# Patient Record
Sex: MALE | Race: WHITE | NOT HISPANIC OR LATINO | Employment: OTHER | URBAN - METROPOLITAN AREA
[De-identification: names, ages, dates, MRNs, and addresses within clinical notes are randomized per-mention and may not be internally consistent; named-entity substitution may affect disease eponyms.]

---

## 2017-01-08 ENCOUNTER — THERAPY VISIT (OUTPATIENT)
Dept: SLEEP MEDICINE | Facility: CLINIC | Age: 67
End: 2017-01-08
Attending: INTERNAL MEDICINE
Payer: COMMERCIAL

## 2017-01-08 DIAGNOSIS — R06.83 SNORING: ICD-10-CM

## 2017-01-08 DIAGNOSIS — I48.0 PAROXYSMAL ATRIAL FIBRILLATION (H): ICD-10-CM

## 2017-01-08 DIAGNOSIS — G47.30 OBSERVED SLEEP APNEA: ICD-10-CM

## 2017-01-08 PROCEDURE — 95811 POLYSOM 6/>YRS CPAP 4/> PARM: CPT | Mod: ZF

## 2017-01-09 NOTE — PATIENT INSTRUCTIONS
[place] [facility]    1. Your sleep study will be reviewed by a sleep physician within the next few days.     2. Please follow up in the sleep clinic as scheduled, or, make an appointment with your sleep provider to be seen within two weeks to discuss the results of the sleep study.    3. If you have any questions or problems with your treatment plan, please contact your sleep clinic provider at [phone] to further manage your condition.    4. Please review your attached medication list, and, at your follow-up appointment advise your sleep clinic provider about any changes.    5. Go to [email or web address] for more information about your sleep problems.    Yara Avelar, PSGT  January 9, 2017

## 2017-01-09 NOTE — PROGRESS NOTES
Completed a split night PSG per provider order.    Preliminary AHI 29.  A final therapeutic PAP pressure was achieved.    Supine REM was seen on therapeutic pressure.    Patient reports feeling not refreshed in AM.

## 2017-01-10 ENCOUNTER — TELEPHONE (OUTPATIENT)
Dept: GASTROENTEROLOGY | Facility: OUTPATIENT CENTER | Age: 67
End: 2017-01-10

## 2017-01-10 DIAGNOSIS — R35.0 URINARY FREQUENCY: Primary | ICD-10-CM

## 2017-01-10 DIAGNOSIS — Z12.11 ENCOUNTER FOR SCREENING COLONOSCOPY: Primary | ICD-10-CM

## 2017-01-10 RX ORDER — ALFUZOSIN HYDROCHLORIDE 10 MG/1
10 TABLET, EXTENDED RELEASE ORAL DAILY
Qty: 90 TABLET | Refills: 1 | Status: SHIPPED | OUTPATIENT
Start: 2017-01-10 | End: 2017-07-06

## 2017-01-10 ASSESSMENT — ACTIVITIES OF DAILY LIVING (ADL)
ARE_THERE_FIREARMS_IN_YOUR_HOME?: N
DO_MEMBERS_OF_YOUR_HOUSEHOLD_WEAR_SEAT_BELTS?: Y
ARE_THERE_CARBON_MONOXIDE_DETECTORS_IN_YOUR_HOME?: Y
DO_MEMBERS_OF_YOUR_HOUSEHOLD_USE_SUNSCREEN?: Y
DO_MEMBERS_OF_YOUR_HOUSEHOLD_USE_SAFETY_HELMETS?: Y
ARE_THERE_SMOKE_DETECTORS_IN_YOUR_HOME?: Y

## 2017-01-10 NOTE — TELEPHONE ENCOUNTER
Patient taking any blood thinners ? 81 mg aspirin    Heart disease ? Paroxysmal atrial fibrillation pt. Reports no current problems    Lung disease ? denies      Sleep apnea ? possible    Diabetic ? denies    Kidney disease ? denies    Dialysis ? n/a    Electronic implanted medical devices ? denies    Are you taking any narcotic pain medication ?  no What is your daily dosage ?    PTSD ? n/a    Prep instructions reviewed with patient ? Instructions,  policy, MAC sedation plan reviewed. Advised pt. To have someone stay with him post exam    Pharmacy : [facility]    Indication for procedure : screening colonoscopy    Referring provider : Dr. Lucy Colon

## 2017-01-16 ENCOUNTER — OFFICE VISIT (OUTPATIENT)
Dept: INTERNAL MEDICINE | Facility: CLINIC | Age: 67
End: 2017-01-16

## 2017-01-16 VITALS
OXYGEN SATURATION: 96 % | HEART RATE: 51 BPM | HEIGHT: 69 IN | SYSTOLIC BLOOD PRESSURE: 115 MMHG | DIASTOLIC BLOOD PRESSURE: 70 MMHG | RESPIRATION RATE: 20 BRPM | BODY MASS INDEX: 26.51 KG/M2 | WEIGHT: 179 LBS

## 2017-01-16 DIAGNOSIS — G47.30 SLEEP APNEA, UNSPECIFIED TYPE: ICD-10-CM

## 2017-01-16 DIAGNOSIS — I48.0 PAROXYSMAL ATRIAL FIBRILLATION (H): ICD-10-CM

## 2017-01-16 DIAGNOSIS — M72.0 DUPUYTREN'S CONTRACTURE OF BOTH HANDS: ICD-10-CM

## 2017-01-16 DIAGNOSIS — B18.2 CHRONIC HEPATITIS C WITHOUT HEPATIC COMA (H): ICD-10-CM

## 2017-01-16 DIAGNOSIS — Z01.810 PRE-OPERATIVE CARDIOVASCULAR EXAMINATION: Primary | ICD-10-CM

## 2017-01-16 LAB
ANION GAP SERPL CALCULATED.3IONS-SCNC: 6 MMOL/L (ref 3–14)
BUN SERPL-MCNC: 13 MG/DL (ref 7–30)
CALCIUM SERPL-MCNC: 8.5 MG/DL (ref 8.5–10.1)
CHLORIDE SERPL-SCNC: 107 MMOL/L (ref 94–109)
CO2 SERPL-SCNC: 28 MMOL/L (ref 20–32)
CREAT SERPL-MCNC: 0.88 MG/DL (ref 0.66–1.25)
GFR SERPL CREATININE-BSD FRML MDRD: 86 ML/MIN/1.7M2
GLUCOSE SERPL-MCNC: 93 MG/DL (ref 70–99)
POTASSIUM SERPL-SCNC: 3.7 MMOL/L (ref 3.4–5.3)
SODIUM SERPL-SCNC: 140 MMOL/L (ref 133–144)

## 2017-01-16 ASSESSMENT — PAIN SCALES - GENERAL: PAINLEVEL: NO PAIN (0)

## 2017-01-16 NOTE — PATIENT INSTRUCTIONS
[facility] Medication Refill Request Information:  * Please contact your pharmacy regarding ANY request for medication refills.  ** [facility] Prescription Fax = [phone]  * Please allow 3 business days for routine medication refills.  * Please allow 5 business days for controlled substance medication refills.     [facility] Test Result notification information:  *You will be notified with in 7-10 days of your appointment day regarding the results of your test.  If you are on MyChart you will be notified as soon as the provider has reviewed the results and signed off on them.    Take your medication as usual on the day prior to your surgery  Resume them the evening of your surgery, including the aspirin  Go to the lab before you leave today  If your feet become painful, or swollen, etc., let me know and I will refer you to Podiatry  You may cancel your appointment with me next month  Follow up in one year and as needed  Dr. Edwards

## 2017-01-16 NOTE — NURSING NOTE
Chief Complaint   Patient presents with     Pre-Op Exam     pre op Release Dupuytrens contracture 1/20/17 (right hand)   Alessandra Hurt LPN 8:39 AM on 1/16/2017

## 2017-01-16 NOTE — PROGRESS NOTES
CC:  Preop    HPI:  Frank Orellana presents today at the request of Dr. Lars Oleary for perioperative cardiopulmonary risk assessment.  The proposed procedure is:  RELEASE DUPUYTRENS CONTRACTURE Right Other   Subtotal Palmar Facetectomy Right Ring Finger              Symptoms of bilateral hand contracture deformities have been progressive over the last few years, affecting daily living activities.  No pain, no history of trauma.  He has had no previous treatment, splinting, nor injections.  No weakness, numbness, tingling.  I saw him in November for preop and we ended up postponing surgery in order to complete his cardiac work up for aortic dilation and a fib.  He was also having some atypical chest pain which has since resolved.  Since I saw him in November 2016, he has had consultations as summarized below (copy and pasted key information):    12/12/16 Cardiology:  Assessment and Plan:   Frank Orellana is a 66 year old Mandaen male referred for symptomatic paroxysmal atrial fibrillation.    1. Paroxysmal atrial fibrillation [id number] (estimated 1.5% annual risk of stroke/thromboembolism) (age+):  Regarding thromboembolic prophylaxis, we discussed that there was some potential benefit for anticoagulation with a TPE1RE2-OXVd of 1 in preventing stroke, but that the risk-benefit profile of preventing thromboembolism vs. Potential for bleeding was not as clear as for a higher GQEC1SX-DSGq score. Particularly given his Episcopalian proscriptions against blood products, he feels that bleeding is the greater concern for him and he understands the 1.5%/year risk of thromboembolism. We have reduced his ASA to 81 mg daily.  He is on diltiazem CD for rate control, which he should continue.  We discussed the merits of a rhythm control strategy, as his A.fib has been significantly symptomatic. He would consider this in the future if episodes became more frequent, but prefers to remain on rate control only at the  present and we agreed with this approach.  Regarding risk factors, his wife reports witnessed apneic episodes and a sleep study is strongly indicated to assess for WINNIE, a modifiable risk factor for A.fib.    2. Thoracic aortic aneuyrsm: 4.5cm mid-ascending aorta on TTE 10/14/16. We have ordered an MRA chest to better assess aortic dimensions.    3. Preoperative cardiovascular examination:  Planned hand surgery is a non-emergent procedure that is low risk. He has no active unstable cardiac conditions (controlled paroxysmal A.fib and moderate aortic root dilatation are not contraindications), and has a functional capacity >4 METS without symptoms. His RCRI is 0, predicting a 0.4% risk of perioperative cardiovascular events.    No further cardiovascular workup is indicated prior to surgery.     Denis Martinez MD  Cardiology Fellow    EP STAFF NOTE  Patient seen and examined and management plan personally reviewed with the patient. I agree with the note above.  Jovanni Felipe MD [facility]  Cardiology - Electrophysiology      Regarding hepatitis C:  12/29/16 hepatology:  Frank Orellana  has chronic hepatitis C, genotype 1b.  He has stage 0 fibrosis as seen on the Fibrosis scan done 12/29/16.  He also had an US today that showed the liver has normal echotexture and no masses.  He  would like to participate in the Prioritize Study.  After discussion with our Research Coordinator, Mr. Orellana would like to have hand surgery first and then start the treatment.  He has agreed to be part of the study.    Sleep apnea f/u pending, had sleep study:  12/2016:  Central Sleep apnea, WINNIE, a fib  AHI 20 on 12/29/16 12/27/16 MR angio chest:  Impression:  1.  Normal caliber of thoracic aorta. The ascending aorta measures 3.9  cm, which is normal when indexed for age/gender/size.    Ziopatch 12/7/16:  Symptom events do not correlate with objective ectopy/arrythmia  Longest SVT run with variable HR, possibly A fib  SVT rage  , longest 45 sec with rate 120    11/11/16 US Aorta:  2.4 cm (WNL)    Hand Xray 10/28/16:  Reviewed    Cardiac risks:  None  Pulmonary risks:  Age, sleep apnea  Exercise tolerance:  Mets approx. 3-4, no concerns  Personal and/or family history of bleeding conditions and/or adverse anesthesia reactions:  None  Prior surgical complications:  None    ROS:  Submitted on 1/10/2017   Annual Exam:         General Symptoms: No    Skin Symptoms: No    HENT Symptoms: No    EYE SYMPTOMS: No    HEART SYMPTOMS: No    LUNG SYMPTOMS: No    INTESTINAL SYMPTOMS: No    URINARY SYMPTOMS: No    REPRODUCTIVE SYMPTOMS: No    SKELETAL SYMPTOMS: No    BLOOD SYMPTOMS: No    NERVOUS SYSTEM SYMPTOMS: No    MENTAL HEALTH SYMPTOMS: No    Frequency of exercise:: 2-3 days/week    Duration of exercise:: 45-60 minutes     Patient Active Problem List   Diagnosis     History of actinic keratoses     Telangiectasia     SK (seborrheic keratosis)     Patient is Mandaen     Refusal of blood transfusions as patient is Mandaen     Paroxysmal atrial fibrillation (H)     Pain in both hands     Chronic left shoulder pain     Chronic hepatitis C without hepatic coma (H)     Sleep apnea     Atrial fibrillation (H)       Past Medical History   Diagnosis Date     Atrial fibrillation (H) 1996     paroxysmal on 3-4 occasions     Actinic keratosis 2009     Hepatitis B 1969     acute infection at age 19; IV drug use     Patient is Mandaen      Refusal of blood transfusions as patient is Mandaen      Pain in both hands      Right shoulder pain      Tinnitus 1 or 2 years ago     both ears     Sleep apnea      central and obstructive     Atrial fibrillation (H) 6/15/96     Afib - 2 or 3 extended occurrences since       Past Surgical History   Procedure Laterality Date     Arthroscopy knee       left knee     Family History   Problem Relation Age of Onset     Arthritis Paternal Grandmother      GASTROINTESTINAL DISEASE Father  "      age 77 after colon surgery     Alcohol/Drug Mother       age 64     HEART DISEASE Brother      Social History     Social History     Marital Status:      Spouse Name: N/A     Number of Children: N/A     Years of Education: N/A     Occupational History     Not on file.     Social History Main Topics     Smoking status: Former Smoker -- 0.50 packs/day for 10 years     Types: Cigarettes, Other     Start date: 06/15/1964     Quit date: 1974     Smokeless tobacco: Never Used     Alcohol Use: 1.8 - 2.4 oz/week      Comment: 4 or 5 drinks/week, limit of 1     Drug Use: Yes     Special: Marijuana, Methamphetamines, IV      Comment:      Sexual Activity:     Partners: Female     Birth Control/ Protection: None     Other Topics Concern     Special Diet No     eats healthy     Exercise No     Social History Narrative    Chemical manufacturing solvents    Cleaning     [employer]    Retired    , no kids         Current Outpatient Prescriptions   Medication Sig Dispense Refill     alfuzosin (UROXATRAL) 10 MG 24 hr tablet Take 1 tablet (10 mg) by mouth daily 90 tablet 1     aspirin 81 MG EC tablet Take 1 tablet (81 mg) by mouth daily 90 tablet 3     diltiazem 180 MG 24 hr capsule Take 1 capsule (180 mg) by mouth daily (Patient taking differently: Take 180 mg by mouth daily 120 mg finishing up old script) 90 capsule 3     Allergies   Allergen Reactions     Blood Transfusion Related (Informational Only)      Orthodoxy.  Declines blood transfusions.     Contrast Dye Rash     /70 mmHg  Pulse 51  Resp 20  Ht 1.753 m (5' 9\")  Wt 81.194 kg (179 lb)  BMI 26.42 kg/m2  SpO2 96%  Physical Examination:  General:  Pleasant, alert, no acute distress  Eyes:  Pupils 2-3 mm, sclera white, EOM's full.    Ears:  TM's normal, EAC's patent, clear of cerumen  Nose:  Nasal passages clear, turbinates not swollen  Throat/Mouth:  No pharyngeal erythema or exudate, oral mucosa and tongue moist, no " suspicious oral lesions  Neck:  Full AROM, supple, thyroid smooth, symmetric, not enlarged, no nodules  Lungs:  Clear to auscultation throughout, no wheezes, rhonchi or rales.  C/V:  Regular rate and rhythm today, no murmurs, rubs or gallops.  No JVD, no carotid bruits.  No peripheral edema.   Abdomen:  Not distended.  Bowel sounds active.  No tenderness, no hepatosplenomegaly or masses.  No CVA tenderness or masses.  Lymph:  No cervical lymph nodes.  Neuro:  Alert and oriented, face symmetric. No tremor.  Gait steady.    Skin:   No rashes or jaundice.  Normal moisture, good skin turgor.  Psych:  Broad range affect.  Not psychomotor slowed.  No signs of anxiety or agitation.    Component      Latest Ref Rng 12/2/2016 1/16/2017   WBC      4.0 - 11.0 10e9/L 5.5    RBC Count      4.4 - 5.9 10e12/L 4.67    Hemoglobin      13.3 - 17.7 g/dL 14.5    Hematocrit      40.0 - 53.0 % 42.4    MCV      78 - 100 fl 91    MCH      26.5 - 33.0 pg 31.0    MCHC      31.5 - 36.5 g/dL 34.2    RDW      10.0 - 15.0 % 13.0    Platelet Count      150 - 450 10e9/L 184    Diff Method       Automated Method    % Neutrophils       58.8    % Lymphocytes       27.7    % Monocytes       10.6    % Eosinophils       2.2    % Basophils       0.5    % Immature Granulocytes       0.2    Nucleated RBCs      0 /100 0    Absolute Neutrophil      1.6 - 8.3 10e9/L 3.2    Absolute Lymphocytes      0.8 - 5.3 10e9/L 1.5    Absolute Monocytes      0.0 - 1.3 10e9/L 0.6    Absolute Eosinophils      0.0 - 0.7 10e9/L 0.1    Absolute Basophils      0.0 - 0.2 10e9/L 0.0    Abs Immature Granulocytes      0 - 0.4 10e9/L 0.0    Absolute Nucleated RBC       0.0    Sodium      133 - 144 mmol/L  140   Potassium      3.4 - 5.3 mmol/L  3.7   Chloride      94 - 109 mmol/L  107   Carbon Dioxide      20 - 32 mmol/L  28   Anion Gap      3 - 14 mmol/L  6   Glucose      70 - 99 mg/dL  93   Urea Nitrogen      7 - 30 mg/dL  13   Creatinine      0.66 - 1.25 mg/dL  0.88   GFR  Estimate      >60 mL/min/1.7m2  86   GFR Estimate If Black      >60 mL/min/1.7m2  >90 . . .   Calcium      8.5 - 10.1 mg/dL  8.5     Frank was seen today for pre-op exam.    Diagnoses and all orders for this visit:    Pre-operative cardiovascular examination  . . .completed today for patient undergoing release of dupuytren's contractures.  Frank has a low cardiopulmonary risk profile for this low risk surgery.  He will take his diltiazem as usual the night prior to surgery  ASA will be on hold preoperative, may resume same day post op  See cardiac evaluation in HPI section  No additional testing indicated at this time.    Dupuytren's contracture of both hands--plan is right hand followed by left    Patient is Judaism    Refusal of blood transfusions as patient is Judaism    Paroxysmal atrial fibrillation (H)   Rate controlled, paroxysmal, currently in SR, asymptomatic  -     Basic metabolic panel; Future    Chronic hepatitis C without hepatic coma (H)    Sleep apnea, unspecified type    Follow up as needed.    Vanessa Edwards M.D.  Internal Medicine  Primary Care Center   pager [phone]

## 2017-01-16 NOTE — MR AVS SNAPSHOT
After Visit Summary   1/16/2017    Frank Orellana    MRN: [id number]           Patient Information     Date Of Birth          1950        Visit Information        Provider Department      1/16/2017 8:40 AM Vanessa Edwards MD [facility]        Today's Diagnoses     Pre-operative cardiovascular examination    -  1     Pain of right hand         Patient is Gnosticism         Refusal of blood transfusions as patient is Gnosticism         Paroxysmal atrial fibrillation (H)         Chronic hepatitis C without hepatic coma (H)         Sleep apnea, unspecified type         Sleep apnea, unspecified type           Care Instructions    [facility] Care Center Medication Refill Request Information:  * Please contact your pharmacy regarding ANY request for medication refills.  ** [facility] Prescription Fax = [phone]  * Please allow 3 business days for routine medication refills.  * Please allow 5 business days for controlled substance medication refills.     [facility] Test Result notification information:  *You will be notified with in 7-10 days of your appointment day regarding the results of your test.  If you are on MyChart you will be notified as soon as the provider has reviewed the results and signed off on them.    Take your medication as usual on the day prior to your surgery  Resume them the evening of your surgery, including the aspirin  Go to the lab before you leave today  If your feet become painful, or swollen, etc., let me know and I will refer you to Podiatry  You may cancel your appointment with me next month  Follow up in one year and as needed  Dr. Edwards          Follow-ups after your visit        Your next 10 appointments already scheduled     Jan 16, 2017  9:45 AM   LAB with  LAB   [facility] ([facility])    [address] [place] [address] [phone]           Patient must bring  picture ID.  Patient should be prepared to give a urine specimen  Please do not eat 10-12 hours before your appointment if you are coming in fasting for labs on lipids, cholesterol, or glucose (sugar).  Pregnant women should follow their Care Team instructions. Water with medications is okay. Do not drink coffee or other fluids.   If you have concerns about taking  your medications, please ask at office or if scheduling via [employer], send a message by clicking on Secure Messaging, Message Your Care Team.            Jan 17, 2017  7:30 AM   Colonoscopy with Darrion Tompkins MD   [facility] ([facility])    [address]  [facility] [address]   [phone]            Jan 18, 2017  1:00 PM   Return Sleep Patient with Melissa Stanford MD   [facility] [place], Sleep Study ([facility])    [address] [place] [address] [phone]            Jan 20, 2017   Procedure with Lars Oleary MD   [facility] [place], Same Day Surgery (--)    [facility] [address] [phone]            Jan 27, 2017 11:40 AM   (Arrive by 11:25 AM)   RETURN HAND with Lars Oleary MD   [facility] ([facility])    [address] [place] [address] [phone]            Feb 09, 2017  7:00 AM   (Arrive by 6:45 AM)   PHYSICAL with Vanessa Edwards MD   [facility] ([facility])    [address] [place] [address] [phone]            Apr 27, 2017  8:00 AM   Ech Complete with UCECHCR2   [facility] Echo ([facility] [place])    [address] [place] [address] [phone]           1.  Please bring or wear a comfortable two-piece outfit. 2.  You may eat, drink and take your normal medicines. 3.  For any questions that cannot be  answered, please contact the ordering physician            Jun 07, 2017  8:00 AM   (Arrive by 7:45 AM)   RETURN ARRHYTHMIA with Jovanni Felipe MD   [facility] ([facility])    [address] [facility] [address] [facility]  [address] [facility] [address] [phone]            Haseeb 15, 2017  7:30 AM   Walk In From ENT with NIKHIL Russo [facility] ([facility])    [address] [place] [address]  [address] [facility] [address] [phone]            Haseeb 15, 2017  8:30 AM   (Arrive by 8:15 AM)   Return Visit with Andrae Laughlin MD   [facility] ([facility])    [address] [place] [address] [phone]              Future tests that were ordered for you today     Open Future Orders        Priority Expected Expires Ordered    Basic metabolic panel Routine  1/16/2018 1/16/2017            Who to contact     Please call your clinic at [phone] to:    Ask questions about your health    Make or cancel appointments    Discuss your medicines    Learn about your test results    Speak to your doctor   If you have compliments or concerns about an experience at your clinic, or if you wish to file a complaint, please contact [facility] Patient Relations at [phone] or email us at [email or web address]         Additional Information About Your Visit        [employer] Information     [employer] gives you secure access to your electronic health record. If you see a primary care provider, you can also send messages to your care team and make appointments. If you have questions, please call your primary care clinic.  If you do not have a primary care provider, please call [phone] and they will assist you.      [employer] is an electronic gateway that provides easy, online access to your medical records. With [employer], you can request a clinic appointment, read  "your test results, renew a prescription or communicate with your care team.     To access your existing account, please contact your [facility] or call [phone] for assistance.        Care EveryWhere ID     This is your Care EveryWhere ID. This could be used by other organizations to access your [facility] medical records  [id number]        Your Vitals Were     Pulse Respirations Height BMI (Body Mass Index) Pulse Oximetry       51 20 1.753 m (5' 9\") 26.42 kg/m2 96%        Blood Pressure from Last 3 Encounters:   01/16/17 115/70   12/29/16 137/77   12/22/16 102/56    Weight from Last 3 Encounters:   01/16/17 81.194 kg (179 lb)   12/29/16 81.285 kg (179 lb 3.2 oz)   12/22/16 81.647 kg (180 lb)                 Today's Medication Changes          These changes are accurate as of: 1/16/17  9:43 AM.  If you have any questions, ask your nurse or doctor.               These medicines have changed or have updated prescriptions.        Dose/Directions    diltiazem 180 MG 24 hr capsule   This may have changed:  additional instructions   Used for:  Paroxysmal atrial fibrillation (H)        Dose:  180 mg   Take 1 capsule (180 mg) by mouth daily   Quantity:  90 capsule   Refills:  3                Primary Care Provider Office Phone # Fax #    Vanessa Edwards [phone]        [facility] [address] [place] [address] [address]  [facility] [address]        Thank you!     Thank you for choosing [facility]  for your care. Our goal is always to provide you with excellent care. Hearing back from our patients is one way we can continue to improve our services. Please take a few minutes to complete the written survey that you may receive in the mail after your visit with us. Thank you!             Your Updated Medication List - Protect others around you: Learn how to safely use, store and throw away your medicines at www.disposemymeds.org.          This list is accurate as of: " 1/16/17  9:43 AM.  Always use your most recent med list.                   Brand Name Dispense Instructions for use    alfuzosin 10 MG 24 hr tablet    UROXATRAL    90 tablet    Take 1 tablet (10 mg) by mouth daily       aspirin 81 MG EC tablet     90 tablet    Take 1 tablet (81 mg) by mouth daily       diltiazem 180 MG 24 hr capsule     90 capsule    Take 1 capsule (180 mg) by mouth daily

## 2017-01-17 ENCOUNTER — TRANSFERRED RECORDS (OUTPATIENT)
Dept: HEALTH INFORMATION MANAGEMENT | Facility: CLINIC | Age: 67
End: 2017-01-17

## 2017-01-18 ENCOUNTER — OFFICE VISIT (OUTPATIENT)
Dept: SLEEP MEDICINE | Facility: CLINIC | Age: 67
End: 2017-01-18
Attending: INTERNAL MEDICINE
Payer: COMMERCIAL

## 2017-01-18 VITALS
HEART RATE: 58 BPM | SYSTOLIC BLOOD PRESSURE: 109 MMHG | DIASTOLIC BLOOD PRESSURE: 60 MMHG | RESPIRATION RATE: 18 BRPM | BODY MASS INDEX: 26.36 KG/M2 | WEIGHT: 178 LBS | OXYGEN SATURATION: 95 % | HEIGHT: 69 IN

## 2017-01-18 DIAGNOSIS — G47.33 OSA (OBSTRUCTIVE SLEEP APNEA): Primary | ICD-10-CM

## 2017-01-18 PROBLEM — D12.6 TUBULAR ADENOMA OF COLON: Status: ACTIVE | Noted: 2017-01-17

## 2017-01-18 PROCEDURE — 99211 OFF/OP EST MAY X REQ PHY/QHP: CPT | Mod: ZF

## 2017-01-18 NOTE — PROCEDURES
"SLEEP STUDY INTERPRETATION  SPLIT-NIGHT STUDY      Patient: Frank Orellana  YOB: 1950  Study Date: 1/8/2017  MRN: [id number]  Referring Provider: Jovanni Felipe MD  Ordering Provider: Melissa Stanford MD    Indications for Polysomnography: The patient is a 66 y old Male who is 5' 9\" and weighs 180.0 lbs.  His BMI is 26.7, [place] sleepiness scale is 2.0 and neck size is 39 cm.  Relevant medical history includes paroxysmal atrial fibrillation.  A diagnostic polysomnogram was performed to evaluate for Sleep Apnea.  After 126.5 minutes of sleep time the patient exhibited sufficient respiratory events qualifying him for a CPAP trial which was then initiated.      Polysomnogram Data:  A full night polysomnogram recorded the standard physiologic parameters including EEG, EOG, EMG, ECG, nasal and oral airflow.  Respiratory parameters of chest and abdominal movements were recorded with respiratory inductance plethysmography.  Oxygen saturation was recorded by pulse oximetry.      Diagnostic PSG  Sleep Architecture: Increased arousal index with normal sleep efficiency.  The total recording time of the diagnostic portion of the study was 141.5 minutes.  The total sleep time was 126.5 minutes.  During the diagnostic portion of the study the sleep latency was decreased at 2.0 minutes with the use of a sleep aid (zolpidem 5 mg.)  REM latency was 52.0 minutes.  Arousal index was increased at 32.7 arousals per hour.  Sleep efficiency was normal at 89.4%.  Wake after sleep onset was 3.0 minutes.   The patient spent 5.9% of total sleep time in Stage N1, 33.6% in Stage N2, 52.6% in Stage N3 and 7.9% in REM.       Respiration: Severe obstructive sleep apnea with mild associated hypoxemia notable in REM sleep.    Events - During the diagnostic portion of the study, the polysomnogram revealed a presence of 10 obstructive, 5 central, and 9 mixed apneas resulting in an apnea index of 11.4 events per hour.  There were 44 " hypopneas resulting in a hypopnea index of 20.9 events per hour.  The combined apnea/hypopnea index was 32.3 events per hour.  The REM AHI was 48.0 events per hour.  The supine AHI was 32.3 events per hour.  The RERA index was 1.9 events per hour.  The RDI was 34.2 events per hour.     Snoring - was reported as moderate and  intermittent.    Respiratory rate and pattern - was notable for normal respiratory rate and pattern.    Sustained Sleep Associated Hypoventilation - Transcutaneous carbon dioxide monitoring was not used, however significant hypoventilation was not suggested by oximetry.    Sleep Associated Hypoxemia - (Greater than 5 minutes O2 sat below 89%) was not present.  Baseline oxygen saturation was 92.9%. Lowest oxygen saturation was 75.2%.  Time spent less than or equal to 88% was 5.2 minutes.  Time spent less than or equal to 89% was 7.0 minutes.  34.2 1.9 32.3     Treatment PSG  Sleep Architecture: Normal arousal index with decreased sleep efficiency on PAP therapy.  At 01:37:58 AM the patient was placed on CPAP treatment and was titrated at pressures ranging from 5 cmH2O up to 7 cmH2O.  The total recording time of the treatment portion of the study was 272.4 minutes.  The total sleep time was 224.0 minutes.  During the treatment portion of the study the sleep latency was 2.5 minutes.  REM latency was 42.0 minutes.  Arousal index was normal at 19.6 arousals per hour.  Sleep efficiency was decreased at 82.2%.  Wake after sleep onset was 38.5 minutes.  The patient spent 8.7% of total sleep time in Stage N1, 66.1% in Stage N2, 10.3% in Stage N3 and 15.0% in REM.       Respiration: Final CPAP pressure of 7 resolved obstructive respiratory events including REM supine.  The optimal pressure was 7 cmH2O with an AHI of 3.2 events per hour.  Time in REM supine on final pressure was 12.0 minutes.   This titration was considered optimal (residual AHI < 5 events per hour and including REM-supine sleep at final  pressure).     Movement Activity: Periodic limb movement noted on diagnostic, none when on PAP.    Periodic Limb Movements  o During the diagnostic portion of the study, there were 45 PLMs recorded. The PLM index was 21.3 movements per hour.  The PLM Arousal Index was 1.9 per hour.    During the treatment portion of the study, there were 0 PLMs recorded.     REM EMG Activity - Excessive transient / sustained muscle activity was not present.    Nocturnal Behavior - Abnormal sleep related behaviors were not noted.    Bruxism - None apparent.    Cardiac Summary: Short runs of supraventricular tachycardia as well as occasional early wide QRS complex beats (PVCs.)  During the diagnostic portion of the study, the average pulse rate was 44.9 bpm.  The minimum pulse rate was 36.9 bpm while the maximum pulse rate was 80.7 bpm.    During the treatment portion of the study, the average pulse rate was 43.4 bpm.  The minimum pulse rate was 34.9 bpm while the maximum pulse rate was 72.1 bpm.       Assessment:     Severe obstructive sleep apnea with AHI 32.2 events per hour and mild associated hypoxemia notable in REM sleep.    Increased arousal index with normal sleep efficiency.    Final CPAP pressure of 7 resolved obstructive respiratory events including REM supine.    Normal arousal index with decreased sleep efficiency on PAP therapy.    Periodic limb movement noted on diagnostic, none when on PAP.    Short runs of supraventricular tachycardia as well as occasional early wide QRS complex beats (PVCs.)    Recommendations:    Treatment of WINNIE with CPAP at 7 cmH2O.      Recommend clinical follow up with sleep management team, for coaching and review of effectiveness and measures.    Patient may be a candidate for dental appliance through referral to Sleep Dentistry for the treatment of obstructive sleep apnea and/or socially disruptive snoring.    If devices are not acceptable or effective, patient may benefit from evaluation of  possible surgical options for the treatment of obstructive sleep apnea and/or socially disruptive snoring.  If he is interested, would recommend referral to specialized ENT-Sleep provider.    Advise regarding the risks of drowsy driving.    Suggest optimizing sleep schedule and avoiding sleep deprivation.    Pharmacologic therapy should be used for management of restless legs syndrome only if present and clinically indicated and not based on the presence of periodic limb movements alone.    Diagnostic Codes:  Obstructive Sleep Apnea G47.33  Sleep Hypoxemia/Hypoventilation G47.36                                                                                             _____________________________________   Melissa Stanford MD 1/18/2017             Table of Oximetry Distribution    Range(%) Time in range (min) Time in range (%) Time in or below range (min) Time in or below range (%)   0.0 - 88.0 5.2 1.3% 5.2 1.3%   0.0 - 89.0 7.0 1.7% 7.0 1.7%

## 2017-01-18 NOTE — NURSING NOTE
"Chief Complaint   Patient presents with     RECHECK     Follow up to discuss PSG results       Initial Ht 1.753 m (5' 9\")  Wt 80.74 kg (178 lb)  BMI 26.27 kg/m2 Estimated body mass index is 26.27 kg/(m^2) as calculated from the following:    Height as of this encounter: 1.753 m (5' 9\").    Weight as of this encounter: 80.74 kg (178 lb).  BP completed using cuff size: regular right arm    ROX Jin          "

## 2017-01-18 NOTE — PROGRESS NOTES
DATE OF SERVICE:  01/18/2017      CHIEF COMPLAINT:  Followup of sleep study.      HISTORY OF PRESENT ILLNESS:  Frank Orellana is a 67-year-old gentleman with history of paroxysmal atrial fibrillation.  He was having observed apneas by his wife for many years.  Finally, had overnight polysomnography performed.  He is here today for those results.  They are summarized as follows:   Study was done on 01/08/2017.  Total sleep time 126 minutes  during the diagnostic portion. Sleep latency was 2 minutes with the use of a sleep aid.  Overall, there were 68 apneas and hypopneas, for an apnea-hypopnea index of 32.3.  All sleep was supine.  REM and non-REM were seen.   35% of the events were central in nature.  CPAP was initiated and final CPAP pressure of 7 led to resolution of sleep disordered breathing.  There was mild associated hypoxemia during the study primarily in REM sleep.  This was also resolved on PAP.  No significant periodic limb movements.  There was a short period of what appears to be supraventricular tachycardia as well as frequent PVCs.       ASSESSMENT:  Severe obstructive sleep apnea with comorbidities of cardiac arrhythmias.  He has a good CPAP titration.        PLAN: We discussed treatment options in detail today.  We recommended CPAP due to the severity of the sleep apnea and his good result during the sleep study, and arrhythmias.  Also discussed dental appliances and position restriction.  Unfortunately, the sleep study did not show any lateral sleep so I cannot say for sure that his sleep apnea would be milder or resolved with lateral sleep.  The patient is noted to sleep on his back at home.  Also reviewed the process of getting a machine, sleep therapy management process and the importance of finding a good fitting mask.  We reviewed the importance of treating severe sleep apnea.  I do not think weight loss would be helpful in this situation. We will generate orders for him to initiate CPAP therapy.   He will schedule a setup, be enrolled in the sleep therapy management program which was reviewed with him in detail today.  He will follow up with me face-to-face approximately 7 weeks later.  He was given a copy of the final report. He is agreeable with this plan.  Questions were answered.  Please see above for further details of counseling provided.      Twenty five  minutes spent with patient, greater than 50% spent in counseling and coordinating care.         CARLA GANNON MD             D: 2017 13:43   T: 2017 16:44   MT: HAZEL      Name:     CATHERINE OH   MRN:      [id number]        Account:      [id number]   :      1950           Visit Date:   2017      Document: [id number]

## 2017-01-18 NOTE — MR AVS SNAPSHOT
After Visit Summary   1/18/2017    Frank Orellana    MRN: [id number]           Patient Information     Date Of Birth          1950        Visit Information        Provider Department      1/18/2017 1:00 PM Melissa Stanford MD [facility], [place], Sleep Study        Care Instructions    1.  Continue CPAP every night for all hours that you are sleeping.  If you nap use CPAP.  As always, try to get at least 8 hours of sleep or more each day, and avoid sleep deprivation. Avoid alcohol.    2.  Reasons that you might need a change to your pressure therapy would be weight gain or loss, waking having inadvertently removed your CPAP overnight, having previously felt refreshed by sleep with CPAP use and now waking un-refreshed, and return of daytime sleepiness. Also, the development of new medical problems can sometimes affect breathing at night-heart failure, stroke, medications such as narcotics.    3.  Please bring CPAP with you if you are hospitalized.  If anticipating surgery be sure to discuss with your surgeon that you have sleep apnea and use PAP therapy.      4.  Maintain your equipment as recommended which includes routine cleaning and replacement of supplies.  Call DME for any questions regarding supplies or maintenance.      5.  Do not drive on engage in potentially dangerous activities if feeling sleepy.    6.  Please see me again in 2 months and bring your machine and card to your follow-up visit.      [facility] DME and cpap specialist Tash [phone]  [facility] [phone]    [facility] DME [phone], CPAP specialist [phone]  [facility] [phone]    [place] Medical Equipment Department, [facility] [phone]    [facility] [place] DME  Daphnie Miles [phone]  [facility] [place] DME Brenda [phone]  [facility]  Medical DME phone [phone]         Tips for your CPAP and BIPAP use-    Mask fitting tips  Mask fitting exercise:    To improve your mask seal and your mobility at night, put mask on and secure in place.  Lie down in bed with full pressure and roll to one side, adjust headgear while in that position to eliminate any leaks. Repeat process rolling to other side.     The mask seal does not have to be perfect:   CPAP machines are designed to make up for small leaks. However, you will not tolerate leaks blowing in your eyes so you will need to adjust.   Any leak should only be near or at the bottom of the mask.  We expect your mask to leak slightly at night.    Do not over-tighten the headgear straps, tighter IS NOT better, we expect minimal leak.    First try re-positioning the mask or headgear before tightening the headgear straps.  Mask leaks are expected due to changing sleeping positions. Try pulling the mask away from your skin allowing the cushion to re-inflate will minimize the leak.  If you struggle for a good fit, try turning the CPAP off and then readjust the mask by pulling it away from your face and then turning back on the CPAP.        Humidifier tips  Humidifiers can be adjusted to increase or decrease the amount of moisture according to your comfort level. You may need to adjust this frequently at first, but then might only change it with seasonal weather changes.     Try INCREASING the humidity if:  You experience a dry, irritated nasal passage or throat.  You have a runny, drippy nose or sneezing fits after using CPAP.  You experience nasal congestion during or after CPAP use.    Try DECREASING the humidity if:  You have excessive condensation or  rain out  in the tubing or mask.  Otherwise keep the tubing warm during the night by running it underneath the blankets or pillow.      Clinic visit after initial CPAP and BIPAP set-up   Bring your equipment with you to your 4 week follow up clinic visit.   We will be extracting your data from the machine.        Travel  Always take your equipment with you.  If you fly with your equipment bring it on with you as a carry on.  Medical equipment does not count as a carry on.    If you travel international the machines take 110-240.  The only adapter needed is the adapter that will fit into the receptacle (outlet).    You may also want to bring an extension cord as many hot rooms have limited outlets at the bedside.  Do not travel with water in your humidifier chamber.     Cleaning and Maintenance Guidelines    Equipment Frequency Cleaning Method   Mask First Day    Daily      Weekly Soak mask in hot soapy water for 30 minutes, rinse and air dry.  Wipe nasal cushion with a hot soapy (Ivory, baby shampoo) cloth and rinse.  Baby wipes may also be used.  Do not use anti-bacterial soaps,Isamar  liquid soap, rubbing alcohol, bleach or ammonia.  Wash frame in hot soapy water (Ivory, baby shampoo) rinse and let air dry   Headgear Biweekly Wash in hot soapy water, rinse and air dry   Reusable Gray Filter Weekly Wash in hot soapy water, rinse, put in towel squeeze moisture out, let air dry   Disposable White Filter Check Weekly Replace when brown or gray in color; at least every 2 to 3 months   Humidifier Chamber Daily    Weekly Empty distilled water from humidifier and let air dry    Hand wash in hot soapy water, rinse and air dry   Tubing Weekly Wash in hot soapy water, rinse and let air dry   Mask, Tubing and Humidifier Chamber As needed Disinfect: Soak in 1 part vinegar to 3 parts hot water for 30 minutes, rinse well and air dry  Not the material headgear        MASK AND SUPPLY REORDERING  Reminder: Most insurance companies will allow for a new mask, headgear, tubing, and reusable gray filter every six months.  Disposable white ultra-fine filters are covered monthly.    EQUIPMENT NEEDS  Please call our CPAP specialist with any equipment problems, questions or needs.    HOME AND  SAFETY INSTRUCTIONS    Do not use frayed or cracked electrical cords, multi plug adaptors, or switched receptacles    Do not immerse electrical equipment into water    Assure that electrical cords do not become a tripping hazard      Your BMI is Body mass index is 26.27 kg/(m^2).  Weight management is a personal decision.  If you are interested in exploring weight loss strategies, the following discussion covers the approaches that may be successful. Body mass index (BMI) is one way to tell whether you are at a healthy weight, overweight, or obese. It measures your weight in relation to your height.  A BMI of 18.5 to 24.9 is in the healthy range. A person with a BMI of 25 to 29.9 is considered overweight, and someone with a BMI of 30 or greater is considered obese. More than two-thirds of American adults are considered overweight or obese.  Being overweight or obese increases the risk for further weight gain. Excess weight may lead to heart disease and diabetes.  Creating and following plans for healthy eating and physical activity may help you improve your health.  Weight control is part of healthy lifestyle and includes exercise, emotional health, and healthy eating habits. Careful eating habits lifelong are the mainstay of weight control. Though there are significant health benefits from weight loss, long-term weight loss with diet alone may be very difficult to achieve- studies show long-term success with dietary management in less than 10% of people. Attaining a healthy weight may be especially difficult to achieve in those with severe obesity. In some cases, medications, devices and surgical management might be considered.  What can you do?  If you are overweight or obese and are interested in methods for weight loss, you should discuss this with your provider.     Consider reducing daily calorie intake by 500 calories.     Keep a food journal.     Avoiding skipping meals, consider cutting portions  instead.    Diet combined with exercise helps maintain muscle while optimizing fat loss. Strength training is particularly important for building and maintaining muscle mass. Exercise helps reduce stress, increase energy, and improves fitness. Increasing exercise without diet control, however, may not burn enough calories to loose weight.       Start walking three days a week 10-20 minutes at a time    Work towards walking thirty minutes five days a week     Eventually, increase the speed of your walking for 1-2 minutes at time    In addition, we recommend that you review healthy lifestyles and methods for weight loss available through the National Institutes of Health patient information sites:  http://win.niddk.nih.gov/publications/index.htm    And look into health and wellness programs that may be available through your health insurance provider, employer, local community center, or matthew club.    Weight management plan: Patient was referred to their PCP to discuss a diet and exercise plan.            Follow-ups after your visit        Your next 10 appointments already scheduled     Jan 20, 2017   Procedure with Lars Oleary MD   [facility], Same Day Surgery (--)    [facility] [id number] [phone]            Jan 24, 2017  8:00 AM   PAP SETUP REPLACEMENT with DME SCHEDULE   [facility], Sleep Study ([facility])    [address] [place] [id number] [phone]            Jan 27, 2017 11:40 AM   (Arrive by 11:25 AM)   RETURN HAND with Lars Oleary MD   [facility] ([facility])    [address] [place] [id number] [phone]            Mar 23, 2017  1:00 PM   Return Sleep Patient with Melissa Stanford MD   [facility], Sleep Study ([facility])    [address] [place] [phone]    [phone]            Apr 27, 2017  8:00 AM   Ech Complete with UCECHCR2    [facility] ([facility])    [address] [phone] [phone]           1.  Please bring or wear a comfortable two-piece outfit. 2.  You may eat, drink and take your normal medicines. 3.  For any questions that cannot be answered, please contact the ordering physician            Jun 07, 2017  8:00 AM   (Arrive by 7:45 AM)   RETURN ARRHYTHMIA with Jovanni Felipe MD   [facility] Heart Care ([facility])    [address] [place] [id number] [phone]            Haseeb 15, 2017  7:30 AM   Walk In From ENT with NIKHIL Russo   [facility] Audiology ([facility])    [address] [place] [phone]            Haseeb 15, 2017  8:30 AM   (Arrive by 8:15 AM)   Return Visit with Andrae Laughlin MD   [facility] Ear Nose and Throat ([facility])    [address] [place] [phone]            Oct 09, 2017  1:15 PM   RETURN RETINA with Ruth Boone MD   [facility] Clinic ([facility])    [address]  [address] [facility]  [facility]  [facility] [address] [phone]              Who to contact     If you have questions or need follow up information about today's clinic visit or your schedule please [facility] directly at [phone].  Normal or non-critical lab and imaging results will be communicated to you by MyChart, letter or phone within 4 business days after the clinic has received the results. If you do not hear from us within 7 days, please contact the clinic through MyChart or phone. If you have a critical or abnormal lab result, we will notify you by phone as soon as possible.  Submit refill requests through [employer] or call your  "pharmacy and they will forward the refill request to us. Please allow 3 business days for your refill to be completed.          Additional Information About Your Visit        [employer] Information     [employer] gives you secure access to your electronic health record. If you see a primary care provider, you can also send messages to your care team and make appointments. If you have questions, please call your primary care clinic.  If you do not have a primary care provider, please call [phone] and they will assist you.        Care EveryWhere ID     This is your Care EveryWhere ID. This could be used by other organizations to access your [facility] medical records  [id number]        Your Vitals Were     Pulse Respirations Height BMI (Body Mass Index) Pulse Oximetry       58 18 1.753 m (5' 9\") 26.27 kg/m2 95%        Blood Pressure from Last 3 Encounters:   01/18/17 109/60   01/16/17 115/70   12/29/16 137/77    Weight from Last 3 Encounters:   01/18/17 80.74 kg (178 lb)   01/16/17 81.194 kg (179 lb)   12/29/16 81.285 kg (179 lb 3.2 oz)              Today, you had the following     No orders found for display         Today's Medication Changes          These changes are accurate as of: 1/18/17  1:35 PM.  If you have any questions, ask your nurse or doctor.               These medicines have changed or have updated prescriptions.        Dose/Directions    diltiazem 180 MG 24 hr capsule   This may have changed:  additional instructions   Used for:  Paroxysmal atrial fibrillation (H)        Dose:  180 mg   Take 1 capsule (180 mg) by mouth daily   Quantity:  90 capsule   Refills:  3                Primary Care Provider Office Phone # Fax #    Vanessa Edwards [phone]        PHYSICIANS [address] [facility] [address]  [facility] [address]        Thank you!     Thank you for choosing [facility], SLEEP STUDY  for your care. Our goal is always to provide you with excellent care. Hearing back from our " patients is one way we can continue to improve our services. Please take a few minutes to complete the written survey that you may receive in the mail after your visit with us. Thank you!             Your Updated Medication List - Protect others around you: Learn how to safely use, store and throw away your medicines at www.disposemymeds.org.          This list is accurate as of: 1/18/17  1:35 PM.  Always use your most recent med list.                   Brand Name Dispense Instructions for use    alfuzosin 10 MG 24 hr tablet    UROXATRAL    90 tablet    Take 1 tablet (10 mg) by mouth daily       aspirin 81 MG EC tablet     90 tablet    Take 1 tablet (81 mg) by mouth daily       diltiazem 180 MG 24 hr capsule     90 capsule    Take 1 capsule (180 mg) by mouth daily

## 2017-01-18 NOTE — PATIENT INSTRUCTIONS
1.  Continue CPAP every night for all hours that you are sleeping.  If you nap use CPAP.  As always, try to get at least 8 hours of sleep or more each day, and avoid sleep deprivation. Avoid alcohol.    2.  Reasons that you might need a change to your pressure therapy would be weight gain or loss, waking having inadvertently removed your CPAP overnight, having previously felt refreshed by sleep with CPAP use and now waking un-refreshed, and return of daytime sleepiness. Also, the development of new medical problems can sometimes affect breathing at night-heart failure, stroke, medications such as narcotics.    3.  Please bring CPAP with you if you are hospitalized.  If anticipating surgery be sure to discuss with your surgeon that you have sleep apnea and use PAP therapy.      4.  Maintain your equipment as recommended which includes routine cleaning and replacement of supplies.  Call DME for any questions regarding supplies or maintenance.      5.  Do not drive on engage in potentially dangerous activities if feeling sleepy.    6.  Please see me again in 2 months and bring your machine and card to your follow-up visit.      [facility] DME and cpap specialist Tash [phone]  [facility] [phone]    [facility] DME [phone], CPAP specialist [phone]  [facility] [phone]    [place] Medical Equipment Department, [facility] [phone]    [facility] DME  Daphnie Miles [phone]  [facility] [place] DME Brenda [phone]  [facility] Medical DME phone [phone]         Tips for your CPAP and BIPAP use-    Mask fitting tips  Mask fitting exercise:    To improve your mask seal and your mobility at night, put mask on and secure in place.  Lie down in bed with full pressure and roll to one side, adjust headgear while in that position to  eliminate any leaks. Repeat process rolling to other side.     The mask seal does not have to be perfect:   CPAP machines are designed to make up for small leaks. However, you will not tolerate leaks blowing in your eyes so you will need to adjust.   Any leak should only be near or at the bottom of the mask.  We expect your mask to leak slightly at night.    Do not over-tighten the headgear straps, tighter IS NOT better, we expect minimal leak.    First try re-positioning the mask or headgear before tightening the headgear straps.  Mask leaks are expected due to changing sleeping positions. Try pulling the mask away from your skin allowing the cushion to re-inflate will minimize the leak.  If you struggle for a good fit, try turning the CPAP off and then readjust the mask by pulling it away from your face and then turning back on the CPAP.        Humidifier tips  Humidifiers can be adjusted to increase or decrease the amount of moisture according to your comfort level. You may need to adjust this frequently at first, but then might only change it with seasonal weather changes.     Try INCREASING the humidity if:  You experience a dry, irritated nasal passage or throat.  You have a runny, drippy nose or sneezing fits after using CPAP.  You experience nasal congestion during or after CPAP use.    Try DECREASING the humidity if:  You have excessive condensation or  rain out  in the tubing or mask.  Otherwise keep the tubing warm during the night by running it underneath the blankets or pillow.      Clinic visit after initial CPAP and BIPAP set-up   Bring your equipment with you to your 4 week follow up clinic visit.  We will be extracting your data from the machine.        Travel  Always take your equipment with you.  If you fly with your equipment bring it on with you as a carry on.  Medical equipment does not count as a carry on.    If you travel international the machines take 110-240.  The only adapter needed is  the adapter that will fit into the receptacle (outlet).    You may also want to bring an extension cord as many hotel rooms have limited outlets at the bedside.  Do not travel with water in your humidifier chamber.     Cleaning and Maintenance Guidelines    Equipment Frequency Cleaning Method   Mask First Day    Daily      Weekly Soak mask in hot soapy water for 30 minutes, rinse and air dry.  Wipe nasal cushion with a hot soapy (Ivory, baby shampoo) cloth and rinse.  Baby wipes may also be used.  Do not use anti-bacterial soaps,Isamar  liquid soap, rubbing alcohol, bleach or ammonia.  Wash frame in hot soapy water (Ivory, baby shampoo) rinse and let air dry   Headgear Biweekly Wash in hot soapy water, rinse and air dry   Reusable Gray Filter Weekly Wash in hot soapy water, rinse, put in towel squeeze moisture out, let air dry   Disposable White Filter Check Weekly Replace when brown or gray in color; at least every 2 to 3 months   Humidifier Chamber Daily    Weekly Empty distilled water from humidifier and let air dry    Hand wash in hot soapy water, rinse and air dry   Tubing Weekly Wash in hot soapy water, rinse and let air dry   Mask, Tubing and Humidifier Chamber As needed Disinfect: Soak in 1 part vinegar to 3 parts hot water for 30 minutes, rinse well and air dry  Not the material headgear        MASK AND SUPPLY REORDERING  Reminder: Most insurance companies will allow for a new mask, headgear, tubing, and reusable gray filter every six months.  Disposable white ultra-fine filters are covered monthly.    EQUIPMENT NEEDS  Please call our CPAP specialist with any equipment problems, questions or needs.    HOME AND SAFETY INSTRUCTIONS    Do not use frayed or cracked electrical cords, multi plug adaptors, or switched receptacles    Do not immerse electrical equipment into water    Assure that electrical cords do not become a tripping hazard      Your BMI is Body mass index is 26.27 kg/(m^2).  Weight management is a  personal decision.  If you are interested in exploring weight loss strategies, the following discussion covers the approaches that may be successful. Body mass index (BMI) is one way to tell whether you are at a healthy weight, overweight, or obese. It measures your weight in relation to your height.  A BMI of 18.5 to 24.9 is in the healthy range. A person with a BMI of 25 to 29.9 is considered overweight, and someone with a BMI of 30 or greater is considered obese. More than two-thirds of American adults are considered overweight or obese.  Being overweight or obese increases the risk for further weight gain. Excess weight may lead to heart disease and diabetes.  Creating and following plans for healthy eating and physical activity may help you improve your health.  Weight control is part of healthy lifestyle and includes exercise, emotional health, and healthy eating habits. Careful eating habits lifelong are the mainstay of weight control. Though there are significant health benefits from weight loss, long-term weight loss with diet alone may be very difficult to achieve- studies show long-term success with dietary management in less than 10% of people. Attaining a healthy weight may be especially difficult to achieve in those with severe obesity. In some cases, medications, devices and surgical management might be considered.  What can you do?  If you are overweight or obese and are interested in methods for weight loss, you should discuss this with your provider.     Consider reducing daily calorie intake by 500 calories.     Keep a food journal.     Avoiding skipping meals, consider cutting portions instead.    Diet combined with exercise helps maintain muscle while optimizing fat loss. Strength training is particularly important for building and maintaining muscle mass. Exercise helps reduce stress, increase energy, and improves fitness. Increasing exercise without diet control, however, may not burn enough  calories to loose weight.       Start walking three days a week 10-20 minutes at a time    Work towards walking thirty minutes five days a week     Eventually, increase the speed of your walking for 1-2 minutes at time    In addition, we recommend that you review healthy lifestyles and methods for weight loss available through the National Institutes of Health patient information sites:  http://win.niddk.nih.gov/publications/index.htm    And look into health and wellness programs that may be available through your health insurance provider, employer, local community center, or matthew club.    Weight management plan: Patient was referred to their PCP to discuss a diet and exercise plan.

## 2017-01-20 ENCOUNTER — ANESTHESIA (OUTPATIENT)
Dept: SURGERY | Facility: CLINIC | Age: 67
End: 2017-01-20
Payer: COMMERCIAL

## 2017-01-20 ENCOUNTER — SURGERY (OUTPATIENT)
Age: 67
End: 2017-01-20

## 2017-01-20 PROCEDURE — 25000125 ZZHC RX 250: Performed by: NURSE ANESTHETIST, CERTIFIED REGISTERED

## 2017-01-20 PROCEDURE — 25800025 ZZH RX 258: Performed by: NURSE ANESTHETIST, CERTIFIED REGISTERED

## 2017-01-20 RX ORDER — SODIUM CHLORIDE, SODIUM LACTATE, POTASSIUM CHLORIDE, CALCIUM CHLORIDE 600; 310; 30; 20 MG/100ML; MG/100ML; MG/100ML; MG/100ML
INJECTION, SOLUTION INTRAVENOUS CONTINUOUS PRN
Status: DISCONTINUED | OUTPATIENT
Start: 2017-01-20 | End: 2017-01-20

## 2017-01-20 RX ORDER — DEXAMETHASONE SODIUM PHOSPHATE 4 MG/ML
INJECTION, SOLUTION INTRA-ARTICULAR; INTRALESIONAL; INTRAMUSCULAR; INTRAVENOUS; SOFT TISSUE PRN
Status: DISCONTINUED | OUTPATIENT
Start: 2017-01-20 | End: 2017-01-20

## 2017-01-20 RX ORDER — LIDOCAINE HYDROCHLORIDE 20 MG/ML
INJECTION, SOLUTION INFILTRATION; PERINEURAL PRN
Status: DISCONTINUED | OUTPATIENT
Start: 2017-01-20 | End: 2017-01-20

## 2017-01-20 RX ORDER — PROPOFOL 10 MG/ML
INJECTION, EMULSION INTRAVENOUS PRN
Status: DISCONTINUED | OUTPATIENT
Start: 2017-01-20 | End: 2017-01-20

## 2017-01-20 RX ORDER — ONDANSETRON 2 MG/ML
INJECTION INTRAMUSCULAR; INTRAVENOUS PRN
Status: DISCONTINUED | OUTPATIENT
Start: 2017-01-20 | End: 2017-01-20

## 2017-01-20 RX ORDER — FENTANYL CITRATE 50 UG/ML
INJECTION, SOLUTION INTRAMUSCULAR; INTRAVENOUS PRN
Status: DISCONTINUED | OUTPATIENT
Start: 2017-01-20 | End: 2017-01-20

## 2017-01-20 RX ADMIN — FENTANYL CITRATE 25 MCG: 50 INJECTION, SOLUTION INTRAMUSCULAR; INTRAVENOUS at 16:59

## 2017-01-20 RX ADMIN — BUPIVACAINE HYDROCHLORIDE 10 ML: 5 INJECTION, SOLUTION PERINEURAL at 17:48

## 2017-01-20 RX ADMIN — MIDAZOLAM HYDROCHLORIDE 2 MG: 1 INJECTION, SOLUTION INTRAMUSCULAR; INTRAVENOUS at 16:12

## 2017-01-20 RX ADMIN — FENTANYL CITRATE 25 MCG: 50 INJECTION, SOLUTION INTRAMUSCULAR; INTRAVENOUS at 17:17

## 2017-01-20 RX ADMIN — SODIUM CHLORIDE 50 ML: 900 IRRIGANT IRRIGATION at 17:29

## 2017-01-20 RX ADMIN — SODIUM CHLORIDE, POTASSIUM CHLORIDE, SODIUM LACTATE AND CALCIUM CHLORIDE: 600; 310; 30; 20 INJECTION, SOLUTION INTRAVENOUS at 16:10

## 2017-01-20 RX ADMIN — ONDANSETRON 4 MG: 2 INJECTION INTRAMUSCULAR; INTRAVENOUS at 17:18

## 2017-01-20 RX ADMIN — LIDOCAINE HYDROCHLORIDE 100 MG: 20 INJECTION, SOLUTION INFILTRATION; PERINEURAL at 16:26

## 2017-01-20 RX ADMIN — FENTANYL CITRATE 50 MCG: 50 INJECTION, SOLUTION INTRAMUSCULAR; INTRAVENOUS at 16:38

## 2017-01-20 RX ADMIN — FENTANYL CITRATE 25 MCG: 50 INJECTION, SOLUTION INTRAMUSCULAR; INTRAVENOUS at 17:57

## 2017-01-20 RX ADMIN — PROPOFOL 150 MG: 10 INJECTION, EMULSION INTRAVENOUS at 16:26

## 2017-01-20 RX ADMIN — DEXAMETHASONE SODIUM PHOSPHATE 8 MG: 4 INJECTION, SOLUTION INTRAMUSCULAR; INTRAVENOUS at 16:26

## 2017-01-20 RX ADMIN — SODIUM CHLORIDE, POTASSIUM CHLORIDE, SODIUM LACTATE AND CALCIUM CHLORIDE: 600; 310; 30; 20 INJECTION, SOLUTION INTRAVENOUS at 17:16

## 2017-01-21 ENCOUNTER — TELEPHONE (OUTPATIENT)
Dept: NURSING | Facility: CLINIC | Age: 67
End: 2017-01-21

## 2017-01-21 NOTE — TELEPHONE ENCOUNTER
Call Type: Triage Call    Presenting Problem: mrn [phone]    pt wife..Larissa.. calling. pt  had surgery on finger and he is now having numbness.  [phone]  aw  Frank had Dupuytrens contracture of right hand release  yesterday.  Carlos Dias' wife called to report Frank's fingers are  still numb.  I asked to have the on call for Dr Lars Oleary,  paged to call Larissa, [phone]. Larissa will call back for a second  page if she has not gotten a return call back by 1:35pm.  Triage Note:  Guideline Title: Postoperative Problems ; Postoperative Problems  Recommended Disposition: See Provider within 8 Hours  Original Inclination: Wanted to speak with a nurse  Override Disposition: Call Provider Immediately  Intended Action: Follow advice given  Physician Contacted: No  Recent onset of pain, tenderness or aching in an extremity that may worsen with  standing or walking OR a cord-like swelling in an extremity that may feel warm,  look red or discolored. ?  YES  Signs of dehydration ? NO  Unconscious now ? NO  Abdominal bloating ? NO  New onset OR worsening bleeding from incision requiring pressure to control ? NO  Depression and no other symptoms ? NO  Severe breathing problems ? NO  Wound separation AND internal organs protrude through wound or surgical incision  (evisceration) ? NO  Hives /Urticaria /Rash ? NO  New or worsening signs and symptoms that may indicate shock ? NO  Neck lump/swelling ? NO  Coughing up large amount of obvious blood (not blood-streaked sputum) ? NO  Orthopedic hardware (metal plate, nica or screw) newly bulging under or through  skin ? NO  New seizure now or within last 6 hours ? NO  Continuous or heavy bleeding from operative site and NOT controlled with 10  minutes of steady pressure ? NO  Pain not relieved by pain medication when taken as directed ? NO  Passing red, black or tarry material from rectum AND onset of new signs and  symptoms of hypovolemia ? NO  Wearing cast or splint AND  new or worsening pain, swelling, numbness, tingling,  coolness or change in color that is NOT improved by elevation for 30 minutes OR  not resolved within 2 hours ? NO  Temperature of 101.5 F (38.6 C) or greater that has not responded to 24 hours of  home care measures ? NO  Vomiting red, bloody or coffee-ground material, more than streaks of blood or  scant amount (not following nosebleed within past day) ? NO  New onset severe pain and pale, discolored or cool below the surgical site  compared to the other extremity ? NO  Current or recent urinary tract instrumentation AND urinary tract symptoms OR no  urine flow ? NO  New or worsening breathing problems ? NO  Heavy vaginal bleeding (soaking 1 pad/tampon every hour for 2 hours or more) ? NO  Urinary tract symptoms AND any flank or low back pain ? NO  Medical device (pump, infusion catheter) damaged or not functioning as expected  (leaking, not infusing, swelling at insertion site) ? NO  More bleeding from site of instrumentation or procedure OR bleeding lasting longer  than defined in provider specified discharge information ? NO  Chest discomfort associated with shortness of breath, sweating, odd heartbeats or  different heart rate, nausea, vomiting, lightheadedness, or fainting lasting 5 or  more minutes now or within the last hour ? NO  Chest pain spreading to the shoulders, neck, jaw, in one or both arms, stomach or  back lasting 5 or more minutes now or within the last hour. Pain is NOT  associated with taking a deep breath or a productive cough, movement, or touch to  a localized area. ? NO  Signs and symptoms of anaphylaxis ? NO  Questions or concerns about postoperative wound ? NO  Headache following spinal anesthesia AND not relieved by pain medication ? NO  Pressure, fullness, squeezing sensation or pain anywhere in the chest lasting 5 or  more minutes now or within the last hour. Pain is NOT associated with taking a  deep breath or a productive cough,  movement, or touch to a localized area on the  chest. ? NO  Sudden onset of focal neurological changes (difficulty speaking, numbness,  weakness, paralysis, loss of coordination, or change in vision such as double  vision or loss of visual field) ? NO  Sudden onset of shortness of breath, chest pain and cough with blood tinged sputum  ? NO  No urination for 12 or more hours ? NO  Abdominal pain, any blood in vomitus or stool, abdominal bloating, new fever or  other cautionary symptoms began after GI surgery or procedure and is lasting  longer than defined in provider specified discharge information. ? NO  Abdominal pain, any blood in vomitus or stool, abdominal bloating, new fever or  other cautionary symptoms began after GI surgery or procedure and is lasting  longer than defined in provider specified discharge information. ? NO  New onset of unbearable pain within last 24 hours ? NO  Any temperature elevation in an immunocompromised individual OR frail elderly with  signs of dehydration ? NO  Unable to retain food or fluids for 24 hours or more due to recurrent vomiting ? NO  Any other unexpected urinary symptoms following urinary tract or abdominal surgery  within timeframe specified by provider ? NO  Physician Instructions:  Care Advice: SYMPTOM / CONDITION MANAGEMENT

## 2017-01-24 ENCOUNTER — DOCUMENTATION ONLY (OUTPATIENT)
Dept: SLEEP MEDICINE | Facility: CLINIC | Age: 67
End: 2017-01-24

## 2017-01-24 ENCOUNTER — DOCUMENTATION ONLY (OUTPATIENT)
Dept: SLEEP MEDICINE | Facility: CLINIC | Age: 67
End: 2017-01-24
Attending: INTERNAL MEDICINE
Payer: COMMERCIAL

## 2017-01-24 NOTE — PROGRESS NOTES
Patient was offered choice of vendor and chose [facility].  Patient Frank Orellana was set up at [facility] on January 24, 2017. Patient received a Resmed AirSense 10 Auto. Pressures were set at 7-9 cm H2O.   Patient s ramp is 5 cm H2O for Off and FLEX/EPR is EPR, 2.  Patient received a Resmed Mask name: AIRFIT N20  Nasal mask Size Medium, heated tubing and heated humidifier.  Patient is enrolled in the [facility] Program and does need to meet compliance. Patient has a follow up on 3/30/17 with Dr. Stanford.    Tash Hurt

## 2017-01-25 ENCOUNTER — DOCUMENTATION ONLY (OUTPATIENT)
Dept: SLEEP MEDICINE | Facility: CLINIC | Age: 67
End: 2017-01-25

## 2017-01-26 ENCOUNTER — DOCUMENTATION ONLY (OUTPATIENT)
Dept: OTHER | Facility: CLINIC | Age: 67
End: 2017-01-26

## 2017-01-26 DIAGNOSIS — Z71.89 ACP (ADVANCE CARE PLANNING): Primary | Chronic | ICD-10-CM

## 2017-01-26 NOTE — PROGRESS NOTES
Patient emailed me asking questions about the ramp pressures and his normal pressures. I briefly replied to explain that the ramp pressure of 5cm H2O is only for while he is falling asleep and the 7-9 cm H2O that is displayed on his screen is his RX pressures. I forwarded his email on to [facility] to help advise patient.    -TAMARA Bianchi Coordinator

## 2017-01-27 ENCOUNTER — OFFICE VISIT (OUTPATIENT)
Dept: ORTHOPEDICS | Facility: CLINIC | Age: 67
End: 2017-01-27

## 2017-01-27 ENCOUNTER — THERAPY VISIT (OUTPATIENT)
Dept: OCCUPATIONAL THERAPY | Facility: CLINIC | Age: 67
End: 2017-01-27
Payer: COMMERCIAL

## 2017-01-27 VITALS — BODY MASS INDEX: 25.98 KG/M2 | HEIGHT: 69 IN | WEIGHT: 175.4 LBS

## 2017-01-27 DIAGNOSIS — M72.0 DUPUYTREN'S CONTRACTURE: Primary | ICD-10-CM

## 2017-01-27 DIAGNOSIS — Z47.89 AFTERCARE FOLLOWING SURGERY OF THE MUSCULOSKELETAL SYSTEM: ICD-10-CM

## 2017-01-27 DIAGNOSIS — M79.644 PAIN OF FINGER OF RIGHT HAND: Primary | ICD-10-CM

## 2017-01-27 DIAGNOSIS — M72.0 DUPUYTREN'S CONTRACTURE: ICD-10-CM

## 2017-01-27 PROBLEM — M79.645 PAIN OF FINGER OF LEFT HAND: Status: ACTIVE | Noted: 2017-01-27

## 2017-01-27 PROBLEM — M79.645 PAIN OF FINGER OF LEFT HAND: Status: RESOLVED | Noted: 2017-01-27 | Resolved: 2017-01-27

## 2017-01-27 PROCEDURE — 97165 OT EVAL LOW COMPLEX 30 MIN: CPT | Mod: GO | Performed by: OCCUPATIONAL THERAPIST

## 2017-01-27 PROCEDURE — 29125 APPL SHORT ARM SPLINT STATIC: CPT | Mod: GO | Performed by: OCCUPATIONAL THERAPIST

## 2017-01-27 PROCEDURE — 97110 THERAPEUTIC EXERCISES: CPT | Mod: GO | Performed by: OCCUPATIONAL THERAPIST

## 2017-01-27 NOTE — Clinical Note
1/27/2017       RE: Frank Orellana  [address]  [facility] [address]     Dear Colleague,    Thank you for referring your patient, Frank Orellana, to the [facility] at [facility]. Please see a copy of my visit note below.    HISTORY OF PRESENT ILLNESS:  Frank is here for followup of his Dupuytren's release last week.  He is doing well, had no pain after surgery.  The dressing is still intact.      Dressing is removed today.  The wound is healing nicely.  He is able to fully extend the finger now; flexion is limited secondary to the recent surgery.  He has full sensation in the digit, brisk capillary refill.      IMPRESSION:  Status post subtotal palmar fasciectomy, right ring finger.      PLAN:  We will have therapy see him for a resting hand splint today.  Sutures were removed.  We will plan on seeing him back in 2-3 weeks for reevaluation.  We will start therapy in the meantime.           Again, thank you for allowing me to participate in the care of your patient.      Sincerely,    Lars Oleary MD

## 2017-01-27 NOTE — PROGRESS NOTES
HISTORY OF PRESENT ILLNESS:  Frank is here for followup of his Dupuytren's release last week.  He is doing well, had no pain after surgery.  The dressing is still intact.      Dressing is removed today.  The wound is healing nicely.  He is able to fully extend the finger now; flexion is limited secondary to the recent surgery.  He has full sensation in the digit, brisk capillary refill.      IMPRESSION:  Status post subtotal palmar fasciectomy, right ring finger.      PLAN:  We will have therapy see him for a resting hand splint today.  Sutures were removed.  We will plan on seeing him back in 2-3 weeks for reevaluation.  We will start therapy in the meantime.

## 2017-01-27 NOTE — NURSING NOTE
"Reason For Visit:   Chief Complaint   Patient presents with     Surgical Followup     S/P Subtotal Palmar Facetectomy Right Ring Finger. DOS: 01/20/2017.       Primary MD: Vanessa Edwards  Ref. MD: Same    Age: 67 year old    ?  No      Ht 1.753 m (5' 9\")  Wt 79.561 kg (175 lb 6.4 oz)  BMI 25.89 kg/m2      Pain Assessment  Patient Currently in Pain: No               QuickDASH Assessment  QuickDASH Main 10/23/2016   1.Open a tight or new jar. Mild difficulty   2. Do heavy household chores (e.g., wash walls, floors) No difficulty   3. Carry a shopping bag or briefcase. No difficulty   4. Wash your back. No difficulty   5. Use a knife to cut food. No difficulty   6. Recreational activities in which you take some force or impact through your arm, shoulder or hand (e.g., golf, hammering, tennis, etc.). No difficulty   7. During the past week, to what extent has your arm, shoulder or hand problem interfered with your normal social activities with family, friends, neighbours or groups? Slightly   8. During the past week, were you limited in your work or other regular daily activities as a result of your arm, shoulder or hand problem? Very limited   9. Arm, shoulder or hand pain. Mild   10.Tingling (pins and needles) in your arm,shoulder or hand. None   11. During the past week, how much difficulty have you had sleeping because of the pain in your arm, shoulder or hand? ([place] number) No difficulty   Quickdash Ability Score 13.63          Current Outpatient Prescriptions   Medication Sig Dispense Refill     order for DME Equipment being ordered: CPAP Equipment ordered: RESMED Auto PAP Mask type: Nasal Settings: 7-9 CM H2O       alfuzosin (UROXATRAL) 10 MG 24 hr tablet Take 1 tablet (10 mg) by mouth daily (Patient taking differently: Take 10 mg by mouth every evening ) 90 tablet 1     aspirin 81 MG EC tablet Take 1 tablet (81 mg) by mouth daily 90 tablet 3     diltiazem 180 MG 24 hr capsule Take 1 capsule (180 " mg) by mouth daily (Patient taking differently: Take 180 mg by mouth every evening ) 90 capsule 3       Allergies   Allergen Reactions     Blood Transfusion Related (Informational Only)      Mandaeism.  Declines blood transfusions.     Contrast Dye Rash

## 2017-01-27 NOTE — PROGRESS NOTES
Hand Therapy Initial Evaluation  Current Date:  1/27/2017    Referring MD: Dr. Oleary  Return to MD: 2/17/2017     Diagnosis: R RF Dupuytren Contracture of MCPJ  Procedure:  Right ring finger subtotal palmar facetectomy  DOS: 1/20/2017  Post:  1w 0d    Subjective:  Frank Orellana is a 67 year old right hand dominant male.    Patient reports symptoms of pain, stiffness/loss of motion, weakness/loss of strength and edema of the right RF which occurred due to above procedure. Since onset symptoms are Gradually getting better..  Special tests:  none.  Previous treatment: none.    General health as reported by patient is good.  Pertinent medical history includes:heart problems, hepatitis, smoking, sleep disorder/apnea  Medical allergies:CT contrast.  Surgical history: orthopedic: hand, knee.  Medication history: none.    Current occupation is Retired - [employer]  Barriers include:none  Prior functional level:  no limitations  Red flags:  none    Additional Occupational Profile Information (patterns of daily living, interests, values and needs): Lives with wife, Larissa.  Upper Extremity Functional Index Score:  SCORE:   Column Totals: /80: 31   (A lower score indicates greater disability.)    Objective:    Pain Report:  VAS(0-10) 1/27/17   At Rest: 0/10   With Use: 2/10   Location:  R volar hand scar  Description:  sharp  Frequency:  intermittent   Pain is worse:  During the day  Pain is exacerbated by:  Finger flexion and extension, at the extremes  Pain is relieved by:  rest  Progression since onset:  improving    ROM: Fingers  Extension/Flexion, AROM(PROM)  Date: 2012 1/27/17   Side: Right   AROM(PROM)     Index:  MP               PIP               DIP 85  95  40    Long:   MP               PIP               DIP 85  90  40    Ring:    MP                PIP                DIP 81  81  10*/47    Small:   MP                PIP                DIP 82  70  44   *history of finger fracture    Strength:  [x]         Contraindicated    Finger Edema (circumference measured in cm)  Edema 1/27/17    Right   Index P1 6.4   Long P1 6.3   Ring P1 7.3   Small P1 5.3     Scar/Wound:  Sutures removed prior to therapy, covered with steri-strips    Sensation: [x]         WNL throughout all nerve distributions; per patient report    []         Decreased  Median  Ulnar  Radial  nerve distribution    Assessment:  Patient presents with symptoms consistent with diagnosis as listed above with surgical  intervention.     Patient's limitations or Problem List includes:  Pain, Decreased ROM/motion, Increased edema, Weakness, Adherent scarring, Decreased stability, Decreased , Decreased pinch, Decreased coordination, Decreased dexterity, Tightness in musculature and Adherence in connective tissue of the right hand which interferes with the patient's ability to perform Self Care Tasks (dressing, eating, bathing, hygiene/toileting), Recreational Activities and Household Chores as compared to previous level of function.    Rehab Potential:  Excellent - Return to full activity, no limitations    Patient will benefit from skilled Occupational Therapy to increase ROM, flexibility,  strength, pinch strength, coordination and dexterity and decrease pain, edema and adherence of scarring to return to previous activity level and resume normal daily tasks and to reach their rehab potential.    Barriers to Learning:  No barrier    Communication Issues:  Patient appears to be able to clearly communicate and understand verbal and written communication and follow directions correctly.    Assessment of Occupational Performance:  3-5 Performance Deficits  Identified Performance Deficits: bathing/showering, toileting, dressing, home establishment and management and meal preparation and cleanup      Clinical Decision Making (Complexity): Low complexity    Treatment Explanation:  The following has been discussed with the patient:  RX ordered/plan of  care  Anticipated outcomes  Possible risks and side effects    Plan:  Frequency:  1 X week, once daily  Duration:  for 4 weeks    Treatment Plan:  Modalities:  US as needed  Therapeutic Exercise:  AROM, AAROM, PROM, Tendon Gliding, Blocking, Isotonics and Isometrics  Manual Techniques:  Scar mobilization, Myofascial release and Manual edema mobilization  Orthotic Fabrication:  Hand based orthosis and Forearm based orthosis  Self Care:  Self Care Tasks    Home Program:  VENTURA extension splint - night wear  AROM of fingers    Next visit:   Check splint  Scar management when healed  Progress ROM to gentle PROM, extensor tracking when healed    Discharge Plan:  Achieve all LTG.  Independent in home treatment program.  Reach maximal therapeutic benefit.    Please see daily flow sheet for treatment and 1:1 time provided today.

## 2017-02-02 ENCOUNTER — THERAPY VISIT (OUTPATIENT)
Dept: OCCUPATIONAL THERAPY | Facility: CLINIC | Age: 67
End: 2017-02-02
Payer: COMMERCIAL

## 2017-02-02 DIAGNOSIS — M72.0 DUPUYTREN'S CONTRACTURE: ICD-10-CM

## 2017-02-02 DIAGNOSIS — R29.898 MECHANICAL PROBLEMS WITH LIMBS: ICD-10-CM

## 2017-02-02 DIAGNOSIS — Z47.89 AFTERCARE FOLLOWING SURGERY OF THE MUSCULOSKELETAL SYSTEM: ICD-10-CM

## 2017-02-02 DIAGNOSIS — M79.644 PAIN OF FINGER OF RIGHT HAND: Primary | ICD-10-CM

## 2017-02-02 PROCEDURE — 97110 THERAPEUTIC EXERCISES: CPT | Mod: GO | Performed by: OCCUPATIONAL THERAPIST

## 2017-02-02 PROCEDURE — 97760 ORTHOTIC MGMT&TRAING 1ST ENC: CPT | Mod: GO | Performed by: OCCUPATIONAL THERAPIST

## 2017-02-02 PROCEDURE — 97140 MANUAL THERAPY 1/> REGIONS: CPT | Mod: GO | Performed by: OCCUPATIONAL THERAPIST

## 2017-02-02 NOTE — PROGRESS NOTES
SOAP note objective information for 2/2/2017.  ROM: Fingers  Extension/Flexion, AROM(PROM)  Date: 2012 1/27/17 2/2/17   Side: Right    AROM(PROM)      Index:  MP               PIP               DIP 85  95  40     Long:   MP               PIP               DIP 85  90  40     Ring:    MP                PIP                DIP 81  81  10*/47 0/87  0/90  -5/49    Small:   MP                PIP                DIP 82  70  44 0/   *history of finger fracture  Please refer to the daily flowsheet for treatment today, total treatment time and time spent performing 1:1 timed codes.       Home Program:  VENTURA extension splint - night wear  AROM of fingers  Scar massage    Next visit:   Check splint  Scar management when healed  Progress ROM to gentle PROM, extensor tracking when healed

## 2017-02-10 ENCOUNTER — OFFICE VISIT (OUTPATIENT)
Dept: ORTHOPEDICS | Facility: CLINIC | Age: 67
End: 2017-02-10

## 2017-02-10 ENCOUNTER — THERAPY VISIT (OUTPATIENT)
Dept: OCCUPATIONAL THERAPY | Facility: CLINIC | Age: 67
End: 2017-02-10
Payer: COMMERCIAL

## 2017-02-10 DIAGNOSIS — M72.0 DUPUYTREN'S CONTRACTURE OF BOTH HANDS: Primary | ICD-10-CM

## 2017-02-10 DIAGNOSIS — M79.644 PAIN OF FINGER OF RIGHT HAND: ICD-10-CM

## 2017-02-10 DIAGNOSIS — M72.0 DUPUYTREN'S CONTRACTURE: ICD-10-CM

## 2017-02-10 DIAGNOSIS — R29.898 MECHANICAL PROBLEMS WITH LIMBS: Primary | ICD-10-CM

## 2017-02-10 DIAGNOSIS — Z47.89 AFTERCARE FOLLOWING SURGERY OF THE MUSCULOSKELETAL SYSTEM: ICD-10-CM

## 2017-02-10 PROCEDURE — 97140 MANUAL THERAPY 1/> REGIONS: CPT | Mod: GO | Performed by: OCCUPATIONAL THERAPIST

## 2017-02-10 PROCEDURE — 97110 THERAPEUTIC EXERCISES: CPT | Mod: GO | Performed by: OCCUPATIONAL THERAPIST

## 2017-02-10 PROCEDURE — 97760 ORTHOTIC MGMT&TRAING 1ST ENC: CPT | Mod: GO | Performed by: OCCUPATIONAL THERAPIST

## 2017-02-10 NOTE — MR AVS SNAPSHOT
After Visit Summary   2/10/2017    Frank Orellana    MRN: [id number]           Patient Information     Date Of Birth          1950        Visit Information        Provider Department      2/10/2017 9:30 AM Lars Oleary MD [facility]        Today's Diagnoses     Dupuytren's contracture of both hands    -  1       Follow-ups after your visit        Your next 10 appointments already scheduled     Feb 17, 2017  8:00 AM CST   LINA Hand with Rosana Yost OT   [facility] Hand Therapy ([facility] [place])    [address] [place] [address] [phone]            Mar 30, 2017  1:00 PM CDT   Return Sleep Patient with Melissa Stanford MD   [facility] [place] Sleep Study ([facility])    [address] [place] [id number] [phone]            Apr 27, 2017  8:00 AM CDT   Ech Complete with [email or web address]   [facility] Echo ([facility])    [address] [place] [id number] [phone]           1.  Please bring or wear a comfortable two-piece outfit. 2.  You may eat, drink and take your normal medicines. 3.  For any questions that cannot be answered, please contact the ordering physician            Jun 07, 2017  8:00 AM CDT   (Arrive by 7:45 AM)   RETURN ARRHYTHMIA with Jovanni Felipe MD   [facility] Heart Care ([facility])    [address] [place] [id number] [phone]            Jul 06, 2017 11:30 AM CDT   Walk In From ENT with Adriane Russo   [facility] Audiology ([facility])    [address] [place] [id number] [phone]            Jul 06, 2017  1:00 PM CDT   (Arrive by 12:45 PM)   Return Visit with Andrae Laughlin MD   [facility] Ear Nose and Throat ([facility] [place])    [address] [place] [address]  Se  4th Floor  [facility] [address] [phone]            Oct 09, 2017  1:15 PM CDT   RETURN RETINA with Ruth Boone MD   Eye Clinic ([facility])    [address]  [facility] [address] [phone]              Who to contact     Please call your clinic at [phone] to:    Ask questions about your health    Make or cancel appointments    Discuss your medicines    Learn about your test results    Speak to your doctor   If you have compliments or concerns about an experience at your clinic, or if you wish to file a complaint, please contact [facility] Patient Relations at [phone] or email us at [email or web address]         Additional Information About Your Visit        MyChart Information     [email or web address] gives you secure access to your electronic health record. If you see a primary care provider, you can also send messages to your care team and make appointments. If you have questions, please call your primary care clinic.  If you do not have a primary care provider, please call [phone] and they will assist you.      Swidjit is an electronic gateway that provides easy, online access to your medical records. With Swidjit, you can request a clinic appointment, read your test results, renew a prescription or communicate with your care team.     To access your existing account, please contact your [facility] or call [phone] for assistance.        Care EveryWhere ID     This is your Care EveryWhere ID. This could be used by other organizations to access your [place] medical records  [id number]         Blood Pressure from Last 3 Encounters:   01/20/17 143/77   01/18/17 109/60   01/16/17 115/70    Weight from Last 3 Encounters:   01/27/17 79.6 kg (175 lb 6.4 oz)   01/20/17 79.7 kg (175 lb 11.3 oz)   01/18/17 80.7 kg (178 lb)              Today, you had the  following     No orders found for display         Today's Medication Changes          These changes are accurate as of: 2/10/17 11:59 PM.  If you have any questions, ask your nurse or doctor.               These medicines have changed or have updated prescriptions.        Dose/Directions    alfuzosin 10 MG 24 hr tablet   Commonly known as:  UROXATRAL   This may have changed:  when to take this   Used for:  Urinary frequency        Dose:  10 mg   Take 1 tablet (10 mg) by mouth daily   Quantity:  90 tablet   Refills:  1       diltiazem 180 MG 24 hr capsule   This may have changed:  when to take this   Used for:  Paroxysmal atrial fibrillation (H)        Dose:  180 mg   Take 1 capsule (180 mg) by mouth daily   Quantity:  90 capsule   Refills:  3                Primary Care Provider Office Phone # Fax #    Vanessa Edwards [phone]        [facility] [address] [place] [phone]  [facility] [address]        Thank you!     Thank you for choosing [facility]  for your care. Our goal is always to provide you with excellent care. Hearing back from our patients is one way we can continue to improve our services. Please take a few minutes to complete the written survey that you may receive in the mail after your visit with us. Thank you!             Your Updated Medication List - Protect others around you: Learn how to safely use, store and throw away your medicines at www.disposemymeds.org.          This list is accurate as of: 2/10/17 11:59 PM.  Always use your most recent med list.                   Brand Name Dispense Instructions for use    alfuzosin 10 MG 24 hr tablet    UROXATRAL    90 tablet    Take 1 tablet (10 mg) by mouth daily       aspirin 81 MG EC tablet     90 tablet    Take 1 tablet (81 mg) by mouth daily       diltiazem 180 MG 24 hr capsule     90 capsule    Take 1 capsule (180 mg) by mouth daily       order for DME      Equipment being ordered: CPAP Equipment ordered: RESMED  Auto PAP Mask type: Nasal Settings: 7-9 CM H2O

## 2017-02-10 NOTE — PROGRESS NOTES
HISTORY OF PRESENT ILLNESS:  Frank is here for followup of his Dupuytren's release in the right hand.  He is doing well.  He is making nice progress in therapy.  No pain.  He is back to all of his activities now.  He does have some Dupuytren's in the contralateral hand.      PHYSICAL EXAMINATION:  Examination of the hand today demonstrates a well-healed wound with some induration in the right palm.  No signs of any infection or recurrence.  He has full flexion now, full extension.  No neurovascular deficits.      On his left hand, he does have Dupuytren's contracture affecting the ring finger.  Approximately a 30-degree contracture at the MP joint, but no involvement of the PIP joint today.      IMPRESSION:  Bilateral Dupuytren's contracture, status post right subtotal palmar fasciectomy.  For the right hand standpoint, he is doing well.  He will continue to use the splint but then he can wean out of it also.  He can continue with all of his activities as tolerated.  In regards to his left hand, we did discuss a subtotal palmar fasciectomy.  We debated the risks and discussed whether or not to proceed with surgery.  My recommendation for now is to monitor it.  He will continue to recover from the right hand.  We will see him back on an as needed basis for the left if it becomes overly bothersome.  It has been present for about 30 years now and has not progressed appreciably.  It does not bother him all that much.  We will continue to monitor.  We will see him back as needed.

## 2017-02-10 NOTE — NURSING NOTE
Reason For Visit:   Chief Complaint   Patient presents with     RECHECK     Status post subtotal palmar fasciectomy, right ring finger, DOS 1/20/17     Primary MD: Vanessa Edwards  Ref. MD: Same    Age: 67 year old    Date of surgery: 1/20/17    Pain Assessment  Patient Currently in Pain: No    Therapy this morning prior to visit

## 2017-02-10 NOTE — PROGRESS NOTES
SOAP Note Objective Information for 2/2/2017    ROM: Fingers  Extension/Flexion, AROM(PROM)  Date: 2012 1/27/17 2/2/17 2/10/17   Side: Right     AROM(PROM)       Index:  MP               PIP               DIP 85  95  40      Long:   MP               PIP               DIP 85  90  40      Ring:    MP                PIP                DIP 81  81  10*/47 0/87  0/90  -5/49 0/95  0/95  5/56    Small:   MP                PIP                DIP 82  70  44 0/    *history of finger fracture  Please refer to the daily flowsheet for treatment today, total treatment time and time spent performing 1:1 timed codes.       Home Program:  VENTURA extension splint - night wear  A/PROM of fingers  ABD/ADD of fingers  Extensor tracking on table  Scar massage    Next visit:   Check splint  Scar massage  A/PROM  Extensor tracking

## 2017-02-10 NOTE — LETTER
2/10/2017       RE: Frank Orellana  [address]  [facility] [address]     Dear Colleague,    Thank you for referring your patient, Frank Orellana, to the [facility] at [facility]. Please see a copy of my visit note below.    HISTORY OF PRESENT ILLNESS:  Frank is here for followup of his Dupuytren's release in the right hand.  He is doing well.  He is making nice progress in therapy.  No pain.  He is back to all of his activities now.  He does have some Dupuytren's in the contralateral hand.      PHYSICAL EXAMINATION:  Examination of the hand today demonstrates a well-healed wound with some induration in the right palm.  No signs of any infection or recurrence.  He has full flexion now, full extension.  No neurovascular deficits.      On his left hand, he does have Dupuytren's contracture affecting the ring finger.  Approximately a 30-degree contracture at the MP joint, but no involvement of the PIP joint today.      IMPRESSION:  Bilateral Dupuytren's contracture, status post right subtotal palmar fasciectomy.  For the right hand standpoint, he is doing well.  He will continue to use the splint but then he can wean out of it also.  He can continue with all of his activities as tolerated.  In regards to his left hand, we did discuss a subtotal palmar fasciectomy.  We debated the risks and discussed whether or not to proceed with surgery.  My recommendation for now is to monitor it.  He will continue to recover from the right hand.  We will see him back on an as needed basis for the left if it becomes overly bothersome.  It has been present for about 30 years now and has not progressed appreciably.  It does not bother him all that much.  We will continue to monitor.  We will see him back as needed.     Sincerely,    Lars Oleary MD

## 2017-02-15 ENCOUNTER — DOCUMENTATION ONLY (OUTPATIENT)
Dept: VASCULAR SURGERY | Facility: CLINIC | Age: 67
End: 2017-02-15

## 2017-02-17 ENCOUNTER — TRANSFERRED RECORDS (OUTPATIENT)
Dept: HEALTH INFORMATION MANAGEMENT | Facility: CLINIC | Age: 67
End: 2017-02-17

## 2017-02-17 ENCOUNTER — THERAPY VISIT (OUTPATIENT)
Dept: OCCUPATIONAL THERAPY | Facility: CLINIC | Age: 67
End: 2017-02-17
Payer: COMMERCIAL

## 2017-02-17 DIAGNOSIS — Z47.89 AFTERCARE FOLLOWING SURGERY OF THE MUSCULOSKELETAL SYSTEM: ICD-10-CM

## 2017-02-17 DIAGNOSIS — M72.0 DUPUYTREN'S CONTRACTURE: ICD-10-CM

## 2017-02-17 DIAGNOSIS — R29.898 MECHANICAL PROBLEMS WITH LIMBS: ICD-10-CM

## 2017-02-17 DIAGNOSIS — M79.644 PAIN OF FINGER OF RIGHT HAND: Primary | ICD-10-CM

## 2017-02-17 PROCEDURE — 97110 THERAPEUTIC EXERCISES: CPT | Mod: GO | Performed by: OCCUPATIONAL THERAPIST

## 2017-02-17 PROCEDURE — 97140 MANUAL THERAPY 1/> REGIONS: CPT | Mod: GO | Performed by: OCCUPATIONAL THERAPIST

## 2017-02-17 PROCEDURE — 97035 APP MDLTY 1+ULTRASOUND EA 15: CPT | Mod: GO | Performed by: OCCUPATIONAL THERAPIST

## 2017-02-17 NOTE — MR AVS SNAPSHOT
After Visit Summary   2/17/2017    Frank Orellana    MRN: [id number]           Patient Information     Date Of Birth          1950        Visit Information        Provider Department      2/17/2017 8:00 AM Rosana Yost OT M [facility] Hand Therapy        Today's Diagnoses     Pain of finger of right hand    -  1    Mechanical problems with limbs        Dupuytren's contracture        Aftercare following surgery of the musculoskeletal system           Follow-ups after your visit        Your next 10 appointments already scheduled     Mar 30, 2017  1:00 PM CDT   Return Sleep Patient with Melissa Stanford MD   [facility], [place], Sleep Study ([facility])    [address] [place] [id number] [phone]            Apr 27, 2017  8:00 AM CDT   Ech Complete with [email or web address]   [facility] Echo ([facility])    [address] [place] [id number] [phone]           1.  Please bring or wear a comfortable two-piece outfit. 2.  You may eat, drink and take your normal medicines. 3.  For any questions that cannot be answered, please contact the ordering physician            Jun 07, 2017  8:00 AM CDT   (Arrive by 7:45 AM)   RETURN ARRHYTHMIA with Jovanni Felipe MD   [facility] Heart Care ([facility])    [address] [place] [id number] [phone]            Jul 06, 2017 11:30 AM CDT   Walk In From ENT with Adriane Russo   [facility] Audiology ([facility])    [address] [place] [id number] [phone]            Jul 06, 2017  1:00 PM CDT   (Arrive by 12:45 PM)   Return Visit with Andrae Laughlin MD   [facility] Ear Nose and Throat ([facility])    [address] [place] [id number] [phone]            Oct 09, 2017  1:15 PM CDT    RETURN RETINA with Ruth [facility] MD   Eye Clinic ([facility])    [address]  [address] [facility]  9[address]  [facility] [id number] [phone]              Who to contact     If you have questions or need follow up information about today's clinic visit or your schedule please contact [facility] directly at [phone].  Normal or non-critical lab and imaging results will be communicated to you by agreement24 [email or web address], letter or phone within 4 business days after the clinic has received the results. If you do not hear from us within 7 days, please contact the clinic through Secrettet or phone. If you have a critical or abnormal lab result, we will notify you by phone as soon as possible.  Submit refill requests through [employer] or call your pharmacy and they will forward the refill request to us. Please allow 3 business days for your refill to be completed.          Additional Information About Your Visit        [employer] Information     [employer] gives you secure access to your electronic health record. If you see a primary care provider, you can also send messages to your care team and make appointments. If you have questions, please call your primary care clinic.  If you do not have a primary care provider, please call [phone] and they will assist you.        Care EveryWhere ID     This is your Care EveryWhere ID. This could be used by other organizations to access your [facility] medical records  [id number]         Blood Pressure from Last 3 Encounters:   01/20/17 143/77   01/18/17 109/60   01/16/17 115/70    Weight from Last 3 Encounters:   01/27/17 79.6 kg (175 lb 6.4 oz)   01/20/17 79.7 kg (175 lb 11.3 oz)   01/18/17 80.7 kg (178 lb)              We Performed the Following     MANUAL THER TECH     THERAPEUTIC EXERCISES     ULTRASOUND THERAPY          Today's Medication Changes          These changes are accurate as of: 2/17/17  8:29 AM.  If you have any questions,  ask your nurse or doctor.               These medicines have changed or have updated prescriptions.        Dose/Directions    alfuzosin 10 MG 24 hr tablet   Commonly known as:  UROXATRAL   This may have changed:  when to take this   Used for:  Urinary frequency        Dose:  10 mg   Take 1 tablet (10 mg) by mouth daily   Quantity:  90 tablet   Refills:  1       diltiazem 180 MG 24 hr capsule   This may have changed:  when to take this   Used for:  Paroxysmal atrial fibrillation (H)        Dose:  180 mg   Take 1 capsule (180 mg) by mouth daily   Quantity:  90 capsule   Refills:  3                Primary Care Provider Office Phone # Fax #    Vanessa Edwards [phone]        [facility] [address] [facility] [address]  [facility] [address]        Thank you!     Thank you for choosing [facility]  for your care. Our goal is always to provide you with excellent care. Hearing back from our patients is one way we can continue to improve our services. Please take a few minutes to complete the written survey that you may receive in the mail after your visit with us. Thank you!             Your Updated Medication List - Protect others around you: Learn how to safely use, store and throw away your medicines at www.disposemymeds.org.          This list is accurate as of: 2/17/17  8:29 AM.  Always use your most recent med list.                   Brand Name Dispense Instructions for use    alfuzosin 10 MG 24 hr tablet    UROXATRAL    90 tablet    Take 1 tablet (10 mg) by mouth daily       aspirin 81 MG EC tablet     90 tablet    Take 1 tablet (81 mg) by mouth daily       diltiazem 180 MG 24 hr capsule     90 capsule    Take 1 capsule (180 mg) by mouth daily       order for DME      Equipment being ordered: CPAP Equipment ordered: RESMED Auto PAP Mask type: Nasal Settings: 7-9 CM H2O

## 2017-02-17 NOTE — PROGRESS NOTES
Hand Therapy Discharge Note  Reporting Period:  1/27/2017 through 2/17/2017    Referring MD: Dr. Oleary  Return to MD: No follow up scheduled at this time     Diagnosis: R RF Dupuytren Contracture of MCPJ  Procedure:  Right ring finger subtotal palmar facetectomy  DOS: 1/20/2017  Post:  4w 0d    Initial History:  Patient reports symptoms of pain, stiffness/loss of motion, weakness/loss of strength and edema of the right RF which occurred due to above procedure. Since onset symptoms are Gradually getting better..  Special tests:  none.  Previous treatment: none.    General health as reported by patient is good.  Pertinent medical history includes:heart problems, hepatitis, smoking, sleep disorder/apnea  Medical allergies:CT contrast.  Surgical history: orthopedic: hand, knee.  Medication history: none.    Current occupation is Retired - [employer]  Barriers include:none  Prior functional level:  no limitations  Red flags:  none    Additional Occupational Profile Information (patterns of daily living, interests, values and needs): Lives with wife, Larissa.  Upper Extremity Functional Index Score:  SCORE:   Column Totals: /80: 80   (A lower score indicates greater disability.)    S:  Subjective changes as noted by patient: Things are going well, its a little stiff in the morning but not painful   Functional changes noted by patient: Improvement in Self Care Tasks (dressing, eating, bathing, hygiene/toileting)  Response to previous treatment: good  Patient has noted adverse reaction to: None    Objective:    Pain Report:  VAS(0-10) 1/27/17 2/17/17   At Rest: 0/10 0/10   With Use: 2/10 0/10   Location:  R volar hand scar  Description:  Stiff  Frequency:  intermittent   Pain is worse:  During the day  Pain is exacerbated by:  Finger flexion and extension, at the extremes  Pain is relieved by:  rest  Progression since onset:  Resolving    ROM: Fingers  Extension/Flexion, AROM(PROM)  Date: 2012 1/27/17 2/2/17 2/10/17 2/17/17    Side: Right   Right   AROM(PROM)        Index:  MP               PIP               DIP 85  95  40       Long:   MP               PIP               DIP 85  90  40       Ring:    MP                PIP                DIP 81  81  10*/47 0/87  0/90  -5/49 0/95  0/95  5/56 0/95  0/96  5/56    Small:   MP                PIP                DIP 82  70  44 0/     *history of finger fracture    Strength:  [x]        Contraindicated    Finger Edema (circumference measured in cm)  Edema 1/27/17 2/17/17    Right Right   Index P1 6.4 6.2   Long P1 6.3 6.1   Ring P1 7.3 6.6   Small P1 5.3 5.1     Scar/Wound:  Raised, mild to moderately hypertrophic but mobile. Non-tender to palpation    Sensation: [x]         WNL throughout all nerve distributions; per patient report    []         Decreased  Median  Ulnar  Radial  nerve distribution    Assessment:  Response to therapy has been improvement to:  ROM of Fingers: MP joint - Flex, PIP joint - Ext, PIP joint - Flex, DIP joint - Flex  Edema:  Circumferential edema has resolved  Pain:  frequency is less, intensity of pain is decreased, duration of pain is decreased and less tender over affected area  Overall Assessment:  Patient is progressing well and is ready to discharge to home exercise program.  STG/LTG:  See goal sheet for details and updates of remaining functional limitations.     Plan:  Discharge to home exercise program    Home Program:  VENTURA extension splint - night wear  A/PROM of fingers  ABD/ADD of fingers  Extensor tracking on table  Scar massage    Please see daily flow sheet for treatment and 1:1 time provided today.

## 2017-02-20 ENCOUNTER — CARE COORDINATION (OUTPATIENT)
Dept: GASTROENTEROLOGY | Facility: CLINIC | Age: 67
End: 2017-02-20

## 2017-02-20 DIAGNOSIS — B18.2 CHRONIC HEPATITIS C WITHOUT HEPATIC COMA (H): Primary | ICD-10-CM

## 2017-02-20 NOTE — PROGRESS NOTES
2/20/2017  2:12 PM      Hep C Care Coordination Call   Attempted to connect with patient for f/u on Hep C treatment start. Patient is on the Prioritized study. No answer. I left patient a message with my direct contact information [phone]. I will continue to attempt to connect with patient.         Lucinda Trujillo RN, BSN, PHN  [facility]   RN Care Coordinator for Hepatology Specialty Clinic/Program

## 2017-02-20 NOTE — PROGRESS NOTES
2/20/2017  3:54 PM      Hep C Care Coordination Call   Patient returned my call regarding Hep C treatment medication. Patient will be on the Prioritized Study. Patient will be on Harvoni for x 12 weeks. Patient has not yet started. Patient will start today in the HS. Patient reports no recent medication changes, no changes in health or recent hospitalizations. Patient will speak with PharmD Dorian CRONIN Tomorrow 2/21/17 for review of medication interactions. Reviewed the following Hep C treatment POC;       Below is a summary of your treatment schedule. Please follow the schedule as closely as possible. Labs should be drawn as close to the date indicated at the [facility] or [facility].    Hepatitis C Treatment  Treatment: Harvoni x 12 weeks  Genotype: 1b  Stage Fibrosis: F0  Treatment Naive       Start Date: 02/20/17    Week 4  Hepatic Panel, BMP Lab Due  Office Visit Date: With Caty Shetty PA-C on 3/21/27 at 11:00am at the [facility] located at [address] [place] [address] on the 3rd floor. Please arrive at 10:00 to have your labs drawn on the 1st floor prior to your provider appointment. You do not need to fast for this lab.     Week 12 - End of Treatment  HCV RNA Quant Lab Due: 5/15/2017. Patient will have the is lab drawn at the [facility] at 8:00am. You do not need to fast for this lab.       3 Months Post Treatment  HCV RNA Quant Lab Due: 8/14/2017. Patient will have the is lab drawn at the [facility] at 8:00am. You do not need to fast for this lab.         Educational information to patient on Hep C treatment;     -Contact the [facility] Hepatology clinic and speak with RN Care Coordinator prior to starting any new prescribed or OTC medications.   -Take medications exactly as prescribed, do not change dose or stop taking without consulting your provider.   -Take Medication one time each day with or without food  -If you miss a dose of medication, then take it as soon as you  remember on the same day. If not remembered on the same day, then skip the dose and take the next dose at the usual time. Do not take more than the recommended dose. Contact the clinic if you miss a dose.    Please contact the pharmacy 1-2 weeks prior to needing a medication refill.      Side Effects  The most common side effects of Hep C medication treatment can include:  -tiredness  -headache  Notify the clinic of any side effects that bother you or that do not go away.   Possible side effects have been discussed.   Patient has been instructed to clinic for rash, itching or unmanageable nausea.    How to store Hep C Treatment Medications  -Store Medication at room temperature below 86 degrees F  -Keep Medication in it's original container  -Do not use Medication if the seal is broken or missing    General information  It is not known if treatment will prevent you from infecting another person or reinfecting yourself with the hepatitis C virus during treatment. It is best that as soon as you start treatment to buy a new toothbrush, disposable razors (if you use a rotating shaver you do not need to buy a new one) and nail clippers. If you check your blood sugar at home, please dispose of the fingerstick needle after each use and DO NOT REUSE the insulin needles. These items should not be shared with anyone.        If you have any questions, please contact the main clinic at [phone] or your RN Care Coordinator at [phone]. We appreciate you choosing the [facility] clinic for your treatment. Patient agrees to Hep C treatment POC and verbalizes understanding. Patient will receive a copy of treatment plan in the mail, address verified with patient. Patient has no further questions or concerns. Hep C care team updated on patient status.          Lucinda Trujillo RN, BSN, PHN  [facility]  Care Coordinator for Hepatology Clinic/Specialty Program

## 2017-02-21 ENCOUNTER — TELEPHONE (OUTPATIENT)
Dept: PHARMACY | Facility: CLINIC | Age: 67
End: 2017-02-21

## 2017-02-21 ENCOUNTER — ALLIED HEALTH/NURSE VISIT (OUTPATIENT)
Dept: PHARMACY | Facility: CLINIC | Age: 67
End: 2017-02-21
Payer: COMMERCIAL

## 2017-02-21 DIAGNOSIS — B18.2 CHRONIC HEPATITIS C WITHOUT HEPATIC COMA (H): Primary | ICD-10-CM

## 2017-02-21 PROCEDURE — 99207 ZZC NO CHARGE LOS: CPT | Performed by: PHARMACIST

## 2017-02-21 NOTE — PROGRESS NOTES
-[facility] Use Only - Started Harvoni yesterday, takes 10-10:30 PM. No issues thus far.  Genotype: 1b  Viral Load and date drawn: 2.8 million on 10/6/16  Previous treatment: Treatment naive  Liver staging: F0 on 1/2017  Child Stephens score: N/A  HIV positive: No  Renal impairment CrCl <30mL/min: No  Decompensated Cirrhosis: No  Recurrent HCV post-liver transplant: No  Hepatocellular Carcinoma: No   Initial Regimen: Harvoni (ledipasvir/sofosbuvir)  Length of Therapy Ordered: 12 weeks  DDIx with HCV therapy: Diltiazem  MTM Evaluation: HCV therapy appropriate and Length of therapy appropriate. 8 or 12 weeks is appropriate.     Discussed with patient:   -Missed dose protocol   -DDIx with Dilt, pt to monitor HR and BP. Harvoni can increase Dilt levels. Dilt has no effect on Harvoni.    -Common SE   -Adherence    Pt should be good to go!    Dorian Schultz, PharmD  MTM Pharmacist    Phone: [phone]

## 2017-02-21 NOTE — PATIENT INSTRUCTIONS
Recommendations from today's MTM visit:                                                      Concerning Harvoni Therapy:   -You will be taking Harvoni, 1 tablet daily for 12 weeks with or without food.   -Do NOT take heartburn/reflux medications while on Harvoni without discussing with us first   -If you start any new medications, please call to discuss drug interactions with me before starting   -If you miss a dose, and it has been less than 12 hours, you may take the dose. If it has been more, skip the dose and take your next dose as normal. Do not take 2 doses at the same time.    -Most common side effects are Headache and Fatigue.   -There is a drug interaction with Harvoni and Diltiazem. Harvoni can increase the blood levels of Diltiazem, monitor this by checking your blood pressure (if your blood pressure drops below 100/50, may need a dose decrease of Diltiazem), and checking your heart rate (if your heart rate drops below 50, you may need a dose decrease of Diltiazem).     Next MTM visit:     To schedule another MTM appointment, please call the clinic directly or you may call the MTM scheduling line at [phone] or toll-free at [phone].     My Clinical Pharmacist's contact information:                                                      It was a pleasure seeing you today!  Please feel free to contact me with any questions or concerns you have.      Dorian Schultz, PharmD  MTM Pharmacist    Phone: [phone]     You may receive a survey about the MTM services you received.  I would appreciate your feedback to help me serve you better in the future. Please fill it out and return it when you can. Your comments will be anonymous.

## 2017-02-21 NOTE — MR AVS SNAPSHOT
After Visit Summary   2/21/2017    Frank Orellana    MRN: [id number]           Patient Information     Date Of Birth          1950        Visit Information        Provider Department      2/21/2017 9:30 AM [facility]        Today's Diagnoses     Chronic hepatitis C without hepatic coma (H)    -  1      Care Instructions    Recommendations from today's MTM visit:                                                      Concerning Harvoni Therapy:   -You will be taking Harvoni, 1 tablet daily for 12 weeks with or without food.   -Do NOT take heartburn/reflux medications while on Harvoni without discussing with us first   -If you start any new medications, please call to discuss drug interactions with me before starting   -If you miss a dose, and it has been less than 12 hours, you may take the dose. If it has been more, skip the dose and take your next dose as normal. Do not take 2 doses at the same time.    -Most common side effects are Headache and Fatigue.   -There is a drug interaction with Harvoni and Diltiazem. Harvoni can increase the blood levels of Diltiazem, monitor this by checking your blood pressure (if your blood pressure drops below 100/50, may need a dose decrease of Diltiazem), and checking your heart rate (if your heart rate drops below 50, you may need a dose decrease of Diltiazem).     Next MTM visit:     To schedule another MTM appointment, please call the clinic directly or you may call the MTM scheduling line at [phone] or toll-free at [phone].     My Clinical Pharmacist's contact information:                                                      It was a pleasure seeing you today!  Please feel free to contact me with any questions or concerns you have.      Dorian Schultz, PharmD  MTM Pharmacist    Phone: [phone]     You may receive a survey about the MTM services you received.  I would appreciate your  feedback to help me serve you better in the future. Please fill it out and return it when you can. Your comments will be anonymous.            Follow-ups after your visit        Your next 10 appointments already scheduled     Mar 21, 2017 10:00 AM CDT   LAB with  LAB    Health Lab ([facility])    [address] [place] [id number] [phone]           Patient must bring picture ID.  Patient should be prepared to give a urine specimen  Please do not eat 10-12 hours before your appointment if you are coming in fasting for labs on lipids, cholesterol, or glucose (sugar).  Pregnant women should follow their Care Team instructions. Water with medications is okay. Do not drink coffee or other fluids.   If you have concerns about taking  your medications, please ask at office or if scheduling via [employer], send a message by clicking on Secure Messaging, Message Your Care Team.            Mar 21, 2017 11:00 AM CDT   (Arrive by 10:45 AM)   Return General Liver with Caty Shetty PA-C   [facility] Hepatology ([facility])    [address] [place] [address] [phone]            Mar 30, 2017  1:00 PM CDT   Return Sleep Patient with Melissa Stanford MD   [facility] [place], Sleep Study ([facility])    [address] [place] [address] [phone]            Apr 27, 2017  8:00 AM CDT   Ech Complete with UCECHCR2    Health Echo ([facility])    [address] [place] [address] [phone]           1.  Please bring or wear a comfortable two-piece outfit. 2.  You may eat, drink and take your normal medicines. 3.  For any questions that cannot be answered, please contact the ordering physician            May 15, 2017  8:00 AM CDT   LAB with  LAB    [facility] ([facility])     [address] [place] [phone]   [phone]           Patient must bring picture ID.  Patient should be prepared to give a urine specimen  Please do not eat 10-12 hours before your appointment if you are coming in fasting for labs on lipids, cholesterol, or glucose (sugar).  Pregnant women should follow their Care Team instructions. Water with medications is okay. Do not drink coffee or other fluids.   If you have concerns about taking  your medications, please ask at office or if scheduling via [email or web address], send a message by clicking on Secure Messaging, Message Your Care Team.            Jun 07, 2017  8:00 AM CDT   (Arrive by 7:45 AM)   RETURN ARRHYTHMIA with Jovanni Felipe MD   [facility] Heart Care ([facility])    [address] [place] [address]   [phone]            Jul 06, 2017 11:30 AM CDT   Walk In From ENT with Adriane Russo   [facility] Audiology ([facility])    [address] [place] [phone]   [phone]            Jul 06, 2017  1:00 PM CDT   (Arrive by 12:45 PM)   Return Visit with Andrae Laughlin MD   [facility] Ear Nose and Throat ([facility])    [address] [place] [phone]   [phone]            Aug 14, 2017  8:00 AM CDT   LAB with  LAB   [facility] Lab ([facility])    [address] [place] [phone]           Patient must bring picture ID.  Patient should be prepared to give a urine specimen  Please do not eat 10-12 hours before your appointment if you are coming in fasting for labs on lipids, cholesterol, or glucose (sugar).  Pregnant women should follow their Care Team instructions. Water with medications is okay. Do not drink coffee or other fluids.   If you have concerns about taking  your medications, please ask at office or  if scheduling via [employer], send a message by clicking on Secure Messaging, Message Your Care Team.            Oct 09, 2017  1:15 PM CDT   RETURN RETINA with Ruth Boone MD   Eye Clinic ([facility])    [address]  [address] [facility]  9th Fl Clin 9a  [facility] [address]   [phone]              Future tests that were ordered for you today     Open Future Orders        Priority Expected Expires Ordered    Hepatic panel Routine 3/21/2017 4/21/2017 2/20/2017    Basic metabolic panel Routine 3/21/2017 4/28/2017 2/20/2017    Hepatitis C RNA quantitative Routine 5/15/2017 8/13/2017 2/20/2017    Hepatitis C RNA quantitative Routine 8/14/2017 11/13/2017 2/20/2017            Who to contact     If you have questions or need follow up information about today's clinic visit or your schedule please contact  [facility] directly at No information on file..  Normal or non-critical lab and imaging results will be communicated to you by [employer], letter or phone within 4 business days after the clinic has received the results. If you do not hear from us within 7 days, please contact the clinic through [employer] or phone. If you have a critical or abnormal lab result, we will notify you by phone as soon as possible.  Submit refill requests through [employer] or call your pharmacy and they will forward the refill request to us. Please allow 3 business days for your refill to be completed.          Additional Information About Your Visit        [employer] Information     [employer] gives you secure access to your electronic health record. If you see a primary care provider, you can also send messages to your care team and make appointments. If you have questions, please call your primary care clinic.  If you do not have a primary care provider, please call [phone] and they will assist you.        Care EveryWhere ID     This is your Care EveryWhere ID. This could be used by  other organizations to access your [facility] medical records  [id number]         Blood Pressure from Last 3 Encounters:   01/20/17 143/77   01/18/17 109/60   01/16/17 115/70    Weight from Last 3 Encounters:   01/27/17 175 lb 6.4 oz (79.6 kg)   01/20/17 175 lb 11.3 oz (79.7 kg)   01/18/17 178 lb (80.7 kg)              Today, you had the following     No orders found for display         Today's Medication Changes          These changes are accurate as of: 2/21/17  9:46 AM.  If you have any questions, ask your nurse or doctor.               These medicines have changed or have updated prescriptions.        Dose/Directions    alfuzosin 10 MG 24 hr tablet   Commonly known as:  UROXATRAL   This may have changed:  when to take this   Used for:  Urinary frequency        Dose:  10 mg   Take 1 tablet (10 mg) by mouth daily   Quantity:  90 tablet   Refills:  1       diltiazem 180 MG 24 hr capsule   This may have changed:  when to take this   Used for:  Paroxysmal atrial fibrillation (H)        Dose:  180 mg   Take 1 capsule (180 mg) by mouth daily   Quantity:  90 capsule   Refills:  3                Primary Care Provider Office Phone # Fax #    Vanessa Edwards [phone]        [facility]ANS [address] [place] [phone]        Thank you!     Thank you for choosing [facility] MEDICATION THERAPY MANAGEMENT  for your care. Our goal is always to provide you with excellent care. Hearing back from our patients is one way we can continue to improve our services. Please take a few minutes to complete the written survey that you may receive in the mail after your visit with us. Thank you!             Your Updated Medication List - Protect others around you: Learn how to safely use, store and throw away your medicines at www.disposemymeds.org.          This list is accurate as of: 2/21/17  9:46 AM.  Always use your most recent med list.                   Brand Name Dispense Instructions for use     alfuzosin 10 MG 24 hr tablet    UROXATRAL    90 tablet    Take 1 tablet (10 mg) by mouth daily       aspirin 81 MG EC tablet     90 tablet    Take 1 tablet (81 mg) by mouth daily       diltiazem 180 MG 24 hr capsule     90 capsule    Take 1 capsule (180 mg) by mouth daily       order for DME      Equipment being ordered: CPAP Equipment ordered: RESMED Auto PAP Mask type: Nasal Settings: 7-9 CM H2O

## 2017-02-21 NOTE — Clinical Note
Pt should be good to go. Discussed Harvoni treatment with patient, small drug interaction with Dilt, but does not effect Harvoni levels. Pt started on 2/20.

## 2017-03-21 ENCOUNTER — OFFICE VISIT (OUTPATIENT)
Dept: GASTROENTEROLOGY | Facility: CLINIC | Age: 67
End: 2017-03-21
Attending: PHYSICIAN ASSISTANT
Payer: COMMERCIAL

## 2017-03-21 VITALS
WEIGHT: 191.3 LBS | TEMPERATURE: 97.7 F | HEIGHT: 69 IN | HEART RATE: 57 BPM | OXYGEN SATURATION: 96 % | DIASTOLIC BLOOD PRESSURE: 73 MMHG | BODY MASS INDEX: 28.33 KG/M2 | SYSTOLIC BLOOD PRESSURE: 116 MMHG

## 2017-03-21 DIAGNOSIS — B18.2 CHRONIC HEPATITIS C WITHOUT HEPATIC COMA (H): ICD-10-CM

## 2017-03-21 DIAGNOSIS — B18.2 CHRONIC HEPATITIS C WITHOUT HEPATIC COMA (H): Primary | ICD-10-CM

## 2017-03-21 LAB
ALBUMIN SERPL-MCNC: 3.7 G/DL (ref 3.4–5)
ALP SERPL-CCNC: 73 U/L (ref 40–150)
ALT SERPL W P-5'-P-CCNC: 16 U/L (ref 0–70)
ANION GAP SERPL CALCULATED.3IONS-SCNC: 7 MMOL/L (ref 3–14)
AST SERPL W P-5'-P-CCNC: 12 U/L (ref 0–45)
BILIRUB DIRECT SERPL-MCNC: 0.2 MG/DL (ref 0–0.2)
BILIRUB SERPL-MCNC: 0.9 MG/DL (ref 0.2–1.3)
BUN SERPL-MCNC: 14 MG/DL (ref 7–30)
CALCIUM SERPL-MCNC: 8.5 MG/DL (ref 8.5–10.1)
CHLORIDE SERPL-SCNC: 111 MMOL/L (ref 94–109)
CO2 SERPL-SCNC: 25 MMOL/L (ref 20–32)
CREAT SERPL-MCNC: 0.78 MG/DL (ref 0.66–1.25)
GFR SERPL CREATININE-BSD FRML MDRD: ABNORMAL ML/MIN/1.7M2
GLUCOSE SERPL-MCNC: 122 MG/DL (ref 70–99)
POTASSIUM SERPL-SCNC: 4.1 MMOL/L (ref 3.4–5.3)
PROT SERPL-MCNC: 6.6 G/DL (ref 6.8–8.8)
SODIUM SERPL-SCNC: 143 MMOL/L (ref 133–144)

## 2017-03-21 PROCEDURE — 99212 OFFICE O/P EST SF 10 MIN: CPT | Mod: ZF

## 2017-03-21 PROCEDURE — 80048 BASIC METABOLIC PNL TOTAL CA: CPT | Performed by: PHYSICIAN ASSISTANT

## 2017-03-21 PROCEDURE — 80076 HEPATIC FUNCTION PANEL: CPT | Performed by: PHYSICIAN ASSISTANT

## 2017-03-21 PROCEDURE — 36415 COLL VENOUS BLD VENIPUNCTURE: CPT | Performed by: PHYSICIAN ASSISTANT

## 2017-03-21 RX ORDER — LEDIPASVIR AND SOFOSBUVIR 90; 400 MG/1; MG/1
1 TABLET, FILM COATED ORAL DAILY
COMMUNITY
End: 2017-06-07

## 2017-03-21 ASSESSMENT — PAIN SCALES - GENERAL: PAINLEVEL: NO PAIN (0)

## 2017-03-21 NOTE — NURSING NOTE
"Chief Complaint   Patient presents with     RECHECK     Hep C. 4 week follow up       Initial /73  Pulse 57  Temp 97.7  F (36.5  C) (Oral)  Ht 1.753 m (5' 9\")  Wt 86.8 kg (191 lb 4.8 oz)  SpO2 96%  BMI 28.25 kg/m2 Estimated body mass index is 28.25 kg/(m^2) as calculated from the following:    Height as of this encounter: 1.753 m (5' 9\").    Weight as of this encounter: 86.8 kg (191 lb 4.8 oz).  Medication Reconciliation: complete  "

## 2017-03-21 NOTE — MR AVS SNAPSHOT
After Visit Summary   3/21/2017    Frank Orellana    MRN: [id number]           Patient Information     Date Of Birth          1950        Visit Information        Provider Department      3/21/2017 11:00 AM Caty Shetty PA-C [facility] Hepatology        Today's Diagnoses     Chronic hepatitis C without hepatic coma (H)    -  1       Follow-ups after your visit        Your next 10 appointments already scheduled     Mar 30, 2017  1:00 PM CDT   Return Sleep Patient with Melissa Stanford MD   [facility] [place], Sleep Study ([facility])    [address] [place] [phone]            Apr 27, 2017  8:00 AM CDT   Ech Complete with UCECHCR2   [facility] Echo ([facility])    [address] [place] [address] [phone]           1.  Please bring or wear a comfortable two-piece outfit. 2.  You may eat, drink and take your normal medicines. 3.  For any questions that cannot be answered, please contact the ordering physician            May 15, 2017  8:00 AM CDT   LAB with  LAB   [facility] Lab ([facility])    [address] [place] [address] [phone]           Patient must bring picture ID.  Patient should be prepared to give a urine specimen  Please do not eat 10-12 hours before your appointment if you are coming in fasting for labs on lipids, cholesterol, or glucose (sugar).  Pregnant women should follow their Care Team instructions. Water with medications is okay. Do not drink coffee or other fluids.   If you have concerns about taking  your medications, please ask at office or if scheduling via [employer], send a message by clicking on Secure Messaging, Message Your Care Team.            Jun 07, 2017  8:00 AM CDT   (Arrive by 7:45 AM)   RETURN ARRHYTHMIA with Jovanni Felipe MD   [facility] Heart Care ([facility]  [facility])    [address] [facility]  3rd [facility] [address]   [phone]            Jul 06, 2017 11:30 AM CDT   Walk In From ENT with Adriane Russo   [facility] Audiology ([facility])    [address] [place] [address]  4th [facility] [address]   [phone]            Jul 06, 2017  1:00 PM CDT   (Arrive by 12:45 PM)   Return Visit with Andrae Laughlin MD   [facility] Ear Nose and Throat ([facility])    [address] [place] [address]  4th [facility] [address]   [phone]            Aug 14, 2017  8:00 AM CDT   LAB with  LAB   [facility] Lab ([facility])    [address] [place] [phone]           Patient must bring picture ID.  Patient should be prepared to give a urine specimen  Please do not eat 10-12 hours before your appointment if you are coming in fasting for labs on lipids, cholesterol, or glucose (sugar).  Pregnant women should follow their Care Team instructions. Water with medications is okay. Do not drink coffee or other fluids.   If you have concerns about taking  your medications, please ask at office or if scheduling via [employer], send a message by clicking on Secure Messaging, Message Your Care Team.            Oct 09, 2017  1:15 PM CDT   RETURN RETINA with Ruth Boone MD   Eye Clinic ([facility])    [address]  [address] [facility]  [address] Clin 9a  [facility] [address] [phone]              Who to contact     If you have questions or need follow up information about today's clinic visit or your schedule please contact [facility] HEPATOLOGY directly at [phone].  Normal or non-critical lab and imaging results will be communicated to you by MyChart, letter or phone within 4 business days after the clinic has received the results. If you do not hear from us within 7 days, please contact the clinic  "through [employer] or phone. If you have a critical or abnormal lab result, we will notify you by phone as soon as possible.  Submit refill requests through [employer] or call your pharmacy and they will forward the refill request to us. Please allow 3 business days for your refill to be completed.          Additional Information About Your Visit        [email or web address] Information     [employer] gives you secure access to your electronic health record. If you see a primary care provider, you can also send messages to your care team and make appointments. If you have questions, please call your primary care clinic.  If you do not have a primary care provider, please call [phone] and they will assist you.        Care EveryWhere ID     This is your Care EveryWhere ID. This could be used by other organizations to access your [facility] medical records  [id number]        Your Vitals Were     Pulse Temperature Height Pulse Oximetry BMI (Body Mass Index)       57 97.7  F (36.5  C) (Oral) 1.753 m (5' 9\") 96% 28.25 kg/m2        Blood Pressure from Last 3 Encounters:   03/21/17 116/73   01/20/17 143/77   01/18/17 109/60    Weight from Last 3 Encounters:   03/21/17 86.8 kg (191 lb 4.8 oz)   01/27/17 79.6 kg (175 lb 6.4 oz)   01/20/17 79.7 kg (175 lb 11.3 oz)              Today, you had the following     No orders found for display         Today's Medication Changes          These changes are accurate as of: 3/21/17 11:27 AM.  If you have any questions, ask your nurse or doctor.               These medicines have changed or have updated prescriptions.        Dose/Directions    alfuzosin 10 MG 24 hr tablet   Commonly known as:  UROXATRAL   This may have changed:  when to take this   Used for:  Urinary frequency        Dose:  10 mg   Take 1 tablet (10 mg) by mouth daily   Quantity:  90 tablet   Refills:  1       diltiazem 180 MG 24 hr capsule   This may have changed:  when to take this   Used for:  Paroxysmal atrial fibrillation (H)        " Dose:  180 mg   Take 1 capsule (180 mg) by mouth daily   Quantity:  90 capsule   Refills:  3                Primary Care Provider Office Phone # Fax #    Vanessa Edwards [phone]        [facility] [address] [facility] [address]  [facility] [address]        Thank you!     Thank you for choosing [facility] HEPATOLOGY  for your care. Our goal is always to provide you with excellent care. Hearing back from our patients is one way we can continue to improve our services. Please take a few minutes to complete the written survey that you may receive in the mail after your visit with us. Thank you!             Your Updated Medication List - Protect others around you: Learn how to safely use, store and throw away your medicines at www.disposemymeds.org.          This list is accurate as of: 3/21/17 11:27 AM.  Always use your most recent med list.                   Brand Name Dispense Instructions for use    alfuzosin 10 MG 24 hr tablet    UROXATRAL    90 tablet    Take 1 tablet (10 mg) by mouth daily       aspirin 81 MG EC tablet     90 tablet    Take 1 tablet (81 mg) by mouth daily       diltiazem 180 MG 24 hr capsule     90 capsule    Take 1 capsule (180 mg) by mouth daily       ledipasvir-sofosbuvir  MG per tablet    HARVONI     Take 1 tablet by mouth daily       order for DME      Equipment being ordered: CPAP Equipment ordered: RESMED Auto PAP Mask type: Nasal Settings: 7-9 CM H2O

## 2017-04-27 ENCOUNTER — RADIANT APPOINTMENT (OUTPATIENT)
Dept: CARDIOLOGY | Facility: CLINIC | Age: 67
End: 2017-04-27
Attending: NURSE PRACTITIONER

## 2017-04-27 DIAGNOSIS — I48.0 PAROXYSMAL ATRIAL FIBRILLATION (H): ICD-10-CM

## 2017-05-04 ENCOUNTER — OFFICE VISIT (OUTPATIENT)
Dept: SLEEP MEDICINE | Facility: CLINIC | Age: 67
End: 2017-05-04
Attending: INTERNAL MEDICINE
Payer: COMMERCIAL

## 2017-05-04 VITALS
WEIGHT: 188.6 LBS | HEIGHT: 69 IN | OXYGEN SATURATION: 95 % | DIASTOLIC BLOOD PRESSURE: 74 MMHG | SYSTOLIC BLOOD PRESSURE: 125 MMHG | HEART RATE: 55 BPM | TEMPERATURE: 96.6 F | BODY MASS INDEX: 27.93 KG/M2

## 2017-05-04 DIAGNOSIS — G47.33 OSA (OBSTRUCTIVE SLEEP APNEA): Primary | ICD-10-CM

## 2017-05-04 ASSESSMENT — PAIN SCALES - GENERAL: PAINLEVEL: MILD PAIN (2)

## 2017-05-04 NOTE — MR AVS SNAPSHOT
After Visit Summary   5/4/2017    Frank Orellana    MRN: [id number]           Patient Information     Date Of Birth          1950        Visit Information        Provider Department      5/4/2017 11:00 AM Melissa Stanford MD [facility], [place], Sleep Study        Today's Diagnoses     WINNIE (obstructive sleep apnea) AHI 32    -  1      Care Instructions    1.  Continue CPAP every night for all hours that you are sleeping.  If you nap use CPAP.  As always, try to get at least 8 hours of sleep or more each day, and avoid sleep deprivation. Avoid alcohol.    2.  Reasons that you might need a change to your pressure therapy would be weight gain or loss, waking having inadvertently removed your CPAP overnight, having previously felt refreshed by sleep with CPAP use and now waking un-refreshed, and return of daytime sleepiness. Also, the development of new medical problems can sometimes affect breathing at night-heart failure, stroke, medications such as narcotics.    3.  Please bring CPAP with you if you are hospitalized.  If anticipating surgery be sure to discuss with your surgeon that you have sleep apnea and use PAP therapy.      4.  Maintain your equipment as recommended which includes routine cleaning and replacement of supplies.  Call DME for any questions regarding supplies or maintenance.      5.  Do not drive on engage in potentially dangerous activities if feeling sleepy.    6.  Please see me again in 12 months and bring your machine and card to your follow-up visit.      [facility] DME and cpap specialist Tash [phone]  [facility] [phone]    [facility] [phone], CPAP specialist [phone]  [facility] [phone]    [place] [facility], [facility] [phone]    [facility] DME  Daphnie Miles [phone]  [facility] DME Brenda [phone]  [facility] DME phone [phone]         Tips for your CPAP and BIPAP use-    Mask fitting tips  Mask fitting exercise:    To improve your mask seal and your mobility at night, put mask on and secure in place.  Lie down in bed with full pressure and roll to one side, adjust headgear while in that position to eliminate any leaks. Repeat process rolling to other side.     The mask seal does not have to be perfect:   CPAP machines are designed to make up for small leaks. However, you will not tolerate leaks blowing in your eyes so you will need to adjust.   Any leak should only be near or at the bottom of the mask.  We expect your mask to leak slightly at night.    Do not over-tighten the headgear straps, tighter IS NOT better, we expect minimal leak.    First try re-positioning the mask or headgear before tightening the headgear straps.  Mask leaks are expected due to changing sleeping positions. Try pulling the mask away from your skin allowing the cushion to re-inflate will minimize the leak.  If you struggle for a good fit, try turning the CPAP off and then readjust the mask by pulling it away from your face and then turning back on the CPAP.        Humidifier tips  Humidifiers can be adjusted to increase or decrease the amount of moisture according to your comfort level. You may need to adjust this frequently at first, but then might only change it with seasonal weather changes.     Try INCREASING the humidity if:  You experience a dry, irritated nasal passage or throat.  You have a runny, drippy nose or sneezing fits after using CPAP.  You experience nasal congestion during or after CPAP use.    Try DECREASING the humidity if:  You have excessive condensation or  rain out  in the tubing or mask.  Otherwise keep the tubing warm during the night by running it underneath the blankets or pillow.      Clinic visit after initial CPAP and BIPAP set-up    Bring your equipment with you to your 4 week follow up clinic visit.  We will be extracting your data from the machine.        Travel  Always take your equipment with you.  If you fly with your equipment bring it on with you as a carry on.  Medical equipment does not count as a carry on.    If you travel international the machines take 110-240.  The only adapter needed is the adapter that will fit into the receptacle (outlet).    You may also want to bring an extension cord as many hot rooms have limited outlets at the bedside.  Do not travel with water in your humidifier chamber.     Cleaning and Maintenance Guidelines    Equipment Frequency Cleaning Method   Mask First Day    Daily      Weekly Soak mask in hot soapy water for 30 minutes, rinse and air dry.  Wipe nasal cushion with a hot soapy (Ivory, baby shampoo) cloth and rinse.  Baby wipes may also be used.  Do not use anti-bacterial soaps,Isamar  liquid soap, rubbing alcohol, bleach or ammonia.  Wash frame in hot soapy water (Ivory, baby shampoo) rinse and let air dry   Headgear Biweekly Wash in hot soapy water, rinse and air dry   Reusable Gray Filter Weekly Wash in hot soapy water, rinse, put in towel squeeze moisture out, let air dry   Disposable White Filter Check Weekly Replace when brown or gray in color; at least every 2 to 3 months   Humidifier Chamber Daily    Weekly Empty distilled water from humidifier and let air dry    Hand wash in hot soapy water, rinse and air dry   Tubing Weekly Wash in hot soapy water, rinse and let air dry   Mask, Tubing and Humidifier Chamber As needed Disinfect: Soak in 1 part vinegar to 3 parts hot water for 30 minutes, rinse well and air dry  Not the material headgear        MASK AND SUPPLY REORDERING  Reminder: Most insurance companies will allow for a new mask, headgear, tubing, and reusable gray filter every six months.  Disposable white ultra-fine filters are covered monthly.    EQUIPMENT NEEDS  Please call our CPAP  specialist with any equipment problems, questions or needs.    HOME AND SAFETY INSTRUCTIONS    Do not use frayed or cracked electrical cords, multi plug adaptors, or switched receptacles    Do not immerse electrical equipment into water    Assure that electrical cords do not become a tripping hazard        Your BMI is Body mass index is 27.85 kg/(m^2).  Weight management is a personal decision.  If you are interested in exploring weight loss strategies, the following discussion covers the approaches that may be successful. Body mass index (BMI) is one way to tell whether you are at a healthy weight, overweight, or obese. It measures your weight in relation to your height.  A BMI of 18.5 to 24.9 is in the healthy range. A person with a BMI of 25 to 29.9 is considered overweight, and someone with a BMI of 30 or greater is considered obese. More than two-thirds of American adults are considered overweight or obese.  Being overweight or obese increases the risk for further weight gain. Excess weight may lead to heart disease and diabetes.  Creating and following plans for healthy eating and physical activity may help you improve your health.  Weight control is part of healthy lifestyle and includes exercise, emotional health, and healthy eating habits. Careful eating habits lifelong are the mainstay of weight control. Though there are significant health benefits from weight loss, long-term weight loss with diet alone may be very difficult to achieve- studies show long-term success with dietary management in less than 10% of people. Attaining a healthy weight may be especially difficult to achieve in those with severe obesity. In some cases, medications, devices and surgical management might be considered.  What can you do?  If you are overweight or obese and are interested in methods for weight loss, you should discuss this with your provider.     Consider reducing daily calorie intake by 500 calories.     Keep a food  journal.     Avoiding skipping meals, consider cutting portions instead.    Diet combined with exercise helps maintain muscle while optimizing fat loss. Strength training is particularly important for building and maintaining muscle mass. Exercise helps reduce stress, increase energy, and improves fitness. Increasing exercise without diet control, however, may not burn enough calories to loose weight.       Start walking three days a week 10-20 minutes at a time    Work towards walking thirty minutes five days a week     Eventually, increase the speed of your walking for 1-2 minutes at time    In addition, we recommend that you review healthy lifestyles and methods for weight loss available through the National Institutes of Health patient information sites:  http://win.niddk.nih.gov/publications/index.htm    And look into health and wellness programs that may be available through your health insurance provider, employer, local community center, or matthew club.    Weight management plan: Patient was referred to their PCP to discuss a diet and exercise plan.            Follow-ups after your visit        Follow-up notes from your care team     Return in about 1 year (around 5/4/2018).      Your next 10 appointments already scheduled     May 05, 2017  8:40 AM CDT   (Arrive by 8:25 AM)   Return Visit with Vanessa Edwards MD   [facility] ([facility])    [address] [place] [id number] [phone]            May 15, 2017  8:00 AM CDT   LAB with  LAB   [facility] ([facility])    [address] [place] [id number] [phone]           Patient must bring picture ID.  Patient should be prepared to give a urine specimen  Please do not eat 10-12 hours before your appointment if you are coming in fasting for labs on lipids, cholesterol, or glucose (sugar).  Pregnant women should follow their Care  Team instructions. Water with medications is okay. Do not drink coffee or other fluids.   If you have concerns about taking  your medications, please ask at office or if scheduling via [employer], send a message by clicking on Secure Messaging, Message Your Care Team.            Jun 07, 2017  8:00 AM CDT   (Arrive by 7:45 AM)   RETURN ARRHYTHMIA with Jovanni Felipe MD   [facility] ([facility])    [address]  3rd Floor  [facility] [address] [phone]            Jul 06, 2017 11:30 AM CDT   Walk In From ENT with Adriane Russo   [facility] Audiology ([facility])    [address] [place] [address]  [address] [facility] [id number] [phone]            Jul 06, 2017  1:00 PM CDT   (Arrive by 12:45 PM)   Return Visit with Andrae Laughlin MD   [facility] Nose [facility]at ([facility])    [address] [place] [address]  [address] [facility] [id number] [phone]            Aug 14, 2017  8:00 AM CDT   LAB with  LAB   [facility] ([facility])    [address]  [address] [facility] [id number] [phone]           Patient must bring picture ID.  Patient should be prepared to give a urine specimen  Please do not eat 10-12 hours before your appointment if you are coming in fasting for labs on lipids, cholesterol, or glucose (sugar).  Pregnant women should follow their Care Team instructions. Water with medications is okay. Do not drink coffee or other fluids.   If you have concerns about taking  your medications, please ask at office or if scheduling via [employer], send a message by clicking on Secure Messaging, Message Your Care Team.            Oct 09, 2017  1:15 PM CDT   RETURN RETINA with Ruth Boone MD   [facility] Clinic ([facility])    [facility]  [address]  [address] [address]  [facility] [address]   [phone]              Who to  "contact     If you have questions or need follow up information about today's clinic visit or your schedule please contact [facility] directly at [phone].  Normal or non-critical lab and imaging results will be communicated to you by [employer], letter or phone within 4 business days after the clinic has received the results. If you do not hear from us within 7 days, please contact the clinic through [employer] or phone. If you have a critical or abnormal lab result, we will notify you by phone as soon as possible.  Submit refill requests through [employer] or call your pharmacy and they will forward the refill request to us. Please allow 3 business days for your refill to be completed.          Additional Information About Your Visit        [employer] Information     [employer] gives you secure access to your electronic health record. If you see a primary care provider, you can also send messages to your care team and make appointments. If you have questions, please call your primary care clinic.  If you do not have a primary care provider, please call [phone] and they will assist you.        Care EveryWhere ID     This is your Care EveryWhere ID. This could be used by other organizations to access your [facility] medical records  [id number]        Your Vitals Were     Pulse Temperature Height Pulse Oximetry BMI (Body Mass Index)       55 96.6  F (35.9  C) (Oral) 1.753 m (5' 9\") 95% 27.85 kg/m2        Blood Pressure from Last 3 Encounters:   05/04/17 125/74   03/21/17 116/73   01/20/17 143/77    Weight from Last 3 Encounters:   05/04/17 85.5 kg (188 lb 9.6 oz)   03/21/17 86.8 kg (191 lb 4.8 oz)   01/27/17 79.6 kg (175 lb 6.4 oz)              Today, you had the following     No orders found for display         Today's Medication Changes          These changes are accurate as of: 5/4/17 11:37 AM.  If you have any questions, ask your nurse or doctor.               These medicines have changed or have " updated prescriptions.        Dose/Directions    alfuzosin 10 MG 24 hr tablet   Commonly known as:  UROXATRAL   This may have changed:  when to take this   Used for:  Urinary frequency        Dose:  10 mg   Take 1 tablet (10 mg) by mouth daily   Quantity:  90 tablet   Refills:  1       diltiazem 180 MG 24 hr capsule   This may have changed:  when to take this   Used for:  Paroxysmal atrial fibrillation (H)        Dose:  180 mg   Take 1 capsule (180 mg) by mouth daily   Quantity:  90 capsule   Refills:  3                Primary Care Provider Office Phone # Fax #    Vanessa Edwards [phone]        [facility] [address]  [facility] [address]        Thank you!     Thank you for choosing [facility]  for your care. Our goal is always to provide you with excellent care. Hearing back from our patients is one way we can continue to improve our services. Please take a few minutes to complete the written survey that you may receive in the mail after your visit with us. Thank you!             Your Updated Medication List - Protect others around you: Learn how to safely use, store and throw away your medicines at www.disposemymeds.org.          This list is accurate as of: 5/4/17 11:37 AM.  Always use your most recent med list.                   Brand Name Dispense Instructions for use    alfuzosin 10 MG 24 hr tablet    UROXATRAL    90 tablet    Take 1 tablet (10 mg) by mouth daily       aspirin 81 MG EC tablet     90 tablet    Take 1 tablet (81 mg) by mouth daily       diltiazem 180 MG 24 hr capsule     90 capsule    Take 1 capsule (180 mg) by mouth daily       ledipasvir-sofosbuvir  MG per tablet    HARVONI     Take 1 tablet by mouth daily       order for DME      Reported on 5/4/2017

## 2017-05-04 NOTE — PROGRESS NOTES
DATE OF SERVICE:  05/04/2017      CHIEF COMPLAINT:  Followup of CPAP and sleep apnea.      HISTORY OF PRESENT ILLNESS:  Farnk Orellana is a 67-year-old gentleman with history of paroxysmal atrial fibrillation, snoring, witnessed apneas.  He was found to have severe obstructive sleep apnea by polysomnography (AHI 32) and has been started on CPAP therapy.  He is here today for followup.  He and his wife both note the resolution of the snoring and witnessed apneas and he feels that his sleep quality is good.  He is fatigued.  However, he attributes this to medication which he is taking for hepatitis C.  Typical bedtime is between 10:30 and midnight, rise time between 5:30 and 7:30.  He is not drinking any alcohol beverages given that he is on this medication at the moment.  Otherwise, he drinks about 2, occasionally 3 caffeinated beverages in the morning.  He occasionally takes some naps when he is fatigued related to the medication.  He is using a nasal mask.  Initially he had some belching but that has since resolved.  He is also using a chin strap.  He does have some potential surgical procedures coming in the next year including possible hand surgery and possible hernia surgery.  [place] is normal.     Download of data from his device in the last 30 days shows excellent compliance with percent of days used greater than 4 hours of 100%, average use, days used is 6 hours and 36 minutes.  Current settings are for auto mode min pressure of 7, max pressure of 9; 95th percentile for pressure is 8.9.  Overall apnea-hypopnea index is optimally treated at 1.  There does not appear to be significant leak issues.  Download was reviewed in detail with the patient and his wife today including detailed individual night data.      PAST MEDICAL HISTORY, ALLERGIES, MEDICATIONS:  Reviewed.      PHYSICAL EXAMINATION:   GENERAL:  Pleasant gentleman, alert, in no distress.   VITAL SIGNS:  Blood pressure 125/74.  Pulse 55.  Temperature  96.6.  O2 saturation 95% on room air.  Weight 188 pounds.  BMI 27.8.      ASSESSMENT:  A 67-year-old gentleman with history of paroxysmal atrial fibrillation, currently without any recent episodes, also with severe obstructive sleep apnea recently diagnosed with resolution of apnea, per download good clinical benefit.      PLAN:  The patient is going to continue to use CPAP nightly.  He is instructed to take it with him when he travels or for any procedures requiring sedation or any hospitalizations.  He was reminded of the effects of alcohol, sedatives and narcotics on sleep apnea.  He was also reminded that weight gain could exacerbate sleep apnea.  If he has questions regarding his pressures, he can certainly contact us and look at a download.  Otherwise, the patient is recommended to follow up once a year.  Please see patient instructions for further details of counseling provided.  We reviewed cleaning schedule as well.  He was agreeable with this plan.  He did note that he did not get sleep therapy management coaching calls as had been counseled to him.  I will be looking into that error.      Twenty-five minutes spent with the patient, greater than 50% spent in counseling and coordinating care.         CARLA GANNON MD             D: 2017 11:40   T: 2017 12:38   MT: SK      Name:     CATHERINE OH   MRN:      [id number]        Account:      [id number]   :      1950           Visit Date:   2017      Document: [id number]

## 2017-05-04 NOTE — NURSING NOTE
"Chief Complaint   Patient presents with     Sleep Problem     f/u       Initial /74 (BP Location: Left arm, Patient Position: Chair, Cuff Size: Adult Regular)  Pulse 55  Temp 96.6  F (35.9  C) (Oral)  Ht 1.753 m (5' 9\")  Wt 85.5 kg (188 lb 9.6 oz)  SpO2 95%  BMI 27.85 kg/m2 Estimated body mass index is 27.85 kg/(m^2) as calculated from the following:    Height as of this encounter: 1.753 m (5' 9\").    Weight as of this encounter: 85.5 kg (188 lb 9.6 oz).  Medication Reconciliation: complete     Neck 37cm   ESS 2    "

## 2017-05-04 NOTE — PATIENT INSTRUCTIONS
1.  Continue CPAP every night for all hours that you are sleeping.  If you nap use CPAP.  As always, try to get at least 8 hours of sleep or more each day, and avoid sleep deprivation. Avoid alcohol.    2.  Reasons that you might need a change to your pressure therapy would be weight gain or loss, waking having inadvertently removed your CPAP overnight, having previously felt refreshed by sleep with CPAP use and now waking un-refreshed, and return of daytime sleepiness. Also, the development of new medical problems can sometimes affect breathing at night-heart failure, stroke, medications such as narcotics.    3.  Please bring CPAP with you if you are hospitalized.  If anticipating surgery be sure to discuss with your surgeon that you have sleep apnea and use PAP therapy.      4.  Maintain your equipment as recommended which includes routine cleaning and replacement of supplies.  Call DME for any questions regarding supplies or maintenance.      5.  Do not drive on engage in potentially dangerous activities if feeling sleepy.    6.  Please see me again in 12 months and bring your machine and card to your follow-up visit.      [facility] DME and cpap specialist Tash [phone]  [facility] [phone]    [facility] DME [phone], CPAP specialist [phone]  [facility] [phone]    [facility] [facility], [facility] [phone]    [facility] D[facility]  Daphnie Miles [phone]  [facility] [place] DME Brenda [phone]  [facility] Medical DME phone [phone]         Tips for your CPAP and BIPAP use-    Mask fitting tips  Mask fitting exercise:    To improve your mask seal and your mobility at night, put mask on and secure in place.  Lie down in bed with full pressure and roll to one side, adjust headgear while in that position to  eliminate any leaks. Repeat process rolling to other side.     The mask seal does not have to be perfect:   CPAP machines are designed to make up for small leaks. However, you will not tolerate leaks blowing in your eyes so you will need to adjust.   Any leak should only be near or at the bottom of the mask.  We expect your mask to leak slightly at night.    Do not over-tighten the headgear straps, tighter IS NOT better, we expect minimal leak.    First try re-positioning the mask or headgear before tightening the headgear straps.  Mask leaks are expected due to changing sleeping positions. Try pulling the mask away from your skin allowing the cushion to re-inflate will minimize the leak.  If you struggle for a good fit, try turning the CPAP off and then readjust the mask by pulling it away from your face and then turning back on the CPAP.        Humidifier tips  Humidifiers can be adjusted to increase or decrease the amount of moisture according to your comfort level. You may need to adjust this frequently at first, but then might only change it with seasonal weather changes.     Try INCREASING the humidity if:  You experience a dry, irritated nasal passage or throat.  You have a runny, drippy nose or sneezing fits after using CPAP.  You experience nasal congestion during or after CPAP use.    Try DECREASING the humidity if:  You have excessive condensation or  rain out  in the tubing or mask.  Otherwise keep the tubing warm during the night by running it underneath the blankets or pillow.      Clinic visit after initial CPAP and BIPAP set-up   Bring your equipment with you to your 4 week follow up clinic visit.  We will be extracting your data from the machine.        Travel  Always take your equipment with you.  If you fly with your equipment bring it on with you as a carry on.  Medical equipment does not count as a carry on.    If you travel international the machines take 110-240.  The only adapter needed is  the adapter that will fit into the receptacle (outlet).    You may also want to bring an extension cord as many hotel rooms have limited outlets at the bedside.  Do not travel with water in your humidifier chamber.     Cleaning and Maintenance Guidelines    Equipment Frequency Cleaning Method   Mask First Day    Daily      Weekly Soak mask in hot soapy water for 30 minutes, rinse and air dry.  Wipe nasal cushion with a hot soapy (Ivory, baby shampoo) cloth and rinse.  Baby wipes may also be used.  Do not use anti-bacterial soaps,Isamar  liquid soap, rubbing alcohol, bleach or ammonia.  Wash frame in hot soapy water (Ivory, baby shampoo) rinse and let air dry   Headgear Biweekly Wash in hot soapy water, rinse and air dry   Reusable Gray Filter Weekly Wash in hot soapy water, rinse, put in towel squeeze moisture out, let air dry   Disposable White Filter Check Weekly Replace when brown or gray in color; at least every 2 to 3 months   Humidifier Chamber Daily    Weekly Empty distilled water from humidifier and let air dry    Hand wash in hot soapy water, rinse and air dry   Tubing Weekly Wash in hot soapy water, rinse and let air dry   Mask, Tubing and Humidifier Chamber As needed Disinfect: Soak in 1 part vinegar to 3 parts hot water for 30 minutes, rinse well and air dry  Not the material headgear        MASK AND SUPPLY REORDERING  Reminder: Most insurance companies will allow for a new mask, headgear, tubing, and reusable gray filter every six months.  Disposable white ultra-fine filters are covered monthly.    EQUIPMENT NEEDS  Please call our CPAP specialist with any equipment problems, questions or needs.    HOME AND SAFETY INSTRUCTIONS    Do not use frayed or cracked electrical cords, multi plug adaptors, or switched receptacles    Do not immerse electrical equipment into water    Assure that electrical cords do not become a tripping hazard        Your BMI is Body mass index is 27.85 kg/(m^2).  Weight management is  a personal decision.  If you are interested in exploring weight loss strategies, the following discussion covers the approaches that may be successful. Body mass index (BMI) is one way to tell whether you are at a healthy weight, overweight, or obese. It measures your weight in relation to your height.  A BMI of 18.5 to 24.9 is in the healthy range. A person with a BMI of 25 to 29.9 is considered overweight, and someone with a BMI of 30 or greater is considered obese. More than two-thirds of American adults are considered overweight or obese.  Being overweight or obese increases the risk for further weight gain. Excess weight may lead to heart disease and diabetes.  Creating and following plans for healthy eating and physical activity may help you improve your health.  Weight control is part of healthy lifestyle and includes exercise, emotional health, and healthy eating habits. Careful eating habits lifelong are the mainstay of weight control. Though there are significant health benefits from weight loss, long-term weight loss with diet alone may be very difficult to achieve- studies show long-term success with dietary management in less than 10% of people. Attaining a healthy weight may be especially difficult to achieve in those with severe obesity. In some cases, medications, devices and surgical management might be considered.  What can you do?  If you are overweight or obese and are interested in methods for weight loss, you should discuss this with your provider.     Consider reducing daily calorie intake by 500 calories.     Keep a food journal.     Avoiding skipping meals, consider cutting portions instead.    Diet combined with exercise helps maintain muscle while optimizing fat loss. Strength training is particularly important for building and maintaining muscle mass. Exercise helps reduce stress, increase energy, and improves fitness. Increasing exercise without diet control, however, may not burn enough  calories to loose weight.       Start walking three days a week 10-20 minutes at a time    Work towards walking thirty minutes five days a week     Eventually, increase the speed of your walking for 1-2 minutes at time    In addition, we recommend that you review healthy lifestyles and methods for weight loss available through the National Institutes of Health patient information sites:  http://win.niddk.nih.gov/publications/index.htm    And look into health and wellness programs that may be available through your health insurance provider, employer, local community center, or matthew club.    Weight management plan: Patient was referred to their PCP to discuss a diet and exercise plan.

## 2017-05-05 ENCOUNTER — OFFICE VISIT (OUTPATIENT)
Dept: INTERNAL MEDICINE | Facility: CLINIC | Age: 67
End: 2017-05-05

## 2017-05-05 VITALS
SYSTOLIC BLOOD PRESSURE: 126 MMHG | RESPIRATION RATE: 16 BRPM | TEMPERATURE: 97.5 F | BODY MASS INDEX: 27.91 KG/M2 | DIASTOLIC BLOOD PRESSURE: 77 MMHG | HEART RATE: 73 BPM | WEIGHT: 189 LBS

## 2017-05-05 DIAGNOSIS — R10.31 ABDOMINAL PAIN, RIGHT LOWER QUADRANT: Primary | ICD-10-CM

## 2017-05-05 DIAGNOSIS — K59.00 CONSTIPATION, UNSPECIFIED CONSTIPATION TYPE: ICD-10-CM

## 2017-05-05 PROBLEM — H90.3 BILATERAL SENSORINEURAL HEARING LOSS: Status: ACTIVE | Noted: 2017-05-05

## 2017-05-05 ASSESSMENT — PAIN SCALES - GENERAL: PAINLEVEL: NO PAIN (1)

## 2017-05-05 NOTE — PATIENT INSTRUCTIONS
[facility] Medication Refill Request Information:  * Please contact your pharmacy regarding ANY request for medication refills.  ** [facility] Prescription Fax = [phone]  * Please allow 3 business days for routine medication refills.  * Please allow 5 business days for controlled substance medication refills.     [facility] Test Result notification information:  *You will be notified with in 7-10 days of your appointment day regarding the results of your test.  If you are on MyChart you will be notified as soon as the provider has reviewed the results and signed off on them.    [facility] [phone]

## 2017-05-05 NOTE — MR AVS SNAPSHOT
After Visit Summary   5/5/2017    Frank Orellana    MRN: [id number]           Patient Information     Date Of Birth          1950        Visit Information        Provider Department      5/5/2017 8:40 AM Vanessa Edwards MD [facility]        Today's Diagnoses     Abdominal pain, right lower quadrant    -  1    Constipation, unspecified constipation type          Care Instructions    Primary Care Center Medication Refill Request Information:  * Please contact your pharmacy regarding ANY request for medication refills.  ** [facility] Prescription Fax = [phone]  * Please allow 3 business days for routine medication refills.  * Please allow 5 business days for controlled substance medication refills.     [facility] Test Result notification information:  *You will be notified with in 7-10 days of your appointment day regarding the results of your test.  If you are on MyChart you will be notified as soon as the provider has reviewed the results and signed off on them.    Primary Care Center [phone]           Follow-ups after your visit        Your next 10 appointments already scheduled     May 15, 2017  8:00 AM CDT   LAB with [facility] ([facility] [place])    [address] [place] [address] [phone]           Patient must bring picture ID.  Patient should be prepared to give a urine specimen  Please do not eat 10-12 hours before your appointment if you are coming in fasting for labs on lipids, cholesterol, or glucose (sugar).  Pregnant women should follow their Care Team instructions. Water with medications is okay. Do not drink coffee or other fluids.   If you have concerns about taking  your medications, please ask at office or if scheduling via [email or web address], send a message by clicking on Secure Messaging, Message Your Care Team.            Jun 07, 2017  8:00 AM CDT   (Arrive by 7:45 AM)   RETURN ARRHYTHMIA  with Jovanni Felipe MD   [facility] Heart Care ([facility])    [address]  3rd Floor  [place] [facility] [address]   [phone]            Jul 06, 2017 11:30 AM CDT   Walk In From ENT with Adriane Russo   [facility] Audiology ([facility])    [address]  [address] [facility] [address] [phone]            Jul 06, 2017  1:00 PM CDT   (Arrive by 12:45 PM)   Return Visit with Andrae Laughlin MD   [facility] Ear Nose and Throat ([facility])    [address] [facility] [address] [phone]            Aug 14, 2017  8:00 AM CDT   LAB with  LAB   [facility] Lab ([facility])    [address]  [address] [facility] [address] [phone]           Patient must bring picture ID.  Patient should be prepared to give a urine specimen  Please do not eat 10-12 hours before your appointment if you are coming in fasting for labs on lipids, cholesterol, or glucose (sugar).  Pregnant women should follow their Care Team instructions. Water with medications is okay. Do not drink coffee or other fluids.   If you have concerns about taking  your medications, please ask at office or if scheduling via Photobuckett, send a message by clicking on Secure Messaging, Message Your Care Team.            Oct 09, 2017  1:15 PM CDT   RETURN RETINA with Ruth Boone MD   Eye Clinic ([facility])    [facility]  [address]  [address] [address]  [facility] [place] [address]   [phone]              Who to contact     Please call your clinic at [phone] to:    Ask questions about your health    Make or cancel appointments    Discuss your medicines    Learn about your test results    Speak to your doctor   If you have compliments or concerns about an experience at your clinic, or if you wish to file a complaint, please contact [facility] [facility] Patient Relations at [phone] or email us at [email or web address]         Additional Information About Your Visit        Preferred [employer] Information     AlliedPatht gives you secure access to your electronic health record. If you see a primary care provider, you can also send messages to your care team and make appointments. If you have questions, please call your primary care clinic.  If you do not have a primary care provider, please call [phone] and they will assist you.      [email or web address] is an electronic gateway that provides easy, online access to your medical records. With [email or web address], you can request a clinic appointment, read your test results, renew a prescription or communicate with your care team.     To access your existing account, please contact your [facility] or call [phone] for assistance.        Care EveryWhere ID     This is your Care EveryWhere ID. This could be used by other organizations to access your [place] medical records  [id number]        Your Vitals Were     Pulse Temperature Respirations BMI (Body Mass Index)          73 97.5  F (36.4  C) (Oral) 16 27.91 kg/m2         Blood Pressure from Last 3 Encounters:   05/05/17 126/77   05/04/17 125/74   03/21/17 116/73    Weight from Last 3 Encounters:   05/05/17 85.7 kg (189 lb)   05/04/17 85.5 kg (188 lb 9.6 oz)   03/21/17 86.8 kg (191 lb 4.8 oz)              Today, you had the following     No orders found for display         Today's Medication Changes          These changes are accurate as of: 5/5/17  9:25 AM.  If you have any questions, ask your nurse or doctor.               These medicines have changed or have updated prescriptions.        Dose/Directions    alfuzosin 10 MG 24 hr tablet   Commonly known as:  UROXATRAL   This may have changed:  when to take this   Used for:  Urinary frequency        Dose:  10 mg   Take 1 tablet (10 mg) by mouth daily   Quantity:  90 tablet    Refills:  1       diltiazem 180 MG 24 hr capsule   This may have changed:  when to take this   Used for:  Paroxysmal atrial fibrillation (H)        Dose:  180 mg   Take 1 capsule (180 mg) by mouth daily   Quantity:  90 capsule   Refills:  3                Primary Care Provider Office Phone # Fax #    Vanessa Edwards [phone]        [facility] [address] [address]  [facility] [address]        Thank you!     Thank you for choosing [facility]  for your care. Our goal is always to provide you with excellent care. Hearing back from our patients is one way we can continue to improve our services. Please take a few minutes to complete the written survey that you may receive in the mail after your visit with us. Thank you!             Your Updated Medication List - Protect others around you: Learn how to safely use, store and throw away your medicines at www.disposemymeds.org.          This list is accurate as of: 5/5/17  9:25 AM.  Always use your most recent med list.                   Brand Name Dispense Instructions for use    alfuzosin 10 MG 24 hr tablet    UROXATRAL    90 tablet    Take 1 tablet (10 mg) by mouth daily       aspirin 81 MG EC tablet     90 tablet    Take 1 tablet (81 mg) by mouth daily       diltiazem 180 MG 24 hr capsule     90 capsule    Take 1 capsule (180 mg) by mouth daily       ledipasvir-sofosbuvir  MG per tablet    HARVONI     Take 1 tablet by mouth daily       order for DME      Reported on 5/4/2017

## 2017-05-05 NOTE — PROGRESS NOTES
"CC:  Possible right hernia  S:  Frank has had some \"light pain\" in his right lower quadrant for about 2 weeks.  He notes that it has been associated with use of Harvoni.  Relieved by drinking water.  Exacerbated by straining on BM's.  No other injury or straining (e.g. Moving, lifting, etc.).  No fevers, chills, other abd pain, GI bleeding, bulge which does not reduce.  No urinary symptoms, no flank pain, no hematuria.  No scrotal mass. Colonoscopy 1/2017 showed 4 polyps, pathology revealed 1/3 polyps tubular adenoma.    ROS:  See HPI. Wife notes he has had right flank \"twinges\" of mild pain x years.  No associated symptoms.  Not escalating in frequency nor severity.  Discussed potential differential and \"red flag\" symptoms for which he should return for additional evaluation.  otherwise 6 point ROS negative.    Patient Active Problem List   Diagnosis     History of actinic keratoses     Telangiectasia     SK (seborrheic keratosis)     Patient is Shinto     Refusal of blood transfusions as patient is Shinto     Paroxysmal atrial fibrillation (H)     Pain in both hands     Chronic left shoulder pain     Chronic hepatitis C without hepatic coma (H)     WINNIE (obstructive sleep apnea) AHI 32     Atrial fibrillation (H)     Tubular adenoma of colon     ACP (advance care planning)     Chronic hepatitis C (H)     Dupuytren's contracture of both hands     Bilateral sensorineural hearing loss     Elevated PSA     Current Outpatient Prescriptions   Medication Sig Dispense Refill     ledipasvir-sofosbuvir (HARVONI)  MG per tablet Take 1 tablet by mouth daily       order for DME Reported on 5/4/2017       alfuzosin (UROXATRAL) 10 MG 24 hr tablet Take 1 tablet (10 mg) by mouth daily (Patient taking differently: Take 10 mg by mouth every evening ) 90 tablet 1     aspirin 81 MG EC tablet Take 1 tablet (81 mg) by mouth daily 90 tablet 3     diltiazem 180 MG 24 hr capsule Take 1 capsule (180 mg) by mouth " "daily (Patient taking differently: Take 180 mg by mouth every evening ) 90 capsule 3     Allergies   Allergen Reactions     Blood Transfusion Related (Informational Only)      Bahai.  Declines blood transfusions.     Contrast Dye Rash     /77 (BP Location: Left arm, Patient Position: Chair, Cuff Size: Adult Regular)  Pulse 73  Temp 97.5  F (36.4  C) (Oral)  Resp 16  Wt 85.7 kg (189 lb)  BMI 27.91 kg/m2  Just lateral to the inguinal ligament is where the patient indicates he has had some mild pain with straining  I could not identify any bulge, including with straining.  No tenderness.  No inguinal hernia/mass palpable, no inguinal nodes    Frank was seen today for possible hernia.    Diagnoses and all orders for this visit:    Mild abdominal pain, low right lower quadrant associated with straining on bowel movements.  Discomfort is in the area where he could have a direct hernia.  Has constipation since starting Harvoni which he will be finishing soon.   Advised on bowel regimen and \"red flag\" signs that might warrant surgical consultation.  Splint area with hand when having BM's or otherwise straining.  Return if symptoms persist after constipation resolved.    Vanessa Edwards M.D.  Internal Medicine  Primary Care Center   pager [phone]    "

## 2017-05-05 NOTE — NURSING NOTE
Chief Complaint   Patient presents with     Hernia     Patient is here to discuss possible hernia     Cynthia Verdugo CMA 8:42 AM on 5/5/2017.

## 2017-05-15 DIAGNOSIS — B18.2 CHRONIC HEPATITIS C WITHOUT HEPATIC COMA (H): ICD-10-CM

## 2017-05-16 LAB
HCV RNA SERPL NAA+PROBE-ACNC: NORMAL [IU]/ML
HCV RNA SERPL NAA+PROBE-LOG IU: NORMAL LOG IU/ML

## 2017-06-06 ENCOUNTER — PRE VISIT (OUTPATIENT)
Dept: CARDIOLOGY | Facility: CLINIC | Age: 67
End: 2017-06-06

## 2017-06-06 DIAGNOSIS — I48.0 PAROXYSMAL ATRIAL FIBRILLATION (H): Primary | ICD-10-CM

## 2017-06-07 ENCOUNTER — OFFICE VISIT (OUTPATIENT)
Dept: CARDIOLOGY | Facility: CLINIC | Age: 67
End: 2017-06-07
Attending: INTERNAL MEDICINE
Payer: COMMERCIAL

## 2017-06-07 VITALS
BODY MASS INDEX: 28.05 KG/M2 | DIASTOLIC BLOOD PRESSURE: 70 MMHG | WEIGHT: 189.4 LBS | OXYGEN SATURATION: 96 % | HEART RATE: 58 BPM | HEIGHT: 69 IN | SYSTOLIC BLOOD PRESSURE: 113 MMHG

## 2017-06-07 DIAGNOSIS — I71.20 THORACIC AORTIC ANEURYSM WITHOUT RUPTURE (H): Primary | ICD-10-CM

## 2017-06-07 DIAGNOSIS — I48.0 PAROXYSMAL ATRIAL FIBRILLATION (H): ICD-10-CM

## 2017-06-07 PROCEDURE — 93010 ELECTROCARDIOGRAM REPORT: CPT | Mod: ZP | Performed by: INTERNAL MEDICINE

## 2017-06-07 PROCEDURE — 99214 OFFICE O/P EST MOD 30 MIN: CPT | Mod: 25 | Performed by: INTERNAL MEDICINE

## 2017-06-07 PROCEDURE — 99212 OFFICE O/P EST SF 10 MIN: CPT | Mod: ZF

## 2017-06-07 PROCEDURE — 93005 ELECTROCARDIOGRAM TRACING: CPT | Mod: ZF

## 2017-06-07 ASSESSMENT — PAIN SCALES - GENERAL: PAINLEVEL: NO PAIN (0)

## 2017-06-07 NOTE — PATIENT INSTRUCTIONS
Cardiology Provider you saw in clinic today: Dr. Felipe       Follow-up Visit:  1 year with Yamilka Skinner NP with cardiac MRI prior. Can cancel Dr. Gagnon appointment in August.        Please contact us via Pixeon for questions or concerns.    Meggan Brar RN   Electrophysiology Nurse Coordinator.  [phone]    If your question concerns the schedule/appointment times, contact:  [facility] Procedure   [phone]    Device Clinic (Pacemakers, ICD, Loop Recorders)   [phone]      You will receive all normal lab and testing results via Pixeon or mail if not reviewed in clinic today.   If you need a medication refill please contact your pharmacy.    As always, thank you for trusting us with your health care needs!

## 2017-06-07 NOTE — NURSING NOTE
Chief Complaint   Patient presents with     Follow Up For     6 mo f/u AF. EKG     Vitals were taken and medications were reconciled. EKG was performed.    Deborah Robert,RMA    8:00am

## 2017-06-07 NOTE — PROGRESS NOTES
HPI:   Frank Nettles a 65 yo formerly smoking Alevism male who returns for follow up of symptomatic paroxysmal atrial fibrillation.  At our initial visit in 12/2016 he had reported ~20 years of paroxysmal atrial fibrillation manifesting as chest pressure and palpitations.  The first episode occurred during a picnic and converted spontaneously to sinus rhythm.  He took metoprolol for 30 days.  Approximately 10 years later he had a second episode in the setting of a meniscal tear and perhaps 2 or 3 more episodes in the intervening years until his palpitations became more frequent in the year leading up to his most recent presentation.  A 48-hour Holter monitor which showed variation between sinus rhythm, junctional rhythm, and A.fib (average HR 63, range ) with 1% PVCs and <1% supraventricular ectopic beats, with 33 runs of PAT vs. A.fib (longest 10 beats at 164 bpm.)    He then presented to the ED on 12/2/16 for palpitations, was in A.fib with RVR~111 bpm, started on diltiazem and ASA 81 mg daily, and discharged.  At our last visit he reported walking daily and did not feel that he had any functional limitation.      Interval history:  He reports not having any symptomatic AF since we last saw him in 12/2016.  He continues to take ASA and has not had any hematochezia or melena.  He remains on diltiazem 120 mg daily and has not had any lightheadedness or syncope.  He gardens, mowing the lawn, carrying bags of dirt, etc and reports no reduction in exercise capacity and no exertional chest pain or dyspnea.  He sometimes has mild swelling in the legs at the end of the day which is resolved by the morning.  Since we last saw him he underwent release of a Dupuytren's contracture of the right hand without any bleeding complications.  He reports using his CPAP and was seen in the sleep clinic a month ago at which time the machine's stored information suggested 100% compliance.  EKG performed in clinic  "and personally reviewed shows sinus rhythm at 49 bpm with normal axis and R wave progression, , QTc 404 msec, and no ischemic ST or Tw changes or pathologic Q waves.    ROS:  A comprehensive 10 point review of systems negative other than as mentioned in HPI.    Examination:  /70 (BP Location: Left arm, Patient Position: Chair, Cuff Size: Adult Regular)  Pulse 58  Ht 1.753 m (5' 9\")  Wt 85.9 kg (189 lb 6.4 oz)  SpO2 96%  BMI 27.97 kg/m2  GENERAL APPEARANCE: comfortable appearing male with unlabored breathing.  HEENT: no icterus, no xanthelasmas  NECK:  Supple.  JVP is not elevated.  RESPIRATORY: lungs clear to auscultation  CARDIOVASCULAR: regular rhythm, normal S1 and S2, 2/6 early diastolic murmur loudest at the LLSB, warm extremities with no peripheral edema.  ABDOMEN: soft, not tender  EXTREMITIES: peripheral pulses normal, no edema, no bruits  NEURO: alert and fully oriented with fluent speech and steady gait.  SKIN: no petechiae/ecchymoses, not jaundiced    Labs:  Reviewed.     Testing/Procedures:  Echocardiogram 4/27/2017 showed LV EF 60-65%, mild RV dilation but normal RV function, mild AI, and mild dilation of the ascending aorta to 4.5 cm, indexed to 2.3 cm/m2.    MR angiography of the chest 12/27/2016 showed \"Normal caliber of thoracic aorta. The ascending aorta measures 3.9 cm, which is normal when indexed for age/gender/size.\"    Echocardiogram 10/14/16 showed LV EF 55-60%, mild RV dilation but normal RV function, mild to moderate AI, and dilation of the proximal ascending aorta to 45 mm, index 2.4 cm/m2.      Assessment and Plan:   Frank Nettles a 67 yo Adventist male who returns for follow up of symptomatic paroxysmal atrial fibrillation.    1. Paroxysmal atrial fibrillation    Stroke prevention:  [id number]=2 (estimated 1.5% annual risk of stroke/thromboembolism) (age+).  Because he is a Adventist and he would not accept blood products he believes that the " risk of bleeding outweighs the stroke/thromboembolic protection from anticoagulation.  He remains on ASA 81 mg daily.    Rate control is adequate on his current dose of diltiazem 120 mg daily.    Rhythm control:  As he is not currently symptomatic and has preserved LV function there is not a compelling reason to employ a rhythm control strategy.    Risk factor modification:  He is being treated with CPAP for WINNIE.    2. Thoracic aortic aneurysm reported on his initial echo was not seen on cardiac MRI but was again reported on his most recent echo 1 month ago.  We will have him get another CMR prior to his next visit and, if it is negative, discontinue surveillance imaging.    Follow up in 1 year with a CMR beforehand.  I discussed the patient with Dr. Jovanni Felipe.    Isreal Beebe MD  Cardiovascular disease fellow    EP STAFF NOTE  Patient seen and examined and management plan personally reviewed with the patient. I agree with the note above by the CV/EP fellow.  Jovanni Felipe MD [facility]  Cardiology - Electrophysiology      MARY ANNE ANDRE

## 2017-06-07 NOTE — MR AVS SNAPSHOT
After Visit Summary   6/7/2017    Frank Orellana    MRN: [id number]           Patient Information     Date Of Birth          1950        Visit Information        Provider Department      6/7/2017 8:00 AM Jovanni Felipe MD [facility] Care        Today's Diagnoses     Thoracic aortic aneurysm without rupture (H)    -  1    Paroxysmal atrial fibrillation (H)          Care Instructions    Cardiology Provider you saw in clinic today: Dr. Felipe       Follow-up Visit:  1 year with Yamilka Skinner NP with cardiac MRI prior. Can cancel Dr. Gagnon appointment in August.        Please contact us via [employer] for questions or concerns.    Meggan Brar, KALEE   Electrophysiology Nurse Coordinator.  [phone]    If your question concerns the schedule/appointment times, contact:  JESSICA Singh Procedure   [phone]    Device Clinic (Pacemakers, ICD, Loop Recorders)   [phone]      You will receive all normal lab and testing results via [employer] or mail if not reviewed in clinic today.   If you need a medication refill please contact your pharmacy.    As always, thank you for trusting us with your health care needs!            Follow-ups after your visit        Follow-up notes from your care team     Return in about 1 year (around 6/7/2018) for Yamilka Skinner NP.      Your next 10 appointments already scheduled     Jul 06, 2017 11:30 AM CDT   Walk In From ENT with Adriane Russo   [facility] Audiology ([facility])    [address] [place] [id number] [phone]            Jul 06, 2017  1:00 PM CDT   (Arrive by 12:45 PM)   Return Visit with Andrae Laughlin MD   [facility] Ear Nose and Throat ([facility])    [address] [id number] [phone]            Aug 08, 2017  8:30 AM CDT   (Arrive by 8:15 AM)   New Patient Visit with MODESTO Sheppard MD   [facility]  Care ([facility])    [address]  3rd Floor  [facility] [id number] [phone]            Aug 14, 2017  8:00 AM CDT   LAB with  LAB   [facility] Lab ([facility])    [address]  1st Floor  [facility] [id number] [phone]           Patient must bring picture ID.  Patient should be prepared to give a urine specimen  Please do not eat 10-12 hours before your appointment if you are coming in fasting for labs on lipids, cholesterol, or glucose (sugar).  Pregnant women should follow their Care Team instructions. Water with medications is okay. Do not drink coffee or other fluids.   If you have concerns about taking  your medications, please ask at office or if scheduling via [employer], send a message by clicking on Secure Messaging, Message Your Care Team.            Oct 09, 2017  1:15 PM CDT   RETURN RETINA with Ruth Boone MD   Eye Clinic ([facility])    [address]  [address] [place] [address] Clin 9a  [facility] [id number] [phone]            May 10, 2018 11:00 AM CDT   Return Sleep Patient with Melissa Stanford MD   [facility] [place] Sleep Study ([facility])    [address] [place] [id number] [phone]              Future tests that were ordered for you today     Open Future Orders        Priority Expected Expires Ordered    MRA Angiogram chest w & w/o contrast Routine 6/4/2018 7/26/2018 6/7/2017            Who to contact     If you have questions or need follow up information about today's clinic visit or your schedule please contact [facility] HEART [facility] directly at [phone].  Normal or non-critical lab and imaging results will be communicated to you by MyChart, letter or phone within 4 business days after the clinic has received the results. If you do not hear from us within 7 days, please contact the clinic through [employer]  "or phone. If you have a critical or abnormal lab result, we will notify you by phone as soon as possible.  Submit refill requests through [email or web address] or call your pharmacy and they will forward the refill request to us. Please allow 3 business days for your refill to be completed.          Additional Information About Your Visit        [employer] Information     [email or web address] gives you secure access to your electronic health record. If you see a primary care provider, you can also send messages to your care team and make appointments. If you have questions, please call your primary care clinic.  If you do not have a primary care provider, please call [phone] and they will assist you.        Care EveryWhere ID     This is your Care EveryWhere ID. This could be used by other organizations to access your [facility] medical records  [id number]        Your Vitals Were     Pulse Height Pulse Oximetry BMI (Body Mass Index)          58 1.753 m (5' 9\") 96% 27.97 kg/m2         Blood Pressure from Last 3 Encounters:   06/07/17 113/70   05/05/17 126/77   05/04/17 125/74    Weight from Last 3 Encounters:   06/07/17 85.9 kg (189 lb 6.4 oz)   05/05/17 85.7 kg (189 lb)   05/04/17 85.5 kg (188 lb 9.6 oz)              We Performed the Following     EKG 12-lead, tracing only (Future)          Today's Medication Changes          These changes are accurate as of: 6/7/17  8:43 AM.  If you have any questions, ask your nurse or doctor.               These medicines have changed or have updated prescriptions.        Dose/Directions    alfuzosin 10 MG 24 hr tablet   Commonly known as:  UROXATRAL   This may have changed:  when to take this   Used for:  Urinary frequency        Dose:  10 mg   Take 1 tablet (10 mg) by mouth daily   Quantity:  90 tablet   Refills:  1       diltiazem 180 MG 24 hr capsule   This may have changed:  when to take this   Used for:  Paroxysmal atrial fibrillation (H)        Dose:  180 mg   Take 1 capsule (180 mg) by mouth " daily   Quantity:  90 capsule   Refills:  3         Stop taking these medicines if you haven't already. Please contact your care team if you have questions.     ledipasvir-sofosbuvir  MG per tablet   Commonly known as:  HARVONI                    Primary Care Provider Office Phone # Fax #    Vanessa Edwards [phone]        [facility] [address] [facility] [address]  [facility] [address]        Thank you!     Thank you for choosing [facility]  for your care. Our goal is always to provide you with excellent care. Hearing back from our patients is one way we can continue to improve our services. Please take a few minutes to complete the written survey that you may receive in the mail after your visit with us. Thank you!             Your Updated Medication List - Protect others around you: Learn how to safely use, store and throw away your medicines at www.disposemymeds.org.          This list is accurate as of: 6/7/17  8:43 AM.  Always use your most recent med list.                   Brand Name Dispense Instructions for use    alfuzosin 10 MG 24 hr tablet    UROXATRAL    90 tablet    Take 1 tablet (10 mg) by mouth daily       aspirin 81 MG EC tablet     90 tablet    Take 1 tablet (81 mg) by mouth daily       diltiazem 180 MG 24 hr capsule     90 capsule    Take 1 capsule (180 mg) by mouth daily       order for DME      Reported on 5/4/2017

## 2017-06-07 NOTE — LETTER
6/7/2017      RE: Frank Orellana  [address]  [facility] [address]       Dear Colleague,    Thank you for the opportunity to participate in the care of your patient, Frank Orellana, at the [facility] HEART [facility]. Please see a copy of my visit note below.    HPI:   Frank Orellanais a 65 yo formerly smoking Mandaeism male who returns for follow up of symptomatic paroxysmal atrial fibrillation.  At our initial visit in 12/2016 he had reported ~20 years of paroxysmal atrial fibrillation manifesting as chest pressure and palpitations.  The first episode occurred during a picnic and converted spontaneously to sinus rhythm.  He took metoprolol for 30 days.  Approximately 10 years later he had a second episode in the setting of a meniscal tear and perhaps 2 or 3 more episodes in the intervening years until his palpitations became more frequent in the year leading up to his most recent presentation.  A 48-hour Holter monitor which showed variation between sinus rhythm, junctional rhythm, and A.fib (average HR 63, range ) with 1% PVCs and <1% supraventricular ectopic beats, with 33 runs of PAT vs. A.fib (longest 10 beats at 164 bpm.)    He then presented to the ED on 12/2/16 for palpitations, was in A.fib with RVR~111 bpm, started on diltiazem and ASA 81 mg daily, and discharged.  At our last visit he reported walking daily and did not feel that he had any functional limitation.      Interval history:  He reports not having any symptomatic AF since we last saw him in 12/2016.  He continues to take ASA and has not had any hematochezia or melena.  He remains on diltiazem 120 mg daily and has not had any lightheadedness or syncope.  He gardens, mowing the lawn, carrying bags of dirt, etc and reports no reduction in exercise capacity and no exertional chest pain or dyspnea.  He sometimes has mild swelling in the legs at the end of the day which is  "resolved by the morning.  Since we last saw him he underwent release of a Dupuytren's contracture of the right hand without any bleeding complications.  He reports using his CPAP and was seen in the sleep clinic a month ago at which time the machine's stored information suggested 100% compliance.  EKG performed in clinic and personally reviewed shows sinus rhythm at 49 bpm with normal axis and R wave progression, , QTc 404 msec, and no ischemic ST or Tw changes or pathologic Q waves.    ROS:  A comprehensive 10 point review of systems negative other than as mentioned in HPI.    Examination:  /70 (BP Location: Left arm, Patient Position: Chair, Cuff Size: Adult Regular)  Pulse 58  Ht 1.753 m (5' 9\")  Wt 85.9 kg (189 lb 6.4 oz)  SpO2 96%  BMI 27.97 kg/m2  GENERAL APPEARANCE: comfortable appearing male with unlabored breathing.  HEENT: no icterus, no xanthelasmas  NECK:  Supple.  JVP is not elevated.  RESPIRATORY: lungs clear to auscultation  CARDIOVASCULAR: regular rhythm, normal S1 and S2, 2/6 early diastolic murmur loudest at the LLSB, warm extremities with no peripheral edema.  ABDOMEN: soft, not tender  EXTREMITIES: peripheral pulses normal, no edema, no bruits  NEURO: alert and fully oriented with fluent speech and steady gait.  SKIN: no petechiae/ecchymoses, not jaundiced    Labs:  Reviewed.     Testing/Procedures:  Echocardiogram 4/27/2017 showed LV EF 60-65%, mild RV dilation but normal RV function, mild AI, and mild dilation of the ascending aorta to 4.5 cm, indexed to 2.3 cm/m2.    MR angiography of the chest 12/27/2016 showed \"Normal caliber of thoracic aorta. The ascending aorta measures 3.9 cm, which is normal when indexed for age/gender/size.\"    Echocardiogram 10/14/16 showed LV EF 55-60%, mild RV dilation but normal RV function, mild to moderate AI, and dilation of the proximal ascending aorta to 45 mm, index 2.4 cm/m2.      Assessment and Plan:   Frank alejo 65 yo Mar's " Witness male who returns for follow up of symptomatic paroxysmal atrial fibrillation.    1. Paroxysmal atrial fibrillation    Stroke prevention:  [id number]-[id number]=2 (estimated 1.5% annual risk of stroke/thromboembolism) (age+).  Because he is a Gnosticism and he would not accept blood products he believes that the risk of bleeding outweighs the stroke/thromboembolic protection from anticoagulation.  He remains on ASA 81 mg daily.    Rate control is adequate on his current dose of diltiazem 120 mg daily.    Rhythm control:  As he is not currently symptomatic and has preserved LV function there is not a compelling reason to employ a rhythm control strategy.    Risk factor modification:  He is being treated with CPAP for WINNIE.    2. Thoracic aortic aneurysm reported on his initial echo was not seen on cardiac MRI but was again reported on his most recent echo 1 month ago.  We will have him get another CMR prior to his next visit and, if it is negative, discontinue surveillance imaging.    Follow up in 1 year with a CMR beforehand.  I discussed the patient with Dr. Jovanni Felipe.    Isreal Beebe MD  Cardiovascular disease fellow    EP STAFF NOTE  Patient seen and examined and management plan personally reviewed with the patient. I agree with the note above by the CV/EP fellow.    Jovanni Felipe MD [facility]  Cardiology - Electrophysiology      CC  MARY ANNE LEO

## 2017-06-09 LAB — INTERPRETATION ECG - MUSE: NORMAL

## 2017-07-05 DIAGNOSIS — H90.5 SNHL (SENSORINEURAL HEARING LOSS): Primary | ICD-10-CM

## 2017-07-06 ENCOUNTER — OFFICE VISIT (OUTPATIENT)
Dept: AUDIOLOGY | Facility: CLINIC | Age: 67
End: 2017-07-06

## 2017-07-06 ENCOUNTER — OFFICE VISIT (OUTPATIENT)
Dept: OTOLARYNGOLOGY | Facility: CLINIC | Age: 67
End: 2017-07-06

## 2017-07-06 DIAGNOSIS — H90.3 SENSORINEURAL HEARING LOSS (SNHL) OF BOTH EARS: Primary | ICD-10-CM

## 2017-07-06 DIAGNOSIS — R35.0 URINARY FREQUENCY: ICD-10-CM

## 2017-07-06 DIAGNOSIS — H90.11 CONDUCTIVE HEARING LOSS OF RIGHT EAR WITH UNRESTRICTED HEARING OF LEFT EAR: Primary | ICD-10-CM

## 2017-07-06 RX ORDER — ALFUZOSIN HYDROCHLORIDE 10 MG/1
TABLET, EXTENDED RELEASE ORAL
Qty: 90 TABLET | Refills: 3 | Status: SHIPPED | OUTPATIENT
Start: 2017-07-06 | End: 2018-04-01

## 2017-07-06 ASSESSMENT — PAIN SCALES - GENERAL: PAINLEVEL: NO PAIN (0)

## 2017-07-06 NOTE — PROGRESS NOTES
The patient presents with a history of hearing loss.  The patient reports a progressive hearing loss in both ears over at least the past few years.  The patient denies ear infections, otalgia, otorrhea, or dizziness but he does report bilateral tinnitus. The patient's Audiogram and Tympanogram are reviewed with him and they demonstrate bilateral sensorineural hearing loss at the higher frequencies and a greater loss in the right ear than in the left ear. He has very good word recognition scores bilaterally and normal tympanograms. The patient's Audiogram and Tympanogram are unchanged since his last test six months ago and these results are discussed with him.     All other systems were reviewed and they are either negative or they are not directly pertinent to this Otolaryngology examination.      Past Medical History:    Past Medical History:   Diagnosis Date     Actinic keratosis 2009     Atrial fibrillation (H) 1996    paroxysmal on 3-4 occasions     Atrial fibrillation (H) 6/15/96    Afib - 2 or 3 extended occurrences since     Chronic hepatitis C (H)     Chronic Hepatitis C,  genotype 1b                Stage 0 Fibrosis     Dupuytren's contracture of both hands      Elevated PSA      Hepatitis B 1969    acute infection at age 19; IV drug use     WINNIE (obstructive sleep apnea)     AHI 34.2     Pain in both hands      Patient is Sabianism      Refusal of blood transfusions as patient is Sabianism      Right shoulder pain      Tinnitus 1 or 2 years ago    both ears     Tubular adenoma of colon 1/17/17    ascending colon, 1/17/17       Past Surgical History:    Past Surgical History:   Procedure Laterality Date     ARTHROSCOPY KNEE      left knee     COLONOSCOPY  1/17/17    tubular adenoma ascending colon     KNEE SURGERY  2006??    torn meniscus     RELEASE DUPUYTRENS CONTRACTURE Right 1/20/2017    Procedure: RELEASE DUPUYTRENS CONTRACTURE;  Surgeon: Lars Oleary MD;  Location: [facility] OR        Medications:      Current Outpatient Prescriptions:      alfuzosin (UROXATRAL) 10 MG 24 hr tablet, TAKE 1 TABLET DAILY, Disp: 90 tablet, Rfl: 3     order for DME, Reported on 5/4/2017, Disp: , Rfl:      aspirin 81 MG EC tablet, Take 1 tablet (81 mg) by mouth daily, Disp: 90 tablet, Rfl: 3     diltiazem 180 MG 24 hr capsule, Take 1 capsule (180 mg) by mouth daily (Patient taking differently: Take 180 mg by mouth every evening ), Disp: 90 capsule, Rfl: 3    Allergies:    Blood transfusion related (informational only) and Contrast dye    Physical Examination:    The patient is a well developed, well nourished male in no apparent distress.  He is normocephalic, atraumatic with pupils equally round and reactive to light.    Oral Cavity Examination:  Normal mucosa with no masses or lesions  Nasal Examination: Normal mucosa with no masses or lesions  Ear Examination: Ear canals clear, tympanic membranes and middle ear spaces normal  Neurological Examination: Facial nerve function intact and symmetric  Integumentary Examination: No lesions on the skin of the head and neck    Assessment and Plan:    The patient presents with a history of hearing loss, slightly worse in the right ear than in the left ear.  The patient's physical examination shows no abnormalities of the ears.  The patient's Audiogram and Tympanogram are unchanged from six months ago. He will be seen again in one year for a repeat Audiogram and Tympanogram.

## 2017-07-06 NOTE — MR AVS SNAPSHOT
After Visit Summary   7/6/2017    Frank Orellana    MRN: [id number]           Patient Information     Date Of Birth          1950        Visit Information        Provider Department      7/6/2017 11:30 AM Jania Umaña [facility] Audiology        Today's Diagnoses     Conductive hearing loss of right ear with unrestricted hearing of left ear    -  1       Follow-ups after your visit        Your next 10 appointments already scheduled     Aug 14, 2017  8:00 AM CDT   LAB with  LAB   [facility] ([facility] [place])    [address] [id number] [phone]           Patient must bring picture ID.  Patient should be prepared to give a urine specimen  Please do not eat 10-12 hours before your appointment if you are coming in fasting for labs on lipids, cholesterol, or glucose (sugar).  Pregnant women should follow their Care Team instructions. Water with medications is okay. Do not drink coffee or other fluids.   If you have concerns about taking  your medications, please ask at office or if scheduling via Shoopit, send a message by clicking on Secure Messaging, Message Your Care Team.            Oct 09, 2017  1:15 PM CDT   RETURN RETINA with Ruth Boone MD   Eye Clinic ([facility])    [address]  [address] [facility]  [address] [address]  [facility] [id number] [phone]            May 10, 2018 11:00 AM CDT   Return Sleep Patient with Melissa Stanford MD   [facility] [place], Sleep Study ([facility], [facility])    [address] [place] [id number] [phone]              Who to contact     Please call your clinic at [phone] to:    Ask questions about your health    Make or cancel appointments    Discuss your medicines    Learn about your test results    Speak to your doctor   If you have compliments or concerns about an experience at your clinic, or  if you wish to file a complaint, please contact [facility] Patient Relations at [phone] or email us at [email or web address]         Additional Information About Your Visit        [employer] Information     Eximia gives you secure access to your electronic health record. If you see a primary care provider, you can also send messages to your care team and make appointments. If you have questions, please call your primary care clinic.  If you do not have a primary care provider, please call [phone] and they will assist you.      Eximia is an electronic gateway that provides easy, online access to your medical records. With Eximia, you can request a clinic appointment, read your test results, renew a prescription or communicate with your care team.     To access your existing account, please contact your [facility] or call [phone] for assistance.        Care EveryWhere ID     This is your Care EveryWhere ID. This could be used by other organizations to access your [place] medical records  [id number]         Blood Pressure from Last 3 Encounters:   06/07/17 113/70   05/05/17 126/77   05/04/17 125/74    Weight from Last 3 Encounters:   06/07/17 85.9 kg (189 lb 6.4 oz)   05/05/17 85.7 kg (189 lb)   05/04/17 85.5 kg (188 lb 9.6 oz)              We Performed the Following     AUDIOGRAM/TYMPANOGRAM - INTERFACE     [facility] Audiometry Thrshld Eval & Speech Recog ([address]668)     Tymps / Reflex   (30288)          Today's Medication Changes          These changes are accurate as of: 7/6/17  1:07 PM.  If you have any questions, ask your nurse or doctor.               These medicines have changed or have updated prescriptions.        Dose/Directions    alfuzosin 10 MG 24 hr tablet   Commonly known as:  UROXATRAL   This may have changed:  See the new instructions.   Used for:  Urinary frequency   Changed by:  Meggan Maravilla MD        TAKE 1  TABLET DAILY   Quantity:  90 tablet   Refills:  3       diltiazem 180 MG 24 hr capsule   This may have changed:  when to take this   Used for:  Paroxysmal atrial fibrillation (H)        Dose:  180 mg   Take 1 capsule (180 mg) by mouth daily   Quantity:  90 capsule   Refills:  3            Where to get your medicines      These medications were sent to [facility] - [place], [place] - [address] [facility]  [address] [facility] [address]     Phone:  [phone]     alfuzosin 10 MG 24 hr tablet                Primary Care Provider Office Phone # Fax #    Vanessa Edwards -[phone]        PHYSICIANS [address] [facility] [address]  [facility] [address]        Equal Access to Services     AYDE PAPPAS : Hadii sylvia corea hadasho Sokourtneyali, waaxda luqadaha, qaybta kaalmada adeegyada, jeyson evans. So [facility] [phone].    ATENCIÓN: Si habla español, tiene a sebastian disposición servicios gratuitos de asistencia lingüística. [facility] [phone].    We comply with applicable federal civil rights laws and [place] laws. We do not discriminate on the basis of race, color, national origin, age, disability sex, sexual orientation or gender identity.            Thank you!     Thank you for choosing [facility] AUDIOLOGY  for your care. Our goal is always to provide you with excellent care. Hearing back from our patients is one way we can continue to improve our services. Please take a few minutes to complete the written survey that you may receive in the mail after your visit with us. Thank you!             Your Updated Medication List - Protect others around you: Learn how to safely use, store and throw away your medicines at www.disposemymeds.org.          This list is accurate as of: 7/6/17  1:07 PM.  Always use your most recent med list.                   Brand Name Dispense Instructions for use Diagnosis    alfuzosin 10 MG 24 hr tablet    UROXATRAL    90 tablet     TAKE 1 TABLET DAILY    Urinary frequency       aspirin 81 MG EC tablet     90 tablet    Take 1 tablet (81 mg) by mouth daily    Paroxysmal atrial fibrillation (H)       diltiazem 180 MG 24 hr capsule     90 capsule    Take 1 capsule (180 mg) by mouth daily    Paroxysmal atrial fibrillation (H)       order for DME      Reported on 5/4/2017

## 2017-07-06 NOTE — MR AVS SNAPSHOT
After Visit Summary   7/6/2017    Frank Orellana    MRN: [id number]           Patient Information     Date Of Birth          1950        Visit Information        Provider Department      7/6/2017 1:00 PM Andrae Laughlin MD [facility] Ear Nose and Throat        Today's Diagnoses     Sensorineural hearing loss (SNHL) of both ears    -  1      Care Instructions    The patient presents with a history of hearing loss, slightly worse in the right ear than in the left ear.  The patient's physical examination shows no abnormalities of the ears.  The patient's Audiogram and Tympanogram are unchanged from six months ago. He will be seen again in one year for a repeat Audiogram and Tympanogram.           Follow-ups after your visit        Your next 10 appointments already scheduled     Aug 14, 2017  8:00 AM CDT   LAB with [facility] ([facility] [place])    [address] [id number] [phone]           Patient must bring picture ID.  Patient should be prepared to give a urine specimen  Please do not eat 10-12 hours before your appointment if you are coming in fasting for labs on lipids, cholesterol, or glucose (sugar).  Pregnant women should follow their Care Team instructions. Water with medications is okay. Do not drink coffee or other fluids.   If you have concerns about taking  your medications, please ask at office or if scheduling via AGNITiOt, send a message by clicking on Secure Messaging, Message Your Care Team.            Oct 09, 2017  1:15 PM CDT   RETURN RETINA with Ruth Boone MD   Eye Clinic ([facility])    [address] [facility]  [address] [facility]  [address] [address]  [facility] [address]   [phone]            May 10, 2018 11:00 AM CDT   Return Sleep Patient with Melissa Stanford MD   [facility] [place], Sleep Study ([facility])    [address]  [facility] [address] [phone]              Who to contact     Please call your clinic at [phone] to:    Ask questions about your health    Make or cancel appointments    Discuss your medicines    Learn about your test results    Speak to your doctor   If you have compliments or concerns about an experience at your clinic, or if you wish to file a complaint, please contact [facility] Patient Relations at [phone] or email us at [email or web address]         Additional Information About Your Visit        [employer] Information     ADCentricity gives you secure access to your electronic health record. If you see a primary care provider, you can also send messages to your care team and make appointments. If you have questions, please call your primary care clinic.  If you do not have a primary care provider, please call [phone] and they will assist you.      ADCentricity is an electronic gateway that provides easy, online access to your medical records. With ADCentricity, you can request a clinic appointment, read your test results, renew a prescription or communicate with your care team.     To access your existing account, please contact your [facility] Physicians Clinic or call [phone] for assistance.        Care EveryWhere ID     This is your Care EveryWhere ID. This could be used by other organizations to access your [place] medical records  [id number]         Blood Pressure from Last 3 Encounters:   06/07/17 113/70   05/05/17 126/77   05/04/17 125/74    Weight from Last 3 Encounters:   06/07/17 85.9 kg (189 lb 6.4 oz)   05/05/17 85.7 kg (189 lb)   05/04/17 85.5 kg (188 lb 9.6 oz)              We Performed the Following     AUDIO REVIEW/CONSULT          Today's Medication Changes          These changes are accurate as of: 7/6/17  1:11 PM.  If you have any questions, ask your nurse or doctor.               These medicines have changed or have  updated prescriptions.        Dose/Directions    alfuzosin 10 MG 24 hr tablet   Commonly known as:  UROXATRAL   This may have changed:  See the new instructions.   Used for:  Urinary frequency   Changed by:  Meggan Maravilla MD        TAKE 1 TABLET DAILY   Quantity:  90 tablet   Refills:  3       diltiazem 180 MG 24 hr capsule   This may have changed:  when to take this   Used for:  Paroxysmal atrial fibrillation (H)        Dose:  180 mg   Take 1 capsule (180 mg) by mouth daily   Quantity:  90 capsule   Refills:  3            Where to get your medicines      These medications were sent to [facility] - [address] [address]     Phone:  [phone]     alfuzosin 10 MG 24 hr tablet                Primary Care Provider Office Phone # Fax #    Vanessa Edwards [phone]        PHYSICIANS [address] [place] [address] [address]  [facility] [address]        Equal Access to Services     [facility] SunsetEDOUARD : Hadii sylvia ku hadasho Soomaali, waaxda luqadaha, qaybta kaalmada adeegyada, waxay onurin hayfitzn fredy hernández . So [facility] [phone].    ATENCIÓN: Si habla español, tiene a sebastian disposición servicios gratuitos de asistencia lingüística. [facility] [phone].    We comply with applicable federal civil rights laws and [place] laws. We do not discriminate on the basis of race, color, national origin, age, disability sex, sexual orientation or gender identity.            Thank you!     Thank you for choosing [facility] EAR NOSE AND THROAT  for your care. Our goal is always to provide you with excellent care. Hearing back from our patients is one way we can continue to improve our services. Please take a few minutes to complete the written survey that you may receive in the mail after your visit with us. Thank you!             Your Updated Medication List - Protect others around you: Learn how to safely use, store and throw away your  medicines at www.disposemymeds.org.          This list is accurate as of: 7/6/17  1:11 PM.  Always use your most recent med list.                   Brand Name Dispense Instructions for use Diagnosis    alfuzosin 10 MG 24 hr tablet    UROXATRAL    90 tablet    TAKE 1 TABLET DAILY    Urinary frequency       aspirin 81 MG EC tablet     90 tablet    Take 1 tablet (81 mg) by mouth daily    Paroxysmal atrial fibrillation (H)       diltiazem 180 MG 24 hr capsule     90 capsule    Take 1 capsule (180 mg) by mouth daily    Paroxysmal atrial fibrillation (H)       order for DME      Reported on 5/4/2017

## 2017-07-06 NOTE — PROGRESS NOTES
AUDIOLOGY REPORT    SUMMARY: Audiology visit completed. See audiogram for results.      RECOMMENDATIONS: Follow-up with ENT.    Hero Damon.  Licensed Audiologist  [place] #[id number]

## 2017-07-06 NOTE — LETTER
7/6/2017       RE: Frank Orellana  [address]  [facility] [address]     Dear Colleague,    Thank you for referring your patient, Frank Orellana, to the [facility]SE [facility] at [facility]. Please see a copy of my visit note below.    The patient presents with a history of hearing loss.  The patient reports a progressive hearing loss in both ears over at least the past few years.  The patient denies ear infections, otalgia, otorrhea, or dizziness but he does report bilateral tinnitus. The patient's Audiogram and Tympanogram are reviewed with him and they demonstrate bilateral sensorineural hearing loss at the higher frequencies and a greater loss in the right ear than in the left ear. He has very good word recognition scores bilaterally and normal tympanograms. The patient's Audiogram and Tympanogram are unchanged since his last test six months ago and these results are discussed with him.     All other systems were reviewed and they are either negative or they are not directly pertinent to this Otolaryngology examination.      Past Medical History:    Past Medical History:   Diagnosis Date     Actinic keratosis 2009     Atrial fibrillation (H) 1996    paroxysmal on 3-4 occasions     Atrial fibrillation (H) 6/15/96    Afib - 2 or 3 extended occurrences since     Chronic hepatitis C (H)     Chronic Hepatitis C,  genotype 1b                Stage 0 Fibrosis     Dupuytren's contracture of both hands      Elevated PSA      Hepatitis B 1969    acute infection at age 19; IV drug use     WINNIE (obstructive sleep apnea)     AHI 34.2     Pain in both hands      Patient is Anabaptism      Refusal of blood transfusions as patient is Anabaptism      Right shoulder pain      Tinnitus 1 or 2 years ago    both ears     Tubular adenoma of colon 1/17/17    ascending colon, 1/17/17       Past Surgical History:    Past Surgical History:   Procedure Laterality Date      ARTHROSCOPY KNEE      left knee     COLONOSCOPY  1/17/17    tubular adenoma ascending colon     KNEE SURGERY  2006??    torn meniscus     RELEASE DUPUYTRENS CONTRACTURE Right 1/20/2017    Procedure: RELEASE DUPUYTRENS CONTRACTURE;  Surgeon: Lars Oleary MD;  Location: [facility] OR       Medications:      Current Outpatient Prescriptions:      alfuzosin (UROXATRAL) 10 MG 24 hr tablet, TAKE 1 TABLET DAILY, Disp: 90 tablet, Rfl: 3     order for DME, Reported on 5/4/2017, Disp: , Rfl:      aspirin 81 MG EC tablet, Take 1 tablet (81 mg) by mouth daily, Disp: 90 tablet, Rfl: 3     diltiazem 180 MG 24 hr capsule, Take 1 capsule (180 mg) by mouth daily (Patient taking differently: Take 180 mg by mouth every evening ), Disp: 90 capsule, Rfl: 3    Allergies:    Blood transfusion related (informational only) and Contrast dye    Physical Examination:    The patient is a well developed, well nourished male in no apparent distress.  He is normocephalic, atraumatic with pupils equally round and reactive to light.    Oral Cavity Examination:  Normal mucosa with no masses or lesions  Nasal Examination: Normal mucosa with no masses or lesions  Ear Examination: Ear canals clear, tympanic membranes and middle ear spaces normal  Neurological Examination: Facial nerve function intact and symmetric  Integumentary Examination: No lesions on the skin of the head and neck    Assessment and Plan:    The patient presents with a history of hearing loss, slightly worse in the right ear than in the left ear.  The patient's physical examination shows no abnormalities of the ears.  The patient's Audiogram and Tympanogram are unchanged from six months ago. He will be seen again in one year for a repeat Audiogram and Tympanogram.         Again, thank you for allowing me to participate in the care of your patient.     Sincerely,    Andrae Laughlin MD

## 2017-07-06 NOTE — PATIENT INSTRUCTIONS
The patient presents with a history of hearing loss, slightly worse in the right ear than in the left ear.  The patient's physical examination shows no abnormalities of the ears.  The patient's Audiogram and Tympanogram are unchanged from six months ago. He will be seen again in one year for a repeat Audiogram and Tympanogram.

## 2017-08-14 DIAGNOSIS — B18.2 CHRONIC HEPATITIS C WITHOUT HEPATIC COMA (H): ICD-10-CM

## 2017-08-17 LAB
HCV RNA SERPL NAA+PROBE-ACNC: <15 [IU]/ML
HCV RNA SERPL NAA+PROBE-LOG IU: <1.2 LOG IU/ML

## 2017-09-01 ENCOUNTER — CARE COORDINATION (OUTPATIENT)
Dept: GASTROENTEROLOGY | Facility: CLINIC | Age: 67
End: 2017-09-01

## 2017-09-01 ASSESSMENT — ACTIVITIES OF DAILY LIVING (ADL)
DO_MEMBERS_OF_YOUR_HOUSEHOLD_USE_SUNSCREEN?: Y
ARE_THERE_FIREARMS_IN_YOUR_HOME?: N
DO_MEMBERS_OF_YOUR_HOUSEHOLD_WEAR_SEAT_BELTS?: Y
DO_MEMBERS_OF_YOUR_HOUSEHOLD_USE_SAFETY_HELMETS?: Y
ARE_THERE_CARBON_MONOXIDE_DETECTORS_IN_YOUR_HOME?: Y
ARE_THERE_SMOKE_DETECTORS_IN_YOUR_HOME?: Y

## 2017-09-01 NOTE — PROGRESS NOTES
9/1/2017  2:13 PM        Hep C Care Coordination Call   Attempted to connect with patient to review Hep C lab results and provider recommendations;     Richard Ruggiero MD Haglund, Michelle, RN                     I agree, he did not achieve SVR and therefore, would need follow up in hepatology clinic and re-treatment consideration, including new trial.   Thanks.       No answer. I did leave my direct contact information for patient. I will continue to attempt to connect with patient.         Lucinda Trujillo RN, BSN, PHN  [facility]   RN Care Coordinator Hepatology Specialty Clinic/Program

## 2017-09-07 ENCOUNTER — OFFICE VISIT (OUTPATIENT)
Dept: INTERNAL MEDICINE | Facility: CLINIC | Age: 67
End: 2017-09-07

## 2017-09-07 ENCOUNTER — CARE COORDINATION (OUTPATIENT)
Dept: GASTROENTEROLOGY | Facility: CLINIC | Age: 67
End: 2017-09-07

## 2017-09-07 VITALS
HEIGHT: 69 IN | DIASTOLIC BLOOD PRESSURE: 70 MMHG | BODY MASS INDEX: 27.7 KG/M2 | RESPIRATION RATE: 20 BRPM | SYSTOLIC BLOOD PRESSURE: 116 MMHG | WEIGHT: 187 LBS | OXYGEN SATURATION: 99 % | HEART RATE: 50 BPM

## 2017-09-07 DIAGNOSIS — K40.90 DIRECT HERNIA: ICD-10-CM

## 2017-09-07 DIAGNOSIS — M79.674 PAIN OF TOE OF RIGHT FOOT: ICD-10-CM

## 2017-09-07 DIAGNOSIS — R73.09 ELEVATED GLUCOSE: ICD-10-CM

## 2017-09-07 DIAGNOSIS — R23.3 EASY BRUISING: ICD-10-CM

## 2017-09-07 DIAGNOSIS — Z00.00 ROUTINE GENERAL MEDICAL EXAMINATION AT A HEALTH CARE FACILITY: Primary | ICD-10-CM

## 2017-09-07 DIAGNOSIS — R10.31 ABDOMINAL PAIN, RIGHT LOWER QUADRANT: ICD-10-CM

## 2017-09-07 DIAGNOSIS — I48.0 PAROXYSMAL ATRIAL FIBRILLATION (H): ICD-10-CM

## 2017-09-07 DIAGNOSIS — M79.642 PAIN OF LEFT HAND: ICD-10-CM

## 2017-09-07 LAB
ERYTHROCYTE [DISTWIDTH] IN BLOOD BY AUTOMATED COUNT: 12.7 % (ref 10–15)
GLUCOSE SERPL-MCNC: 102 MG/DL (ref 70–99)
HBA1C MFR BLD: 4.9 % (ref 4.3–6)
HCT VFR BLD AUTO: 40.1 % (ref 40–53)
HGB BLD-MCNC: 13.4 G/DL (ref 13.3–17.7)
INR PPP: 1.08 (ref 0.86–1.14)
MCH RBC QN AUTO: 30.9 PG (ref 26.5–33)
MCHC RBC AUTO-ENTMCNC: 33.4 G/DL (ref 31.5–36.5)
MCV RBC AUTO: 92 FL (ref 78–100)
PLATELET # BLD AUTO: 214 10E9/L (ref 150–450)
RBC # BLD AUTO: 4.34 10E12/L (ref 4.4–5.9)
WBC # BLD AUTO: 4.7 10E9/L (ref 4–11)

## 2017-09-07 RX ORDER — DILTIAZEM HYDROCHLORIDE 180 MG/1
180 CAPSULE, COATED, EXTENDED RELEASE ORAL DAILY
Qty: 90 CAPSULE | Refills: 3 | Status: SHIPPED | OUTPATIENT
Start: 2017-09-07 | End: 2017-10-06

## 2017-09-07 ASSESSMENT — PAIN SCALES - GENERAL: PAINLEVEL: NO PAIN (0)

## 2017-09-07 NOTE — NURSING NOTE
Chief Complaint   Patient presents with     Physical     established physical   Alessandra Hurt LPN 8:00 AM on 9/7/2017

## 2017-09-07 NOTE — PROGRESS NOTES
CC: Routine physical    S:  Frank is here for the above.  Health care maintenance topics were addressed including cancer screening, any need for labs, medication review, vitals including weights and blood pressures, dental and eye care, depression and anxiety screening, review of labs over the last year, immunizations, coordinating care with GI/hepatology.   His concerns today include:  1.  Right, direct, hernia increasing in size, bulging more often, uncomfortable throughout the day.  No nausea, vomiting, constipation, rectal bleeding.  2.  Right great toe pain throughout the day when walking.  No swelling, redness, injury.  Wears comfortable shoes and shoe inserts with arch support.  3.  Left hand dupuytren's contracture, would like to get this repaired at some point. Has increasing hand pain and limitation in ROM.  4.  Easy bruising if bumps self against something, also with heavy straining (such as recent cutting down trees/branches, lifting).  No hematomas.  On ASA  Otherwise 10 point ROS negative.    Patient Active Problem List   Diagnosis     History of actinic keratoses     Telangiectasia     SK (seborrheic keratosis)     Patient is Buddhism     Refusal of blood transfusions as patient is Buddhism     Paroxysmal atrial fibrillation (H)     Pain in both hands     Chronic left shoulder pain     Chronic hepatitis C without hepatic coma (H)     WINNIE (obstructive sleep apnea) AHI 32     Atrial fibrillation (H)     Tubular adenoma of colon     ACP (advance care planning)     Chronic hepatitis C (H)     Dupuytren's contracture of both hands     Bilateral sensorineural hearing loss     Elevated PSA     Current Outpatient Prescriptions   Medication Sig Dispense Refill     diltiazem 180 MG 24 hr capsule Take 1 capsule (180 mg) by mouth daily 90 capsule 3     alfuzosin (UROXATRAL) 10 MG 24 hr tablet TAKE 1 TABLET DAILY 90 tablet 3     order for DME Reported on 5/4/2017       aspirin 81 MG EC tablet Take  "1 tablet (81 mg) by mouth daily 90 tablet 3     [DISCONTINUED] diltiazem 180 MG 24 hr capsule Take 1 capsule (180 mg) by mouth daily (Patient taking differently: Take 180 mg by mouth every evening ) 90 capsule 3     Allergies   Allergen Reactions     Blood Transfusion Related (Informational Only)      Hoahaoism.  Declines blood transfusions.     Contrast Dye Rash     /70 (BP Location: Right arm, Patient Position: Chair, Cuff Size: Adult Regular)  Pulse 50  Resp 20  Ht 1.753 m (5' 9\")  Wt 84.8 kg (187 lb)  SpO2 99%  BMI 27.62 kg/m2   Physical Examination:  General:  Pleasant, alert, no acute distress  Eyes:  Pupils 2-3 mm, sclera white, EOM's full.    Ears:  TM's normal, EAC's patent, clear of cerumen  Nose:  Nasal passages clear, turbinates not swollen  Throat/Mouth:  No pharyngeal erythema or exudate, oral mucosa and tongue moist, no suspicious oral lesions  Neck:  Full AROM, supple, thyroid smooth, symmetric, not enlarged, no nodules  Lungs:  Clear to auscultation throughout, no wheezes, rhonchi or rales.  C/V:  Regular rate and rhythm, no murmurs, rubs or gallops.  No JVD, no carotid bruits.  No peripheral edema.   Abdomen:  Not distended.  Bowel sounds active.  No tenderness, no hepatosplenomegaly.  Right direct hernia bulge on valsalva, reduces readily.  Lymph:  No cervical lymph nodes.  Neuro:  Alert and oriented, face symmetric. No tremor.  Gait steady.    M/S:   No joint swelling. Left hand/ring finger contracture noted.  Mild bunion deformity right great toe.  Skin:   No rashes or jaundice.  Normal moisture, good skin turgor.  Psych:  Broad range affect.  Not psychomotor slowed.  No signs of anxiety or agitation.    Frank was seen today for physical.    Diagnoses and all orders for this visit:    Routine general medical examination at a health care facility    Direct hernia, abdominal pain RLQ  -     GENERAL SURG ADULT REFERRAL; Future    Pain of toe of right foot, suspect early bunion  - "     PODIATRY/FOOT & ANKLE SURGERY REFERRAL; Future  -     Before scheduling a podiatry appointment, conservative management with a trial of cushioning between great and 2nd toes, cushioning under metatarsal foot pad, wear wide toe box shoes    Elevated glucose  -     Glucose; Future  -     Hemoglobin A1c; Future    Pain of left hand, dupuytren's contracture  -     ORTHOPEDICS (hand) ADULT REFERRAL; Future    Easy bruising  -     CBC with platelets; Future  -     INR; Future    Paroxysmal atrial fibrillation (H)  -     Refill diltiazem 180 MG 24 hr capsule; Take 1 capsule (180 mg) by mouth daily    F/U in one year and as needed.    Vanessa Edwards M.D.  Internal Medicine  Primary Care Center   pager [phone]

## 2017-09-07 NOTE — PATIENT INSTRUCTIONS
Primary Care Center Medication Refill Request Information:  * Please contact your pharmacy regarding ANY request for medication refills.  ** [facility] Prescription Fax = [phone]  * Please allow 3 business days for routine medication refills.  * Please allow 5 business days for controlled substance medication refills.     Primary Care Center Test Result notification information:  *You will be notified with in 7-10 days of your appointment day regarding the results of your test.  If you are on MyChart you will be notified as soon as the provider has reviewed the results and signed off on them.    Primary Care Center [phone]   General Surgery [phone] ([facility], 4th Floor S)   U Ortho [phone] ([facility], 4th Floor N)  Podiatry [phone] ([facility], 4th Floor N)

## 2017-09-07 NOTE — PROGRESS NOTES
9/7/2017  10:07 AM      Hep C Care Coordination   Patient here in clinic with spouse for follow up on HCV RNA Quant results 3 months post hep c treatment;     Component Results   Component Value Flag Ref Range Units Status Collected Lab   HCV RNA Quant IU/ml <15 (A) HCVND^HCV RNA Not Detected         Comment:   HCV RNA is detected, less than 15 IU/mL HCV RNA      Patient has set up an appointment with Dr. Dorman and research coordinator Rivas, for next Tuesday 9/12 to discuss results; to determine if patient will need to be retreated and to see if he may qualify for the Hep C treatment study if treatment is necessary. Patient has no further questions or concerns at this time and is aware he can contact me directly at [phone].         Lucinda Trujillo RN, BSN, PHN  [facility]   RN Care Coordinator Hepatology Specialty Clinic/Program

## 2017-09-08 ASSESSMENT — ANXIETY QUESTIONNAIRES
3. WORRYING TOO MUCH ABOUT DIFFERENT THINGS: NOT AT ALL
7. FEELING AFRAID AS IF SOMETHING AWFUL MIGHT HAPPEN: NOT AT ALL
1. FEELING NERVOUS, ANXIOUS, OR ON EDGE: NOT AT ALL
GAD7 TOTAL SCORE: 0
2. NOT BEING ABLE TO STOP OR CONTROL WORRYING: NOT AT ALL
6. BECOMING EASILY ANNOYED OR IRRITABLE: NOT AT ALL
5. BEING SO RESTLESS THAT IT IS HARD TO SIT STILL: NOT AT ALL

## 2017-09-08 ASSESSMENT — PATIENT HEALTH QUESTIONNAIRE - PHQ9
SUM OF ALL RESPONSES TO PHQ QUESTIONS 1-9: 0
5. POOR APPETITE OR OVEREATING: NOT AT ALL

## 2017-09-09 ASSESSMENT — ANXIETY QUESTIONNAIRES: GAD7 TOTAL SCORE: 0

## 2017-09-12 ENCOUNTER — APPOINTMENT (OUTPATIENT)
Dept: GASTROENTEROLOGY | Facility: CLINIC | Age: 67
End: 2017-09-12
Attending: INTERNAL MEDICINE
Payer: COMMERCIAL

## 2017-09-12 DIAGNOSIS — B18.2 CHRONIC HEPATITIS C WITHOUT HEPATIC COMA (H): Primary | ICD-10-CM

## 2017-09-12 DIAGNOSIS — B18.2 CHRONIC HEPATITIS C WITHOUT HEPATIC COMA (H): ICD-10-CM

## 2017-09-12 PROCEDURE — 87522 HEPATITIS C REVRS TRNSCRPJ: CPT | Performed by: INTERNAL MEDICINE

## 2017-09-12 PROCEDURE — 36415 COLL VENOUS BLD VENIPUNCTURE: CPT | Performed by: INTERNAL MEDICINE

## 2017-09-13 ENCOUNTER — TELEPHONE (OUTPATIENT)
Dept: GASTROENTEROLOGY | Facility: CLINIC | Age: 67
End: 2017-09-13

## 2017-09-13 DIAGNOSIS — N28.9 KIDNEY LESION: ICD-10-CM

## 2017-09-13 DIAGNOSIS — B18.2 CHRONIC HEPATITIS C WITHOUT HEPATIC COMA (H): Primary | ICD-10-CM

## 2017-09-13 NOTE — TELEPHONE ENCOUNTER
Writer spoke with Dr. Dorman regarding the result note of patient's recent US abd. Dr. Dorman recommends patient get an updated creatinine level and then will need a follow up CT scan. If patient has any pain on his sides or flank pain related to the cyst then recommendation is for patient to follow up with PCP asap.     Patient updated and plans on getting his BMP tomorrow at a local [place]. Order placed. Patient stated being allergic to contrast so writer will follow up with Dr. Dorman regarding alternative contrasts patient may be able to have or if a CT without contrast is appropriate. Will await BMP results before updating the patient on Friday.    Frank Orellana - 09/12/17 << Less Detail     Micah Dorman MD     Sent: Tue September 12, 2017  9:39 PM     To: Enmanuel Crowley LPN          Result Note      Your ultrasound shows o suspicious hepatic mass. There is a lesion arising from the lower pole of the left kidney has slightly increased in size since CT dated 7/14/2010 and now shows     low-level internal echoes. Given that this lesion showed simple cystic characteristics and prior CT, favor that this represents development of internal hemorrhagic contents.

## 2017-09-14 DIAGNOSIS — B18.2 CHRONIC HEPATITIS C WITHOUT HEPATIC COMA (H): ICD-10-CM

## 2017-09-14 LAB
ANION GAP SERPL CALCULATED.3IONS-SCNC: 10 MMOL/L (ref 3–14)
BUN SERPL-MCNC: 14 MG/DL (ref 7–30)
CALCIUM SERPL-MCNC: 8.8 MG/DL (ref 8.5–10.1)
CHLORIDE SERPL-SCNC: 108 MMOL/L (ref 94–109)
CO2 SERPL-SCNC: 22 MMOL/L (ref 20–32)
CREAT SERPL-MCNC: 0.85 MG/DL (ref 0.66–1.25)
GFR SERPL CREATININE-BSD FRML MDRD: 90 ML/MIN/1.7M2
GLUCOSE SERPL-MCNC: 90 MG/DL (ref 70–99)
HCV RNA SERPL NAA+PROBE-ACNC: NORMAL [IU]/ML
HCV RNA SERPL NAA+PROBE-LOG IU: NORMAL LOG IU/ML
POTASSIUM SERPL-SCNC: 3.7 MMOL/L (ref 3.4–5.3)
SODIUM SERPL-SCNC: 140 MMOL/L (ref 133–144)

## 2017-09-14 PROCEDURE — 80048 BASIC METABOLIC PNL TOTAL CA: CPT | Performed by: FAMILY MEDICINE

## 2017-09-14 PROCEDURE — 36415 COLL VENOUS BLD VENIPUNCTURE: CPT | Performed by: FAMILY MEDICINE

## 2017-09-20 NOTE — TELEPHONE ENCOUNTER
"Writer spoke with Dr. Dorman regarding the CT scan Dr. Dorman recommended. Pt's contrast allergy may be specific to CT (iodine based) or it may be another type. Pt had stated he had a contrast allergy when CT was advised with contrast.     Dr. Dorman stated that patient really should follow up with urology and they can determine the best choice imaging and follow up for the findings on the recent US abd. See impressions below:    \"Hypoechoic lesion arising from the lower pole of the left kidney  has slightly increased in size since CT dated 7/14/2010 and now shows low-level internal echoes. Given that this lesion showed simple cystic characteristics and prior CT, favor that this represents development of internal hemorrhagic contents.\"    Dr. Dorman reiterated that this is non urgent since there is no pain. If there is pain patient plans to follow up with his PCP in the meantime.    Pt updated and in agreement with plan. He will await assistance with scheduling in urology.  "

## 2017-09-25 ENCOUNTER — PRE VISIT (OUTPATIENT)
Dept: UROLOGY | Facility: CLINIC | Age: 67
End: 2017-09-25

## 2017-09-29 ENCOUNTER — OFFICE VISIT (OUTPATIENT)
Dept: UROLOGY | Facility: CLINIC | Age: 67
End: 2017-09-29

## 2017-09-29 VITALS
BODY MASS INDEX: 26.66 KG/M2 | WEIGHT: 180 LBS | HEART RATE: 65 BPM | DIASTOLIC BLOOD PRESSURE: 65 MMHG | SYSTOLIC BLOOD PRESSURE: 111 MMHG | HEIGHT: 69 IN

## 2017-09-29 DIAGNOSIS — N28.1 ACQUIRED CYST OF KIDNEY: ICD-10-CM

## 2017-09-29 DIAGNOSIS — R97.20 ELEVATED PROSTATE SPECIFIC ANTIGEN (PSA): Primary | ICD-10-CM

## 2017-09-29 DIAGNOSIS — K40.90 INGUINAL HERNIA WITHOUT OBSTRUCTION OR GANGRENE, RECURRENCE NOT SPECIFIED, UNSPECIFIED LATERALITY: ICD-10-CM

## 2017-09-29 ASSESSMENT — PAIN SCALES - GENERAL: PAINLEVEL: NO PAIN (0)

## 2017-09-29 NOTE — PROGRESS NOTES
UROLOGIC DIAGNOSES:       CURRENT INTERVENTIONS:     HISTORY:     Patient with history of elevated PSA with negative prostate MRI currently followed with serial PSAs. Found incidentally on u/s to have a renal lesion, most likely a cyst (hemorrhagic).     I discussed these u/s findings with the patient and his wife.   We reviewed PSA trend and need for screening PSA. Also discussed further imaging to better characterize the lesion.           PAST MEDICAL HISTORY:   Past Medical History:   Diagnosis Date     Actinic keratosis      Atrial fibrillation (H)     paroxysmal on 3-4 occasions     Atrial fibrillation (H) 6/15/96    Afib - 2 or 3 extended occurrences since     Chronic hepatitis C (H)     Chronic Hepatitis C,  genotype 1b                Stage 0 Fibrosis     Dupuytren's contracture of both hands      Elevated PSA      Hepatitis B     acute infection at age 19; IV drug use     WINNIE (obstructive sleep apnea)     AHI 34.2     Pain in both hands      Patient is Druze      Refusal of blood transfusions as patient is Druze      Right shoulder pain      Tinnitus 1 or 2 years ago    both ears     Tubular adenoma of colon 17    ascending colon, 17       PAST SURGICAL HISTORY:   Past Surgical History:   Procedure Laterality Date     ARTHROSCOPY KNEE      left knee     COLONOSCOPY  17    tubular adenoma ascending colon     KNEE SURGERY  ??    torn meniscus     RELEASE DUPUYTRENS CONTRACTURE Right 2017    Procedure: RELEASE DUPUYTRENS CONTRACTURE;  Surgeon: Lars Oleary MD;  Location: [facility]       FAMILY HISTORY:   Family History   Problem Relation Age of Onset     Arthritis Paternal Grandmother      GASTROINTESTINAL DISEASE Father       age 77 after colon surgery     Alcohol/Drug Mother       age 64     HEART DISEASE Brother      Cardiac Sudden Death Brother      Rheumatoid Arthritis Paternal Grandmother        SOCIAL HISTORY:   Social History  "  Substance Use Topics     Smoking status: Former Smoker     Packs/day: 0.50     Years: 10.00     Types: Cigarettes, Other     Start date: 6/15/1964     Quit date: 7/1/1974     Smokeless tobacco: Never Used     Alcohol use 1.8 - 2.4 oz/week      Comment: 4 or 5 drinks/week, limit of 1       Current Outpatient Prescriptions   Medication     diltiazem 180 MG 24 hr capsule     alfuzosin (UROXATRAL) 10 MG 24 hr tablet     order for DME     aspirin 81 MG EC tablet     No current facility-administered medications for this visit.          PHYSICAL EXAM:    /65  Pulse 65  Ht 1.753 m (5' 9\")  Wt 81.6 kg (180 lb)  BMI 26.58 kg/m2    HEENT: Normocephalic and atraumatic   Cardiac: Not done  Back/Flank: Not done  CNS/PNS: Not done  Respiratory: Normal non-labored breathing  Abdomen: Soft nontender and nondistended  Peripheral Vascular: Not done  Mental Status: Not done    Penis: Not done  Scrotal Skin: Not done  Testicles: Not done  Epididymis: Not done  Digital Rectal Exam:     Cystoscopy: Not done    Imaging: None    Urinalysis: UA RESULTS:  Recent Labs   Lab Test  10/13/16   0816   COLOR  Yellow   APPEARANCE  Clear   URINEGLC  Negative   URINEBILI  Negative   URINEKETONE  Negative   SG  1.011   UBLD  Negative   URINEPH  7.0   PROTEIN  Negative   NITRITE  Negative   LEUKEST  Negative   RBCU  0   WBCU  <1       PSA: 5.42 in 11/2016    Post Void Residual:     Other labs: None today      IMPRESSION:  68 y/o M with elevated PSA and renal lesion     PLAN:  Will obtain MRI abd/pelvis w/w/o contrast to better characterize the renal lesion   PSA next available   Follow up when above completed     Total Time: 15 minutes                                      Total in Consultation: greater than 50%     "

## 2017-09-29 NOTE — PATIENT INSTRUCTIONS
Schedule an MRI, PSA, and follow up.    It was a pleasure meeting with you today.  Thank you for allowing me and my team the privilege of caring for you today.  YOU are the reason we are here, and I truly hope we provided you with the excellent service you deserve.  Please let us know if there is anything else we can do for you so that we can be sure you are leaving completely satisfied with your care experience.

## 2017-09-29 NOTE — LETTER
9/29/2017       RE: Frank Orellana  [address]  [facility] [address]     Dear Colleague,    Thank you for referring your patient, Frank Orellana, to the [facility] UROLOGY AND INST FOR PROSTATE AND UROLOGIC CANCERS at [facility]. Please see a copy of my visit note below.    UROLOGIC DIAGNOSES:       CURRENT INTERVENTIONS:     HISTORY:     Patient with history of elevated PSA with negative prostate MRI currently followed with serial PSAs. Found incidentally on u/s to have a renal lesion, most likely a cyst (hemorrhagic).     I discussed these u/s findings with the patient and his wife.   We reviewed PSA trend and need for screening PSA. Also discussed further imaging to better characterize the lesion.           PAST MEDICAL HISTORY:   Past Medical History:   Diagnosis Date     Actinic keratosis 2009     Atrial fibrillation (H) 1996    paroxysmal on 3-4 occasions     Atrial fibrillation (H) 6/15/96    Afib - 2 or 3 extended occurrences since     Chronic hepatitis C (H)     Chronic Hepatitis C,  genotype 1b                Stage 0 Fibrosis     Dupuytren's contracture of both hands      Elevated PSA      Hepatitis B 1969    acute infection at age 19; IV drug use     WINNIE (obstructive sleep apnea)     AHI 34.2     Pain in both hands      Patient is Mosque      Refusal of blood transfusions as patient is Mosque      Right shoulder pain      Tinnitus 1 or 2 years ago    both ears     Tubular adenoma of colon 1/17/17    ascending colon, 1/17/17       PAST SURGICAL HISTORY:   Past Surgical History:   Procedure Laterality Date     ARTHROSCOPY KNEE      left knee     COLONOSCOPY  1/17/17    tubular adenoma ascending colon     KNEE SURGERY  2006??    torn meniscus     RELEASE DUPUYTRENS CONTRACTURE Right 1/20/2017    Procedure: RELEASE DUPUYTRENS CONTRACTURE;  Surgeon: Lars Oleary MD;  [facility]       FAMILY HISTORY:   Family History  "  Problem Relation Age of Onset     Arthritis Paternal Grandmother      GASTROINTESTINAL DISEASE Father       age 77 after colon surgery     Alcohol/Drug Mother       age 64     HEART DISEASE Brother      Cardiac Sudden Death Brother      Rheumatoid Arthritis Paternal Grandmother        SOCIAL HISTORY:   Social History   Substance Use Topics     Smoking status: Former Smoker     Packs/day: 0.50     Years: 10.00     Types: Cigarettes, Other     Start date: 6/15/1964     Quit date: 1974     Smokeless tobacco: Never Used     Alcohol use 1.8 - 2.4 oz/week      Comment: 4 or 5 drinks/week, limit of 1       Current Outpatient Prescriptions   Medication     diltiazem 180 MG 24 hr capsule     alfuzosin (UROXATRAL) 10 MG 24 hr tablet     order for DME     aspirin 81 MG EC tablet     No current facility-administered medications for this visit.          PHYSICAL EXAM:    /65  Pulse 65  Ht 1.753 m (5' 9\")  Wt 81.6 kg (180 lb)  BMI 26.58 kg/m2    HEENT: Normocephalic and atraumatic   Cardiac: Not done  Back/Flank: Not done  CNS/PNS: Not done  Respiratory: Normal non-labored breathing  Abdomen: Soft nontender and nondistended  Peripheral Vascular: Not done  Mental Status: Not done    Penis: Not done  Scrotal Skin: Not done  Testicles: Not done  Epididymis: Not done  Digital Rectal Exam:     Cystoscopy: Not done    Imaging: None    Urinalysis: UA RESULTS:  Recent Labs   Lab Test  10/13/16   0816   COLOR  Yellow   APPEARANCE  Clear   URINEGLC  Negative   URINEBILI  Negative   URINEKETONE  Negative   SG  1.011   UBLD  Negative   URINEPH  7.0   PROTEIN  Negative   NITRITE  Negative   LEUKEST  Negative   RBCU  0   WBCU  <1       PSA: 5.42 in 2016    Post Void Residual:     Other labs: None today      IMPRESSION:  66 y/o M with elevated PSA and renal lesion     PLAN:  Will obtain MRI abd/pelvis w/w/o contrast to better characterize the renal lesion   PSA next available   Follow up when above completed     Total " Time: 15 minutes                                      Total in Consultation: greater than 50%       Again, thank you for allowing me to participate in the care of your patient.      Sincerely,    Meggan Maravilla MD

## 2017-09-29 NOTE — MR AVS SNAPSHOT
After Visit Summary   9/29/2017    Frank Orellana    MRN: [id number]           Patient Information     Date Of Birth          1950        Visit Information        Provider Department      9/29/2017 3:45 PM Meggan Maravilla MD [facility] Urology and [facility]        Today's Diagnoses     Elevated prostate specific antigen (PSA)    -  1    Acquired cyst of kidney        Inguinal hernia without obstruction or gangrene, recurrence not specified, unspecified laterality          Care Instructions    Schedule an MRI, PSA, and follow up.    It was a pleasure meeting with you today.  Thank you for allowing me and my team the privilege of caring for you today.  YOU are the reason we are here, and I truly hope we provided you with the excellent service you deserve.  Please let us know if there is anything else we can do for you so that we can be sure you are leaving completely satisfied with your care experience.                  Follow-ups after your visit        Additional Services     GENERAL SURG ADULT REFERRAL       Your provider has referred you to: [facility]: General Surgery Clinic [facility] [phone]   [email or web address]    Please be aware that coverage of these services is subject to the terms and limitations of your health insurance plan.  Call member services at your health plan with any benefit or coverage questions.      Please bring the following with you to your appointment:    (1) Any X-Rays, CTs or MRIs which have been performed.  Contact the facility where they were done to arrange for  prior to your scheduled appointment.   (2) List of current medications   (3) This referral request   (4) Any documents/labs given to you for this referral                  Your next 10 appointments already scheduled     Oct 06, 2017  7:00 AM CDT   (Arrive by 6:45 AM)   MR ABDOMEN & PELVIS W/O & W CONTRAST with UCMR1   M  [facility] ([facility])    [address]  1st Floor  [facility] [address] [phone]           Take your medicines as usual, unless your doctor tells you not to. Bring a list of your current medicines to your exam (including vitamins, minerals and over-the-counter drugs). Also bring the results of similar scans you may have had.    The day before your exam, drink extra fluids at least six 8-ounce glasses (unless your doctor tells you to restrict your fluids).   Have a blood test (creatinine test) within 30 days of your exam. Go to your clinic or Diagnostic Imaging Department for this test.   Do not eat or drink for 6 hours prior to exam.  The MRI machine uses a strong magnet. Please wear clothes without metal (snaps, zippers). A sweatsuit works well, or we may give you a hospital gown.  Please remove any body piercings and hair extensions before you arrive. You will also remove watches, jewelry, hairpins, wallets, dentures, partial dental plates and hearing aids. You may wear contact lenses, and you may be able to wear your rings. We have a safe place to keep your personal items, but it is safer to leave them at home.   **IMPORTANT** THE INSTRUCTIONS BELOW ARE ONLY FOR THOSE PATIENTS WHO HAVE BEEN TOLD THEY WILL RECEIVE SEDATION OR GENERAL ANESTHESIA DURING THEIR MRI PROCEDURE:  IF YOU WILL RECEIVE SEDATION (take medicine to help you relax during your exam):   You must get the medicine from your doctor before you arrive. Bring the medicine to the exam. Do not take it at home.   Arrive one hour early. Bring someone who can take you home after the test. Your medicine will make you sleepy. After the exam, you may not drive, take a bus or take a taxi by yourself.   No eating 8 hours before your exam. You may have clear liquids up until 4 hours before your exam. (Clear liquids include water, clear tea, black coffee and fruit juice without pulp.)  IF YOU WILL RECEIVE  ANESTHESIA (be asleep for your exam):   Arrive 1 1/2 hours early. Bring someone who can take you home after the test. You may not drive, take a bus or take a taxi by yourself.   No eating 8 hours before your exam. You may have clear liquids up until 4 hours before your exam. (Clear liquids include water, clear tea, black coffee and fruit juice without pulp.)  If you have any questions, please contact your Imaging Department exam site.            Oct 06, 2017  7:45 AM CDT   LAB with  LAB    Health Lab ([facility])    [address] [id number] [phone]           Patient must bring picture ID. Patient should be prepared to give a urine specimen  Please do not eat 10-12 hours before your appointment if you are coming in fasting for labs on lipids, cholesterol, or glucose (sugar). Pregnant women should follow their Care Team instructions. Water with medications is okay. Do not drink coffee or other fluids. If you have concerns about taking  your medications, please ask at office or if scheduling via [employer], send a message by clicking on Secure Messaging, Message Your Care Team.            Oct 09, 2017  1:15 PM CDT   RETURN RETINA with Ruth Boone MD   Eye Clinic ([facility])    [address]  [address] [address] [facility] [address]  [facility] [address]   [phone]            Oct 10, 2017  1:25 PM CDT   Return Visit with Meggan Maravilla MD   [facility] [place] ([facility])    [address]  [address]  [facility] [address] [phone]            May 10, 2018 11:00 AM CDT   Return Sleep Patient with Melissa Stanford MD   [facility] [place] Sleep Study ([facility])    [address] [place] [id number] [phone]              Future tests that were ordered for you today     Open Future Orders         "Priority Expected Expires Ordered    MR Abdomen & Pelvis w/o & w Contrast Routine  9/29/2018 9/29/2017    PSA tumor marker Routine  9/29/2018 9/29/2017            Who to contact     Please call your clinic at [phone] to:    Ask questions about your health    Make or cancel appointments    Discuss your medicines    Learn about your test results    Speak to your doctor   If you have compliments or concerns about an experience at your clinic, or if you wish to file a complaint, please contact [facility] Patient Relations at [phone] or email us at [email or web address]         Additional Information About Your Visit        [employer] Information     [employer] gives you secure access to your electronic health record. If you see a primary care provider, you can also send messages to your care team and make appointments. If you have questions, please call your primary care clinic.  If you do not have a primary care provider, please call [phone] and they will assist you.      [employer] is an electronic gateway that provides easy, online access to your medical records. With [employer], you can request a clinic appointment, read your test results, renew a prescription or communicate with your care team.     To access your existing account, please contact your [facility] or call [phone] for assistance.        Care EveryWhere ID     This is your Care EveryWhere ID. This could be used by other organizations to access your [place] medical records  [id number]        Your Vitals Were     Pulse Height BMI (Body Mass Index)             65 1.753 m (5' 9\") 26.58 kg/m2          Blood Pressure from Last 3 Encounters:   09/29/17 111/65   09/07/17 116/70   06/07/17 113/70    Weight from Last 3 Encounters:   09/29/17 81.6 kg (180 lb)   09/07/17 84.8 kg (187 lb)   06/07/17 85.9 kg (189 lb 6.4 oz)              We Performed the Following     GENERAL SURG ADULT " REFERRAL        Primary Care Provider Office Phone # Fax #    Vanessa Edwards [phone]       [address] [place] [address] [address]  [facility] [address]        Equal Access to Services     AYDE PAPPAS : Hadii sylvia ku hadevetteo Sokourtneyali, waaxda luqadaha, qaybta kaalmada adesammieda, jeyson palomareschloe evans. So [facility] [phone].    ATENCIÓN: Si habla español, tiene a sebastian disposición servicios gratuitos de asistencia lingüística. Llame al [phone].    We comply with applicable federal civil rights laws and [place] laws. We do not discriminate on the basis of race, color, national origin, age, disability, sex, sexual orientation, or gender identity.            Thank you!     Thank you for choosing [facility] UROLOGY AND [facility] FOR PROSTATE AND UROLOGIC CANCERS  for your care. Our goal is always to provide you with excellent care. Hearing back from our patients is one way we can continue to improve our services. Please take a few minutes to complete the written survey that you may receive in the mail after your visit with us. Thank you!             Your Updated Medication List - Protect others around you: Learn how to safely use, store and throw away your medicines at www.disposemymeds.org.          This list is accurate as of: 9/29/17  5:03 PM.  Always use your most recent med list.                   Brand Name Dispense Instructions for use Diagnosis    alfuzosin 10 MG 24 hr tablet    UROXATRAL    90 tablet    TAKE 1 TABLET DAILY    Urinary frequency       aspirin 81 MG EC tablet     90 tablet    Take 1 tablet (81 mg) by mouth daily    Paroxysmal atrial fibrillation (H)       diltiazem 180 MG 24 hr capsule     90 capsule    Take 1 capsule (180 mg) by mouth daily    Paroxysmal atrial fibrillation (H)       order for DME      Reported on 5/4/2017

## 2017-10-05 ENCOUNTER — OFFICE VISIT (OUTPATIENT)
Dept: SURGERY | Facility: CLINIC | Age: 67
End: 2017-10-05

## 2017-10-05 VITALS
HEART RATE: 51 BPM | WEIGHT: 187.1 LBS | TEMPERATURE: 97.8 F | DIASTOLIC BLOOD PRESSURE: 67 MMHG | OXYGEN SATURATION: 97 % | HEIGHT: 69 IN | SYSTOLIC BLOOD PRESSURE: 111 MMHG | BODY MASS INDEX: 27.71 KG/M2

## 2017-10-05 DIAGNOSIS — K40.90 RIGHT INGUINAL HERNIA: Primary | ICD-10-CM

## 2017-10-05 ASSESSMENT — PAIN SCALES - GENERAL: PAINLEVEL: NO PAIN (0)

## 2017-10-05 NOTE — MR AVS SNAPSHOT
After Visit Summary   10/5/2017    Frank Orellana    MRN: [id number]           Patient Information     Date Of Birth          1950        Visit Information        Provider Department      10/5/2017 8:00 AM Suresh Raymond MD [facility] Surgery        Today's Diagnoses     Right inguinal hernia    -  1      Care Instructions    Open Inguinal Hernia Repair Teaching Sheet      1.  Wound care:  Remove gauze dressing 24 hours after surgery.  Leave the medical tape (Steri-strips) in place.  The tape should stay on for 5-7 days.  You may remove the Steri-Strips after one week.   Please wash your hands before touching your incisions or removing dressings.    2.  You may resume all of your home medications after surgery.  Please do not start Aspirin or blood thinners until the day after surgery.    3.  You may shower 24 hours after surgery. Do not submerge yourself in water for 7 days (bath, whirlpool, hot tub, pool, lake, etc).      4.  Restrictions:  No lifting in excess of 20 pounds for 3-4 weeks from the date of surgery.    5.  Pain management:  Apply ice pack to groin for 24 hours protecting the skin with a cloth.  Take prescribed pain medication as directed and only as needed.  Please do not take any additional Acetaminophen or Tylenol products while taking narcotic pain medications.  We encourage the use of Ibuprofen, Advil, or Motrin after surgery except if you have an allergy, ulcer, kidney problems, or it is contraindicated by a provider.  A TAP Block may be recommended the day of surgery (see information sheet below).    6.  Athletic supporter (male):  Wear for the first 3 days.  You may wear longer if you wish.    7.  Our office will call you 2-4 days after your procedure to review post-op teaching, answer questions, and help arrange after surgery care.    8.  Watch for signs of infection:  Redness of the wound, drainage, increasing pain, and/or fever/chills (greater than 101  degrees F).    9.  Constipation:  Please take prescribed stool softener as directed.  You may stop taking it when you are no longer taking narcotic pain medications and your bowels have returned to normal.  If you become constipated it is safe to take an over-the-counter laxative as directed on the bottle.    10. Driving:  You may drive  when you are no longer taking prescription pain medications  and you feel comfortable operating a vehicle.       11. Diet:  You may resume your regular diet after surgery.      If you have questions or concerns please contact our office Monday through Friday during regular office hours at [phone].  If you are calling during nonbusiness hours, the weekend, or holiday please call the hospital  at [phone] and ask for the on-call General Surgeon.                    Follow-ups after your visit        Your next 10 appointments already scheduled     Oct 06, 2017  7:00 AM CDT   (Arrive by 6:45 AM)   MR ABDOMEN & PELVIS W/O & W CONTRAST with [address] MRI ([facility] [place])    [address] [address] [phone]           Take your medicines as usual, unless your doctor tells you not to. Bring a list of your current medicines to your exam (including vitamins, minerals and over-the-counter drugs). Also bring the results of similar scans you may have had.    The day before your exam, drink extra fluids at least six 8-ounce glasses (unless your doctor tells you to restrict your fluids).   Have a blood test (creatinine test) within 30 days of your exam. Go to your clinic or Diagnostic Imaging Department for this test.   Do not eat or drink for 6 hours prior to exam.  The MRI machine uses a strong magnet. Please wear clothes without metal (snaps, zippers). A sweatsuit works well, or we may give you a hospital gown.  Please remove any body piercings and hair extensions before you arrive. You will also  remove watches, jewelry, hairpins, wallets, dentures, partial dental plates and hearing aids. You may wear contact lenses, and you may be able to wear your rings. We have a safe place to keep your personal items, but it is safer to leave them at home.   **IMPORTANT** THE INSTRUCTIONS BELOW ARE ONLY FOR THOSE PATIENTS WHO HAVE BEEN TOLD THEY WILL RECEIVE SEDATION OR GENERAL ANESTHESIA DURING THEIR MRI PROCEDURE:  IF YOU WILL RECEIVE SEDATION (take medicine to help you relax during your exam):   You must get the medicine from your doctor before you arrive. Bring the medicine to the exam. Do not take it at home.   Arrive one hour early. Bring someone who can take you home after the test. Your medicine will make you sleepy. After the exam, you may not drive, take a bus or take a taxi by yourself.   No eating 8 hours before your exam. You may have clear liquids up until 4 hours before your exam. (Clear liquids include water, clear tea, black coffee and fruit juice without pulp.)  IF YOU WILL RECEIVE ANESTHESIA (be asleep for your exam):   Arrive 1 1/2 hours early. Bring someone who can take you home after the test. You may not drive, take a bus or take a taxi by yourself.   No eating 8 hours before your exam. You may have clear liquids up until 4 hours before your exam. (Clear liquids include water, clear tea, black coffee and fruit juice without pulp.)  If you have any questions, please contact your Imaging Department exam site.            Oct 06, 2017  7:45 AM CDT   LAB with [facility] ([facility])    [address] [place] [address] [phone]           Patient must bring picture ID. Patient should be prepared to give a urine specimen  Please do not eat 10-12 hours before your appointment if you are coming in fasting for labs on lipids, cholesterol, or glucose (sugar). Pregnant women should follow their Care Team instructions. Water with medications is  okay. Do not drink coffee or other fluids. If you have concerns about taking  your medications, please ask at office or if scheduling via [employer], send a message by clicking on Secure Messaging, Message Your Care Team.            Oct 09, 2017  1:15 PM CDT   RETURN RETINA with Ruth Boone MD   Eye Clinic ([facility])    [address]  [address] [facility]  [address] [address]  [facility] [address] [phone]            Oct 10, 2017  1:25 PM CDT   Return Visit with Meggan Maravilla MD   [facility] [place] ([facility] [place])    [address]  [address]  [facility] [id number] [phone]            May 10, 2018 11:00 AM CDT   Return Sleep Patient with Melissa Stanford MD   [facility] [place], Sleep Study ([facility])    [address] [place] [id number] [phone]              Who to contact     Please call your clinic at [phone] to:    Ask questions about your health    Make or cancel appointments    Discuss your medicines    Learn about your test results    Speak to your doctor   If you have compliments or concerns about an experience at your clinic, or if you wish to file a complaint, please contact [facility] Patient Relations at [phone] or email us at [email or web address]         Additional Information About Your Visit        [employer] Information     [employer] gives you secure access to your electronic health record. If you see a primary care provider, you can also send messages to your care team and make appointments. If you have questions, please call your primary care clinic.  If you do not have a primary care provider, please call [phone] and they will assist you.      [employer] is an electronic gateway that provides easy, online access to your medical records. With [employer], you can request a clinic appointment,  "read your test results, renew a prescription or communicate with your care team.     To access your existing account, please contact your [facility] or call [phone] for assistance.        Care EveryWhere ID     This is your Care EveryWhere ID. This could be used by other organizations to access your [place] medical records  [id number]        Your Vitals Were     Pulse Temperature Height Pulse Oximetry BMI (Body Mass Index)       51 97.8  F (36.6  C) 1.753 m (5' 9\") 97% 27.63 kg/m2        Blood Pressure from Last 3 Encounters:   10/05/17 111/67   09/29/17 111/65   09/07/17 116/70    Weight from Last 3 Encounters:   10/05/17 84.9 kg (187 lb 1.6 oz)   09/29/17 81.6 kg (180 lb)   09/07/17 84.8 kg (187 lb)              We Performed the Following     Sally-Operative Worksheet (Generic)        Primary Care Provider Office Phone # Fax #    Vanessa Edwards [phone]       [address] [place] [address] [address] [place] [address]        Equal Access to Services     [facility]: Hadii sylvia ku hadasho Soguy, waaxda luqadaha, qaybta kaalmada adegary, jeyson hernández . So [facility] [phone].    ATENCIÓN: Si habla español, tiene a sebastian disposición servicios gratuitos de asistencia lingüística. Llame al [phone].    We comply with applicable federal civil rights laws and [place] laws. We do not discriminate on the basis of race, color, national origin, age, disability, sex, sexual orientation, or gender identity.            Thank you!     Thank you for choosing [facility]  for your care. Our goal is always to provide you with excellent care. Hearing back from our patients is one way we can continue to improve our services. Please take a few minutes to complete the written survey that you may receive in the mail after your visit with us. Thank you!             Your Updated Medication List - Protect others around you: Learn how to safely " use, store and throw away your medicines at www.disposemymeds.org.          This list is accurate as of: 10/5/17  8:49 AM.  Always use your most recent med list.                   Brand Name Dispense Instructions for use Diagnosis    alfuzosin 10 MG 24 hr tablet    UROXATRAL    90 tablet    TAKE 1 TABLET DAILY    Urinary frequency       aspirin 81 MG EC tablet     90 tablet    Take 1 tablet (81 mg) by mouth daily    Paroxysmal atrial fibrillation (H)       diltiazem 180 MG 24 hr capsule     90 capsule    Take 1 capsule (180 mg) by mouth daily    Paroxysmal atrial fibrillation (H)       order for DME      Reported on 5/4/2017

## 2017-10-05 NOTE — PATIENT INSTRUCTIONS
Open Inguinal Hernia Repair Teaching Sheet      1.  Wound care:  Remove gauze dressing 24 hours after surgery.  Leave the medical tape (Steri-strips) in place.  The tape should stay on for 5-7 days.  You may remove the Steri-Strips after one week.   Please wash your hands before touching your incisions or removing dressings.    2.  You may resume all of your home medications after surgery.  Please do not start Aspirin or blood thinners until the day after surgery.    3.  You may shower 24 hours after surgery. Do not submerge yourself in water for 7 days (bath, whirlpool, hot tub, pool, lake, etc).      4.  Restrictions:  No lifting in excess of 20 pounds for 3-4 weeks from the date of surgery.    5.  Pain management:  Apply ice pack to groin for 24 hours protecting the skin with a cloth.  Take prescribed pain medication as directed and only as needed.  Please do not take any additional Acetaminophen or Tylenol products while taking narcotic pain medications.  We encourage the use of Ibuprofen, Advil, or Motrin after surgery except if you have an allergy, ulcer, kidney problems, or it is contraindicated by a provider.  A TAP Block may be recommended the day of surgery (see information sheet below).    6.  Athletic supporter (male):  Wear for the first 3 days.  You may wear longer if you wish.    7.  Our office will call you 2-4 days after your procedure to review post-op teaching, answer questions, and help arrange after surgery care.    8.  Watch for signs of infection:  Redness of the wound, drainage, increasing pain, and/or fever/chills (greater than 101 degrees F).    9.  Constipation:  Please take prescribed stool softener as directed.  You may stop taking it when you are no longer taking narcotic pain medications and your bowels have returned to normal.  If you become constipated it is safe to take an over-the-counter laxative as directed on the bottle.    10. Driving:  You may drive  when you are no longer  taking prescription pain medications  and you feel comfortable operating a vehicle.       11. Diet:  You may resume your regular diet after surgery.      If you have questions or concerns please contact our office Monday through Friday during regular office hours at [phone].  If you are calling during nonbusiness hours, the weekend, or holiday please call the hospital  at [phone] and ask for the on-call General Surgeon.

## 2017-10-05 NOTE — PROGRESS NOTES
Frank Orellana is a 67 year old male with a 6 month history of a right inguinal mass with the following symptoms of lump and pain.    Finding was not work related.  Onset did occur with lifting.  Obstructive symptoms:  no  Urinary difficulties:  no  Chronic cough: no  Constipation:  no  Current level of activity:  Medium, active at home, retired    Past medical and surgical history, medications, allergies, family history, and social history were reviewed with the patient. See list.      ROS: 10 point review of systems negative except noted in HPI    PHYSICAL EXAM  General appearance- healthy, alert, and in no distress.  Skin- Skin color, texture, and turgor normal.  No rashes.  Neck- Neck is supple with no obvious adenopathy.  Lungs- Respiratory effort unlabored.  Gait- Normal.  Abdomen soft non tender with very small umbilical hernia.  Right inguinal hernia retracted, left without weakness or defect.    Impression:  Inguinal hernia, right  Recommendation:  Open  Inguinal Hernioplasty, right mesh repair.    A full discussion regarding the alternatives, risks, goals, and potential complications for this surgery was completed today.  The patient understood that the potential problems included but are not limited to:  Infection, bleeding, hematoma, seroma, recurrence, injury to nerve/muscle or involved testicle, ischemic orchitis, and chronic pain.    The patient verbally expressed understanding, was given the opportunity for questions, and gives full informed consent for the procedure.      Today the patient was instructed on the need for a preoperative H&P, NPO status prior to surgery, and the need to stop anticoagulants prior to surgery.  Additional educational material, soap, and instructions will be mailed out to the patients home.    The total time spent with this patient was 30 minutes.  Of this time, greater than 50% was spent counseling and coordinating care.      Lovenox:  No

## 2017-10-05 NOTE — NURSING NOTE
Pre and Post op Patient Education/Teaching Flowsheet  Relevant Diagnosis:  hernia  Teaching Topic:  Pre and post op teaching  Person(s) Involved in teaching:  Patient and Wife     Motivation Level:  Asks Questions:  Yes  Eager to Learn:  Yes  Cooperative:  Yes  Receptive (willing/able to accept information):  Yes  Any cultural factors/Taoism beliefs that may influence understanding or compliance?  No    Patient/caregiver/family demonstrates understanding of the following:  Reason for the appointment, diagnosis, and treatment plan:  Yes  Patient demonstrates understanding of the following:  Pre-op bowel prep:  No  Post-op pain management recommendations (medications, ice compress, binder/athletic supporter (if applicable), etc.:  Yes  Inguinal hernia patients:  Post-op urinary retention- discussed signs/symptoms and visit to ER for Cunningham catheter placement and to stay in place for at least 48 hours:  Yes  Restrictions:  Yes  Medications to take the day of surgery:  Per PCP  Blood thinner medications discussed and when to stop (if applicable):  Yes  Wound care:  Yes  Diabetes medication management (if applicable):  Per PCP  Which situations necessitate calling provider and whom to contact:  Discussed how to contact the hospital, nurse, and clinic scheduling staff if necessary      Date and time of surgery:  Yes  Location of surgery:  [facility] [place]- 5th Floor  History and Physical and any other testing necessary prior to surgery:  Yes  Required time line for completion of History and Physical and any pre-op testing:  Yes  Discuss need for someone to drive patient home and stay with them for 24 hours:  Yes  Pre-op showering/scrub information with Surgical Scrub:  Yes  NPO Guidelines:  NPO per Anesthesia Guidelines      Infection Prevention: Patient demonstrates understanding of the following:  Patient instructed on hand hygiene:  Yes  Surgical procedure site care will be taught and  will be reviewed at the time of discharge  Signs and symptoms of infection taught:  Yes  Wound care reviewed and will be taught at the time of discharge  Central venous catheter care will be taught at the time of discharge (if applicable)    Post-op follow-up:  Instructional materials used/given/mailed:  [facility] Booklet, post op teaching sheet, Map, Soap, and arrival/location information    Surgical instructions mailed to patient

## 2017-10-05 NOTE — NURSING NOTE
"Chief Complaint   Patient presents with     Consult     consult       Vitals:    10/05/17 0815   BP: 111/67   BP Location: Left arm   Patient Position: Chair   Cuff Size: Adult Regular   Pulse: 51   Temp: 97.8  F (36.6  C)   SpO2: 97%   Weight: 187 lb 1.6 oz   Height: 5' 9\"       Body mass index is 27.63 kg/(m^2).  Kristina Foote MA                          "

## 2017-10-05 NOTE — LETTER
10/5/2017       RE: Frank Orellana  [address]  [facility] [facility] [address]     Dear Colleague,    Thank you for referring your patient, Frank Orellana, to the [facility] at [facility]. Please see a copy of my visit note below.    Frank Orellana is a 67 year old male with a 6 month history of a right inguinal mass with the following symptoms of lump and pain.    Finding was not work related.  Onset did occur with lifting.  Obstructive symptoms:  no  Urinary difficulties:  no  Chronic cough: no  Constipation:  no  Current level of activity:  Medium, active at home, retired    Past medical and surgical history, medications, allergies, family history, and social history were reviewed with the patient. See list.      ROS: 10 point review of systems negative except noted in HPI    PHYSICAL EXAM  General appearance- healthy, alert, and in no distress.  Skin- Skin color, texture, and turgor normal.  No rashes.  Neck- Neck is supple with no obvious adenopathy.  Lungs- Respiratory effort unlabored.  Gait- Normal.  Abdomen soft non tender with very small umbilical hernia.  Right inguinal hernia retracted, left without weakness or defect.    Impression:  Inguinal hernia, right  Recommendation:  Open  Inguinal Hernioplasty, right mesh repair.    A full discussion regarding the alternatives, risks, goals, and potential complications for this surgery was completed today.  The patient understood that the potential problems included but are not limited to:  Infection, bleeding, hematoma, seroma, recurrence, injury to nerve/muscle or involved testicle, ischemic orchitis, and chronic pain.    The patient verbally expressed understanding, was given the opportunity for questions, and gives full informed consent for the procedure.      Today the patient was instructed on the need for a preoperative H&P, NPO status prior to surgery, and the need to stop anticoagulants prior  to surgery.  Additional educational material, soap, and instructions will be mailed out to the patients home.    The total time spent with this patient was 30 minutes.  Of this time, greater than 50% was spent counseling and coordinating care.      Lovenox:  No          Again, thank you for allowing me to participate in the care of your patient.      Sincerely,    Suresh Raymond MD

## 2017-10-05 NOTE — LETTER
Frank Hilario Esteban  [address]  [facility] [address]      SURGERY PACKET            Your surgery is scheduled:    Date: Thursday, October 26  ________________________________    Time: 1:30 pm  ________________________________    Please arrive at:  12:00  ______________________    Surgeon's Name: Dr. Suresh Raymond  _______________________        Pre-Op Physical Fax Numbers:          [facility] Pre-Admissions  Fax: [phone]  Phone: [phone]        Your surgery is located at:  [facility]  [address] [place] [address]  [phone]       Before Your Surgery  For Patients and Visitors at [facility]    Welcome  As you get ready for surgery, you may have a lot of questions.  This brochure will help you know what to expect before and after surgery.  You and your family are the most important members of your health care team.  You will need to take an active role in your care.    Be sure to ask questions and learn all that you can about your surgery.  If you have any safety concerns, we urge you to tell a nurse as soon as possible.   This brochure is for information only.  It does not replace the advice of your doctor.  Always follow your doctor's advice.    Please tell us if you need a .    GETTING READY FOR SURGERY  Always follow your surgeon's instructions.  If you don't, your surgery could be canceled.  Please use the following checklist.    Within 30 Days of Surgery:    Have a pre-surgery physical exam with your family doctor or partner.    If you use a [facility], all of your information from the pre-op physical will be in the [facility] computer system.    Ask the doctor to send all of your results to the hospital before the surgery.  The doctor also may ask you to bring the results with you on the day of surgery or you can fax them to [phone].  Tell the doctor if:    You are allergic to latex or rubber  (Latex and rubber  gloves are often used in medical care).    You are taking any medicines (including aspirin), vitamins (Vitamin E, Fish Oil, Omegas) or herbal products.  You will need to stop taking some medicines before surgery.    You have any medical problems (allergies, diabetes or heart disease, for example).    You have a pacemaker or an AICD (automatic implanted cardiac defibrillator).  If you do, please bring the ID card with you on the day of surgery.    You are a smoker.  People who smoke have a higher risk of infection after surgery.  Ask your doctor how you can quit smoking.  Within 7 days of Surgery:    Pre-register with the hospital.  Please use the hospital's phone number listed on the first page of this brochure.  Or, to register online, go to [email or web address].      Prior to your surgical procedure, a nurse will be contacting you to obtain a health history ([phone]).  Additionally, someone from the Admissions Department will also contact you for preregistration ([phone]).      Call your insurance company.  Ask if you need pre-approval for your surgery.  If you do not have insurance, please let us know.      Arrange for someone to drive you home after surgery.  If you will have same-day surgery, you may not drive or take public transportation home by yourself.    Arrange for someone to stay with you for 24 hours after you go home.  This person must be a responsible adult (ie- Family member or friend).  The Day Before Surgery:     Call your surgeon if there are any changes in your health.  This includes signs of a cold or flu (such as a sore throat, runny nose, cough, rash or fever).    Do not smoke, drink alcohol or take over-the-counter medicine (unless your surgeon tells you to) for 24 hours before and after surgery.    If you take prescribed drugs:  You may need to stop them until after the surgery.  Follow your doctor's orders.  You may resume Aspirin and/or blood thinners after your surgery as  directed by your physician/surgeon.    NO FOOD OR DRINK AFTER MIDNIGHT.  Follow your surgeon's orders for eating and drinking.  You need to have an empty stomach before surgery.  This will make the surgery as safe as possible.  If you don't follow your doctor's orders, your surgery could be changed to another date.  A nurse will call you within a few days of surgery to go over these and other instructions.  If you do not hear from them, please call them at [phone]  The Day of Surgery:    Take a shower or bath the night before and the morning of surgery.  Use antiseptic soap or the soap your surgeon gave you.  Gently clean the skin.  Do not shave or scrub your surgery site.    Please remove deodorant, cologne, scented lotion, makeup, nail polish and jewelry (including rings and body piercings).  If you wear artificial nails, please remove at least one nail before coming to the hospital.    Wear clean, loose clothing to the hospital.    Bring these items to the hospital:  1. Your insurance card.  2. Money for parking and co-pays (for medicines or the surgery), if needed.   3. A list of all the medicines you take.  Include vitamins, minerals, herbs and over-the-counter drugs.  Note any drug allergies.  4. A copy of your advance health care directive, if you have one.  This tells us what treatment you would want -- and who would make health care decisions -- if you could no longer speak for yourself.  You may request this form in advance or download it from [email or web address].  5. A case for any glasses, contact lenses, hearing aids or dentures.  6. Your inhaler or CPAP machine, if you use these at home.  Leave extra cash, jewelry and other valuables at home.    When You Arrive:  When you get to the hospital, you will:    Check in.  If you are under age 18, you must be with a parent or legal guardian.    Sign consent forms, if you haven't already.  These forms state that you know the risks and benefits of  surgery.  When you sign the forms, you give us permission to do the surgery.  Do not sign them unless you understand what will happen during and after your surgery.  If you have any questions about your surgery, ask to speak with your doctor before you sign the forms.  If you don't understand the answers, ask again.    Receive a copy of the Patients Bill of Rights.  If you do not receive a copy, please ask for one.    Change into hospital clothes.  Your belongings will be placed in a bag.  We will return them to you after surgery.    Meet with the anesthesia provider.  He or she will tell you what kind of anesthesia (medicine) will be used to keep you comfortable during surgery.  Remember: It's okay to remind doctors and nurses to wash their hands before touching you.   In most cases, your surgeon will use a marker to write his or her initials on the surgery site.  This ensures that the exact site is operated on.  For safety reasons, we will ask you the same questions many times.  For example, we may ask your name and birth date over and over again.  Friends and family can stay with you until it's time for surgery.  While you're in surgery, they will be in the waiting area.  Please note that cell phones are not allowed in some patient care areas.  If you have questions about what will happen in the operating room, talk to your care team.  After Surgery:    We will move you to a recovery room where we will watch you closely.  If you have any pain or discomfort, tell your nurse.  He or she will try to make you comfortable.      If you are staying overnight we will move you to your hospital room after you are awake.    If you are going home we will move you to another room.  Friends and family may be able to join you.  The length of time you spend in recovery depends on the type of medicine you received, your medical condition, and the type of surgery you had.  Dealing with pain:  A nurse will check your comfort level  "often during your stay.  He or she will work with you to manage your pain.  Remember:    All pain is real.  There are many ways to control pain.  We can help you decide what works best for you.    Ask for pain medicine when you need it.  Don't try to \"tough it out\" -- this can make you feel worse.  Always take your medicine as ordered.    Medicine doesn't work the same for everyone.  If your medicine isn't working tell your nurse.  There may be other medicines or treatments we can try.  Going Home:  We will let you know when you're ready to leave the hospital.  Before you leave, we will tell you how to care for yourself at home and prevent infections.  If you do not understand something, please say so.  We will answer any questions you have.  We will then help you get ready to leave.  You must have an adult with you for the first 24 hours after you leave the hospital. Take it easy when you get home.  You will need some time to recover -- you may be more tired than you realize at first.  Rest and relax for at least the first 24 hours at home.  You'll feel better and heal faster if you take good care of yourself.                      Pre-Operative Surgery Scrub    Purpose:   The skin harbors a large variety of bacteria, both infectious and noninfectious.  Showering with an antiseptic soap prior to an invasive procedure will decrease the number of transient and resident (good and bad) bacteria that is normally found on the skin.    Procedure:      Shower or bathe with 1/2 of the bottle of antiseptic soap (enclosed) the evening before and 1/2 of the bottle the morning of surgery (bathing the day of the procedure is most important).       Apply the soap at full strength (use the entire bottle).  Gently clean the skin, rinse, and dry with a clean towel that is freshly laundered (out of the dryer) and then put on clean clothing that is freshly laundered.        We have given you information regarding pre-op showering.  We " recommend that patients wash with an antiseptic soap prior to surgery.  This cleanser will be given to you at the clinic or mailed to your home.  It is advised that you liberally wash the specific area surgery area the night before, and again in the morning before the surgery.  Do not apply lotion afterward.  We would like to keep the skin as clean as possible.    Thank you for following these important instructions.      You have been scheduled for surgery and we would like to give you some information that will assist in helping get the best possible outcome.      Before Surgery:   If for any reason you decide not to have the surgery, please contact our office.  We can easily cancel or reschedule the procedure. Please call the  at [phone].      Any pain related to the surgery that occurs before the surgery needs to be reported and managed by your primary care or referring doctor.      Please keep in mind that the time of surgery is subject to change.  Make sure you have nothing to eat or drink after midnight.  If your surgery is later in the afternoon, this recommendation might change, but not until the day before surgery after the actual time of the surgery has been established.    After Surgery:  When you are discharged from the recovery room, the nurses will review instructions with you and your caregiver.      Please wash your hands every time you touch the wound or change bandages or dressings.      Do not submerge the wound in water.  You may not use a bathtub or hot tub until the wound is closed.  The wait time frame is generally 2-3 weeks but any open area can be a source of incoming bacteria, so it is better to be on the safe side and avoid the tub until your wound is fully healed.      You may take a shower 24 hours after surgery.  Double check with your surgeon if it is ok for water to run over a wound, whether it has been sutured, stapled, glued or is open.  You may gently wash the wound  using the antiseptic soap provided for your pre-surgery showering (do not use a washcloth).  Any mild soap will work as well.      Many surgical wounds will have small white strips of tape on them called Steri Strips.  Do not remove these.  The edges will curl and fall off within 7-10 days with normal showering.      If you are going home with sutures (stitches) or staples, you must return to the clinic to have them taken out, usually within 1-2 weeks.      Signs and symptoms of infection include:  1. Fever, temperature over 101.5 ' F  2. Redness  3. Swelling  4. Increasing pain  5. Green or yellow drainage which may or may not have a foul odor.    Symptoms of infection need to be reported to your surgery office. Please call the nurse at [phone], Option #3.    If you or your  are deaf or hard of hearing, or prefer a language other than English, please let us know.  We have many free services, including interpreters and other aids to help you communicate. You may ask for help  through any member of your care team or by calling Language Services at [phone], option 2.

## 2017-10-06 DIAGNOSIS — I48.0 PAROXYSMAL ATRIAL FIBRILLATION (H): ICD-10-CM

## 2017-10-06 DIAGNOSIS — R97.20 ELEVATED PROSTATE SPECIFIC ANTIGEN (PSA): ICD-10-CM

## 2017-10-06 LAB — PSA SERPL-MCNC: 4.65 UG/L (ref 0–4)

## 2017-10-06 RX ORDER — DILTIAZEM HYDROCHLORIDE 180 MG/1
CAPSULE, EXTENDED RELEASE ORAL
Qty: 90 CAPSULE | Refills: 3 | Status: ON HOLD | OUTPATIENT
Start: 2017-10-06 | End: 2018-01-15

## 2017-10-06 ASSESSMENT — ACTIVITIES OF DAILY LIVING (ADL)
IN_THE_PAST_7_DAYS,_DID_YOU_NEED_HELP_FROM_OTHERS_TO_TAKE_CARE_OF_THINGS_SUCH_AS_LAUNDRY_AND_HOUSEKEEPING,_BANKING,_SHOPPING,_USING_THE_TELEPHONE,_FOOD_PREPARATION,_TRANSPORTATION,_OR_TAKING_YOUR_OWN_MEDICATIONS?: N
IN_THE_PAST_7_DAYS,_DID_YOU_NEED_HELP_FROM_OTHERS_TO_PERFORM_EVERYDAY_ACTIVITIES_SUCH_AS_EATING,_GETTING_DRESSED,_GROOMING,_BATHING,_WALKING,_OR_USING_THE_TOILET: N

## 2017-10-09 ENCOUNTER — OFFICE VISIT (OUTPATIENT)
Dept: OPHTHALMOLOGY | Facility: CLINIC | Age: 67
End: 2017-10-09
Attending: OPHTHALMOLOGY
Payer: COMMERCIAL

## 2017-10-09 DIAGNOSIS — H43.812 PVD (POSTERIOR VITREOUS DETACHMENT), LEFT EYE: ICD-10-CM

## 2017-10-09 DIAGNOSIS — H40.003 GLAUCOMA SUSPECT OF BOTH EYES: ICD-10-CM

## 2017-10-09 DIAGNOSIS — H43.811 PVD (POSTERIOR VITREOUS DETACHMENT), RIGHT: ICD-10-CM

## 2017-10-09 DIAGNOSIS — H25.13 SENILE NUCLEAR SCLEROSIS, BILATERAL: Primary | ICD-10-CM

## 2017-10-09 PROCEDURE — 99213 OFFICE O/P EST LOW 20 MIN: CPT | Mod: ZF

## 2017-10-09 PROCEDURE — 92015 DETERMINE REFRACTIVE STATE: CPT | Mod: ZF

## 2017-10-09 ASSESSMENT — REFRACTION_WEARINGRX
OD_AXIS: 035
OD_SPHERE: +2.00
OS_ADD: +2.50
OS_SPHERE: +2.25
SPECS_TYPE: BIFOCAL
OD_ADD: +2.50
OD_CYLINDER: +0.25

## 2017-10-09 ASSESSMENT — REFRACTION_MANIFEST
OD_CYLINDER: +0.50
OS_AXIS: 002
OD_AXIS: 018
OD_SPHERE: +2.00
OS_ADD: +1.50
OD_ADD: +2.50
OS_CYLINDER: +0.50
OS_SPHERE: +2.75

## 2017-10-09 ASSESSMENT — EXTERNAL EXAM - RIGHT EYE: OD_EXAM: NORMAL

## 2017-10-09 ASSESSMENT — TONOMETRY
OD_IOP_MMHG: 12
IOP_METHOD: TONOPEN
OS_IOP_MMHG: 12

## 2017-10-09 ASSESSMENT — CONF VISUAL FIELD
OS_NORMAL: 1
METHOD: COUNTING FINGERS
OD_NORMAL: 1

## 2017-10-09 ASSESSMENT — VISUAL ACUITY
OS_CC+: -2+
OS_CC: 20/30
OD_CC: 20/30
CORRECTION_TYPE: GLASSES
METHOD: SNELLEN - LINEAR
OD_CC+: -2

## 2017-10-09 ASSESSMENT — CUP TO DISC RATIO
OS_RATIO: 0.45
OD_RATIO: 0.4

## 2017-10-09 ASSESSMENT — SLIT LAMP EXAM - LIDS
COMMENTS: NORMAL
COMMENTS: NORMAL

## 2017-10-09 ASSESSMENT — EXTERNAL EXAM - LEFT EYE: OS_EXAM: NORMAL

## 2017-10-09 NOTE — PROGRESS NOTES
I have confirmed the patient's and reviewed Past Medical History, Past Surgical History, Social History, Family History, Problem List, Medication List and agree with Tech note.    CC: floater left eye    HPI: for several months     Assessment/plan:   1.  Nuclear sclerotic cataract  Both eyes    - Not visually significant   - refract as needed    - new prescription glasses issued    - monitor yearly     2.  Posterior vitreous detachment (PVD) right eye and new in the left eye    Retinal detachment/retinal tear precautions discussed with patient  Contact clinic immediately for new floaters/flashers or shadows in vision    3.  Primary open angle glaucoma (POAG) suspect   Retinal nerve fiber layer analysis last year shows normal RNFL both eyes    Informed patient       RTC one year for follow up Primary open angle glaucoma (POAG) suspect     Ruth Hodge MD PhD.  Professor & Chair

## 2017-10-09 NOTE — NURSING NOTE
Chief Complaints and History of Present Illnesses   Patient presents with     Follow Up For     1 year f/u for POAG and cats     HPI    Affected eye(s):  Left   Symptoms:     No blurred vision   Floaters      Frequency:  Constant       Do you have eye pain now?:  No      Comments:  New floaters left eye 6-9 months ago. No eyedrops, no eye pain. No vision changes.    Maribel Mon COT 1:53 PM October 9, 2017

## 2017-10-09 NOTE — PATIENT INSTRUCTIONS
Future Appointments  Date Time Provider Department Center   10/10/2017 1:25 PM Meggan Maravilla MD [facility]   10/18/2017 2:40 PM Vanessa Edwards MD [facility]   11/17/2017 11:20 AM Lars Oleary MD [facility]   5/10/2018 11:00 AM Melissa Stanford MD [facility]   10/11/2018 7:30 AM [facility]

## 2017-10-09 NOTE — MR AVS SNAPSHOT
After Visit Summary   10/9/2017    Frank Orellana    MRN: [id number]           Patient Information     Date Of Birth          1950        Visit Information        Provider Department      10/9/2017 1:15 PM [facility]        Today's Diagnoses     Senile nuclear sclerosis, bilateral    -  1    PVD (posterior vitreous detachment), right        PVD (posterior vitreous detachment), left eye        Glaucoma suspect of both eyes          Care Instructions    Future Appointments  Date Time Provider Department Center   10/10/2017 1:25 PM Meggan Maravilla MD [facility]   10/18/2017 2:40 PM Vanessa Edwards MD [facility]   11/17/2017 11:20 AM Lars Oleary MD [facility]   5/10/2018 11:00 AM Melissa Stanford MD [facility]   10/11/2018 7:30 AM Ruth Boone MD [facility] CLIN               Follow-ups after your visit        Follow-up notes from your care team     Return in about 1 year (around 10/9/2018) for POAG suspect with OCT of RNFL .      Your next 10 appointments already scheduled     Oct 10, 2017  1:25 PM CDT   Return Visit with Meggan Maravilla MD   [facility] ([facility])    [address]  [address]  [facility] [address] [phone]            Oct 18, 2017  2:40 PM CDT   (Arrive by 2:25 PM)   PRE-OP with Vanessa Edwards MD   [facility] ([facility])    [address] [place] [address]  [address] [facility] [address] [phone]            Oct 26, 2017   Procedure with Suresh Raymond MD   [facility] ([facility])    [address] [place] [address]  [address] [facility] [address] [phone]           Located in the Clinics and Surgery Center at [address] [place] [address].   parking is very convenient and highly  recommended.  is a $6 flat rate fee.  Both  and self parkers should enter the main arrival plaza from [facility]; parking attendants will direct you based on your parking preference.            Nov 17, 2017 11:20 AM CST   (Arrive by 11:05 AM)   RETURN HAND with Lars Oleary MD   [facility] ([facility] [place] [facility])    [address]  4th Floor  [facility] [address] [phone]            May 10, 2018 11:00 AM CDT   Return Sleep Patient with Melissa Stanford MD   [facility] [place] ([facility])    [address] [place] [address] [phone]              Future tests that were ordered for you today     Open Future Orders        Priority Expected Expires Ordered    OCT Optic Nerve RNFL Spectralis OU (both eyes) Routine  4/12/2019 10/9/2017            Who to contact     Please call your clinic at [phone] to:    Ask questions about your health    Make or cancel appointments    Discuss your medicines    Learn about your test results    Speak to your doctor   If you have compliments or concerns about an experience at your clinic, or if you wish to file a complaint, please contact [facility] Patient Relations at [phone] or email us at [email or web address]         Additional Information About Your Visit        [employer] Information     [employer] gives you secure access to your electronic health record. If you see a primary care provider, you can also send messages to your care team and make appointments. If you have questions, please call your primary care clinic.  If you do not have a primary care provider, please call [phone] and they will assist you.      [employer] is an electronic gateway that provides easy, online access to your medical records. With [employer], you can request a clinic appointment, read your test results, renew a  prescription or communicate with your care team.     To access your existing account, please contact your [facility] or call [phone] for assistance.        Care EveryWhere ID     This is your Care EveryWhere ID. This could be used by other organizations to access your [facility] medical records  [id number]         Blood Pressure from Last 3 Encounters:   10/05/17 111/67   09/29/17 111/65   09/07/17 116/70    Weight from Last 3 Encounters:   10/05/17 84.9 kg (187 lb 1.6 oz)   09/29/17 81.6 kg (180 lb)   09/07/17 84.8 kg (187 lb)               Primary Care Provider Office Phone # Fax #    Vanessa Edwards [phone]       [address] [place] [address]        Equal Access to Services     AYDE PAPPAS : Hadii sylvia corea hadasho Soguy, waaxda luqadaha, qaybta kaalmada adebenedictyada, jeyson hernández . So [facility] [phone].    ATENCIÓN: Si habla español, tiene a sebastian disposición servicios gratuitos de asistencia lingüística. Rowan al [phone].    We comply with applicable federal civil rights laws and [place] laws. We do not discriminate on the basis of race, color, national origin, age, disability, sex, sexual orientation, or gender identity.            Thank you!     Thank you for choosing [facility] CLINIC  for your care. Our goal is always to provide you with excellent care. Hearing back from our patients is one way we can continue to improve our services. Please take a few minutes to complete the written survey that you may receive in the mail after your visit with us. Thank you!             Your Updated Medication List - Protect others around you: Learn how to safely use, store and throw away your medicines at www.disposemymeds.org.          This list is accurate as of: 10/9/17  2:43 PM.  Always use your most recent med list.                   Brand Name Dispense Instructions for use Diagnosis    alfuzosin 10 MG 24 hr tablet     UROXATRAL    90 tablet    TAKE 1 TABLET DAILY    Urinary frequency       aspirin 81 MG EC tablet     90 tablet    Take 1 tablet (81 mg) by mouth daily    Paroxysmal atrial fibrillation (H)       CARTIA  MG 24 hr capsule   Generic drug:  diltiazem     90 capsule    TAKE 1 CAPSULE DAILY    Paroxysmal atrial fibrillation (H)       order for DME      Reported on 5/4/2017

## 2017-10-10 ENCOUNTER — OFFICE VISIT (OUTPATIENT)
Dept: UROLOGY | Facility: CLINIC | Age: 67
End: 2017-10-10
Payer: COMMERCIAL

## 2017-10-10 VITALS
HEART RATE: 58 BPM | OXYGEN SATURATION: 98 % | BODY MASS INDEX: 25.92 KG/M2 | HEIGHT: 69 IN | WEIGHT: 175 LBS | DIASTOLIC BLOOD PRESSURE: 60 MMHG | SYSTOLIC BLOOD PRESSURE: 112 MMHG

## 2017-10-10 DIAGNOSIS — R97.20 ELEVATED PROSTATE SPECIFIC ANTIGEN (PSA): ICD-10-CM

## 2017-10-10 DIAGNOSIS — N28.1 ACQUIRED CYST OF KIDNEY: Primary | ICD-10-CM

## 2017-10-10 PROCEDURE — 99213 OFFICE O/P EST LOW 20 MIN: CPT | Performed by: UROLOGY

## 2017-10-10 ASSESSMENT — PAIN SCALES - GENERAL: PAINLEVEL: NO PAIN (0)

## 2017-10-10 NOTE — LETTER
10/10/2017       RE: Frank Orellana  [address]  [facility] [address]     Dear Colleague,    Thank you for referring your patient, Frank Orellana, to the [facility] [place] at [facility]. Please see a copy of my visit note below.    UROLOGIC DIAGNOSES:       CURRENT INTERVENTIONS:     HISTORY:     Patient with history of elevated PSA with negative prostate MRI currently followed with serial PSAs. Found incidentally on u/s to have a renal lesion, most likely a cyst (hemorrhagic).     Patient presents after MRI to better characterize the cyst. MRI confirmed simple cyst with some hemorrhagic features.     We discussed these findings as well as repeat PSa of 4.65 (decreased from last PSA in 2016). Discussed importance of regular PSA screening as well given PSA elevation in the past.       PAST MEDICAL HISTORY:   Past Medical History:   Diagnosis Date     Actinic keratosis 2009     Atrial fibrillation (H) 1996    paroxysmal on 3-4 occasions     Atrial fibrillation (H) 6/15/96    Afib - 2 or 3 extended occurrences since     Chronic hepatitis C (H)     Chronic Hepatitis C,  genotype 1b                Stage 0 Fibrosis     Dupuytren's contracture of both hands      Elevated PSA      Hepatitis B 1969    acute infection at age 19; IV drug use     WINNIE (obstructive sleep apnea)     AHI 34.2     Pain in both hands      Patient is Oriental orthodox      Refusal of blood transfusions as patient is Oriental orthodox      Right shoulder pain      Tinnitus 1 or 2 years ago    both ears     Tubular adenoma of colon 1/17/17    ascending colon, 1/17/17       PAST SURGICAL HISTORY:   Past Surgical History:   Procedure Laterality Date     ARTHROSCOPY KNEE      left knee     COLONOSCOPY  1/17/17    tubular adenoma ascending colon     KNEE SURGERY  2006??    torn meniscus     RELEASE DUPUYTRENS CONTRACTURE Right 1/20/2017    Procedure: RELEASE DUPUYTRENS  "CONTRACTURE;  Surgeon: Lars Oleary MD;  Location: [facility] OR       FAMILY HISTORY:   Family History   Problem Relation Age of Onset     Arthritis Paternal Grandmother      Rheumatoid Arthritis Paternal Grandmother      GASTROINTESTINAL DISEASE Father       age 77 after colon surgery     Alcohol/Drug Mother       age 64     HEART DISEASE Brother      Cardiac Sudden Death Brother        SOCIAL HISTORY:   Social History   Substance Use Topics     Smoking status: Former Smoker     Packs/day: 0.50     Years: 10.00     Types: Cigarettes, Other     Start date: 6/15/1964     Quit date: 1974     Smokeless tobacco: Former User     Alcohol use 1.8 - 2.4 oz/week      Comment: 4 or 5 drinks/week, limit of 1       Current Outpatient Prescriptions   Medication     CARTIA  MG 24 hr capsule     alfuzosin (UROXATRAL) 10 MG 24 hr tablet     aspirin 81 MG EC tablet     order for DME     No current facility-administered medications for this visit.          PHYSICAL EXAM:    /60 (BP Location: Left arm, Patient Position: Sitting, Cuff Size: Adult Regular)  Pulse 58  Ht 1.753 m (5' 9\")  Wt 79.4 kg (175 lb)  SpO2 98%  BMI 25.84 kg/m2    HEENT: Normocephalic and atraumatic   Cardiac: Not done  Back/Flank: Not done  CNS/PNS: Not done  Respiratory: Normal non-labored breathing  Abdomen: Soft nontender and nondistended  Peripheral Vascular: Not done  Mental Status: Not done    Penis: Not done  Scrotal Skin: Not done  Testicles: Not done  Epididymis: Not done  Digital Rectal Exam:     Cystoscopy: Not done    Imaging: None    Urinalysis: UA RESULTS:  Recent Labs   Lab Test  10/13/16   0816   COLOR  Yellow   APPEARANCE  Clear   URINEGLC  Negative   URINEBILI  Negative   URINEKETONE  Negative   SG  1.011   UBLD  Negative   URINEPH  7.0   PROTEIN  Negative   NITRITE  Negative   LEUKEST  Negative   RBCU  0   WBCU  <1       PSA: 4.65    Post Void Residual:     Other labs: None today      IMPRESSION:  68 y/o M with " elevated PSA and renal cyst     PLAN:  PSA in six months   Follow up in six months   Patient would like to follow up at [facility]     Total Time: 15 minutes                                      Total in Consultation: greater than 50%       Again, thank you for allowing me to participate in the care of your patient.      Sincerely,    Meggan Maravilla MD

## 2017-10-10 NOTE — MR AVS SNAPSHOT
After Visit Summary   10/10/2017    Frank Orellana    MRN: [id number]           Patient Information     Date Of Birth          1950        Visit Information        Provider Department      10/10/2017 1:25 PM Meggan Maravilla MD [facility] [place]        Today's Diagnoses     Acquired cyst of kidney    -  1    Elevated prostate specific antigen (PSA)           Follow-ups after your visit        Follow-up notes from your care team     Return in about 6 months (around 4/10/2018) for PSa prior .      Your next 10 appointments already scheduled     Oct 18, 2017  2:40 PM CDT   (Arrive by 2:25 PM)   PRE-OP with Vanessa Edwards MD   [facility] ([facility])    [address] [place] [address]  4th [facility] [address] [phone]            Oct 26, 2017   Procedure with Suresh Raymond MD   [facility] ([facility])    [address] [place] [address]  5th [facility] [address] [phone]           Located in the Clinics and Surgery Center at [address] [place] [address].   parking is very convenient and highly recommended.  is a $6 flat rate fee.  Both  and self parkers should enter the main arrival plaza from [facility]; parking attendants will direct you based on your parking preference.            Nov 17, 2017 11:20 AM CST   (Arrive by 11:05 AM)   RETURN HAND with Lars Oleary MD   [facility] Orthopaedic [facility] ([facility])    [address] [place] [address] [phone]            May 10, 2018 11:00 AM CDT   Return Sleep Patient with Melissa Stanford MD   [facility] [place] Sleep Study ([facility])    [address] [place] [id number] [phone]            Oct 11, 2018  7:30 AM CDT   RETURN RETINA with  "Ruth Boone MD   Eye Clinic ([facility])    [address]  [address] [facility]  9th Fl Clin 9a  [facility] [id number] [phone]              Who to contact     If you have questions or need follow up information about today's clinic visit or your schedule please contact [facility] [place] [facility] directly at [phone].  Normal or non-critical lab and imaging results will be communicated to you by [employer], letter or phone within 4 business days after the clinic has received the results. If you do not hear from us within 7 days, please contact the clinic through Topadmitt or phone. If you have a critical or abnormal lab result, we will notify you by phone as soon as possible.  Submit refill requests through Moontoast or call your pharmacy and they will forward the refill request to us. Please allow 3 business days for your refill to be completed.          Additional Information About Your Visit        Moontoast Information     Moontoast gives you secure access to your electronic health record. If you see a primary care provider, you can also send messages to your care team and make appointments. If you have questions, please call your primary care clinic.  If you do not have a primary care provider, please call [phone] and they will assist you.        Care EveryWhere ID     This is your Care EveryWhere ID. This could be used by other organizations to access your [place] medical records  [id number]        Your Vitals Were     Pulse Height Pulse Oximetry BMI (Body Mass Index)          58 1.753 m (5' 9\") 98% 25.84 kg/m2         Blood Pressure from Last 3 Encounters:   10/10/17 112/60   10/05/17 111/67   09/29/17 111/65    Weight from Last 3 Encounters:   10/10/17 79.4 kg (175 lb)   10/05/17 84.9 kg (187 lb 1.6 oz)   09/29/17 81.6 kg (180 lb)              Today, you had the following     No orders found for display       Primary Care Provider Office " Phone # Fax #    Vanessa Edwards [phone]       [address] [place] [address]41  [facility] [address]        Equal Access to Services     AYDE PAPPAS : Hadii aad ku hadevetteadwoa Soguy, waaxda luqadaha, qaybta kaalmada keiry, jeyson carvalho betty evans. So [facility] [phone].    ATENCIÓN: Si habla español, tiene a sebastian disposición servicios gratuitos de asistencia lingüística. Llame al [phone].    We comply with applicable federal civil rights laws and [place] laws. We do not discriminate on the basis of race, color, national origin, age, disability, sex, sexual orientation, or gender identity.            Thank you!     Thank you for choosing [facility]OLOGY [facility] [place]  for your care. Our goal is always to provide you with excellent care. Hearing back from our patients is one way we can continue to improve our services. Please take a few minutes to complete the written survey that you may receive in the mail after your visit with us. Thank you!             Your Updated Medication List - Protect others around you: Learn how to safely use, store and throw away your medicines at www.disposemymeds.org.          This list is accurate as of: 10/10/17 11:59 PM.  Always use your most recent med list.                   Brand Name Dispense Instructions for use Diagnosis    alfuzosin 10 MG 24 hr tablet    UROXATRAL    90 tablet    TAKE 1 TABLET DAILY    Urinary frequency       aspirin 81 MG EC tablet     90 tablet    Take 1 tablet (81 mg) by mouth daily    Paroxysmal atrial fibrillation (H)       CARTIA  MG 24 hr capsule   Generic drug:  diltiazem     90 capsule    TAKE 1 CAPSULE DAILY    Paroxysmal atrial fibrillation (H)       order for DME      Reported on 5/4/2017

## 2017-10-12 NOTE — PROGRESS NOTES
UROLOGIC DIAGNOSES:       CURRENT INTERVENTIONS:     HISTORY:     Patient with history of elevated PSA with negative prostate MRI currently followed with serial PSAs. Found incidentally on u/s to have a renal lesion, most likely a cyst (hemorrhagic).     Patient presents after MRI to better characterize the cyst. MRI confirmed simple cyst with some hemorrhagic features.     We discussed these findings as well as repeat PSa of 4.65 (decreased from last PSA in 2016). Discussed importance of regular PSA screening as well given PSA elevation in the past.       PAST MEDICAL HISTORY:   Past Medical History:   Diagnosis Date     Actinic keratosis      Atrial fibrillation (H)     paroxysmal on 3-4 occasions     Atrial fibrillation (H) 6/15/96    Afib - 2 or 3 extended occurrences since     Chronic hepatitis C (H)     Chronic Hepatitis C,  genotype 1b                Stage 0 Fibrosis     Dupuytren's contracture of both hands      Elevated PSA      Hepatitis B 1969    acute infection at age 19; IV drug use     WINNIE (obstructive sleep apnea)     AHI 34.2     Pain in both hands      Patient is Anabaptist      Refusal of blood transfusions as patient is Anabaptist      Right shoulder pain      Tinnitus 1 or 2 years ago    both ears     Tubular adenoma of colon 17    ascending colon, 17       PAST SURGICAL HISTORY:   Past Surgical History:   Procedure Laterality Date     ARTHROSCOPY KNEE      left knee     COLONOSCOPY  17    tubular adenoma ascending colon     KNEE SURGERY  ??    torn meniscus     RELEASE DUPUYTRENS CONTRACTURE Right 2017    Procedure: RELEASE DUPUYTRENS CONTRACTURE;  Surgeon: Lars Oleary MD;  Location: [facility]       FAMILY HISTORY:   Family History   Problem Relation Age of Onset     Arthritis Paternal Grandmother      Rheumatoid Arthritis Paternal Grandmother      GASTROINTESTINAL DISEASE Father       age 77 after colon surgery     Alcohol/Drug Mother      "  age 64     HEART DISEASE Brother      Cardiac Sudden Death Brother        SOCIAL HISTORY:   Social History   Substance Use Topics     Smoking status: Former Smoker     Packs/day: 0.50     Years: 10.00     Types: Cigarettes, Other     Start date: 6/15/1964     Quit date: 1974     Smokeless tobacco: Former User     Alcohol use 1.8 - 2.4 oz/week      Comment: 4 or 5 drinks/week, limit of 1       Current Outpatient Prescriptions   Medication     CARTIA  MG 24 hr capsule     alfuzosin (UROXATRAL) 10 MG 24 hr tablet     aspirin 81 MG EC tablet     order for DME     No current facility-administered medications for this visit.          PHYSICAL EXAM:    /60 (BP Location: Left arm, Patient Position: Sitting, Cuff Size: Adult Regular)  Pulse 58  Ht 1.753 m (5' 9\")  Wt 79.4 kg (175 lb)  SpO2 98%  BMI 25.84 kg/m2    HEENT: Normocephalic and atraumatic   Cardiac: Not done  Back/Flank: Not done  CNS/PNS: Not done  Respiratory: Normal non-labored breathing  Abdomen: Soft nontender and nondistended  Peripheral Vascular: Not done  Mental Status: Not done    Penis: Not done  Scrotal Skin: Not done  Testicles: Not done  Epididymis: Not done  Digital Rectal Exam:     Cystoscopy: Not done    Imaging: None    Urinalysis: UA RESULTS:  Recent Labs   Lab Test  10/13/16   0816   COLOR  Yellow   APPEARANCE  Clear   URINEGLC  Negative   URINEBILI  Negative   URINEKETONE  Negative   SG  1.011   UBLD  Negative   URINEPH  7.0   PROTEIN  Negative   NITRITE  Negative   LEUKEST  Negative   RBCU  0   WBCU  <1       PSA: 4.65    Post Void Residual:     Other labs: None today      IMPRESSION:  68 y/o M with elevated PSA and renal cyst     PLAN:  PSA in six months   Follow up in six months   Patient would like to follow up at [facility]     Total Time: 15 minutes                                      Total in Consultation: greater than 50%     "

## 2017-10-16 ENCOUNTER — OFFICE VISIT (OUTPATIENT)
Dept: INTERNAL MEDICINE | Facility: CLINIC | Age: 67
End: 2017-10-16

## 2017-10-16 ENCOUNTER — DOCUMENTATION ONLY (OUTPATIENT)
Dept: SLEEP MEDICINE | Facility: CLINIC | Age: 67
End: 2017-10-16

## 2017-10-16 VITALS
TEMPERATURE: 97.6 F | BODY MASS INDEX: 27.42 KG/M2 | SYSTOLIC BLOOD PRESSURE: 120 MMHG | RESPIRATION RATE: 16 BRPM | HEART RATE: 59 BPM | WEIGHT: 185.7 LBS | DIASTOLIC BLOOD PRESSURE: 70 MMHG

## 2017-10-16 DIAGNOSIS — I48.0 PAROXYSMAL ATRIAL FIBRILLATION (H): ICD-10-CM

## 2017-10-16 DIAGNOSIS — K40.90 RIGHT INGUINAL HERNIA: ICD-10-CM

## 2017-10-16 DIAGNOSIS — G47.33 OBSTRUCTIVE SLEEP APNEA SYNDROME: Primary | ICD-10-CM

## 2017-10-16 DIAGNOSIS — Z23 NEED FOR PROPHYLACTIC VACCINATION AND INOCULATION AGAINST INFLUENZA: ICD-10-CM

## 2017-10-16 DIAGNOSIS — Z01.810 PRE-OPERATIVE CARDIOVASCULAR EXAMINATION: Primary | ICD-10-CM

## 2017-10-16 DIAGNOSIS — G47.33 OSA (OBSTRUCTIVE SLEEP APNEA): ICD-10-CM

## 2017-10-16 LAB
CREAT SERPL-MCNC: 0.81 MG/DL (ref 0.66–1.25)
GFR SERPL CREATININE-BSD FRML MDRD: >90 ML/MIN/1.7M2
HGB BLD-MCNC: 13.9 G/DL (ref 13.3–17.7)
POTASSIUM SERPL-SCNC: 3.7 MMOL/L (ref 3.4–5.3)

## 2017-10-16 ASSESSMENT — ENCOUNTER SYMPTOMS
TASTE DISTURBANCE: 0
EYE REDNESS: 0
HOARSE VOICE: 0
DIFFICULTY URINATING: 1
DOUBLE VISION: 0
SORE THROAT: 0
HEMATURIA: 0
EYE PAIN: 0
SMELL DISTURBANCE: 0
EYE WATERING: 0
NECK MASS: 0
SINUS PAIN: 0
FLANK PAIN: 0
EYE IRRITATION: 0
SINUS CONGESTION: 0
TROUBLE SWALLOWING: 0
DYSURIA: 0

## 2017-10-16 ASSESSMENT — PAIN SCALES - GENERAL: PAINLEVEL: NO PAIN (0)

## 2017-10-16 NOTE — NURSING NOTE
Chief Complaint   Patient presents with     Pre-Op Exam     Hernia Surgery on 10/26/2017 2i5h Dr. Raymond at [facility]     MARLENY ROBBINS at 1:12 PM on 10/16/2017.

## 2017-10-16 NOTE — MR AVS SNAPSHOT
After Visit Summary   10/16/2017    Frank Orellana    MRN: [id number]           Patient Information     Date Of Birth          1950        Visit Information        Provider Department      10/16/2017 1:20 PM Vanessa Edwards MD [facility]        Today's Diagnoses     Pre-operative cardiovascular examination    -  1    Need for prophylactic vaccination and inoculation against influenza        Right inguinal hernia        Refusal of blood transfusions as patient is Congregation        Paroxysmal atrial fibrillation (H)        WINNIE (obstructive sleep apnea) AHI 32          Care Instructions    Primary Care Center: [phone]     Primary Care Center Medication Refill Request Information:  * Please contact your pharmacy regarding ANY request for medication refills.  ** [facility] Prescription Fax = [phone]  * Please allow 3 business days for routine medication refills.  * Please allow 5 business days for controlled substance medication refills.     [facility] Test Result notification information:  *You will be notified with in 7-10 days of your appointment day regarding the results of your test.  If you are on MyChart you will be notified as soon as the provider has reviewed the results and signed off on them.            Follow-ups after your visit        Follow-up notes from your care team     Return if symptoms worsen or fail to improve.      Your next 10 appointments already scheduled     Oct 26, 2017   Procedure with Suresh Raymond MD   [facility] Surgery [facility] Procedure Center ([facility])    [address] [place], [place] [id number] [phone]           Located in the Clinics and Surgery Center at [address] [place] [address].   parking is very convenient and highly recommended.  is a $6 flat rate fee.  Both  and self parkers should enter the main arrival plaza from [facility];  parking attendants will direct you based on your parking preference.            Nov 17, 2017 11:20 AM CST   (Arrive by 11:05 AM)   RETURN HAND with Lars Oleary MD   [facility] Orthopaedic Clinic ([facility])    [address] [facility]  4th [facility] [id number] [phone]            May 10, 2018 11:00 AM CDT   Return Sleep Patient with Melissa Stanford MD   [facility] [place], Sleep Study ([facility])    [address] [place] [id number] [phone]            Oct 11, 2018  7:30 AM CDT   RETURN RETINA with Ruth Boone MD   Eye Clinic ([facility] Clin[facility])    [address]  [address] [facility]  [address] Clin 9a  [facility] [id number] [phone]              Future tests that were ordered for you today     Open Future Orders        Priority Expected Expires Ordered    Hemoglobin Routine 10/16/2017 10/16/2018 10/16/2017    Potassium Routine 10/16/2017 10/30/2017 10/16/2017    Creatinine Routine 10/16/2017 10/16/2018 10/16/2017            Who to contact     Please call your clinic at [phone] to:    Ask questions about your health    Make or cancel appointments    Discuss your medicines    Learn about your test results    Speak to your doctor   If you have compliments or concerns about an experience at your clinic, or if you wish to file a complaint, please contact [facility] Patient Relations at [phone] or email us at [email or web address]         Additional Information About Your Visit        MyChart Information     Highlightert gives you secure access to your electronic health record. If you see a primary care provider, you can also send messages to your care team and make appointments. If you have questions, please call your primary care clinic.  If you do not have a primary care provider, please call [phone] and they will assist you.       Vurb is an electronic gateway that provides easy, online access to your medical records. With Vurb, you can request a clinic appointment, read your test results, renew a prescription or communicate with your care team.     To access your existing account, please contact your [facility] or call [phone] for assistance.        Care EveryWhere ID     This is your Care EveryWhere ID. This could be used by other organizations to access your [place] medical records  [id number]        Your Vitals Were     Pulse Temperature Respirations BMI (Body Mass Index)          59 97.6  F (36.4  C) 16 27.42 kg/m2         Blood Pressure from Last 3 Encounters:   10/16/17 120/70   10/10/17 112/60   10/05/17 111/67    Weight from Last 3 Encounters:   10/16/17 84.2 kg (185 lb 11.2 oz)   10/10/17 79.4 kg (175 lb)   10/05/17 84.9 kg (187 lb 1.6 oz)              We Performed the Following     FLU VACCINE, INCREASED ANTIGEN, PRESV FREE, AGE 65+ [55968]        Primary Care Provider Office Phone # Fax #    Vanessa Edwards [phone]       [address] [place] [address]41  [facility] [address]        Equal Access to Services     AYDE PAPPAS : Hadii aad ku hadasho Soomaali, waaxda luqadaha, qaybta kaalmada adeegyada, waxay onurin junen fredy evans. So [facility] [phone].    ATENCIÓN: Si habla español, tiene a sebastian disposición servicios gratuitos de asistencia lingüística. Llame al [phone].    We comply with applicable federal civil rights laws and [place] laws. We do not discriminate on the basis of race, color, national origin, age, disability, sex, sexual orientation, or gender identity.            Thank you!     Thank you for choosing [facility] PRIMARY CARE CLINIC  for your care. Our goal is always to provide you with excellent care. Hearing back from our patients is one way we can continue to improve our services. Please take a few minutes to complete the written survey  that you may receive in the mail after your visit with us. Thank you!             Your Updated Medication List - Protect others around you: Learn how to safely use, store and throw away your medicines at www.disposemymeds.org.          This list is accurate as of: 10/16/17  2:07 PM.  Always use your most recent med list.                   Brand Name Dispense Instructions for use Diagnosis    alfuzosin 10 MG 24 hr tablet    UROXATRAL    90 tablet    TAKE 1 TABLET DAILY    Urinary frequency       aspirin 81 MG EC tablet     90 tablet    Take 1 tablet (81 mg) by mouth daily    Paroxysmal atrial fibrillation (H)       CARTIA  MG 24 hr capsule   Generic drug:  diltiazem     90 capsule    TAKE 1 CAPSULE DAILY    Paroxysmal atrial fibrillation (H)       order for DME      Reported on 5/4/2017

## 2017-10-16 NOTE — PROGRESS NOTES
CC:  Preop    HPI:  I am seeing Frank Orellana today at the request of Dr. Suresh Raymond for cardiopulmonary risk assessment for the following surgery:  Surgeon Role   Suresh Raymond MD Primary    Procedure Laterality Anesthesia   HERNIORRHAPHY INGUINAL Right Combined MAC with Local   Open Right Inguinal Hernia Repair with Mesh            HPI: Right, direct, hernia increasing in size x last several months, bulging more often, uncomfortable throughout the day.  No nausea, vomiting, constipation, abdominal pain, rectal bleeding.  No unexplained appetite nor weight loss.  Bulge still reduces spontaneously.  Saw Dr. Raymond who recommends proceeding with herniorrhaphy      Cardiac risks: None.  Hx intermittent Afib, well controlled.  Pulmonary risks: Hx WINNIE.  Exercise tolerance: Mets 2-3, no concerns  History of bleeding/clotting disorders in patient and/or family: None, however, note: Patient is a Druze and declines blood transfusions.  History of adverse anesthesia reactions in patient and/or family:  Personal history of prior surgical complications:      Answers for HPI/ROS submitted by the patient on 10/16/2017   General Symptoms: No  Skin Symptoms: No  HENT Symptoms: Yes  EYE SYMPTOMS: Yes  HEART SYMPTOMS: No  LUNG SYMPTOMS: No  INTESTINAL SYMPTOMS: No  URINARY SYMPTOMS: Yes  REPRODUCTIVE SYMPTOMS: No  SKELETAL SYMPTOMS: No  BLOOD SYMPTOMS: No  NERVOUS SYSTEM SYMPTOMS: No  MENTAL HEALTH SYMPTOMS: No  Ear pain: No  Ear discharge: No  Hearing loss: No  Tinnitus: Yes  Nosebleeds: No  Congestion: No  Sinus pain: No  Trouble swallowing: No   Voice hoarseness: No  Mouth sores: No  Sore throat: No  Tooth pain: No  Gum tenderness: No  Bleeding gums: No  Change in taste: No  Change in sense of smell: No  Dry mouth: No  Hearing aid used: No  Neck lump: No  Eye pain: No  Vision loss: No  Dry eyes: No  Watery eyes: No  Eye bulging: No  Double vision: No  Flashing of lights: No  Spots: No  Floaters: Yes  Redness:  No  Crossed eyes: No  Tunnel Vision: No  Yellowing of eyes: No  Eye irritation: No  Trouble holding urine or incontinence: No  Pain or burning: No  Trouble starting or stopping: No  Increased frequency of urination: No  Blood in urine: No  Decreased frequency of urination: No  Frequent nighttime urination: No  Flank pain: No  Difficulty emptying bladder: Yes    Patient Active Problem List   Diagnosis     History of actinic keratoses     Telangiectasia     SK (seborrheic keratosis)     Patient is Yazidi     Refusal of blood transfusions as patient is Yazidi     Paroxysmal atrial fibrillation (H)     Pain in both hands     Chronic left shoulder pain     Chronic hepatitis C without hepatic coma (H)     WINNIE (obstructive sleep apnea) AHI 32     Atrial fibrillation (H)     Tubular adenoma of colon     ACP (advance care planning)     Chronic hepatitis C (H)     Dupuytren's contracture of both hands     Bilateral sensorineural hearing loss     Elevated PSA     Past Medical History:   Diagnosis Date     Actinic keratosis 2009     Atrial fibrillation (H) 1996    paroxysmal on 3-4 occasions     Atrial fibrillation (H) 6/15/96    Afib - 2 or 3 extended occurrences since     Chronic hepatitis C (H)     Chronic Hepatitis C,  genotype 1b                Stage 0 Fibrosis     Dupuytren's contracture of both hands      Elevated PSA      Hepatitis B 1969    acute infection at age 19; IV drug use     WINNIE (obstructive sleep apnea)     AHI 34.2     Pain in both hands      Patient is Yazidi      Refusal of blood transfusions as patient is Yazidi      Right shoulder pain      Tinnitus 1 or 2 years ago    both ears     Tubular adenoma of colon 1/17/17    ascending colon, 1/17/17     Past Surgical History:   Procedure Laterality Date     ARTHROSCOPY KNEE      left knee     COLONOSCOPY  1/17/17    tubular adenoma ascending colon     KNEE SURGERY  2006??    torn meniscus     RELEASE ZAHRA  CONTRACTURE Right 2017    Procedure: RELEASE DUPUYTRENS CONTRACTURE;  Surgeon: Lars Oleary MD;  Location: [facility]       Family History   Problem Relation Age of Onset     Arthritis Paternal Grandmother      Rheumatoid Arthritis Paternal Grandmother      GASTROINTESTINAL DISEASE Father       age 77 after colon surgery     Alcohol/Drug Mother       age 64     HEART DISEASE Brother      Cardiac Sudden Death Brother      Social History     Social History     Marital status:      Spouse name: N/A     Number of children: N/A     Years of education: N/A     Occupational History     Not on file.     Social History Main Topics     Smoking status: Former Smoker     Packs/day: 0.50     Years: 10.00     Types: Cigarettes, Other     Start date: 6/15/1964     Quit date: 1974     Smokeless tobacco: Former User     Alcohol use 1.8 - 2.4 oz/week      Comment: 4 or 5 drinks/week, limit of 1     Drug use: Yes     Special: Marijuana, Methamphetamines, IV      Comment: 's     Sexual activity: Yes     Partners: Female     Birth control/ protection: None     Other Topics Concern     Special Diet No     eats healthy     Exercise No     Social History Narrative    Social history:    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, 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  Declines blood transfusions.     Contrast Dye Rash     /70  Pulse 59  Temp 97.6  F (36.4  C)  Resp 16  Wt 84.2 kg (185 lb 11.2 oz)  BMI 27.42  kg/m2  Physical Examination:  General:  Pleasant, alert, no acute distress  Eyes:  Pupils 2-3 mm, sclera white, EOM's full.    Ears:  TM's normal, EAC's patent, clear of cerumen  Nose:  Nasal passages clear, turbinates not swollen  Throat/Mouth:  No pharyngeal erythema or exudate, oral mucosa and tongue moist, no suspicious oral lesions  Neck:  Full AROM, supple, thyroid smooth, symmetric, not enlarged, no nodules  Lungs:  Clear to auscultation throughout, no wheezes, rhonchi or rales.  C/V:  Regular rate and rhythm, no murmurs, rubs or gallops.  No JVD, no carotid bruits.  No peripheral edema.   Abdomen:  Not distended.  Bowel sounds active.  No tenderness, no hepatosplenomegaly or masses.  No CVA tenderness or masses.  Lymph:  No cervical lymph nodes.  Neuro:  Alert and oriented, face symmetric. No tremor.   M/S:   Left hand dupuytren's contracture noted.  Skin:   No rashes or jaundice.  Normal moisture, good skin turgor.  Psych:  Broad range affect.  Not psychomotor slowed.  No signs of anxiety or agitation.  Component      Latest Ref Rng & Units 10/16/2017   Creatinine      0.66 - 1.25 mg/dL 0.81   GFR Estimate      >60 mL/min/1.7m2 >90   GFR Estimate If Black      >60 mL/min/1.7m2 >90   Hemoglobin      13.3 - 17.7 g/dL 13.9   Potassium      3.4 - 5.3 mmol/L 3.7     Frank was seen today for pre-op exam.    Diagnoses and all orders for this visit:    Pre-operative cardiovascular examination  Patient has a low cardiopulmonary risk profile for this low/mod risk procedure.  No contra indications identified for the proposed procedure.  --Hgb, creat, potassium  --Hold ASA one week prior to surgery, resume post surgery.  --Take other medications as prescribed perioperatively  -    Right inguinal hernia  -     Hemoglobin; Future  -     Potassium; Future  -     Creatinine; Future    Need for prophylactic vaccination and inoculation against influenza  -     FLU VACCINE, INCREASED ANTIGEN, PRESV FREE, AGE 65+  [36274]    Refusal of blood transfusions as patient is Mosque    Paroxysmal atrial fibrillation (H), controlled    WINNIE (obstructive sleep apnea) AHI 32, controlled      Vanessa Edwards M.D.  Internal Medicine  [facility]   pager [phone]

## 2017-10-16 NOTE — LETTER
10/16/2017      RE: Frank Orellana  [address]  [facility] [address]       CC:  Preop    HPI:  I am seeing Frank Orellana today at the request of Dr. Suresh Raymond for cardiopulmonary risk assessment for the following surgery:  Surgeon Role   Mandi, Suresh Adames MD Primary    Procedure Laterality Anesthesia   HERNIORRHAPHY INGUINAL Right Combined MAC with Local   Open Right Inguinal Hernia Repair with Mesh            HPI: Right, direct, hernia increasing in size x last several months, bulging more often, uncomfortable throughout the day.  No nausea, vomiting, constipation, abdominal pain, rectal bleeding.  No unexplained appetite nor weight loss.  Bulge still reduces spontaneously.  Saw Dr. Raymond who recommends proceeding with herniorrhaphy      Cardiac risks: None.  Hx intermittent Afib, well controlled.  Pulmonary risks: Hx WINNIE.  Exercise tolerance: Mets 2-3, no concerns  History of bleeding/clotting disorders in patient and/or family: None, however, note: Patient is a Islam and declines blood transfusions.  History of adverse anesthesia reactions in patient and/or family:  Personal history of prior surgical complications:      Answers for HPI/ROS submitted by the patient on 10/16/2017   General Symptoms: No  Skin Symptoms: No  HENT Symptoms: Yes  EYE SYMPTOMS: Yes  HEART SYMPTOMS: No  LUNG SYMPTOMS: No  INTESTINAL SYMPTOMS: No  URINARY SYMPTOMS: Yes  REPRODUCTIVE SYMPTOMS: No  SKELETAL SYMPTOMS: No  BLOOD SYMPTOMS: No  NERVOUS SYSTEM SYMPTOMS: No  MENTAL HEALTH SYMPTOMS: No  Ear pain: No  Ear discharge: No  Hearing loss: No  Tinnitus: Yes  Nosebleeds: No  Congestion: No  Sinus pain: No  Trouble swallowing: No   Voice hoarseness: No  Mouth sores: No  Sore throat: No  Tooth pain: No  Gum tenderness: No  Bleeding gums: No  Change in taste: No  Change in sense of smell: No  Dry mouth: No  Hearing aid used: No  Neck lump: No  Eye pain: No  Vision loss: No  Dry eyes: No  Watery eyes: No  Eye  bulging: No  Double vision: No  Flashing of lights: No  Spots: No  Floaters: Yes  Redness: No  Crossed eyes: No  Tunnel Vision: No  Yellowing of eyes: No  Eye irritation: No  Trouble holding urine or incontinence: No  Pain or burning: No  Trouble starting or stopping: No  Increased frequency of urination: No  Blood in urine: No  Decreased frequency of urination: No  Frequent nighttime urination: No  Flank pain: No  Difficulty emptying bladder: Yes    Patient Active Problem List   Diagnosis     History of actinic keratoses     Telangiectasia     SK (seborrheic keratosis)     Patient is Pentecostal     Refusal of blood transfusions as patient is Pentecostal     Paroxysmal atrial fibrillation (H)     Pain in both hands     Chronic left shoulder pain     Chronic hepatitis C without hepatic coma (H)     WINNIE (obstructive sleep apnea) AHI 32     Atrial fibrillation (H)     Tubular adenoma of colon     ACP (advance care planning)     Chronic hepatitis C (H)     Dupuytren's contracture of both hands     Bilateral sensorineural hearing loss     Elevated PSA     Past Medical History:   Diagnosis Date     Actinic keratosis 2009     Atrial fibrillation (H) 1996    paroxysmal on 3-4 occasions     Atrial fibrillation (H) 6/15/96    Afib - 2 or 3 extended occurrences since     Chronic hepatitis C (H)     Chronic Hepatitis C,  genotype 1b                Stage 0 Fibrosis     Dupuytren's contracture of both hands      Elevated PSA      Hepatitis B 1969    acute infection at age 19; IV drug use     WINNIE (obstructive sleep apnea)     AHI 34.2     Pain in both hands      Patient is Pentecostal      Refusal of blood transfusions as patient is Pentecostal      Right shoulder pain      Tinnitus 1 or 2 years ago    both ears     Tubular adenoma of colon 1/17/17    ascending colon, 1/17/17     Past Surgical History:   Procedure Laterality Date     ARTHROSCOPY KNEE      left knee     COLONOSCOPY  1/17/17    tubular  adenoma ascending colon     KNEE SURGERY  2006??    torn meniscus     RELEASE DUPUYTRENS CONTRACTURE Right 2017    Procedure: RELEASE DUPUYTRENS CONTRACTURE;  Surgeon: Lars Oleary MD;  [facility]       Family History   Problem Relation Age of Onset     Arthritis Paternal Grandmother      Rheumatoid Arthritis Paternal Grandmother      GASTROINTESTINAL DISEASE Father       age 77 after colon surgery     Alcohol/Drug Mother       age 64     HEART DISEASE Brother      Cardiac Sudden Death Brother      Social History     Social History     Marital status:      Spouse name: N/A     Number of children: N/A     Years of education: N/A     Occupational History     Not on file.     Social History Main Topics     Smoking status: Former Smoker     Packs/day: 0.50     Years: 10.00     Types: Cigarettes, Other     Start date: 6/15/1964     Quit date: 1974     Smokeless tobacco: Former User     Alcohol use 1.8 - 2.4 oz/week      Comment: 4 or 5 drinks/week, limit of 1     Drug use: Yes     Special: Marijuana, Methamphetamines, IV      Comment: 's     Sexual activity: Yes     Partners: Female     Birth control/ protection: None     Other Topics Concern     Special Diet No     eats healthy     Exercise No     Social History Narrative    Social history:    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, 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  Declines blood transfusions.     Contrast Dye Rash     /70  Pulse 59   Temp 97.6  F (36.4  C)  Resp 16  Wt 84.2 kg (185 lb 11.2 oz)  BMI 27.42 kg/m2  Physical Examination:  General:  Pleasant, alert, no acute distress  Eyes:  Pupils 2-3 mm, sclera white, EOM's full.    Ears:  TM's normal, EAC's patent, clear of cerumen  Nose:  Nasal passages clear, turbinates not swollen  Throat/Mouth:  No pharyngeal erythema or exudate, oral mucosa and tongue moist, no suspicious oral lesions  Neck:  Full AROM, supple, thyroid smooth, symmetric, not enlarged, no nodules  Lungs:  Clear to auscultation throughout, no wheezes, rhonchi or rales.  C/V:  Regular rate and rhythm, no murmurs, rubs or gallops.  No JVD, no carotid bruits.  No peripheral edema.   Abdomen:  Not distended.  Bowel sounds active.  No tenderness, no hepatosplenomegaly or masses.  No CVA tenderness or masses.  Lymph:  No cervical lymph nodes.  Neuro:  Alert and oriented, face symmetric. No tremor.   M/S:   Left hand dupuytren's contracture noted.  Skin:   No rashes or jaundice.  Normal moisture, good skin turgor.  Psych:  Broad range affect.  Not psychomotor slowed.  No signs of anxiety or agitation.  Component      Latest Ref Rng & Units 10/16/2017   Creatinine      0.66 - 1.25 mg/dL 0.81   GFR Estimate      >60 mL/min/1.7m2 >90   GFR Estimate If Black      >60 mL/min/1.7m2 >90   Hemoglobin      13.3 - 17.7 g/dL 13.9   Potassium      3.4 - 5.3 mmol/L 3.7     Frank was seen today for pre-op exam.    Diagnoses and all orders for this visit:    Pre-operative cardiovascular examination  Patient has a low cardiopulmonary risk profile for this low/mod risk procedure.  No contra indications identified for the proposed procedure.  --Hgb, creat, potassium  --Hold ASA one week prior to surgery, resume post surgery.  --Take other medications as prescribed perioperatively  -    Right inguinal hernia  -     Hemoglobin; Future  -     Potassium; Future  -     Creatinine; Future    Need for prophylactic vaccination and inoculation against  influenza  -     FLU VACCINE, INCREASED ANTIGEN, PRESV FREE, AGE 65+ [34388]    Refusal of blood transfusions as patient is Anabaptism    Paroxysmal atrial fibrillation (H), controlled    WINNIE (obstructive sleep apnea) AHI 32, controlled      Vanessa Edwards M.D.  Internal Medicine  [facility]   pager [phone]

## 2017-10-16 NOTE — NURSING NOTE
".Injectable Influenza Immunization Documentation    1.  Has the patient received the information for the injectable influenza vaccine? YES     2. Is the patient 6 months of age or older? YES     3. Does the patient have any of the following contraindications?         Severe allergy to eggs? No     Severe allergic reaction to previous influenza vaccines? No   Severe allergy to latex? No       History of Guillain-Stephenville syndrome? No     Currently have a temperature greater than 100.4F? No        4.  Severely egg allergic patients should have flu vaccine eligibility assessed by an MD, RN, or pharmacist, and those who received flu vaccine should be observed for 15 min by an MD, RN, Pharmacist, Medical Technician, or member of clinic staff.\": YES    5. Latex-allergic patients should be given latex-free influenza vaccine No. Please reference the Vaccine latex table to determine if your clinic s product is latex-containing.       Vaccination given by MARLENY ROBBINS at 2:16 PM on 10/16/2017.          "

## 2017-10-16 NOTE — PROGRESS NOTES
Patient seen at [facility] today for mask refit. Pt currently is using Resmed N20 nasal mask with chinstrap and wife still reports he is leaking. Pt wanted to try a FFM. He was fitted to Resmed F20 large FFM and received all new supplies.

## 2017-10-16 NOTE — PATIENT INSTRUCTIONS
[facility]: [phone]     [facility] Medication Refill Request Information:  * Please contact your pharmacy regarding ANY request for medication refills.  ** [facility] Prescription Fax = [phone]  * Please allow 3 business days for routine medication refills.  * Please allow 5 business days for controlled substance medication refills.     [facility] Test Result notification information:  *You will be notified with in 7-10 days of your appointment day regarding the results of your test.  If you are on MyChart you will be notified as soon as the provider has reviewed the results and signed off on them.

## 2017-10-25 ENCOUNTER — ANESTHESIA EVENT (OUTPATIENT)
Dept: SURGERY | Facility: AMBULATORY SURGERY CENTER | Age: 67
End: 2017-10-25

## 2017-10-26 ENCOUNTER — HOSPITAL ENCOUNTER (OUTPATIENT)
Facility: AMBULATORY SURGERY CENTER | Age: 67
End: 2017-10-26
Attending: SURGERY

## 2017-10-26 ENCOUNTER — ANESTHESIA (OUTPATIENT)
Dept: SURGERY | Facility: AMBULATORY SURGERY CENTER | Age: 67
End: 2017-10-26

## 2017-10-26 ENCOUNTER — SURGERY (OUTPATIENT)
Age: 67
End: 2017-10-26

## 2017-10-26 VITALS
HEIGHT: 69 IN | WEIGHT: 180 LBS | BODY MASS INDEX: 26.66 KG/M2 | HEART RATE: 50 BPM | OXYGEN SATURATION: 99 % | RESPIRATION RATE: 14 BRPM | TEMPERATURE: 97.6 F | DIASTOLIC BLOOD PRESSURE: 72 MMHG | SYSTOLIC BLOOD PRESSURE: 129 MMHG

## 2017-10-26 DIAGNOSIS — K40.90 RIGHT INGUINAL HERNIA: Primary | ICD-10-CM

## 2017-10-26 DEVICE — MESH POLYESTER PROGRIP 6X6" (15X15CM) PARIETEX TEM1515G: Type: IMPLANTABLE DEVICE | Site: ABDOMEN | Status: FUNCTIONAL

## 2017-10-26 RX ORDER — CEFAZOLIN SODIUM 1 G/3ML
1 INJECTION, POWDER, FOR SOLUTION INTRAMUSCULAR; INTRAVENOUS SEE ADMIN INSTRUCTIONS
Status: DISCONTINUED | OUTPATIENT
Start: 2017-10-26 | End: 2017-10-26 | Stop reason: HOSPADM

## 2017-10-26 RX ORDER — KETOROLAC TROMETHAMINE 30 MG/ML
INJECTION, SOLUTION INTRAMUSCULAR; INTRAVENOUS PRN
Status: DISCONTINUED | OUTPATIENT
Start: 2017-10-26 | End: 2017-10-26

## 2017-10-26 RX ORDER — PROPOFOL 10 MG/ML
INJECTION, EMULSION INTRAVENOUS PRN
Status: DISCONTINUED | OUTPATIENT
Start: 2017-10-26 | End: 2017-10-26

## 2017-10-26 RX ORDER — SODIUM CHLORIDE, SODIUM LACTATE, POTASSIUM CHLORIDE, CALCIUM CHLORIDE 600; 310; 30; 20 MG/100ML; MG/100ML; MG/100ML; MG/100ML
INJECTION, SOLUTION INTRAVENOUS CONTINUOUS
Status: DISCONTINUED | OUTPATIENT
Start: 2017-10-26 | End: 2017-10-26 | Stop reason: HOSPADM

## 2017-10-26 RX ORDER — GABAPENTIN 100 MG/1
100 CAPSULE ORAL ONCE
Status: COMPLETED | OUTPATIENT
Start: 2017-10-26 | End: 2017-10-26

## 2017-10-26 RX ORDER — AMOXICILLIN 250 MG
1-2 CAPSULE ORAL 2 TIMES DAILY
Qty: 30 TABLET | Refills: 0 | Status: SHIPPED | OUTPATIENT
Start: 2017-10-26 | End: 2017-12-06

## 2017-10-26 RX ORDER — MEPERIDINE HYDROCHLORIDE 25 MG/ML
12.5 INJECTION INTRAMUSCULAR; INTRAVENOUS; SUBCUTANEOUS
Status: DISCONTINUED | OUTPATIENT
Start: 2017-10-26 | End: 2017-10-27 | Stop reason: HOSPADM

## 2017-10-26 RX ORDER — FLUMAZENIL 0.1 MG/ML
0.2 INJECTION, SOLUTION INTRAVENOUS
Status: DISCONTINUED | OUTPATIENT
Start: 2017-10-26 | End: 2017-10-26 | Stop reason: HOSPADM

## 2017-10-26 RX ORDER — LIDOCAINE HYDROCHLORIDE 20 MG/ML
INJECTION, SOLUTION INFILTRATION; PERINEURAL PRN
Status: DISCONTINUED | OUTPATIENT
Start: 2017-10-26 | End: 2017-10-26

## 2017-10-26 RX ORDER — LIDOCAINE 40 MG/G
CREAM TOPICAL
Status: DISCONTINUED | OUTPATIENT
Start: 2017-10-26 | End: 2017-10-26 | Stop reason: HOSPADM

## 2017-10-26 RX ORDER — ONDANSETRON 2 MG/ML
4 INJECTION INTRAMUSCULAR; INTRAVENOUS EVERY 30 MIN PRN
Status: DISCONTINUED | OUTPATIENT
Start: 2017-10-26 | End: 2017-10-27 | Stop reason: HOSPADM

## 2017-10-26 RX ORDER — FENTANYL CITRATE 50 UG/ML
25-50 INJECTION, SOLUTION INTRAMUSCULAR; INTRAVENOUS
Status: DISCONTINUED | OUTPATIENT
Start: 2017-10-26 | End: 2017-10-26 | Stop reason: HOSPADM

## 2017-10-26 RX ORDER — SODIUM CHLORIDE, SODIUM LACTATE, POTASSIUM CHLORIDE, CALCIUM CHLORIDE 600; 310; 30; 20 MG/100ML; MG/100ML; MG/100ML; MG/100ML
INJECTION, SOLUTION INTRAVENOUS CONTINUOUS
Status: DISCONTINUED | OUTPATIENT
Start: 2017-10-26 | End: 2017-10-27 | Stop reason: HOSPADM

## 2017-10-26 RX ORDER — BUPIVACAINE HYDROCHLORIDE 2.5 MG/ML
INJECTION, SOLUTION INFILTRATION; PERINEURAL PRN
Status: DISCONTINUED | OUTPATIENT
Start: 2017-10-26 | End: 2017-10-26 | Stop reason: HOSPADM

## 2017-10-26 RX ORDER — PROPOFOL 10 MG/ML
INJECTION, EMULSION INTRAVENOUS CONTINUOUS PRN
Status: DISCONTINUED | OUTPATIENT
Start: 2017-10-26 | End: 2017-10-26

## 2017-10-26 RX ORDER — HYDROCODONE BITARTRATE AND ACETAMINOPHEN 5; 325 MG/1; MG/1
1-2 TABLET ORAL EVERY 4 HOURS PRN
Qty: 20 TABLET | Refills: 0 | Status: SHIPPED | OUTPATIENT
Start: 2017-10-26 | End: 2017-12-06

## 2017-10-26 RX ORDER — OXYCODONE HYDROCHLORIDE 5 MG/1
5 TABLET ORAL ONCE
Status: COMPLETED | OUTPATIENT
Start: 2017-10-26 | End: 2017-10-26

## 2017-10-26 RX ORDER — NALOXONE HYDROCHLORIDE 0.4 MG/ML
.1-.4 INJECTION, SOLUTION INTRAMUSCULAR; INTRAVENOUS; SUBCUTANEOUS
Status: DISCONTINUED | OUTPATIENT
Start: 2017-10-26 | End: 2017-10-27 | Stop reason: HOSPADM

## 2017-10-26 RX ORDER — ONDANSETRON 4 MG/1
4 TABLET, ORALLY DISINTEGRATING ORAL EVERY 30 MIN PRN
Status: DISCONTINUED | OUTPATIENT
Start: 2017-10-26 | End: 2017-10-27 | Stop reason: HOSPADM

## 2017-10-26 RX ORDER — ONDANSETRON 2 MG/ML
INJECTION INTRAMUSCULAR; INTRAVENOUS PRN
Status: DISCONTINUED | OUTPATIENT
Start: 2017-10-26 | End: 2017-10-26

## 2017-10-26 RX ORDER — DEXAMETHASONE SODIUM PHOSPHATE 4 MG/ML
INJECTION, SOLUTION INTRA-ARTICULAR; INTRALESIONAL; INTRAMUSCULAR; INTRAVENOUS; SOFT TISSUE PRN
Status: DISCONTINUED | OUTPATIENT
Start: 2017-10-26 | End: 2017-10-26

## 2017-10-26 RX ORDER — ACETAMINOPHEN 325 MG/1
975 TABLET ORAL ONCE
Status: COMPLETED | OUTPATIENT
Start: 2017-10-26 | End: 2017-10-26

## 2017-10-26 RX ORDER — NALOXONE HYDROCHLORIDE 0.4 MG/ML
.1-.4 INJECTION, SOLUTION INTRAMUSCULAR; INTRAVENOUS; SUBCUTANEOUS
Status: DISCONTINUED | OUTPATIENT
Start: 2017-10-26 | End: 2017-10-26 | Stop reason: HOSPADM

## 2017-10-26 RX ADMIN — LIDOCAINE HYDROCHLORIDE 40 MG: 20 INJECTION, SOLUTION INFILTRATION; PERINEURAL at 12:51

## 2017-10-26 RX ADMIN — PROPOFOL 100 MCG/KG/MIN: 10 INJECTION, EMULSION INTRAVENOUS at 12:54

## 2017-10-26 RX ADMIN — BUPIVACAINE HYDROCHLORIDE 7 ML: 2.5 INJECTION, SOLUTION INFILTRATION; PERINEURAL at 13:39

## 2017-10-26 RX ADMIN — ONDANSETRON 4 MG: 2 INJECTION INTRAMUSCULAR; INTRAVENOUS at 12:55

## 2017-10-26 RX ADMIN — GABAPENTIN 100 MG: 100 CAPSULE ORAL at 12:23

## 2017-10-26 RX ADMIN — PROPOFOL 20 MG: 10 INJECTION, EMULSION INTRAVENOUS at 12:54

## 2017-10-26 RX ADMIN — OXYCODONE HYDROCHLORIDE 5 MG: 5 TABLET ORAL at 14:13

## 2017-10-26 RX ADMIN — PROPOFOL 50 MG: 10 INJECTION, EMULSION INTRAVENOUS at 12:52

## 2017-10-26 RX ADMIN — DEXAMETHASONE SODIUM PHOSPHATE 4 MG: 4 INJECTION, SOLUTION INTRA-ARTICULAR; INTRALESIONAL; INTRAMUSCULAR; INTRAVENOUS; SOFT TISSUE at 12:58

## 2017-10-26 RX ADMIN — ACETAMINOPHEN 975 MG: 325 TABLET ORAL at 12:23

## 2017-10-26 RX ADMIN — SODIUM CHLORIDE, SODIUM LACTATE, POTASSIUM CHLORIDE, CALCIUM CHLORIDE: 600; 310; 30; 20 INJECTION, SOLUTION INTRAVENOUS at 12:45

## 2017-10-26 RX ADMIN — KETOROLAC TROMETHAMINE 30 MG: 30 INJECTION, SOLUTION INTRAMUSCULAR; INTRAVENOUS at 12:55

## 2017-10-26 RX ADMIN — BUPIVACAINE HYDROCHLORIDE 13 ML: 2.5 INJECTION, SOLUTION INFILTRATION; PERINEURAL at 13:05

## 2017-10-26 RX ADMIN — PROPOFOL 30 MG: 10 INJECTION, EMULSION INTRAVENOUS at 13:06

## 2017-10-26 RX ADMIN — PROPOFOL 50 MG: 10 INJECTION, EMULSION INTRAVENOUS at 13:34

## 2017-10-26 ASSESSMENT — ENCOUNTER SYMPTOMS: DYSRHYTHMIAS: 1

## 2017-10-26 NOTE — IP AVS SNAPSHOT
[facility]    [address] [place] [address]    Phone:  [phone]    Fax:  [phone]                                       After Visit Summary   10/26/2017    Frank Orellana    MRN: [id number]           After Visit Summary Signature Page     I have received my discharge instructions, and my questions have been answered. I have discussed any challenges I see with this plan with the nurse or doctor.    ..........................................................................................................................................  Patient/Patient Representative Signature      ..........................................................................................................................................  Patient Representative Print Name and Relationship to Patient    ..................................................               ................................................  Date                                            Time    ..........................................................................................................................................  Reviewed by Signature/Title    ...................................................              ..............................................  Date                                                            Time

## 2017-10-26 NOTE — OP NOTE
DATE OF PROCEDURE:  10/26/2017      PREOPERATIVE DIAGNOSIS:  Right inguinal hernia.      POSTOPERATIVE DIAGNOSIS:  Right inguinal hernia.      PROCEDURE:  Open mesh repair right inguinal hernia.      SURGEON:  Suresh Raymond MD      ANESTHESIA:  IV sedation plus local infiltration of Marcaine without epinephrine.      INDICATIONS FOR PROCEDURE:  The patient presents for right inguinal hernia.  Informed consent was obtained.      OPERATIVE FINDINGS:  Right inguinal indirect hernia.      OPERATIVE PROCEDURE:  The patient was brought to the operating room, put under IV sedation.  His right inguinal region widely prepped and draped in the usual sterile fashion, injected with Marcaine throughout for adequate anesthetic.  Skin incision was made along the inguinal crease.  Dissection carried down.  The external aponeurosis was opened to the external ring.  Easily encountered a fair-sized indirect sac, which was peritonealized off of the cord structures without difficulty.  I did inadvertently enter the sac more proximal.  This was pursestring and 3-0 Vicryl and then we closed that and the hernia sac was then returned to the preperitoneal space.  I then initiated my routine Marisa mesh repair by using a 15 x 10 cm Parietex ProGrip mesh.  This was slit laterally, thus encircling the spermatic cord and establishing the newly created deep ring.  The mesh was extended out medially to anterior rectus fascia, superiorly between internal and external aponeurosis.  I then slit the tails that I passed around the cord and lateral tails were then tacked down at the lateral aspect of the newly created deep ring.  The tails of the mesh were then pushed out laterally to completely cover the right lateral region, again without difficulty.  Estimated blood loss was minimal.  The patient tolerated the procedure well and was taken to the recovery room where he was without difficulty or apparent complication.         SURESH RAYMOND,  MD             D: 10/26/2017 14:02   T: 10/26/2017 14:24   MT: paras      Name:     CATHERINE OH   MRN:      [id number]        Account:        [id number]   :      1950           Procedure Date: 10/26/2017      Document: [id number]       cc: [facility] Billing

## 2017-10-26 NOTE — DISCHARGE INSTRUCTIONS
[facility]  Home Care Following Anesthesia  For 24 hours after surgery:  1. Get plenty of rest.  A responsible adult must stay with you for at least 24 hours after you leave the surgery center.  2. Do not drive or use heavy equipment.  If you have weakness or tingling, don't drive or use heavy equipment until this feeling goes away.   3. Do not drink alcohol.   4. Avoid strenuous or risky activities.  Ask for help when climbing stairs.  5. You may feel lightheaded.  IF so, sit for a few minutes before standing.  Have someone help you get up.   6. If you have nausea (feel sick to your stomach): Drink only clear liquids such as apple juice, ginger ale, broth or 7-Up.  Rest may also help.  Be sure to drink enough fluids.  Move to a regular diet as you feel able.   7. You may have a slight fever.  Call the doctor if your fever is over 100 F (37.7 C) (taken under the tongue) or lasts longer than 24 hours.  8. You may have a dry mouth, a sore throat, muscle aches or trouble sleeping. These should go away after 24 hours.  9. Do not make important or legal decisions.     Tips for taking pain medications  To get the best pain relief possible, remember these points:    Take pain medications as directed, before pain becomes severe.    Pain medication can upset your stomach: taking it with food may help.    Constipation is a common side effect of pain medication. Drink plenty of  fluids.    Eat foods high in fiber. Take a stool softener if recommended by your doctor or pharmacist.    Do not drink alcohol, drive or operate machinery while taking pain medications.    Ask about other ways to control pain, such as with heat, ice or relaxation.    Tylenol/Acetaminophen Consumption  To help encourage the safe use of acetaminophen, the makers of TYLENOL  have lowered the maximum daily dose for single-ingredient Extra Strength TYLENOL  (acetaminophen) products sold in the [place] from 8 pills per day  (4,000 mg) to 6 pills per day (3,000 mg). The dosing interval has also changed from 2 pills every 4-6 hours to 2 pills every 6 hours.    If you feel your pain relief is insufficient, you may take Tylenol/Acetaminophen in addition to your narcotic pain medication.     Be careful not to exceed 3,000 mg of Tylenol/Acetaminophen in a 24 hour period from all sources.    If you are taking extra strength Tylenol/acetaminophen (500 mg), the maximum dose is 6 tablets in 24 hours.    If you are taking regular strength acetaminophen (325 mg), the maximum dose is 9 tablets in 24 hours.    Call a doctor for any of the followin. Signs of infection (fever, growing tenderness at the surgery site, a large amount of drainage or bleeding, severe pain, foul-smelling drainage, redness, swelling).  2. It has been over 8 to 10 hours since surgery and you are still not able to urinate (pass water).  3. Headache for over 24 hours.  Your doctor is:  Dr. Suresh Raymond, General Surgery: [phone]               Or dial [phone] and ask for the resident on call for:  General Surgery  For emergency care, call the:  [facility]:  [phone] (TTY for hearing impaired: [phone])  *wear athletic wear when active.

## 2017-10-26 NOTE — ANESTHESIA CARE TRANSFER NOTE
Patient: Frank Orellana    Procedure(s):  Open Right Inguinal Hernia Repair with Mesh - Wound Class: I-Clean    Diagnosis: Inguinal Hernia  Diagnosis Additional Information: No value filed.    Anesthesia Type:   MAC     Note:  Airway :Room Air  Patient transferred to:Phase II  Comments: To phase 2 on room air vss report to RN    107/65, 47, 98%, 97.5, 14Handoff Report: Identifed the Patient, Identified the Reponsible Provider, Reviewed the pertinent medical history, Discussed the surgical course, Reviewed Intra-OP anesthesia mangement and issues during anesthesia, Set expectations for post-procedure period and Allowed opportunity for questions and acknowledgement of understanding      Vitals: (Last set prior to Anesthesia Care Transfer)    CRNA VITALS  10/26/2017 1315 - 10/26/2017 1354      10/26/2017             Pulse: 52    SpO2: 90 %    Resp Rate (set): 10                Electronically Signed By: ANDREINA Mejía CRNA  October 26, 2017  1:54 PM

## 2017-10-26 NOTE — ANESTHESIA PREPROCEDURE EVALUATION
Anesthesia Evaluation     .             ROS/MED HX    ENT/Pulmonary:     (+)sleep apnea, WINNIE risk factors uses CPAP , . .    Neurologic:  - neg neurologic ROS     Cardiovascular:  - neg cardiovascular ROS   (+) ----. : . . . :. dysrhythmias a-fib, . Previous cardiac testing Echodate:4/27/17results:   Interpretation Summary  Left ventricular function, chamber size, wall motion, and wall thickness are  normal. The EF is 60-65%. Normal left ventricular filling for age.  Global right ventricular function is normal. Mild right ventricular dilation  is present.  Mild aortic insufficiency is present.  Pulmonary artery systolic pressure is normal.  The inferior vena cava is normal. Estimated mean right atrial pressure is <3  mmHg.  Mild dilatation of the aorta is present. Ascending aorta 4.5 cm. Aortic  diameter indexed to BSA is 2.3 cm/m2.  No pericardial effusion is present.     This study was compared with the study from 10/13/2016. There has been no  change.Stress Testdate:12/27/16 results:Impression:  1. Normal caliber of thoracic aorta. The ascending aorta measures 3.9  cm, which is normal when indexed for age/gender/size.     The MRI sequences and imaging planes in this study were tailored for  cardiac imaging and are suboptimal for evaluation of non-cardiac  structures.     MR ANGIOGRAM FINDINGS:     1. The aortic root is normal in size. The ascending aorta is normal.  The aortic arch, and descending aorta are normal in diameter. There is  no dissection seen.  2. The aortic arch is left sided. There is normal branching of the  arch vessels. There is no coarctation seen.  3. The main and proximal branch pulmonary arteries are normal in size.     4. The systemic venous connections are normal. ECG reviewed date:6/7/17 results:Sinus bradycardia. Ventricular rate 49 BPM date: results:          METS/Exercise Tolerance:  >4 METS   Hematologic:  - neg hematologic  ROS   (+) Other Hematologic Disorder-Jehova's witness        Musculoskeletal:  - neg musculoskeletal ROS       GI/Hepatic:     (+) hepatitis type C, liver disease,       Renal/Genitourinary:         Endo:  - neg endo ROS       Psychiatric:  - neg psychiatric ROS       Infectious Disease:  - neg infectious disease ROS       Malignancy:   (+) Malignancy History of GI  GI CA Remission status post,      - no malignancy   Other:    - neg other ROS             Procedure: Procedure(s):  Open Right Inguinal Hernia Repair with Mesh - Wound Class: I-Clean    HPI: Frank Orellana is a 67 year old male with a right, direct inguinal hernia increasing. Presents electively for open right inguinal herniorrhaphy under MAC anesthesia with preoperative block.       PMHx/PSHx:  Past Medical History:   Diagnosis Date     Actinic keratosis 2009     Atrial fibrillation (H) 1996    paroxysmal on 3-4 occasions     Atrial fibrillation (H) 6/15/96    Afib - 2 or 3 extended occurrences since     Chronic hepatitis C (H)     Chronic Hepatitis C,  genotype 1b                Stage 0 Fibrosis     Dupuytren's contracture of both hands      Elevated PSA      Hepatitis B 1969    acute infection at age 19; IV drug use     WINNIE (obstructive sleep apnea)     AHI 34.2     Pain in both hands      Patient is Sabianism      Refusal of blood transfusions as patient is Sabianism      Right shoulder pain      Tinnitus 1 or 2 years ago    both ears     Tubular adenoma of colon 1/17/17    ascending colon, 1/17/17       Past Surgical History:   Procedure Laterality Date     ARTHROSCOPY KNEE      left knee     COLONOSCOPY  1/17/17    tubular adenoma ascending colon     KNEE SURGERY  2006??    torn meniscus     RELEASE DUPUYTRENS CONTRACTURE Right 1/20/2017    Procedure: RELEASE DUPUYTRENS CONTRACTURE;  Surgeon: Lars Oleary MD;  Location: [facility] OR       Current Outpatient Prescriptions on File Prior to Encounter:  alfuzosin (UROXATRAL) 10 MG 24 hr tablet TAKE 1 TABLET DAILY   aspirin 81 MG EC  tablet Take 1 tablet (81 mg) by mouth daily   order for DME Reported on 5/4/2017     No current facility-administered medications on file prior to encounter.     Social Hx:   Social History   Substance Use Topics     Smoking status: Former Smoker     Packs/day: 0.50     Years: 10.00     Types: Cigarettes, Other     Start date: 6/15/1964     Quit date: 7/1/1974     Smokeless tobacco: Former User     Alcohol use 1.8 - 2.4 oz/week      Comment: 4 or 5 drinks/week, limit of 1       Allergies:   Allergies   Allergen Reactions     Blood Transfusion Related (Informational Only)      Nondenominational.  Declines blood transfusions.     Contrast Dye Rash       NPO Status: > 8 hours     Labs:    BMP:  Recent Labs   Lab Test  10/16/17   1418  09/14/17   0859   NA   --   140   POTASSIUM  3.7  3.7   CHLORIDE   --   108   CO2   --   22   BUN   --   14   CR  0.81  0.85   GLC   --   90   VALENTINA   --   8.8     CBC:   Recent Labs   Lab Test  10/16/17   1418  09/07/17   1012   WBC   --   4.7   RBC   --   4.34*   HGB  13.9  13.4   HCT   --   40.1   MCV   --   92   MCH   --   30.9   MCHC   --   33.4   RDW   --   12.7   PLT   --   214     Coags:  Recent Labs   Lab Test  09/07/17   1012   INR  1.08         Physical Exam  Normal systems: cardiovascular and pulmonary    Airway   Mallampati: II  TM distance: >3 FB  Neck ROM: full    Dental     Cardiovascular       Pulmonary                   Anesthesia Plan      History & Physical Review  History and physical reviewed and following examination; no interval change.    ASA Status:  2 .    NPO Status:  > 8 hours    Plan for MAC with Intravenous and Propofol induction. Reason for MAC:  Deep or markedly invasive procedure (G8)         Postoperative Care  Postoperative pain management:  IV analgesics and Oral pain medications.      Consents  Anesthetic plan, risks, benefits and alternatives discussed with:  Patient..        Airway: 01/20/17; 1626; Easy; Intravenous; Easy; 5; mm; laryngeal mask  airway; center of mouth; Equal, clear and bilateral; CRNA; Robert F; Spontaneous ventilation, Adequate tidal volume, Follows commands, Transported with oxygen, Purposeful movement    Special Considerations:  Preoperative bilateral TAP     Plan:   - ASA 2  - Preoperative Block   - MAC with standard ASA monitors, IV induction, balanced anesthetic  - PIV x 1   - Antibiotics per general surgery  - PONV prophylaxis  - Pain management with Fentanyl/dilaudid boluses     Hari Rodgers MD  Anesthesiology Resident  [phone]       ------------  I agree with the findings and plan as detailed above by the resident physician.        Nicolás Jimenez  Anesthesiology Staff  October 26, 2017

## 2017-10-26 NOTE — BRIEF OP NOTE
[facility] Brief Operative Note    Pre-operative diagnosis: Inguinal Hernia   Post-operative diagnosis Same as Pre-op Dx   Procedure: Procedure(s):  Open Right Inguinal Hernia Repair with Mesh - Wound Class: I-Clean   Surgeon: Suresh Raymond MD   Assistants(s):    Estimated blood loss: Minimal    Specimens: None   Findings: RIIH

## 2017-10-26 NOTE — IP AVS SNAPSHOT
MRN:[id number]                      After Visit Summary   10/26/2017    Frank Orellana    MRN: [id number]           Thank you!     Thank you for choosing [facility] for your care. Our goal is always to provide you with excellent care. Hearing back from our patients is one way we can continue to improve our services. Please take a few minutes to complete the written survey that you may receive in the mail after you visit with us. Thank you!        Patient Information     Date Of Birth          1950        About your hospital stay     You were admitted on:  October 26, 2017 You last received care in the:  [facility]    You were discharged on:  October 26, 2017       Who to Call     For medical emergencies, please call 911.  For non-urgent questions about your medical care, please call your primary care provider or clinic, [phone]  For questions related to your surgery, please call your surgery clinic        Attending Provider     Provider Specialty    Suresh Raymond MD Surgery       Primary Care Provider Office Phone # Fax #    Vanessa Edwards [phone]      After Care Instructions     Diet Instructions       Resume pre-procedure diet                  Your next 10 appointments already scheduled     Nov 17, 2017 11:20 AM CST   (Arrive by 11:05 AM)   RETURN HAND with Lars Oleary MD   [facility] ([facility])    [address] [address] [phone]            May 10, 2018 11:00 AM CDT   Return Sleep Patient with Melissa Stanford MD   [facility], [facility], Sleep Study ([facility])    [address] [place] [id number] [phone]            Oct 11, 2018  7:30 AM CDT   RETURN RETINA with Ruth Boone MD   [facility] ([facility])    [address]  [address]  [facility] [address]   [phone]              Further instructions from your care team       [facility]  Home Care Following Anesthesia  For 24 hours after surgery:  1. Get plenty of rest.  A responsible adult must stay with you for at least 24 hours after you leave the surgery center.  2. Do not drive or use heavy equipment.  If you have weakness or tingling, don't drive or use heavy equipment until this feeling goes away.   3. Do not drink alcohol.   4. Avoid strenuous or risky activities.  Ask for help when climbing stairs.  5. You may feel lightheaded.  IF so, sit for a few minutes before standing.  Have someone help you get up.   6. If you have nausea (feel sick to your stomach): Drink only clear liquids such as apple juice, ginger ale, broth or 7-Up.  Rest may also help.  Be sure to drink enough fluids.  Move to a regular diet as you feel able.   7. You may have a slight fever.  Call the doctor if your fever is over 100 F (37.7 C) (taken under the tongue) or lasts longer than 24 hours.  8. You may have a dry mouth, a sore throat, muscle aches or trouble sleeping. These should go away after 24 hours.  9. Do not make important or legal decisions.     Tips for taking pain medications  To get the best pain relief possible, remember these points:    Take pain medications as directed, before pain becomes severe.    Pain medication can upset your stomach: taking it with food may help.    Constipation is a common side effect of pain medication. Drink plenty of  fluids.    Eat foods high in fiber. Take a stool softener if recommended by your doctor or pharmacist.    Do not drink alcohol, drive or operate machinery while taking pain medications.    Ask about other ways to control pain, such as with heat, ice or relaxation.    Tylenol/Acetaminophen Consumption  To help encourage the safe use of acetaminophen, the makers of TYLENOL  have lowered the maximum  "daily dose for single-ingredient Extra Strength TYLENOL  (acetaminophen) products sold in the [place] from 8 pills per day (4,000 mg) to 6 pills per day (3,000 mg). The dosing interval has also changed from 2 pills every 4-6 hours to 2 pills every 6 hours.    If you feel your pain relief is insufficient, you may take Tylenol/Acetaminophen in addition to your narcotic pain medication.     Be careful not to exceed 3,000 mg of Tylenol/Acetaminophen in a 24 hour period from all sources.    If you are taking extra strength Tylenol/acetaminophen (500 mg), the maximum dose is 6 tablets in 24 hours.    If you are taking regular strength acetaminophen (325 mg), the maximum dose is 9 tablets in 24 hours.    Call a doctor for any of the followin. Signs of infection (fever, growing tenderness at the surgery site, a large amount of drainage or bleeding, severe pain, foul-smelling drainage, redness, swelling).  2. It has been over 8 to 10 hours since surgery and you are still not able to urinate (pass water).  3. Headache for over 24 hours.  Your doctor is:  Dr. Suresh Raymond, General Surgery: [phone]               Or dial [phone] and ask for the resident on call for:  General Surgery  For emergency care, call the:  [facility]:  [phone] (TTY for hearing impaired: [phone])  *wear athletic wear when active.                 Pending Results     No orders found from 10/24/2017 to 10/27/2017.            Admission Information     Date & Time Provider Department Dept. Phone    10/26/2017 Suresh Raymond MD [facility] [phone]      Your Vitals Were     Blood Pressure Pulse Temperature Respirations Height Weight    107/68 50 97.4  F (36.3  C) (Oral) 12 1.753 m (5' 9\") 81.6 kg (180 lb)    Pulse Oximetry BMI (Body Mass Index)                96% 26.58 kg/m2          [email or web address] Information     [employer] gives you secure access to your electronic health record. If you see " a primary care provider, you can also send messages to your care team and make appointments. If you have questions, please call your primary care clinic.  If you do not have a primary care provider, please call [phone] and they will assist you.      [employer] is an electronic gateway that provides easy, online access to your medical records. With [employer], you can request a clinic appointment, read your test results, renew a prescription or communicate with your care team.     To access your existing account, please contact your [facility] or call [phone] for assistance.        Care EveryWhere ID     This is your Care EveryWhere ID. This could be used by other organizations to access your [facility] medical records  [id number]        Equal Access to Services     AYDE PAPPAS : Mike Bravo, lavinia guzman, rafita ricci, jeyson evans. So [facility] [phone].    ATENCIÓN: Si habla español, tiene a sebastian disposición servicios gratuitos de asistencia lingüística. Llame al [phone].    We comply with applicable federal civil rights laws and [place] laws. We do not discriminate on the basis of race, color, national origin, age, disability, sex, sexual orientation, or gender identity.               Review of your medicines      START taking        Dose / Directions    HYDROcodone-acetaminophen 5-325 MG per tablet   Commonly known as:  NORCO   Used for:  Right inguinal hernia        Dose:  1-2 tablet   Take 1-2 tablets by mouth every 4 hours as needed for other (Moderate to Severe Pain)   Quantity:  20 tablet   Refills:  0       senna-docusate 8.6-50 MG per tablet   Commonly known as:  SENOKOT-S;PERICOLACE   Used for:  Right inguinal hernia        Dose:  1-2 tablet   Take 1-2 tablets by mouth 2 times daily Take while on oral narcotics to prevent or treat constipation.   Quantity:  30 tablet   Refills:  0         CONTINUE these  medicines which have NOT CHANGED        Dose / Directions    alfuzosin 10 MG 24 hr tablet   Commonly known as:  UROXATRAL   Used for:  Urinary frequency        TAKE 1 TABLET DAILY   Quantity:  90 tablet   Refills:  3       aspirin 81 MG EC tablet   Used for:  Paroxysmal atrial fibrillation (H)        Dose:  81 mg   Take 1 tablet (81 mg) by mouth daily   Quantity:  90 tablet   Refills:  3       CARTIA  MG 24 hr capsule   Used for:  Paroxysmal atrial fibrillation (H)   Generic drug:  diltiazem        TAKE 1 CAPSULE DAILY   Quantity:  90 capsule   Refills:  3       order for DME        Reported on 5/4/2017   Refills:  0            Where to get your medicines      These medications were sent to [place], [place] - [address]  [address], [facility] [address]    Hours:  TRANSPLANT PHONE NUMBER [phone] Phone:  [phone]     senna-docusate 8.6-50 MG per tablet         Some of these will need a paper prescription and others can be bought over the counter. Ask your nurse if you have questions.     Bring a paper prescription for each of these medications     HYDROcodone-acetaminophen 5-325 MG per tablet                Protect others around you: Learn how to safely use, store and throw away your medicines at www.disposemymeds.org.             Medication List: This is a list of all your medications and when to take them. Check marks below indicate your daily home schedule. Keep this list as a reference.      Medications           Morning Afternoon Evening Bedtime As Needed    alfuzosin 10 MG 24 hr tablet   Commonly known as:  UROXATRAL   TAKE 1 TABLET DAILY                                aspirin 81 MG EC tablet   Take 1 tablet (81 mg) by mouth daily                                CARTIA  MG 24 hr capsule   TAKE 1 CAPSULE DAILY   Generic drug:  diltiazem                                HYDROcodone-acetaminophen 5-325 MG per tablet   Commonly  known as:  NORCO   Take 1-2 tablets by mouth every 4 hours as needed for other (Moderate to Severe Pain)                                order for DME   Reported on 5/4/2017                                senna-docusate 8.6-50 MG per tablet   Commonly known as:  SENOKOT-S;PERICOLACE   Take 1-2 tablets by mouth 2 times daily Take while on oral narcotics to prevent or treat constipation.

## 2017-10-26 NOTE — ANESTHESIA POSTPROCEDURE EVALUATION
Patient: Frank Orellana    Procedure(s):  Open Right Inguinal Hernia Repair with Mesh - Wound Class: I-Clean    Diagnosis:Inguinal Hernia  Diagnosis Additional Information: No value filed.    Anesthesia Type:  MAC    Note:  Anesthesia Post Evaluation    Patient location during evaluation: Phase 2  Patient participation: Able to fully participate in evaluation  Level of consciousness: awake and alert  Pain management: adequate  Airway patency: patent  Cardiovascular status: acceptable  Respiratory status: acceptable  Hydration status: acceptable  PONV: none     Anesthetic complications: None          Last vitals:  Vitals:    10/26/17 1415 10/26/17 1430 10/26/17 1438   BP: 120/69 120/68 129/72   Pulse:      Resp: 12 12 14   Temp:   36.4  C (97.6  F)   SpO2: 98% 99% 99%         Electronically Signed By: Nicolás Jimenez MD  October 26, 2017

## 2017-10-30 ENCOUNTER — CARE COORDINATION (OUTPATIENT)
Dept: SURGERY | Facility: CLINIC | Age: 67
End: 2017-10-30

## 2017-10-30 NOTE — PROGRESS NOTES
RN Post-Op/Post-Discharge Care Coordination Note    Mr. Frank Orellana is a 67 year old male who underwent Open right inguinal hernia repair on 10/26 with  Dr. Suresh Raymond.  Spoke with Patient.    Support  Patient able to care for self independently     Health Status   Fevers/chills: Patient denies any fever or chills.  Nausea/Vomiting: Patient denies nausea/vomiting.  Eating/drinking: Patient is able to eat and drink without any complaints.  Bowel habits: Patient reports having a normal bowel movement.  Urinary: Voiding normally  Drains (KERRY): N/A  Incisions: Patient denies any signs and symptoms of infection. Steri strips in place. Patient notes bruising around the incision; discussed with patient this is normal and should get better with time.  Pain: patient has stopped using narcotics and plans on using Tylenol or Ibuprofen prn.  New Medications:  Norco, Senna    Activity/Restrictions  No lifting in excess of 15-20 pounds for 3-4 weeks    Equipment  None    Pathology reviewed with patient:  N/A    All of his questions were answered including reviewing restrictions, pathology, and wound care.  He will call this office if he has any further questions and/or concerns.      In lieu of a post-op clinic patient that patient would like to be contacted in approximately 10 days via telephone.    Whom and When to Call  Patient acknowledges understanding of how to manage any medication changes and   when to seek medical care.     Patient advised that if after hour medical concerns arise to please call [phone] and choose option 4 to speak to the physician on call.     A copy of this note was routed to the primary surgeon.

## 2017-11-09 ENCOUNTER — CARE COORDINATION (OUTPATIENT)
Dept: SURGERY | Facility: CLINIC | Age: 67
End: 2017-11-09

## 2017-11-09 NOTE — PROGRESS NOTES
Frank Orellana was contacted several days post procedure via telephone for a status update and elected to have a telephone follow -up call approximately 10 days after original contact in lieu of a clinic visit with Dr. Suresh Raymond.  He continues to do well and the steri-strips have mostly peeled off.  The patients wounds are healed and the area is C/D/I. He notes a slight hardness to his incision, which is normal following surgery and should resolve over the next 8 weeks. He is afebrile, pain free, and paul po; the patient is slowly resuming his normal activity.  He notes he feels more tired than he used to but realized this should also get better with time. He will call with any concerns or questions.    Pathology was reviewed with the patient: N/A    All of Frank Orellana question's were answered and  he would like to return to the clinic on a PRN basis.      A copy of this note was routed to his surgeon.

## 2017-11-17 ENCOUNTER — OFFICE VISIT (OUTPATIENT)
Dept: ORTHOPEDICS | Facility: CLINIC | Age: 67
End: 2017-11-17

## 2017-11-17 ENCOUNTER — TELEPHONE (OUTPATIENT)
Dept: ORTHOPEDICS | Facility: CLINIC | Age: 67
End: 2017-11-17

## 2017-11-17 VITALS — BODY MASS INDEX: 26.66 KG/M2 | WEIGHT: 180 LBS | HEIGHT: 69 IN

## 2017-11-17 DIAGNOSIS — M72.0 DUPUYTREN CONTRACTURE: Primary | ICD-10-CM

## 2017-11-17 NOTE — PROGRESS NOTES
Follow-up Dupuytren disease, bilateral hands. Status post right ring finger subtotal palmar fasciectomy. He is doing very well from that standpoint. No symptoms. Back to full use of the hand. He is complaining today of left hand ring finger Dupuytren's disease. Stiffness, and catches on objects. It bothers him when he is getting up off the floor. Denies numbness or tingling. No history of any trauma.    The past medical history is reviewed and documented in the chart including allergies, medications, medical diagnoses, surgical procedures and review of systems.    Physical examination of the right hand demonstrates a well-healed palmar incision in the ring finger. No significant scarring, no adhesions. He has full range of motion of the digit. No recurrence of disease.    Examination of the left hand demonstrates a significant Dupuytren's cord from the mid palm and the ring finger. This is causing a contracture of the MP joint of approximately 45 . Soft tissue, skin, is very mobile over the disease. He has a small cord into the middle finger as well but this is not causing the contracture of the middle finger.There is a palpable radial pulse and brisk capillary refill. No overlying skin changes, no erythema, no wounds or signs of infection. There is full sensation to light touch throughout. No motor deficits noted.    Impression: Left ring finger, middle finger Dupuytren's disease, contracture    Plan: We did discuss surgical versus nonsurgical treatment. He had a great outcome of the contralateral right hand. He like to proceed with a left ring finger, middle finger subtotal palmar fasciectomy.The risks risks, benefits, and rationale of the procedure were discussed with Frank and his wife today. Risks include but are not limited to recurrence, infection, bleeding, injuries to blood vessels, tendons, nerves and possibly need for more surgery. All questions were answered and arrangements will be made at their earliest  possible convenience.

## 2017-11-17 NOTE — MR AVS SNAPSHOT
After Visit Summary   11/17/2017    Frank Orellana    MRN: [id number]           Patient Information     Date Of Birth          1950        Visit Information        Provider Department      11/17/2017 11:20 AM Lars Oleary MD [facility]        Today's Diagnoses     Dupuytren contracture    -  1       Follow-ups after your visit        Your next 10 appointments already scheduled     Dec 06, 2017  1:00 PM CST   (Arrive by 12:45 PM)   PRE-OP with Vanessa Edwards MD   [facility] ([facility])    [address] [place] [address]  [address] [facility] [place] [address] [phone]            Dec 15, 2017   Procedure with Lars Oleary MD   [facility] ([facility])    [address] [place] [address]  [address] [facility] [place] [address] [phone]           Located in the Clinics and Surgery Center at [address] [place] [address].   parking is very convenient and highly recommended.  is a $6 flat rate fee.  Both  and self parkers should enter the main arrival plaza from [facility]; parking attendants will direct you based on your parking preference.            Jan 05, 2018 11:50 AM CST   (Arrive by 11:35 AM)   POST-OP HAND with Lars Oleary MD   [facility] ([facility])    [address] [place] [address]  [address] [facility] [place] [address] [phone]            May 10, 2018 11:00 AM CDT   Return Sleep Patient with Melissa Stanford MD   [facility], Sleep Study ([facility])    [address] [place] [id number] [phone]            Oct 11, 2018  7:30 AM CDT   RETURN RETINA with Ruth Boone MD   Eye Clinic ([facility])    [address]  [address] [facility]  [facility] 9a  [facility] [address]   [phone]             "  Who to contact     Please call your clinic at [phone] to:    Ask questions about your health    Make or cancel appointments    Discuss your medicines    Learn about your test results    Speak to your doctor   If you have compliments or concerns about an experience at your clinic, or if you wish to file a complaint, please contact [facility] Patient Relations at [phone] or email us at [email or web address]         Additional Information About Your Visit        GradeFundhart Information     [email or web address] gives you secure access to your electronic health record. If you see a primary care provider, you can also send messages to your care team and make appointments. If you have questions, please call your primary care clinic.  If you do not have a primary care provider, please call [phone] and they will assist you.      mValent is an electronic gateway that provides easy, online access to your medical records. With mValent, you can request a clinic appointment, read your test results, renew a prescription or communicate with your care team.     To access your existing account, please contact your [facility] or call [phone] for assistance.        Care EveryWhere ID     This is your Care EveryWhere ID. This could be used by other organizations to access your [place] medical records  [id number]        Your Vitals Were     Height BMI (Body Mass Index)                1.753 m (5' 9\") 26.58 kg/m2           Blood Pressure from Last 3 Encounters:   No data found for BP    Weight from Last 3 Encounters:   No data found for Wt              Today, you had the following     No orders found for display       Primary Care Provider Office Phone # Fax #    Vanessa Edwards [phone]       [address] [place] [address]  [facility] [address]        Equal Access to Services     AYDE PAPPAS AH: Hadii aad lavinia Virgen qaybta " jeyson lubin junebillie fredy hernández ahSupa Sotelo [facility] [phone].    ATENCIÓN: Si fabian moura, tiene a sebastian disposición servicios gratuitos de asistencia lingüística. Rowan al [phone].    We comply with applicable federal civil rights laws and [place] laws. We do not discriminate on the basis of race, color, national origin, age, disability, sex, sexual orientation, or gender identity.            Thank you!     Thank you for choosing [facility]  for your care. Our goal is always to provide you with excellent care. Hearing back from our patients is one way we can continue to improve our services. Please take a few minutes to complete the written survey that you may receive in the mail after your visit with us. Thank you!             Your Updated Medication List - Protect others around you: Learn how to safely use, store and throw away your medicines at www.disposemymeds.org.          This list is accurate as of: 11/17/17 11:59 PM.  Always use your most recent med list.                   Brand Name Dispense Instructions for use Diagnosis    alfuzosin 10 MG 24 hr tablet    UROXATRAL    90 tablet    TAKE 1 TABLET DAILY    Urinary frequency       aspirin 81 MG EC tablet     90 tablet    Take 1 tablet (81 mg) by mouth daily    Paroxysmal atrial fibrillation (H)       CARTIA  MG 24 hr capsule   Generic drug:  diltiazem     90 capsule    TAKE 1 CAPSULE DAILY    Paroxysmal atrial fibrillation (H)       HYDROcodone-acetaminophen 5-325 MG per tablet    NORCO    20 tablet    Take 1-2 tablets by mouth every 4 hours as needed for other (Moderate to Severe Pain)    Right inguinal hernia       order for DME      Reported on 5/4/2017        senna-docusate 8.6-50 MG per tablet    SENOKOT-S;PERICOLACE    30 tablet    Take 1-2 tablets by mouth 2 times daily Take while on oral narcotics to prevent or treat constipation.    Right inguinal hernia

## 2017-11-17 NOTE — NURSING NOTE
"Reason For Visit:   Chief Complaint   Patient presents with     RECHECK     Left hand dupuytrens contracture       Primary MD: Vanessa Edwards      Age: 67 year old    ?  No      Ht 1.753 m (5' 9\")  Wt 81.6 kg (180 lb)  BMI 26.58 kg/m2      Pain Assessment  Patient Currently in Pain: No    Hand Dominance Evaluation  Hand Dominance: Right      force  R hand  level 2 force: 36.3 kg (80 lb)  L hand  level  2 force: 36.3 kg (80 lb)    QuickDASH Assessment  QuickDA Main 10/23/2016   1.Open a tight or new jar. Mild difficulty   2. Do heavy household chores (e.g., wash walls, floors) No difficulty   3. Carry a shopping bag or briefcase. No difficulty   4. Wash your back. No difficulty   5. Use a knife to cut food. No difficulty   6. Recreational activities in which you take some force or impact through your arm, shoulder or hand (e.g., golf, hammering, tennis, etc.). No difficulty   7. During the past week, to what extent has your arm, shoulder or hand problem interfered with your normal social activities with family, friends, neighbours or groups? Slightly   8. During the past week, were you limited in your work or other regular daily activities as a result of your arm, shoulder or hand problem? Very limited   9. Arm, shoulder or hand pain. Mild   10.Tingling (pins and needles) in your arm,shoulder or hand. None   11. During the past week, how much difficulty have you had sleeping because of the pain in your arm, shoulder or hand? ([place] number) No difficulty   Quickdash Ability Score 13.63          Current Outpatient Prescriptions   Medication Sig Dispense Refill     HYDROcodone-acetaminophen (NORCO) 5-325 MG per tablet Take 1-2 tablets by mouth every 4 hours as needed for other (Moderate to Severe Pain) (Patient not taking: Reported on 11/17/2017) 20 tablet 0     senna-docusate (SENOKOT-S;PERICOLACE) 8.6-50 MG per tablet Take 1-2 tablets by mouth 2 times daily Take while on oral narcotics " to prevent or treat constipation. 30 tablet 0     CARTIA  MG 24 hr capsule TAKE 1 CAPSULE DAILY 90 capsule 3     alfuzosin (UROXATRAL) 10 MG 24 hr tablet TAKE 1 TABLET DAILY 90 tablet 3     order for DME Reported on 5/4/2017       aspirin 81 MG EC tablet Take 1 tablet (81 mg) by mouth daily 90 tablet 3       Allergies   Allergen Reactions     Blood Transfusion Related (Informational Only)      Adventism.  Declines blood transfusions.     Contrast Dye Rash       Dee Dee Purcell, ATC

## 2017-11-17 NOTE — NURSING NOTE
Teaching Flowsheet   Relevant Diagnosis:  Duputren's contracture  Teaching Topic:left ring and middle finger subtotal palmar fascictomy     Person(s) involved in teaching:   Patient     Motivation Level:  Asks Questions: Yes  Eager to Learn: Yes  Cooperative: Yes  Receptive (willing/able to accept information): Yes  Any cultural factors/Lutheran beliefs that may influence understanding or compliance? No     Patient demonstrates understanding of the following:  Reason for the appointment, diagnosis and treatment plan: Yes  Knowledge of proper use of medications and conditions for which they are ordered (with special attention to potential side effects or drug interactions): Yes  Which situations necessitate calling provider and whom to contact: Yes     Teaching Concerns Addressed:   Comments: Patient understands he will need to have an H&P completed within 30 days of surgery.     Nutritional needs and diet plan: Yes  Pain management techniques: Yes  Wound Care: Yes  How and/when to access community resources: Yes     Instructional Materials Used/Given: Pre-op packet given and reviewed with patient.  Antiseptic soap given.  He was given the date of 12/15/17 for surgery.  Surgery scheduler will contact him to verify date and time.     Time spent with patient: 15 minutes.

## 2017-11-17 NOTE — LETTER
11/17/2017       RE: Frank Orellana  [address]  [facility] [address]     Dear Colleague,    Thank you for referring your patient, Frank Orellana, to the [facility] at [facility]. Please see a copy of my visit note below.    Follow-up Dupuytren disease, bilateral hands. Status post right ring finger subtotal palmar fasciectomy. He is doing very well from that standpoint. No symptoms. Back to full use of the hand. He is complaining today of left hand ring finger Dupuytren's disease. Stiffness, and catches on objects. It bothers him when he is getting up off the floor. Denies numbness or tingling. No history of any trauma.    The past medical history is reviewed and documented in the chart including allergies, medications, medical diagnoses, surgical procedures and review of systems.    Physical examination of the right hand demonstrates a well-healed palmar incision in the ring finger. No significant scarring, no adhesions. He has full range of motion of the digit. No recurrence of disease.    Examination of the left hand demonstrates a significant Dupuytren's cord from the mid palm and the ring finger. This is causing a contracture of the MP joint of approximately 45 . Soft tissue, skin, is very mobile over the disease. He has a small cord into the middle finger as well but this is not causing the contracture of the middle finger.There is a palpable radial pulse and brisk capillary refill. No overlying skin changes, no erythema, no wounds or signs of infection. There is full sensation to light touch throughout. No motor deficits noted.    Impression: Left ring finger, middle finger Dupuytren's disease, contracture    Plan: We did discuss surgical versus nonsurgical treatment. He had a great outcome of the contralateral right hand. He like to proceed with a left ring finger, middle finger subtotal palmar fasciectomy.The risks risks, benefits, and  rationale of the procedure were discussed with Frank and his wife today. Risks include but are not limited to recurrence, infection, bleeding, injuries to blood vessels, tendons, nerves and possibly need for more surgery. All questions were answered and arrangements will be made at their earliest possible convenience.    Again, thank you for allowing me to participate in the care of your patient.      Sincerely,    Lars Oleary MD

## 2017-11-22 ASSESSMENT — ACTIVITIES OF DAILY LIVING (ADL)
IN_THE_PAST_7_DAYS,_DID_YOU_NEED_HELP_FROM_OTHERS_TO_PERFORM_EVERYDAY_ACTIVITIES_SUCH_AS_EATING,_GETTING_DRESSED,_GROOMING,_BATHING,_WALKING,_OR_USING_THE_TOILET: N
IN_THE_PAST_7_DAYS,_DID_YOU_NEED_HELP_FROM_OTHERS_TO_TAKE_CARE_OF_THINGS_SUCH_AS_LAUNDRY_AND_HOUSEKEEPING,_BANKING,_SHOPPING,_USING_THE_TELEPHONE,_FOOD_PREPARATION,_TRANSPORTATION,_OR_TAKING_YOUR_OWN_MEDICATIONS?: N

## 2017-12-06 ENCOUNTER — OFFICE VISIT (OUTPATIENT)
Dept: INTERNAL MEDICINE | Facility: CLINIC | Age: 67
End: 2017-12-06

## 2017-12-06 VITALS
WEIGHT: 181 LBS | SYSTOLIC BLOOD PRESSURE: 129 MMHG | BODY MASS INDEX: 26.73 KG/M2 | DIASTOLIC BLOOD PRESSURE: 77 MMHG | HEART RATE: 56 BPM | RESPIRATION RATE: 16 BRPM

## 2017-12-06 DIAGNOSIS — M72.0 DUPUYTREN'S CONTRACTURE OF BOTH HANDS: ICD-10-CM

## 2017-12-06 DIAGNOSIS — M79.642 PAIN IN BOTH HANDS: ICD-10-CM

## 2017-12-06 DIAGNOSIS — Z01.810 PRE-OPERATIVE CARDIOVASCULAR EXAMINATION: Primary | ICD-10-CM

## 2017-12-06 DIAGNOSIS — M79.641 PAIN IN BOTH HANDS: ICD-10-CM

## 2017-12-06 DIAGNOSIS — I48.0 PAROXYSMAL ATRIAL FIBRILLATION (H): ICD-10-CM

## 2017-12-06 DIAGNOSIS — G47.33 OSA (OBSTRUCTIVE SLEEP APNEA): ICD-10-CM

## 2017-12-06 DIAGNOSIS — B18.2 CHRONIC HEPATITIS C WITHOUT HEPATIC COMA (H): ICD-10-CM

## 2017-12-06 ASSESSMENT — PAIN SCALES - GENERAL: PAINLEVEL: NO PAIN (0)

## 2017-12-06 NOTE — MR AVS SNAPSHOT
After Visit Summary   12/6/2017    Frank Orellana    MRN: [id number]           Patient Information     Date Of Birth          1950        Visit Information        Provider Department      12/6/2017 1:00 PM Vanessa Edwards MD [facility]        Today's Diagnoses     Pre-operative cardiovascular examination    -  1    Dupuytren's contracture of both hands        Pain in both hands        Paroxysmal atrial fibrillation (H)        Refusal of blood transfusions as patient is Muslim        Chronic hepatitis C without hepatic coma (H)        WINNIE (obstructive sleep apnea) AHI 32          Care Instructions    Primary Care Center Phone Number [phone]  Primary Care Center Medication Refill Request Information:  * Please contact your pharmacy regarding ANY request for medication refills.  ** [facility] Prescription Fax = [phone]  * Please allow 3 business days for routine medication refills.  * Please allow 5 business days for controlled substance medication refills.     Primary Care Center Test Result notification information:  *You will be notified with in 7-10 days of your appointment day regarding the results of your test.  If you are on MyChart you will be notified as soon as the provider has reviewed the results and signed off on them.                  Follow-ups after your visit        Your next 10 appointments already scheduled     Dec 15, 2017   Procedure with Lars Oleary MD   [facility] ([facility])    [address] [place], [place] [id number] [phone]           Located in the Clinics and Surgery Center at [address] [place] [address].   parking is very convenient and highly recommended.  is a $6 flat rate fee.  Both  and self parkers should enter the main arrival plaza from [facility]; parking attendants will direct you based on your parking  preference.            Jan 05, 2018 11:50 AM CST   (Arrive by 11:35 AM)   POST-OP HAND with Lars Oleary MD   [facility] ([facility])    [address] [facility]  4th Floor  [facility] [id number] [phone]            May 10, 2018 11:00 AM CDT   Return Sleep Patient with Melissa Stanford MD   [facility] Sleep Study ([facility])    [address] [place] [id number] [phone]            Oct 11, 2018  7:30 AM CDT   RETURN RETINA with Ruth Boone MD   Eye Clinic ([facility])    [address]  [address] [facility]  [address] Clin 9a  [facility] [address] [phone]              Who to contact     Please call your clinic at [phone] to:    Ask questions about your health    Make or cancel appointments    Discuss your medicines    Learn about your test results    Speak to your doctor   If you have compliments or concerns about an experience at your clinic, or if you wish to file a complaint, please contact [facility] Patient Relations at [phone] or email us at [email or web address]         Additional Information About Your Visit        Pintail Technologieshart Information     [email or web address] gives you secure access to your electronic health record. If you see a primary care provider, you can also send messages to your care team and make appointments. If you have questions, please call your primary care clinic.  If you do not have a primary care provider, please call [phone] and they will assist you.      MapSense is an electronic gateway that provides easy, online access to your medical records. With MapSense, you can request a clinic appointment, read your test results, renew a prescription or communicate with your care team.     To access your existing account, please contact your [facility] or call  [phone] for assistance.        Care EveryWhere ID     This is your Care EveryWhere ID. This could be used by other organizations to access your [place] medical records  [id number]        Your Vitals Were     Pulse Respirations BMI (Body Mass Index)             56 16 26.73 kg/m2          Blood Pressure from Last 3 Encounters:   12/06/17 129/77   10/26/17 129/72   10/16/17 120/70    Weight from Last 3 Encounters:   12/06/17 82.1 kg (181 lb)   11/17/17 81.6 kg (180 lb)   10/26/17 81.6 kg (180 lb)              We Performed the Following     EKG Performed in Clinic w/ Provider Reading Fee          Today's Medication Changes          These changes are accurate as of: 12/6/17  1:38 PM.  If you have any questions, ask your nurse or doctor.               Stop taking these medicines if you haven't already. Please contact your care team if you have questions.     HYDROcodone-acetaminophen 5-325 MG per tablet   Commonly known as:  NORCO   Stopped by:  Vanessa Edwards MD           senna-docusate 8.6-50 MG per tablet   Commonly known as:  SENOKOT-S;PERICOLACE   Stopped by:  Vanessa Edwards MD                    Primary Care Provider Office Phone # Fax #    Vanessa Edwards [phone]       [address] [place] [address]        Equal Access to Services     AYDE PAPPAS AH: Hadjacob hayes Soguy, waaxda luqadaha, qaybta kaaljeyson salcido. So [facility] [phone].    ATENCIÓN: Si habla español, tiene a sebastian disposición servicios gratuitos de asistencia lingüística. Rowan al [phone].    We comply with applicable federal civil rights laws and [place] laws. We do not discriminate on the basis of race, color, national origin, age, disability, sex, sexual orientation, or gender identity.            Thank you!     Thank you for choosing [facility]  for your care. Our goal is always to provide you with excellent care.  Hearing back from our patients is one way we can continue to improve our services. Please take a few minutes to complete the written survey that you may receive in the mail after your visit with us. Thank you!             Your Updated Medication List - Protect others around you: Learn how to safely use, store and throw away your medicines at www.disposemymeds.org.          This list is accurate as of: 12/6/17  1:38 PM.  Always use your most recent med list.                   Brand Name Dispense Instructions for use Diagnosis    alfuzosin 10 MG 24 hr tablet    UROXATRAL    90 tablet    TAKE 1 TABLET DAILY    Urinary frequency       aspirin 81 MG EC tablet     90 tablet    Take 1 tablet (81 mg) by mouth daily    Paroxysmal atrial fibrillation (H)       CARTIA  MG 24 hr capsule   Generic drug:  diltiazem     90 capsule    TAKE 1 CAPSULE DAILY    Paroxysmal atrial fibrillation (H)       order for DME      Reported on 5/4/2017

## 2017-12-06 NOTE — PROGRESS NOTES
Rooming Note    Health Maintenance  Health Maintenance Due   Topic Date Due     URINE DRUG SCREEN Q1 YR  01/15/1965     Pre-Operative Information  [facility] PREOP PROCEDURE RS 11/22/2017   Date? 12/15/2017   Time? 2:10 PM   [facility]? [facility]   Surgeon? Lars Oleary   Surgical Procedure? Left Ring and Middle Finger Subtotal Palmar Fasciectomy     Gena Grissom CMA at 12:52 PM on 12/6/2017        Answers for HPI/ROS submitted by the patient on 11/22/2017   General Symptoms: No  Skin Symptoms: No  HENT Symptoms: No  EYE SYMPTOMS: No  HEART SYMPTOMS: No  LUNG SYMPTOMS: No  INTESTINAL SYMPTOMS: No  URINARY SYMPTOMS: No  REPRODUCTIVE SYMPTOMS: No  SKELETAL SYMPTOMS: No  BLOOD SYMPTOMS: No  NERVOUS SYSTEM SYMPTOMS: No  MENTAL HEALTH SYMPTOMS: No

## 2017-12-06 NOTE — PATIENT INSTRUCTIONS
Primary Care Center Phone Number [phone]  Primary Care Center Medication Refill Request Information:  * Please contact your pharmacy regarding ANY request for medication refills.  ** [facility] Prescription Fax = [phone]  * Please allow 3 business days for routine medication refills.  * Please allow 5 business days for controlled substance medication refills.     Primary Care Center Test Result notification information:  *You will be notified with in 7-10 days of your appointment day regarding the results of your test.  If you are on MyChart you will be notified as soon as the provider has reviewed the results and signed off on them.

## 2017-12-06 NOTE — PROGRESS NOTES
CC:  Preop    HPI:  I am seeing Frank Orellana at the requestion of Dr. Lars Oleary for preoperative cardiopulmonary risk assessment  Frank has a history of successful right ring finger subtotal palmar fasciectomy in Jan of this year. Now has stiffness, catching of the left hand ring finger Dupuytren's.  Can be difficult to use hand to e.g. Get up off the floor.  No prior trauma. No numbness, tingling.       Surgeon Role   Lars Oleary MD Primary    Procedure Laterality Anesthesia   RELEASE DUPUYTRENS CONTRACTURE Left Regional   Left Ring and Middle Finger Subtotal Palmar Fasciectomy            Cardiac risks:  None  Pulmonary risks:  WINNIE  Exercise tolerance:  Mets approx. 3  Personal and/or family history of bleeding disorder:  None, however, patient is a Gnosticist and does not accept blood transfusions.  Personal and/or family history of adverse anesthesia reactions:  None  Personal history of surgical complications:  None     Of note, he had open right inguinal hernia repair with mesh with Dr. Raymond in Oct of this year.  Now doing well.                    ROS:  General Symptoms: No  Skin Symptoms: No  HENT Symptoms: No  EYE SYMPTOMS: No  HEART SYMPTOMS: No  LUNG SYMPTOMS: No  INTESTINAL SYMPTOMS: No  URINARY SYMPTOMS: No  REPRODUCTIVE SYMPTOMS: No  SKELETAL SYMPTOMS: No  BLOOD SYMPTOMS: No  NERVOUS SYSTEM SYMPTOMS: No  MENTAL HEALTH SYMPTOMS: No    Patient Active Problem List   Diagnosis     History of actinic keratoses     Telangiectasia     SK (seborrheic keratosis)     Patient is Gnosticist     Refusal of blood transfusions as patient is Gnosticist     Paroxysmal atrial fibrillation (H)     Pain in both hands     Chronic left shoulder pain     Chronic hepatitis C without hepatic coma (H)     WINNIE (obstructive sleep apnea) AHI 32     Atrial fibrillation (H)     Tubular adenoma of colon     ACP (advance care planning)     Chronic hepatitis C (H)     Dupuytren's contracture of both  hands     Bilateral sensorineural hearing loss     Elevated PSA     Past Medical History:   Diagnosis Date     Actinic keratosis      Atrial fibrillation (H)     paroxysmal on 3-4 occasions     Atrial fibrillation (H) 6/15/96    Afib - 2 or 3 extended occurrences since     Chronic hepatitis C (H)     Chronic Hepatitis C,  genotype 1b                Stage 0 Fibrosis     Dupuytren's contracture of both hands      Elevated PSA      Hepatitis B 1969    acute infection at age 19; IV drug use     WINNIE (obstructive sleep apnea)     AHI 34.2     Pain in both hands      Patient is Uatsdin      Refusal of blood transfusions as patient is Uatsdin      Right shoulder pain      Tinnitus 1 or 2 years ago    both ears     Tubular adenoma of colon 17    ascending colon, 17     Past Surgical History:   Procedure Laterality Date     ARTHROSCOPY KNEE      left knee     COLONOSCOPY  16    tubular adenoma ascending colon     HERNIORRHAPHY INGUINAL Right 10/26/2017    Procedure: HERNIORRHAPHY INGUINAL;  Open Right Inguinal Hernia Repair with Mesh;  Surgeon: Suresh Raymond MD;  Location: [facility] OR     KNEE SURGERY  ??    torn meniscus     RELEASE DUPUYTRENS CONTRACTURE Right 2017    Procedure: RELEASE DUPUYTRENS CONTRACTURE;  Surgeon: Lars Oleary MD;  Location: [facility] OR     Family History   Problem Relation Age of Onset     Arthritis Paternal Grandmother      Rheumatoid Arthritis Paternal Grandmother      GASTROINTESTINAL DISEASE Father       age 77 after colon surgery     Alcohol/Drug Mother       age 64     HEART DISEASE Brother      Cardiac Sudden Death Brother      Social History     Social History     Marital status:      Spouse name: N/A     Number of children: N/A     Years of education: N/A     Occupational History     Not on file.     Social History Main Topics     Smoking status: Former Smoker     Packs/day: 0.50     Years: 10.00     Types: Cigarettes,  Other     Start date: 6/15/1964     Quit date: 7/1/1974     Smokeless tobacco: Former User     Alcohol use 1.8 - 2.4 oz/week      Comment: 4 or 5 drinks/week, limit of 1     Drug use: Yes     Special: Marijuana, Methamphetamines, IV      Comment: 1960's     Sexual activity: Yes     Partners: Female     Birth control/ protection: None     Other Topics Concern     Special Diet No     eats healthy     Exercise No     Social History Narrative    Social history:    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, followed by [employer]    Retired    Chemical manufacturing solvents    , no kids         Current Outpatient Prescriptions   Medication Sig Dispense Refill     CARTIA  MG 24 hr capsule TAKE 1 CAPSULE DAILY 90 capsule 3     alfuzosin (UROXATRAL) 10 MG 24 hr tablet TAKE 1 TABLET DAILY 90 tablet 3     order for DME Reported on 5/4/2017       aspirin 81 MG EC tablet Take 1 tablet (81 mg) by mouth daily (Patient not taking: Reported on 12/6/2017) 90 tablet 3     Allergies   Allergen Reactions     Blood Transfusion Related (Informational Only)      Jewish.  Declines blood transfusions.     Contrast Dye Rash     /77  Pulse 56  Resp 16  Wt 82.1 kg (181 lb)  BMI 26.73 kg/m2  Physical Examination:  General:  Pleasant, alert, no acute distress  Eyes:  Pupils 2-3 mm, sclera white, EOM's full.    Ears:  TM's normal, EAC's patent, clear of cerumen  Nose:  Nasal passages clear, turbinates not swollen  Throat/Mouth:  No pharyngeal erythema or exudate, oral mucosa and tongue moist, no suspicious oral lesions  Lungs:  Clear to auscultation throughout, no wheezes, rhonchi or rales.  C/V:  Regular rate and rhythm, no murmurs, rubs or gallops. No peripheral edema.   Abdomen:  Not distended.  Bowel sounds active.  No tenderness, no hepatosplenomegaly or masses.  No CVA tenderness or masses.  Right lower abd surgical scar healing nicely.  Lymph:  No cervical lymph nodes.  Neuro:  Alert and oriented, face symmetric. No  tremor.  Gait steady.    M/S:   Left hand dupuytren's contractures noted  Skin:   No rashes or jaundice.  Normal moisture, good skin turgor.  Psych:  Broad range affect.  Not psychomotor slowed.  No signs of anxiety or agitation.    EKG today (my read):  Bradycardia, rate 47, normal axis. Normal TX, QRS and QT intervals.  No ST-T changes.  No significant Q waves.  Compared with EKG 6/2017, no change    Component      Latest Ref Rng & Units 10/16/2017   Creatinine      0.66 - 1.25 mg/dL 0.81   GFR Estimate      >60 mL/min/1.7m2 >90   GFR Estimate If Black      >60 mL/min/1.7m2 >90   Hemoglobin      13.3 - 17.7 g/dL 13.9   Potassium      3.4 - 5.3 mmol/L 3.7       Frank was seen today for pre-op exam.    Diagnoses and all orders for this visit:    Pre-operative cardiovascular examination  Preoperative history and physical completed today for patient undergoing repair of left hand Dupuytren's contracture.  Frank has a low cardio pumonary risk profile for this low risk surgery.  May proceed with surgery    Dupuytren's contracture of left hand      Paroxysmal atrial fibrillation (H)  -     EKG Performed in Clinic w/ Provider Reading Fee    Refusal of blood transfusions as patient is Worship    Chronic hepatitis C without hepatic coma (H)  S/p treatment with Ledipasvir-sofosbuvir (Harvoni)    WINNIE (obstructive sleep apnea) AHI 32, stable    Follow up in one year and as needed.    Vanessa Edwards M.D.  Internal Medicine  [facility]   pager [phone]

## 2017-12-06 NOTE — NURSING NOTE
Chief Complaint   Patient presents with     Pre-Op Exam     pt is here for a pre op exam       Gena Grissom CMA at 12:54 PM on 12/6/2017

## 2017-12-08 LAB — INTERPRETATION ECG - MUSE: NORMAL

## 2017-12-14 ENCOUNTER — ANESTHESIA EVENT (OUTPATIENT)
Dept: SURGERY | Facility: AMBULATORY SURGERY CENTER | Age: 67
End: 2017-12-14

## 2017-12-15 ENCOUNTER — ANESTHESIA (OUTPATIENT)
Dept: SURGERY | Facility: AMBULATORY SURGERY CENTER | Age: 67
End: 2017-12-15

## 2017-12-15 ENCOUNTER — HOSPITAL ENCOUNTER (OUTPATIENT)
Facility: AMBULATORY SURGERY CENTER | Age: 67
End: 2017-12-15
Attending: ORTHOPAEDIC SURGERY
Payer: COMMERCIAL

## 2017-12-15 VITALS
OXYGEN SATURATION: 100 % | HEART RATE: 45 BPM | RESPIRATION RATE: 12 BRPM | DIASTOLIC BLOOD PRESSURE: 73 MMHG | TEMPERATURE: 97.1 F | SYSTOLIC BLOOD PRESSURE: 116 MMHG

## 2017-12-15 DIAGNOSIS — M72.0 PALMAR FASCIAL FIBROMATOSIS: Primary | ICD-10-CM

## 2017-12-15 RX ORDER — ONDANSETRON 2 MG/ML
4 INJECTION INTRAMUSCULAR; INTRAVENOUS EVERY 30 MIN PRN
Status: DISCONTINUED | OUTPATIENT
Start: 2017-12-15 | End: 2017-12-16 | Stop reason: HOSPADM

## 2017-12-15 RX ORDER — HYDROCODONE BITARTRATE AND ACETAMINOPHEN 5; 325 MG/1; MG/1
1-2 TABLET ORAL EVERY 4 HOURS PRN
Qty: 30 TABLET | Refills: 0 | Status: SHIPPED | OUTPATIENT
Start: 2017-12-15 | End: 2018-01-03

## 2017-12-15 RX ORDER — NALOXONE HYDROCHLORIDE 0.4 MG/ML
.1-.4 INJECTION, SOLUTION INTRAMUSCULAR; INTRAVENOUS; SUBCUTANEOUS
Status: DISCONTINUED | OUTPATIENT
Start: 2017-12-15 | End: 2017-12-16 | Stop reason: HOSPADM

## 2017-12-15 RX ORDER — ONDANSETRON 4 MG/1
4 TABLET, ORALLY DISINTEGRATING ORAL EVERY 30 MIN PRN
Status: DISCONTINUED | OUTPATIENT
Start: 2017-12-15 | End: 2017-12-16 | Stop reason: HOSPADM

## 2017-12-15 RX ORDER — LIDOCAINE HYDROCHLORIDE 20 MG/ML
INJECTION, SOLUTION INFILTRATION; PERINEURAL PRN
Status: DISCONTINUED | OUTPATIENT
Start: 2017-12-15 | End: 2017-12-15

## 2017-12-15 RX ORDER — HYDROCODONE BITARTRATE AND ACETAMINOPHEN 5; 325 MG/1; MG/1
1 TABLET ORAL
Status: DISCONTINUED | OUTPATIENT
Start: 2017-12-15 | End: 2017-12-16 | Stop reason: HOSPADM

## 2017-12-15 RX ORDER — BUPIVACAINE HYDROCHLORIDE 5 MG/ML
INJECTION, SOLUTION EPIDURAL; INTRACAUDAL PRN
Status: DISCONTINUED | OUTPATIENT
Start: 2017-12-15 | End: 2017-12-15

## 2017-12-15 RX ORDER — FLUMAZENIL 0.1 MG/ML
0.2 INJECTION, SOLUTION INTRAVENOUS
Status: DISCONTINUED | OUTPATIENT
Start: 2017-12-15 | End: 2017-12-15 | Stop reason: HOSPADM

## 2017-12-15 RX ORDER — NALOXONE HYDROCHLORIDE 0.4 MG/ML
.1-.4 INJECTION, SOLUTION INTRAMUSCULAR; INTRAVENOUS; SUBCUTANEOUS
Status: DISCONTINUED | OUTPATIENT
Start: 2017-12-15 | End: 2017-12-15 | Stop reason: HOSPADM

## 2017-12-15 RX ORDER — FENTANYL CITRATE 50 UG/ML
25-50 INJECTION, SOLUTION INTRAMUSCULAR; INTRAVENOUS EVERY 5 MIN PRN
Status: DISCONTINUED | OUTPATIENT
Start: 2017-12-15 | End: 2017-12-15 | Stop reason: HOSPADM

## 2017-12-15 RX ORDER — MEPERIDINE HYDROCHLORIDE 25 MG/ML
12.5 INJECTION INTRAMUSCULAR; INTRAVENOUS; SUBCUTANEOUS
Status: DISCONTINUED | OUTPATIENT
Start: 2017-12-15 | End: 2017-12-16 | Stop reason: HOSPADM

## 2017-12-15 RX ORDER — ONDANSETRON 2 MG/ML
INJECTION INTRAMUSCULAR; INTRAVENOUS PRN
Status: DISCONTINUED | OUTPATIENT
Start: 2017-12-15 | End: 2017-12-15

## 2017-12-15 RX ORDER — FENTANYL CITRATE 50 UG/ML
25-50 INJECTION, SOLUTION INTRAMUSCULAR; INTRAVENOUS
Status: DISCONTINUED | OUTPATIENT
Start: 2017-12-15 | End: 2017-12-15 | Stop reason: HOSPADM

## 2017-12-15 RX ORDER — SODIUM CHLORIDE, SODIUM LACTATE, POTASSIUM CHLORIDE, CALCIUM CHLORIDE 600; 310; 30; 20 MG/100ML; MG/100ML; MG/100ML; MG/100ML
INJECTION, SOLUTION INTRAVENOUS CONTINUOUS PRN
Status: DISCONTINUED | OUTPATIENT
Start: 2017-12-15 | End: 2017-12-15

## 2017-12-15 RX ORDER — SODIUM CHLORIDE, SODIUM LACTATE, POTASSIUM CHLORIDE, CALCIUM CHLORIDE 600; 310; 30; 20 MG/100ML; MG/100ML; MG/100ML; MG/100ML
INJECTION, SOLUTION INTRAVENOUS CONTINUOUS
Status: DISCONTINUED | OUTPATIENT
Start: 2017-12-15 | End: 2017-12-16 | Stop reason: HOSPADM

## 2017-12-15 RX ORDER — GABAPENTIN 300 MG/1
300 CAPSULE ORAL ONCE
Status: COMPLETED | OUTPATIENT
Start: 2017-12-15 | End: 2017-12-15

## 2017-12-15 RX ORDER — ACETAMINOPHEN 325 MG/1
975 TABLET ORAL ONCE
Status: COMPLETED | OUTPATIENT
Start: 2017-12-15 | End: 2017-12-15

## 2017-12-15 RX ORDER — PROPOFOL 10 MG/ML
INJECTION, EMULSION INTRAVENOUS CONTINUOUS PRN
Status: DISCONTINUED | OUTPATIENT
Start: 2017-12-15 | End: 2017-12-15

## 2017-12-15 RX ORDER — ACETAMINOPHEN 325 MG/1
650 TABLET ORAL
Status: DISCONTINUED | OUTPATIENT
Start: 2017-12-15 | End: 2017-12-16 | Stop reason: HOSPADM

## 2017-12-15 RX ORDER — DEXAMETHASONE SODIUM PHOSPHATE 4 MG/ML
INJECTION, SOLUTION INTRA-ARTICULAR; INTRALESIONAL; INTRAMUSCULAR; INTRAVENOUS; SOFT TISSUE PRN
Status: DISCONTINUED | OUTPATIENT
Start: 2017-12-15 | End: 2017-12-15

## 2017-12-15 RX ADMIN — FENTANYL CITRATE 25 MCG: 50 INJECTION, SOLUTION INTRAMUSCULAR; INTRAVENOUS at 13:44

## 2017-12-15 RX ADMIN — ACETAMINOPHEN 975 MG: 325 TABLET ORAL at 13:01

## 2017-12-15 RX ADMIN — ONDANSETRON 4 MG: 2 INJECTION INTRAMUSCULAR; INTRAVENOUS at 14:09

## 2017-12-15 RX ADMIN — SODIUM CHLORIDE, SODIUM LACTATE, POTASSIUM CHLORIDE, CALCIUM CHLORIDE: 600; 310; 30; 20 INJECTION, SOLUTION INTRAVENOUS at 14:06

## 2017-12-15 RX ADMIN — BUPIVACAINE HYDROCHLORIDE 28 ML: 5 INJECTION, SOLUTION EPIDURAL; INTRACAUDAL at 13:37

## 2017-12-15 RX ADMIN — LIDOCAINE HYDROCHLORIDE 60 MG: 20 INJECTION, SOLUTION INFILTRATION; PERINEURAL at 14:09

## 2017-12-15 RX ADMIN — GABAPENTIN 300 MG: 300 CAPSULE ORAL at 13:01

## 2017-12-15 RX ADMIN — PROPOFOL 50 MCG/KG/MIN: 10 INJECTION, EMULSION INTRAVENOUS at 14:09

## 2017-12-15 RX ADMIN — DEXAMETHASONE SODIUM PHOSPHATE 4 MG: 4 INJECTION, SOLUTION INTRA-ARTICULAR; INTRALESIONAL; INTRAMUSCULAR; INTRAVENOUS; SOFT TISSUE at 14:09

## 2017-12-15 ASSESSMENT — ENCOUNTER SYMPTOMS: DYSRHYTHMIAS: 1

## 2017-12-15 NOTE — IP AVS SNAPSHOT
[facility]    [address] [place] [address]    Phone:  [phone]    Fax:  [phone]                                       After Visit Summary   12/15/2017    Frank Orellana    MRN: [id number]           After Visit Summary Signature Page     I have received my discharge instructions, and my questions have been answered. I have discussed any challenges I see with this plan with the nurse or doctor.    ..........................................................................................................................................  Patient/Patient Representative Signature      ..........................................................................................................................................  Patient Representative Print Name and Relationship to Patient    ..................................................               ................................................  Date                                            Time    ..........................................................................................................................................  Reviewed by Signature/Title    ...................................................              ..............................................  Date                                                            Time

## 2017-12-15 NOTE — ANESTHESIA CARE TRANSFER NOTE
Patient: Frank Orellana    Procedure(s):  Left Ring and Middle Finger Subtotal Palmar Fasciectomy - Wound Class: I-Clean    Diagnosis: Dupuytrens  Diagnosis Additional Information: No value filed.    Anesthesia Type:   MAC     Note:  Airway :Face Mask  Patient transferred to:Phase II  Comments: 117/80 99% 97.4-46-16 Handoff Report: Identifed the Patient, Identified the Reponsible Provider, Reviewed the pertinent medical history, Discussed the surgical course, Reviewed Intra-OP anesthesia mangement and issues during anesthesia, Set expectations for post-procedure period and Allowed opportunity for questions and acknowledgement of understanding      Vitals: (Last set prior to Anesthesia Care Transfer)    CRNA VITALS  12/15/2017 1505 - 12/15/2017 1542      12/15/2017             Resp Rate (set): 10                Electronically Signed By: ANDREINA Benítez CRNA  December 15, 2017  3:42 PM

## 2017-12-15 NOTE — DISCHARGE INSTRUCTIONS
"[facility]  Home Care Following Anesthesia  For 24 hours after surgery:  1. Get plenty of rest.  A responsible adult must stay with you for at least 24 hours after you leave the surgery center.  2. Do not drive or use heavy equipment.  If you have weakness or tingling, don't drive or use heavy equipment until this feeling goes away.   3. Do not drink alcohol.   4. Avoid strenuous or risky activities.  Ask for help when climbing stairs.  5. You may feel lightheaded.  IF so, sit for a few minutes before standing.  Have someone help you get up.   6. If you have nausea (feel sick to your stomach): Drink only clear liquids such as apple juice, ginger ale, broth or 7-Up.  Rest may also help.  Be sure to drink enough fluids.  Move to a regular diet as you feel able.   7. You may have a slight fever.  Call the doctor if your fever is over 100 F (37.7 C) (taken under the tongue) or lasts longer than 24 hours.  8. You may have a dry mouth, a sore throat, muscle aches or trouble sleeping. These should go away after 24 hours.  9. Do not make important or legal decisions.      Today you received a Marcaine or bupivacaine block to numb the nerves near your surgery site.  This is a block using local anesthetic or \"numbing\" medication injected around the nerves to anesthetize or \"numb\" the area supplied by those nerves.  This block is injected into the muscle layer near your surgical site.  The medication may numb the location where you had surgery for 6-18 hours, but may last up to 24 hours.  If your surgical site is an arm or leg you should be careful with your affected limb, since it is possible to injure your limb without being aware of it due to the numbing.  Until full feeling returns, you should guard against bumping or hitting your limb, and avoid extreme hot or cold temperatures on the skin.  As the block wears off, the feeling will return as a tingling or prickly sensation near your " surgical site.  You will experience more discomfort from your incision as the feeling returns.  You may want to take a pain pill (a narcotic or Tylenol if this was prescribed by your surgeon) when you start to experience mild pain before the pain beccomes more severe.  If your pain medications do not control your pain you should notifiy your surgeon.    Tips for taking pain medications  To get the best pain relief possible, remember these points:    Take pain medications as directed, before pain becomes severe.    Pain medication can upset your stomach: taking it with food may help.    Constipation is a common side effect of pain medication. Drink plenty of  fluids.    Eat foods high in fiber. Take a stool softener if recommended by your doctor or pharmacist.    Do not drink alcohol, drive or operate machinery while taking pain medications.    Ask about other ways to control pain, such as with heat, ice or relaxation.    Tylenol/Acetaminophen Consumption  To help encourage the safe use of acetaminophen, the makers of TYLENOL  have lowered the maximum daily dose for single-ingredient Extra Strength TYLENOL  (acetaminophen) products sold in the [place] from 8 pills per day (4,000 mg) to 6 pills per day (3,000 mg). The dosing interval has also changed from 2 pills every 4-6 hours to 2 pills every 6 hours.    If you feel your pain relief is insufficient, you may take Tylenol/Acetaminophen in addition to your narcotic pain medication.     Be careful not to exceed 3,000 mg of Tylenol/Acetaminophen in a 24 hour period from all sources.    If you are taking extra strength Tylenol/acetaminophen (500 mg), the maximum dose is 6 tablets in 24 hours.    If you are taking regular strength acetaminophen (325 mg), the maximum dose is 9 tablets in 24 hours.    Call a doctor for any of the followin. Signs of infection (fever, growing tenderness at the surgery site, a large amount of drainage or bleeding, severe pain, foul-smelling  drainage, redness, swelling).  2. It has been over 8 to 10 hours since surgery and you are still not able to urinate (pass water).  Your doctor is:  Dr. Lars Oleary, Orthopaedics: [phone]                  Or dial [phone] and ask for the resident on call for:  Orthopaedics  For emergency care, call the:  [facility] Emergency Department: [phone] (TTY for hearing impaired: [phone])

## 2017-12-15 NOTE — IP AVS SNAPSHOT
MRN:[id number]                      After Visit Summary   12/15/2017    Frank Orellana    MRN: [id number]           Thank you!     Thank you for choosing [facility] for your care. Our goal is always to provide you with excellent care. Hearing back from our patients is one way we can continue to improve our services. Please take a few minutes to complete the written survey that you may receive in the mail after you visit with us. Thank you!        Patient Information     Date Of Birth          1950        About your hospital stay     You were admitted on:  December 15, 2017 [facility]    You were discharged on:  December 15, 2017       Who to Call     For medical emergencies, please call 911.  For non-urgent questions about your medical care, please call your primary care provider or clinic, [phone]  For questions related to your surgery, please call your surgery clinic        Attending Provider     Provider Specialty    Lars Oleary MD Hand Surgery       Primary Care Provider Office Phone # Fax #    Vanessa Edwards [phone]      After Care Instructions     Discharge Instructions       Review outpatient procedure discharge instructions with patient as directed by Provider            Dressing Change       Change dressing on third day after surgery.            Dressing Change        Daily and as needed            Dressing Change        IF leaking, remove and redress. Wash hands before and after and use gloves.            Ice to affected area       Ice pack to surgical site every 15 minutes per hour for 24 hours            Notify Provider       For signs and symptoms of infection: Fever greater than 101, redness, swelling, heat at site, drainage, pus.            Return to clinic       Return to clinic in 2 weeks            Shower        Cover dressing if dressing is not going to be changed today             Wound care       Do not immerse wound in water until sutures removed. Ok to let water run over incisions starting on POD 3 but no soaking or scrubbing.                  Your next 10 appointments already scheduled     Jan 05, 2018 11:50 AM CST   (Arrive by 11:35 AM)   POST-OP HAND with Lars Oleary MD   [facility] ([facility])    [address]  4th Floor  [place] [facility] [address]   [phone]            May 10, 2018 11:00 AM CDT   Return Sleep Patient with Melissa Stanford MD   [facility] [place], Sleep Study ([facility])    [address] [place] [id number]   [phone]            Oct 11, 2018  7:30 AM CDT   RETURN RETINA with Ruth Boone MD   [facility] ([facility])    [address]  [address]  [address]  [facility] [facility] [address]   [phone]              Further instructions from your care team       [facility]  Home Care Following Anesthesia  For 24 hours after surgery:  1. Get plenty of rest.  A responsible adult must stay with you for at least 24 hours after you leave the surgery center.  2. Do not drive or use heavy equipment.  If you have weakness or tingling, don't drive or use heavy equipment until this feeling goes away.   3. Do not drink alcohol.   4. Avoid strenuous or risky activities.  Ask for help when climbing stairs.  5. You may feel lightheaded.  IF so, sit for a few minutes before standing.  Have someone help you get up.   6. If you have nausea (feel sick to your stomach): Drink only clear liquids such as apple juice, ginger ale, broth or 7-Up.  Rest may also help.  Be sure to drink enough fluids.  Move to a regular diet as you feel able.   7. You may have a slight fever.  Call the doctor if your fever is over 100 F (37.7 C) (taken under the tongue) or lasts longer than 24 hours.  8. You  "may have a dry mouth, a sore throat, muscle aches or trouble sleeping. These should go away after 24 hours.  9. Do not make important or legal decisions.      Today you received a Marcaine or bupivacaine block to numb the nerves near your surgery site.  This is a block using local anesthetic or \"numbing\" medication injected around the nerves to anesthetize or \"numb\" the area supplied by those nerves.  This block is injected into the muscle layer near your surgical site.  The medication may numb the location where you had surgery for 6-18 hours, but may last up to 24 hours.  If your surgical site is an arm or leg you should be careful with your affected limb, since it is possible to injure your limb without being aware of it due to the numbing.  Until full feeling returns, you should guard against bumping or hitting your limb, and avoid extreme hot or cold temperatures on the skin.  As the block wears off, the feeling will return as a tingling or prickly sensation near your surgical site.  You will experience more discomfort from your incision as the feeling returns.  You may want to take a pain pill (a narcotic or Tylenol if this was prescribed by your surgeon) when you start to experience mild pain before the pain beccomes more severe.  If your pain medications do not control your pain you should notifiy your surgeon.    Tips for taking pain medications  To get the best pain relief possible, remember these points:    Take pain medications as directed, before pain becomes severe.    Pain medication can upset your stomach: taking it with food may help.    Constipation is a common side effect of pain medication. Drink plenty of  fluids.    Eat foods high in fiber. Take a stool softener if recommended by your doctor or pharmacist.    Do not drink alcohol, drive or operate machinery while taking pain medications.    Ask about other ways to control pain, such as with heat, ice or relaxation.    Tylenol/Acetaminophen " Consumption  To help encourage the safe use of acetaminophen, the makers of TYLENOL  have lowered the maximum daily dose for single-ingredient Extra Strength TYLENOL  (acetaminophen) products sold in the [place] from 8 pills per day (4,000 mg) to 6 pills per day (3,000 mg). The dosing interval has also changed from 2 pills every 4-6 hours to 2 pills every 6 hours.    If you feel your pain relief is insufficient, you may take Tylenol/Acetaminophen in addition to your narcotic pain medication.     Be careful not to exceed 3,000 mg of Tylenol/Acetaminophen in a 24 hour period from all sources.    If you are taking extra strength Tylenol/acetaminophen (500 mg), the maximum dose is 6 tablets in 24 hours.    If you are taking regular strength acetaminophen (325 mg), the maximum dose is 9 tablets in 24 hours.    Call a doctor for any of the followin. Signs of infection (fever, growing tenderness at the surgery site, a large amount of drainage or bleeding, severe pain, foul-smelling drainage, redness, swelling).  2. It has been over 8 to 10 hours since surgery and you are still not able to urinate (pass water).  Your doctor is:  Dr. Lars Oleary, Orthopaedics: [phone]                  Or dial [phone] and ask for the resident on call for:  Orthopaedics  For emergency care, call the:  [facility] Emergency Department: [phone] (TTY for hearing impaired: [phone])    Pending Results     No orders found from 2017 to 2017.            Admission Information     Date & Time Provider Department Dept. Phone    12/15/2017 Lars Oleary MD [facility] [phone]      Your Vitals Were     Blood Pressure Temperature Respirations Pulse Oximetry          125/71 97.8  F (36.6  C) (Temporal) 10 96%        MyChart Information     [facility] gives you secure access to your electronic health record. If you see a primary care provider, you can also send messages to your care team  and make appointments. If you have questions, please call your primary care clinic.  If you do not have a primary care provider, please call [phone] and they will assist you.      [employer] is an electronic gateway that provides easy, online access to your medical records. With [employer], you can request a clinic appointment, read your test results, renew a prescription or communicate with your care team.     To access your existing account, please contact your [facility] or call [phone] for assistance.        Care EveryWhere ID     This is your Care EveryWhere ID. This could be used by other organizations to access your [facility] medical records  [id number]        Equal Access to Services     AYDE PAPPAS : Mike Bravo, lavinia guzman, rafita ricci, jeyson evans. [facility] [phone].    ATENCIÓN: Si habla español, tiene a sebastian disposición servicios gratuitos de asistencia lingüística. [facility] [phone].    We comply with applicable federal civil rights laws and [place] laws. We do not discriminate on the basis of race, color, national origin, age, disability, sex, sexual orientation, or gender identity.               Review of your medicines      START taking        Dose / Directions    HYDROcodone-acetaminophen 5-325 MG per tablet   Commonly known as:  NORCO   Used for:  Palmar fascial fibromatosis        Dose:  1-2 tablet   Take 1-2 tablets by mouth every 4 hours as needed for other (Moderate to Severe Pain)   Quantity:  30 tablet   Refills:  0         CONTINUE these medicines which have NOT CHANGED        Dose / Directions    alfuzosin 10 MG 24 hr tablet   Commonly known as:  UROXATRAL   Used for:  Urinary frequency        TAKE 1 TABLET DAILY   Quantity:  90 tablet   Refills:  3       aspirin 81 MG EC tablet   Used for:  Paroxysmal atrial fibrillation (H)        Dose:  81 mg   Take 1 tablet (81 mg) by mouth  daily   Quantity:  90 tablet   Refills:  3       CARTIA  MG 24 hr capsule   Used for:  Paroxysmal atrial fibrillation (H)   Generic drug:  diltiazem        TAKE 1 CAPSULE DAILY   Quantity:  90 capsule   Refills:  3       order for DME        Reported on 5/4/2017   Refills:  0            Where to get your medicines      Some of these will need a paper prescription and others can be bought over the counter. Ask your nurse if you have questions.     Bring a paper prescription for each of these medications     HYDROcodone-acetaminophen 5-325 MG per tablet                Protect others around you: Learn how to safely use, store and throw away your medicines at www.disposemymeds.org.             Medication List: This is a list of all your medications and when to take them. Check marks below indicate your daily home schedule. Keep this list as a reference.      Medications           Morning Afternoon Evening Bedtime As Needed    alfuzosin 10 MG 24 hr tablet   Commonly known as:  UROXATRAL   TAKE 1 TABLET DAILY                                aspirin 81 MG EC tablet   Take 1 tablet (81 mg) by mouth daily                                CARTIA  MG 24 hr capsule   TAKE 1 CAPSULE DAILY   Generic drug:  diltiazem                                HYDROcodone-acetaminophen 5-325 MG per tablet   Commonly known as:  NORCO   Take 1-2 tablets by mouth every 4 hours as needed for other (Moderate to Severe Pain)                                order for DME   Reported on 5/4/2017

## 2017-12-15 NOTE — ANESTHESIA PROCEDURE NOTES
Peripheral Nerve Block Procedure Note    Staff:     Anesthesiologist:  JERILYN CRONIN    Referred By:  URSULA PORTER  Location: Pre-op  Procedure Start/Stop TImes:      12/15/2017 1:32 PM     12/15/2017 1:41 PM    patient identified, IV checked, site marked, risks and benefits discussed, informed consent, monitors and equipment checked, pre-op evaluation, at physician/surgeon's request and post-op pain management      Correct Patient: Yes      Correct Position: Yes      Correct Site: Yes      Correct Procedure: Yes      Correct Laterality:  Yes    Site Marked:  Yes  Procedure details:     Procedure:  Supraclavicular    ASA:  2    Laterality:  Left    Position:  Supine    Sterile Prep: chloraprep, mask and sterile gloves      Needle:  Insulated and short bevel    Needle gauge:  21    Needle length (inches):  4    Ultrasound: Yes      Ultrasound used to identify targeted nerve, plexus, or vascular structure and placed a needle adjacent to it      Permanent Image entered into patiient's record      Abnormal pain on injection: No      Blood Aspirated: No      Paresthesias:  No    Bleeding at site: No      Bolus via:  Needle    Infusion Method:  Single Shot    Complications:  None

## 2017-12-15 NOTE — PROGRESS NOTES
Brief Orthopaedic Op Note    Date of Service: 12/15/2017    Attending Surgeon: Lars Oleary MD  Resident Surgeons: WALKER Montanez,   Assistants: none    Pre-Op Diagnosis: Dupuytrens  Post-Op Diagnosis: same  Procedures: Procedure(s):  RELEASE DUPUYTRENS CONTRACTURE    EBL: 5 mL  Anesthesia: General endotracheal anesthesia  Blood Transfusion: none administered  Fluids: see anesthesia record  Urine Output: See Anesthesia Record  Drains: none  Specimens: palmar fascia  Implants: See dictated operative report for full details    Findings: none  Complications:  None  Condition: Stable to PACU  Comments: See Full Dictated Operative Report for Full Details         Assessment and Plan:    Frank Orellana is a 67 year old male status-post:    Left palmar fasciaectomy    - splint  - f/u 2 weeks      Aleks Montanez  PGY-4, Orthopaedic Surgery  [phone]

## 2017-12-15 NOTE — ANESTHESIA PREPROCEDURE EVALUATION
Anesthesia Evaluation     . Pt has had prior anesthetic. Type: General    No history of anesthetic complications          ROS/MED HX    ENT/Pulmonary:     (+)sleep apnea, , . .    Neurologic:  - neg neurologic ROS     Cardiovascular:     (+) ----. : . . . :. dysrhythmias a-fib, . Previous cardiac testing date:results:date: results:ECG reviewed date:12/06/17 results:Sinus bethanie (47) date: results:          METS/Exercise Tolerance:  3 - Able to walk 1-2 blocks without stopping   Hematologic:  - neg hematologic  ROS       Musculoskeletal:  - neg musculoskeletal ROS       GI/Hepatic:     (+) hepatitis type C,       Renal/Genitourinary:  - ROS Renal section negative       Endo:  - neg endo ROS       Psychiatric:  - neg psychiatric ROS       Infectious Disease:  - neg infectious disease ROS       Malignancy:      - no malignancy   Other:    - neg other ROS                 Physical Exam  Normal systems: cardiovascular, pulmonary and dental    Airway   Mallampati: II  TM distance: >3 FB  Neck ROM: full    Dental     Cardiovascular   Rhythm and rate: regular and normal      Pulmonary    breath sounds clear to auscultation                    Anesthesia Plan      History & Physical Review  History and physical reviewed and following examination; no interval change.    ASA Status:  2 .        Plan for MAC and Peripheral Nerve Block with Propofol induction. Maintenance will be TIVA.  Reason for MAC:  Deep or markedly invasive procedure (G8)  PONV prophylaxis:  Ondansetron (or other 5HT-3) and Dexamethasone or Solumedrol       Postoperative Care  Postoperative pain management:  Oral pain medications and Multi-modal analgesia.      Consents  Anesthetic plan, risks, benefits and alternatives discussed with:  Patient..                          .

## 2017-12-16 NOTE — ANESTHESIA POSTPROCEDURE EVALUATION
Patient: Frank Orellana    Procedure(s):  Left Ring and Middle Finger Subtotal Palmar Fasciectomy - Wound Class: I-Clean    Diagnosis:Dupuytrens  Diagnosis Additional Information: No value filed.    Anesthesia Type:  MAC    Note:  Anesthesia Post Evaluation    Patient location during evaluation: bedside  Patient participation: Able to fully participate in evaluation  Level of consciousness: awake  Pain management: adequate  Airway patency: patent  Cardiovascular status: acceptable  Respiratory status: acceptable  Hydration status: acceptable  PONV: controlled     Anesthetic complications: None          Last vitals:  Vitals:    12/15/17 1400 12/15/17 1540 12/15/17 1555   BP: 125/71 117/80 116/73   Pulse:  (!) 47 (!) 45   Resp: 10 12 12   Temp:  36.2  C (97.1  F) 36.2  C (97.1  F)   SpO2: 96% 100% 100%         Electronically Signed By: Enmanuel Patten MD, MD  December 15, 2017  6:25 PM

## 2017-12-17 NOTE — OP NOTE
DATE OF SERVICE:  12/15/2017      ATTENDING SURGEON:  Lars Oleary MD      RESIDENT PHYSICIAN:  Aleks Montanez MD, PGY-4      PREOPERATIVE DIAGNOSES:  Dupuytren's contracture of left ring finger.      POSTOPERATIVE DIAGNOSES:  Dupuytren's contracture of left ring finger.      PROCEDURES PERFORMED:  Subtotal palmar fasciectomy, left ring finger.      COMPLICATIONS:  None.      ESTIMATED BLOOD LOSS:  10 mL      IMPLANTS:  None.      INDICATIONS:  Frank Orellana is a 67-year-old male with a history of right-sided subtotal palmar fasciectomy for Dupuytren's contracture.  He presented to the clinic with Dr. Oleary with similar contractures of the left side.  After discussion regarding risks and benefits of surgery versus ongoing nonoperative management, the patient elected to proceed with Dupuytren's subtotal palmar fasciectomy on the left hand.  Risks and benefits were discussed extensively with the patient along with the potential nonoperative management options.  The patient again elected to proceed.       DESCRIPTION OF PROCEDURE:  The patient was met in the preoperative holding area and was identified per hospital policy.  A supraclavicular regional block was performed per the anesthesia providers.  Please see their note for full details.  The surgical consent was reviewed with the patient and markings were placed on the skin per hospital policy.  The patient was then transferred to the operating suite where he was placed supine on operating table.  MAC anesthesia was utilized for sedation.  The left upper extremity was prepped and draped in the usual sterile fashion.  A timeout was then performed in which all personnel in the operating suite were identified per hospital policy.  The extremity we operated on and procedure to be performed were identified utilizing preoperative history, consent and markings on the skin.  We then were set to initiate our procedure.  We did so by inflating our tourniquet to 250  mmHg after exsanguinating with an Esmarch bandage.  We then made a Robert style incision extending from the PIP palmar crease of the ring finger to approximately the proximal palmar crease of the hand where the palmar fascial band was noted to become prominent.  We dissected down sharply in the palm to the Dupuytren's cord superficially.  Utilizing combination of blunt and sharp dissection, we were able to identify and expose fully the Dupuytren's cord extending into the ring finger to the PIP joint area.  Again, utilizing a combination of blunt and sharp dissection, we were able to transect and elevate the Dupuytren's cord from palmar working distally.  We did take a considerable amount of time to fully identify and expose both radial and ulnar digital nerves and arteries ensuring they were not within our operative transection.  With these exposed we were able to fully remove the fascial thickening with all of its attachments.  This was done without complication.  It was noted that the ring finger was able to be straightened completely once this was resected.  We then released the tourniquet gaining hemostasis with bipolar cautery.  The digital arteries and nerves were both noted to be in continuity without evidence of trauma.  We then irrigated the wound copiously and closed with interrupted nylon sutures.  Dressings were applied.  The patient was placed in an ulnar gutter splint.  It should be noted the tourniquet was deflated prior to our closure.        POSTOPERATIVE CARE PLAN:  Patient was discharged home from the PACU today.  Follow up with Dr. Oleary in 2 weeks, at which point the wound will be evaluated.  Sutures likely removed.  He will remain in the ulnar gutter splint for immobilization to allow wound healing in the intervening time.  He will be discharged home with pain medication, which he should take as clearly prescribed.  There were no complications during the procedure today.  Dr. Oleary was  present and scrubbed for all critical portions of the case.         URSULA PORTER MD       As dictated by KRYSTAL SPEAR MD            D: 12/15/2017 18:47   T: 2017 09:15   MT: MARGAUX      Name:     CATHERINE OH   MRN:      [id number]        Account:        [id number]   :      1950           Procedure Date: 12/15/2017      Document: [id number]

## 2017-12-19 LAB — COPATH REPORT: NORMAL

## 2018-01-02 ENCOUNTER — MYC MEDICAL ADVICE (OUTPATIENT)
Dept: INTERNAL MEDICINE | Facility: CLINIC | Age: 68
End: 2018-01-02

## 2018-01-03 ENCOUNTER — OFFICE VISIT (OUTPATIENT)
Dept: FAMILY MEDICINE | Facility: CLINIC | Age: 68
End: 2018-01-03

## 2018-01-03 VITALS
HEART RATE: 106 BPM | WEIGHT: 180.8 LBS | SYSTOLIC BLOOD PRESSURE: 113 MMHG | OXYGEN SATURATION: 98 % | DIASTOLIC BLOOD PRESSURE: 70 MMHG | BODY MASS INDEX: 26.7 KG/M2

## 2018-01-03 DIAGNOSIS — I48.19 PERSISTENT ATRIAL FIBRILLATION (H): Primary | ICD-10-CM

## 2018-01-03 RX ORDER — DILTIAZEM HYDROCHLORIDE 240 MG/1
240 CAPSULE, COATED, EXTENDED RELEASE ORAL DAILY
Qty: 90 CAPSULE | Refills: 1 | Status: SHIPPED | OUTPATIENT
Start: 2018-01-03 | End: 2018-02-14

## 2018-01-03 ASSESSMENT — PAIN SCALES - GENERAL: PAINLEVEL: NO PAIN (0)

## 2018-01-03 NOTE — PATIENT INSTRUCTIONS
[facility]: [phone]     [facility] Medication Refill Request Information:  * Please contact your pharmacy regarding ANY request for medication refills.  ** [facility] Prescription Fax = [phone]  * Please allow 3 business days for routine medication refills.  * Please allow 5 business days for controlled substance medication refills.     [facility] Test Result notification information:  *You will be notified with in 7-10 days of your appointment day regarding the results of your test.  If you are on MyChart you will be notified as soon as the provider has reviewed the results and signed off on them.

## 2018-01-03 NOTE — NURSING NOTE
Chief Complaint   Patient presents with     Heart Problem     Patient is here to discuss his heart feeling out of rhythm since 1/28/17.      Kay Rand LPN at 1:54 PM on 1/3/2018.

## 2018-01-03 NOTE — NURSING NOTE
EKG preformed results to provider patient tolerated well.     Kay Rand LPN at 2:39 PM on 1/3/2018.

## 2018-01-03 NOTE — PROGRESS NOTES
[facility]  Primary Care Center   Vanda Ahumada MD PhD  1/3/2018     Chief Complaint:   Palpitations      History of Present Illness:   Frank Orellana is a 67 year old male with a history of paroxysmal atrial fibrillation, WINNIE, who presents for evaluation of palpitations.  The patient reports his heart started beating irregularly 6 days ago.  He was not exerting himself at the time.  He denies any chest pain or lightheadedness however does have some pressure in the left side of his chest when taking a deep breath.  He also is more fatigued and gets winded when walking.  He has had a handful of episodes of atrial fibrillation over the past 20 years with last episode occurring about 1 year ago.  Normally these his episodes last only a couple hours.  The longest episode was the first one which occurred in 1996, lasting a couple days before resolving spontaneously prior to a planned cardioversion.  He is on daily Aspirin and Diltiazem but has not been on Metoprolol since shortly after his initial episode 20 years ago.  EKG last month prior to surgery showed sinus rhythm.  He last saw cardiology 6/7/17.  He is not on anticoagulation as he is a [employer] and would not accept blood products therefore the risk of bleeding was thought to outweigh any benefits.    Echocardiogram 4/27/17:  Left ventricular function, chamber size, wall motion, and wall thickness are normal. The EF is 60-65%. Normal left ventricular filling for age.  Global right ventricular function is normal. Mild right ventricular dilation is present.  Mild aortic insufficiency is present.  Pulmonary artery systolic pressure is normal.  The inferior vena cava is normal. Estimated mean right atrial pressure is <3 mmHg.  Mild dilatation of the aorta is present. Ascending aorta 4.5 cm. Aortic diameter indexed to BSA is 2.3 cm/m2.  No pericardial effusion is present.     This study was compared with the study from 10/13/2016. There has been no  change.     Review of Systems:   A full 10-pt Review of Systems was performed, verified and is negative except for palpitations and fatigue and any other symptoms as documented above in the HPI.       Active Medications:      CARTIA  MG 24 hr capsule, TAKE 1 CAPSULE DAILY, Disp: 90 capsule, Rfl: 3     alfuzosin (UROXATRAL) 10 MG 24 hr tablet, TAKE 1 TABLET DAILY, Disp: 90 tablet, Rfl: 3     aspirin 81 MG EC tablet, Take 1 tablet (81 mg) by mouth daily, Disp: 90 tablet, Rfl: 3      Allergies:   Contrast dye - rash     Past Medical History:  Atrial fibrillation, paroxysmal   Chronic Hepatitis C   Obstructive sleep apnea   Dupuytren's contracture  Tubular adenoma of colon  Tinnitus   Sensorineural hearing loss  Actinic keratosis  Chronic shoulder pain  Elevated PSA     Past Surgical History:  Dupuytren's contracture release, left hand 12/15/17  Inguinal herniorrhaphy, right 10/26/17  Dupuytren's contracture release, right hand 1/20/17  Knee surgery 2006    Family History:   Sudden cardiac death - brother  Alcohol/drug abuse - mother  Rheumatoid arthritis - paternal grandmother      Social History:   Tobacco Use: Former smoker, 5 pack year history, quit 1964.   Alcohol Use: 4 - 5 drinks per week  Drug use: Remote history of methamphetamine and marijuana use.   PCP: Vanessa Edwards       Physical Exam:   /70  Pulse 106  Wt 82 kg (180 lb 12.8 oz)  SpO2 98%  BMI 26.7 kg/m2     General: NAD, alert and conversational, no diaphoresis, no acute SOB   Neck: No lymphadenopathy or palpable thyroid  Cardiovascular: Pulse 130 apically, irregular rhythm with no murmurs, rubs or gallops  Respiratory: Lungs CTA bilaterally with no wheezes or crackles  Legs no edema   Skin: No concerning lesions or rashes        EKG:  Indication: palpitations  Vent. Rate 82 bpm. WV interval 8 ms. QRS duration 110 ms. QT/QTc 392/457 ms. P-R-T axis 8 -5 45.   Atrial fibrillation with competing junctional pacemaker with premature  ventricular or aberrantly conducted complexes. Nonspecific T-wave abnormality. Abnormal ECG.    Read by Dr. Ahumada.     Assessment and Plan:  Persistent atrial fibrillation (H)  I spoke with cardiology.  Patient cannot be cardioverted as anticoagulation following this procedure is essentially contraindicated since he is a [employer].  Will increase Diltiazem to 240 mg daily.  Cardiology will contact the patient to schedule an appointment with them in the near future.  If his symptoms change or worsen or he develops new symptoms, he is to go to the ER for further evaluation.  - EKG 12-LEAD CLINIC READ  - diltiazem (CARDIZEM CD) 240 MG 24 hr capsule  Dispense: 90 capsule; Refill: 1  - CARDIOLOGY EVAL ADULT REFERRAL     Follow-up: Return in about 3 months (around 4/3/2018).         Scribe Disclosure:   I, Yani Alli, am serving as a scribe to document services personally performed by Vanda Ahumada MD PhD at this visit, based upon the provider's statements to me. All documentation has been reviewed by the aforementioned provider prior to being entered into the official medical record.     Portions of this medical record were completed by a scribe. UPON MY REVIEW AND AUTHENTICATION BY ELECTRONIC SIGNATURE, this confirms (a) I performed the applicable clinical services, and (b) the record is accurate.

## 2018-01-03 NOTE — TELEPHONE ENCOUNTER
Dr Edwards,   I have had an abnormal heart rhythm since Thursday, hoping it would clear on its own. It hasn't. Any ideas?   Frank Orellana              Really need some direction here. It has been 4-1/2 hours since my last post.  Thank you,  Frank Orellana

## 2018-01-03 NOTE — MR AVS SNAPSHOT
After Visit Summary   1/3/2018    Frank Orellana    MRN: [id number]           Patient Information     Date Of Birth          1950        Visit Information        Provider Department      1/3/2018 2:00 PM Vanda Ahumada MD [facility]        Today's Diagnoses     Persistent atrial fibrillation (H)    -  1      Care Instructions    Primary Care Center: [phone]     Primary Care Center Medication Refill Request Information:  * Please contact your pharmacy regarding ANY request for medication refills.  ** [facility] Prescription Fax = [phone]  * Please allow 3 business days for routine medication refills.  * Please allow 5 business days for controlled substance medication refills.     Primary Care Center Test Result notification information:  *You will be notified with in 7-10 days of your appointment day regarding the results of your test.  If you are on MyChart you will be notified as soon as the provider has reviewed the results and signed off on them.            Follow-ups after your visit        Additional Services     CARDIOLOGY EVAL ADULT REFERRAL       Your provider has referred you to:  [facility]: [facility] [phone]   [email or web address]    Please be aware that coverage of these services is subject to the terms and limitations of your health insurance plan.  Call member services at your health plan with any benefit or coverage questions.      Type of Referral:  Cardiology Follow Up    Timeframe requested:  1-3 Days  Talked to MD on call and she is aware of this pt - he is in persistent AF and should be seen in next few days.     Please bring the following to your appointment:  >>   Any x-rays, CTs or MRIs which have been performed.  Contact the facility where they were done to arrange for  prior to your scheduled appointment.    >>   List of current  medications  >>   This referral request   >>   Any documents/labs given to you for this referral                  Follow-up notes from your care team     Return in about 3 months (around 4/3/2018).      Your next 10 appointments already scheduled     Jan 05, 2018 11:50 AM CST   (Arrive by 11:35 AM)   POST-OP HAND with Lars Oleary MD   [facility] ([facility] [place])    [address]  4th Floor  [facility] [address] [phone]            Jan 06, 2018  9:00 AM CST   (Arrive by 8:45 AM)   New Patient Visit with Tray Hussein MD   [facility] ([facility])    [address]  [address]  [facility] [id number] [phone]            Apr 19, 2018  7:00 AM CDT   (Arrive by 6:45 AM)   Return Visit with Vanessa Edwards MD   [facility] ([facility] [place])    [address]  4th [facility] [address] [phone]            May 10, 2018 11:00 AM CDT   Return Sleep Patient with Melissa Stanford MD   [facility] ([facility])    [address] [place] [address] [phone]            Oct 11, 2018  7:30 AM CDT   RETURN RETINA with Ruth Boone MD   Eye Clinic ([facility])    [facility]  [address]  [address] [address]  [facility] [address] [phone]              Who to contact     Please call your clinic at [phone] to:    Ask questions about your health    Make or cancel appointments    Discuss your medicines    Learn about your test results    Speak to your doctor   If you have compliments or concerns about an experience at your clinic, or if you wish to file a complaint, please contact [facility] Patient Relations at [phone] or email us at [email or web address]         Additional Information  About Your Visit        [employer] Information     [employer] gives you secure access to your electronic health record. If you see a primary care provider, you can also send messages to your care team and make appointments. If you have questions, please call your primary care clinic.  If you do not have a primary care provider, please call [phone] and they will assist you.      [employer] is an electronic gateway that provides easy, online access to your medical records. With [employer], you can request a clinic appointment, read your test results, renew a prescription or communicate with your care team.     To access your existing account, please contact your [facility] or call [phone] for assistance.        Care EveryWhere ID     This is your Care EveryWhere ID. This could be used by other organizations to access your [place] medical records  [id number]        Your Vitals Were     Pulse Pulse Oximetry BMI (Body Mass Index)             106 98% 26.7 kg/m2          Blood Pressure from Last 3 Encounters:   01/03/18 113/70   12/15/17 116/73   12/06/17 129/77    Weight from Last 3 Encounters:   01/03/18 82 kg (180 lb 12.8 oz)   12/06/17 82.1 kg (181 lb)   11/17/17 81.6 kg (180 lb)              We Performed the Following     CARDIOLOGY EVAL ADULT REFERRAL     EKG 12-LEAD CLINIC READ          Today's Medication Changes          These changes are accurate as of: 1/3/18  2:49 PM.  If you have any questions, ask your nurse or doctor.               These medicines have changed or have updated prescriptions.        Dose/Directions    * CARTIA  MG 24 hr capsule   This may have changed:  Another medication with the same name was added. Make sure you understand how and when to take each.   Used for:  Paroxysmal atrial fibrillation (H)   Generic drug:  diltiazem   Changed by:  Jovanni Felipe MD        TAKE 1 CAPSULE DAILY   Quantity:  90 capsule   Refills:  3       * diltiazem 240 MG 24 hr  capsule   Commonly known as:  CARDIZEM CD   This may have changed:  You were already taking a medication with the same name, and this prescription was added. Make sure you understand how and when to take each.   Used for:  Persistent atrial fibrillation (H)   Changed by:  Vanda Ahumada MD PhD        Dose:  240 mg   Take 1 capsule (240 mg) by mouth daily   Quantity:  90 capsule   Refills:  1       * Notice:  This list has 2 medication(s) that are the same as other medications prescribed for you. Read the directions carefully, and ask your doctor or other care provider to review them with you.         Where to get your medicines      These medications were sent to [place], [place] - [address]  [address], [facility] [address]    Hours:  TRANSPLANT PHONE NUMBER [phone] Phone:  [phone]     diltiazem 240 MG 24 hr capsule                Primary Care Provider Office Phone # Fax #    Vanessa Edwards [phone]       [address] [place] [address]  [facility] [address]        Equal Access to Services     [facility]: Hadii aad ku hadasho Soomaali, waaxda luqadaha, qaybta kaalmada adeegyada, waxay apurva hernández . So [facility] [phone].    ATENCIÓN: Si habla español, tiene a sebastian disposición servicios gratuitos de asistencia lingüística. Llame al [phone].    We comply with applicable federal civil rights laws and [place] laws. We do not discriminate on the basis of race, color, national origin, age, disability, sex, sexual orientation, or gender identity.            Thank you!     Thank you for choosing [facility] PRIMARY [facility]ARE CLINIC  for your care. Our goal is always to provide you with excellent care. Hearing back from our patients is one way we can continue to improve our services. Please take a few minutes to complete the written survey that you may receive in the mail after your visit with us. Thank  you!             Your Updated Medication List - Protect others around you: Learn how to safely use, store and throw away your medicines at www.disposemymeds.org.          This list is accurate as of: 1/3/18  2:49 PM.  Always use your most recent med list.                   Brand Name Dispense Instructions for use Diagnosis    alfuzosin 10 MG 24 hr tablet    UROXATRAL    90 tablet    TAKE 1 TABLET DAILY    Urinary frequency       aspirin 81 MG EC tablet     90 tablet    Take 1 tablet (81 mg) by mouth daily    Paroxysmal atrial fibrillation (H)       * CARTIA  MG 24 hr capsule   Generic drug:  diltiazem     90 capsule    TAKE 1 CAPSULE DAILY    Paroxysmal atrial fibrillation (H)       * diltiazem 240 MG 24 hr capsule    CARDIZEM CD    90 capsule    Take 1 capsule (240 mg) by mouth daily    Persistent atrial fibrillation (H)       order for DME      Reported on 5/4/2017        * Notice:  This list has 2 medication(s) that are the same as other medications prescribed for you. Read the directions carefully, and ask your doctor or other care provider to review them with you.

## 2018-01-03 NOTE — PROGRESS NOTES
Rooming Note  Health Maintenance   Health Maintenance Due   Topic Date Due     URINE DRUG SCREEN Q1 YR  01/15/1964    Health maintenance items discussed and ordered/forwarded to PCP.    Kay Rand LPN at 2:04 PM on 1/3/2018.

## 2018-01-04 ENCOUNTER — PRE VISIT (OUTPATIENT)
Dept: CARDIOLOGY | Facility: CLINIC | Age: 68
End: 2018-01-04

## 2018-01-04 LAB — INTERPRETATION ECG - MUSE: NORMAL

## 2018-01-04 ASSESSMENT — ENCOUNTER SYMPTOMS
LEG PAIN: 0
HYPOTENSION: 0
SYNCOPE: 0
PALPITATIONS: 1
ORTHOPNEA: 0
EXERCISE INTOLERANCE: 0
HYPERTENSION: 0
LIGHT-HEADEDNESS: 0
SLEEP DISTURBANCES DUE TO BREATHING: 0

## 2018-01-04 NOTE — TELEPHONE ENCOUNTER
Pt called and continues with with irregular heart rate. SOB on and off with walking. Seen by Dr Ahumada 01/03/2017.   Clinic numbers given for questions or concerns.  Velma Eden RN 8:12 AM on 1/4/2018.

## 2018-01-05 ENCOUNTER — OFFICE VISIT (OUTPATIENT)
Dept: ORTHOPEDICS | Facility: CLINIC | Age: 68
End: 2018-01-05

## 2018-01-05 DIAGNOSIS — Z98.890 STATUS POST DUPUYTREN'S FASCIECTOMY: Primary | ICD-10-CM

## 2018-01-05 NOTE — MR AVS SNAPSHOT
After Visit Summary   1/5/2018    Frank Orellana    MRN: [id number]           Patient Information     Date Of Birth          1950        Visit Information        Provider Department      1/5/2018 11:50 AM Lars Oleary MD [facility]        Today's Diagnoses     Status post Dupuytren's fasciectomy    -  1       Follow-ups after your visit        Additional Services     HAND THERAPY       Hand Therapy Referral                  Your next 10 appointments already scheduled     Jan 12, 2018  7:00 AM CST   (Arrive by 6:45 AM)   LINA Hand with Rosana Yost OT   [facility] ([facility] [place])    [address]  4th [facility] [id number] [phone]            Apr 19, 2018  7:00 AM CDT   (Arrive by 6:45 AM)   Return Visit with Vanessa Edwards MD   [facility] ([facility] [place])    [address] [id number] [phone]            May 10, 2018 11:00 AM CDT   Return Sleep Patient with Melissa Stanford MD   [facility] ([facility])    [address] [place] [address] [phone]            Oct 11, 2018  7:30 AM CDT   RETURN RETINA with Ruth Boone MD   Eye Clinic ([facility])    J[address] Altman [address]  [address] [address]a  [facility] [address] [phone]              Who to contact     Please call your clinic at [phone] to:    Ask questions about your health    Make or cancel appointments    Discuss your medicines    Learn about your test results    Speak to your doctor   If you have compliments or concerns about an experience at your clinic, or if you wish to file a complaint, please contact [facility] Patient Relations at [phone] or email us at [email or web address]          Additional Information About Your Visit        [email or web address] Information     [employer] gives you secure access to your electronic health record. If you see a primary care provider, you can also send messages to your care team and make appointments. If you have questions, please call your primary care clinic.  If you do not have a primary care provider, please call [phone] and they will assist you.      [employer] is an electronic gateway that provides easy, online access to your medical records. With [employer], you can request a clinic appointment, read your test results, renew a prescription or communicate with your care team.     To access your existing account, please contact your [facility] or call [phone] for assistance.        Care EveryWhere ID     This is your Care EveryWhere ID. This could be used by other organizations to access your [place] medical records  [id number]         Blood Pressure from Last 3 Encounters:   01/06/18 115/74   01/03/18 113/70   12/15/17 116/73    Weight from Last 3 Encounters:   01/06/18 81.7 kg (180 lb 1.6 oz)   01/03/18 82 kg (180 lb 12.8 oz)   12/06/17 82.1 kg (181 lb)              We Performed the Following     HAND THERAPY        Primary Care Provider Office Phone # Fax #    Vanessa Edwards [phone]       [address] [place] [address]        Equal Access to Services     AYDE PAPPAS : Hadii sylvia corea hadasho Soguy, waaxda luqadaha, qaybta kaalmada jeyson ricci . So [facility] [phone].    ATENCIÓN: Si habla español, tiene a sebastian disposición servicios gratuitos de asistencia lingüística. Rowan al [phone].    We comply with applicable federal civil rights laws and [place] laws. We do not discriminate on the basis of race, color, national origin, age, disability, sex, sexual orientation, or gender identity.            Thank you!     Thank you for choosing [facility]  for your care. Our goal is always to provide you with excellent care. Hearing back from our patients is one way we can continue to improve our services. Please take a few minutes to complete the written survey that you may receive in the mail after your visit with us. Thank you!             Your Updated Medication List - Protect others around you: Learn how to safely use, store and throw away your medicines at www.disposemymeds.org.          This list is accurate as of: 1/5/18 11:59 PM.  Always use your most recent med list.                   Brand Name Dispense Instructions for use Diagnosis    alfuzosin 10 MG 24 hr tablet    UROXATRAL    90 tablet    TAKE 1 TABLET DAILY    Urinary frequency       aspirin 81 MG EC tablet     90 tablet    Take 1 tablet (81 mg) by mouth daily    Paroxysmal atrial fibrillation (H)       * CARTIA  MG 24 hr capsule   Generic drug:  diltiazem     90 capsule    TAKE 1 CAPSULE DAILY    Paroxysmal atrial fibrillation (H)       * diltiazem 240 MG 24 hr capsule    CARDIZEM CD    90 capsule    Take 1 capsule (240 mg) by mouth daily    Persistent atrial fibrillation (H)       order for DME      Reported on 5/4/2017        * Notice:  This list has 2 medication(s) that are the same as other medications prescribed for you. Read the directions carefully, and ask your doctor or other care provider to review them with you.

## 2018-01-05 NOTE — LETTER
1/5/2018       RE: Frank Orellana  [address]  [facility] [address]     Dear Colleague,    Thank you for referring your patient, Frank Orellana, to the [facility] at [facility]. Please see a copy of my visit note below.    Follow-up left hand subtotal palmar fasciectomy. Still is doing very well. No numbness or tingling. No fevers or chills.    Examination left hand demonstrates full extension, flexion. The wound is healing nicely. No signs of any infection but it is slightly indurated. No decreased sensation along the radial, ulnar aspect of the digit.There is a palpable radial pulse and brisk capillary refill. No overlying skin changes, no erythema, no wounds or signs of infection. There is full sensation to light touch throughout. No motor deficits noted.    Impression: Status post left ring finger subtotal palmar fasciectomy.    Plan: He is doing well. I think we can continue with active and passive range of motion. Sutures were removed today. Therapy would be helpful. He is given a referral today. He'll work on home exercise program. I'll see him back in 1 month to 6 weeks.      Sincerely,    Lars Oleary MD

## 2018-01-05 NOTE — NURSING NOTE
Reason For Visit:   Chief Complaint   Patient presents with     Surgical Followup     Left Ring and Middle Finger Subtotal Palmar Fasciectomy, DOS: 12-.        Primary MD: Vanessa Edwards    Age: 67 year old    ?  No        Pain Assessment  Patient Currently in Pain: Denies    Hand Dominance Evaluation  Hand Dominance: Right          QuickDASH Assessment  QuickDASH Main 10/23/2016   1.Open a tight or new jar. Mild difficulty   2. Do heavy household chores (e.g., wash walls, floors) No difficulty   3. Carry a shopping bag or briefcase. No difficulty   4. Wash your back. No difficulty   5. Use a knife to cut food. No difficulty   6. Recreational activities in which you take some force or impact through your arm, shoulder or hand (e.g., golf, hammering, tennis, etc.). No difficulty   7. During the past week, to what extent has your arm, shoulder or hand problem interfered with your normal social activities with family, friends, neighbours or groups? Slightly   8. During the past week, were you limited in your work or other regular daily activities as a result of your arm, shoulder or hand problem? Very limited   9. Arm, shoulder or hand pain. Mild   10.Tingling (pins and needles) in your arm,shoulder or hand. None   11. During the past week, how much difficulty have you had sleeping because of the pain in your arm, shoulder or hand? ([place] number) No difficulty   Quickdash Ability Score 13.63          Current Outpatient Prescriptions   Medication Sig Dispense Refill     diltiazem (CARDIZEM CD) 240 MG 24 hr capsule Take 1 capsule (240 mg) by mouth daily 90 capsule 1     CARTIA  MG 24 hr capsule TAKE 1 CAPSULE DAILY 90 capsule 3     alfuzosin (UROXATRAL) 10 MG 24 hr tablet TAKE 1 TABLET DAILY 90 tablet 3     order for DME Reported on 5/4/2017       aspirin 81 MG EC tablet Take 1 tablet (81 mg) by mouth daily 90 tablet 3       Allergies   Allergen Reactions     Blood Transfusion Related  (Informational Only)      Evangelical.  Declines blood transfusions.     Contrast Dye Kylee Maloney, STACYN

## 2018-01-05 NOTE — TELEPHONE ENCOUNTER
Previsit nursing summary    HPI: Referral from Dr. Vanda Ahumada for persistent AF. Patient is seen by Dr. Felipe for atrial fibrillation    Procedures:    ECHO (4/27/2017)  Interpretation Summary  Left ventricular function, chamber size, wall motion, and wall thickness are  normal. The EF is 60-65%. Normal left ventricular filling for age.  Global right ventricular function is normal. Mild right ventricular dilation  is present.  Mild aortic insufficiency is present.  Pulmonary artery systolic pressure is normal.  The inferior vena cava is normal. Estimated mean right atrial pressure is <3  mmHg.  Mild dilatation of the aorta is present. Ascending aorta 4.5 cm. Aortic  diameter indexed to BSA is 2.3 cm/m2.  No pericardial effusion is present.     This study was compared with the study from 10/13/2016. There has been no  change.    MRI of chest (12/27/2016)  Impression:  1.  Normal caliber of thoracic aorta. The ascending aorta measures 3.9  cm, which is normal when indexed for age/gender/size    Ziopatch (12/7/2016)      Us aorta (11/11/2016)  Impression:      Maximal AP diameter of the infrarenal abdominal aorta is 2.4, which is  within normal limits.    Holter 48 hours (10/31/2016)      ECHO (10/14/2016)  Interpretation Summary  Left ventricular function, chamber size, wall motion, and wall thickness are  normal. The EF is 55-60%.  Mild right ventricular dilation is present. Global right ventricular function  is normal.  Mild to moderate aortic insufficiency is present.  Pulmonary artery systolic pressure is normal.  The inferior vena cava is normal. The estimated mean right atrial pressure is  <3 mmHg.  The aorta is dilated and measures 45 mm at the level of the proximal  ascending segment (Z-score +3.75.) The aortic root, indexed to BSA is 22.41  mm/m2 (normal, men: 15+/-2 mm/m2; normal, women: 16+/-3 mm/m2.)  No pericardial effusion is present.    Myocardial stress test (8/9/2006)  Impressions:     1. Normal  study with a high degree of certainty.    2. There is no evidence of stress-induced ischemia and the target  heart rate was achieved.    3. Left ventricular size is normal at rest.  Transient ischemic  dilation of the left ventricle did not occur.    4. The left ventricular ejection fraction is 56 % and there are no  regional wall motion abnormalities.

## 2018-01-05 NOTE — PROGRESS NOTES
Follow-up left hand subtotal palmar fasciectomy. Still is doing very well. No numbness or tingling. No fevers or chills.    Examination left hand demonstrates full extension, flexion. The wound is healing nicely. No signs of any infection but it is slightly indurated. No decreased sensation along the radial, ulnar aspect of the digit.There is a palpable radial pulse and brisk capillary refill. No overlying skin changes, no erythema, no wounds or signs of infection. There is full sensation to light touch throughout. No motor deficits noted.    Impression: Status post left ring finger subtotal palmar fasciectomy.    Plan: He is doing well. I think we can continue with active and passive range of motion. Sutures were removed today. Therapy would be helpful. He is given a referral today. He'll work on home exercise program. I'll see him back in 1 month to 6 weeks.

## 2018-01-06 ENCOUNTER — OFFICE VISIT (OUTPATIENT)
Dept: CARDIOLOGY | Facility: CLINIC | Age: 68
End: 2018-01-06
Attending: INTERNAL MEDICINE
Payer: COMMERCIAL

## 2018-01-06 VITALS
HEIGHT: 69 IN | DIASTOLIC BLOOD PRESSURE: 74 MMHG | WEIGHT: 180.1 LBS | OXYGEN SATURATION: 97 % | SYSTOLIC BLOOD PRESSURE: 115 MMHG | HEART RATE: 78 BPM | BODY MASS INDEX: 26.67 KG/M2

## 2018-01-06 DIAGNOSIS — I48.19 PERSISTENT ATRIAL FIBRILLATION (H): ICD-10-CM

## 2018-01-06 DIAGNOSIS — I48.19 PERSISTENT ATRIAL FIBRILLATION (H): Primary | ICD-10-CM

## 2018-01-06 LAB
DIGOXIN SERPL-MCNC: <0.1 UG/L (ref 0.5–2)
INR PPP: 1.1 (ref 0.86–1.14)
MAGNESIUM SERPL-MCNC: 2.3 MG/DL (ref 1.6–2.3)
POTASSIUM SERPL-SCNC: 3.9 MMOL/L (ref 3.4–5.3)

## 2018-01-06 PROCEDURE — 93010 ELECTROCARDIOGRAM REPORT: CPT | Mod: ZP | Performed by: INTERNAL MEDICINE

## 2018-01-06 PROCEDURE — 80162 ASSAY OF DIGOXIN TOTAL: CPT | Performed by: INTERNAL MEDICINE

## 2018-01-06 PROCEDURE — 36415 COLL VENOUS BLD VENIPUNCTURE: CPT | Performed by: INTERNAL MEDICINE

## 2018-01-06 PROCEDURE — 85610 PROTHROMBIN TIME: CPT | Performed by: INTERNAL MEDICINE

## 2018-01-06 PROCEDURE — 83735 ASSAY OF MAGNESIUM: CPT | Performed by: INTERNAL MEDICINE

## 2018-01-06 PROCEDURE — 99214 OFFICE O/P EST MOD 30 MIN: CPT | Mod: ZP | Performed by: INTERNAL MEDICINE

## 2018-01-06 PROCEDURE — G0463 HOSPITAL OUTPT CLINIC VISIT: HCPCS | Mod: 25,ZF

## 2018-01-06 PROCEDURE — 84132 ASSAY OF SERUM POTASSIUM: CPT | Performed by: INTERNAL MEDICINE

## 2018-01-06 RX ORDER — POTASSIUM CHLORIDE 1500 MG/1
40 TABLET, EXTENDED RELEASE ORAL
Status: CANCELLED | OUTPATIENT
Start: 2018-01-06

## 2018-01-06 RX ORDER — POTASSIUM CHLORIDE 1500 MG/1
20 TABLET, EXTENDED RELEASE ORAL
Status: CANCELLED | OUTPATIENT
Start: 2018-01-06

## 2018-01-06 RX ORDER — DABIGATRAN ETEXILATE 150 MG/1
CAPSULE ORAL
Qty: 60 CAPSULE | Refills: 5 | Status: SHIPPED | OUTPATIENT
Start: 2018-01-06 | End: 2018-01-09

## 2018-01-06 RX ORDER — LIDOCAINE 40 MG/G
CREAM TOPICAL
Status: CANCELLED | OUTPATIENT
Start: 2018-01-06

## 2018-01-06 RX ORDER — MAGNESIUM SULFATE HEPTAHYDRATE 40 MG/ML
2 INJECTION, SOLUTION INTRAVENOUS
Status: CANCELLED | OUTPATIENT
Start: 2018-01-06

## 2018-01-06 ASSESSMENT — PAIN SCALES - GENERAL: PAINLEVEL: NO PAIN (0)

## 2018-01-06 NOTE — PROGRESS NOTES
CARDIOLOGY CONSULTATION    Referring Provider:  Vanda Ahumada  Primary Care Provider:   Vanessa Edwards  Indication for Consultation:  Paroxysmal atrial fibrillation    HPI: Frank Orellana is a 67 year old male, Mu-ism, being seen today for evaluation of paroxysmal atrial fibrillation.   The patient's risk factor profile is: (-) HTN, (-) DM, (-) hypercholesterolemia, (+)  prior 5 pack-year tobacco use [quit 1974], (+) fam Hx premature CAD [fraternal fatal MI age 69].     He reports a ~20 year history of paroxysmal atrial fibrillation. The first episode occurred at a picnic, when he was sitting down and began to feel an irregular hearbeat sensation and chest pressurse. He came to [facility] ED at that time. He had been planned for DCCV, but converted to sinus rhythm spontaneously.  He took metoprolol for 30 days, but did not have any further treatment at that time. He then had a second episode of A.fib ~10 years ago, when he had a mensical tear. He recalls 2-3 subsequent episodes in the intervening years, but A.fib was very rare until 2016, when palpitations became more frequent.     He was seen by Larissa Mcnamara for evaluation of palpitations on 10/31/16 and had an episode of paroxysmal palpitations at that time which was associated with an irregular pulse on physical exam, however his rhythm became regular before an ECG could be obtained. He underwent a 48-hour Holter monitor which showed variation between sinus rhythm, junctional rhythm, and A.fib (average HR 63, range ) with 1% PVCs and <1% supraventricular ectopic beats, with 33 runs of PAT vs. A.fib (longest 10 beats at 164 bpm.). On review of Holter, these did appear to represent short runs of atrial fibrillation.     He was then seen in the ED on 12/2/16 for palpitations, was found to be in A.fib with RVR~111 bpm. He was given 20 mg IV diltiazem and started on diltiazem  mg and ASA 81 mg daily, and discharged home. ED discharge ECG  shows rate-controlled A.fib, VR 61,  QTc 424. He states with certainty that he converted to NSR at 8:00 PM that evening (Friday, 12/2/16.) He has had several brief salvos of palpitations (lasting only a few seconds at at time), but none more protracted.     He had an ECHO in Apr 2017 showing normal LV function, and no significant valvular heart disease.  He had a negative stress nuclear study in 2006.    He had been feeling well until 10 days ago when he developed recurrent palpitations.  He describes hyperdynamic heart beat that was irregular.  He denies lightheadedness or syncope.  He has been fatigued with reduced exercise capacity.  He has noticed intermittent chest pain below the left nipple, lasting for up to 1-2 minutes in association with the palpitations.  He denies exertional chest discomfort.   The palpitations remain a nuissance.  He is concerned that he is unable to push himself.    The patient has no history of CAD, CHF, or valvular heart disease).  The patient has no Hx of PAD or cerebrovascular disease.  He has a 4.5cm mid-ascending aorta on TTE (10/14/16).  An MRA (12/27/16) showed normal ascending aorta measuring 3.9 cm so no further imaging was indicated.    The patient has been followed by Dr. Felipe, most recently in Dec 2016.  At that time, he noted the patient had a CHADS2-Vasc = 1 and he discussed thromboembolic prophylaxis, and settled on ASA 81 mg qd.  The patient's risk of embolic event was 1.5%.  He was continued on Diltiazem  mg qd  (recently increased from 180 mg CD qd).    He called his PCP and was referred to cardiology clinic.     PAST MEDICAL HISTORY:Past Medical History:   Diagnosis Date     Actinic keratosis 2009     Atrial fibrillation (H) 1996    paroxysmal on 3-4 occasions     Atrial fibrillation (H) 6/15/96    Afib - 2 or 3 extended occurrences since     Chronic hepatitis C (H)     Chronic Hepatitis C,  genotype 1b                Stage 0 Fibrosis     Dupuytren's  contracture of both hands      Elevated PSA      Hepatitis B 1969    acute infection at age 19; IV drug use     WINNIE (obstructive sleep apnea)     AHI 34.2     Pain in both hands      Patient is Pentecostal      Refusal of blood transfusions as patient is Pentecostal      Right shoulder pain      Tinnitus 1 or 2 years ago    both ears     Tubular adenoma of colon 17    ascending colon, 16       CURRENT MEDICATIONS:  Current Outpatient Prescriptions   Medication Sig Dispense Refill     diltiazem (CARDIZEM CD) 240 MG 24 hr capsule Take 1 capsule (240 mg) by mouth daily 90 capsule 1     alfuzosin (UROXATRAL) 10 MG 24 hr tablet TAKE 1 TABLET DAILY 90 tablet 3     aspirin 81 MG EC tablet Take 1 tablet (81 mg) by mouth daily 90 tablet 3     CARTIA  MG 24 hr capsule TAKE 1 CAPSULE DAILY (Patient not taking: Reported on 2018) 90 capsule 3     order for DME Reported on 2017         PAST SURGICAL HISTORY:  Past Surgical History:   Procedure Laterality Date     ARTHROSCOPY KNEE      left knee     COLONOSCOPY  16    tubular adenoma ascending colon     HERNIORRHAPHY INGUINAL Right 10/26/2017    Procedure: HERNIORRHAPHY INGUINAL;  Open Right Inguinal Hernia Repair with Mesh;  Surgeon: Suresh Raymond MD;  Location: [facility] OR     KNEE SURGERY  ??    torn meniscus     RELEASE DUPUYTRENS CONTRACTURE Right 2017    Procedure: RELEASE DUPUYTRENS CONTRACTURE;  Surgeon: Lars Oleary MD;  Location: [facility] OR     RELEASE DUPUYTRENS CONTRACTURE Left 12/15/2017    Procedure: RELEASE DUPUYTRENS CONTRACTURE;  Left Ring and Middle Finger Subtotal Palmar Fasciectomy;  Surgeon: Lars Oleary MD;  Location: [facility] OR       ALLERGIES  Blood transfusion related (informational only) and Contrast dye    FAMILY HX:  Family History   Problem Relation Age of Onset     Arthritis Paternal Grandmother      Rheumatoid Arthritis Paternal Grandmother      GASTROINTESTINAL DISEASE Father        "age 77 after colon surgery     Alcohol/Drug Mother       age 64     HEART DISEASE Brother      Cardiac Sudden Death Brother        SOCIAL HX:  Social History     Social History     Marital status:      Spouse name: N/A     Number of children: N/A     Years of education: N/A     Social History Main Topics     Smoking status: Former Smoker     Packs/day: 0.50     Years: 10.00     Types: Cigarettes, Other     Start date: 6/15/1964     Quit date: 1974     Smokeless tobacco: Former User     Alcohol use 1.8 - 2.4 oz/week      Comment: 4 or 5 drinks/week, limit of 1     Drug use: Yes     Special: Marijuana, Methamphetamines, IV      Comment: 's     Sexual activity: Yes     Partners: Female     Birth control/ protection: None     Other Topics Concern     Special Diet No     eats healthy     Exercise No     Social History Narrative    Social history:    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[employer], followed by [employer]    Retired    Chemical manufacturing solvents    , no kids           ROS:  Answers for HPI/ROS submitted by the patient on 2018   General Symptoms: No  Skin Symptoms: No  HENT Symptoms: No  EYE SYMPTOMS: No  HEART SYMPTOMS: Yes  LUNG SYMPTOMS: No  INTESTINAL SYMPTOMS: No  URINARY SYMPTOMS: No  REPRODUCTIVE SYMPTOMS: No  SKELETAL SYMPTOMS: No  BLOOD SYMPTOMS: No  NERVOUS SYSTEM SYMPTOMS: No  MENTAL HEALTH SYMPTOMS: No  Chest pain or pressure: Yes  Fast or irregular heartbeat: Yes  Pain in legs with walking: No  Trouble breathing while lying down: No  Fingers or toes appear blue: No  High blood pressure: No  Low blood pressure: No  Fainting: No  Murmurs: Yes  Pacemaker: No  Varicose veins: No  Edema or swelling: No  Wake up at night with shortness of breath: No  Light-headedness: No  Exercise intolerance: No  I reviewd the ROS on 18.    VITAL SIGNS:  /74 (BP Location: Left arm, Patient Position: Chair, Cuff Size: Adult Regular)  Pulse 78  Ht 1.753 m (5' 9\")  Wt 81.7 kg (180 lb 1.6 " oz)  SpO2 97%  BMI 26.6 kg/m2  Body mass index is 26.6 kg/(m^2).  Wt Readings from Last 2 Encounters:   18 81.7 kg (180 lb 1.6 oz)   18 82 kg (180 lb 12.8 oz)       PHYSICAL EXAM  Frank Orellana is a 67 year old male.in no acute distress.  HEENT: Eyes Nonicteric.  Neck: JVP normal.  Carotids +3/3 bilaterally without bruits.  Lungs: CTA.  Cor: RRR. Normal S1 and S2.  No murmur, rub, or gallop.  PMI in Lf 5th ICS.  Abd: Soft, nontender, nondistended.  NABS.  No pulsatile mass.  Extremities: No C/C/E.  Pulses +3/3 symmetric in upper and lower extremities.  Neuro: Grossly intact.  Psych: A&O x 3.  Skin: No rash.    LABS  Recent Labs   Lab Test  10/16/17   1418  17   1012  16   0227   WBC   --   4.7  5.5   HGB  13.9  13.4  14.5   HCT   --   40.1  42.4   PLT   --   214  184     Recent Labs   Lab Test  10/16/17   1418  17   0859  17   1012  17   1008   NA   --   140   --   143   POTASSIUM  3.7  3.7   --   4.1   CHLORIDE   --   108   --   111*   CO2   --   22   --   25   GLC   --   90  102*  122*   BUN   --   14   --   14   CR  0.81  0.85   --   0.78   VAL[place]   --   8.8   --   8.5     Recent Labs   Lab Test  10/06/16   1127  14   0945  13   1008   CHOL  172  163  188   HDL  56  46  54   LDL  100*  97  118   TRIG  81  102  79   CHOLHDLRATIO   --   3.5  3.5   NHDL  116   --    --         EK18  Atrial fibrillation, rate 73.  No ST shift, Q waves, TWI.      ECHO (2017)  Interpretation Summary  Left ventricular function, chamber size, wall motion, and wall thickness are normal. The EF is 60-65%. Normal left ventricular filling for age.  Global right ventricular function is normal. Mild right ventricular dilation is present.  Mild aortic insufficiency is present.  Pulmonary artery systolic pressure is normal.  The inferior vena cava is normal. Estimated mean right atrial pressure is <3 mmHg.  Mild dilatation of the aorta is present. Ascending aorta 4.5 cm.  Aortic diameter indexed to BSA is 2.3 cm/m2.  No pericardial effusion is present.  This study was compared with the study from 10/13/2016. There has been no change.    MRI of chest (12/27/2016)  Impression: Normal caliber of thoracic aorta. The ascending aorta measures 3.9 cm, which is normal when indexed for age/gender/size.      Ziopatch (12/7/2016)      Us aorta (11/11/2016)  Impression: Maximal AP diameter of the infrarenal abdominal aorta is 2.4, which is within normal limits.    Holter 48 hours (10/31/2016)      ECHO (10/14/2016)  Interpretation Summary  Left ventricular function, chamber size, wall motion, and wall thickness are normal. The EF is 55-60%.  Mild right ventricular dilation is present. Global right ventricular function is normal.  Mild to moderate aortic insufficiency is present.  Pulmonary artery systolic pressure is normal.  The inferior vena cava is normal. The estimated mean right atrial pressure is <3 mmHg.  The aorta is dilated and measures 45 mm at the level of the proximal ascending segment (Z-score +3.75.) The aortic root, indexed to BSA is 22.41 mm/m2 (normal, men: 15+/-2 mm/m2; normal, women: 16+/-3 mm/m2.)  No pericardial effusion is present.    Myocardial stress test (8/9/2006)  Impressions:     1. Normal study with a high degree of certainty.    2. There is no evidence of stress-induced ischemia and the target heart rate was achieved.    3. Left ventricular size is normal at rest.  Transient ischemic dilation of the left ventricle did not occur.    4. The left ventricular ejection fraction is 56 % and there are no regional wall motion abnormalities.      ASSESSMENT AND PLAN:   1. Atrial Fibrillation, Paroxysmal.  Mr. Orellana is an elderly gentleman with paroxysmal atrial fibrillation. From clinical history, the recent episode appears to have been present for the past 10 days and it is clearly impacting on his functional capacity.  I would favor electrical cardioversion.  However,  this will require anticoagulation and that poses a risk of blood transfusion in the setting of bleeding.  I had an extensive conversation with the patient and he would not accept blood or plasma (i.e. FFR for reversal) under any consideration but he can take Vitamin K.  I would avoid antiocoagulants such as Apixaban, Edoxaban, and Rivaroxaban since they can not be reversed that this time.  He could take coumadin or Pradaxa (Factor II inhibitor that can be emergently reversed with Praxbind).   The advantage of Pradaxa is that he will not have to wait several weeks to have therapeutic INR.  I would recommend 4 weeks minimum of Pradaxa after which it may be stopped given the CHADS2-Vasc = 1 and the risk of bleeding in the setting of him being a Baptism.    The patient has a CHADS2-Vasc score of 1, corresponding to a 1.3% annual stroke / systemic emolism event rate.  I had a detailed discussion with the patient in regard to antiplatelet and anticoagulant therapy.  Antiplatelet agents include aspirin and clopidogrel (Plavix). Anticoagulants include warfarin (Coumadin) and the noval oral anticoagulant agents: dabigatran (Pradaxa), rivaroxaban (Xarelto), and apixaban (Eliquis). Antiplatelet agents, as a class, do not provide adequate protection against stroke and systemic embolism and carry a significant risk of bleeding over the long term. Recently published guidelines (ESC) recommend anticoagulation and favor the novel oral anticoagulants over warfarin. The benefits of warfarin include (1) low cost, (2) established track record in reduction of stroke and systemic embolism, (3) the ability to reverse the agents, (4) the ability to titrate the agent, and (5) the ability to provide additional protection in subsets of patients who require anticoagulation for an independent process (i.e. prosthetic valve). The disadvantages of warfarin include (1) frequent phlebotomy for INRs, (2) difficulty in regulation of INR  [typically 60-65% of INRs within therapeutic range], and dietary constraints secondary to bioavailability. The benefits of the novel oral anticoagulants, as a class, include (1) no phlebotomy, (2) similar or significantly lower risk of stroke or systemic embolism depending on the agent, and (3) similar or significantly lower risk of bleeding, particularly intracranial bleeding. The disadvantages of the novel agents, as a class, include (1) higher cost, (2) the inability to reverse the agents, and (3) the need to be cautious in patients with CKD.   In regard to heart rate control, the patient's pulse is well controlled.  He can remain on Diltiazem 240 mg CD qd.    2. Thoracic Aortic Aneurysm.  This was ruled out by MRA.  No repeat imaging necessary.    3. Chest Discomfort.  The patient has had several episodes of chest discomfort that has atypical features.  I will need to stop the ASA for the cardioversion to avoid excessive bleeding risk on Pradaxa.  I will get a Lexiscan and if it is negative, we will stop ASA and then start Pradaxa for planned JIM guided cardioversion.  If the stress nuclear study is positive, he will need coronary angiography and the ASA will need to be continued and the Pradaxa will need to deferred.    FOLLOW UP:  JIM Guided Cardioversion    ADENOSINE NUCLEAR STUDY (1/9/18):   IMPRESSION:  Normal  myocardial SPECT study with a summed stress score of  zero. No ischemia or infarct identified. Normal LV function. No change from prior study.    JIM (1/18/18):   The left atrial appendage is normal. It is free of spontaneous echo contrastand thrombus.  Left ventricular function, chamber size, wall motion, and wall thickness are  normal.The EF is 60-65%.  Mild aortic insufficiency is present.  Mild dilatation of the aorta is present.  Ascending aorta 4.2 cm.  No pericardial effusion is present.  There has been no change.    Electrical Cardioversion (1/18/18):  Successful direct current cardioversion  "with one 150J biphasic shock.     ADDENDUM (4/29/19): Patient seen by Dr. Felipe.  \"stop sotalol and start flecainide 50 mg BID with exercise EKG stress test per usual protocol in two weeks after initiation. Would wait 48 hours between last dose of sotalol and first dose of flecainide. Given discontinuation of beta-blocker, will start low dose diltiazem 120 mg SR as well.\"    Tray Hussein MD    Divisions of Cardiology  [facility]  Patient Care Team:  Vanessa Edwards MD as PCP - General (Internal Medicine)  Artur Grullon MD as MD (Family Practice)  Meggan Brar RN as Nurse Coordinator (Clinical Cardiac Electrophysiology)  Jovanni Felipe MD as MD (Clinical Cardiac Electrophysiology)  Suresh Raymond MD as MD (Surgery)  Lars Oleary MD as MD (Hand Surgery)  Tray Hussein MD as MD (Internal Medicine - Interventional Cardiology)  TIM JOSEPH  "

## 2018-01-06 NOTE — NURSING NOTE
Chief Complaint   Patient presents with     New Patient     referral from Vanda Ahumada MD for persistant afib/EKG     Vitals were taken and medications were reconciled. EKG was performed.    ROX Duckworth  8:53 AM

## 2018-01-06 NOTE — NURSING NOTE
Cardiac Testing: Patient given instructions regarding  transesophageal echocardiogram and nm stress test. Discussed purpose, preparation, procedure and when to expect results reported back to the patient. Patient demonstrated understanding of this information and agreed to call with further questions or concerns.  Diet: Patient instructed regarding a heart healthy diet, including discussion of reduced fat and sodium intake. Patient demonstrated understanding of this information and agreed to call with further questions or concerns.  Med Reconcile: Reviewed and verified all current medications with the patient. The updated medication list was printed and given to the patient.  New Medication: Patient was educated regarding newly prescribed medication, including discussion of  the indication, administration, side effects, and when to report to MD or RN. Patient demonstrated understanding of this information and agreed to call with further questions or concerns.  Return Appointment: Patient given instructions regarding scheduling next clinic visit. Patient demonstrated understanding of this information and agreed to call with further questions or concerns.  Medication Change: Patient was educated regarding prescribed medication change, including discussion of the indication, administration, side effects, and when to report to MD or RN. Patient demonstrated understanding of this information and agreed to call with further questions or concerns.  Patient stated he understood all health information given and agreed to call with further questions or concerns.

## 2018-01-06 NOTE — LETTER
1/6/2018      RE: Frank Orellana  [address]  [facility] [place] [address]       Dear Colleague,    Thank you for the opportunity to participate in the care of your patient, Frank Orellana, at the [facility] at [facility]. Please see a copy of my visit note below.    CARDIOLOGY CONSULTATION    Referring Provider:  Vanda Ahumada  Primary Care Provider:   Vanessa Edwards  Indication for Consultation:  Paroxysmal atrial fibrillation    HPI: Frank Orellana is a 67 year old male, Taoist, being seen today for evaluation of paroxysmal atrial fibrillation.   The patient's risk factor profile is: (-) HTN, (-) DM, (-) hypercholesterolemia, (+)  prior 5 pack-year tobacco use [quit 1974], (+) fam Hx premature CAD [fraternal fatal MI age 69].     He reports a ~20 year history of paroxysmal atrial fibrillation. The first episode occurred at a picnic, when he was sitting down and began to feel an irregular hearbeat sensation and chest pressurse. He came to [facility] ED at that time. He had been planned for DCCV, but converted to sinus rhythm spontaneously.  He took metoprolol for 30 days, but did not have any further treatment at that time. He then had a second episode of A.fib ~10 years ago, when he had a mensical tear. He recalls 2-3 subsequent episodes in the intervening years, but A.fib was very rare until 2016, when palpitations became more frequent.     He was seen by Larissa Mcnamara for evaluation of palpitations on 10/31/16 and had an episode of paroxysmal palpitations at that time which was associated with an irregular pulse on physical exam, however his rhythm became regular before an ECG could be obtained. He underwent a 48-hour Holter monitor which showed variation between sinus rhythm, junctional rhythm, and A.fib (average HR 63, range ) with 1% PVCs and <1% supraventricular ectopic beats, with 33 runs of PAT vs. A.fib (longest 10 beats at 164  bpm.). On review of Holter, these did appear to represent short runs of atrial fibrillation.     He was then seen in the ED on 12/2/16 for palpitations, was found to be in A.fib with RVR~111 bpm. He was given 20 mg IV diltiazem and started on diltiazem  mg and ASA 81 mg daily, and discharged home. ED discharge ECG shows rate-controlled A.fib, VR 61,  QTc 424. He states with certainty that he converted to NSR at 8:00 PM that evening (Friday, 12/2/16.) He has had several brief salvos of palpitations (lasting only a few seconds at at time), but none more protracted.     He had an ECHO in Apr 2017 showing normal LV function, and no significant valvular heart disease.  He had a negative stress nuclear study in 2006.    He had been feeling well until 10 days ago when he developed recurrent palpitations.  He describes hyperdynamic heart beat that was irregular.  He denies lightheadedness or syncope.  He has been fatigued with reduced exercise capacity.  He has noticed intermittent chest pain below the left nipple, lasting for up to 1-2 minutes in association with the palpitations.  He denies exertional chest discomfort.   The palpitations remain a nuissance.  He is concerned that he is unable to push himself.    The patient has no history of CAD, CHF, or valvular heart disease).  The patient has no Hx of PAD or cerebrovascular disease.  He has a 4.5cm mid-ascending aorta on TTE (10/14/16).  An MRA (12/27/16) showed normal ascending aorta measuring 3.9 cm so no further imaging was indicated.    The patient has been followed by Dr. Felipe, most recently in Dec 2016.  At that time, he noted the patient had a CHADS2-Vasc = 1 and he discussed thromboembolic prophylaxis, and settled on ASA 81 mg qd.  The patient's risk of embolic event was 1.5%.  He was continued on Diltiazem  mg qd  (recently increased from 180 mg CD qd).    He called his PCP and was referred to cardiology clinic.     PAST MEDICAL  HISTORY:Past Medical History:   Diagnosis Date     Actinic keratosis 2009     Atrial fibrillation (H) 1996    paroxysmal on 3-4 occasions     Atrial fibrillation (H) 6/15/96    Afib - 2 or 3 extended occurrences since     Chronic hepatitis C (H)     Chronic Hepatitis C,  genotype 1b                Stage 0 Fibrosis     Dupuytren's contracture of both hands      Elevated PSA      Hepatitis B 1969    acute infection at age 19; IV drug use     WINNIE (obstructive sleep apnea)     AHI 34.2     Pain in both hands      Patient is Taoist      Refusal of blood transfusions as patient is Taoist      Right shoulder pain      Tinnitus 1 or 2 years ago    both ears     Tubular adenoma of colon 1/17/17    ascending colon, 1/17/17       CURRENT MEDICATIONS:  Current Outpatient Prescriptions   Medication Sig Dispense Refill     diltiazem (CARDIZEM CD) 240 MG 24 hr capsule Take 1 capsule (240 mg) by mouth daily 90 capsule 1     alfuzosin (UROXATRAL) 10 MG 24 hr tablet TAKE 1 TABLET DAILY 90 tablet 3     aspirin 81 MG EC tablet Take 1 tablet (81 mg) by mouth daily 90 tablet 3     CARTIA  MG 24 hr capsule TAKE 1 CAPSULE DAILY (Patient not taking: Reported on 1/6/2018) 90 capsule 3     order for DME Reported on 5/4/2017         PAST SURGICAL HISTORY:  Past Surgical History:   Procedure Laterality Date     ARTHROSCOPY KNEE      left knee     COLONOSCOPY  1/17/17    tubular adenoma ascending colon     HERNIORRHAPHY INGUINAL Right 10/26/2017    Procedure: HERNIORRHAPHY INGUINAL;  Open Right Inguinal Hernia Repair with Mesh;  Surgeon: Suresh Raymond MD;  Location: [facility] OR     KNEE SURGERY  2006??    torn meniscus     RELEASE DUPUYTRENS CONTRACTURE Right 1/20/2017    Procedure: RELEASE DUPUYTRENS CONTRACTURE;  Surgeon: Lars Oleary MD;  Location: [facility] OR     RELEASE DUPUYTRENS CONTRACTURE Left 12/15/2017    Procedure: RELEASE DUPUYTRENS CONTRACTURE;  Left Ring and Middle Finger Subtotal Palmar  "Fasciectomy;  Surgeon: Lars Oleary MD;  Location: [facility] OR       ALLERGIES  Blood transfusion related (informational only) and Contrast dye    FAMILY HX:  Family History   Problem Relation Age of Onset     Arthritis Paternal Grandmother      Rheumatoid Arthritis Paternal Grandmother      GASTROINTESTINAL DISEASE Father       age 77 after colon surgery     Alcohol/Drug Mother       age 64     HEART DISEASE Brother      Cardiac Sudden Death Brother        SOCIAL HX:  Social History     Social History     Marital status:      Spouse name: N/A     Number of children: N/A     Years of education: N/A     Social History Main Topics     Smoking status: Former Smoker     Packs/day: 0.50     Years: 10.00     Types: Cigarettes, Other     Start date: 6/15/1964     Quit date: 1974     Smokeless tobacco: Former User     Alcohol use 1.8 - 2.4 oz/week      Comment: 4 or 5 drinks/week, limit of 1     Drug use: Yes     Special: Marijuana, Methamphetamines, IV      Comment: 's     Sexual activity: Yes     Partners: Female     Birth control/ protection: None     Other Topics Concern     Special Diet No     eats healthy     Exercise No     Social History Narrative    Social history:    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, followed by [employer]    Retired    Chemical manufacturing solvents    , no kids           ROS:      VITAL SIGNS:  /74 (BP Location: Left arm, Patient Position: Chair, Cuff Size: Adult Regular)  Pulse 78  Ht 1.753 m (5' 9\")  Wt 81.7 kg (180 lb 1.6 oz)  SpO2 97%  BMI 26.6 kg/m2  Body mass index is 26.6 kg/(m^2).  Wt Readings from Last 2 Encounters:   18 81.7 kg (180 lb 1.6 oz)   18 82 kg (180 lb 12.8 oz)       PHYSICAL EXAM  Frank Orellana is a 67 year old male.in no acute distress.  HEENT: Eyes Nonicteric.  Neck: JVP normal.  Carotids +3/3 bilaterally without bruits.  Lungs: CTA.  Cor: RRR. Normal S1 and S2.  No murmur, rub, or gallop.  PMI in Lf 5th ICS.  Abd: " Soft, nontender, nondistended.  NABS.  No pulsatile mass.  Extremities: No C/C/E.  Pulses +3/3 symmetric in upper and lower extremities.  Neuro: Grossly intact.  Psych: A&O x 3.  Skin: No rash.    LABS  Recent Labs   Lab Test  10/16/17   1418  17   1012  16   0227   WBC   --   4.7  5.5   HGB  13.9  13.4  14.5   HCT   --   40.1  42.4   PLT   --   214  184     Recent Labs   Lab Test  10/16/17   1418  17   0859  17   1012  17   1008   NA   --   140   --   143   POTASSIUM  3.7  3.7   --   4.1   CHLORIDE   --   108   --   111*   CO2   --   22   --   25   GLC   --   90  102*  122*   BUN   --   14   --   14   CR  0.81  0.85   --   0.78   VALENTINA   --   8.8   --   8.5     Recent Labs   Lab Test  10/06/16   1127  14   0945  13   1008   CHOL  172  163  188   HDL  56  46  54   LDL  100*  97  118   TRIG  81  102  79   CHOLHDLRATIO   --   3.5  3.5   NHDL  116   --    --         EK18  Atrial fibrillation, rate 73.  No ST shift, Q waves, TWI.      ECHO (2017)  Interpretation Summary  Left ventricular function, chamber size, wall motion, and wall thickness are normal. The EF is 60-65%. Normal left ventricular filling for age.  Global right ventricular function is normal. Mild right ventricular dilation is present.  Mild aortic insufficiency is present.  Pulmonary artery systolic pressure is normal.  The inferior vena cava is normal. Estimated mean right atrial pressure is <3 mmHg.  Mild dilatation of the aorta is present. Ascending aorta 4.5 cm. Aortic diameter indexed to BSA is 2.3 cm/m2.  No pericardial effusion is present.  This study was compared with the study from 10/13/2016. There has been no change.    MRI of chest (2016)  Impression: Normal caliber of thoracic aorta. The ascending aorta measures 3.9 cm, which is normal when indexed for age/gender/size.      Ziopatch (2016)      Us aorta (2016)  Impression: Maximal AP diameter of the infrarenal abdominal  aorta is 2.4, which is within normal limits.    Holter 48 hours (10/31/2016)      ECHO (10/14/2016)  Interpretation Summary  Left ventricular function, chamber size, wall motion, and wall thickness are normal. The EF is 55-60%.  Mild right ventricular dilation is present. Global right ventricular function is normal.  Mild to moderate aortic insufficiency is present.  Pulmonary artery systolic pressure is normal.  The inferior vena cava is normal. The estimated mean right atrial pressure is <3 mmHg.  The aorta is dilated and measures 45 mm at the level of the proximal ascending segment (Z-score +3.75.) The aortic root, indexed to BSA is 22.41 mm/m2 (normal, men: 15+/-2 mm/m2; normal, women: 16+/-3 mm/m2.)  No pericardial effusion is present.    Myocardial stress test (8/9/2006)  Impressions:     1. Normal study with a high degree of certainty.    2. There is no evidence of stress-induced ischemia and the target heart rate was achieved.    3. Left ventricular size is normal at rest.  Transient ischemic dilation of the left ventricle did not occur.    4. The left ventricular ejection fraction is 56 % and there are no regional wall motion abnormalities.      ASSESSMENT AND PLAN:   1. Atrial Fibrillation, Paroxysmal.  Mr. Orellana is an elderly gentleman with paroxysmal atrial fibrillation. From clinical history, the recent episode appears to have been present for the past 10 days and it is clearly impacting on his functional capacity.  I would favor electrical cardioversion.  However, this will require anticoagulation and that poses a risk of blood transfusion in the setting of bleeding.  I had an extensive conversation with the patient and he would not accept blood or plasma (i.e. FFR for reversal) under any consideration but he can take Vitamin K.  I would avoid antiocoagulants such as Apixaban, Edoxaban, and Rivaroxaban since they can not be reversed that this time.  He could take coumadin or Pradaxa (Factor II  inhibitor that can be emergently reversed with Praxbind).   The advantage of Pradaxa is that he will not have to wait several weeks to have therapeutic INR.  I would recommend 4 weeks minimum of Pradaxa after which it may be stopped given the CHADS2-Vasc = 1 and the risk of bleeding in the setting of him being a Hinduism.    The patient has a CHADS2-Vasc score of 1, corresponding to a 1.3% annual stroke / systemic emolism event rate.  I had a detailed discussion with the patient in regard to antiplatelet and anticoagulant therapy.  Antiplatelet agents include aspirin and clopidogrel (Plavix). Anticoagulants include warfarin (Coumadin) and the noval oral anticoagulant agents: dabigatran (Pradaxa), rivaroxaban (Xarelto), and apixaban (Eliquis). Antiplatelet agents, as a class, do not provide adequate protection against stroke and systemic embolism and carry a significant risk of bleeding over the long term. Recently published guidelines (ESC) recommend anticoagulation and favor the novel oral anticoagulants over warfarin. The benefits of warfarin include (1) low cost, (2) established track record in reduction of stroke and systemic embolism, (3) the ability to reverse the agents, (4) the ability to titrate the agent, and (5) the ability to provide additional protection in subsets of patients who require anticoagulation for an independent process (i.e. prosthetic valve). The disadvantages of warfarin include (1) frequent phlebotomy for INRs, (2) difficulty in regulation of INR [typically 60-65% of INRs within therapeutic range], and dietary constraints secondary to bioavailability. The benefits of the novel oral anticoagulants, as a class, include (1) no phlebotomy, (2) similar or significantly lower risk of stroke or systemic embolism depending on the agent, and (3) similar or significantly lower risk of bleeding, particularly intracranial bleeding. The disadvantages of the novel agents, as a class, include  (1) higher cost, (2) the inability to reverse the agents, and (3) the need to be cautious in patients with CKD.   In regard to heart rate control, the patient's pulse is well controlled.  He can remain on Diltiazem 240 mg CD qd.    2. Thoracic Aortic Aneurysm.  This was ruled out by MRA.  No repeat imaging necessary.    3. Chest Discomfort.  The patient has had several episodes of chest discomfort that has atypical features.  I will need to stop the ASA for the cardioversion to avoid excessive bleeding risk on Pradaxa.  I will get a Lexiscan and if it is negative, we will stop ASA and then start Pradaxa for planned JIM guided cardioversion.  If the stress nuclear study is positive, he will need coronary angiography and the ASA will need to be continued and the Pradaxa will need to deferred.    FOLLOW UP:  JIM Guided Cardioversion      Tray Hussein MD    Divisions of Cardiology  [facility]  Patient Care Team:  Vanessa Edwards MD as PCP - General (Internal Medicine)  Artur Grullon MD as MD (Family Practice)  Meggan Brar RN as Nurse Coordinator (Clinical Cardiac Electrophysiology)  Jovanni Felipe MD as MD (Clinical Cardiac Electrophysiology)  Suresh Raymond MD as MD (Surgery)  Lars Oleary MD as MD (Hand Surgery)  Tray Hussein MD as MD (Internal Medicine - Interventional Cardiology)  TIM JOSEPH

## 2018-01-06 NOTE — MR AVS SNAPSHOT
After Visit Summary   1/6/2018    Frank Orellana    MRN: [id number]           Patient Information     Date Of Birth          1950        Visit Information        Provider Department      1/6/2018 9:00 AM Tray Hussein MD [facility]        Today's Diagnoses     Persistent atrial fibrillation (H)    -  1      Care Instructions      It was a pleasure to see you in the cardiology clinic today.    If you have any questions, you can reach my nurse, Nora Miguel, at [phone].  Press Option #1 for the [facility], and then press Option #3 for nursing.    Note the new medications: Pradaxa 150 mg, twice a day    Stop the following medications: Aspirin (after Lexiscan study completed)    Studies ordered:   1. Elective cardioversion with JIM guidance  (See information below)   2. Lexiscan stress nuclear study  PREP:  No caffeine, alcohol or nicotine 12 hours prior to the test  Nothing to eat or drink 3 hours prior to the test.  Take all your morning medications with sips of water in the morning.    The results from today include: ECG documenting atrial fibrillation.    I would like you to follow up with Dr. Hussein in next 6 weeks.    Sincerely,      Tray Hussein MD           What Is Atrial Flutter/Atrial Fibrillation?      The heart has its own electrical system. This system makes the signals that start each heartbeat. The heartbeat begins in 1 of the 2 upper chambers of the heart (atria). A problem can make the atria beat faster than normal. The atria may beat fast but still evenly. This problem is called atrial flutter. If the atria beat very fast and also unevenly, it is called atrial fibrillation (AFib).  Causes of Atrial Flutter and Atrial Fibrillation  Causes of these problems can include:    Previous heart attack    High blood pressure    Thyroid problems  In many cases, the cause is unknown.  When the Atria Beat Too Fast  The atria may beat fast  only once in a while. This is called a paroxysmal heart rhythm problem. If they beat fast all the time, it is a chronic problem.  Atrial Flutter  With atrial flutter, electrical signals travel around and around inside the atria. These circling signals make the atria beat too fast:    Atrial flutter can cause symptoms similar to AFib. It can also lead to the even faster, uneven rhythms of AFib.  Atrial Fibrillation (AFib)  With AFib, cells in the atria send extra electrical signals. These extra signals make the atria beat very fast. They also beat unevenly:    The atria beat so fast and unevenly that they may quiver instead of mu. If the atria don t contract, they don t move enough blood into the 2 lower chambers of the heart (ventricles). This can cause you to feel dizzy or weak.    Blood that doesn t keep moving can pool and form clots in the atria. These clots can move into other parts of the body and cause serious problems such as a stroke.   Symptoms of Atrial Flutter and AFib  These symptoms include the following:    Palpitations (a fluttering, fast heartbeat)    Weakness or tiredness    Shortness of breath    Chest pain or tightness    Dizziness or lightheadedness    Fainting spells   Date Last Reviewed: 2/26/2014 2000-2017 [employer]. [address] [place] [address]. All rights reserved. This information is not intended as a substitute for professional medical care. Always follow your healthcare professional's instructions.        Transesophageal Echocardiography (JIM)      Transesophageal echocardiography (JIM) is a test done to record images of your heart with a probe inside your esophagus. These images help your healthcare provider find and treat problems such as infection, disease, or defects in your heart s function, walls or valves. This test may be done when a chest echocardiogram (transthoracic) does not give your provider enough information.  Before your  test    Tell your provider about all the medicines you take. Ask if it s OK to take them before the test.    Don t eat or drink for 6 to 8 hours before the test. This includes water.    Tell your healthcare provider if you have ulcers, a hiatal hernia, or problems swallowing. Also report a history of narrowing of the esophagus, or any other previous gastrointestinal problems.  Also, let him or her know of any allergies to medicines or sedatives.    Also let your provider know if you have dental implants or dentures that should be removed before the test.    Arrange to have someone drive you home after the exam.  During your JIM    When you arrive for your JIM, you will change into a hospital gown, and then be taken to the testing room.    Your provider will spray your throat with a numbing medicine. You may be given a medicine through an IV (intravenous) in your arm to help you relax. You may also be given oxygen. Then you ll be asked to lie on your left side.    The healthcare provider gently inserts the small, lubricated probe into your mouth. As you swallow, he or she will slowly guide the tube into your esophagus.    You may feel the healthcare provider moving the probe, but it shouldn t hurt or interfere with your breathing. A nurse checks your heart rate, blood pressure, and breathing. The test usually takes 20 to 40 minutes.    The nurse or assistant will suction any saliva out of your mouth, similar to when you have a dental cleaning.  After the test    Tell your healthcare provider about any pain, or if you cough up or vomit blood, or have trouble swallowing.    You can eat and drink again when your throat is no longer numb.    Do not drive a car or run heavy machinery for at least 24 hours after getting sedation. After 24 hours you can return to normal activity unless your healthcare provider tells you otherwise.    Be sure to keep your follow-up appointment to go over the results with your healthcare  provider.    Your next appointment is: ____________________  Date Last Reviewed: 12/1/2016 2000-2017 [employer]. [address] [place] [address]. All rights reserved. This information is not intended as a substitute for professional medical care. Always follow your healthcare professional's instructions.        Electrical Cardioversion     Cut away image of heart showing SA node, right atrium, AV node, and left atrium   You had a cardioversion today. This is an electrical shock applied to the chest. The shock reset your heart rhythm back to normal. Your chest wall and chest muscles may feel sore for a few days. Some redness may appear on the skin on your chest where the cardioversion patches were applied. That will go away within a week.  To get ready for this procedure, you may have been given medicine to help you relax and to reduce pain. Depending on the medicine used, it could take up to 8 hours for the effect to wear off.  Home care  Follow these guidelines when caring for yourself at home:    You should be watched by a responsible adult for the next 8 hours. This is in case your condition gets worse.    Don t take any oral medicine for pain or sleep during the next 4 hours. These medicines might react with the medicines you were given in the hospital. This can cause a much stronger response than usual.    Don t drink any alcohol for the next 24 hours.    Don t drive or operate dangerous machinery during the next 24 hours.    Your healthcare provider will have prescribed medicines to stop the abnormal heart rhythm from coming back. Take these medicines as directed. Expect to take blood thinners for at least 4 weeks. This course of blood thinners should not be interrupted. Do not schedule other invasive procedures during this time. Interrupting this medicine could increase your risk for a stroke after a cardioversion.    You may use acetaminophen or ibuprofen to control pain, unless  another pain medicine was prescribed. If you have chronic liver or kidney disease, talk with your provider before taking these medicines. Also talk with your provider if you ve had a stomach ulcer or gastrointestinal bleeding.  Follow-up care  Follow up with your healthcare provider, or as advised, if you aren t alert and back to your usual level of activity within 12 hours.   Call 911  Call 911 or seek emergency medical attention if you have:     Pain in your chest, arm, shoulder, neck or upper back    You have problems speaking or seeing    Weakness in an arm or leg    You are unable to move your arm or leg on one side of your body    You have uncrontrolled bleeding from blood thinning medicines   When to seek medical advice  Call your healthcare provider right away if any of these occur:    Weakness, dizziness, lightheadedness, or fainting    Shortness of breath    You feel like your heart is fluttering or beating fast, hard, or irregularly (palpitations)    More than minor skin discomfort or redness where the cardioversion pads were placed   Date Last Reviewed: 1/1/2017 2000-2017 [employer]. [address] [place] [address]. All rights reserved. This information is not intended as a substitute for professional medical care. Always follow your healthcare professional's instructions.                Follow-ups after your visit        Your next 10 appointments already scheduled     Jan 08, 2018  1:30 PM CST   (Arrive by 1:15 PM)   NM INJECTION with [id number]   [facility], Nuclear Medicine ([facility])    [address] [place] [id number] [phone]            Jan 08, 2018  2:15 PM CST   (Arrive by 2:00 PM)   NM SCAN3 with UUNM1   [facility], Nuclear Medicine ([facility])    [address] [place] [id number] [phone]            Jan 08, 2018  2:45 PM CST    Ekg Stress Nm Lexiscan with UUEKGNMS   [facility] ELECTROCARDIOLOGY ([facility])    [address] [facility] [id number]               Jan 08, 2018  3:45 PM CST   (Arrive by 3:30 PM)   NM MPI WITH LEXISCAN with UUNM1   [facility] [place], Nuclear Medicine ([facility])    [address] [id number] [phone]           For a ONE day exam: Allow 3-4 hours for test. For a TWO day exam: Allow 2 hours PER day for test.  You may need to stop some medicines before the test. Follow your doctor s orders. - If you take a beta blocker: Follow your doctor s specific instructions on taking it prior to and on the day of your exam. - If you take Aggrenox or dipyridamole (Persantine, Permole), stop taking it 48 hours before your test. - If you take Viagra, Cialis or Levitra, stop taking it 48 hours before your test. - If you take theophylline or aminophylline, stop taking it 12 hours before your test.  For patients with diabetes: - If you take insulin, call your diabetes care team. Ask if you should take a 1/2 dose the morning of your test. - If you take diabetes medicine by mouth, don t take it on the morning of your test. Bring it with you to take after the test. (If you have questions, call your diabetes care team.)  Do not take nitrates on the day of your test. Do not wear your Nitro-Patch.  Stop all caffeine 12 hours before the test. This includes coffee, tea, soda pop, chocolate and certain medicines (such as Anacin, Excedrin and NoDoz). Also avoid decaf coffee and tea, as these contain small amounts of caffeine.  No alcohol, smoking or other tobacco for 12 hours before the test.  Stop eating 3 hours before the test. You may drink water.  Please wear a loose two-piece outfit. If you will have an exercise test, bring rubber-soled walking shoes.  When you arrive, please tell us if you: - Have diabetes - Are breastfeeding -  May be pregnant - Have a pacemaker of ICD (implantable defibrillator).  Please call your Imaging Department at your exam site with any questions.            Jan 12, 2018  7:00 AM CST   (Arrive by 6:45 AM)   LINA Hand with Rosana Yost OT   [facility] Hand Therapy ([facility])    [address]  [address] [facility] [id number] [phone]            Apr 19, 2018  7:00 AM CDT   (Arrive by 6:45 AM)   Return Visit with Vanessa Edwards MD   [facility] Primary Care Clinic ([facility])    [address]  [address] [facility] [id number] [phone]            May 10, 2018 11:00 AM CDT   Return Sleep Patient with Melissa Stanford MD   [facility] [place], Sleep Study ([facility])    [address] [place] [id number] [phone]            Oct 11, 2018  7:30 AM CDT   RETURN RETINA with Ruth Boone MD   [facility] Clinic ([facility])    [address]  [address]  [address] [address]  [facility] [id number] [phone]              Future tests that were ordered for you today     Open Future Orders        Priority Expected Expires Ordered    NM Lexiscan stress test Routine  1/6/2019 1/6/2018    Transesophageal Echocardiogram Routine  1/6/2019 1/6/2018    Cardioversion Routine  1/6/2019 1/6/2018            Who to contact     If you have questions or need follow up information about today's clinic visit or your schedule please contact [facility] HEART [facility] directly at [phone].  Normal or non-critical lab and imaging results will be communicated to you by MyChart, letter or phone within 4 business days after the clinic has received the results. If you do not hear from us within 7 days, please contact the clinic through MyChart or phone. If you have a critical or abnormal lab result, we will notify you by phone as soon as possible.  Submit refill requests  "through [employer] or call your pharmacy and they will forward the refill request to us. Please allow 3 business days for your refill to be completed.          Additional Information About Your Visit        [employer] Information     [employer] gives you secure access to your electronic health record. If you see a primary care provider, you can also send messages to your care team and make appointments. If you have questions, please call your primary care clinic.  If you do not have a primary care provider, please call [phone] and they will assist you.        Care EveryWhere ID     This is your Care EveryWhere ID. This could be used by other organizations to access your [facility] medical records  [id number]        Your Vitals Were     Pulse Height Pulse Oximetry BMI (Body Mass Index)          78 1.753 m (5' 9\") 97% 26.6 kg/m2         Blood Pressure from Last 3 Encounters:   01/06/18 115/74   01/03/18 113/70   12/15/17 116/73    Weight from Last 3 Encounters:   01/06/18 81.7 kg (180 lb 1.6 oz)   01/03/18 82 kg (180 lb 12.8 oz)   12/06/17 82.1 kg (181 lb)              We Performed the Following     EKG 12-lead, tracing only          Today's Medication Changes          These changes are accurate as of: 1/6/18 10:28 AM.  If you have any questions, ask your nurse or doctor.               Start taking these medicines.        Dose/Directions    dabigatran ANTICOAGULANT 150 MG capsule   Commonly known as:  PRADAXA   Used for:  Persistent atrial fibrillation (H)   Started by:  Tray Hussein MD        Take 1 capsule (150 mg) by mouth twice daily. Store in original 's bottle or blister pack; use within 120 days of opening.   Quantity:  60 capsule   Refills:  5         Stop taking these medicines if you haven't already. Please contact your care team if you have questions.     aspirin 81 MG EC tablet   Stopped by:  Tray Hussein MD                Where to get your medicines      These medications were sent " to [place], [place] - [address]  [address], [facility] [address]    Hours:  TRANSPLANT PHONE NUMBER [phone] Phone:  [phone]     dabigatran ANTICOAGULANT 150 MG capsule                Primary Care Provider Office Phone # Fax #    Vanessa Edwards [phone]       [address] [place] [address] [address] [facility] [address]  [facility] [address]        Equal Access to Services     AYDE PAPPAS : Hadii aad ku hadasho Soomaali, waaxda luqadaha, qaybta kaalmada adeegyada, waxay idiin hayaan adeeg kharash la'chloe evans. So [facility] [place] [phone].    ATENCIÓN: Si habla español, tiene a sebastian disposición servicios gratuitos de asistencia lingüística. [facility] [phone].    We comply with applicable federal civil rights laws and [place] laws. We do not discriminate on the basis of race, color, national origin, age, disability, sex, sexual orientation, or gender identity.            Thank you!     Thank you for choosing [facility]  for your care. Our goal is always to provide you with excellent care. Hearing back from our patients is one way we can continue to improve our services. Please take a few minutes to complete the written survey that you may receive in the mail after your visit with us. Thank you!             Your Updated Medication List - Protect others around you: Learn how to safely use, store and throw away your medicines at www.disposemymeds.org.          This list is accurate as of: 1/6/18 10:28 AM.  Always use your most recent med list.                   Brand Name Dispense Instructions for use Diagnosis    alfuzosin 10 MG 24 hr tablet    UROXATRAL    90 tablet    TAKE 1 TABLET DAILY    Urinary frequency       * CARTIA  MG 24 hr capsule   Generic drug:  diltiazem     90 capsule    TAKE 1 CAPSULE DAILY    Paroxysmal atrial fibrillation (H)       * diltiazem 240 MG 24 hr capsule    CARDIZEM CD    90 capsule    Take 1 capsule (240 mg) by mouth  daily    Persistent atrial fibrillation (H)       dabigatran ANTICOAGULANT 150 MG capsule    PRADAXA    60 capsule    Take 1 capsule (150 mg) by mouth twice daily. Store in original 's bottle or blister pack; use within 120 days of opening.    Persistent atrial fibrillation (H)       order for DME      Reported on 5/4/2017        * Notice:  This list has 2 medication(s) that are the same as other medications prescribed for you. Read the directions carefully, and ask your doctor or other care provider to review them with you.

## 2018-01-08 ENCOUNTER — HOSPITAL ENCOUNTER (OUTPATIENT)
Dept: NUCLEAR MEDICINE | Facility: CLINIC | Age: 68
Setting detail: NUCLEAR MEDICINE
End: 2018-01-08
Attending: INTERNAL MEDICINE
Payer: COMMERCIAL

## 2018-01-08 ENCOUNTER — HOSPITAL ENCOUNTER (OUTPATIENT)
Dept: NUCLEAR MEDICINE | Facility: CLINIC | Age: 68
Setting detail: NUCLEAR MEDICINE
Discharge: HOME | End: 2018-01-08
Attending: INTERNAL MEDICINE | Admitting: INTERNAL MEDICINE
Payer: COMMERCIAL

## 2018-01-08 ENCOUNTER — HOSPITAL ENCOUNTER (OUTPATIENT)
Dept: CARDIOLOGY | Facility: CLINIC | Age: 68
Setting detail: NUCLEAR MEDICINE
End: 2018-01-08
Attending: INTERNAL MEDICINE
Payer: COMMERCIAL

## 2018-01-08 DIAGNOSIS — I48.19 PERSISTENT ATRIAL FIBRILLATION (H): ICD-10-CM

## 2018-01-08 LAB — INTERPRETATION ECG - MUSE: NORMAL

## 2018-01-08 PROCEDURE — 25000128 H RX IP 250 OP 636: Performed by: INTERNAL MEDICINE

## 2018-01-08 PROCEDURE — 78452 HT MUSCLE IMAGE SPECT MULT: CPT | Mod: 26 | Performed by: INTERNAL MEDICINE

## 2018-01-08 PROCEDURE — 93016 CV STRESS TEST SUPVJ ONLY: CPT | Performed by: INTERNAL MEDICINE

## 2018-01-08 PROCEDURE — 78452 HT MUSCLE IMAGE SPECT MULT: CPT

## 2018-01-08 PROCEDURE — 93018 CV STRESS TEST I&R ONLY: CPT | Performed by: INTERNAL MEDICINE

## 2018-01-08 PROCEDURE — 34300033 ZZH RX 343: Performed by: INTERNAL MEDICINE

## 2018-01-08 PROCEDURE — 93017 CV STRESS TEST TRACING ONLY: CPT

## 2018-01-08 PROCEDURE — A9502 TC99M TETROFOSMIN: HCPCS | Performed by: INTERNAL MEDICINE

## 2018-01-08 RX ORDER — REGADENOSON 0.08 MG/ML
0.4 INJECTION, SOLUTION INTRAVENOUS ONCE
Status: COMPLETED | OUTPATIENT
Start: 2018-01-08 | End: 2018-01-08

## 2018-01-08 RX ORDER — ALBUTEROL SULFATE 90 UG/1
2 AEROSOL, METERED RESPIRATORY (INHALATION) EVERY 5 MIN PRN
Status: DISCONTINUED | OUTPATIENT
Start: 2018-01-08 | End: 2018-01-09 | Stop reason: HOSPADM

## 2018-01-08 RX ORDER — AMINOPHYLLINE 25 MG/ML
50-100 INJECTION, SOLUTION INTRAVENOUS
Status: DISCONTINUED | OUTPATIENT
Start: 2018-01-08 | End: 2018-01-09 | Stop reason: HOSPADM

## 2018-01-08 RX ORDER — ACYCLOVIR 200 MG/1
0-1 CAPSULE ORAL
Status: DISCONTINUED | OUTPATIENT
Start: 2018-01-08 | End: 2018-01-09 | Stop reason: HOSPADM

## 2018-01-08 RX ADMIN — REGADENOSON 0.4 MG: 0.08 INJECTION, SOLUTION INTRAVENOUS at 14:37

## 2018-01-08 RX ADMIN — TETROFOSMIN 32.5 MCI.: 1.38 INJECTION, POWDER, LYOPHILIZED, FOR SOLUTION INTRAVENOUS at 14:39

## 2018-01-08 RX ADMIN — TETROFOSMIN 8 MCI.: 1.38 INJECTION, POWDER, LYOPHILIZED, FOR SOLUTION INTRAVENOUS at 13:30

## 2018-01-09 DIAGNOSIS — I48.19 PERSISTENT ATRIAL FIBRILLATION (H): ICD-10-CM

## 2018-01-09 RX ORDER — MAGNESIUM SULFATE HEPTAHYDRATE 40 MG/ML
2 INJECTION, SOLUTION INTRAVENOUS
Status: DISCONTINUED | OUTPATIENT
Start: 2018-01-09 | End: 2018-01-09 | Stop reason: CLARIF

## 2018-01-09 RX ORDER — POTASSIUM CHLORIDE 1500 MG/1
20 TABLET, EXTENDED RELEASE ORAL
Status: DISCONTINUED | OUTPATIENT
Start: 2018-01-09 | End: 2018-01-09 | Stop reason: CLARIF

## 2018-01-09 RX ORDER — DABIGATRAN ETEXILATE 150 MG/1
CAPSULE ORAL
Qty: 60 CAPSULE | Refills: 5 | Status: SHIPPED | OUTPATIENT
Start: 2018-01-09 | End: 2018-02-14

## 2018-01-09 RX ORDER — POTASSIUM CHLORIDE 1500 MG/1
40 TABLET, EXTENDED RELEASE ORAL
Status: DISCONTINUED | OUTPATIENT
Start: 2018-01-09 | End: 2018-01-09 | Stop reason: CLARIF

## 2018-01-09 RX ORDER — SODIUM CHLORIDE 9 MG/ML
1000 INJECTION, SOLUTION INTRAVENOUS CONTINUOUS
Status: DISCONTINUED | OUTPATIENT
Start: 2018-01-09 | End: 2018-01-09 | Stop reason: CLARIF

## 2018-01-09 RX ORDER — ATROPINE SULFATE 0.1 MG/ML
.5-1 INJECTION INTRAVENOUS
Status: DISCONTINUED | OUTPATIENT
Start: 2018-01-09 | End: 2018-01-09 | Stop reason: CLARIF

## 2018-01-09 NOTE — PROGRESS NOTES
Called patient with results of NM stress. No evidence of ischemia noted. Will proceed with initiated Pradaxa and will coordinated JIM and cardioversion. Patient will follow up with EP post cardioversion. Patient understands and agrees to the plan.

## 2018-01-10 ENCOUNTER — HOSPITAL ENCOUNTER (OUTPATIENT)
Facility: CLINIC | Age: 68
End: 2018-01-10
Attending: INTERNAL MEDICINE | Admitting: INTERNAL MEDICINE
Payer: COMMERCIAL

## 2018-01-10 ENCOUNTER — MYC MEDICAL ADVICE (OUTPATIENT)
Dept: INTERNAL MEDICINE | Facility: CLINIC | Age: 68
End: 2018-01-10

## 2018-01-11 ENCOUNTER — MYC MEDICAL ADVICE (OUTPATIENT)
Dept: INTERNAL MEDICINE | Facility: CLINIC | Age: 68
End: 2018-01-11

## 2018-01-11 NOTE — TELEPHONE ENCOUNTER
Dr Edwards,    Who is driving the boat? I saw Dr Ahumada last week in Primary Care. She set me up with Dr Hussein in Cardiology last Saturday.  I am getting different direction from two nurses.  Still have not heard back and getting anxious.    I have sent the following message to Dr Felipe in cardiology:    Dr Felipe,  My ToDo list shows and Echo Kip and Echo Cardioversion. I received calls yesterday from two different nurses, yours and Dr Hussein's.  Dr Hussein's nurse talked about the need for Pradaxa for 3 weeks prior to cardioversion. Your nurse, after I told her about that, said she would call me back. I have not heard anything and do not know how to proceed.  Please advise.  Thank you,  Frank Orellana

## 2018-01-11 NOTE — TELEPHONE ENCOUNTER
Patient returning my call to clarify orders. He now states that he sees he is not suppose to be taking the aspirin as discussed at visit with Dr. Hussein.   We talked about the JIM and Cardioversion and the sequence of events as to why we want to proceed with the JIM and cardioversion now since he is symptomatic.  He states he understands the plan and will be following with EP post cardioversion.

## 2018-01-12 ENCOUNTER — THERAPY VISIT (OUTPATIENT)
Dept: OCCUPATIONAL THERAPY | Facility: CLINIC | Age: 68
End: 2018-01-12
Payer: COMMERCIAL

## 2018-01-12 DIAGNOSIS — M79.645 PAIN OF FINGER OF LEFT HAND: Primary | ICD-10-CM

## 2018-01-12 DIAGNOSIS — M72.0 DUPUYTREN'S CONTRACTURE OF LEFT HAND: ICD-10-CM

## 2018-01-12 DIAGNOSIS — Z98.890 STATUS POST DUPUYTREN'S FASCIECTOMY: ICD-10-CM

## 2018-01-12 PROCEDURE — G8984 CARRY CURRENT STATUS: HCPCS | Mod: GO | Performed by: OCCUPATIONAL THERAPIST

## 2018-01-12 PROCEDURE — 97165 OT EVAL LOW COMPLEX 30 MIN: CPT | Mod: GO | Performed by: OCCUPATIONAL THERAPIST

## 2018-01-12 PROCEDURE — 29130 APPL FINGER SPLINT STATIC: CPT | Mod: GO | Performed by: OCCUPATIONAL THERAPIST

## 2018-01-12 PROCEDURE — 97110 THERAPEUTIC EXERCISES: CPT | Mod: GO | Performed by: OCCUPATIONAL THERAPIST

## 2018-01-12 PROCEDURE — G8985 CARRY GOAL STATUS: HCPCS | Mod: GO | Performed by: OCCUPATIONAL THERAPIST

## 2018-01-12 NOTE — PROGRESS NOTES
Hand Therapy Initial Evaluation  Current Date:  1/12/2018    Referring MD: Dr. Oleary  Return to MD: 4 weeks    Procedure:  L RF and SF Subtotal Palmar Fasciectomy  DOS: 12/15/2017  Post:  4w 0d    Subjective:  Frank Orellana is a 67 year old right hand dominant male.    Patient reports symptoms of pain, stiffness/loss of motion, weakness/loss of strength and edema of the right LF which occurred due to above procedure. Since onset symptoms are Gradually getting better.  Special tests:  none.  Previous treatment: none.    General health as reported by patient is good.  Pertinent medical history includes:heart problems, hepatitis, smoking, sleep disorder/apnea  Medical allergies:CT contrast.  Surgical history: orthopedic: hand, knee.  Medication history: none.    Occupational Profile Information:  Current occupation is Retired from [employer]  Prior functional level:  no limitations  Barriers include:none  Mobility: No difficulty  Transportation: drives  Leisure activities/hobbies: Involved actively with [employer]  Other: Lives with wifeLarissa    Upper Extremity Functional Index Score:  SCORE:   Column Totals: /80: 75   (A lower score indicates greater disability.)    Objective:    Pain Report:  VAS(0-10) 1/12/18   At Rest: 0/10   With Use: 1/10   Location:  L Volar palm/incision  Description:  Sore, discomfort  Frequency:  intermittent   Pain is worse: During the day   Pain is exacerbated by:  Finger flexion and extension  Pain is relieved by:  rest  Progression since onset:  resolving    ROM: Fingers  Extension/Flexion, AROM(PROM)  1/12/18 Date: 2012 1/12/18   Right Side: Left    AROM(PROM)    0/95  +/95  10/60  Ring:    MP                PIP                DIP 0/90  0/95  0/60   0/97  0/95  0/65  Small:   MP                PIP                DIP 0/95  0/96  0/73      and Pinch Strength (pounds)  1/12 Date: 1/12   Right    Side  Left   79        # 1                # 2                # 3         Average 63      Edema:  []        None  [x]         Mild   []        Moderate  []         Severe     []         of affected part    Scar/Wound:  Well healed surgical incision, moderately thick and adhered through palm. Tender to palpation over distal palmar crease    Sensation: Patient reports numbness on ulnar side of P1 post surgery    Assessment:  Patient presents with symptoms consistent with diagnosis as listed above with surgical  intervention.     Patient's limitations or Problem List includes:  Pain, Decreased ROM/motion, Increased edema, Weakness, Sensory disturbance, Adherent scarring and Adherence in connective tissue of the left ring finger and small finger which interferes with the patient's ability to perform Self Care Tasks (dressing, eating, bathing, hygiene/toileting), Recreational Activities and Household Chores as compared to previous level of function.    Rehab Potential:  Excellent - Return to full activity, no limitations    Patient will benefit from skilled Occupational Therapy to increase ROM,  strength and sensation and decrease pain, edema and adherence of scarring to return to previous activity level and resume normal daily tasks and to reach their rehab potential.    Barriers to Learning:  No barrier    Communication Issues:  Patient appears to be able to clearly communicate and understand verbal and written communication and follow directions correctly.    Chart Review: Chart Review and Detailed history review with patient    Identified Performance Deficits: hygiene and grooming, home establishment and management and meal preparation and cleanup    Assessment of Occupational Performance:  1-3 Performance Deficits    Clinical Decision Making (Complexity): Low complexity    Treatment Explanation:  The following has been discussed with the patient:  RX ordered/plan of care  Anticipated outcomes  Possible risks and side effects    Plan:  Frequency:  1 X every other week, once daily  Duration:  for 6  weeks    Treatment Plan:  Modalities:  US  Therapeutic Exercise:  AROM, AAROM, PROM, Tendon Gliding, Blocking and Extensor Tracking  Manual Techniques:  Scar mobilization  Orthotic Fabrication:  Hand based orthosis  Self Care:  Self Care Tasks    Home Program:  HB SF and LF extension splint - night  Scar Pad - night  Scar massage 2 x day  Tendon gliding fist series  Finger ABD/ADD  Extensor tracking  PROM RF Flexion    Next visit:   US  Scar mobs  A/PROM  Check splint  DC to HEP if patient continues to do well    Discharge Plan:  Achieve all LTG.  Independent in home treatment program.  Reach maximal therapeutic benefit.    Please see daily flow sheet for treatment and 1:1 time provided today.

## 2018-01-12 NOTE — MR AVS SNAPSHOT
After Visit Summary   1/12/2018    Frank Orellana    MRN: [id number]           Patient Information     Date Of Birth          1950        Visit Information        Provider Department      1/12/2018 7:00 AM Rosana Yost OT M [facility]        Today's Diagnoses     Pain of finger of left hand    -  1    Dupuytren's contracture of left hand        Status post Dupuytren's fasciectomy           Follow-ups after your visit        Your next 10 appointments already scheduled     Jean-Claude 15, 2018  6:45 AM CST   LAB with ACUTE CARE LAB UU   [facility], Lab ([facility])    [address] [facility] [address]              Please do not eat 10-12 hours before your appointment if you are coming in fasting for labs on lipids, cholesterol, or glucose (sugar). This does not apply to pregnant women. Water, hot tea and black coffee (with nothing added) are okay. Do not drink other fluids, diet soda or chew gum.            Jean-Claude 15, 2018  7:30 AM CST   Procedure 4 hour with U2A ROOM 1   Unit [address] [facility] ([facility])    [address] [facility] [address]               Jean-Claude 15, 2018  8:30 AM CST   Ech Kip with UUETEER1   [facility],  Echocardiography ([facility])    [address] [address]   [phone]           1.  Please bring or wear a comfortable two-piece outfit. 2.  Arrival time: -   [facility]:  arrive 75 minutes prior to examination time. -   [facility]:  arrive 90 minutes prior to examination time. -   [facility]:   arrive 15 minutes prior to examination time. 3.  Plan to have someone here to drive you home after the test. -   Someone should stay with you for 6 hours after your test. 4.  No food or drink: -   6 hours before the test 5.  If you take antacids or water pills (diuretics): Do not take them until after your  test. You may take blood pressure medicine with a few sips of water. 6.  If you have diabetes: -   Morning slots preferred -   If you take insulin, call your diabetes care team. Ask if you should take a   dose the morning of your test. -   If you take diabetes medicine by mouth, don't take it on the morning of your test. Bring it with you to take after the test. (If you have questions, call your diabetes care team.) 7.  Bring a list of any medicines you are taking. 8.  Do not drive for 24 hours after the test. 9.   A responsible adult must stay with you for 24 hours after the test.  10.  For any questions that cannot be answered, please contact the ordering physician            Jean-Claude 15, 2018  9:00 AM CST   Cardioversion with UUETEER1   [facility],  Echocardiography ([facility])    [address] [facility] [address]   [phone]           1) NPO for 8 hours prior to the procedure except for sips of water with medications. 2) Hold insulin or oral diabetic medications morning of procedure. May take   dose long acting insulin. Follow further instructions of PMD. 3) Continue daily Coumadin. ~ If taking Digoxin, hold AM of procedure. ~ Plan to have a responsible adult take you home after the exam. You may not drive, take a bus or taxi by yourself. A responsible adult must stay with you for 24 hours after the test.            Jean-Claude 15, 2018   Procedure with GENERIC ANESTHESIA PROVIDER   [facility], Same Day Surgery (--)    [address] [facility] [address] [phone]            Jan 31, 2018  8:00 AM CST   LINA Hand with Rosana Yost OT   [facility] Hand Therapy ([facility])    [address] [place] [address]  4th Floor  [facility] [id number] [phone]            Feb 14, 2018  8:00 AM CST   (Arrive by 7:45 AM)   RETURN ARRHYTHMIA with Jovanni Felipe MD   [facility] Heart Care ([facility])    [address] [place] [address]  [address]  [facility] [id number] [phone]            Apr 19, 2018  7:00 AM CDT   (Arrive by 6:45 AM)   Return Visit with Vanessa Edwards MD   [facility] ([facility])    [address] [facility]  4th [facility] [id number] [phone]            May 10, 2018 11:00 AM CDT   Return Sleep Patient with Melissa Stanford MD   [facility], Sleep Study ([facility])    [address] [place] [address] [phone]            Oct 11, 2018  7:30 AM CDT   RETURN RETINA with Ruth Boone MD   [facility] ([facility])    [address]  [address] [facility]  [address] [address]  [facility] [id number] [phone]              Who to contact     If you have questions or need follow up information about today's clinic visit or your schedule please contact [facility] directly at [phone].  Normal or non-critical lab and imaging results will be communicated to you by MyChart, letter or phone within 4 business days after the clinic has received the results. If you do not hear from us within 7 days, please contact the clinic through [employer] or phone. If you have a critical or abnormal lab result, we will notify you by phone as soon as possible.  Submit refill requests through [employer] or call your pharmacy and they will forward the refill request to us. Please allow 3 business days for your refill to be completed.          Additional Information About Your Visit        MyChart Information     [employer] gives you secure access to your electronic health record. If you see a primary care provider, you can also send messages to your care team and make appointments. If you have questions, please call your primary care clinic.  If you do not have a primary care provider, please call [phone] and they will assist you.        Care EveryWhere ID     This is your Care EveryWhere  ID. This could be used by other organizations to access your [facility] medical records  [id number]         Blood Pressure from Last 3 Encounters:   01/06/18 115/74   01/03/18 113/70   12/15/17 116/73    Weight from Last 3 Encounters:   01/06/18 81.7 kg (180 lb 1.6 oz)   01/03/18 82 kg (180 lb 12.8 oz)   12/06/17 82.1 kg (181 lb)              We Performed the Following     APPLY FINGER SPLINT,STATIC     INITIAL EVAL REPORT     OT Eval, Low Complexity ([id number])     THERAPEUTIC EXERCISES        Primary Care Provider Office Phone # Fax #    Vanessa Edwards [phone]       [address] [place] [address]        Equal Access to Services     AYDE PAPPAS : Hadii sylvia corea hadasho Soguy, waaxda luqadaha, qaybta kaalmada adeegyada, jeyson mohrin marisa hernández . So [facility] [phone].    ATENCIÓN: Si habla español, tiene a sebastian disposición servicios gratuitos de asistencia lingüística. [facility] [phone].    We comply with applicable federal civil rights laws and [place] laws. We do not discriminate on the basis of race, color, national origin, age, disability, sex, sexual orientation, or gender identity.            Thank you!     Thank you for choosing [facility]  for your care. Our goal is always to provide you with excellent care. Hearing back from our patients is one way we can continue to improve our services. Please take a few minutes to complete the written survey that you may receive in the mail after your visit with us. Thank you!             Your Updated Medication List - Protect others around you: Learn how to safely use, store and throw away your medicines at www.disposemymeds.org.          This list is accurate as of: 1/12/18  9:03 AM.  Always use your most recent med list.                   Brand Name Dispense Instructions for use Diagnosis    alfuzosin 10 MG 24 hr tablet    UROXATRAL    90 tablet    TAKE 1 TABLET DAILY    Urinary frequency       *  CARTIA  MG 24 hr capsule   Generic drug:  diltiazem     90 capsule    TAKE 1 CAPSULE DAILY    Paroxysmal atrial fibrillation (H)       * diltiazem 240 MG 24 hr capsule    CARDIZEM CD    90 capsule    Take 1 capsule (240 mg) by mouth daily    Persistent atrial fibrillation (H)       dabigatran ANTICOAGULANT 150 MG capsule    PRADAXA    60 capsule    Take 1 capsule (150 mg) by mouth twice daily. Store in original 's bottle or blister pack; use within 120 days of opening.    Persistent atrial fibrillation (H)       order for DME      Reported on 5/4/2017        * Notice:  This list has 2 medication(s) that are the same as other medications prescribed for you. Read the directions carefully, and ask your doctor or other care provider to review them with you.

## 2018-01-15 ENCOUNTER — ANESTHESIA (OUTPATIENT)
Dept: SURGERY | Facility: CLINIC | Age: 68
End: 2018-01-15
Payer: COMMERCIAL

## 2018-01-15 ENCOUNTER — APPOINTMENT (OUTPATIENT)
Dept: LAB | Facility: CLINIC | Age: 68
End: 2018-01-15
Attending: INTERNAL MEDICINE
Payer: COMMERCIAL

## 2018-01-15 ENCOUNTER — HOSPITAL ENCOUNTER (OUTPATIENT)
Dept: CARDIOLOGY | Facility: CLINIC | Age: 68
End: 2018-01-15
Attending: INTERNAL MEDICINE | Admitting: INTERNAL MEDICINE
Payer: COMMERCIAL

## 2018-01-15 ENCOUNTER — ANESTHESIA EVENT (OUTPATIENT)
Dept: SURGERY | Facility: CLINIC | Age: 68
End: 2018-01-15
Payer: COMMERCIAL

## 2018-01-15 ENCOUNTER — APPOINTMENT (OUTPATIENT)
Dept: MEDSURG UNIT | Facility: CLINIC | Age: 68
End: 2018-01-15
Attending: INTERNAL MEDICINE
Payer: COMMERCIAL

## 2018-01-15 ENCOUNTER — HOSPITAL ENCOUNTER (OUTPATIENT)
Facility: CLINIC | Age: 68
Discharge: HOME | End: 2018-01-15
Attending: INTERNAL MEDICINE | Admitting: INTERNAL MEDICINE
Payer: COMMERCIAL

## 2018-01-15 ENCOUNTER — SURGERY (OUTPATIENT)
Age: 68
End: 2018-01-15

## 2018-01-15 VITALS
SYSTOLIC BLOOD PRESSURE: 93 MMHG | WEIGHT: 180.1 LBS | RESPIRATION RATE: 16 BRPM | HEIGHT: 69 IN | TEMPERATURE: 98 F | BODY MASS INDEX: 26.67 KG/M2 | HEART RATE: 49 BPM | OXYGEN SATURATION: 95 % | DIASTOLIC BLOOD PRESSURE: 64 MMHG

## 2018-01-15 VITALS
DIASTOLIC BLOOD PRESSURE: 72 MMHG | OXYGEN SATURATION: 96 % | RESPIRATION RATE: 10 BRPM | SYSTOLIC BLOOD PRESSURE: 94 MMHG | HEART RATE: 51 BPM

## 2018-01-15 VITALS
RESPIRATION RATE: 14 BRPM | DIASTOLIC BLOOD PRESSURE: 68 MMHG | SYSTOLIC BLOOD PRESSURE: 83 MMHG | HEART RATE: 72 BPM | OXYGEN SATURATION: 96 %

## 2018-01-15 DIAGNOSIS — I48.19 PERSISTENT ATRIAL FIBRILLATION (H): ICD-10-CM

## 2018-01-15 LAB
INR PPP: 1.52 (ref 0.86–1.14)
INTERPRETATION ECG - MUSE: NORMAL
MAGNESIUM SERPL-MCNC: 2.3 MG/DL (ref 1.6–2.3)
POTASSIUM SERPL-SCNC: 3.9 MMOL/L (ref 3.4–5.3)

## 2018-01-15 PROCEDURE — 93312 ECHO TRANSESOPHAGEAL: CPT

## 2018-01-15 PROCEDURE — 85610 PROTHROMBIN TIME: CPT | Performed by: INTERNAL MEDICINE

## 2018-01-15 PROCEDURE — 93325 DOPPLER ECHO COLOR FLOW MAPG: CPT

## 2018-01-15 PROCEDURE — 83735 ASSAY OF MAGNESIUM: CPT | Performed by: INTERNAL MEDICINE

## 2018-01-15 PROCEDURE — 99152 MOD SED SAME PHYS/QHP 5/>YRS: CPT

## 2018-01-15 PROCEDURE — 40000065 ZZH STATISTIC EKG NON-CHARGEABLE

## 2018-01-15 PROCEDURE — 93312 ECHO TRANSESOPHAGEAL: CPT | Mod: 26 | Performed by: INTERNAL MEDICINE

## 2018-01-15 PROCEDURE — 93005 ELECTROCARDIOGRAM TRACING: CPT

## 2018-01-15 PROCEDURE — 93320 DOPPLER ECHO COMPLETE: CPT | Mod: 26 | Performed by: INTERNAL MEDICINE

## 2018-01-15 PROCEDURE — 84132 ASSAY OF SERUM POTASSIUM: CPT | Performed by: INTERNAL MEDICINE

## 2018-01-15 PROCEDURE — 37000008 ZZH ANESTHESIA TECHNICAL FEE, 1ST 30 MIN

## 2018-01-15 PROCEDURE — 25000125 ZZHC RX 250: Performed by: NURSE ANESTHETIST, CERTIFIED REGISTERED

## 2018-01-15 PROCEDURE — 92960 CARDIOVERSION ELECTRIC EXT: CPT | Mod: GC | Performed by: INTERNAL MEDICINE

## 2018-01-15 PROCEDURE — 92960 CARDIOVERSION ELECTRIC EXT: CPT | Performed by: INTERNAL MEDICINE

## 2018-01-15 PROCEDURE — 25000128 H RX IP 250 OP 636: Performed by: INTERNAL MEDICINE

## 2018-01-15 PROCEDURE — 25000125 ZZHC RX 250: Performed by: INTERNAL MEDICINE

## 2018-01-15 PROCEDURE — 93325 DOPPLER ECHO COLOR FLOW MAPG: CPT | Mod: 26 | Performed by: INTERNAL MEDICINE

## 2018-01-15 PROCEDURE — 92960 CARDIOVERSION ELECTRIC EXT: CPT

## 2018-01-15 PROCEDURE — 40000166 ZZH STATISTIC PP CARE STAGE 1

## 2018-01-15 PROCEDURE — 36415 COLL VENOUS BLD VENIPUNCTURE: CPT | Performed by: INTERNAL MEDICINE

## 2018-01-15 RX ORDER — NALOXONE HYDROCHLORIDE 0.4 MG/ML
.1-.4 INJECTION, SOLUTION INTRAMUSCULAR; INTRAVENOUS; SUBCUTANEOUS
Status: DISCONTINUED | OUTPATIENT
Start: 2018-01-15 | End: 2018-01-16 | Stop reason: HOSPADM

## 2018-01-15 RX ORDER — POTASSIUM CHLORIDE 750 MG/1
40 TABLET, EXTENDED RELEASE ORAL
Status: DISCONTINUED | OUTPATIENT
Start: 2018-01-15 | End: 2018-01-15 | Stop reason: HOSPADM

## 2018-01-15 RX ORDER — POTASSIUM CHLORIDE 750 MG/1
20 TABLET, EXTENDED RELEASE ORAL
Status: DISCONTINUED | OUTPATIENT
Start: 2018-01-15 | End: 2018-01-15 | Stop reason: HOSPADM

## 2018-01-15 RX ORDER — FENTANYL CITRATE 50 UG/ML
25 INJECTION, SOLUTION INTRAMUSCULAR; INTRAVENOUS
Status: DISCONTINUED | OUTPATIENT
Start: 2018-01-15 | End: 2018-01-16 | Stop reason: HOSPADM

## 2018-01-15 RX ORDER — SODIUM CHLORIDE 9 MG/ML
INJECTION, SOLUTION INTRAVENOUS CONTINUOUS PRN
Status: DISCONTINUED | OUTPATIENT
Start: 2018-01-15 | End: 2018-01-16 | Stop reason: HOSPADM

## 2018-01-15 RX ORDER — LIDOCAINE 40 MG/G
CREAM TOPICAL
Status: DISCONTINUED | OUTPATIENT
Start: 2018-01-15 | End: 2018-01-15 | Stop reason: HOSPADM

## 2018-01-15 RX ORDER — FLUMAZENIL 0.1 MG/ML
0.2 INJECTION, SOLUTION INTRAVENOUS
Status: DISCONTINUED | OUTPATIENT
Start: 2018-01-15 | End: 2018-01-16 | Stop reason: HOSPADM

## 2018-01-15 RX ORDER — LIDOCAINE 40 MG/G
CREAM TOPICAL
Status: DISCONTINUED | OUTPATIENT
Start: 2018-01-15 | End: 2018-01-16 | Stop reason: HOSPADM

## 2018-01-15 RX ORDER — FENTANYL CITRATE 50 UG/ML
25-50 INJECTION, SOLUTION INTRAMUSCULAR; INTRAVENOUS
Status: COMPLETED | OUTPATIENT
Start: 2018-01-15 | End: 2018-01-15

## 2018-01-15 RX ADMIN — TOPICAL ANESTHETIC 0.5 ML: 200 SPRAY DENTAL; PERIODONTAL at 08:35

## 2018-01-15 RX ADMIN — LIDOCAINE HYDROCHLORIDE 30 ML: 20 SOLUTION ORAL; TOPICAL at 08:35

## 2018-01-15 RX ADMIN — FENTANYL CITRATE 75 MCG: 50 INJECTION INTRAMUSCULAR; INTRAVENOUS at 08:52

## 2018-01-15 RX ADMIN — MIDAZOLAM 3 MG: 1 INJECTION INTRAMUSCULAR; INTRAVENOUS at 08:52

## 2018-01-15 RX ADMIN — METHOHEXITAL SODIUM 50 MG: 500 INJECTION, POWDER, LYOPHILIZED, FOR SOLUTION INTRAMUSCULAR; INTRAVENOUS; RECTAL at 09:43

## 2018-01-15 RX ADMIN — SODIUM CHLORIDE: 9 INJECTION, SOLUTION INTRAVENOUS at 08:35

## 2018-01-15 NOTE — PROCEDURES
CARDIOVERSION    PROCEDURE:  Direct current cardioversion  PROCEDURE DATE: 1/15/18    Pre-procedure diagnosis: atrial fibrillation  Complications:  none    BRIEF CLINICAL HISTORY:  67 year old male, [employer], with symptomatic atrial fibrillation on dabigatran and diltiazem.      PROCEDURE: The patient arrived at the Echo Laboratory in a fasting, non-sedated state.  Informed consent was previously obtained from patient, who understood indications, risks, and benefits of the procedure. Verbal confirmation of compliance to Dabigatran was obtained. Transesophageal echo was performed by the echo lab team to rule out intracardiac thrombus.  With help from Anesthesia, patient was deeply sedated.  150J of synchronized biphasic shock was delivered through the cardiac monitor, and the patient was successfully converted to normal sinus rhythm.  After sedation wore off, patient awoke and remained neurologically intact.  The patient tolerated the procedure well from a hemodynamic and respiratory standpoint without any complications.  He was observed post procedure and then discharged to home after sedation wore off and he was ambulatory.    IMPRESSION:  1.  Successful direct current cardioversion with one 150J biphasic shock.    RECOMMENDATIONS/PLANS:  1.   Follow-up with cardiology  2.   Continue anticoagulation    Procedure was supervised by Dr. Juju Verde MD  Cardiology Fellow    I appreciated the opportunity to see and assess Mr Orellana and direct the procedure described above with CV Fellow Dr Verde. After the procedure, I discussed findings with patient's spouse on Station 2A>    I agree with the above note which summarizes my findings and current recommendations. Please do not hesitate to contact me if you have any questions or concerns.    Gordon Matute MD [facility]   Pager [phone]  Office [phone]

## 2018-01-15 NOTE — ANESTHESIA POSTPROCEDURE EVALUATION
Patient: Frank Orellana    Procedure(s):  Anesthesia Coverage Cardioversion With Transesophageal Echocardiogram @0930    - Wound Class: II-Clean Contaminated    Diagnosis:Persistent Atrial Fibrillation   Diagnosis Additional Information: No value filed.    Anesthesia Type:  No value filed.    Note:  Anesthesia Post Evaluation    Patient location during evaluation: Echo Lab  Patient participation: Able to fully participate in evaluation  Level of consciousness: awake and alert  Pain management: satisfactory to patient  Airway patency: patent  Cardiovascular status: blood pressure returned to baseline and hemodynamically stable  Respiratory status: room air  Hydration status: euvolemic  PONV: none     Anesthetic complications: None          Last vitals:  Vitals:    01/15/18 0730   BP: 103/68   Pulse: 80   Resp: 18   Temp: 36.7  C (98  F)   SpO2: 97%         Electronically Signed By: Chinedu Loera MD  January 15, 2018  9:56 AM

## 2018-01-15 NOTE — PROGRESS NOTES
Pt arrived in ECHO department  for scheduled JIM.   Procedure explained, questions answered and consent signed. Discharge instructions discussed with patient.  Pt's throat sprayed at 0835, therefore pt will not be able to eat or drink until 2 hours after at 1035.  Informed pt of this time and encouraged to start with warm fluids and soft foods.    Pt tolerated procedure well, and was given a total of 75 mcg IV fentanyl and 3 mg IV versed for conscious sedation.  Pt denied throat or chest pain after JIM complete.   JIM probe 54 used for procedure.  No clots visualized on JIM so proceeded with DCCV.  Anesthesia gave pt 50 mg IV brevitol for sedation and pt was DCCV at 150 Joules to a SR.  Pt was transferred to unit 2A for recovery after awake and VSS  Report given to 2A RN.

## 2018-01-15 NOTE — ANESTHESIA PREPROCEDURE EVALUATION
Physical Exam  Normal systems: dental    Airway   Mallampati: II  TM distance: >3 FB  Neck ROM: full    Dental     Cardiovascular   Rhythm and rate: irregular and normal      Pulmonary                     Anesthesia Plan      History & Physical Review  History and physical reviewed and following examination; no interval change.    ASA Status:  2 .    NPO Status:  > 8 hours    Plan for MAC with Other induction. Maintenance will be TIVA.  Reason for MAC:  Deep or markedly invasive procedure (G8)         Postoperative Care      Consents  Anesthetic plan, risks, benefits and alternatives discussed with:  Patient.  Use of blood products discussed: No .   .

## 2018-01-15 NOTE — PROGRESS NOTES
Tolerated bedrest food and fluids.  Ambulated with patient and he used restroom, but upon return to room, felt very chilled and slightly dizzy.  Dozing now.

## 2018-01-15 NOTE — ANESTHESIA CARE TRANSFER NOTE
Patient: Frank Orellana    Procedure(s):  Anesthesia Coverage Cardioversion With Transesophageal Echocardiogram @0930    - Wound Class: II-Clean Contaminated    Diagnosis: Persistent Atrial Fibrillation   Diagnosis Additional Information: No value filed.    Anesthesia Type:   No value filed.     Note:  Airway :Nasal Cannula  Patient transferred to:Telemetry/Step Down Unit  Comments: Answers questions. RN with pt. Handoff Report: Identifed the Patient, Identified the Reponsible Provider, Reviewed the pertinent medical history, Discussed the surgical course, Reviewed Intra-OP anesthesia mangement and issues during anesthesia, Set expectations for post-procedure period and Allowed opportunity for questions and acknowledgement of understanding      Vitals: (Last set prior to Anesthesia Care Transfer)    CRNA VITALS  1/15/2018 0917 - 1/15/2018 0947      1/15/2018             NIBP: 108/73    Ht Rate: 58    SpO2: 98 %                Electronically Signed By: ANDREINA Martínez CRNA  January 15, 2018  9:47 AM

## 2018-01-15 NOTE — PROGRESS NOTES
Slept and then felt better.  Reviewed discharge instructions with patient and wife.  Discharged to home with wife.

## 2018-01-16 LAB — INTERPRETATION ECG - MUSE: NORMAL

## 2018-01-31 ENCOUNTER — THERAPY VISIT (OUTPATIENT)
Dept: OCCUPATIONAL THERAPY | Facility: CLINIC | Age: 68
End: 2018-01-31
Payer: COMMERCIAL

## 2018-01-31 DIAGNOSIS — M79.645 PAIN OF FINGER OF LEFT HAND: ICD-10-CM

## 2018-01-31 DIAGNOSIS — Z98.890 STATUS POST DUPUYTREN'S FASCIECTOMY: ICD-10-CM

## 2018-01-31 DIAGNOSIS — M72.0 DUPUYTREN'S CONTRACTURE OF LEFT HAND: ICD-10-CM

## 2018-01-31 PROCEDURE — G8985 CARRY GOAL STATUS: HCPCS | Mod: GO | Performed by: OCCUPATIONAL THERAPIST

## 2018-01-31 PROCEDURE — G8986 CARRY D/C STATUS: HCPCS | Mod: GO | Performed by: OCCUPATIONAL THERAPIST

## 2018-01-31 PROCEDURE — 97140 MANUAL THERAPY 1/> REGIONS: CPT | Mod: GO | Performed by: OCCUPATIONAL THERAPIST

## 2018-01-31 PROCEDURE — 97110 THERAPEUTIC EXERCISES: CPT | Mod: GO | Performed by: OCCUPATIONAL THERAPIST

## 2018-01-31 PROCEDURE — 97035 APP MDLTY 1+ULTRASOUND EA 15: CPT | Mod: GO | Performed by: OCCUPATIONAL THERAPIST

## 2018-01-31 NOTE — PROGRESS NOTES
Hand Therapy Discharge Note  Reporting Period:  1/12/2018 through 1/31/2018    Referring MD: Dr. Oleary  Return to MD: 2/16/2018    Procedure:  L RF and SF Subtotal Palmar Fasciectomy  DOS: 12/15/2017  Post:  6w 5d    Initial History:  Patient reports symptoms of pain, stiffness/loss of motion, weakness/loss of strength and edema of the right LF which occurred due to above procedure. Since onset symptoms are Gradually getting better.  Special tests:  none.  Previous treatment: none.    General health as reported by patient is good.  Pertinent medical history includes:heart problems, hepatitis, smoking, sleep disorder/apnea  Medical allergies:CT contrast.  Surgical history: orthopedic: hand, knee.  Medication history: none.    Occupational Profile Information:  Current occupation is Retired from [employer]  Prior functional level:  no limitations  Barriers include:none  Mobility: No difficulty  Transportation: drives  Leisure activities/hobbies: Involved actively with [employer]  Other: Lives with wifeLarissa    Upper Extremity Functional Index Score:  SCORE:   Column Totals: /80: 79   (A lower score indicates greater disability.)    S:  Subjective changes as noted by patient: Things are going well, I hardly have pain, just a little stiffness in the morning.   Functional changes noted by patient: Improvement in Self Care Tasks (dressing, eating, bathing, hygiene/toileting)  Response to previous treatment: good  Patient has noted adverse reaction to: None    Objective:    Pain Report:  VAS(0-10) 1/12/18 1/31/18   At Rest: 0/10 0/10   With Use: 1/10 0-1/10   Location:  L Volar palm/incision  Description:  Stiff  Frequency:  intermittent   Pain is worse: In the morning  Pain is exacerbated by:  Finger flexion and extension  Pain is relieved by:  rest   Progression since onset:  resolving    ROM: Fingers  Extension/Flexion, AROM(PROM)  1/12/18 Date: 2012 1/12/18 1/31/18   Right Side: Left Left    AROM(PROM)     0/95  +/95  10/60   Ring:    MP                PIP                DIP 0/90  0/95  0/60 0/90  0/95  0/62   0/97  0/95  0/65  Small:   MP                PIP                DIP 0/95  0/96  0/73 0/95  0/97  0/73      and Pinch Strength (pounds)  1/12 Date: 1/12 1/31   Right    Side  Left Left   79        # 1                # 2                # 3         Average 63 76     Edema:  []        None  [x]         Mild   []        Moderate  []         Severe     []         of affected part    Scar/Wound:  Well healed surgical incision, mild to moderately thick and adhered through palm. No longer tender to palpation over distal palmar crease     Sensation: Patient reports numbness on ulnar side of P1 post surgery - resolving, ongoing numbness over scar    Assessment:  Response to therapy has been improvement to:  Strength:   strength  Pain:  frequency is less, intensity of pain is decreased, duration of pain is decreased and less tender over affected area  Overall Assessment:  Patient's symptoms are resolving.  Patient is progressing well and is ready to discharge to home exercise program.  STG/LTG:  See goal sheet for details and updates of remaining functional limitations.     Plan:  Discharge to home exercise program    Home Program:  HB SF and LF extension splint - night  Scar Pad - night  Scar massage 2 x day  Tendon gliding fist series  Finger ABD/ADD  Extensor tracking  PROM RF Flexion    Please see daily flow sheet for treatment and 1:1 time provided today.

## 2018-01-31 NOTE — MR AVS SNAPSHOT
After Visit Summary   1/31/2018    Frank Orellana    MRN: [id number]           Patient Information     Date Of Birth          1950        Visit Information        Provider Department      1/31/2018 8:00 AM Rosana Yost OT [facility] Hand Therapy        Today's Diagnoses     Pain of finger of left hand        Dupuytren's contracture of left hand        Status post Dupuytren's fasciectomy           Follow-ups after your visit        Your next 10 appointments already scheduled     Feb 14, 2018  8:00 AM CST   (Arrive by 7:45 AM)   RETURN ARRHYTHMIA with Jovanni Felipe MD   [facility] [place] [facility] ([facility])    [address]  [address]  [facility] [id number] [phone]            Feb 16, 2018 11:30 AM CST   (Arrive by 11:15 AM)   RETURN HAND with Lars Oleary MD   [facility] ([facility])    [address]  4th [facility] [id number] [phone]            Apr 19, 2018  7:00 AM CDT   (Arrive by 6:45 AM)   Return Visit with Vanessa Edwards MD   [facility] ([facility])    [address]  4th Floor  [facility] [id number] [phone]            May 10, 2018 11:00 AM CDT   Return Sleep Patient with Melissa Stanford MD   [facility] [place], Sleep Study ([facility])    [address] [place] [id number] [phone]            Oct 11, 2018  7:30 AM CDT   RETURN RETINA with Ruth Boone MD   Eye Clinic ([facility])    [address]  [address]  [address] 9a  [facility] [id number] [phone]              Who to contact     If you have questions or need follow up information about today's clinic visit or your schedule please contact [facility]ERAP[facility] directly at [phone].  Normal or non-critical lab and imaging results will be  communicated to you by Leaphart, letter or phone within 4 business days after the clinic has received the results. If you do not hear from us within 7 days, please contact the clinic through OneTouch or phone. If you have a critical or abnormal lab result, we will notify you by phone as soon as possible.  Submit refill requests through OneTouch or call your pharmacy and they will forward the refill request to us. Please allow 3 business days for your refill to be completed.          Additional Information About Your Visit        OneTouch Information     OneTouch gives you secure access to your electronic health record. If you see a primary care provider, you can also send messages to your care team and make appointments. If you have questions, please call your primary care clinic.  If you do not have a primary care provider, please call [phone] and they will assist you.        Care EveryWhere ID     This is your Care EveryWhere ID. This could be used by other organizations to access your [place] medical records  [id number]         Blood Pressure from Last 3 Encounters:   01/15/18 93/64   01/15/18 94/72   01/15/18 (!) 83/68    Weight from Last 3 Encounters:   01/15/18 81.7 kg (180 lb 1.6 oz)   01/06/18 81.7 kg (180 lb 1.6 oz)   01/03/18 82 kg (180 lb 12.8 oz)              We Performed the Following     MANUAL THER TECH     THERAPEUTIC EXERCISES     ULTRASOUND THERAPY        Primary Care Provider Office Phone # Fax #    Vanessa Edwards [phone]       [address] [place] [address]        Equal Access to Services     AYDE PAPPAS : Hadii sylvia corea hadasho Sokourtneyali, waaxda luqadaha, qaybta kaalmada adegary, jeyson evans. So [facility] [phone].    ATENCIÓN: Si habla español, tiene a sebastian disposición servicios gratuitos de asistencia lingüística. Llame al [phone].    We comply with applicable federal civil rights laws and [place] laws. We do not  discriminate on the basis of race, color, national origin, age, disability, sex, sexual orientation, or gender identity.            Thank you!     Thank you for choosing [facility]  for your care. Our goal is always to provide you with excellent care. Hearing back from our patients is one way we can continue to improve our services. Please take a few minutes to complete the written survey that you may receive in the mail after your visit with us. Thank you!             Your Updated Medication List - Protect others around you: Learn how to safely use, store and throw away your medicines at www.disposemymeds.org.          This list is accurate as of 1/31/18  8:31 AM.  Always use your most recent med list.                   Brand Name Dispense Instructions for use Diagnosis    alfuzosin 10 MG 24 hr tablet    UROXATRAL    90 tablet    TAKE 1 TABLET DAILY    Urinary frequency       dabigatran ANTICOAGULANT 150 MG capsule    PRADAXA    60 capsule    Take 1 capsule (150 mg) by mouth twice daily. Store in original 's bottle or blister pack; use within 120 days of opening.    Persistent atrial fibrillation (H)       diltiazem 240 MG 24 hr capsule    CARDIZEM CD    90 capsule    Take 1 capsule (240 mg) by mouth daily    Persistent atrial fibrillation (H)       order for DME      Reported on 5/4/2017

## 2018-02-02 ASSESSMENT — ENCOUNTER SYMPTOMS
SLEEP DISTURBANCES DUE TO BREATHING: 0
SINUS PAIN: 0
PALPITATIONS: 1
SYNCOPE: 0
LEG PAIN: 0
SORE THROAT: 0
EXERCISE INTOLERANCE: 0
FLANK PAIN: 0
DOUBLE VISION: 0
HOARSE VOICE: 0
EYE REDNESS: 0
HYPOTENSION: 0
DIFFICULTY URINATING: 1
TROUBLE SWALLOWING: 0
HYPERTENSION: 0
HEMATURIA: 0
LIGHT-HEADEDNESS: 0
EYE PAIN: 0
NECK MASS: 0
SINUS CONGESTION: 0
DYSURIA: 0
TASTE DISTURBANCE: 0
SMELL DISTURBANCE: 0
EYE IRRITATION: 0
EYE WATERING: 0
ORTHOPNEA: 0

## 2018-02-13 ENCOUNTER — PRE VISIT (OUTPATIENT)
Dept: CARDIOLOGY | Facility: CLINIC | Age: 68
End: 2018-02-13

## 2018-02-14 ENCOUNTER — OFFICE VISIT (OUTPATIENT)
Dept: CARDIOLOGY | Facility: CLINIC | Age: 68
End: 2018-02-14
Attending: INTERNAL MEDICINE
Payer: COMMERCIAL

## 2018-02-14 VITALS
OXYGEN SATURATION: 96 % | SYSTOLIC BLOOD PRESSURE: 103 MMHG | BODY MASS INDEX: 26.69 KG/M2 | WEIGHT: 180.2 LBS | HEART RATE: 62 BPM | HEIGHT: 69 IN | DIASTOLIC BLOOD PRESSURE: 66 MMHG

## 2018-02-14 DIAGNOSIS — I48.19 PERSISTENT ATRIAL FIBRILLATION (H): ICD-10-CM

## 2018-02-14 DIAGNOSIS — I48.0 PAROXYSMAL ATRIAL FIBRILLATION (H): Primary | ICD-10-CM

## 2018-02-14 PROCEDURE — G0463 HOSPITAL OUTPT CLINIC VISIT: HCPCS | Mod: 25,ZF

## 2018-02-14 PROCEDURE — 93010 ELECTROCARDIOGRAM REPORT: CPT | Mod: ZP | Performed by: INTERNAL MEDICINE

## 2018-02-14 PROCEDURE — 93005 ELECTROCARDIOGRAM TRACING: CPT | Mod: ZF

## 2018-02-14 PROCEDURE — 99214 OFFICE O/P EST MOD 30 MIN: CPT | Mod: ZP | Performed by: INTERNAL MEDICINE

## 2018-02-14 RX ORDER — DILTIAZEM HYDROCHLORIDE 180 MG/1
180 CAPSULE, COATED, EXTENDED RELEASE ORAL DAILY
Qty: 90 CAPSULE | Refills: 3 | Status: SHIPPED | OUTPATIENT
Start: 2018-02-14 | End: 2018-02-14

## 2018-02-14 RX ORDER — DILTIAZEM HYDROCHLORIDE 180 MG/1
180 CAPSULE, COATED, EXTENDED RELEASE ORAL DAILY
Qty: 90 CAPSULE | Refills: 3 | Status: SHIPPED | OUTPATIENT
Start: 2018-02-14 | End: 2018-02-28

## 2018-02-14 RX ORDER — ASPIRIN 81 MG/1
81 TABLET ORAL DAILY
COMMUNITY
Start: 2018-02-14 | End: 2021-01-06

## 2018-02-14 ASSESSMENT — PAIN SCALES - GENERAL: PAINLEVEL: NO PAIN (0)

## 2018-02-14 NOTE — PATIENT INSTRUCTIONS
Cardiology Provider you saw in clinic today: Dr. Felipe    Medication Changes:     1. Stop Pradaxa   2. Start aspirin 81mg daily   3. Decrease diltiazem to 180mg daily     Follow-up Visit:  1 year with Yamilka Skinner NP              Please contact us via [employer] for questions or concerns.    Meggan Brar RN   Electrophysiology Nurse Coordinator.  [phone]    If your question concerns the schedule/appointment times, contact:  [facility] Procedure   [phone]    Device Clinic (Pacemakers, ICD, Loop Recorders)   [phone]      You will receive all normal lab and testing results via [employer] or mail if not reviewed in clinic today.   If you need a medication refill please contact your pharmacy.    As always, thank you for trusting us with your health care needs!

## 2018-02-14 NOTE — NURSING NOTE
Chief Complaint   Patient presents with     Follow Up For     AF - 1 mo f/u DCCV. EKG     Vitals were taken and medications were reconciled. EKG was performed    ROX Pappas  8:08 AM

## 2018-02-14 NOTE — LETTER
2/14/2018      RE: Frank Orellana  [address]  [facility] [address]       Dear Colleague,    Thank you for the opportunity to participate in the care of your patient, Frank Orellana, at the [facility] HEART [facility]. Please see a copy of my visit note below.    HPI:   Frank Orellanais a 69 yo formerly smoking Taoism male who returns for follow up of symptomatic paroxysmal atrial fibrillation.  At our initial visit in 12/2016 he had reported ~20 years of paroxysmal atrial fibrillation manifesting as chest pressure and palpitations.  The first episode occurred during a picnic and converted spontaneously to sinus rhythm.  He took metoprolol for 30 days.  Approximately 10 years later he had a second episode in the setting of a meniscal tear and perhaps 2 or 3 more episodes in the intervening years until his palpitations became more frequent in the year leading up to his most recent presentation.  A 48-hour Holter monitor which showed variation between sinus rhythm, junctional rhythm, and A.fib (average HR 63, range ) with 1% PVCs and <1% supraventricular ectopic beats, with 33 runs of PAT vs. A.fib (longest 10 beats at 164 bpm.)    He then presented to the ED on 12/2/16 for palpitations, was in A.fib with RVR~111 bpm, started on diltiazem and ASA 81 mg daily, and discharged.  At our last visit he reported walking daily and did not feel that he had any functional limitation.      Interval history:  In the interim he had an episode of AFib that manifested as palpitations, SOB and fatigue. He felt the symptoms for about 10 days and was seen by Dr. Hussein on 1/6/18. At this time he was noted to be in symptomatic AF (rate controlled), and was scheduled to undergo JIM/DCCV, which was performed on 1/15/18. His symptoms have subsided since then. He was on Dabigatran for 4 weeks following the DCCV and his ASA was held.     ROS:  A comprehensive 10  "point review of systems negative other than as mentioned in HPI.    Examination:  /66 (BP Location: Right arm, Cuff Size: Adult Regular)  Pulse 62  Ht 1.753 m (5' 9\")  Wt 81.7 kg (180 lb 3.2 oz)  SpO2 96%  BMI 26.61 kg/m2  GENERAL APPEARANCE: comfortable appearing male with unlabored breathing.  HEENT: no icterus, no xanthelasmas  NECK:  Supple.  JVP is not elevated.  RESPIRATORY: lungs clear to auscultation  CARDIOVASCULAR: regular rhythm, normal S1 and S2, 2/6 early diastolic murmur loudest at the LLSB, warm extremities with no peripheral edema.  ABDOMEN: soft, not tender  EXTREMITIES: peripheral pulses normal, no edema, no bruits  NEURO: alert and fully oriented with fluent speech and steady gait.  SKIN: no petechiae/ecchymoses, not jaundiced    Labs:  Reviewed.     Testing/Procedures:  Echocardiogram 4/27/2017 showed LV EF 60-65%, mild RV dilation but normal RV function, mild AI, and mild dilation of the ascending aorta to 4.5 cm, indexed to 2.3 cm/m2.    MR angiography of the chest 12/27/2016 showed \"Normal caliber of thoracic aorta. The ascending aorta measures 3.9 cm, which is normal when indexed for age/gender/size.\"    Echocardiogram 10/14/16 showed LV EF 55-60%, mild RV dilation but normal RV function, mild to moderate AI, and dilation of the proximal ascending aorta to 45 mm, index 2.4 cm/m2.\"    Lexiscan NM stress test 1/8/18 showed \"Normal  myocardial SPECT study with a summed stress score of  zero . No ischemia or infarct identified. Normal LV function. No change  from prior study.\"      Assessment and Plan:   Frank Ntetles a 69 yo Church male who returns for follow up of symptomatic paroxysmal atrial fibrillation.    1. Paroxysmal atrial fibrillation    Stroke prevention:  [id number]=1 (estimated 1.5% annual risk of stroke/thromboembolism) (age+).  Because he is a Church and he would not accept blood products he believes that the risk of bleeding outweighs the " stroke/thromboembolic protection from anticoagulation.  We will hold his Dabigatran (4 weeks post DCCV), and resume ASA 81 mg.    Rate control is adequate. Will decrease Dilt back to 180 mg daily.    Rhythm control:  As he is not currently symptomatic and has preserved LV function there is not a compelling reason to employ a rhythm control strategy at this time.    Risk factor modification:  He is being treated with CPAP for WINNIE.        Follow up in 1 year.  I discussed the patient with Dr. Jovanni Felipe.      Yuval Roper MD  Cardiovascular Disease Fellow  Pager: [phone]    EP STAFF NOTE  Patient seen and examined and management plan personally reviewed with the patient. I agree with the note above by the CV/EP fellow.  Jovanni Felipe MD [facility]  Cardiology - Electrophysiology      CC  MARY ANNE LEO

## 2018-02-14 NOTE — PROGRESS NOTES
"HPI:   Frank Nettles a 67 yo formerly smoking Islam male who returns for follow up of symptomatic paroxysmal atrial fibrillation.  At our initial visit in 12/2016 he had reported ~20 years of paroxysmal atrial fibrillation manifesting as chest pressure and palpitations.  The first episode occurred during a picnic and converted spontaneously to sinus rhythm.  He took metoprolol for 30 days.  Approximately 10 years later he had a second episode in the setting of a meniscal tear and perhaps 2 or 3 more episodes in the intervening years until his palpitations became more frequent in the year leading up to his most recent presentation.  A 48-hour Holter monitor which showed variation between sinus rhythm, junctional rhythm, and A.fib (average HR 63, range ) with 1% PVCs and <1% supraventricular ectopic beats, with 33 runs of PAT vs. A.fib (longest 10 beats at 164 bpm.)    He then presented to the ED on 12/2/16 for palpitations, was in A.fib with RVR~111 bpm, started on diltiazem and ASA 81 mg daily, and discharged.  At our last visit he reported walking daily and did not feel that he had any functional limitation.      Interval history:  In the interim he had an episode of AFib that manifested as palpitations, SOB and fatigue. He felt the symptoms for about 10 days and was seen by Dr. Hussein on 1/6/18. At this time he was noted to be in symptomatic AF (rate controlled), and was scheduled to undergo JIM/DCCV, which was performed on 1/15/18. His symptoms have subsided since then. He was on Dabigatran for 4 weeks following the DCCV and his ASA was held.     ROS:  A comprehensive 10 point review of systems negative other than as mentioned in HPI.    Examination:  /66 (BP Location: Right arm, Cuff Size: Adult Regular)  Pulse 62  Ht 1.753 m (5' 9\")  Wt 81.7 kg (180 lb 3.2 oz)  SpO2 96%  BMI 26.61 kg/m2  GENERAL APPEARANCE: comfortable appearing male with unlabored breathing.  HEENT: no " "icterus, no xanthelasmas  NECK:  Supple.  JVP is not elevated.  RESPIRATORY: lungs clear to auscultation  CARDIOVASCULAR: regular rhythm, normal S1 and S2, 2/6 early diastolic murmur loudest at the LLSB, warm extremities with no peripheral edema.  ABDOMEN: soft, not tender  EXTREMITIES: peripheral pulses normal, no edema, no bruits  NEURO: alert and fully oriented with fluent speech and steady gait.  SKIN: no petechiae/ecchymoses, not jaundiced    Labs:  Reviewed.     Testing/Procedures:  Echocardiogram 4/27/2017 showed LV EF 60-65%, mild RV dilation but normal RV function, mild AI, and mild dilation of the ascending aorta to 4.5 cm, indexed to 2.3 cm/m2.    MR angiography of the chest 12/27/2016 showed \"Normal caliber of thoracic aorta. The ascending aorta measures 3.9 cm, which is normal when indexed for age/gender/size.\"    Echocardiogram 10/14/16 showed LV EF 55-60%, mild RV dilation but normal RV function, mild to moderate AI, and dilation of the proximal ascending aorta to 45 mm, index 2.4 cm/m2.\"    Lexiscan NM stress test 1/8/18 showed \"Normal  myocardial SPECT study with a summed stress score of  zero. No ischemia or infarct identified. Normal LV function. No change  from prior study.\"      Assessment and Plan:   Frank Nettles a 67 yo Zoroastrian male who returns for follow up of symptomatic paroxysmal atrial fibrillation.    1. Paroxysmal atrial fibrillation    Stroke prevention:  [id number]=7 (estimated 1.5% annual risk of stroke/thromboembolism) (age+).  Because he is a Zoroastrian and he would not accept blood products he believes that the risk of bleeding outweighs the stroke/thromboembolic protection from anticoagulation.  We will hold his Dabigatran (4 weeks post DCCV), and resume ASA 81 mg.    Rate control is adequate. Will decrease Dilt back to 180 mg daily.    Rhythm control:  As he is not currently symptomatic and has preserved LV function there is not a compelling reason to " employ a rhythm control strategy at this time.    Risk factor modification:  He is being treated with CPAP for WINNIE.        Follow up in 1 year.  I discussed the patient with Dr. Jovanni Felipe.      Yuval Roper MD  Cardiovascular Disease Fellow  Pager: [phone]    EP STAFF NOTE  Patient seen and examined and management plan personally reviewed with the patient. I agree with the note above by the CV/EP fellow.  Jovanni Felipe MD [facility]  Cardiology - Electrophysiology      MARY ANNE ANDRE

## 2018-02-14 NOTE — NURSING NOTE
Return Appointment: Patient given instructions regarding scheduling next clinic visit. Patient demonstrated understanding of this information and agreed to call with further questions or concerns.  Medication Change: Patient was educated regarding prescribed medication change, including discussion of the indication, administration, side effects, and when to report to MD or RN. Patient demonstrated understanding of this information and agreed to call with further questions or concerns.  Patient stated he understood all health information given and agreed to call with further questions or concerns.

## 2018-02-14 NOTE — MR AVS SNAPSHOT
After Visit Summary   2/14/2018    Frank Orellana    MRN: [id number]           Patient Information     Date Of Birth          1950        Visit Information        Provider Department      2/14/2018 8:00 AM Jovanni Felipe MD [facility] Heart Care        Today's Diagnoses     Paroxysmal atrial fibrillation (H)    -  1    Persistent atrial fibrillation (H)          Care Instructions    Cardiology Provider you saw in clinic today: Dr. Felipe    Medication Changes:     1. Stop Pradaxa   2. Start aspirin 81mg daily   3. Decrease diltiazem to 180mg daily     Follow-up Visit:  1 year with Yamilka Skinner NP              Please contact us via [employer] for questions or concerns.    Meggan Brar RN   Electrophysiology Nurse Coordinator.  [phone]    If your question concerns the schedule/appointment times, contact:  [facility] Procedure   [phone]    Device Clinic (Pacemakers, ICD, Loop Recorders)   [phone]      You will receive all normal lab and testing results via [employer] or mail if not reviewed in clinic today.   If you need a medication refill please contact your pharmacy.    As always, thank you for trusting us with your health care needs!            Follow-ups after your visit        Additional Services     Follow-Up with Cardiac Advanced Practice Provider       Yamilka Skinner NP                  Your next 10 appointments already scheduled     Feb 16, 2018 11:30 AM CST   (Arrive by 11:15 AM)   RETURN HAND with Lars Oleary MD   [facility] Orth[facility] ([facility])    [address] [place] [id number] [phone]            Apr 19, 2018  7:00 AM CDT   (Arrive by 6:45 AM)   Return Visit with Vanessa Edwards MD   [facility] Primary Care Clinic ([facility])    [address] [place] [id number] [phone]            May 10, 2018 11:00 AM CDT   Return Sleep  Patient with Melissa Stanford MD   [facility] [place], Sleep Study ([facility])    [address] [place] [address] [phone]            Oct 11, 2018  7:30 AM CDT   RETURN RETINA with Ruth Boone MD   Eye Clinic ([facility])    [place] [address]  [address] 9a  [facility] [address] [phone]              Future tests that were ordered for you today     Open Future Orders        Priority Expected Expires Ordered    Follow-Up with Cardiac Advanced Practice Provider Routine 2/13/2019 2/14/2019 2/14/2018            Who to contact     If you have questions or need follow up information about today's clinic visit or your schedule please contact [facility] directly at [phone].  Normal or non-critical lab and imaging results will be communicated to you by MyChart, letter or phone within 4 business days after the clinic has received the results. If you do not hear from us within 7 days, please contact the clinic through [employer] or phone. If you have a critical or abnormal lab result, we will notify you by phone as soon as possible.  Submit refill requests through [employer] or call your pharmacy and they will forward the refill request to us. Please allow 3 business days for your refill to be completed.          Additional Information About Your Visit        MyChart Information     [employer] gives you secure access to your electronic health record. If you see a primary care provider, you can also send messages to your care team and make appointments. If you have questions, please call your primary care clinic.  If you do not have a primary care provider, please call [phone] and they will assist you.        Care EveryWhere ID     This is your Care EveryWhere ID. This could be used by other organizations to access your [place] medical records  [id number]        Your Vitals Were      "Pulse Height Pulse Oximetry BMI (Body Mass Index)          62 1.753 m (5' 9\") 96% 26.61 kg/m2         Blood Pressure from Last 3 Encounters:   02/14/18 103/66   01/15/18 93/64   01/15/18 94/72    Weight from Last 3 Encounters:   02/14/18 81.7 kg (180 lb 3.2 oz)   01/15/18 81.7 kg (180 lb 1.6 oz)   01/06/18 81.7 kg (180 lb 1.6 oz)              We Performed the Following     EKG 12-lead, tracing only (Same Day)          Today's Medication Changes          These changes are accurate as of 2/14/18  8:59 AM.  If you have any questions, ask your nurse or doctor.               These medicines have changed or have updated prescriptions.        Dose/Directions    diltiazem 180 MG 24 hr capsule   Commonly known as:  CARDIZEM CD   This may have changed:    - medication strength  - how much to take   Used for:  Persistent atrial fibrillation (H)   Changed by:  Jovanni Felipe MD        Dose:  180 mg   Take 1 capsule (180 mg) by mouth daily   Quantity:  90 capsule   Refills:  3         Stop taking these medicines if you haven't already. Please contact your care team if you have questions.     dabigatran ANTICOAGULANT 150 MG capsule   Commonly known as:  PRADAXA   Stopped by:  Jovanni Felipe MD                Where to get your medicines      These medications were sent to [facility] [address] [facility] [address] [facility] [address]  [address], [facility] [address]    Hours:  TRANSPLANT PHONE NUMBER [phone] Phone:  [phone]     diltiazem 180 MG 24 hr capsule                Primary Care Provider Office Phone # Fax #    Vanessa Edwards [phone]       [address] [place] [address]  [facility] [address]        Equal Access to Services     AYDE PAPPAS AH: Mike Bravo, wadara guzman, qalyudmilata kaalmaizabella ricci, jeyson evans. So [facility] [phone].    ATENCIÓN: Si habla español, tiene a sebastian disposición servicios gratuitos de " asistencia lingüística. Rowan al [phone].    We comply with applicable federal civil rights laws and [place] laws. We do not discriminate on the basis of race, color, national origin, age, disability, sex, sexual orientation, or gender identity.            Thank you!     Thank you for choosing [facility]  for your care. Our goal is always to provide you with excellent care. Hearing back from our patients is one way we can continue to improve our services. Please take a few minutes to complete the written survey that you may receive in the mail after your visit with us. Thank you!             Your Updated Medication List - Protect others around you: Learn how to safely use, store and throw away your medicines at www.disposemymeds.org.          This list is accurate as of 2/14/18  8:59 AM.  Always use your most recent med list.                   Brand Name Dispense Instructions for use Diagnosis    alfuzosin 10 MG 24 hr tablet    UROXATRAL    90 tablet    TAKE 1 TABLET DAILY    Urinary frequency       aspirin EC 81 MG EC tablet      Take 1 tablet (81 mg) by mouth daily    Paroxysmal atrial fibrillation (H)       diltiazem 180 MG 24 hr capsule    CARDIZEM CD    90 capsule    Take 1 capsule (180 mg) by mouth daily    Persistent atrial fibrillation (H)       order for DME      Reported on 5/4/2017

## 2018-02-15 LAB — INTERPRETATION ECG - MUSE: NORMAL

## 2018-02-16 ENCOUNTER — OFFICE VISIT (OUTPATIENT)
Dept: ORTHOPEDICS | Facility: CLINIC | Age: 68
End: 2018-02-16
Payer: COMMERCIAL

## 2018-02-16 DIAGNOSIS — Z98.890 STATUS POST DUPUYTREN'S FASCIECTOMY: Primary | ICD-10-CM

## 2018-02-16 NOTE — PROGRESS NOTES
Follow-up left hand subtotal palmar fasciectomy. He is doing very well. Done with therapy now. No pain. Back to all his normal activities.    The past medical history is reviewed and documented in the chart including allergies, medications, medical diagnoses, surgical procedures and review of systems.    Examination of his hands today demonstrates full range of motion bilaterally. Dupuytren's incisions are nicely healed, no signs of recurrence, no signs of infection. The incisions are nontender.  There is a palpable radial pulse bilaterally and brisk capillary refill. No overlying skin changes, no erythema, no wounds or signs of infection. There is full sensation to light touch throughout. No motor deficits noted.    Impression: Status post bilateral hand subtotal palmar fasciectomy    Plan: Return to activities as tolerated now. Overall he is doing great. We'll plan on seeing him back on an as needed basis. He has our contact information should problems arise.

## 2018-02-16 NOTE — LETTER
2/16/2018       RE: Frank Orellana  [address]  [facility] [address]     Dear Colleague,    Thank you for referring your patient, Frank Orellana, to the [facility] at [facility] Please see a copy of my visit note below.     Follow-up left hand subtotal palmar fasciectomy. He is doing very well. Done with therapy now. No pain. Back to all his normal activities.    The past medical history is reviewed and documented in the chart including allergies, medications, medical diagnoses, surgical procedures and review of systems.    Examination of his hands today demonstrates full range of motion bilaterally. Dupuytren's incisions are nicely healed, no signs of recurrence, no signs of infection. The incisions are nontender.  There is a palpable radial pulse bilaterally and brisk capillary refill. No overlying skin changes, no erythema, no wounds or signs of infection. There is full sensation to light touch throughout. No motor deficits noted.    Impression: Status post bilateral hand subtotal palmar fasciectomy    Plan: Return to activities as tolerated now. Overall he is doing great. We'll plan on seeing him back on an as needed basis. He has our contact information should problems arise.    Again, thank you for allowing me to participate in the care of your patient.      Sincerely,    Lars Oleary MD

## 2018-02-16 NOTE — NURSING NOTE
Reason For Visit:   Chief Complaint   Patient presents with     Surgical Followup     Left Ring and Middle Finger Subtotal Palmar Fasciectomy, DOS: 12-.       Primary MD: Vanessa Edwards  Age: 68 year old    ?  No        Pain Assessment  Patient Currently in Pain: Denies    Hand Dominance Evaluation  Hand Dominance: Right          QuickDASH Assessment  QuickDASH Main 10/23/2016   1.Open a tight or new jar. Mild difficulty   2. Do heavy household chores (e.g., wash walls, floors) No difficulty   3. Carry a shopping bag or briefcase. No difficulty   4. Wash your back. No difficulty   5. Use a knife to cut food. No difficulty   6. Recreational activities in which you take some force or impact through your arm, shoulder or hand (e.g., golf, hammering, tennis, etc.). No difficulty   7. During the past week, to what extent has your arm, shoulder or hand problem interfered with your normal social activities with family, friends, neighbours or groups? Slightly   8. During the past week, were you limited in your work or other regular daily activities as a result of your arm, shoulder or hand problem? Very limited   9. Arm, shoulder or hand pain. Mild   10.Tingling (pins and needles) in your arm,shoulder or hand. None   11. During the past week, how much difficulty have you had sleeping because of the pain in your arm, shoulder or hand? ([place] number) No difficulty   Quickdash Ability Score 13.63          Current Outpatient Prescriptions   Medication Sig Dispense Refill     aspirin EC 81 MG EC tablet Take 1 tablet (81 mg) by mouth daily       diltiazem 180 MG 24 hr capsule Take 1 capsule (180 mg) by mouth daily 90 capsule 3     alfuzosin (UROXATRAL) 10 MG 24 hr tablet TAKE 1 TABLET DAILY 90 tablet 3     order for DME Reported on 5/4/2017         Allergies   Allergen Reactions     Blood Transfusion Related (Informational Only)      Hindu.  Declines blood transfusions.     Contrast Dye Rash        Luyc Maloney LPN

## 2018-02-16 NOTE — MR AVS SNAPSHOT
After Visit Summary   2/16/2018    Frank Orellana    MRN: [id number]           Patient Information     Date Of Birth          1950        Visit Information        Provider Department      2/16/2018 11:30 AM Lars Oleary MD [facility]        Today's Diagnoses     Status post Dupuytren's fasciectomy    -  1       Follow-ups after your visit        Your next 10 appointments already scheduled     Apr 19, 2018  7:00 AM CDT   (Arrive by 6:45 AM)   Return Visit with Vanessa Edwards MD   [facility] ([facility])    [address]  [address] [facility] [place] [id number] [phone]            May 10, 2018 11:00 AM CDT   Return Sleep Patient with Melissa Stanford MD   [facility] [place], Sleep Study ([facility])    [address] [place] [id number] [phone]            Oct 11, 2018  7:30 AM CDT   RETURN RETINA with Ruth Boone MD   Eye Clinic ([facility])    [address]  [facility] [id number] [phone]              Who to contact     Please call your clinic at [phone] to:    Ask questions about your health    Make or cancel appointments    Discuss your medicines    Learn about your test results    Speak to your doctor            Additional Information About Your Visit        [facility] Information     [employer] gives you secure access to your electronic health record. If you see a primary care provider, you can also send messages to your care team and make appointments. If you have questions, please call your primary care clinic.  If you do not have a primary care provider, please call [phone] and they will assist you.      [employer] is an electronic gateway that provides easy, online access to your medical records. With [employer], you can request a clinic appointment, read your  test results, renew a prescription or communicate with your care team.     To access your existing account, please contact your [facility] or call [phone] for assistance.        Care EveryWhere ID     This is your Care EveryWhere ID. This could be used by other organizations to access your [place] medical records  [id number]         Blood Pressure from Last 3 Encounters:   02/14/18 103/66   01/15/18 93/64   01/15/18 94/72    Weight from Last 3 Encounters:   02/14/18 81.7 kg (180 lb 3.2 oz)   01/15/18 81.7 kg (180 lb 1.6 oz)   01/06/18 81.7 kg (180 lb 1.6 oz)              Today, you had the following     No orders found for display       Primary Care Provider Office Phone # Fax #    Vanessa Edwards [phone]       [address] [place] [address] [address] [place] [address]        Equal Access to Services     AYDE PAPPAS : Hadii sylvia ku hadasho Soomaali, waaxda luqadaha, qaybta kaalmada adeegyada, jeyson hernández . So [facility] [phone].    ATENCIÓN: Si habla español, tiene a sebastian disposición servicios gratuitos de asistencia lingüística. Llame al [phone].    We comply with applicable federal civil rights laws and [place] laws. We do not discriminate on the basis of race, color, national origin, age, disability, sex, sexual orientation, or gender identity.            Thank you!     Thank you for choosing [facility]  for your care. Our goal is always to provide you with excellent care. Hearing back from our patients is one way we can continue to improve our services. Please take a few minutes to complete the written survey that you may receive in the mail after your visit with us. Thank you!             Your Updated Medication List - Protect others around you: Learn how to safely use, store and throw away your medicines at www.disposemymeds.org.          This list is accurate as of 2/16/18 11:51 AM.  Always use your most recent  med list.                   Brand Name Dispense Instructions for use Diagnosis    alfuzosin 10 MG 24 hr tablet    UROXATRAL    90 tablet    TAKE 1 TABLET DAILY    Urinary frequency       aspirin EC 81 MG EC tablet      Take 1 tablet (81 mg) by mouth daily    Paroxysmal atrial fibrillation (H)       diltiazem 180 MG 24 hr capsule     90 capsule    Take 1 capsule (180 mg) by mouth daily    Persistent atrial fibrillation (H)       order for DME      Reported on 5/4/2017

## 2018-02-28 ENCOUNTER — MYC MEDICAL ADVICE (OUTPATIENT)
Dept: CARDIOLOGY | Facility: CLINIC | Age: 68
End: 2018-02-28

## 2018-02-28 DIAGNOSIS — I48.19 PERSISTENT ATRIAL FIBRILLATION (H): ICD-10-CM

## 2018-02-28 RX ORDER — DILTIAZEM HYDROCHLORIDE 180 MG/1
180 CAPSULE, COATED, EXTENDED RELEASE ORAL DAILY
Qty: 90 CAPSULE | Refills: 3 | Status: SHIPPED | OUTPATIENT
Start: 2018-02-28 | End: 2018-12-27

## 2018-02-28 NOTE — TELEPHONE ENCOUNTER
Call to [facility] - they had voided the prescription due to unknown reasons. They advised to send a new Rx.

## 2018-04-01 DIAGNOSIS — R35.0 URINARY FREQUENCY: ICD-10-CM

## 2018-04-02 RX ORDER — ALFUZOSIN HYDROCHLORIDE 10 MG/1
1 TABLET, EXTENDED RELEASE ORAL DAILY
Qty: 90 TABLET | Refills: 1 | Status: SHIPPED | OUTPATIENT
Start: 2018-04-02 | End: 2019-01-16

## 2018-04-19 ENCOUNTER — OFFICE VISIT (OUTPATIENT)
Dept: INTERNAL MEDICINE | Facility: CLINIC | Age: 68
End: 2018-04-19
Payer: COMMERCIAL

## 2018-04-19 VITALS
WEIGHT: 177.8 LBS | RESPIRATION RATE: 12 BRPM | SYSTOLIC BLOOD PRESSURE: 121 MMHG | DIASTOLIC BLOOD PRESSURE: 78 MMHG | HEART RATE: 53 BPM | BODY MASS INDEX: 26.26 KG/M2

## 2018-04-19 DIAGNOSIS — I48.0 PAROXYSMAL ATRIAL FIBRILLATION (H): Primary | ICD-10-CM

## 2018-04-19 ASSESSMENT — PAIN SCALES - GENERAL: PAINLEVEL: NO PAIN (0)

## 2018-04-19 NOTE — NURSING NOTE
Chief Complaint   Patient presents with     Recheck Medication     patient states he is here for a 3 month check     MARLENY ROBBINS at 6:56 AM on 4/19/2018.

## 2018-04-19 NOTE — PROGRESS NOTES
"[facility]  Primary Care [place]   Vanessa Edwards MD  04/19/2018      Chief Complaint:   No chief complaint on file.       History of Present Illness:   Frank Orellana is a 68 year old male with a history of paroxysmal atrial fibrillation and WINNIE who presents to clinic for 3 month follow up of atrial fibrillation. The patient was last seen here on 1/3/18, by Dr. Ahumada, for evaluation of palpitations accompanied with shortness of breath on exertion and fatigue. This was without chest pain or lightheadedness at the time. EKG (noted bellow) and cardiology referral was given and diltiazem daily dosage was increased to 240 mg. He was last seen by cardiology on 2/14/18, who noted that the patient had underwent JIM/DCCV on 1/15/18, and his symptoms had subsided from this. He was on 4 weeks of Dabigatran and his ASA has been held. At this visit he wished to forgo anticoagulation due to the risk of bleeding (He is a Baptism and would not accept any blood products if needed) and continue ASA. Diltiazem was decreased to 180 mg daily, rate and rhythm were controled.       Other concerns discussed:  ***    EKG (1/3/18):  Indication: palpitations  Vent. Rate 82 bpm. TX interval 8 ms. QRS duration 110 ms. QT/QTc 392/457 ms. P-R-T axis 8 -5 45.   Atrial fibrillation with competing junctional pacemaker with premature ventricular or aberrantly conducted complexes. Nonspecific T-wave abnormality. Abnormal ECG.    Read by Dr. Ahumada.     Health Maintenance:  Mammogram (females age 40 - 75): yearly or every 2 years? - {[employer]ank[employer] Single Select.:502627::\"Recommended\",\"Patient Declined\",\"Up to Date ***\",\"Not applicable\",\"***\"}  Pap (females age 21 - 65): every 3 years, every 5 years after age 35 if cotesting done - {[employer] Single Select.:676929::\"Recommended\",\"Patient Declined\",\"Up to Date ***\",\"Not applicable\",\"***\"}  Colon Cancer screening (age 50 - 75): yearly FIT or colonoscopy every 5 - 10 years - {BlankBase Single " "Select.:869558::\"Recommended\",\"Patient Declined\",\"Up to Date ***\",\"Not applicable\",\"***\"}  Lung Cancer screening (age 55 - 80 with 30 pack-year history who currently smoke or quit in last 15 years: yearly or per recommendation - {Blank[employer] Single Select.:954864::\"Recommended\",\"Patient Declined\",\"Up to Date ***\",\"Not applicable\",\"***\"}  Dexa (females age ? 65, males age ? 70): every 2 years - {BlankBase Single Select.:075735::\"Recommended\",\"Patient Declined\",\"Up to Date ***\",\"Not applicable\",\"***\"}  AAA Screening (males ages 65 - 75 who have ever smoked): once per lifetime - {BlankBase Single Select.:692928::\"Recommended\",\"Patient Declined\",\"Up to Date ***\",\"Not applicable\",\"***\"}  Glucose: every 5 years, yearly if risk factors? - {BlankBase Single Select.:120471::\"Recommended\",\"Patient Declined\",\"Up to Date ***\",\"Not applicable\",\"***\"}  Lipid panel: every 5 years, yearly if risk factors - {BlankBase Single Select.:582529::\"Recommended\",\"Patient Declined\",\"Up to Date ***\",\"Not applicable\",\"***\"}  GC/Chlamydia (sexually active females age <24 years): yearly - {BlankBase Single Select.:949136::\"Recommended\",\"Patient Declined\",\"Up to Date ***\",\"Not applicable\",\"***\"}  Hepatitis C (adults born 1945 - 1965): once per lifetime - {BlankBase Single Select.:807322::\"Recommended\",\"Patient Declined\",\"Up to Date ***\",\"Not applicable\",\"***\"}  HIV (ages 13 - 64): once per lifetime, yearly if MSM or other risk factors - {[employer] BioBase Single Select.:527738::\"Recommended\",\"Patient Declined\",\"Up to Date ***\",\"Not applicable\",\"***\"}  Immunizations (Tetanus every 10 years, Influenza yearly, Pneumonia PPV-23 and PCV-13 age ? 65 or sooner if risk factors) - {[employer] Single Select.:841501::\"Recommended\",\"Patient Declined\",\"Up to Date\",\"Not applicable\",\"***\"}   Immunization History   Administered Date(s) Administered     HEPA 09/29/2009, 04/09/2010     Influenza (High Dose) 3 valent vaccine 10/06/2016, 10/16/2017     Influenza (IIV3) PF " 10/05/2011, 10/15/2012     Pneumo Conj 13-V (2010&after) 10/06/2016     Pneumococcal 23 valent 07/17/2014     Poliovirus, inactivated (IPV) 09/29/2009     Tdap (Adacel,Boostrix) 09/29/2009     Typhoid IM 09/29/2009, 07/17/2014     Zoster vaccine, live 03/13/2013        The following health maintenance issues were also reviewed and addressed as needed:  Blood pressure (>18 years annually)  Depression - PHQ-2 Score:   PHQ-2 ( 1999 [employer]) 1/16/2017 3/13/2013   Q1: Little interest or pleasure in doing things 0 0   Q2: Feeling down, depressed or hopeless 0 0   PHQ-2 Score 0 0     ***/6  Lifestyle habits (including exercise, diet, weight)  Health risk behaviors (sexual history, alcohol, tobacco, drug use, partner violence)  Routine eye, dental, hearing, and skin exams  Advanced directives     Review of Systems:   Pertinent items are noted in HPI, remainder of complete ROS is negative.      Active Medications:     Current Outpatient Prescriptions:      alfuzosin (UROXATRAL) 10 MG 24 hr tablet, Take 1 tablet (10 mg) by mouth daily, Disp: 90 tablet, Rfl: 1     aspirin EC 81 MG EC tablet, Take 1 tablet (81 mg) by mouth daily, Disp: , Rfl:      diltiazem 180 MG 24 hr capsule, Take 1 capsule (180 mg) by mouth daily, Disp: 90 capsule, Rfl: 3     order for DME, Reported on 5/4/2017, Disp: , Rfl:       Allergies:   Blood transfusion related (informational only) and Contrast dye      Past Medical History:  History of actinic keratoses  Telangiectasia  SK (seborrheic keratosis)  Refusal of blood transfusions as patient is Mormon  Paroxysmal atrial fibrillation (H)  Pain in both hands  Chronic left shoulder pain  Chronic hepatitis C without hepatic coma (H)  WINNIE (obstructive sleep apnea) AHI 32  Atrial fibrillation (H)  Tubular adenoma of ascending colon  ACP (advance care planning)  Chronic hepatitis C (H)  Dupuytren's contracture of both hands  Bilateral sensorineural hearing loss  Elevated PSA  Tinnitus, 1 or 2 years  ago both ears   Past Surgical History:  Anesthesia cardioversion   Arthroscopy knee left   Colonoscopy    Herniorrhaphy inguinal   Knee surgery for torn meniscus   Release Dupuytren's contracture left, right   Transesophageal echocardiogram intraoperative     Family History:   Paternal grandmother - rheumatoid arthritis   Father () - GI disease, alcoholism   Mother - alcohol/drug  Brother - heart disease, cardiac sudden death       Social History:   Presents ***  Tobacco use: Former smoker, quit  after 10 years at 0.50 PPD   Smokeless tobacco: Former user, quit   Alcohol consumption: 4 or 5 drinks/week   Marital status: ***      Physical Exam:   There were no vitals taken for this visit.   ***      Assessment and Plan:  There are no diagnoses linked to this encounter.        Follow-up: Data Unavailable         Scribe Disclosure:   I, Lucio Stevens, am serving as a scribe to document services personally performed by Vanessa Edwards MD at this visit, based upon the provider's statements to me. All documentation has been reviewed by the aforementioned provider prior to being entered into the official medical record.     Portions of this medical record were completed by a scribe. UPON MY REVIEW AND AUTHENTICATION BY ELECTRONIC SIGNATURE, this confirms (a) I performed the applicable clinical services, and (b) the record is accurate.

## 2018-04-19 NOTE — PROGRESS NOTES
"Chief Complaint   Frank Orellana is a 68 year old male presents for   Chief Complaint   Patient presents with     Recheck Medication     patient states he is here for a 3 month check      SUBJECTIVE:  Frank is a 68 year old male with PMH significant for paroxysmal atrial fibrillation s/p DCCV on 1/15/18 who is returning for follow-up.  He is doing very well.  Hasn't experienced any heart palpitations since the procedure.  Denies any chest pain or SOB.  He is on diltiazem for rate control and aspirin 81 mg.  He is tolerating the medication well.  Last appointment in Cardiology was on 2/14/18 in which they decreased his diltiazem.      His diet is \"good\" and he exercises mainly with walking [id number].  No specific concerns today.    Medications and allergies were reviewed by me today.     Social History   History   Smoking Status     Former Smoker     Packs/day: 0.50     Years: 10.00     Types: Cigarettes     Start date: 6/15/1964     Quit date: 7/1/1974   Smokeless Tobacco     Former User     Quit date: 6/1/1969      PHQ-2 ( 1999 [facility]) 4/19/2018 1/16/2017   Q1: Little interest or pleasure in doing things 0 0   Q2: Feeling down, depressed or hopeless 0 0   PHQ-2 Score 0 0     PHQ-9 SCORE 9/8/2017   Total Score 0     Past Medical History   Patient Active Problem List   Diagnosis     History of actinic keratoses     Telangiectasia     SK (seborrheic keratosis)     Patient is Mosque     Refusal of blood transfusions as patient is Mosque     Paroxysmal atrial fibrillation (H)     Pain in both hands     Chronic left shoulder pain     Chronic hepatitis C without hepatic coma (H)     WINNIE (obstructive sleep apnea) AHI 32     Atrial fibrillation (H)     Tubular adenoma of colon     ACP (advance care planning)     Chronic hepatitis C (H)     Dupuytren's contracture of both hands     Bilateral sensorineural hearing loss     Elevated PSA       Review of Systems   5 point ROS completed and negative " except noted above, including Gen, CV, Resp, GI, MS    Physical Exam   /78  Pulse 53  Resp 12  Wt 80.6 kg (177 lb 12.8 oz)  BMI 26.26 kg/m2  Gen: no distress, comfortable, pleasant   Eyes: anicteric, normal extra-ocular movements   Cardiovascular: regular rate and rhythm, normal S1 and S2, no murmurs, rubs or gallops, radial pulses 2+ bilaterally. No peripheral edema.  No carotid bruits.   Respiratory: clear to auscultation, no wheezes or crackles, normal breath sounds   Gastrointestinal: positive bowel sounds.  Slightly tender to palpation in RUQ/RLQ.  No rebound tenderness or guarding present.  No hepatosplenomegaly.  Skin: no concerning lesions, no jaundice   Psychological: appropriate mood     Imaging:  EKG on 2/14/18  Impression  Sinus bradycardia  Otherwise normal ECG  When compared with ECG of 15-ELIAS-2018 09:57,  No significant change was found    JIM on 1/15/18  Interpretation Summary  The left atrial appendage is normal. It is free of spontaneous echo contrast  and thrombus.  Left ventricular function, chamber size, wall motion, and wall thickness are  normal.The EF is 60-65%.  Mild aortic insufficiency is present.  Mild dilatation of the aorta is present.  Ascending aorta 4.2 cm.  No pericardial effusion is present.  There has been no change.    Assessment & Plan   Paroxysmal atrial fibrillation  Patient with previously symptomatic episodes of atrial fibrillation, s/p DCCV on 1/15/18.  Follow up EKG revealing regular rate and rhythm.  On diltiazem for rate control and tolerating well.  Recent cardiology visit in February in which they decreased rate control dose.  No symptomatic episodes since.  Encouraged patient to make appointment with cardiology if symptoms return.  Otherwise continue to monitor and follow up as needed.    HM:  Last colonoscopy in 2017.  Hepatitis C and AAA screening complete.  Lipid screening in 2016.  Immunizations up to date.  Nothing to do.    RTC: For annual physical in  one year    Scribed by Richard Chaves, MS4 for Dr. Jerry MD    Provider Disclosure:    The above student acted as a scribe for this encounter.  I was present and performed the service.  I have reviewed the review of systems and past medical, family and social history.  I have reviewed the student's documentation of the exam and I have performed the exam.   I have reviewed and verified the medical student's documentation and it is correct except as follows: None  Vanessa Edwards M.D.  Internal Medicine   pager [phone]

## 2018-04-19 NOTE — PATIENT INSTRUCTIONS
[facility]: [phone]     [facility] Medication Refill Request Information:  * Please contact your pharmacy regarding ANY request for medication refills.  ** [facility] Prescription Fax = [phone]  * Please allow 3 business days for routine medication refills.  * Please allow 5 business days for controlled substance medication refills.     [facility] Test Result notification information:  *You will be notified with in 7-10 days of your appointment day regarding the results of your test.  If you are on MyChart you will be notified as soon as the provider has reviewed the results and signed off on them.

## 2018-04-19 NOTE — MR AVS SNAPSHOT
After Visit Summary   4/19/2018    Frank Orellana    MRN: [id number]           Patient Information     Date Of Birth          1950        Visit Information        Provider Department      4/19/2018 7:00 AM Vanessa Edwards MD [facility]        Today's Diagnoses     Paroxysmal atrial fibrillation (H)    -  1      Care Instructions    Primary Care Center: [phone]     Primary Care Center Medication Refill Request Information:  * Please contact your pharmacy regarding ANY request for medication refills.  ** [facility] Prescription Fax = [phone]  * Please allow 3 business days for routine medication refills.  * Please allow 5 business days for controlled substance medication refills.     Primary Care Center Test Result notification information:  *You will be notified with in 7-10 days of your appointment day regarding the results of your test.  If you are on MyChart you will be notified as soon as the provider has reviewed the results and signed off on them.            Follow-ups after your visit        Your next 10 appointments already scheduled     May 10, 2018 11:00 AM CDT   Return Sleep Patient with Melissa Stanford MD   [facility] [place], Sleep Study ([facility])    [address] [place] [id number] [phone]            Oct 11, 2018  7:30 AM CDT   RETURN RETINA with Ruth Boone MD   [facility] ([facility])    [address]  [facility] [id number] [phone]              Who to contact     Please call your clinic at [phone] to:    Ask questions about your health    Make or cancel appointments    Discuss your medicines    Learn about your test results    Speak to your doctor            Additional Information About Your Visit        MyChart Information     MyChart gives you secure access to your electronic health record. If you  see a primary care provider, you can also send messages to your care team and make appointments. If you have questions, please call your primary care clinic.  If you do not have a primary care provider, please call [phone] and they will assist you.      [employer] is an electronic gateway that provides easy, online access to your medical records. With [employer], you can request a clinic appointment, read your test results, renew a prescription or communicate with your care team.     To access your existing account, please contact your [facility] or call [phone] for assistance.        Care EveryWhere ID     This is your Care EveryWhere ID. This could be used by other organizations to access your [facility] medical records  [id number]        Your Vitals Were     Pulse Respirations BMI (Body Mass Index)             53 12 26.26 kg/m2          Blood Pressure from Last 3 Encounters:   04/19/18 121/78   02/14/18 103/66   01/15/18 93/64    Weight from Last 3 Encounters:   04/19/18 80.6 kg (177 lb 12.8 oz)   02/14/18 81.7 kg (180 lb 3.2 oz)   01/15/18 81.7 kg (180 lb 1.6 oz)              Today, you had the following     No orders found for display       Primary Care Provider Office Phone # Fax #    Vanessa Edwards [phone]       [address] [place] [phone]  [facility] [address]        Equal Access to Services     [facility] CenterEDOUARD : Hadii sylvia corea hadevetteo Soguy, waaxda luqadaha, qaybta kaalmada keiry, jeyson hernández . So [facility] [phone].    ATENCIÓN: Si habla español, tiene a sebastian disposición servicios gratuitos de asistencia lingüística. Llame al [phone].    We comply with applicable federal civil rights laws and [place] laws. We do not discriminate on the basis of race, color, national origin, age, disability, sex, sexual orientation, or gender identity.            Thank you!     Thank you for choosing [facility]  for  your care. Our goal is always to provide you with excellent care. Hearing back from our patients is one way we can continue to improve our services. Please take a few minutes to complete the written survey that you may receive in the mail after your visit with us. Thank you!             Your Updated Medication List - Protect others around you: Learn how to safely use, store and throw away your medicines at www.disposemymeds.org.          This list is accurate as of 4/19/18  1:01 PM.  Always use your most recent med list.                   Brand Name Dispense Instructions for use Diagnosis    alfuzosin 10 MG 24 hr tablet    UROXATRAL    90 tablet    Take 1 tablet (10 mg) by mouth daily    Urinary frequency       aspirin EC 81 MG EC tablet      Take 1 tablet (81 mg) by mouth daily    Paroxysmal atrial fibrillation (H)       diltiazem 180 MG 24 hr capsule     90 capsule    Take 1 capsule (180 mg) by mouth daily    Persistent atrial fibrillation (H)       order for DME      Reported on 5/4/2017

## 2018-05-10 ENCOUNTER — OFFICE VISIT (OUTPATIENT)
Dept: SLEEP MEDICINE | Facility: CLINIC | Age: 68
End: 2018-05-10
Payer: COMMERCIAL

## 2018-05-10 VITALS
OXYGEN SATURATION: 98 % | BODY MASS INDEX: 26.81 KG/M2 | WEIGHT: 181 LBS | DIASTOLIC BLOOD PRESSURE: 71 MMHG | RESPIRATION RATE: 16 BRPM | HEIGHT: 69 IN | HEART RATE: 52 BPM | SYSTOLIC BLOOD PRESSURE: 124 MMHG

## 2018-05-10 DIAGNOSIS — G47.33 OSA (OBSTRUCTIVE SLEEP APNEA): Primary | ICD-10-CM

## 2018-05-10 PROCEDURE — 99214 OFFICE O/P EST MOD 30 MIN: CPT | Performed by: INTERNAL MEDICINE

## 2018-05-10 NOTE — NURSING NOTE
"    Chief Complaint   Patient presents with     CPAP Follow Up       Initial /71  Pulse 52  Resp 16  Ht 1.753 m (5' 9.02\")  Wt 82.1 kg (181 lb)  SpO2 98%  BMI 26.72 kg/m2 Estimated body mass index is 26.72 kg/(m^2) as calculated from the following:    Height as of this encounter: 1.753 m (5' 9.02\").    Weight as of this encounter: 82.1 kg (181 lb).    Medication Reconciliation: complete    Neck circumference:  inches /  centimeters.    DME: [facility] ~[place]       "

## 2018-05-10 NOTE — NURSING NOTE
Pressure changed to Min: 8 Max: 12. Supplies sent to connie García [facility] ~[place]

## 2018-05-10 NOTE — PATIENT INSTRUCTIONS
1.  Continue CPAP every night for all hours that you are sleeping.  If you nap use CPAP.  As always, try to get at least 8 hours of sleep or more each day, and avoid sleep deprivation. Avoid alcohol.    2.  Reasons that you might need a change to your pressure therapy would be weight gain or loss, waking having inadvertently removed your CPAP overnight, having previously felt refreshed by sleep with CPAP use and now waking un-refreshed, and return of daytime sleepiness. Also, the development of new medical problems can sometimes affect breathing at night-heart failure, stroke, medications such as narcotics.    3.  Please bring CPAP with you if you are hospitalized.  If anticipating surgery be sure to discuss with your surgeon that you have sleep apnea and use PAP therapy.      4.  Maintain your equipment as recommended which includes routine cleaning and replacement of supplies.  Call DME for any questions regarding supplies or maintenance.      5.  Do not drive on engage in potentially dangerous activities if feeling sleepy.    6.  Please see me again in 12 months and bring your machine and card to your follow-up visit.      [facility] DME and cpap specialist [phone]  [facility] [phone]    [facility] DME [phone], CPAP specialist [phone]  [facility] [phone]    [place] [facility], [facility] [phone]    [facility] DME  Daphnie Miles [phone]  [facility] [place] [facility] Brenda [phone]  [facility] D[facility] phone [phone]         Tips for your CPAP and BIPAP use-    Mask fitting tips  Mask fitting exercise:    To improve your mask seal and your mobility at night, put mask on and secure in place.  Lie down in bed with full pressure and roll to one side, adjust headgear while in that position to eliminate any  leaks. Repeat process rolling to other side.     The mask seal does not have to be perfect:   CPAP machines are designed to make up for small leaks. However, you will not tolerate leaks blowing in your eyes so you will need to adjust.   Any leak should only be near or at the bottom of the mask.  We expect your mask to leak slightly at night.    Do not over-tighten the headgear straps, tighter IS NOT better, we expect minimal leak.    First try re-positioning the mask or headgear before tightening the headgear straps.  Mask leaks are expected due to changing sleeping positions. Try pulling the mask away from your skin allowing the cushion to re-inflate will minimize the leak.  If you struggle for a good fit, try turning the CPAP off and then readjust the mask by pulling it away from your face and then turning back on the CPAP.        Humidifier tips  Humidifiers can be adjusted to increase or decrease the amount of moisture according to your comfort level. You may need to adjust this frequently at first, but then might only change it with seasonal weather changes.     Try INCREASING the humidity if:  You experience a dry, irritated nasal passage or throat.  You have a runny, drippy nose or sneezing fits after using CPAP.  You experience nasal congestion during or after CPAP use.    Try DECREASING the humidity if:  You have excessive condensation or  rain out  in the tubing or mask.  Otherwise keep the tubing warm during the night by running it underneath the blankets or pillow.      Clinic visit after initial CPAP and BIPAP set-up   Bring your equipment with you to your 4 week follow up clinic visit.  We will be extracting your data from the machine.        Travel  Always take your equipment with you.  If you fly with your equipment bring it on with you as a carry on.  Medical equipment does not count as a carry on.    If you travel international the machines take 110-240.  The only adapter needed is the adapter that  will fit into the receptacle (outlet).    You may also want to bring an extension cord as many hotel rooms have limited outlets at the bedside.  Do not travel with water in your humidifier chamber.     Cleaning and Maintenance Guidelines    Equipment Frequency Cleaning Method   Mask First Day    Daily      Weekly Soak mask in hot soapy water for 30 minutes, rinse and air dry.  Wipe nasal cushion with a hot soapy (Ivory, baby shampoo) cloth and rinse.  Baby wipes may also be used.  Do not use anti-bacterial soaps,Isamar  liquid soap, rubbing alcohol, bleach or ammonia.  Wash frame in hot soapy water (Ivory, baby shampoo) rinse and let air dry   Headgear Biweekly Wash in hot soapy water, rinse and air dry   Reusable Gray Filter Weekly Wash in hot soapy water, rinse, put in towel squeeze moisture out, let air dry   Disposable White Filter Check Weekly Replace when brown or gray in color; at least every 2 to 3 months   Humidifier Chamber Daily    Weekly Empty distilled water from humidifier and let air dry    Hand wash in hot soapy water, rinse and air dry   Tubing Weekly Wash in hot soapy water, rinse and let air dry   Mask, Tubing and Humidifier Chamber As needed Disinfect: Soak in 1 part vinegar to 3 parts hot water for 30 minutes, rinse well and air dry  Not the material headgear        MASK AND SUPPLY REORDERING  Reminder: Most insurance companies will allow for a new mask, headgear, tubing, and reusable gray filter every six months.  Disposable white ultra-fine filters are covered monthly.    EQUIPMENT NEEDS  Please call our CPAP specialist with any equipment problems, questions or needs.    HOME AND SAFETY INSTRUCTIONS    Do not use frayed or cracked electrical cords, multi plug adaptors, or switched receptacles    Do not immerse electrical equipment into water    Assure that electrical cords do not become a tripping hazard      Your BMI is Body mass index is 26.72 kg/(m^2).  Weight management is a personal  decision.  If you are interested in exploring weight loss strategies, the following discussion covers the approaches that may be successful. Body mass index (BMI) is one way to tell whether you are at a healthy weight, overweight, or obese. It measures your weight in relation to your height.  A BMI of 18.5 to 24.9 is in the healthy range. A person with a BMI of 25 to 29.9 is considered overweight, and someone with a BMI of 30 or greater is considered obese. More than two-thirds of American adults are considered overweight or obese.  Being overweight or obese increases the risk for further weight gain. Excess weight may lead to heart disease and diabetes.  Creating and following plans for healthy eating and physical activity may help you improve your health.  Weight control is part of healthy lifestyle and includes exercise, emotional health, and healthy eating habits. Careful eating habits lifelong are the mainstay of weight control. Though there are significant health benefits from weight loss, long-term weight loss with diet alone may be very difficult to achieve- studies show long-term success with dietary management in less than 10% of people. Attaining a healthy weight may be especially difficult to achieve in those with severe obesity. In some cases, medications, devices and surgical management might be considered.  What can you do?  If you are overweight or obese and are interested in methods for weight loss, you should discuss this with your provider.     Consider reducing daily calorie intake by 500 calories.     Keep a food journal.     Avoiding skipping meals, consider cutting portions instead.    Diet combined with exercise helps maintain muscle while optimizing fat loss. Strength training is particularly important for building and maintaining muscle mass. Exercise helps reduce stress, increase energy, and improves fitness. Increasing exercise without diet control, however, may not burn enough calories  to loose weight.       Start walking three days a week 10-20 minutes at a time    Work towards walking thirty minutes five days a week     Eventually, increase the speed of your walking for 1-2 minutes at time    In addition, we recommend that you review healthy lifestyles and methods for weight loss available through the National Institutes of Health patient information sites:  http://win.niddk.nih.gov/publications/index.htm    And look into health and wellness programs that may be available through your health insurance provider, employer, local community center, or mat[employer]w club.    Weight management plan: Patient was referred to their PCP to discuss a diet and exercise plan.

## 2018-05-10 NOTE — MR AVS SNAPSHOT
After Visit Summary   5/10/2018    Frank Orellana    MRN: [id number]           Patient Information     Date Of Birth          1950        Visit Information        Provider Department      5/10/2018 11:00 AM Melissa Stanford MD [facility], [place], Sleep Study        Today's Diagnoses     WINNIE (obstructive sleep apnea) AHI 32    -  1      Care Instructions    1.  Continue CPAP every night for all hours that you are sleeping.  If you nap use CPAP.  As always, try to get at least 8 hours of sleep or more each day, and avoid sleep deprivation. Avoid alcohol.    2.  Reasons that you might need a change to your pressure therapy would be weight gain or loss, waking having inadvertently removed your CPAP overnight, having previously felt refreshed by sleep with CPAP use and now waking un-refreshed, and return of daytime sleepiness. Also, the development of new medical problems can sometimes affect breathing at night-heart failure, stroke, medications such as narcotics.    3.  Please bring CPAP with you if you are hospitalized.  If anticipating surgery be sure to discuss with your surgeon that you have sleep apnea and use PAP therapy.      4.  Maintain your equipment as recommended which includes routine cleaning and replacement of supplies.  Call DME for any questions regarding supplies or maintenance.      5.  Do not drive on engage in potentially dangerous activities if feeling sleepy.    6.  Please see me again in 12 months and bring your machine and card to your follow-up visit.      [facility] DME and cpap specialist [phone]  [facility] [phone]    [facility] DME [phone], CPAP specialist [phone]  [facility] [phone]    [place] Medical Equipment Department, [facility] [phone]    [facility] DME  Daphnie Miles [phone]  [facility] Sleep  [facility]da [phone]  [facility] phone [phone]         Tips for your CPAP and BIPAP use-    Mask fitting tips  Mask fitting exercise:    To improve your mask seal and your mobility at night, put mask on and secure in place.  Lie down in bed with full pressure and roll to one side, adjust headgear while in that position to eliminate any leaks. Repeat process rolling to other side.     The mask seal does not have to be perfect:   CPAP machines are designed to make up for small leaks. However, you will not tolerate leaks blowing in your eyes so you will need to adjust.   Any leak should only be near or at the bottom of the mask.  We expect your mask to leak slightly at night.    Do not over-tighten the headgear straps, tighter IS NOT better, we expect minimal leak.    First try re-positioning the mask or headgear before tightening the headgear straps.  Mask leaks are expected due to changing sleeping positions. Try pulling the mask away from your skin allowing the cushion to re-inflate will minimize the leak.  If you struggle for a good fit, try turning the CPAP off and then readjust the mask by pulling it away from your face and then turning back on the CPAP.        Humidifier tips  Humidifiers can be adjusted to increase or decrease the amount of moisture according to your comfort level. You may need to adjust this frequently at first, but then might only change it with seasonal weather changes.     Try INCREASING the humidity if:  You experience a dry, irritated nasal passage or throat.  You have a runny, drippy nose or sneezing fits after using CPAP.  You experience nasal congestion during or after CPAP use.    Try DECREASING the humidity if:  You have excessive condensation or  rain out  in the tubing or mask.  Otherwise keep the tubing warm during the night by running it underneath the blankets or pillow.      Clinic visit after initial CPAP and BIPAP set-up   Bring  your equipment with you to your 4 week follow up clinic visit.  We will be extracting your data from the machine.        Travel  Always take your equipment with you.  If you fly with your equipment bring it on with you as a carry on.  Medical equipment does not count as a carry on.    If you travel international the machines take 110-240.  The only adapter needed is the adapter that will fit into the receptacle (outlet).    You may also want to bring an extension cord as many hot rooms have limited outlets at the bedside.  Do not travel with water in your humidifier chamber.     Cleaning and Maintenance Guidelines    Equipment Frequency Cleaning Method   Mask First Day    Daily      Weekly Soak mask in hot soapy water for 30 minutes, rinse and air dry.  Wipe nasal cushion with a hot soapy (Ivory, baby shampoo) cloth and rinse.  Baby wipes may also be used.  Do not use anti-bacterial soaps,Isamar  liquid soap, rubbing alcohol, bleach or ammonia.  Wash frame in hot soapy water (Ivory, baby shampoo) rinse and let air dry   Headgear Biweekly Wash in hot soapy water, rinse and air dry   Reusable Gray Filter Weekly Wash in hot soapy water, rinse, put in towel squeeze moisture out, let air dry   Disposable White Filter Check Weekly Replace when brown or gray in color; at least every 2 to 3 months   Humidifier Chamber Daily    Weekly Empty distilled water from humidifier and let air dry    Hand wash in hot soapy water, rinse and air dry   Tubing Weekly Wash in hot soapy water, rinse and let air dry   Mask, Tubing and Humidifier Chamber As needed Disinfect: Soak in 1 part vinegar to 3 parts hot water for 30 minutes, rinse well and air dry  Not the material headgear        MASK AND SUPPLY REORDERING  Reminder: Most insurance companies will allow for a new mask, headgear, tubing, and reusable gray filter every six months.  Disposable white ultra-fine filters are covered monthly.    EQUIPMENT NEEDS  Please call our CPAP  specialist with any equipment problems, questions or needs.    HOME AND SAFETY INSTRUCTIONS    Do not use frayed or cracked electrical cords, multi plug adaptors, or switched receptacles    Do not immerse electrical equipment into water    Assure that electrical cords do not become a tripping hazard      Your BMI is Body mass index is 26.72 kg/(m^2).  Weight management is a personal decision.  If you are interested in exploring weight loss strategies, the following discussion covers the approaches that may be successful. Body mass index (BMI) is one way to tell whether you are at a healthy weight, overweight, or obese. It measures your weight in relation to your height.  A BMI of 18.5 to 24.9 is in the healthy range. A person with a BMI of 25 to 29.9 is considered overweight, and someone with a BMI of 30 or greater is considered obese. More than two-thirds of American adults are considered overweight or obese.  Being overweight or obese increases the risk for further weight gain. Excess weight may lead to heart disease and diabetes.  Creating and following plans for healthy eating and physical activity may help you improve your health.  Weight control is part of healthy lifestyle and includes exercise, emotional health, and healthy eating habits. Careful eating habits lifelong are the mainstay of weight control. Though there are significant health benefits from weight loss, long-term weight loss with diet alone may be very difficult to achieve- studies show long-term success with dietary management in less than 10% of people. Attaining a healthy weight may be especially difficult to achieve in those with severe obesity. In some cases, medications, devices and surgical management might be considered.  What can you do?  If you are overweight or obese and are interested in methods for weight loss, you should discuss this with your provider.     Consider reducing daily calorie intake by 500 calories.     Keep a food  journal.     Avoiding skipping meals, consider cutting portions instead.    Diet combined with exercise helps maintain muscle while optimizing fat loss. Strength training is particularly important for building and maintaining muscle mass. Exercise helps reduce stress, increase energy, and improves fitness. Increasing exercise without diet control, however, may not burn enough calories to loose weight.       Start walking three days a week 10-20 minutes at a time    Work towards walking thirty minutes five days a week     Eventually, increase the speed of your walking for 1-2 minutes at time    In addition, we recommend that you review healthy lifestyles and methods for weight loss available through the National Institutes of Health patient information sites:  http://win.niddk.nih.gov/publications/index.htm    And look into health and wellness programs that may be available through your health insurance provider, employer, local community center, or matthew club.    Weight management plan: Patient was referred to their PCP to discuss a diet and exercise plan.              Follow-ups after your visit        Follow-up notes from your care team     Return in about 1 year (around 5/10/2019).      Your next 10 appointments already scheduled     Oct 11, 2018  7:30 AM CDT   RETURN RETINA with Ruth Boone MD   Eye Clinic ([facility])    [address] [address] [phone]              Who to contact     If you have questions or need follow up information about today's clinic visit or your schedule please [facility] directly at [phone].  Normal or non-critical lab and imaging results will be communicated to you by MyChart, letter or phone within 4 business days after the clinic has received the results. If you do not hear from us within 7 days, please contact the clinic through MyChart or phone. If you  "have a critical or abnormal lab result, we will notify you by phone as soon as possible.  Submit refill requests through [employer] or call your pharmacy and they will forward the refill request to us. Please allow 3 business days for your refill to be completed.          Additional Information About Your Visit        [facility] Information     [employer] gives you secure access to your electronic health record. If you see a primary care provider, you can also send messages to your care team and make appointments. If you have questions, please call your primary care clinic.  If you do not have a primary care provider, please call [phone] and they will assist you.        Care EveryWhere ID     This is your Care EveryWhere ID. This could be used by other organizations to access your [facility] medical records  [id number]        Your Vitals Were     Pulse Respirations Height Pulse Oximetry BMI (Body Mass Index)       52 16 1.753 m (5' 9.02\") 98% 26.72 kg/m2        Blood Pressure from Last 3 Encounters:   05/10/18 124/71   04/19/18 121/78   02/14/18 103/66    Weight from Last 3 Encounters:   05/10/18 82.1 kg (181 lb)   04/19/18 80.6 kg (177 lb 12.8 oz)   02/14/18 81.7 kg (180 lb 3.2 oz)              We Performed the Following     Comprehensive DME        Primary Care Provider Office Phone # Fax #    Vanessa Edwards [phone]       [address] [place] [address] [phone]  [facility] [address]        Equal Access to Services     AYDE PAPPAS : Hadii sylvia ku hadasho Sokourtneyali, waaxda luqadaha, qaybta kaalmada fredyyaizabella, waxay apurva hernández . So [facility] [phone].    ATENCIÓN: Si habla español, tiene a sebastian disposición servicios gratuitos de asistencia lingüística. Llame al [phone].    We comply with applicable federal civil rights laws and [place] laws. We do not discriminate on the basis of race, color, national origin, age, disability, sex, sexual orientation, or gender identity.            Thank " you!     Thank you for choosing [facility], [facility]  for your care. Our goal is always to provide you with excellent care. Hearing back from our patients is one way we can continue to improve our services. Please take a few minutes to complete the written survey that you may receive in the mail after your visit with us. Thank you!             Your Updated Medication List - Protect others around you: Learn how to safely use, store and throw away your medicines at www.disposemymeds.org.          This list is accurate as of 5/10/18 11:34 AM.  Always use your most recent med list.                   Brand Name Dispense Instructions for use Diagnosis    alfuzosin 10 MG 24 hr tablet    UROXATRAL    90 tablet    Take 1 tablet (10 mg) by mouth daily    Urinary frequency       aspirin 81 MG EC tablet      Take 1 tablet (81 mg) by mouth daily    Paroxysmal atrial fibrillation (H)       diltiazem 180 MG 24 hr capsule     90 capsule    Take 1 capsule (180 mg) by mouth daily    Persistent atrial fibrillation (H)       order for DME      Reported on 5/4/2017

## 2018-05-10 NOTE — PROGRESS NOTES
Chief complaint:Follow up WINNIE    History of Present Illness:68 M with hx of pAF and severe WINNIE.  Since his last visit he has undergone electrocardioversion and remains in normal sinus rhythm.  He reports that he was using a nasal mask and was having problems with oral leak.  He had a chinstrap.  Chinstrap was quite hot.  So then he tried full facemask.  Thinks that the full facemask might be a little big and he has noticed some leak.  He also notes that sometimes when he lays on his left side he notes more belching.  Otherwise he has noticed no deterioration in his sleep quality.  He rarely he does take naps occasionally.  [place] is normal.  Drinks 2-3 caffeinated beverages per about 5 alcoholic beverages a week.  No sleep aids.  He denies the development of any new sleep concerns, no restless legs, no sleepwalking, no sleep talking, no dream enactment behavior.  He cleans his supplies regularl.  Plans on traveling to the [facility] in July for 10 days.  Typical bedtime is between 10:30 PM and midnight, rise time between 5:30 and 7 AM.  He does not use any tobacco or marijuana products per    [place] Seepiness Scale:4 (Less than 10 normal)    Past Medical History:   Diagnosis Date     Actinic keratosis 2009     Atrial fibrillation (H) 6/15/96    Afib - 2 or 3 extended occurrences since     Chronic hepatitis C (H)     Chronic Hepatitis C,  genotype 1b                Stage 0 Fibrosis     Dupuytren's contracture of both hands      Elevated PSA      Hepatitis B 1969    acute infection at age 19; IV drug use     WINNIE (obstructive sleep apnea)     AHI 34.2     Pain in both hands      Patient is Rastafarian      Refusal of blood transfusions as patient is Rastafarian      Right shoulder pain      Tinnitus 1 or 2 years ago    both ears     Tubular adenoma of colon 1/17/17    ascending colon, 1/17/17       Allergies   Allergen Reactions     Blood Transfusion Related (Informational Only)      [facility].   "Declines blood transfusions.     Contrast Dye Rash       Current Outpatient Prescriptions   Medication     alfuzosin (UROXATRAL) 10 MG 24 hr tablet     aspirin EC 81 MG EC tablet     diltiazem 180 MG 24 hr capsule     order for DME     No current facility-administered medications for this visit.        Social History     Social History     Marital status:      Spouse name: N/A     Number of children: N/A     Years of education: N/A     Occupational History     Not on file.     Social History Main Topics     Smoking status: Former Smoker     Packs/day: 0.50     Years: 10.00     Types: Cigarettes     Start date: 6/15/1964     Quit date: 1974     Smokeless tobacco: Former User     Quit date: 1969     Alcohol use 1.8 - 2.4 oz/week      Comment: 4 or 5 drinks/week, limit of 1     Drug use: Yes     Special: Marijuana, Methamphetamines, IV      Comment:      Sexual activity: Yes     Partners: Female     Birth control/ protection: None     Other Topics Concern     Special Diet No     eats healthy     Exercise No     Social History Narrative    Social history:    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, followed by [employer]    Retired    Chemical manufacturing solvents    , no kids           Family History   Problem Relation Age of Onset     Arthritis Paternal Grandmother      Rheumatoid Arthritis Paternal Grandmother      GASTROINTESTINAL DISEASE Father       age 77 after colon surgery     Alcoholism Father      Alcohol/Drug Mother       age 64     Alcoholism Mother      HEART DISEASE Brother      Cardiac Sudden Death Brother          EXAM: Pleasant, alert, no distress, here with his wife  /71  Pulse 52  Resp 16  Ht 1.753 m (5' 9.02\")  Wt 82.1 kg (181 lb)  SpO2 98%  BMI 26.72 kg/m2  Psychiatric: Mood and affect appear normal    PSG date:2017  Wt:180  AHI: 32    PAP download from 4/10/2018 to 2018 reviewed:  Per cent of days used greater than 4 Hours 97 % (minimum goal greater " than 70%)  Average use on days used: 6 hours  24 min  Settings: Min EPAP 7 cmH2O    Max EPAP 9 cmH2O  Pressures delivered 90/95th percentile for pressure 9 cmH2O  Average AHI 21 events per hour (goal less than 5)  Leak acceptable    I was able to review data for the last year and it is clear that after October 16 2017 AHI significantly increased.  Reviewing the chart this is when he went from nasal mask to full facemask.    ASSESSMENT: 68-year-old gentleman with history of paroxysmal A. fib currently in normal sinus rhythm.  He had optimally controlled obstructive sleep apnea until he changed from nasal mask to full facemask.  No symptomatic consequence from this however will try to achieve normalization of AHI per    PLAN: We will increase his pressure range to see if this resolves the obstructive apnea component.  Will check on him in 1-2 weeks remotely.  If he finds this pressure change uncomfortable he should contact me.  Then would need to proceed with mask fitting to find a different mask.  Patient is to [facility] with [facility] regarding mask and supplies, also discussed international travel recommendations as far as traveling with CPAP.  Please see instructions for further details of counseling provided.  Patient will face-to-face follow-up in 1 year.    Twenty-five minutes spent with patient, >50% spent counseling and coordinating care.      Melissa Stanford M.D.  Pulmonary/Critical Care/Sleep Medicine    The above note was dictated using voice recognition software and may include typographical errors. Please contact the author for any clarifications.

## 2018-05-31 ENCOUNTER — DOCUMENTATION ONLY (OUTPATIENT)
Dept: SLEEP MEDICINE | Facility: CLINIC | Age: 68
End: 2018-05-31

## 2018-05-31 NOTE — PROGRESS NOTES
STM recheck after pressure change.          Message left for patient to return call     Assessment: Pt not meeting objective benchmarks for AHI    Action plan: waiting for patient to return call.      Device type: Auto-CPAP  PAP settings:  CPAP min 8 cm  H20      CPAP max 12 cm  H20          95th% pressure 11.9 cm   Mask type:  Full Face Mask  Objective measures: 14 day rolling measures      Compliance  92 %      Leak  0.86 lpm  last  upload      AHI 9.63   last  upload      Average number of minutes 363     Average hours of usage 6.1          Objective measure goal  Compliance   Goal >70%  Leak   Goal < 24 lpm  AHI  Goal < 5  Usage  Goal >240

## 2018-06-01 NOTE — PROGRESS NOTES
Patient returned call.     Subjective measures:  Patient failing following subjective benchmarks: pressure issues when he is starting therapy.      Action plan:Turned ramp on for 15 minutes with starting pressure of 6.0 cm H20.  Pt instructed to call back after weekend if he continues to struggle.  AHI remains slightly elevated however with recent issues with pressure will continue to watch to see if AHI comes back down.      STM 2 week recheck scheduled.

## 2018-06-07 ENCOUNTER — TELEPHONE (OUTPATIENT)
Dept: SLEEP MEDICINE | Facility: CLINIC | Age: 68
End: 2018-06-07

## 2018-06-07 ENCOUNTER — DOCUMENTATION ONLY (OUTPATIENT)
Dept: SLEEP MEDICINE | Facility: CLINIC | Age: 68
End: 2018-06-07
Payer: COMMERCIAL

## 2018-06-07 DIAGNOSIS — G47.33 OSA (OBSTRUCTIVE SLEEP APNEA): Primary | ICD-10-CM

## 2018-06-07 NOTE — PROGRESS NOTES
STM Re-Check: Patient called back and he is okay with pressure change per provider recommendations.

## 2018-06-20 ENCOUNTER — DOCUMENTATION ONLY (OUTPATIENT)
Dept: SLEEP MEDICINE | Facility: CLINIC | Age: 68
End: 2018-06-20

## 2018-06-20 NOTE — PROGRESS NOTES
Patient came in to [facility] this afternoon to try on a different size mask. He feels as if the large is too big for him. He tried on the Medium sized cushion for the F20 mask. We did not witness any leaking and the patient said it was more comfortable. He decided to switch to the medium mask. Issued him a new one today.

## 2018-07-12 ENCOUNTER — DOCUMENTATION ONLY (OUTPATIENT)
Dept: SLEEP MEDICINE | Facility: CLINIC | Age: 68
End: 2018-07-12

## 2018-07-12 NOTE — PROGRESS NOTES
STM recheck        Subjective measures:  Pt is much happier with new mask.  Usage is increasing and settings are effective on new mask.     Assessment: Pt not meeting objective benchmarks for compliance  Patient meeting subjective benchmarks.   Action plan: follow up per provider request    Device type: Auto-CPAP  PAP settings: CPAP min 9.0 cm  H20     CPAP max 14.0 cm  H20        95th% pressure 13.2 cm   Mask type:  Full Face Mask  Objective measures: 14 day rolling measures      Compliance  14 %      Leak  10.8 lpm  last  upload      AHI 3.53   last  upload      Average number of minutes 70     Average hours of usage 1.2          Objective measure goal  Compliance   Goal >70%  Leak   Goal < 24 lpm  AHI  Goal < 5  Usage  Goal >240

## 2018-09-04 ENCOUNTER — MYC MEDICAL ADVICE (OUTPATIENT)
Dept: SLEEP MEDICINE | Facility: CLINIC | Age: 68
End: 2018-09-04

## 2018-09-13 ENCOUNTER — DOCUMENTATION ONLY (OUTPATIENT)
Dept: SLEEP MEDICINE | Facility: CLINIC | Age: 68
End: 2018-09-13

## 2018-09-13 DIAGNOSIS — G47.33 OSA (OBSTRUCTIVE SLEEP APNEA): Primary | ICD-10-CM

## 2018-09-13 NOTE — PROGRESS NOTES
STM recheck        Subjective measures: Patient feels that things are getting better with soft response setting however he still feels that he is swallowing air on his side at times.  He is open to reducing pressure settings.       Assessment: Pt meeting objective benchmarks.  Patient failing following subjective benchmarks: pressure issues  Action plan: order placed to provider for pressure change.  Recheck on 9/17/2018.     Device type: Auto-CPAP  PAP settings: CPAP min 9.0 cm  H20     CPAP max 14.0 cm  H20     95th% pressure 13.6 cm   Mask type:  Full Face Mask  Objective measures: 14 day rolling measures      Compliance  85 %      Leak  5.31 lpm  last  upload      AHI 6.06   last  upload      Average number of minutes 383     Average hours of usage 6.4          Objective measure goal  Compliance   Goal >70%  Leak   Goal < 24 lpm  AHI  Goal < 5  Usage  Goal >240

## 2018-09-13 NOTE — Clinical Note
Hi can you please sign pended order for Frank.  We are going to try and reduce starting pressure.  Soft response not working to keep pressures lower.  Rechecking him again on Monday   Thanks  Thea

## 2018-09-18 ENCOUNTER — DOCUMENTATION ONLY (OUTPATIENT)
Dept: SLEEP MEDICINE | Facility: CLINIC | Age: 68
End: 2018-09-18

## 2018-09-18 NOTE — PROGRESS NOTES
STM recheck after pressure change.     Message left for patient to return call     Assessment: Pt not meeting objective benchmarks for AHI (reducing nightly)  Action plan: waiting for patient to return call.     Device type: Auto-CPAP  PAP settings: CPAP min 6.0 cm  H20     CPAP max 14.0 cm  H20        95th% pressure 13.1 cm   Mask type:  Full Face Mask  Objective measures: 14 day rolling measures      Compliance  78 %      Leak  7.38 lpm  last  upload      AHI 6.2   last  upload      Average number of minutes 375     Average hours of usage 6.3          Objective measure goal  Compliance   Goal >70%  Leak   Goal < 24 lpm  AHI  Goal < 5  Usage  Goal >240

## 2018-09-18 NOTE — PROGRESS NOTES
Patient reporting that the pressure change is better.  He feels that he may still be swallowing air on occasion. He is willing to try these settings for a few more nights and if he is still struggling he will call back and we can lower settings again.

## 2018-10-03 ENCOUNTER — DOCUMENTATION ONLY (OUTPATIENT)
Dept: SLEEP MEDICINE | Facility: CLINIC | Age: 68
End: 2018-10-03
Payer: COMMERCIAL

## 2018-10-03 NOTE — PROGRESS NOTES
[facility] Re-Check: Called patient and he stated he stopped using his machine because the high pressures blows his mouth open and pressure also comes outside of the mask on each side. This happens when patient is both supine and lateral sleep positions. Patient can't tolerate the high pressures patient said he recently received a new mask. Patient will be reviewed tomorrow at sleep therapy coaching meeting on 10/4/2018.

## 2018-10-04 ENCOUNTER — DOCUMENTATION ONLY (OUTPATIENT)
Dept: SLEEP MEDICINE | Facility: CLINIC | Age: 68
End: 2018-10-04
Payer: COMMERCIAL

## 2018-10-04 NOTE — PROGRESS NOTES
[facility] Re-Check: Spoke with patient and the reason he stopped using his nasal mask with chinstrap is because his mouth kept opening. Patient using Full Face mask and he feels the pressure is to high. Patient is willing to try his nasal mask tonight. I will call patient tomorrow to see how his night goes.

## 2018-10-05 ENCOUNTER — DOCUMENTATION ONLY (OUTPATIENT)
Dept: SLEEP MEDICINE | Facility: CLINIC | Age: 68
End: 2018-10-05
Payer: COMMERCIAL

## 2018-10-05 NOTE — PROGRESS NOTES
[facility] Re-Check: Called patient and he wore nasal mask last night and stated everything went well. He will try a nasal mask for one week.

## 2018-10-10 ENCOUNTER — CARE COORDINATION (OUTPATIENT)
Dept: CARDIOLOGY | Facility: CLINIC | Age: 68
End: 2018-10-10

## 2018-10-10 ENCOUNTER — TELEPHONE (OUTPATIENT)
Dept: CARDIOLOGY | Facility: CLINIC | Age: 68
End: 2018-10-10

## 2018-10-10 DIAGNOSIS — I48.0 PAROXYSMAL ATRIAL FIBRILLATION (H): Primary | ICD-10-CM

## 2018-10-10 RX ORDER — POTASSIUM CHLORIDE 1500 MG/1
40 TABLET, EXTENDED RELEASE ORAL
Status: CANCELLED | OUTPATIENT
Start: 2018-10-10

## 2018-10-10 RX ORDER — POTASSIUM CHLORIDE 1500 MG/1
20 TABLET, EXTENDED RELEASE ORAL
Status: CANCELLED | OUTPATIENT
Start: 2018-10-10

## 2018-10-10 RX ORDER — MAGNESIUM SULFATE HEPTAHYDRATE 40 MG/ML
2 INJECTION, SOLUTION INTRAVENOUS
Status: CANCELLED | OUTPATIENT
Start: 2018-10-10

## 2018-10-10 RX ORDER — LIDOCAINE 40 MG/G
CREAM TOPICAL
Status: CANCELLED | OUTPATIENT
Start: 2018-10-10

## 2018-10-10 RX ORDER — DABIGATRAN ETEXILATE 150 MG/1
150 CAPSULE ORAL 2 TIMES DAILY
Qty: 60 CAPSULE | Refills: 0 | Status: SHIPPED | OUTPATIENT
Start: 2018-10-10 | End: 2018-11-13

## 2018-10-10 NOTE — TELEPHONE ENCOUNTER
Called and spoke to patient.    Onset: Yesterday morning 7am or 8am.    Symptoms: Irregular beat, abnormal. Not racing.   slightly SOB, inhale deeply pain in upper left chest, woke up during the night and had to sit up.     BP: 147/96  HR:  128          Date: 10/10/2018    Time of Call: 12:38 PM     Diagnosis:  DCCV     [ VORB ] Ordering provider: Yamilka Skinner NP   Order:   DCCV, no JIM   Start Pradaxa 150mg BID today and continue 4 weeks post.      Order received by: Meggan Brar RN      Follow-up/additional notes:   Scheduled for DCCV 10/11/18.  Pradaxa Rx sent.  Reviewed below instructions and sent via [employer].              You are scheduled for a Transesophageal Echocardiogram followed by a Cardioversion at the [facility] ([address], [facility] [address], [phone]).       Follow these instructions:    1. Report to the GOLD waiting room in the [facility] hospital on: 10/11/18 at 7am    2. DO NOT EAT OR DRINK ANYTHING FOR 8 HOURS PRIOR TO ARRIVAL.     3. The morning of your procedure you may take your scheduled medications with a SIP of water. If you take diabetic medications or a diuretic, you may hold these.     4. You will receive medication that makes you sleepy; you will need a  and someone to stay with you for 24 hours following this procedure.    You should not make any legal decisions for 24 hours following discharge.       If you have further questions, please utilize [employer] to contact us.   If your question concerns the above instructions, contact:  Meggan Brar RN    Electrophysiology Nurse Coordinator.  [phone]    If your question concerns the schedule/appointment times, contact:  JESSICA Singh Procedure   [phone]

## 2018-10-10 NOTE — TELEPHONE ENCOUNTER
[facility] Call Center    Phone Message    May a detailed message be left on voicemail: yes    Reason for Call: Other: Pt is in a. fib and would like a call back to discuss what he needs to do.     Action Taken: Message routed to:  [facility]

## 2018-10-11 ENCOUNTER — HOSPITAL ENCOUNTER (OUTPATIENT)
Facility: CLINIC | Age: 68
Discharge: HOME | End: 2018-10-11
Attending: INTERNAL MEDICINE | Admitting: INTERNAL MEDICINE
Payer: COMMERCIAL

## 2018-10-11 ENCOUNTER — APPOINTMENT (OUTPATIENT)
Dept: LAB | Facility: CLINIC | Age: 68
End: 2018-10-11
Attending: INTERNAL MEDICINE
Payer: COMMERCIAL

## 2018-10-11 ENCOUNTER — ANESTHESIA EVENT (OUTPATIENT)
Dept: SURGERY | Facility: CLINIC | Age: 68
End: 2018-10-11
Payer: COMMERCIAL

## 2018-10-11 ENCOUNTER — HOSPITAL ENCOUNTER (OUTPATIENT)
Dept: CARDIOLOGY | Facility: CLINIC | Age: 68
End: 2018-10-11
Attending: INTERNAL MEDICINE | Admitting: INTERNAL MEDICINE
Payer: COMMERCIAL

## 2018-10-11 ENCOUNTER — ANESTHESIA (OUTPATIENT)
Dept: SURGERY | Facility: CLINIC | Age: 68
End: 2018-10-11
Payer: COMMERCIAL

## 2018-10-11 ENCOUNTER — APPOINTMENT (OUTPATIENT)
Dept: MEDSURG UNIT | Facility: CLINIC | Age: 68
End: 2018-10-11
Attending: INTERNAL MEDICINE
Payer: COMMERCIAL

## 2018-10-11 VITALS
DIASTOLIC BLOOD PRESSURE: 67 MMHG | OXYGEN SATURATION: 96 % | TEMPERATURE: 97.8 F | RESPIRATION RATE: 14 BRPM | SYSTOLIC BLOOD PRESSURE: 102 MMHG | HEART RATE: 62 BPM

## 2018-10-11 VITALS
HEART RATE: 68 BPM | RESPIRATION RATE: 14 BRPM | OXYGEN SATURATION: 95 % | SYSTOLIC BLOOD PRESSURE: 101 MMHG | DIASTOLIC BLOOD PRESSURE: 71 MMHG

## 2018-10-11 DIAGNOSIS — I48.0 PAROXYSMAL ATRIAL FIBRILLATION (H): ICD-10-CM

## 2018-10-11 LAB
MAGNESIUM SERPL-MCNC: 2.3 MG/DL (ref 1.6–2.3)
POTASSIUM SERPL-SCNC: 4 MMOL/L (ref 3.4–5.3)

## 2018-10-11 PROCEDURE — 25000128 H RX IP 250 OP 636: Performed by: NURSE ANESTHETIST, CERTIFIED REGISTERED

## 2018-10-11 PROCEDURE — 83735 ASSAY OF MAGNESIUM: CPT | Performed by: INTERNAL MEDICINE

## 2018-10-11 PROCEDURE — 36415 COLL VENOUS BLD VENIPUNCTURE: CPT | Performed by: INTERNAL MEDICINE

## 2018-10-11 PROCEDURE — 40000166 ZZH STATISTIC PP CARE STAGE 1

## 2018-10-11 PROCEDURE — 40000065 ZZH STATISTIC EKG NON-CHARGEABLE

## 2018-10-11 PROCEDURE — 84132 ASSAY OF SERUM POTASSIUM: CPT | Performed by: INTERNAL MEDICINE

## 2018-10-11 PROCEDURE — 37000008 ZZH ANESTHESIA TECHNICAL FEE, 1ST 30 MIN

## 2018-10-11 PROCEDURE — 92960 CARDIOVERSION ELECTRIC EXT: CPT

## 2018-10-11 PROCEDURE — 92960 CARDIOVERSION ELECTRIC EXT: CPT | Performed by: NURSE PRACTITIONER

## 2018-10-11 PROCEDURE — 25000125 ZZHC RX 250: Performed by: NURSE ANESTHETIST, CERTIFIED REGISTERED

## 2018-10-11 RX ORDER — POTASSIUM CHLORIDE 750 MG/1
40 TABLET, EXTENDED RELEASE ORAL
Status: DISCONTINUED | OUTPATIENT
Start: 2018-10-11 | End: 2018-10-11 | Stop reason: HOSPADM

## 2018-10-11 RX ORDER — PROPOFOL 10 MG/ML
INJECTION, EMULSION INTRAVENOUS PRN
Status: DISCONTINUED | OUTPATIENT
Start: 2018-10-11 | End: 2018-10-11

## 2018-10-11 RX ORDER — POTASSIUM CHLORIDE 750 MG/1
20 TABLET, EXTENDED RELEASE ORAL
Status: DISCONTINUED | OUTPATIENT
Start: 2018-10-11 | End: 2018-10-11 | Stop reason: HOSPADM

## 2018-10-11 RX ORDER — LIDOCAINE 40 MG/G
CREAM TOPICAL
Status: DISCONTINUED | OUTPATIENT
Start: 2018-10-11 | End: 2018-10-11 | Stop reason: HOSPADM

## 2018-10-11 RX ADMIN — METHOHEXITAL SODIUM 50 MG: 500 INJECTION, POWDER, LYOPHILIZED, FOR SOLUTION INTRAMUSCULAR; INTRAVENOUS; RECTAL at 09:52

## 2018-10-11 RX ADMIN — PROPOFOL 40 MG: 10 INJECTION, EMULSION INTRAVENOUS at 09:55

## 2018-10-11 NOTE — PROGRESS NOTES
Pt arrived to 2A for cardioversion. VSS, pt denies pain. IV inserted, labs previously collected. Awaiting consented. Continue to monitor

## 2018-10-11 NOTE — ANESTHESIA CARE TRANSFER NOTE
Patient: Frank Orellana    Procedure(s):  Cardioversion    Diagnosis: Atrial Fibrillation  Diagnosis Additional Information: No value filed.    Anesthesia Type:   No value filed.     Note:  Airway :Nasal Cannula  Patient transferred to:Telemetry/Step Down Unit  Comments: Patient awake and responding appropriately; no resp. Issues; care to Echo RNHandoff Report: Identifed the Patient, Identified the Reponsible Provider, Reviewed the pertinent medical history, Discussed the surgical course, Reviewed Intra-OP anesthesia mangement and issues during anesthesia, Set expectations for post-procedure period and Allowed opportunity for questions and acknowledgement of understanding      Vitals: (Last set prior to Anesthesia Care Transfer)    CRNA VITALS  10/11/2018 0946 - 10/11/2018 1016      10/11/2018             NIBP: 110/50    Pulse: 52    SpO2: 100 %    Resp Rate (observed): 16    EKG: Sinus rhythm                Electronically Signed By: GAURAV SPARKS APRN CRNA  October 11, 2018  10:16 AM

## 2018-10-11 NOTE — PROGRESS NOTES
Pt arrived in ECHO department  for scheduled cardioversion.   Procedure explained, questions answered and consent signed. Discharge instructions discussed with patient.  Anesthesia gave pt 50 mg IV brevitol and 40 mcg propofol for sedation and pt was DCCV at 200 Joules to a SR.  Pt denied C.P or throat pain after procedure and transferred back to unit 2a after awake and VSS.   Report given to Zenobia GRANDA.

## 2018-10-11 NOTE — H&P
Electrophysiology Pre-Procedure History and Physical    Frank Orellana MRN# [id number]   Age: 68 year old YOB: 1950      Date of Procedure: 10/11/2018  Location [facility]      Date of Exam 10/11/2018 Facility (Same day)       Home clinic: [facility]  Primary care provider: Vanessa Edwards         Active problem list:   Patient Active Problem List    Diagnosis Date Noted     Bilateral sensorineural hearing loss 05/05/2017     Priority: Medium     Elevated PSA      Priority: Medium     ACP (advance care planning) 01/26/2017     Priority: Medium     Advance Care Planning 1/26/2017: Receipt of ACP document:  Received: Health Care Directive which was witnessed or notarized on 11/9/16.  Document previously scanned on 11/22/16.  Validation form completed and sent to be scanned. Patient is Druze.  See Health Care Directive for information on use of blood products.  Code Status needs to be updated to reflect choices in most recent ACP document. Confirmed/documented designated decision maker(s).  Added by Jazzy Mazariegos RN, Advance Care Planning Liaison.         Tubular adenoma of colon 01/17/2017     Priority: Medium     ascending colon, 1/17/17       WINNIE (obstructive sleep apnea) AHI 32      Priority: Medium     PSG at [facility] 1/8/2017AHI 32.3 wt 180       Chronic left shoulder pain 12/29/2016     Priority: Medium     Patient is Druze      Priority: Medium     Refusal of blood transfusions as patient is Druze      Priority: Medium     Paroxysmal atrial fibrillation (H)      Priority: Medium     paroxysmal on 3-4 occasions       History of actinic keratoses 03/15/2013     Priority: Medium     Telangiectasia 03/15/2013     Priority: Medium     SK (seborrheic keratosis) 03/15/2013     Priority: Medium     Atrial fibrillation (H) 06/15/1996     Priority: Medium     Afib - 2 or 3 extended occurrences since               Medications (include herbals and vitamins):          Frank Orellana   Home Medication Instructions BAIRON:[id number]    Printed on:10/11/18 0113   Medication Information                      alfuzosin (UROXATRAL) 10 MG 24 hr tablet  Take 1 tablet (10 mg) by mouth daily             aspirin EC 81 MG EC tablet  Take 1 tablet (81 mg) by mouth daily             dabigatran ANTICOAGULANT (PRADAXA ANTICOAGULANT) 150 MG capsule  Take 1 capsule (150 mg) by mouth 2 times daily Store in original 's bottle or blister pack; use within 120 days of opening.             diltiazem 180 MG 24 hr capsule  Take 1 capsule (180 mg) by mouth daily             order for DME  Reported on 5/4/2017                      Allergies:      Allergies   Allergen Reactions     Blood Transfusion Related (Informational Only)      Mandaeism.  Declines blood transfusions.     Contrast Dye Rash     Allergy to Latex? No  Allergy to tape?   No  Intolerances: NA          Social History:     Social History   Substance Use Topics     Smoking status: Former Smoker     Packs/day: 0.50     Years: 10.00     Types: Cigarettes     Start date: 6/15/1964     Quit date: 7/1/1974     Smokeless tobacco: Former User     Quit date: 6/1/1969     Alcohol use 1.8 - 2.4 oz/week      Comment: 4 or 5 drinks/week, limit of 1            Physical Exam:   All vitals have been reviewed  Patient Vitals for the past 8 hrs:   BP Temp Temp src Pulse Resp SpO2   10/11/18 0700 115/67 97.8  F (36.6  C) Oral 89 20 98 %        Airway assessment:   Patient is able to open mouth wide  Patient is able to stick out tongue}      ENT:   Normocephalic, without obvious abnormality, atraumatic, sinuses nontender on palpation, external ears without lesions, oral pharynx with moist mucous membranes, tonsils without erythema or exudates, gums normal and good dentition.     Neck:   Supple, symmetrical, trachea midline, no adenopathy, thyroid symmetric, not enlarged and no tenderness,  skin normal     Lungs:   No increased work of breathing, good air exchange, clear to auscultation bilaterally, no crackles or wheezing     Cardiovascular:   irregular             Lab / Radiology Results:     Lab Results   Component Value Date    WBC 4.7 09/07/2017    RBC 4.34 09/07/2017    HGB 13.9 10/16/2017    HCT 40.1 09/07/2017    MCV 92 09/07/2017    RDW 12.7 09/07/2017     09/07/2017      Lab Results   Component Value Date    WBC 4.7 09/07/2017     Lab Results   Component Value Date     09/07/2017     Lab Results   Component Value Date    HGB 13.9 10/16/2017    HCT 40.1 09/07/2017     Lab Results   Component Value Date     09/14/2017    CO2 22 09/14/2017    BUN 14 09/14/2017     Lab Results   Component Value Date     09/14/2017    CO2 22 09/14/2017    BUN 14 09/14/2017     Lab Results   Component Value Date     09/14/2017     Lab Results   Component Value Date    BUN 14 09/14/2017     Lab Results   Component Value Date    TSH 1.59 12/02/2016             Plan:   Patient's active problems diagnostically and therapeutically optimized for the planned procedure. Patient here for DCCV. Procedure explained in detail to patient including indications, risks, and benefits. He states understanding and wishes to procced.     ANDREINA Small CNP  Electrophysiology Consult Service  Pager: [phone]

## 2018-10-12 LAB — INTERPRETATION ECG - MUSE: NORMAL

## 2018-10-12 NOTE — OP NOTE
CARDIOVERSION    PROCEDURE:  direct current cardioversion  PROCEDURE DATE: 10/11/2018     Pre-procedure diagnosis:  Atrial fibrillation  Post-procedure diagnosis: s/p direct current cardioversion  Complications:  none    BRIEF CLINICAL HISTORY:  Mr. Orellana is a 68 year old male who has a past medical history significant for prior tobacco use, PAF (CHADSVASC 0), chronic hepatitis C, hepatitis B, WINNIE (usses CPAP), tubular adenoma of colon, and refusal of blood products as he is Catholic.   He has had a >20 year history of paroxysmal atrial fibrillation manifesting as chest pressure and palpitations.  The first episode occurred during a picnic and converted spontaneously to sinus rhythm.  He took metoprolol for 30 days.  Approximately 10 years later he had a second episode in the setting of a meniscal tear and perhaps 2 or 3 more episodes in the intervening years until his palpitations became more frequent starting around 2016.  A 48-hour Holter monitor in 2016 showed variation between sinus rhythm, junctional rhythm, and A.fib (average HR 63, range ) with 1% PVCs and <1% supraventricular ectopic beats, with 33 runs of PAT vs. A.fib (longest 10 beats at 164 bpm.). He then presented to the ED on 12/2/16 for palpitations, was in A.fib with RVR~111 bpm, started on diltiazem and ASA 81 mg daily, and discharged.  He had recurrent episode of AF end of 12/2017 that manifested as palpitations, SOB and fatigue. He felt the symptoms for about 10 days prior and was seen by Dr. Hussein on 1/6/18. At this time he was noted to be in symptomatic AF (rate controlled), and was scheduled to undergo JIM/DCCV, which was performed on 1/15/18. He was on Dabigatran for 4 weeks following the DCCV and his ASA was held. He last saw Dr. Felipe in EP clinic in 2/2018 and was doing well. More recently, he called reporting he was back in AF. He was started on Pradaxa within 1.5 days of his symptoms. He was then arranged for DCCV.        PROCEDURE:  The patient arrived at the Echo Laboratory in a fasting, non-sedated state.  Informed consent was previously obtained from patient, who understood indications, risks, and benefits of the procedure.  Patient started Pradaxa within 48 hours of onset of AF and has been on uninterrupted since. With help from Anesthesia, patient was deeply sedated.  150J of synchronized biphasic shock was delivered through the cardiac monitor, and the patient was not successfully converted to normal sinus rhythm. 200J of synchronized biphasic shock was delivered through the cardiac monitor, and the patient was successfully converted to normal sinus rhythm.After sedation wore off, patient awoke and remained neurologically intact.  The patient tolerated the procedure well from a hemodynamic and respiratory standpoint without any complications.  He was observed in the Echo Laboratory and then discharged to home after sedation wore off and he was ambulatory.    IMPRESSION:  1.  Successful direct current cardioversion with 200J biphasic shock. (150J not successful)    RECOMMENDATIONS/PLANS:  1.   Follow-up with EP as scheduled.   2.   Continue anticoagulation    ANDREINA Small CNP  Electrophysiology Consult Service  Pager: [phone]

## 2018-10-15 LAB — INTERPRETATION ECG - MUSE: NORMAL

## 2018-10-18 ENCOUNTER — ALLIED HEALTH/NURSE VISIT (OUTPATIENT)
Dept: NURSING | Facility: CLINIC | Age: 68
End: 2018-10-18
Payer: COMMERCIAL

## 2018-10-18 ENCOUNTER — OFFICE VISIT (OUTPATIENT)
Dept: OPHTHALMOLOGY | Facility: CLINIC | Age: 68
End: 2018-10-18
Attending: OPHTHALMOLOGY
Payer: COMMERCIAL

## 2018-10-18 DIAGNOSIS — Z23 NEED FOR PROPHYLACTIC VACCINATION AND INOCULATION AGAINST INFLUENZA: Primary | ICD-10-CM

## 2018-10-18 DIAGNOSIS — H43.811 PVD (POSTERIOR VITREOUS DETACHMENT), RIGHT: ICD-10-CM

## 2018-10-18 DIAGNOSIS — H25.13 SENILE NUCLEAR SCLEROSIS, BILATERAL: Primary | ICD-10-CM

## 2018-10-18 DIAGNOSIS — H40.003 GLAUCOMA SUSPECT OF BOTH EYES: ICD-10-CM

## 2018-10-18 PROCEDURE — G0008 ADMIN INFLUENZA VIRUS VAC: HCPCS

## 2018-10-18 PROCEDURE — 90662 IIV NO PRSV INCREASED AG IM: CPT

## 2018-10-18 PROCEDURE — 99207 ZZC NO CHARGE NURSE ONLY: CPT

## 2018-10-18 PROCEDURE — G0463 HOSPITAL OUTPT CLINIC VISIT: HCPCS | Mod: ZF

## 2018-10-18 PROCEDURE — 92015 DETERMINE REFRACTIVE STATE: CPT | Mod: ZF

## 2018-10-18 ASSESSMENT — REFRACTION_MANIFEST
OD_ADD: +2.50
OD_SPHERE: +2.00
OD_CYLINDER: +0.50
OS_SPHERE: +2.50
OS_AXIS: 180
OS_CYLINDER: +0.50
OD_AXIS: 035
OS_ADD: +2.50

## 2018-10-18 ASSESSMENT — VISUAL ACUITY
CORRECTION_TYPE: GLASSES
OD_CC: 20/20
OS_CC: 20/30
METHOD: SNELLEN - LINEAR
OD_CC+: -1
OS_CC+: +1

## 2018-10-18 ASSESSMENT — CONF VISUAL FIELD
OS_NORMAL: 1
OD_NORMAL: 1

## 2018-10-18 ASSESSMENT — REFRACTION_WEARINGRX
OD_AXIS: 035
OS_ADD: +2.50
OD_CYLINDER: +0.25
OS_SPHERE: +2.25
OD_SPHERE: +2.00
OD_ADD: +2.50
OS_CYLINDER: +0.00
OS_AXIS: 000

## 2018-10-18 ASSESSMENT — TONOMETRY
OS_IOP_MMHG: 18
IOP_METHOD: TONOPEN
OD_IOP_MMHG: 18

## 2018-10-18 ASSESSMENT — SLIT LAMP EXAM - LIDS
COMMENTS: NORMAL
COMMENTS: NORMAL

## 2018-10-18 ASSESSMENT — EXTERNAL EXAM - LEFT EYE: OS_EXAM: NORMAL

## 2018-10-18 ASSESSMENT — EXTERNAL EXAM - RIGHT EYE: OD_EXAM: NORMAL

## 2018-10-18 ASSESSMENT — CUP TO DISC RATIO
OS_RATIO: 0.45
OD_RATIO: 0.4

## 2018-10-18 NOTE — PROGRESS NOTES

## 2018-10-18 NOTE — PROGRESS NOTES
I have confirmed the patient's and reviewed Past Medical History, Past Surgical History, Social History, Family History, Problem List, Medication List and agree with Tech note.    CC: floaters left eye    HPI: for several months     Assessment/plan:   1.  Nuclear sclerotic cataract  Both eyes    - Not visually significant   - refract as needed    - new prescription glasses issued    - monitor yearly     2.  Posterior vitreous detachment (PVD) right eye and new in the left eye    Retinal detachment/retinal tear precautions discussed with patient  Contact clinic immediately for new floaters/flashers or shadows in vision    3.  Primary open angle glaucoma (POAG) suspect   Retinal nerve fiber layer analysis today and two years ago shows normal RNFL both eyes    Informed patient       RTC one year for follow up Primary open angle glaucoma (POAG) suspect with Den Hodge MD PhD.  Professor & Chair

## 2018-10-18 NOTE — MR AVS SNAPSHOT
After Visit Summary   10/18/2018    Frank Orellana    MRN: [id number]           Patient Information     Date Of Birth          1950        Visit Information        Provider Department      10/18/2018 2:00 PM  FLU CLINIC NURSE [facility]        Today's Diagnoses     Need for prophylactic vaccination and inoculation against influenza    -  1       Follow-ups after your visit        Your next 10 appointments already scheduled     Oct 18, 2018  2:00 PM CDT   Nurse Only with  FLU CLINIC NURSE   [facility] ([facility])    [address] [place] [address] [phone]            Nov 13, 2018 11:40 AM CST   (Arrive by 11:25 AM)   PHYSICAL with Vanessa Edwards MD   [facility] ([facility])    [address] [address] [phone]              Who to contact     If you have questions or need follow up information about today's clinic visit or your schedule please contact [facility] directly at [phone].  Normal or non-critical lab and imaging results will be communicated to you by [employer], letter or phone within 4 business days after the clinic has received the results. If you do not hear from us within 7 days, please contact the clinic through [employer] or phone. If you have a critical or abnormal lab result, we will notify you by phone as soon as possible.  Submit refill requests through myThings or call your pharmacy and they will forward the refill request to us. Please allow 3 business days for your refill to be completed.          Additional Information About Your Visit        [employer] Information     myThings gives you secure access to your electronic health record. If you see a primary care provider, you can also send messages to your care team and make appointments. If you have questions, please call your primary care clinic.  If you do  not have a primary care provider, please call [phone] and they will assist you.        Care EveryWhere ID     This is your Care EveryWhere ID. This could be used by other organizations to access your [place] medical records  [id number]         Blood Pressure from Last 3 Encounters:   10/11/18 102/67   10/11/18 101/71   05/10/18 124/71    Weight from Last 3 Encounters:   05/10/18 181 lb (82.1 kg)   04/19/18 177 lb 12.8 oz (80.6 kg)   02/14/18 180 lb 3.2 oz (81.7 kg)              We Performed the Following     FLU VACCINE, INCREASED ANTIGEN, PRESV FREE, AGE 65+ [20525]     Vaccine Administration, Initial [31424]        Primary Care Provider Office Phone # Fax #    Vanessa Edwards [phone]       [address] [place] [address] [address] [place] [address]        Equal Access to Services     AYDE PAPPAS : Hadii aad ku hadasho Soomaali, waaxda luqadaha, qaybta kaalmada adeegyada, waxay onurin marisa hernández . So [facility] [phone].    ATENCIÓN: Si habla español, tiene a sebastian disposición servicios gratuitos de asistencia lingüística. Llame al [phone].    We comply with applicable federal civil rights laws and [place] laws. We do not discriminate on the basis of race, color, national origin, age, disability, sex, sexual orientation, or gender identity.            Thank you!     Thank you for choosing [facility]  for your care. Our goal is always to provide you with excellent care. Hearing back from our patients is one way we can continue to improve our services. Please take a few minutes to complete the written survey that you may receive in the mail after your visit with us. Thank you!             Your Updated Medication List - Protect others around you: Learn how to safely use, store and throw away your medicines at www.disposemymeds.org.          This list is accurate as of 10/18/18  1:41 PM.  Always use your most recent med list.                   Brand Name Dispense  Instructions for use Diagnosis    alfuzosin 10 MG 24 hr tablet    UROXATRAL    90 tablet    Take 1 tablet (10 mg) by mouth daily    Urinary frequency       aspirin 81 MG EC tablet      Take 1 tablet (81 mg) by mouth daily    Paroxysmal atrial fibrillation (H)       dabigatran ANTICOAGULANT 150 MG capsule    PRADAXA ANTICOAGULANT    60 capsule    Take 1 capsule (150 mg) by mouth 2 times daily Store in original 's bottle or blister pack; use within 120 days of opening.    Paroxysmal atrial fibrillation (H)       diltiazem 180 MG 24 hr capsule     90 capsule    Take 1 capsule (180 mg) by mouth daily    Persistent atrial fibrillation (H)       order for DME      Reported on 5/4/2017

## 2018-10-18 NOTE — NURSING NOTE
Chief Complaints and History of Present Illnesses   Patient presents with     Follow Up For     Glaucoma Suspect      HPI    Last Eye Exam:  10/9/17   Affected eye(s):  Both   Symptoms:     Floaters   No flashes      Duration:  1 year   Frequency:  Constant       Do you have eye pain now?:  No      Comments:  Pt would like the health of eyes checked today and wants an updated glasses rx. Vision is stable. BE feel good and comfortable. Notes that he still sees floaters, nothing new to report today. YAZMIN HOUSER, COA 10:19 AM 10/18/2018

## 2018-10-18 NOTE — MR AVS SNAPSHOT
After Visit Summary   10/18/2018    Frank Orellana    MRN: [id number]           Patient Information     Date Of Birth          1950        Visit Information        Provider Department      10/18/2018 9:45 AM Ruth Boone MD [facility]        Today's Diagnoses     Senile nuclear sclerosis, bilateral    -  1    PVD (posterior vitreous detachment), right        Glaucoma suspect of both eyes           Follow-ups after your visit        Follow-up notes from your care team     Return in about 1 year (around 10/18/2019).      Your next 10 appointments already scheduled     Oct 18, 2018  2:00 PM CDT   Nurse Only with HW FLU CLINIC NURSE   [facility] ([facility])    [address] [place] [address] [phone]            Nov 13, 2018 11:40 AM CST   (Arrive by 11:25 AM)   PHYSICAL with Vanessa Edwards MD   [facility] ([facility])    [address] [facility]  [address] [facility] [address] [phone]              Who to contact     Please call your clinic at [phone] to:    Ask questions about your health    Make or cancel appointments    Discuss your medicines    Learn about your test results    Speak to your doctor            Additional Information About Your Visit        [employer] Information     [employer] gives you secure access to your electronic health record. If you see a primary care provider, you can also send messages to your care team and make appointments. If you have questions, please call your primary care clinic.  If you do not have a primary care provider, please call [phone] and they will assist you.      [employer] is an electronic gateway that provides easy, online access to your medical records. With [employer], you can request a clinic appointment, read your test results, renew a prescription or communicate with your care team.     To access your existing account,  please contact your [facility] or call [phone] for assistance.        Care EveryWhere ID     This is your Care EveryWhere ID. This could be used by other organizations to access your [place] medical records  [id number]         Blood Pressure from Last 3 Encounters:   10/11/18 102/67   10/11/18 101/71   05/10/18 124/71    Weight from Last 3 Encounters:   05/10/18 82.1 kg (181 lb)   04/19/18 80.6 kg (177 lb 12.8 oz)   02/14/18 81.7 kg (180 lb 3.2 oz)              Today, you had the following     No orders found for display       Primary Care Provider Office Phone # Fax #    Vanessa Edwards [phone]       [address] [place] [address]        Equal Access to Services     AYDE PAPPAS : Hadii aad nahomy hadasho Soomaali, waaxda luqadaha, qaybta kaalmada adeegyada, jeyson mixon haychloe hernández . So [facility] [phone].    ATENCIÓN: Si habla español, tiene a sebastian disposición servicios gratuitos de asistencia lingüística. Rowan al [phone].    We comply with applicable federal civil rights laws and [place] laws. We do not discriminate on the basis of race, color, national origin, age, disability, sex, sexual orientation, or gender identity.            Thank you!     Thank you for choosing E[facility]INIC  for your care. Our goal is always to provide you with excellent care. Hearing back from our patients is one way we can continue to improve our services. Please take a few minutes to complete the written survey that you may receive in the mail after your visit with us. Thank you!             Your Updated Medication List - Protect others around you: Learn how to safely use, store and throw away your medicines at www.disposemymeds.org.          This list is accurate as of 10/18/18 11:08 AM.  Always use your most recent med list.                   Brand Name Dispense Instructions for use Diagnosis    alfuzosin 10 MG 24 hr tablet    UROXATRAL     90 tablet    Take 1 tablet (10 mg) by mouth daily    Urinary frequency       aspirin 81 MG EC tablet      Take 1 tablet (81 mg) by mouth daily    Paroxysmal atrial fibrillation (H)       dabigatran ANTICOAGULANT 150 MG capsule    PRADAXA ANTICOAGULANT    60 capsule    Take 1 capsule (150 mg) by mouth 2 times daily Store in original 's bottle or blister pack; use within 120 days of opening.    Paroxysmal atrial fibrillation (H)       diltiazem 180 MG 24 hr capsule     90 capsule    Take 1 capsule (180 mg) by mouth daily    Persistent atrial fibrillation (H)       order for DME      Reported on 5/4/2017

## 2018-10-19 ENCOUNTER — MYC MEDICAL ADVICE (OUTPATIENT)
Dept: OPHTHALMOLOGY | Facility: CLINIC | Age: 68
End: 2018-10-19

## 2018-10-30 ASSESSMENT — ENCOUNTER SYMPTOMS
SLEEP DISTURBANCES DUE TO BREATHING: 0
HYPOTENSION: 0
FATIGUE: 0
SINUS CONGESTION: 0
FEVER: 0
ALTERED TEMPERATURE REGULATION: 1
DECREASED APPETITE: 0
WEIGHT LOSS: 0
SORE THROAT: 0
INCREASED ENERGY: 0
ORTHOPNEA: 0
HALLUCINATIONS: 0
NIGHT SWEATS: 0
SYNCOPE: 0
SMELL DISTURBANCE: 0
LEG PAIN: 0
LIGHT-HEADEDNESS: 0
TROUBLE SWALLOWING: 0
DIFFICULTY URINATING: 1
POLYPHAGIA: 0
NECK MASS: 0
POLYDIPSIA: 0
FLANK PAIN: 0
HEMATURIA: 0
TASTE DISTURBANCE: 0
SINUS PAIN: 0
WEIGHT GAIN: 0
EXERCISE INTOLERANCE: 0
CHILLS: 0
DYSURIA: 0
PALPITATIONS: 1
HOARSE VOICE: 0
HYPERTENSION: 0

## 2018-11-13 ENCOUNTER — OFFICE VISIT (OUTPATIENT)
Dept: INTERNAL MEDICINE | Facility: CLINIC | Age: 68
End: 2018-11-13
Payer: COMMERCIAL

## 2018-11-13 VITALS
HEIGHT: 70 IN | SYSTOLIC BLOOD PRESSURE: 128 MMHG | RESPIRATION RATE: 16 BRPM | WEIGHT: 178 LBS | OXYGEN SATURATION: 96 % | BODY MASS INDEX: 25.48 KG/M2 | DIASTOLIC BLOOD PRESSURE: 73 MMHG | HEART RATE: 57 BPM

## 2018-11-13 DIAGNOSIS — R97.20 ELEVATED PROSTATE SPECIFIC ANTIGEN (PSA): ICD-10-CM

## 2018-11-13 DIAGNOSIS — I48.0 PAROXYSMAL ATRIAL FIBRILLATION (H): ICD-10-CM

## 2018-11-13 DIAGNOSIS — G47.33 OSA (OBSTRUCTIVE SLEEP APNEA): ICD-10-CM

## 2018-11-13 DIAGNOSIS — Z00.00 ROUTINE HISTORY AND PHYSICAL EXAMINATION OF ADULT: Primary | ICD-10-CM

## 2018-11-13 DIAGNOSIS — Z23 NEED FOR VACCINATION: ICD-10-CM

## 2018-11-13 DIAGNOSIS — M72.2 PLANTAR FASCIITIS: ICD-10-CM

## 2018-11-13 LAB — PSA SERPL-MCNC: 5.78 UG/L (ref 0–4)

## 2018-11-13 ASSESSMENT — PAIN SCALES - GENERAL: PAINLEVEL: NO PAIN (0)

## 2018-11-13 NOTE — PROGRESS NOTES
CC:  Routine physical    HPI:  Frank is here for the above.  Overall he is doing well.  Did have a recurrent episode of a fib with RVR requiring cardioversion last month.  Since then, no recurrence.  On Pradaxa.  Routine health care maintenance metrics reviewed. Overdue for PSA and urology f/u.  Would like to pursue these.  Right heel pain upon awakening, increasing x one month.  No clear instigating event e.g. Prolonged walking, standing, inury.  Does not walk on e.g. Hardwood floors w/o footwear. Symptoms improve after walking around.  Cyst palmar surfact of right pinky MCP, not bothering him at this point    Answers for HPI/ROS submitted by the patient on 10/30/2018   General Symptoms: Yes  Skin Symptoms: No  HENT Symptoms: Yes  EYE SYMPTOMS: No  HEART SYMPTOMS: Yes  LUNG SYMPTOMS: No  INTESTINAL SYMPTOMS: No  URINARY SYMPTOMS: Yes  REPRODUCTIVE SYMPTOMS: No  SKELETAL SYMPTOMS: No  BLOOD SYMPTOMS: No  NERVOUS SYSTEM SYMPTOMS: No  MENTAL HEALTH SYMPTOMS: No  Fever: No  Loss of appetite: No  Weight loss: No  Weight gain: No  Fatigue: No  Night sweats: No  Chills: No  Increased stress: No  Excessive hunger: No  Excessive thirst: No  Feeling hot or cold when others believe the temperature is normal: Yes  Loss of height: No  Post-operative complications: No  Surgical site pain: No  Hallucinations: No  Change in or Loss of Energy: No  Hyperactivity: No  Confusion: No  Ear pain: No  Ear discharge: No  Hearing loss: No  Tinnitus: Yes  Nosebleeds: No  Congestion: No  Sinus pain: No  Trouble swallowing: No   Voice hoarseness: No  Mouth sores: No  Sore throat: No  Tooth pain: No  Gum tenderness: No  Bleeding gums: No  Change in taste: No  Change in sense of smell: No  Dry mouth: No  Hearing aid used: No  Neck lump: No  Chest pain or pressure: No  Fast or irregular heartbeat: Yes  Pain in legs with walking: No  Trouble breathing while lying down: No  Fingers or toes appear blue: No  High blood pressure: No  Low blood  pressure: No  Fainting: No  Murmurs: No  Pacemaker: No  Varicose veins: No  Edema or swelling: No  Wake up at night with shortness of breath: No  Light-headedness: No  Exercise intolerance: No  Trouble holding urine or incontinence: No  Pain or burning: No  Trouble starting or stopping: No  Increased frequency of urination: No  Blood in urine: No  Decreased frequency of urination: No  Frequent nighttime urination: No  Flank pain: No  Difficulty emptying bladder: Yes    Patient Active Problem List   Diagnosis     History of actinic keratoses     Telangiectasia     SK (seborrheic keratosis)     Patient is Amish     Refusal of blood transfusions as patient is Amish     Paroxysmal atrial fibrillation (H)     Chronic left shoulder pain     WINNIE (obstructive sleep apnea) AHI 32     Atrial fibrillation (H)     Tubular adenoma of colon     ACP (advance care planning)     Bilateral sensorineural hearing loss     Elevated PSA     Past Medical History:   Diagnosis Date     Actinic keratosis 2009     Atrial fibrillation (H) 6/15/96    Afib - 2 or 3 extended occurrences since     Chronic hepatitis C (H)     Chronic Hepatitis C,  genotype 1b                Stage 0 Fibrosis     Dupuytren's contracture of both hands      Elevated PSA      Hepatitis B 1969    acute infection at age 19; IV drug use     WINNIE (obstructive sleep apnea)     AHI 34.2     Pain in both hands      Patient is Amish      Refusal of blood transfusions as patient is Amish      Right shoulder pain      Tinnitus 1 or 2 years ago    both ears     Tubular adenoma of colon 1/17/17    ascending colon, 1/17/17     Past Surgical History:   Procedure Laterality Date     ANESTHESIA CARDIOVERSION N/A 1/15/2018    Procedure: ANESTHESIA CARDIOVERSION;  Anesthesia Coverage Cardioversion With Transesophageal Echocardiogram @0930 ;  Surgeon: GENERIC ANESTHESIA PROVIDER;  Location: [facility] OR     ANESTHESIA CARDIOVERSION N/A 10/11/2018     Procedure: ANESTHESIA CARDIOVERSION;  Cardioversion;  Surgeon: GENERIC ANESTHESIA PROVIDER;  Location: [facility] OR     ARTHROSCOPY KNEE      left knee     COLONOSCOPY  17    tubular adenoma ascending colon     HERNIORRHAPHY INGUINAL Right 10/26/2017    Procedure: HERNIORRHAPHY INGUINAL;  Open Right Inguinal Hernia Repair with Mesh;  Surgeon: Suresh Raymond MD;  Location: [facility]     KNEE SURGERY  2006??    torn meniscus     RELEASE DUPUYTRENS CONTRACTURE Right 2017    Procedure: RELEASE DUPUYTRENS CONTRACTURE;  Surgeon: Lars Oleary MD;  Location: [facility] OR     RELEASE DUPUYTRENS CONTRACTURE Left 12/15/2017    Procedure: RELEASE DUPUYTRENS CONTRACTURE;  Left Ring and Middle Finger Subtotal Palmar Fasciectomy;  Surgeon: Lars Oleary MD;  Location: [facility]     TRANSESOPHAGEAL ECHOCARDIOGRAM INTRAOPERATIVE N/A 1/15/2018    Procedure: TRANSESOPHAGEAL ECHOCARDIOGRAM INTRAOPERATIVE;;  Surgeon: GENERIC ANESTHESIA PROVIDER;  Location: [facility] OR     Family History   Problem Relation Age of Onset     Arthritis Paternal Grandmother      Rheumatoid Arthritis Paternal Grandmother      GASTROINTESTINAL DISEASE Father       age 77 after colon surgery     Alcoholism Father      Alcohol/Drug Mother       age 64     Alcoholism Mother      HEART DISEASE Brother      Cardiac Sudden Death Brother      Glaucoma No family hx of      Macular Degeneration No family hx of      Retinal detachment No family hx of      Social History     Social History     Marital status:      Spouse name: N/A     Number of children: N/A     Years of education: N/A     Occupational History     Not on file.     Social History Main Topics     Smoking status: Former Smoker     Packs/day: 0.50     Years: 10.00     Types: Cigarettes     Start date: 6/15/1964     Quit date: 1974     Smokeless tobacco: Former User     Quit date: 1969     Alcohol use 1.8 - 2.4 oz/week      Comment: 4 or 5 drinks/week, limit of 1     Drug use:  "Yes     Special: Marijuana, Methamphetamines, IV      Comment: 1960's     Sexual activity: Yes     Partners: Female     Birth control/ protection: None     Other Topics Concern     Special Diet No     eats healthy     Exercise No     Social History Narrative    Social history:    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  Declines blood transfusions.     Contrast Dye Rash     /73 (BP Location: Left arm, Patient Position: Sitting, Cuff Size: Adult Regular)  Pulse 57  Resp 16  Ht 1.765 m (5' 9.5\")  Wt 80.7 kg (178 lb)  SpO2 96%  BMI 25.91 kg/m2  Physical Examination:  Vitals    General:  Conversant, generally healthy appearing, no acute distress    Head: atraumatic    Eyes:  Pupils 2-3 mm, sclera white, EOM's full, conjunctiva moist, no lid lag      Ears:  TM's normal, EAC's patent, clear of cerumen    Nose:  Nasal passages clear, turbinates not swollen.  Does have septal deviation from trauma when young.    Throat/Mouth:  No pharyngeal erythema, exudate, ulcers, oral mucosa and tongue moist, normal hard and soft palate    Neck:  Trachea midline, Full AROM, supple, thyroid smooth, symmetric, not enlarged, no nodules, no neck lymphadenopathy    Lungs:  Clear to auscultation throughout, no wheezes, rhonchi or rales. Normal respiratory effort and no intercostal retractions.    C/V:  Regular rate and rhythm, no murmurs, rubs or gallops.  No JVD, normal carotid " upstroke and amplitude, no carotid bruits.    Abdomen:  Not distended.  Bowel sounds active.  No tenderness, no hepatosplenomegaly or masses.  No CVA tenderness or masses.    Lymph:  No cervical lymph nodes.    Neuro: Alert and oriented, face symmetric. No tremor.     M/S:   No joint deformities noted.  No joint swelling.  THere is a BB sized firm lump and his right pinky finger MCP, palmar surface, c/w cyst such as ganglion.  Right plantar surface calcaneous tender to palpation.  No other abnormal foot findings.    Skin:   Normal temperature., turgor and texture. No rashes, suspicious lesions,  jaundice or ulcers      Extremities:  No peripheral edema, no digital cyanosis    Psych:  Alert and oriented to person, place and time. Appropriate affect.  Not psychomotor slowed.  No signs of anxiety or agitation.      Frank was seen today for physical.    Diagnoses and all orders for this visit:    Routine history and physical examination of adult    Elevated prostate specific antigen (PSA)  -     PSA tumor marker; Future  -     UROLOGY ADULT REFERRAL    Need for vaccination  -     ZOSTER VACCINE RECOMBINANT ADJUVANTED IM NJX    Paroxysmal atrial fibrillation (H), stable    WINNIE (obstructive sleep apnea) AHI 32, stable    Patient is Oriental orthodox    Plantar fasciitis--info given on management (see patient instructions)    RTC 6 months and as needed.    Vanessa Edwards M.D.  Internal Medicine  [facility]   pager [phone]

## 2018-11-13 NOTE — PATIENT INSTRUCTIONS
Plantar Fasciitis  Plantar fasciitis is a painful swelling of the plantar fascia. The plantar fascia is a thick, fibrous layer of tissue that covers the bones on the bottom of your foot. It supports the foot bones in an arched position.  Plantar fasciitis can happen gradually or suddenly. It usually affects one foot at a time. Heel pain can be sharp, like a knife sticking into the bottom of your foot. You may feel pain after exercising, long-distance jogging, stair climbing, long periods of standing, or after standing up.  Risk factors include: non-active lifestyle, arthritis, diabetes, obesity or recent weight gain, flat foot, high arch. Wearing high heels, loose shoes, or shoes with poor arch support for long periods of time adds to the risk. This problem is commonly found in runners and dancers. It also found in people who stand on hard surfaces for long periods of time.  Foot pain from this condition is usually worse in the morning. But it often improves with walking. By the end of the day there may be a dull aching. Treatment requires short-term rest and controlling swelling. It may take up to 9 months before all symptoms go away. Rarely, a steroid injection into the foot, or surgery, may be needed.  Home care    If you are overweight, lose weight to help healing.    Choose supportive shoes with good arch support and shock absorbency. Replace athletic shoes when they become worn out. Don t walk or run barefoot.    Premade or custom-fitted shoe inserts may be helpful. Inserts made of silicone seem to be the most effective. Custom-made inserts can be provided by foot specialist, physical therapist, or orthopedist.    Premade or custom-made night splints keep the heel stretched out while you sleep. They may prevent morning pain.    Limit activities that stress the feet: jogging, prolonged standing or walking, contact sports, etc.    First thing in the morning and before sports, stretch the bottom of your  feet. Gently flex your ankle so the toes move toward your knee.    Icing may help control heel pain. Apply an ice pack to the heel for 10 to 20 minutes as a preventive. Or ice your heel after a severe flare-up of symptoms. You may repeat this every 1 to 2 hours as needed.    You may use over-the-counter pain medicine to control pain, unless another medicine was prescribed. Anti-inflammatory pain medicines, such as ibuprofen or naproxen, may work better than acetaminophen. If you have chronic liver or kidney disease or ever had a stomach ulcer or gastrointestinal bleeding, talk with your healthcare provider before using these medicines.  Follow-up care  Follow up with your healthcare provider, or as advised.  Call for an appointment if pain worsens or there is no relief after a few weeks of home treatment. Shoe inserts, a night splint, or a special boot may be required.  If X-rays were taken, you will be told of any new findings that may affect your care.  When to seek medical advice  Call your healthcare provider right away if any of these occur:    Foot swelling    Redness or warmth with increasing pain   Date Last Reviewed: 5/1/2018 2000-2018 [employer]. [address] [place] [address]. All rights reserved. This information is not intended as a substitute for professional medical care. Always follow your healthcare professional's instructions.        Treating Plantar Fasciitis  First, your healthcare provider tries to find out the cause of your problem. He or she can then suggest ways to ease pain. If your pain is due to poor foot mechanics, occasionally custom-made shoe inserts (orthoses) may help.    Ease symptoms    To relieve mild symptoms, try aspirin, ibuprofen, or other medicines as directed. Rubbing ice on the area may also help.    To lessen severe pain and swelling, your healthcare provider may give you pills or injections. In some cases, you may need a walking cast. Physical  therapy, such as ultrasound or a daily stretching program, may also be recommended. Surgery is rarely needed.    To ease symptoms caused by poor foot mechanics, your foot may be taped. This supports the arch and temporarily controls movement. Night splints may also help by stretching the fascia.  Control movement  If taping helps, your healthcare provider may prescribe orthoses. These are inserts built from plaster casts of your feet. They control the way your foot moves. As a result, your symptoms may go away.  Reduce overuse  Every time your foot strikes the ground, the plantar fascia is stretched. You can lessen the strain on the plantar fascia and the chance of overuse by:    Losing any excess weight    Not running on hard or uneven ground    Using orthoses, if recommended, in your shoes and house slippers  If surgery is needed  Your healthcare provider may consider surgery if other types of treatment don't control your pain. During surgery, the plantar fascia is partially cut to release tension. As you heal, fibrous tissue fills the space between the heel bone and the plantar fascia.   Date Last Reviewed: 1/1/2018 2000-2018 [employer]. [address] [place] [address]. All rights reserved. This information is not intended as a substitute for professional medical care. Always follow your healthcare professional's instructions.    [facility] contact information:  [place] [address] [phone]

## 2018-11-13 NOTE — MR AVS SNAPSHOT
After Visit Summary   11/13/2018    Frank Orellana    MRN: [id number]           Patient Information     Date Of Birth          1950        Visit Information        Provider Department      11/13/2018 11:40 AM Vanessa Edwards MD [facility]        Today's Diagnoses     Routine history and physical examination of adult    -  1    Elevated prostate specific antigen (PSA)        Need for vaccination        Paroxysmal atrial fibrillation (H)        WINNIE (obstructive sleep apnea) AHI 32        Patient is Jew        Plantar fasciitis          Care Instructions          Plantar Fasciitis  Plantar fasciitis is a painful swelling of the plantar fascia. The plantar fascia is a thick, fibrous layer of tissue that covers the bones on the bottom of your foot. It supports the foot bones in an arched position.  Plantar fasciitis can happen gradually or suddenly. It usually affects one foot at a time. Heel pain can be sharp, like a knife sticking into the bottom of your foot. You may feel pain after exercising, long-distance jogging, stair climbing, long periods of standing, or after standing up.  Risk factors include: non-active lifestyle, arthritis, diabetes, obesity or recent weight gain, flat foot, high arch. Wearing high heels, loose shoes, or shoes with poor arch support for long periods of time adds to the risk. This problem is commonly found in runners and dancers. It also found in people who stand on hard surfaces for long periods of time.  Foot pain from this condition is usually worse in the morning. But it often improves with walking. By the end of the day there may be a dull aching. Treatment requires short-term rest and controlling swelling. It may take up to 9 months before all symptoms go away. Rarely, a steroid injection into the foot, or surgery, may be needed.  Home care    If you are overweight, lose weight to help healing.    Choose supportive shoes with  good arch support and shock absorbency. Replace athletic shoes when they become worn out. Don t walk or run barefoot.    Premade or custom-fitted shoe inserts may be helpful. Inserts made of silicone seem to be the most effective. Custom-made inserts can be provided by foot specialist, physical therapist, or orthopedist.    Premade or custom-made night splints keep the heel stretched out while you sleep. They may prevent morning pain.    Limit activities that stress the feet: jogging, prolonged standing or walking, contact sports, etc.    First thing in the morning and before sports, stretch the bottom of your feet. Gently flex your ankle so the toes move toward your knee.    Icing may help control heel pain. Apply an ice pack to the heel for 10 to 20 minutes as a preventive. Or ice your heel after a severe flare-up of symptoms. You may repeat this every 1 to 2 hours as needed.    You may use over-the-counter pain medicine to control pain, unless another medicine was prescribed. Anti-inflammatory pain medicines, such as ibuprofen or naproxen, may work better than acetaminophen. If you have chronic liver or kidney disease or ever had a stomach ulcer or gastrointestinal bleeding, talk with your healthcare provider before using these medicines.  Follow-up care  Follow up with your healthcare provider, or as advised.  Call for an appointment if pain worsens or there is no relief after a few weeks of home treatment. Shoe inserts, a night splint, or a special boot may be required.  If X-rays were taken, you will be told of any new findings that may affect your care.  When to seek medical advice  Call your healthcare provider right away if any of these occur:    Foot swelling    Redness or warmth with increasing pain   Date Last Reviewed: 5/1/2018 2000-2018 [employer]. [address] [place] [address], [place] [address]. All rights reserved. This information is not intended as a substitute for professional medical  care. Always follow your healthcare professional's instructions.        Treating Plantar Fasciitis  First, your healthcare provider tries to find out the cause of your problem. He or she can then suggest ways to ease pain. If your pain is due to poor foot mechanics, occasionally custom-made shoe inserts (orthoses) may help.    Ease symptoms    To relieve mild symptoms, try aspirin, ibuprofen, or other medicines as directed. Rubbing ice on the area may also help.    To lessen severe pain and swelling, your healthcare provider may give you pills or injections. In some cases, you may need a walking cast. Physical therapy, such as ultrasound or a daily stretching program, may also be recommended. Surgery is rarely needed.    To ease symptoms caused by poor foot mechanics, your foot may be taped. This supports the arch and temporarily controls movement. Night splints may also help by stretching the fascia.  Control movement  If taping helps, your healthcare provider may prescribe orthoses. These are inserts built from plaster casts of your feet. They control the way your foot moves. As a result, your symptoms may go away.  Reduce overuse  Every time your foot strikes the ground, the plantar fascia is stretched. You can lessen the strain on the plantar fascia and the chance of overuse by:    Losing any excess weight    Not running on hard or uneven ground    Using orthoses, if recommended, in your shoes and house slippers  If surgery is needed  Your healthcare provider may consider surgery if other types of treatment don't control your pain. During surgery, the plantar fascia is partially cut to release tension. As you heal, fibrous tissue fills the space between the heel bone and the plantar fascia.   Date Last Reviewed: 1/1/2018 2000-2018 [employer]. [address] [place] [address], [place] [address]. All rights reserved. This information is not intended as a substitute for professional medical care. Always  follow your healthcare professional's instructions.    [facility] contact information:  [place] [address]  [phone]                Follow-ups after your visit        Additional Services     UROLOGY ADULT REFERRAL       Needs f/u with Dr. Maravilla, established patient    Your provider has referred you to: [facility] [place] [phone]   [email or web address]    Please be aware that coverage of these services is subject to the terms and limitations of your health insurance plan.  Call member services at your health plan with any benefit or coverage questions.      Please bring the following with you to your appointment:    (1) Any X-Rays, CTs or MRIs which have been performed.  Contact the facility where they were done to arrange for  prior to your scheduled appointment.    (2) List of current medications  (3) This referral request   (4) Any documents/labs given to you for this referral                  Follow-up notes from your care team     Return in about 6 months (around 5/13/2019) for Routine Visit.      Your next 10 appointments already scheduled     Nov 13, 2018 12:45 PM CST   LAB with  LAB   [facility] ([facility])    [address] [place] [address]  [address] [facility] [address] [phone]           Please do not eat 10-12 hours before your appointment if you are coming in fasting for labs on lipids, cholesterol, or glucose (sugar). This does not apply to pregnant women. Water, hot tea and black coffee (with nothing added) are okay. Do not drink other fluids, diet soda or chew gum.            Jan 11, 2019  1:00 PM CST   (Arrive by 12:45 PM)   Return Visit with Meggan Maravilla MD   [facility] and [facility]rolog[facility] ([facility])    [address] [place] [address]  [address] [facility] [id number] [phone]            Feb 08, 2019  1:00 PM  CST   (Arrive by 12:45 PM)   RETURN ARRHYTHMIA with ANDREINA Welch CNP   [facility] ([facility])    [address]  [address]  [facility] [id number] [phone]            May 16, 2019  7:00 AM CDT   (Arrive by 6:45 AM)   Return Visit with Vanessa Edwards MD   [facility] ([facility])    [address]  4th Floor  [facility] [address] [phone]            Oct 22, 2019  7:30 AM CDT   NEW GENERAL with Den Quintana MD   [facility] ([facility])    [address]  [address] Clin [address]  [facility] [id number] [phone]              Future tests that were ordered for you today     Open Future Orders        Priority Expected Expires Ordered    PSA tumor marker Routine 11/13/2018 11/13/2019 11/13/2018            Who to contact     Please call your clinic at [phone] to:    Ask questions about your health    Make or cancel appointments    Discuss your medicines    Learn about your test results    Speak to your doctor            Additional Information About Your Visit        [employer] Information     [employer] gives you secure access to your electronic health record. If you see a primary care provider, you can also send messages to your care team and make appointments. If you have questions, please call your primary care clinic.  If you do not have a primary care provider, please call [phone] and they will assist you.      [employer] is an electronic gateway that provides easy, online access to your medical records. With [employer], you can request a clinic appointment, read your test results, renew a prescription or communicate with your care team.     To access your existing account, please contact your [facility] or call [phone] for assistance.        Care EveryWhere ID     This is your Care EveryWhere ID. This could be  "used by other organizations to access your [facility] medical records  [id number]        Your Vitals Were     Pulse Respirations Height Pulse Oximetry BMI (Body Mass Index)       57 16 1.765 m (5' 9.5\") 96% 25.91 kg/m2        Blood Pressure from Last 3 Encounters:   11/13/18 128/73   10/11/18 102/67   10/11/18 101/71    Weight from Last 3 Encounters:   11/13/18 80.7 kg (178 lb)   05/10/18 82.1 kg (181 lb)   04/19/18 80.6 kg (177 lb 12.8 oz)              We Performed the Following     UROLOGY ADULT REFERRAL     ZOSTER VACCINE RECOMBINANT ADJUVANTED IM NJX          Today's Medication Changes          These changes are accurate as of 11/13/18 12:33 PM.  If you have any questions, ask your nurse or doctor.               Stop taking these medicines if you haven't already. Please contact your care team if you have questions.     dabigatran ANTICOAGULANT 150 MG capsule   Commonly known as:  PRADAXA ANTICOAGULANT   Stopped by:  Vanessa Edwards MD                    Primary Care Provider Office Phone # Fax #    Vanessa Edwards [phone]       [address] [place] [address] [address]  [place] [address]        Equal Access to Services     GLORIA PAPPAS : Mike morrowo Soguy, waaxda luqadaha, qaybta kaalmada adeegyada, jeyson evans. So [facility] [phone].    ATENCIÓN: Si habla español, tiene a sebastian disposición servicios gratuitos de asistencia lingüística. Llame al [phone].    We comply with applicable federal civil rights laws and [place] laws. We do not discriminate on the basis of race, color, national origin, age, disability, sex, sexual orientation, or gender identity.            Thank you!     Thank you for choosing [facility]  for your care. Our goal is always to provide you with excellent care. Hearing back from our patients is one way we can continue to improve our services. Please take a few minutes to complete the written survey that you may " receive in the mail after your visit with us. Thank you!             Your Updated Medication List - Protect others around you: Learn how to safely use, store and throw away your medicines at www.disposemymeds.org.          This list is accurate as of 11/13/18 12:33 PM.  Always use your most recent med list.                   Brand Name Dispense Instructions for use Diagnosis    alfuzosin 10 MG 24 hr tablet    UROXATRAL    90 tablet    Take 1 tablet (10 mg) by mouth daily    Urinary frequency       aspirin 81 MG EC tablet      Take 1 tablet (81 mg) by mouth daily    Paroxysmal atrial fibrillation (H)       diltiazem 180 MG 24 hr capsule     90 capsule    Take 1 capsule (180 mg) by mouth daily    Persistent atrial fibrillation (H)       order for DME      Reported on 5/4/2017

## 2018-11-27 ENCOUNTER — CARE COORDINATION (OUTPATIENT)
Dept: CARDIOLOGY | Facility: CLINIC | Age: 68
End: 2018-11-27

## 2018-11-27 ENCOUNTER — TELEPHONE (OUTPATIENT)
Dept: CARDIOLOGY | Facility: CLINIC | Age: 68
End: 2018-11-27

## 2018-11-27 DIAGNOSIS — I48.0 PAROXYSMAL ATRIAL FIBRILLATION (H): Primary | ICD-10-CM

## 2018-11-27 RX ORDER — POTASSIUM CHLORIDE 1500 MG/1
20 TABLET, EXTENDED RELEASE ORAL
Status: CANCELLED | OUTPATIENT
Start: 2018-11-27

## 2018-11-27 RX ORDER — POTASSIUM CHLORIDE 1500 MG/1
40 TABLET, EXTENDED RELEASE ORAL
Status: CANCELLED | OUTPATIENT
Start: 2018-11-27

## 2018-11-27 RX ORDER — LIDOCAINE 40 MG/G
CREAM TOPICAL
Status: CANCELLED | OUTPATIENT
Start: 2018-11-27

## 2018-11-27 RX ORDER — MAGNESIUM SULFATE HEPTAHYDRATE 40 MG/ML
2 INJECTION, SOLUTION INTRAVENOUS
Status: CANCELLED | OUTPATIENT
Start: 2018-11-27

## 2018-11-27 RX ORDER — DABIGATRAN ETEXILATE 150 MG/1
150 CAPSULE ORAL 2 TIMES DAILY
Qty: 60 CAPSULE | Refills: 0 | Status: SHIPPED | OUTPATIENT
Start: 2018-11-27 | End: 2018-12-27

## 2018-11-27 NOTE — TELEPHONE ENCOUNTER
[facility] Call Center    Phone Message    May a detailed message be left on voicemail: yes    Reason for Call: Other: Pt had a cardioversion with Dr. Felipe and states that he has been in a. fib ever since. Please call back to discuss.     Action Taken: Message routed to:  [facility] ([facility]  CARDIOVASCULAR CTR

## 2018-11-27 NOTE — PROGRESS NOTES
Patient called stating he has been in AF since noon yesterday. Can feel irregular heartbeat, slightly lightheaded upon exertion. HR 90s (baseline 60s). BP 85/60. He has eaten today.        Date: 11/27/2018    Time of Call: 8:56 AM     Diagnosis:  AF      [ TORB ] Ordering provider: Yamilka Skinner NP   Order:   DCCV. No JIM needed if starts Pradaxa today. Continue Pradaxa for 30 days post.     Order received by: Meggan Brar RN      Follow-up/additional notes:   DCCV scheduled for 11/28 at 0700. Patient will start Pradaxa this evening. Instructions reviewed.

## 2018-11-28 ENCOUNTER — ANESTHESIA (OUTPATIENT)
Dept: SURGERY | Facility: CLINIC | Age: 68
End: 2018-11-28
Payer: COMMERCIAL

## 2018-11-28 ENCOUNTER — APPOINTMENT (OUTPATIENT)
Dept: MEDSURG UNIT | Facility: CLINIC | Age: 68
End: 2018-11-28
Attending: INTERNAL MEDICINE
Payer: COMMERCIAL

## 2018-11-28 ENCOUNTER — HOSPITAL ENCOUNTER (OUTPATIENT)
Dept: CARDIOLOGY | Facility: CLINIC | Age: 68
End: 2018-11-28
Attending: INTERNAL MEDICINE | Admitting: INTERNAL MEDICINE
Payer: COMMERCIAL

## 2018-11-28 ENCOUNTER — APPOINTMENT (OUTPATIENT)
Dept: LAB | Facility: CLINIC | Age: 68
End: 2018-11-28
Attending: INTERNAL MEDICINE
Payer: COMMERCIAL

## 2018-11-28 ENCOUNTER — ANESTHESIA EVENT (OUTPATIENT)
Dept: SURGERY | Facility: CLINIC | Age: 68
End: 2018-11-28
Payer: COMMERCIAL

## 2018-11-28 ENCOUNTER — HOSPITAL ENCOUNTER (OUTPATIENT)
Facility: CLINIC | Age: 68
Discharge: HOME | End: 2018-11-28
Attending: INTERNAL MEDICINE | Admitting: INTERNAL MEDICINE
Payer: COMMERCIAL

## 2018-11-28 VITALS
HEIGHT: 70 IN | DIASTOLIC BLOOD PRESSURE: 65 MMHG | SYSTOLIC BLOOD PRESSURE: 112 MMHG | WEIGHT: 178 LBS | OXYGEN SATURATION: 95 % | TEMPERATURE: 97.9 F | HEART RATE: 60 BPM | RESPIRATION RATE: 16 BRPM | BODY MASS INDEX: 25.48 KG/M2

## 2018-11-28 VITALS
SYSTOLIC BLOOD PRESSURE: 103 MMHG | RESPIRATION RATE: 16 BRPM | DIASTOLIC BLOOD PRESSURE: 71 MMHG | OXYGEN SATURATION: 95 % | HEART RATE: 58 BPM

## 2018-11-28 DIAGNOSIS — I48.0 PAROXYSMAL ATRIAL FIBRILLATION (H): ICD-10-CM

## 2018-11-28 LAB
MAGNESIUM SERPL-MCNC: 2.3 MG/DL (ref 1.6–2.3)
POTASSIUM SERPL-SCNC: 3.8 MMOL/L (ref 3.4–5.3)

## 2018-11-28 PROCEDURE — 37000008 ZZH ANESTHESIA TECHNICAL FEE, 1ST 30 MIN

## 2018-11-28 PROCEDURE — 84132 ASSAY OF SERUM POTASSIUM: CPT | Performed by: INTERNAL MEDICINE

## 2018-11-28 PROCEDURE — 40000065 ZZH STATISTIC EKG NON-CHARGEABLE

## 2018-11-28 PROCEDURE — 93005 ELECTROCARDIOGRAM TRACING: CPT

## 2018-11-28 PROCEDURE — 83735 ASSAY OF MAGNESIUM: CPT | Performed by: INTERNAL MEDICINE

## 2018-11-28 PROCEDURE — 92960 CARDIOVERSION ELECTRIC EXT: CPT | Performed by: NURSE PRACTITIONER

## 2018-11-28 PROCEDURE — 92960 CARDIOVERSION ELECTRIC EXT: CPT

## 2018-11-28 PROCEDURE — 25000125 ZZHC RX 250: Performed by: NURSE ANESTHETIST, CERTIFIED REGISTERED

## 2018-11-28 PROCEDURE — 93010 ELECTROCARDIOGRAM REPORT: CPT | Performed by: INTERNAL MEDICINE

## 2018-11-28 PROCEDURE — 40000166 ZZH STATISTIC PP CARE STAGE 1

## 2018-11-28 PROCEDURE — 36415 COLL VENOUS BLD VENIPUNCTURE: CPT | Performed by: INTERNAL MEDICINE

## 2018-11-28 RX ORDER — POTASSIUM CHLORIDE 750 MG/1
40 TABLET, EXTENDED RELEASE ORAL
Status: DISCONTINUED | OUTPATIENT
Start: 2018-11-28 | End: 2018-11-28 | Stop reason: HOSPADM

## 2018-11-28 RX ORDER — POTASSIUM CHLORIDE 750 MG/1
20 TABLET, EXTENDED RELEASE ORAL
Status: DISCONTINUED | OUTPATIENT
Start: 2018-11-28 | End: 2018-11-28 | Stop reason: HOSPADM

## 2018-11-28 RX ORDER — LIDOCAINE 40 MG/G
CREAM TOPICAL
Status: DISCONTINUED | OUTPATIENT
Start: 2018-11-28 | End: 2018-11-28 | Stop reason: HOSPADM

## 2018-11-28 RX ADMIN — METHOHEXITAL SODIUM 50 MG: 500 INJECTION, POWDER, LYOPHILIZED, FOR SOLUTION INTRAMUSCULAR; INTRAVENOUS; RECTAL at 09:16

## 2018-11-28 ASSESSMENT — ENCOUNTER SYMPTOMS: DYSRHYTHMIAS: 1

## 2018-11-28 NOTE — PROGRESS NOTES
Pt here post cardioversion; pt awake and alert, denies pain. Pt taking PO food and drink without problem; family at bedside. Red circular area on chest.

## 2018-11-28 NOTE — ANESTHESIA POSTPROCEDURE EVALUATION
Anesthesia POST Procedure Evaluation    Patient: Frank Orellana   MRN:     [id number] Gender:   male   Age:    68 year old :      1950        Preoperative Diagnosis: Atrial Fibrillation    Procedure(s):  Anesthesia Coverage Cardioversion @0830   Postop Comments: No value filed.       Anesthesia Type:  General    Reportable Event: NO     PAIN: Uncomplicated   Sign Out status: Comfortable, Well controlled pain     PONV: No PONV   Sign Out status:  No Nausea or Vomiting     Neuro/Psych: Uneventful perioperative course   Sign Out Status: Preoperative baseline; Age appropriate mentation     Airway/Resp.: Uneventful perioperative course   Sign Out Status: Non labored breathing, age appropriate RR; Resp. Status within EXPECTED Parameters     CV: Uneventful perioperative course   Sign Out status: Appropriate BP and perfusion indices; Appropriate HR/Rhythm     Disposition:   Sign Out in:  Postop Area  Disposition:  Home  Recovery Course: Uneventful  Follow-Up: Not required           Last Anesthesia Record Vitals:  CRNA VITALS  2018 0850 - 2018 0937      2018             Resp Rate (observed): 14          Last PACU/Preop Vitals:  Vitals:    18 0755   BP: 102/70   Pulse: 78   Resp: 20   Temp: 36.6  C (97.9  F)   SpO2: 98%         Electronically Signed By: Chris Solorzano MD, 2018, 9:37 AM

## 2018-11-28 NOTE — H&P
Electrophysiology Pre-Procedure History and Physical    Frank Orellana MRN# [id number]   Age: 68 year old YOB: 1950      Date of Procedure: 11/28/2018 Location [facility]      Date of Exam 11/28/2018 Facility (In hospital)       Home clinic: [facility]  Primary care provider: Vanessa Edwards         Active problem list:   Patient Active Problem List    Diagnosis Date Noted     Bilateral sensorineural hearing loss 05/05/2017     Priority: Medium     Elevated PSA      Priority: Medium     ACP (advance care planning) 01/26/2017     Priority: Medium     Advance Care Planning 1/26/2017: Receipt of ACP document:  Received: Health Care Directive which was witnessed or notarized on 11/9/16.  Document previously scanned on 11/22/16.  Validation form completed and sent to be scanned. Patient is Mormon.  See Health Care Directive for information on use of blood products.  Code Status needs to be updated to reflect choices in most recent ACP document. Confirmed/documented designated decision maker(s).  Added by Jazzy Mazariegos RN, Advance Care Planning Liaison.         Tubular adenoma of colon 01/17/2017     Priority: Medium     ascending colon, 1/17/17       WINNIE (obstructive sleep apnea) AHI 32      Priority: Medium     PSG at [facility] 1/8/2017AHI 32.3 wt 180       Chronic left shoulder pain 12/29/2016     Priority: Medium     Patient is Mormon      Priority: Medium     Refusal of blood transfusions as patient is Mormon      Priority: Medium     Paroxysmal atrial fibrillation (H)      Priority: Medium     paroxysmal on 3-4 occasions       History of actinic keratoses 03/15/2013     Priority: Medium     Telangiectasia 03/15/2013     Priority: Medium     SK (seborrheic keratosis) 03/15/2013     Priority: Medium     Atrial fibrillation (H) 06/15/1996     Priority: Medium     Afib - 2 or 3 extended occurrences since               Medications (include herbals and vitamins):          Frank Orellana   Home Medication Instructions BAIRON:[id number]    Printed on:11/28/18 1500   Medication Information                      alfuzosin (UROXATRAL) 10 MG 24 hr tablet  Take 1 tablet (10 mg) by mouth daily             aspirin EC 81 MG EC tablet  Take 1 tablet (81 mg) by mouth daily             dabigatran ANTICOAGULANT (PRADAXA ANTICOAGULANT) 150 MG capsule  Take 1 capsule (150 mg) by mouth 2 times daily Store in original 's bottle or blister pack; use within 120 days of opening.             diltiazem 180 MG 24 hr capsule  Take 1 capsule (180 mg) by mouth daily             order for DME  Reported on 5/4/2017                      Allergies:      Allergies   Allergen Reactions     Blood Transfusion Related (Informational Only)      Congregation.  Declines blood transfusions.     Contrast Dye Rash     Allergy to Latex? No  Allergy to tape?   No            Social History:     Social History   Substance Use Topics     Smoking status: Former Smoker     Packs/day: 0.50     Years: 10.00     Types: Cigarettes     Start date: 6/15/1964     Quit date: 7/1/1974     Smokeless tobacco: Former User     Quit date: 6/1/1969     Alcohol use 1.8 - 2.4 oz/week      Comment: 4 or 5 drinks/week, limit of 1            Physical Exam:   All vitals have been reviewed  No data found.       Airway assessment:   Patient is able to open mouth wide  Patient is able to stick out tongue}      ENT:   Normocephalic, without obvious abnormality, atraumatic, sinuses nontender on palpation, external ears without lesions, oral pharynx with moist mucous membranes, tonsils without erythema or exudates, gums normal and good dentition.     Neck:   Supple, symmetrical, trachea midline, no adenopathy, thyroid symmetric, not enlarged and no tenderness, skin normal     Lungs:   No increased work of breathing, good air exchange, clear to auscultation bilaterally, no crackles or  wheezing     Cardiovascular:   Normal apical impulse, irregular rate and rhythm, normal S1 and S2, no S3 or S4, and no murmur noted             Lab / Radiology Results:          Plan:   Patient's active problems diagnostically and therapeutically optimized for the planned procedure. Patient here for DCCV. Procedure explained in detail to patient including indications, risks, and benefits. He states understanding and wishes to procced.     ANDREINA Tompkins CNP  Electrophysiology Consult Service  Pager: [phone]

## 2018-11-28 NOTE — PROGRESS NOTES
Prep and teaching complete for Cardioversion; Wife, Larissa here will drive pt home, phone:  [phone].

## 2018-11-28 NOTE — ANESTHESIA PREPROCEDURE EVALUATION
Anesthesia Pre-Procedure Evaluation    Patient: Frank Orellana   MRN:     [id number] Gender:   male   Age:    68 year old :      1950        Preoperative Diagnosis: Atrial Fibrillation    Procedure(s):  Anesthesia Coverage Cardioversion @0830     Past Medical History:   Diagnosis Date     Actinic keratosis      Atrial fibrillation (H) 6/15/96    Afib - 2 or 3 extended occurrences since     Chronic hepatitis C (H)     Chronic Hepatitis C,  genotype 1b                Stage 0 Fibrosis     Dupuytren's contracture of both hands      Elevated PSA      Hepatitis B 1969    acute infection at age 19; IV drug use     WINNIE (obstructive sleep apnea)     AHI 34.2     Pain in both hands      Patient is Spiritism      Refusal of blood transfusions as patient is Spiritism      Right shoulder pain      Tinnitus 1 or 2 years ago    both ears     Tubular adenoma of colon 17    ascending colon, 17      Past Surgical History:   Procedure Laterality Date     ANESTHESIA CARDIOVERSION N/A 1/15/2018    Procedure: ANESTHESIA CARDIOVERSION;  Anesthesia Coverage Cardioversion With Transesophageal Echocardiogram @0930 ;  Surgeon: GENERIC ANESTHESIA PROVIDER;  [facility]: [facility]     ANESTHESIA CARDIOVERSION N/A 10/11/2018    Procedure: ANESTHESIA CARDIOVERSION;  Cardioversion;  Surgeon: GENERIC ANESTHESIA PROVIDER;  Location: [facility]     ARTHROSCOPY KNEE      left knee     COLONOSCOPY  17    tubular adenoma ascending colon     HERNIORRHAPHY INGUINAL Right 10/26/2017    Procedure: HERNIORRHAPHY INGUINAL;  Open Right Inguinal Hernia Repair with Mesh;  Surgeon: Suresh Raymond MD;  [facility]     KNEE SURGERY  ??    torn meniscus     RELEASE DUPUYTRENS CONTRACTURE Right 2017    Procedure: RELEASE DUPUYTRENS CONTRACTURE;  Surgeon: Lars Oleary MD;  [facility]     RELEASE DUPUYTRENS CONTRACTURE Left 12/15/2017    Procedure: RELEASE DUPUYTRENS CONTRACTURE;  Left Ring and  Middle Finger Subtotal Palmar Fasciectomy;  Surgeon: Lars Oleary MD;  [facility]     TRANSESOPHAGEAL ECHOCARDIOGRAM INTRAOPERATIVE N/A 1/15/2018    Procedure: TRANSESOPHAGEAL ECHOCARDIOGRAM INTRAOPERATIVE;;  Surgeon: GENERIC ANESTHESIA PROVIDER;  Location: [facility]          Anesthesia Evaluation     .             ROS/MED HX    ENT/Pulmonary:     (+)sleep apnea, , . .    Neurologic:  - neg neurologic ROS     Cardiovascular:     (+) ----. : . . . :. dysrhythmias a-fib, .       METS/Exercise Tolerance:     Hematologic: Comments: Latter day        Musculoskeletal:  - neg musculoskeletal ROS       GI/Hepatic:     (+) hepatitis type C and B,       Renal/Genitourinary:  - ROS Renal section negative       Endo:  - neg endo ROS       Psychiatric:  - neg psychiatric ROS       Infectious Disease:  - neg infectious disease ROS       Malignancy:   (+)   Adenoma of colon        Other:                         PHYSICAL EXAM:   Mental Status/Neuro: A/A/O   Airway: Facies: Feasible  Mallampati: II  Mouth/Opening: Full  TM distance: > 6 cm  Neck ROM: Full   Respiratory: Auscultation: CTAB     Resp. Rate: Normal     Resp. Effort: Normal      CV: Rhythm: Regular  Rate: Age appropriate  Heart: Normal Sounds   Comments:      Dental: Normal                  Lab Results   Component Value Date    WBC 4.7 09/07/2017    HGB 13.9 10/16/2017    HCT 40.1 09/07/2017     09/07/2017    SED 5 07/23/2014     09/14/2017    POTASSIUM 3.8 11/28/2018    CHLORIDE 108 09/14/2017    CO2 22 09/14/2017    BUN 14 09/14/2017    CR 0.81 10/16/2017    GLC 90 09/14/2017    VALENTINA 8.8 09/14/2017    MAG 2.3 11/28/2018    ALBUMIN 3.7 03/21/2017    PROTTOTAL 6.6 (L) 03/21/2017    ALT 16 03/21/2017    AST 12 03/21/2017    ALKPHOS 73 03/21/2017    BILITOTAL 0.9 03/21/2017    LIPASE 96 07/07/2010    PTT 29 06/14/2006    INR 1.52 (H) 01/15/2018    TSH 1.59 12/02/2016       Preop Vitals  BP Readings from Last 3 Encounters:   11/28/18 102/70  "  11/28/18 106/67   11/13/18 128/73    Pulse Readings from Last 3 Encounters:   11/28/18 78   11/28/18 51   11/13/18 57      Resp Readings from Last 3 Encounters:   11/28/18 20   11/28/18 16   11/13/18 16    SpO2 Readings from Last 3 Encounters:   11/28/18 98%   11/28/18 97%   11/13/18 96%      Temp Readings from Last 1 Encounters:   11/28/18 36.6  C (97.9  F) (Oral)    Ht Readings from Last 1 Encounters:   11/28/18 1.765 m (5' 9.5\")      Wt Readings from Last 1 Encounters:   11/28/18 80.7 kg (178 lb)    Estimated body mass index is 25.91 kg/(m^2) as calculated from the following:    Height as of this encounter: 1.765 m (5' 9.5\").    Weight as of this encounter: 80.7 kg (178 lb).     LDA:  Peripheral IV 01/15/18 Right Lower forearm (Active)   Number of days:317       Peripheral IV 10/11/18 Left Upper forearm (Active)   Number of days:48       Peripheral IV 11/28/18 Right Lower forearm (Active)   Site Assessment WDL 11/28/2018  8:13 AM   Line Status Saline locked 11/28/2018  8:13 AM   Phlebitis Scale 0-->no symptoms 11/28/2018  8:13 AM   Infiltration Scale 0 11/28/2018  8:13 AM   Extravasation? No 11/28/2018  8:13 AM   Dressing Intervention New dressing  11/28/2018  8:13 AM   Number of days:0            Assessment:   ASA SCORE: 3    NPO Status: > 6 hours since completed Solid Foods   Documentation: H&P complete; Preop Testing complete; Consents complete   Proceeding: Proceed without further delay  Tobacco Use:  NO Active use of Tobacco/UNKNOWN Tobacco use status     Plan:   Anes. Type:  General      Induction:  IV (Standard) (methohexital for cardioversion)      Access/Monitoring: PIV      Emergence: Procedure Site   Logistics: Same Day Surgery; Remote Procedure     PONV Management:  Adult Risk Factors:, Non-Smoker     CONSENT: Direct conversation   Plan and risks discussed with: Patient   Blood Products: Consent Deferred (Minimal Blood Loss)                       Chris Solorzano MD  Staff Anesthesiologist  [phone]  "

## 2018-11-28 NOTE — DISCHARGE INSTRUCTIONS
Going Home after Sedation      For 24 hours:    An adult should stay with you.    Relax and take it easy.    DO NOT make any important legal decisions.    DO NOT drive or operate machines at home or at work.    Resume your regular diet and drink plenty of fluids.    If you have any redness/skin sorness where the patches were placed, you may use aloe vera gel as needed to help relieve local pain.     CALL THE PHYSICIAN IF:    You develop nausea or vomiting    You develop hives or a rash or any unexplained itching    ADDITIONAL INFORMATION:  [facility] Clinic:   [address] [place] [address]. [place] [address]  Your Care Team:  [facility]   Telephone Number     Yamilka Skinner NP, Dr. *** [phone]   Meggan Teixeira RN  [phone]     For scheduling appts or procedures:    Yelitza Wright   [phone]   For the Device Clinic (Pacemakers and ICD's)   RN's :   Gregoria William  During business hours: [phone]     After business hours:   [phone]- select option 4 and ask for job code [id number].       As always, Thank you for trusting us with your health care needs!

## 2018-11-28 NOTE — OP NOTE
CARDIOVERSION    PROCEDURE:  direct current cardioversion  PROCEDURE DATE: 11/28/2018     Pre-procedure diagnosis:  Atrial fibrillation  Post-procedure diagnosis: s/p DCCV  Complications:  none    BRIEF CLINICAL HISTORY: The patient has a past medical history significant for  is a 68 year old male who has a past medical history significant for prior tobacco use, PAF (CHADSVASC 0), chronic hepatitis C, hepatitis B, WINNIE (uses CPAP), tubular adenoma of colon, and refusal of blood products as he is Rastafari. He has had a >20 year history of paroxysmal atrial fibrillation manifesting as chest pressure and palpitations. His most recent DCCV was on 10/11/2018.  He went back into AF on Monday afternoon (less than 48 hours ago) and started on Pradaxa within 24 hours of onset AF without interruption.      PROCEDURE:  The patient arrived at the Echo Laboratory in a fasting, non-sedated state.  Informed consent was previously obtained from patient, who understood indications, risks, and benefits of the procedure.  200 J of synchronized biphasic shock was delivered through the cardiac monitor, and the patient was successfully converted to normal sinus rhythm.  After sedation wore off, patient awoke and remained neurologically intact.  The patient tolerated the procedure well from a hemodynamic and respiratory standpoint without any complications.  He was observed in the Echo Laboratory and then discharged to home after sedation wore off and he was ambulatory.    IMPRESSION:  1.  Successful direct current cardioversion with 200 J biphasic shock.    RECOMMENDATIONS/PLANS:  1.   Follow-up with Dr. Felipe  2.   Continue anticoagulation for 30 days    ANDREINA Tompkins CNP  Electrophysiology Consult Service  Pager: [phone]

## 2018-11-28 NOTE — PROGRESS NOTES
Pt arrived in ECHO department for scheduled Cardoversion.   Procedure explained, questions answered and consent signed. Discharge instructions discussed with patient and given written copy.  Anesthesia gave pt 50 mg IV brevitol for sedation and pt was DCCV at 200 Joules to a SB/SR.  Pt denied chest pain or any other discomfort after procedure and was monitored until after awake and VSS.  Escorted back to 2A for further recovery.   Report given to KALEE Kinney.

## 2018-11-28 NOTE — PROGRESS NOTES
Pt up walking, steady on his feet. Reddened area on chest is lighter in color, pt denies pain. Pt tolerated PO both food and drink. IV discontinued. VSS. Post EKG done; Sinus. Discharge instructions reviewed and copy to pt. Discharge to home with wife.

## 2018-11-28 NOTE — ANESTHESIA CARE TRANSFER NOTE
Patient: Frank Orellana    Procedure(s):  Anesthesia Coverage Cardioversion @0830    Diagnosis: Atrial Fibrillation   Diagnosis Additional Information: No value filed.    Anesthesia Type:   No value filed.     Note:  Airway :Nasal Cannula  Patient transferred to:Phase II  Comments: Pt. In ECHO lab, VSS, airway patent, RN at bedside. Patient awake and alert.Handoff Report: Identifed the Patient, Identified the Reponsible Provider, Reviewed the pertinent medical history, Discussed the surgical course, Reviewed Intra-OP anesthesia mangement and issues during anesthesia, Set expectations for post-procedure period and Allowed opportunity for questions and acknowledgement of understanding      Vitals: (Last set prior to Anesthesia Care Transfer)    CRNA VITALS  11/28/2018 0850 - 11/28/2018 0921      11/28/2018             Resp Rate (observed): 14                Electronically Signed By: ANDREINA Padron CRNA  November 28, 2018  9:21 AM

## 2018-11-29 LAB
INTERPRETATION ECG - MUSE: NORMAL
INTERPRETATION ECG - MUSE: NORMAL

## 2018-12-06 ENCOUNTER — DOCUMENTATION ONLY (OUTPATIENT)
Dept: SLEEP MEDICINE | Facility: CLINIC | Age: 68
End: 2018-12-06
Payer: COMMERCIAL

## 2018-12-06 DIAGNOSIS — G47.33 OSA (OBSTRUCTIVE SLEEP APNEA): Primary | ICD-10-CM

## 2018-12-06 ASSESSMENT — ENCOUNTER SYMPTOMS: DYSRHYTHMIAS: 1

## 2018-12-06 NOTE — ANESTHESIA PREPROCEDURE EVALUATION
Anesthesia Pre-Procedure Evaluation    Patient: Frank Orellana   MRN:     [id number] Gender:   male   Age:    68 year old :      1950        Preoperative Diagnosis: Atrial Fibrillation   Procedure(s):  Cardioversion     Past Medical History:   Diagnosis Date     Actinic keratosis      Atrial fibrillation (H) 6/15/96    Afib - 2 or 3 extended occurrences since     Chronic hepatitis C (H)     Chronic Hepatitis C,  genotype 1b                Stage 0 Fibrosis     Dupuytren's contracture of both hands      Elevated PSA      Hepatitis B 1969    acute infection at age 19; IV drug use     WINNIE (obstructive sleep apnea)     AHI 34.2     Pain in both hands      Patient is Christianity      Refusal of blood transfusions as patient is Christianity      Right shoulder pain      Tinnitus 1 or 2 years ago    both ears     Tubular adenoma of colon 17    ascending colon, 17      Past Surgical History:   Procedure Laterality Date     ANESTHESIA CARDIOVERSION N/A 1/15/2018    Procedure: ANESTHESIA CARDIOVERSION;  Anesthesia Coverage Cardioversion With Transesophageal Echocardiogram @0930 ;  Surgeon: GENERIC ANESTHESIA PROVIDER;  Location: [facility]     ANESTHESIA CARDIOVERSION N/A 10/11/2018    Procedure: ANESTHESIA CARDIOVERSION;  Cardioversion;  Surgeon: GENERIC ANESTHESIA PROVIDER;  Location: [facility]     ANESTHESIA CARDIOVERSION N/A 2018    Procedure: Anesthesia Coverage Cardioversion @0830;  Surgeon: GENERIC ANESTHESIA PROVIDER;  [facility]     ARTHROSCOPY KNEE      left knee     COLONOSCOPY  17    tubular adenoma ascending colon     HERNIORRHAPHY INGUINAL Right 10/26/2017    Procedure: HERNIORRHAPHY INGUINAL;  Open Right Inguinal Hernia Repair with Mesh;  Surgeon: Suresh Raymond MD;  [facility]     KNEE SURGERY  ??    torn meniscus     RELEASE DUPUYTRENS CONTRACTURE Right 2017    Procedure: RELEASE DUPUYTRENS CONTRACTURE;  Surgeon: Lars Oleary MD;   [facility]     RELEASE DUPUYTRENS CONTRACTURE Left 12/15/2017    Procedure: RELEASE DUPUYTRENS CONTRACTURE;  Left Ring and Middle Finger Subtotal Palmar Fasciectomy;  Surgeon: Lars Oleary MD;  [facility]     TRANSESOPHAGEAL ECHOCARDIOGRAM INTRAOPERATIVE N/A 1/15/2018    Procedure: TRANSESOPHAGEAL ECHOCARDIOGRAM INTRAOPERATIVE;;  Surgeon: GENERIC ANESTHESIA PROVIDER;  Location: [facility]          Anesthesia Evaluation     .             ROS/MED HX    ENT/Pulmonary:     (+)sleep apnea, , . .    Neurologic:       Cardiovascular:     (+) ----. : . . . :. dysrhythmias a-fib, .       METS/Exercise Tolerance:     Hematologic:         Musculoskeletal:         GI/Hepatic:     (+) hepatitis type B and C,       Renal/Genitourinary:         Endo:         Psychiatric:         Infectious Disease:         Malignancy:         Other:                         PHYSICAL EXAM:   Mental Status/Neuro: A/A/O   Airway: Facies: Feasible  Mallampati: I  Mouth/Opening: Full  TM distance: > 6 cm  Neck ROM: Full   Respiratory: Auscultation: CTAB     Resp. Rate: Normal     Resp. Effort: Normal      CV: Rhythm: Irregular  Rate: Age appropriate  Heart: Normal Sounds   Comments:      Dental: Normal                  Lab Results   Component Value Date    WBC 4.7 09/07/2017    HGB 13.9 10/16/2017    HCT 40.1 09/07/2017     09/07/2017    SED 5 07/23/2014     09/14/2017    POTASSIUM 3.8 11/28/2018    CHLORIDE 108 09/14/2017    CO2 22 09/14/2017    BUN 14 09/14/2017    CR 0.81 10/16/2017    GLC 90 09/14/2017    VALENTINA 8.8 09/14/2017    MAG 2.3 11/28/2018    ALBUMIN 3.7 03/21/2017    PROTTOTAL 6.6 (L) 03/21/2017    ALT 16 03/21/2017    AST 12 03/21/2017    ALKPHOS 73 03/21/2017    BILITOTAL 0.9 03/21/2017    LIPASE 96 07/07/2010    PTT 29 06/14/2006    INR 1.52 (H) 01/15/2018    TSH 1.59 12/02/2016       Preop Vitals  BP Readings from Last 3 Encounters:   11/28/18 112/65   11/28/18 103/71   11/13/18 128/73    Pulse Readings from  "Last 3 Encounters:   11/28/18 60   11/28/18 58   11/13/18 57      Resp Readings from Last 3 Encounters:   11/28/18 16   11/28/18 16   11/13/18 16    SpO2 Readings from Last 3 Encounters:   11/28/18 95%   11/28/18 95%   11/13/18 96%      Temp Readings from Last 1 Encounters:   11/28/18 36.6  C (97.9  F) (Oral)    Ht Readings from Last 1 Encounters:   11/28/18 1.765 m (5' 9.5\")      Wt Readings from Last 1 Encounters:   11/28/18 80.7 kg (178 lb)    Estimated body mass index is 25.91 kg/(m^2) as calculated from the following:    Height as of 11/28/18: 1.765 m (5' 9.5\").    Weight as of 11/28/18: 80.7 kg (178 lb).     LDA:  Peripheral IV 01/15/18 Right Lower forearm (Active)   Number of days:325       Peripheral IV 10/11/18 Left Upper forearm (Active)   Site Assessment WDL 10/11/2018  8:58 AM   Line Status Infusing 10/11/2018  8:58 AM   Phlebitis Scale 0-->no symptoms 10/11/2018  8:58 AM   Infiltration Scale 0 10/11/2018  8:58 AM   Number of days:56            Assessment:   ASA SCORE: 3    NPO Status: > 6 hours since completed Solid Foods   Documentation: H&P complete; Preop Testing complete; Consents complete   Proceeding: Proceed without further delay  Tobacco Use:  NO Active use of Tobacco/UNKNOWN Tobacco use status     Plan:   Anes. Type:  General (methohexital bolus for cardioversion)      Induction:  IV (Standard)      Access/Monitoring: PIV      Emergence: Procedure Site   Logistics: Same Day Surgery     PONV Management:  Adult Risk Factors:, Non-Smoker     CONSENT: Direct conversation   Plan and risks discussed with: Patient   Blood Products: Consent Deferred (Minimal Blood Loss)                         Chris Solorzano MD  "

## 2018-12-06 NOTE — ANESTHESIA POSTPROCEDURE EVALUATION
Anesthesia POST Procedure Evaluation    Patient: Frank Orellana   MRN:     [id number] Gender:   male   Age:    68 year old :      1950        Preoperative Diagnosis: Atrial Fibrillation   Procedure(s):  Cardioversion   Postop Comments: No value filed.       Anesthesia Type:  No value filed.    Reportable Event: NO     PAIN: Uncomplicated   Sign Out status: Comfortable, Well controlled pain     PONV: No PONV   Sign Out status:  No Nausea or Vomiting     Neuro/Psych: Uneventful perioperative course   Sign Out Status: Preoperative baseline; Age appropriate mentation     Airway/Resp.: Uneventful perioperative course   Sign Out Status: Non labored breathing, age appropriate RR; Resp. Status within EXPECTED Parameters     CV: Uneventful perioperative course   Sign Out status: Appropriate BP and perfusion indices; Appropriate HR/Rhythm     Disposition:   Sign Out in:  PACU  Disposition:  Phase II; Home  Recovery Course: Uneventful  Follow-Up: Not required           Last Anesthesia Record Vitals:      Last PACU/Preop Vitals:  There were no vitals filed for this visit.      Electronically Signed By: Chris Solorzano MD, 2018, 10:26 AM

## 2018-12-06 NOTE — PROGRESS NOTES
STM-Recheck: patient feeling bloated and is burping from his machine. Will put order into patients Provider to decrease pressure. Spoke with patient and told him we are changing his pressures 6-12 cm H20.

## 2018-12-13 ASSESSMENT — ENCOUNTER SYMPTOMS
HYPERTENSION: 0
DYSURIA: 0
FLANK PAIN: 0
HYPOTENSION: 0
DIFFICULTY URINATING: 1
PALPITATIONS: 0
LIGHT-HEADEDNESS: 0
SYNCOPE: 0
LEG PAIN: 0
HEMATURIA: 0
ORTHOPNEA: 0
EXERCISE INTOLERANCE: 0
SLEEP DISTURBANCES DUE TO BREATHING: 0

## 2018-12-19 ENCOUNTER — PRE VISIT (OUTPATIENT)
Dept: UROLOGY | Facility: CLINIC | Age: 68
End: 2018-12-19

## 2018-12-26 NOTE — PROGRESS NOTES
Heart Care - Clinical Cardiac Electrophysiology       HPI:   Frank Orellana is a 68 year old male who presents for 4 week followup s/p DCCV for PAF.  He has a past medical history significant for prior tobacco use, PAF, chronic hepatitis C, hepatitis B, WINNIE (uses CPAP), tubular adenoma of colon, and refusal of blood products as he is Jewish. He has had a >20 year history of paroxysmal atrial fibrillation manifesting as chest pressure and palpitations. His most recent DCCV was on 11/28/2018.      Reviewed current interval:  - JIM 1/15/2018 showed normal LV function with EF 60-65%, ascending aorta 4.2 cm, no signification valvular pathologies.   - Echocardiogram 4/27/2017 showed normal LV function with EF 60-65%, ascending aorta to 4.5 cm  - Echocardiogram 10/2016 showed normal LV function with EF 55-60%, ascending aorta 4.5 cm   -  NM Lexiscan stress test 1/2018 was negative for ischemia or infarct, summed stress score of zero.   - MR angio 12/2016 showed ascending aorta 3.9 cm  - Presenting EKG personally reviewed tracings: SB, Vent rate 45, IA interval 166 ms, QRS duration 112 ms, QTc 397 ms    Current interval history:  Patient states has resumed normal activity. States that he feels well.  States that when he has an episode of AF he will have symptoms of palpitations and fatigue. Has not felt any AF episodes since his DCCV.  Denies chest pain or pressure, dizziness, syncope, angina, dyspnea at rest or with exertion, palpitations, orthopnea, PND, abdominal edema, pedal edema, or claudication.  Denies easy bruising or bleeding, hematuria, hematochezia, and epistaxis. Denies signs/symptoms of stroke such as visual disturbance, difficulty speaking, facial drooping, confusion, problems with gait, or any new numbness or weakness.     Current Outpatient Medications   Medication Sig Dispense Refill     alfuzosin (UROXATRAL) 10 MG 24 hr tablet Take 1 tablet (10 mg) by mouth daily 90 tablet 1     aspirin  EC 81 MG EC tablet Take 1 tablet (81 mg) by mouth daily       dabigatran ANTICOAGULANT (PRADAXA ANTICOAGULANT) 150 MG capsule Take 1 capsule (150 mg) by mouth 2 times daily Store in original 's bottle or blister pack; use within 120 days of opening. 60 capsule 0     diltiazem 180 MG 24 hr capsule Take 1 capsule (180 mg) by mouth daily 90 capsule 3     order for DME Reported on 5/4/2017         Past Medical History:   Diagnosis Date     Actinic keratosis 2009     Atrial fibrillation (H) 6/15/96    Afib - 2 or 3 extended occurrences since     Chronic hepatitis C (H)     Chronic Hepatitis C,  genotype 1b                Stage 0 Fibrosis     Dupuytren's contracture of both hands      Elevated PSA      Hepatitis B 1969    acute infection at age 19; IV drug use     WINNIE (obstructive sleep apnea)     AHI 34.2     Pain in both hands      Patient is Hoahaoism      Refusal of blood transfusions as patient is Hoahaoism      Right shoulder pain      Tinnitus 1 or 2 years ago    both ears     Tubular adenoma of colon 1/17/17    ascending colon, 1/17/17       Past Surgical History:   Procedure Laterality Date     ANESTHESIA CARDIOVERSION N/A 1/15/2018    Procedure: ANESTHESIA CARDIOVERSION;  Anesthesia Coverage Cardioversion With Transesophageal Echocardiogram @0930 ;  Surgeon: GENERIC ANESTHESIA PROVIDER;  Location: [facility]     ANESTHESIA CARDIOVERSION N/A 10/11/2018    Procedure: ANESTHESIA CARDIOVERSION;  Cardioversion;  Surgeon: GENERIC ANESTHESIA PROVIDER;  Location: [facility]     ANESTHESIA CARDIOVERSION N/A 11/28/2018    Procedure: Anesthesia Coverage Cardioversion @0830;  Surgeon: GENERIC ANESTHESIA PROVIDER;  Location: [facility]     ARTHROSCOPY KNEE      left knee     COLONOSCOPY  1/17/17    tubular adenoma ascending colon     HERNIORRHAPHY INGUINAL Right 10/26/2017    Procedure: HERNIORRHAPHY INGUINAL;  Open Right Inguinal Hernia Repair with Mesh;  Surgeon: Suresh Raymond MD;  Location:  OR      "KNEE SURGERY  2006??    torn meniscus     RELEASE DUPUYTRENS CONTRACTURE Right 2017    Procedure: RELEASE DUPUYTRENS CONTRACTURE;  Surgeon: Lars Oleary MD;  Location: [facility]     RELEASE DUPUYTRENS CONTRACTURE Left 12/15/2017    Procedure: RELEASE DUPUYTRENS CONTRACTURE;  Left Ring and Middle Finger Subtotal Palmar Fasciectomy;  Surgeon: Lars Oleary MD;  Location: [facility]     TRANSESOPHAGEAL ECHOCARDIOGRAM INTRAOPERATIVE N/A 1/15/2018    Procedure: TRANSESOPHAGEAL ECHOCARDIOGRAM INTRAOPERATIVE;;  Surgeon: GENERIC ANESTHESIA PROVIDER;  Location: [facility] OR       Family History   Problem Relation Age of Onset     Arthritis Paternal Grandmother      Rheumatoid Arthritis Paternal Grandmother      Gastrointestinal Disease Father          age 77 after colon surgery     Alcoholism Father      Alcohol/Drug Mother          age 64     Alcoholism Mother      Heart Disease Brother      Cardiac Sudden Death Brother      Glaucoma No family hx of      Macular Degeneration No family hx of      Retinal detachment No family hx of        Social History     Tobacco Use     Smoking status: Former Smoker     Packs/day: 0.50     Years: 10.00     Pack years: 5.00     Types: Cigarettes     Start date: 6/15/1964     Last attempt to quit: 1974     Years since quittin.5     Smokeless tobacco: Former User     Quit date: 1969   Substance Use Topics     Alcohol use: Yes     Alcohol/week: 1.8 - 2.4 oz     Comment: 4 or 5 drinks/week, limit of 1       Allergies   Allergen Reactions     Blood Transfusion Related (Informational Only)      Mandaeism.  Declines blood transfusions.     Contrast Dye Rash         ROS:   A complete review of systems was performed and is negative except as noted in the HPI.    Physical Examination:  Vitals: /69 (BP Location: Right arm, Cuff Size: Adult Regular)   Pulse 52   Ht 1.753 m (5' 9\")   Wt 81 kg (178 lb 8 oz)   SpO2 93%   BMI 26.36 kg/m    BMI= Body mass " index is 26.36 kg/m .    GENERAL APPEARANCE: healthy, alert, and no acute distress  HEENT: no icterus, no xanthelasmas, normal pupil size and reaction, no cyanosis.  NECK: no asymmetry, no cervical bruits, no JVD   RESPIRATORY: lungs clear to auscultation - no rales, rhonchi or wheezes, no use of accessory muscles, no retractions, respirations are unlabored, normal respiratory rate  CARDIOVASCULAR: regular rhythm, normal S1 with physiologic split S2, no S3 or S4 and no murmur, click or rub.  GI: no abdominal bruits, soft, non-tender  EXTREMITIES: no clubbing  NEURO: alert and oriented to person/place/time, normal speech, gait and affect  VASC: Radial and posterior tibialis pulses +2 and symmetric bilaterally. No cyanosis. No edema.   SKIN: no ecchymoses, no rashes.     Assessment and recommendations:  Patient has resumed normal activities.    # Paroxysmal atrial fibrillation: Discussed in detail with the patient management/treatment options for AF includin. Stroke Prophylaxis:  CHADSVASC=age+  1, corresponding to a 1.3% annual stroke / systemic emolism event rate. indicating need for long term oral anticoagulation.  As he is a Sikhism and he would not accept blood products, he believes that the risk of bleeding outweighs the stroke/thromboembolic protection from anticoagulation. He may discontinue his dabigatran as he is 4 weeks post DCCV and resume ASA 81 mg.  2. Rate Control: He has been on diltiazem 180 mg and is bradycardiac. Will decrease to diltiazem  mg daily.   3. Rhythm Control: He is s/p multiple cardioversions and his most recent DCCV was on 2018.   Disscussed antiarrhythmics as option for rhythm control.  Ablations may be considered in patients with highly symptomatic with episodes that are not controlled by medication. He is happy with calling as needed for infrequent DCCVs and would like to wait on starting AAT at this time.   4. Risk factor modifications: Keep healthy  cholesterol levels, maintain BP control, maintain a healthy weight, and limit caffeine and alcohol.  Get at least 150 to 300 minutes/week moderate intensity aerobic physical activities.  Also, do muscle strengthening activities on 2 or more days/week.  6. WINNIE evaluation is indicated.  He wears his CPAP.    FOLLOW UP: Follow up with Dr. Felipe in one year or follow up sooner if needed for problems or symptoms.    Patient expresses understanding and agreement with the plan.    I appreciate the chance to help with Frank Sulaiman Orellana's care. Please let me know if you have any questions or concerns.    Aaliyah HDZ, NP-C

## 2018-12-27 ENCOUNTER — OFFICE VISIT (OUTPATIENT)
Dept: CARDIOLOGY | Facility: CLINIC | Age: 68
End: 2018-12-27
Attending: NURSE PRACTITIONER
Payer: COMMERCIAL

## 2018-12-27 VITALS
BODY MASS INDEX: 26.44 KG/M2 | OXYGEN SATURATION: 93 % | HEIGHT: 69 IN | WEIGHT: 178.5 LBS | SYSTOLIC BLOOD PRESSURE: 125 MMHG | HEART RATE: 52 BPM | DIASTOLIC BLOOD PRESSURE: 69 MMHG

## 2018-12-27 DIAGNOSIS — I48.0 PAROXYSMAL ATRIAL FIBRILLATION (H): ICD-10-CM

## 2018-12-27 DIAGNOSIS — I48.19 PERSISTENT ATRIAL FIBRILLATION (H): ICD-10-CM

## 2018-12-27 DIAGNOSIS — I48.0 PAF (PAROXYSMAL ATRIAL FIBRILLATION) (H): Primary | ICD-10-CM

## 2018-12-27 PROCEDURE — 99213 OFFICE O/P EST LOW 20 MIN: CPT | Mod: ZP | Performed by: NURSE PRACTITIONER

## 2018-12-27 PROCEDURE — G0463 HOSPITAL OUTPT CLINIC VISIT: HCPCS

## 2018-12-27 PROCEDURE — 93005 ELECTROCARDIOGRAM TRACING: CPT | Mod: ZF

## 2018-12-27 PROCEDURE — 93010 ELECTROCARDIOGRAM REPORT: CPT | Mod: ZP | Performed by: INTERNAL MEDICINE

## 2018-12-27 RX ORDER — DILTIAZEM HYDROCHLORIDE 120 MG/1
120 CAPSULE, COATED, EXTENDED RELEASE ORAL DAILY
Qty: 90 CAPSULE | Refills: 3 | Status: SHIPPED | OUTPATIENT
Start: 2018-12-27 | End: 2019-01-18

## 2018-12-27 ASSESSMENT — PAIN SCALES - GENERAL: PAINLEVEL: NO PAIN (0)

## 2018-12-27 ASSESSMENT — MIFFLIN-ST. JEOR: SCORE: 1565.05

## 2018-12-27 NOTE — PATIENT INSTRUCTIONS
Cardiology Provider you saw in clinic today: Aaliyah HDZ, NP-C    Medication Changes:  Decrease to diltiazem  mg daily because your heart rate is slow.     Labs/Tests needed:  None at this time.      Follow-up Visit:  Follow up in one year or follow up sooner as needed for symptoms or problems.     Further Instructions:      Get at least 150 to 300 minutes/week moderate intensity aerobic physical activities.  Also, do muscle strengthening activities on 2 or more days/week.      Continue to wear your CPAP.     You will receive all normal lab and testing results via iikot or mail if not reviewed in clinic today. Please contact our office if you need assistance with setting up [employer].    If you need a medication refill please contact your pharmacy. Please allow 3 business days for your refill to be completed.     As always, thank you for trusting us with your health care needs!    If you have any questions regarding your visit please contact your care team:   Cardiology  Telephone Number    EP RN  Electrophysiology Nurse Coordinator [phone]     Call for EP procedure scheduling concerns  JESSICA Singh  [phone]

## 2018-12-27 NOTE — LETTER
12/27/2018      RE: Frank Orellana  [address]  [facility] [address]       Dear Colleague,    Thank you for the opportunity to participate in the care of your patient, Frank Orellana, at the [facility]. Please see a copy of my visit note below.        Heart Care - Clinical Cardiac Electrophysiology       HPI:   Frank Orellana is a 68 year old male who presents for 4 week followup s/p DCCV for PAF.  He has a past medical history significant for prior tobacco use, PAF, chronic hepatitis C, hepatitis B, WINNIE (uses CPAP), tubular adenoma of colon, and refusal of blood products as he is Moravian. He has had a >20 year history of paroxysmal atrial fibrillation manifesting as chest pressure and palpitations. His most recent DCCV was on 11/28/2018.      Reviewed current interval:  - JIM 1/15/2018 showed normal LV function with EF 60-65%, ascending aorta 4.2 cm, no signification valvular pathologies.   - Echocardiogram 4/27/2017 showed normal LV function with EF 60-65%, ascending aorta to 4.5 cm  - Echocardiogram 10/2016 showed normal LV function with EF 55-60%, ascending aorta 4.5 cm   -  NM Lexiscan stress test 1/2018 was negative for ischemia or infarct, summed stress score of zero.   - MR angio 12/2016 showed ascending aorta 3.9 cm  - Presenting EKG personally reviewed tracings: SB, Vent rate 45, UT interval 166 ms, QRS duration 112 ms, QTc 397 ms    Current interval history:  Patient states has resumed normal activity. States that he feels well.  States that when he has an episode of AF he will have symptoms of palpitations and fatigue. Has not felt any AF episodes since his DCCV.  Denies chest pain or pressure, dizziness, syncope, angina, dyspnea at rest or with exertion, palpitations, orthopnea, PND, abdominal edema, pedal edema, or claudication.  Denies easy bruising or bleeding, hematuria, hematochezia, and epistaxis. Denies  signs/symptoms of stroke such as visual disturbance, difficulty speaking, facial drooping, confusion, problems with gait, or any new numbness or weakness.     Current Outpatient Medications   Medication Sig Dispense Refill     alfuzosin (UROXATRAL) 10 MG 24 hr tablet Take 1 tablet (10 mg) by mouth daily 90 tablet 1     aspirin EC 81 MG EC tablet Take 1 tablet (81 mg) by mouth daily       dabigatran ANTICOAGULANT (PRADAXA ANTICOAGULANT) 150 MG capsule Take 1 capsule (150 mg) by mouth 2 times daily Store in original 's bottle or blister pack; use within 120 days of opening. 60 capsule 0     diltiazem 180 MG 24 hr capsule Take 1 capsule (180 mg) by mouth daily 90 capsule 3     order for DME Reported on 5/4/2017         Past Medical History:   Diagnosis Date     Actinic keratosis 2009     Atrial fibrillation (H) 6/15/96    Afib - 2 or 3 extended occurrences since     Chronic hepatitis C (H)     Chronic Hepatitis C,  genotype 1b                Stage 0 Fibrosis     Dupuytren's contracture of both hands      Elevated PSA      Hepatitis B 1969    acute infection at age 19; IV drug use     WINNIE (obstructive sleep apnea)     AHI 34.2     Pain in both hands      Patient is Jew      Refusal of blood transfusions as patient is Jew      Right shoulder pain      Tinnitus 1 or 2 years ago    both ears     Tubular adenoma of colon 1/17/17    ascending colon, 1/17/17       Past Surgical History:   Procedure Laterality Date     ANESTHESIA CARDIOVERSION N/A 1/15/2018    Procedure: ANESTHESIA CARDIOVERSION;  Anesthesia Coverage Cardioversion With Transesophageal Echocardiogram @0930 ;  Surgeon: GENERIC ANESTHESIA PROVIDER;  Location: [facility]     ANESTHESIA CARDIOVERSION N/A 10/11/2018    Procedure: ANESTHESIA CARDIOVERSION;  Cardioversion;  Surgeon: GENERIC ANESTHESIA PROVIDER;  Location: [facility]     ANESTHESIA CARDIOVERSION N/A 11/28/2018    Procedure: Anesthesia Coverage Cardioversion @0830;   Surgeon: GENERIC ANESTHESIA PROVIDER;  Location: [facility]     ARTHROSCOPY KNEE      left knee     COLONOSCOPY  17    tubular adenoma ascending colon     HERNIORRHAPHY INGUINAL Right 10/26/2017    Procedure: HERNIORRHAPHY INGUINAL;  Open Right Inguinal Hernia Repair with Mesh;  Surgeon: Suresh Raymond MD;  [facility]     KNEE SURGERY  ??    torn meniscus     RELEASE DUPUYTRENS CONTRACTURE Right 2017    Procedure: RELEASE DUPUYTRENS CONTRACTURE;  Surgeon: Lars Oleary MD;  [facility]     RELEASE DUPUYTRENS CONTRACTURE Left 12/15/2017    Procedure: RELEASE DUPUYTRENS CONTRACTURE;  Left Ring and Middle Finger Subtotal Palmar Fasciectomy;  Surgeon: Lars Oleary MD;  [facility]     TRANSESOPHAGEAL ECHOCARDIOGRAM INTRAOPERATIVE N/A 1/15/2018    Procedure: TRANSESOPHAGEAL ECHOCARDIOGRAM INTRAOPERATIVE;;  Surgeon: GENERIC ANESTHESIA PROVIDER;  Location: [facility]       Family History   Problem Relation Age of Onset     Arthritis Paternal Grandmother      Rheumatoid Arthritis Paternal Grandmother      Gastrointestinal Disease Father          age 77 after colon surgery     Alcoholism Father      Alcohol/Drug Mother          age 64     Alcoholism Mother      Heart Disease Brother      Cardiac Sudden Death Brother      Glaucoma No family hx of      Macular Degeneration No family hx of      Retinal detachment No family hx of        Social History     Tobacco Use     Smoking status: Former Smoker     Packs/day: 0.50     Years: 10.00     Pack years: 5.00     Types: Cigarettes     Start date: 6/15/1964     Last attempt to quit: 1974     Years since quittin.5     Smokeless tobacco: Former User     Quit date: 1969   Substance Use Topics     Alcohol use: Yes     Alcohol/week: 1.8 - 2.4 oz     Comment: 4 or 5 drinks/week, limit of 1       Allergies   Allergen Reactions     Blood Transfusion Related (Informational Only)      Amish.  Declines blood  "transfusions.     Contrast Dye Rash         ROS:   A complete review of systems was performed and is negative except as noted in the HPI.    Physical Examination:  Vitals: /69 (BP Location: Right arm, Cuff Size: Adult Regular)   Pulse 52   Ht 1.753 m (5' 9\")   Wt 81 kg (178 lb 8 oz)   SpO2 93%   BMI 26.36 kg/m     BMI= Body mass index is 26.36 kg/m .    GENERAL APPEARANCE: healthy, alert, and no acute distress  HEENT: no icterus, no xanthelasmas, normal pupil size and reaction, no cyanosis.  NECK: no asymmetry, no cervical bruits, no JVD   RESPIRATORY: lungs clear to auscultation - no rales, rhonchi or wheezes, no use of accessory muscles, no retractions, respirations are unlabored, normal respiratory rate  CARDIOVASCULAR: regular rhythm, normal S1 with physiologic split S2, no S3 or S4 and no murmur, click or rub.  GI: no abdominal bruits, soft, non-tender  EXTREMITIES: no clubbing  NEURO: alert and oriented to person/place/time, normal speech, gait and affect  VASC: Radial and posterior tibialis pulses +2 and symmetric bilaterally. No cyanosis. No edema.   SKIN: no ecchymoses, no rashes.     Assessment and recommendations:  Patient has resumed normal activities.    # Paroxysmal atrial fibrillation: Discussed in detail with the patient management/treatment options for AF includin. Stroke Prophylaxis:  CHADSVASC=age+  1, corresponding to a 1.3% annual stroke / systemic emolism event rate. indicating need for long term oral anticoagulation.  As he is a Restorationism and he would not accept blood products, he believes that the risk of bleeding outweighs the stroke/thromboembolic protection from anticoagulation. He may discontinue his dabigatran as he is 4 weeks post DCCV and resume ASA 81 mg.  2. Rate Control: He has been on diltiazem 180 mg and is bradycardiac. Will decrease to diltiazem  mg daily.   3. Rhythm Control: He is s/p multiple cardioversions and his most recent DCCV was on " 11/28/2018.   Disscussed antiarrhythmics as option for rhythm control.  Ablations may be considered in patients with highly symptomatic with episodes that are not controlled by medication. He is happy with calling as needed for infrequent DCCVs and would like to wait on starting AAT at this time.   4. Risk factor modifications: Keep healthy cholesterol levels, maintain BP control, maintain a healthy weight, and limit caffeine and alcohol.  Get at least 150 to 300 minutes/week moderate intensity aerobic physical activities.  Also, do muscle strengthening activities on 2 or more days/week.  6. WINNIE evaluation is indicated.  He wears his CPAP.    FOLLOW UP: Follow up with Dr. Felipe in one year or follow up sooner if needed for problems or symptoms.    Patient expresses understanding and agreement with the plan.  I appreciate the chance to help with Frank Orellana's care. Please let me know if you have any questions or concerns.      ANDREINA Perry CNP

## 2018-12-28 LAB — INTERPRETATION ECG - MUSE: NORMAL

## 2019-01-03 ENCOUNTER — TELEPHONE (OUTPATIENT)
Dept: CARDIOLOGY | Facility: CLINIC | Age: 69
End: 2019-01-03

## 2019-01-03 NOTE — TELEPHONE ENCOUNTER
[facility] Call Center    Phone Message    May a detailed message be left on voicemail: yes    Reason for Call: Symptoms or Concerns     If patient has red-flag symptoms, warm transfer to triage line    Current symptom or concern: Chest pressure of fluctuating intensity. Tuesday evening the chest pressure was really bad, and during that pt suddenly felt extremely cold. He also woke up with neck and head pain the next morning.     Symptoms have been present for:  2 day(s)    Has patient previously been seen for this? Yes    By : Aaliyah Albright    Date: 12/27    Are there any new or worsening symptoms? No      Action Taken: Message routed to:  [facility] ([facility]): Cardiology

## 2019-01-04 ENCOUNTER — HOSPITAL ENCOUNTER (OUTPATIENT)
Facility: CLINIC | Age: 69
Setting detail: OBSERVATION
Discharge: HOME | End: 2019-01-05
Attending: EMERGENCY MEDICINE | Admitting: EMERGENCY MEDICINE
Payer: COMMERCIAL

## 2019-01-04 ENCOUNTER — APPOINTMENT (OUTPATIENT)
Dept: GENERAL RADIOLOGY | Facility: CLINIC | Age: 69
End: 2019-01-04
Payer: COMMERCIAL

## 2019-01-04 DIAGNOSIS — R06.02 SOB (SHORTNESS OF BREATH): ICD-10-CM

## 2019-01-04 DIAGNOSIS — I48.91 ATRIAL FIBRILLATION, UNSPECIFIED TYPE (H): ICD-10-CM

## 2019-01-04 DIAGNOSIS — R07.89 OTHER CHEST PAIN: ICD-10-CM

## 2019-01-04 DIAGNOSIS — G80.9 CP (CEREBRAL PALSY) (H): ICD-10-CM

## 2019-01-04 DIAGNOSIS — R07.89 CHEST DISCOMFORT: ICD-10-CM

## 2019-01-04 LAB
ALBUMIN SERPL-MCNC: 4 G/DL (ref 3.4–5)
ALBUMIN UR-MCNC: 10 MG/DL
ALP SERPL-CCNC: 83 U/L (ref 40–150)
ALT SERPL W P-5'-P-CCNC: 16 U/L (ref 0–70)
ANION GAP SERPL CALCULATED.3IONS-SCNC: 6 MMOL/L (ref 3–14)
APPEARANCE UR: CLEAR
AST SERPL W P-5'-P-CCNC: 8 U/L (ref 0–45)
BASOPHILS # BLD AUTO: 0 10E9/L (ref 0–0.2)
BASOPHILS NFR BLD AUTO: 0.2 %
BILIRUB SERPL-MCNC: 0.4 MG/DL (ref 0.2–1.3)
BILIRUB UR QL STRIP: NEGATIVE
BUN SERPL-MCNC: 14 MG/DL (ref 7–30)
CALCIUM SERPL-MCNC: 8.3 MG/DL (ref 8.5–10.1)
CHLORIDE SERPL-SCNC: 107 MMOL/L (ref 94–109)
CO2 SERPL-SCNC: 28 MMOL/L (ref 20–32)
COLOR UR AUTO: YELLOW
CREAT SERPL-MCNC: 0.82 MG/DL (ref 0.66–1.25)
D DIMER PPP FEU-MCNC: 0.7 UG/ML FEU (ref 0–0.5)
DIFFERENTIAL METHOD BLD: NORMAL
EOSINOPHIL # BLD AUTO: 0.2 10E9/L (ref 0–0.7)
EOSINOPHIL NFR BLD AUTO: 2.4 %
ERYTHROCYTE [DISTWIDTH] IN BLOOD BY AUTOMATED COUNT: 12.9 % (ref 10–15)
GFR SERPL CREATININE-BSD FRML MDRD: 90 ML/MIN/{1.73_M2}
GLUCOSE SERPL-MCNC: 98 MG/DL (ref 70–99)
GLUCOSE UR STRIP-MCNC: NEGATIVE MG/DL
HCT VFR BLD AUTO: 43.6 % (ref 40–53)
HGB BLD-MCNC: 14.2 G/DL (ref 13.3–17.7)
HGB UR QL STRIP: NEGATIVE
IMM GRANULOCYTES # BLD: 0 10E9/L (ref 0–0.4)
IMM GRANULOCYTES NFR BLD: 0.1 %
INTERPRETATION ECG - MUSE: NORMAL
KETONES UR STRIP-MCNC: NEGATIVE MG/DL
LACTATE BLD-SCNC: 1.1 MMOL/L (ref 0.7–2)
LEUKOCYTE ESTERASE UR QL STRIP: NEGATIVE
LIPASE SERPL-CCNC: 135 U/L (ref 73–393)
LYMPHOCYTES # BLD AUTO: 0.9 10E9/L (ref 0.8–5.3)
LYMPHOCYTES NFR BLD AUTO: 10.2 %
MCH RBC QN AUTO: 30.7 PG (ref 26.5–33)
MCHC RBC AUTO-ENTMCNC: 32.6 G/DL (ref 31.5–36.5)
MCV RBC AUTO: 94 FL (ref 78–100)
MONOCYTES # BLD AUTO: 0.9 10E9/L (ref 0–1.3)
MONOCYTES NFR BLD AUTO: 10.6 %
MUCOUS THREADS #/AREA URNS LPF: PRESENT /LPF
NEUTROPHILS # BLD AUTO: 6.7 10E9/L (ref 1.6–8.3)
NEUTROPHILS NFR BLD AUTO: 76.5 %
NITRATE UR QL: NEGATIVE
NRBC # BLD AUTO: 0 10*3/UL
NRBC BLD AUTO-RTO: 0 /100
PH UR STRIP: 5.5 PH (ref 5–7)
PLATELET # BLD AUTO: 193 10E9/L (ref 150–450)
POTASSIUM SERPL-SCNC: 3.7 MMOL/L (ref 3.4–5.3)
PROT SERPL-MCNC: 7.5 G/DL (ref 6.8–8.8)
RBC # BLD AUTO: 4.62 10E12/L (ref 4.4–5.9)
RBC #/AREA URNS AUTO: 1 /HPF (ref 0–2)
SODIUM SERPL-SCNC: 140 MMOL/L (ref 133–144)
SOURCE: ABNORMAL
SP GR UR STRIP: 1.02 (ref 1–1.03)
TROPONIN I SERPL-MCNC: <0.015 UG/L (ref 0–0.04)
UROBILINOGEN UR STRIP-MCNC: NORMAL MG/DL (ref 0–2)
WBC # BLD AUTO: 8.8 10E9/L (ref 4–11)
WBC #/AREA URNS AUTO: 1 /HPF (ref 0–5)

## 2019-01-04 PROCEDURE — 83605 ASSAY OF LACTIC ACID: CPT | Performed by: EMERGENCY MEDICINE

## 2019-01-04 PROCEDURE — 81001 URINALYSIS AUTO W/SCOPE: CPT | Performed by: EMERGENCY MEDICINE

## 2019-01-04 PROCEDURE — 99285 EMERGENCY DEPT VISIT HI MDM: CPT | Mod: 25 | Performed by: EMERGENCY MEDICINE

## 2019-01-04 PROCEDURE — 25000132 ZZH RX MED GY IP 250 OP 250 PS 637: Performed by: EMERGENCY MEDICINE

## 2019-01-04 PROCEDURE — 71045 X-RAY EXAM CHEST 1 VIEW: CPT

## 2019-01-04 PROCEDURE — 85025 COMPLETE CBC W/AUTO DIFF WBC: CPT | Performed by: EMERGENCY MEDICINE

## 2019-01-04 PROCEDURE — 83690 ASSAY OF LIPASE: CPT | Performed by: EMERGENCY MEDICINE

## 2019-01-04 PROCEDURE — 84484 ASSAY OF TROPONIN QUANT: CPT | Performed by: EMERGENCY MEDICINE

## 2019-01-04 PROCEDURE — 80053 COMPREHEN METABOLIC PANEL: CPT | Performed by: EMERGENCY MEDICINE

## 2019-01-04 PROCEDURE — 93010 ELECTROCARDIOGRAM REPORT: CPT | Mod: Z6 | Performed by: EMERGENCY MEDICINE

## 2019-01-04 PROCEDURE — 93005 ELECTROCARDIOGRAM TRACING: CPT | Performed by: EMERGENCY MEDICINE

## 2019-01-04 PROCEDURE — 99218 ZZC INITIAL OBSERVATION CARE,LEVL I: CPT | Mod: Z6 | Performed by: PHYSICIAN ASSISTANT

## 2019-01-04 PROCEDURE — 85379 FIBRIN DEGRADATION QUANT: CPT | Performed by: EMERGENCY MEDICINE

## 2019-01-04 RX ORDER — ASPIRIN 325 MG
325 TABLET ORAL ONCE
Status: COMPLETED | OUTPATIENT
Start: 2019-01-04 | End: 2019-01-04

## 2019-01-04 RX ORDER — ASPIRIN 81 MG/1
81 TABLET ORAL DAILY
Status: DISCONTINUED | OUTPATIENT
Start: 2019-01-05 | End: 2019-01-05 | Stop reason: HOSPADM

## 2019-01-04 RX ORDER — DILTIAZEM HYDROCHLORIDE 180 MG/1
180 CAPSULE, COATED, EXTENDED RELEASE ORAL AT BEDTIME
Status: DISCONTINUED | OUTPATIENT
Start: 2019-01-05 | End: 2019-01-05 | Stop reason: HOSPADM

## 2019-01-04 RX ORDER — ALFUZOSIN HYDROCHLORIDE 10 MG/1
10 TABLET, EXTENDED RELEASE ORAL EVERY EVENING
Status: DISCONTINUED | OUTPATIENT
Start: 2019-01-05 | End: 2019-01-05 | Stop reason: HOSPADM

## 2019-01-04 RX ADMIN — ASPIRIN 325 MG ORAL TABLET 325 MG: 325 PILL ORAL at 19:26

## 2019-01-04 ASSESSMENT — MIFFLIN-ST. JEOR: SCORE: 1549.17

## 2019-01-04 ASSESSMENT — ENCOUNTER SYMPTOMS: FEVER: 0

## 2019-01-04 NOTE — TELEPHONE ENCOUNTER
"I reviewed Frank's most recent notes and called Frank back. Frank states he started having chest pressure on Tuesday morning and it \"got really bad Tuesday night\".  Now only feels it if he bends from the waist. Nothing unusual such as injuries or new physical activity.  Pain with deep breath also.  Describes as a pressure feeling, has never had this before. Denies the feeling of palpitations.  States when he's been in A.fib in the past it has not felt like this. He's taken his vitals and they are \"normal\" - HRs in Mid 60's, normal blood pressures 124/76.   Still taking 180 diltiazem, was recommended to decrease to 120 last week but the new pills haven't come in the mail yet.    I recommended Frank go to the ED for evaluation.  Frank agrees. I will forward this message to Aaliyah Albright for her review.   "

## 2019-01-05 ENCOUNTER — APPOINTMENT (OUTPATIENT)
Dept: NUCLEAR MEDICINE | Facility: CLINIC | Age: 69
End: 2019-01-05
Payer: COMMERCIAL

## 2019-01-05 ENCOUNTER — APPOINTMENT (OUTPATIENT)
Dept: CARDIOLOGY | Facility: CLINIC | Age: 69
End: 2019-01-05
Payer: COMMERCIAL

## 2019-01-05 VITALS
DIASTOLIC BLOOD PRESSURE: 69 MMHG | SYSTOLIC BLOOD PRESSURE: 109 MMHG | OXYGEN SATURATION: 95 % | HEIGHT: 69 IN | TEMPERATURE: 98.9 F | WEIGHT: 175 LBS | RESPIRATION RATE: 18 BRPM | HEART RATE: 82 BPM | BODY MASS INDEX: 25.92 KG/M2

## 2019-01-05 LAB
CHOLEST SERPL-MCNC: 122 MG/DL
HDLC SERPL-MCNC: 50 MG/DL
LDLC SERPL CALC-MCNC: 64 MG/DL
NONHDLC SERPL-MCNC: 72 MG/DL
TRIGL SERPL-MCNC: 43 MG/DL
TROPONIN I SERPL-MCNC: <0.015 UG/L (ref 0–0.04)
TROPONIN I SERPL-MCNC: <0.015 UG/L (ref 0–0.04)

## 2019-01-05 PROCEDURE — 99217 ZZC OBSERVATION CARE DISCHARGE: CPT | Mod: Z6 | Performed by: NURSE PRACTITIONER

## 2019-01-05 PROCEDURE — 25000132 ZZH RX MED GY IP 250 OP 250 PS 637: Performed by: PHYSICIAN ASSISTANT

## 2019-01-05 PROCEDURE — 93306 TTE W/DOPPLER COMPLETE: CPT

## 2019-01-05 PROCEDURE — 78580 LUNG PERFUSION IMAGING: CPT

## 2019-01-05 PROCEDURE — 36415 COLL VENOUS BLD VENIPUNCTURE: CPT | Performed by: PHYSICIAN ASSISTANT

## 2019-01-05 PROCEDURE — 93306 TTE W/DOPPLER COMPLETE: CPT | Mod: 26 | Performed by: INTERNAL MEDICINE

## 2019-01-05 PROCEDURE — 34300033 ZZH RX 343: Performed by: EMERGENCY MEDICINE

## 2019-01-05 PROCEDURE — G0378 HOSPITAL OBSERVATION PER HR: HCPCS

## 2019-01-05 PROCEDURE — 80061 LIPID PANEL: CPT | Performed by: PHYSICIAN ASSISTANT

## 2019-01-05 PROCEDURE — 27210210 NM LUNG SCAN VENTILATION AND PERFUSION

## 2019-01-05 PROCEDURE — 84484 ASSAY OF TROPONIN QUANT: CPT | Performed by: PHYSICIAN ASSISTANT

## 2019-01-05 PROCEDURE — A9540 TC99M MAA: HCPCS | Performed by: EMERGENCY MEDICINE

## 2019-01-05 PROCEDURE — A9567 TECHNETIUM TC-99M AEROSOL: HCPCS | Performed by: EMERGENCY MEDICINE

## 2019-01-05 RX ORDER — LIDOCAINE 40 MG/G
CREAM TOPICAL
Status: DISCONTINUED | OUTPATIENT
Start: 2019-01-05 | End: 2019-01-05 | Stop reason: HOSPADM

## 2019-01-05 RX ORDER — NALOXONE HYDROCHLORIDE 0.4 MG/ML
.1-.4 INJECTION, SOLUTION INTRAMUSCULAR; INTRAVENOUS; SUBCUTANEOUS
Status: DISCONTINUED | OUTPATIENT
Start: 2019-01-05 | End: 2019-01-05 | Stop reason: HOSPADM

## 2019-01-05 RX ORDER — ALUMINA, MAGNESIA, AND SIMETHICONE 2400; 2400; 240 MG/30ML; MG/30ML; MG/30ML
30 SUSPENSION ORAL EVERY 4 HOURS PRN
Status: DISCONTINUED | OUTPATIENT
Start: 2019-01-05 | End: 2019-01-05 | Stop reason: HOSPADM

## 2019-01-05 RX ORDER — NITROGLYCERIN 0.4 MG/1
0.4 TABLET SUBLINGUAL EVERY 5 MIN PRN
Status: DISCONTINUED | OUTPATIENT
Start: 2019-01-05 | End: 2019-01-05 | Stop reason: HOSPADM

## 2019-01-05 RX ORDER — ACETAMINOPHEN 325 MG/1
650 TABLET ORAL EVERY 4 HOURS PRN
Status: DISCONTINUED | OUTPATIENT
Start: 2019-01-05 | End: 2019-01-05 | Stop reason: HOSPADM

## 2019-01-05 RX ADMIN — ASPIRIN 81 MG: 81 TABLET, COATED ORAL at 08:24

## 2019-01-05 RX ADMIN — DILTIAZEM HYDROCHLORIDE 180 MG: 180 CAPSULE, EXTENDED RELEASE ORAL at 02:56

## 2019-01-05 RX ADMIN — ALFUZOSIN HYDROCHLORIDE 10 MG: 10 TABLET, EXTENDED RELEASE ORAL at 02:56

## 2019-01-05 RX ADMIN — KIT FOR THE PREPARATION OF TECHNETIUM TC 99M PENTETATE 2 MILLICURIE: 20 INJECTION, POWDER, LYOPHILIZED, FOR SOLUTION INTRAVENOUS; RESPIRATORY (INHALATION) at 01:11

## 2019-01-05 RX ADMIN — Medication 7.2 MILLICURIE: at 01:11

## 2019-01-05 NOTE — ED PROVIDER NOTES
History     Chief Complaint   Patient presents with     Chest Wall Pain     The history is provided by the patient.     Frank Orellana is a 68 year old male with a history of chronic hepatitis C, OCA, and atrial fibrillation who presents to the Emergency Department today for evaluation of chest pressure and chest pain.  The patient reports that on Tuesday night he developed his symptoms along with chills.  He states that then in the middle the night he developed dental pain that made it difficult for him to sleep.  He states that in the morning his chest pressure and chest pain was still present but had improved.  He states that his chest pressure and pain has continued until today, again with some slight improvement.  He states that his pain is exacerbated with deep breathing and bending over.  The patient did have 3 cardioversions throughout 2018 with the most recent one being 11/28/18.    I have reviewed the Medications, Allergies, Past Medical and Surgical History, and Social History in the [employer] system.    Past Medical History:   Diagnosis Date     Actinic keratosis 2009     Atrial fibrillation (H) 6/15/96    Afib - 2 or 3 extended occurrences since     Chronic hepatitis C (H)     Chronic Hepatitis C,  genotype 1b                Stage 0 Fibrosis     Dupuytren's contracture of both hands      Elevated PSA      Hepatitis B 1969    acute infection at age 19; IV drug use     WINNIE (obstructive sleep apnea)     AHI 34.2     Pain in both hands      Patient is Jehovah's witness      Refusal of blood transfusions as patient is Jehovah's witness      Right shoulder pain      Tinnitus 1 or 2 years ago    both ears     Tubular adenoma of colon 1/17/17    ascending colon, 1/17/17     Past Surgical History:   Procedure Laterality Date     ANESTHESIA CARDIOVERSION N/A 1/15/2018    Procedure: ANESTHESIA CARDIOVERSION;  Anesthesia Coverage Cardioversion With Transesophageal Echocardiogram @0930 ;  Surgeon: GENERIC ANESTHESIA  PROVIDER;  Location: [facility]     ANESTHESIA CARDIOVERSION N/A 10/11/2018    Procedure: ANESTHESIA CARDIOVERSION;  Cardioversion;  Surgeon: GENERIC ANESTHESIA PROVIDER;  Location: [facility]     ANESTHESIA CARDIOVERSION N/A 2018    Procedure: Anesthesia Coverage Cardioversion @0830;  Surgeon: GENERIC ANESTHESIA PROVIDER;  [facility]     ARTHROSCOPY KNEE      left knee     COLONOSCOPY  17    tubular adenoma ascending colon     HERNIORRHAPHY INGUINAL Right 10/26/2017    Procedure: HERNIORRHAPHY INGUINAL;  Open Right Inguinal Hernia Repair with Mesh;  Surgeon: Suresh Raymond MD;  [facility]     KNEE SURGERY  2006??    torn meniscus     RELEASE DUPUYTRENS CONTRACTURE Right 2017    Procedure: RELEASE DUPUYTRENS CONTRACTURE;  Surgeon: Lars Oleary MD;  [facility]     RELEASE DUPUYTRENS CONTRACTURE Left 12/15/2017    Procedure: RELEASE DUPUYTRENS CONTRACTURE;  Left Ring and Middle Finger Subtotal Palmar Fasciectomy;  Surgeon: Lars Oleary MD;  [facility]     TRANSESOPHAGEAL ECHOCARDIOGRAM INTRAOPERATIVE N/A 1/15/2018    Procedure: TRANSESOPHAGEAL ECHOCARDIOGRAM INTRAOPERATIVE;;  Surgeon: GENERIC ANESTHESIA PROVIDER;  Location: [facility]     Family History   Problem Relation Age of Onset     Arthritis Paternal Grandmother      Rheumatoid Arthritis Paternal Grandmother      Gastrointestinal Disease Father          age 77 after colon surgery     Alcoholism Father      Alcohol/Drug Mother          age 64     Alcoholism Mother      Heart Disease Brother      Cardiac Sudden Death Brother      Glaucoma No family hx of      Macular Degeneration No family hx of      Retinal detachment No family hx of      Social History     Tobacco Use     Smoking status: Former Smoker     Packs/day: 0.50     Years: 10.00     Pack years: 5.00     Types: Cigarettes     Start date: 6/15/1964     Last attempt to quit: 1974     Years since quittin.5     Smokeless tobacco: Former User  "    Quit date: 6/1/1969   Substance Use Topics     Alcohol use: Yes     Alcohol/week: 1.8 - 2.4 oz     Comment: 4 or 5 drinks/week, limit of 1     No current facility-administered medications for this encounter.      Current Outpatient Medications   Medication     alfuzosin (UROXATRAL) 10 MG 24 hr tablet     aspirin EC 81 MG EC tablet     diltiazem ER COATED BEADS (CARDIZEM CD/CARTIA XT) 120 MG 24 hr capsule     order for DME     Allergies   Allergen Reactions     Blood Transfusion Related (Informational Only)      Latter-day.  Declines blood transfusions.     Contrast Dye Rash      Review of Systems   Constitutional: Negative for fever.   Cardiovascular: Positive for chest pain.   All other systems reviewed and are negative.    Physical Exam   BP: 137/72  Pulse: 74  Heart Rate: 81  Temp: 98.1  F (36.7  C)  Resp: 18  Height: 175.3 cm (5' 9\")  Weight: 79.4 kg (175 lb)  SpO2: 97 %    Physical Exam   Constitutional: No distress.   HENT:   Head: Atraumatic.   Mouth/Throat: Oropharynx is clear and moist.   Eyes: Pupils are equal, round, and reactive to light. No scleral icterus.   Cardiovascular: Normal heart sounds and intact distal pulses.   Pulmonary/Chest: Breath sounds normal. No respiratory distress.   Abdominal: Soft. Bowel sounds are normal. There is no tenderness.   Musculoskeletal: He exhibits no edema or tenderness.   Skin: Skin is warm. No rash noted. He is not diaphoretic.     ED Course   Dictation Disclaimer: These note that this medical chart was completed with an electronic voice recognition dictation system (dragon).  Even though efforts have been made to review and edit this chart, it is possible that some warts may have been transcribed by error in my dictation.  This is simply due to normal deficiencies and variance that may happen while using a voice recognition dictation.    Based on history physical examination and evaluation I plan to evaluate this patient by getting a blood work and gain " evaluation of the current electrolyte status.  I suspect the patient has a benign etiology for his pain however he will need further admission and evaluation to do a complete evaluation of the cardiac cardiac status the patient    The fact the patient has a history of bad reaction to the CTA I would do a nuclear scan to ensure that patient does not have a PE however.  Patient will need admission for a chest pain rule out due to the fact that workup is negative and the problem still needs to be resolved.    Procedures:  None       EKG Interpretation:      Interpreted by Darion Ybarra  Time reviewed: 1830 hours  Symptoms at time of EKG: Chest pain  Rhythm: normal sinus   Rate: Normal @ 75 bpm  Axis: Left Axis Deviation  Ectopy: none  Conduction: normal  ST Segments/ T Waves: No acute ST Segments  Q Waves: none  Comparison to prior: No changes    Clinical Impression: normal EKG      Critical Care time:  none             Labs Ordered and Resulted from Time of ED Arrival Up to the Time of Departure from the ED   D DIMER QUANTITATIVE - Abnormal; Notable for the following components:       Result Value    D Dimer 0.7 (*)     All other components within normal limits   COMPREHENSIVE METABOLIC PANEL - Abnormal; Notable for the following components:    Calcium 8.3 (*)     All other components within normal limits   CBC WITH PLATELETS DIFFERENTIAL   LIPASE   LACTIC ACID WHOLE BLOOD   TROPONIN I   UA MACROSCOPIC WITH REFLEX TO MICRO AND CULTURE   PERIPHERAL IV CATHETER   CARDIAC CONTINUOUS MONITORING            Assessments & Plan (with Medical Decision Making)   68-year-old male comes in with evaluation of atypical chest pain on the left side of his chest he describes it as a pressure that began today and has been non-on and off for a couple of days.  Physical physical exam is reassuring and EKG does not show acute injury however an ischemia event is considered therefore the patient will need admission and follow-up.    I  have reviewed the nursing notes.    I have reviewed the findings, diagnosis, plan and need for follow up with the patient.     Medication List      There are no discharge medications for this visit.       Final diagnoses:   Chest discomfort   IRoldan, am serving as a trained medical scribe to document services personally performed by Darion Ybarra MD, based on the provider's statements to me.   IDarion MD, was physically present and have reviewed and verified the accuracy of this note documented by Roldan Kennedy.     1/4/2019   [facility], EMERGENCY DEPARTMENT     Darion Ybarra MD  01/06/19 0029

## 2019-01-05 NOTE — PLAN OF CARE
"  Problem: Patient Care Overview  Goal: Plan of Care/Patient Progress Review  Outpatient/Observation goals to be met before discharge home:  - Serial troponins and stress test complete.- UNMET- No stress ordered yet, 3rd Trop to be drawn with AM labs  - Seen and cleared by consultant if applicable- UNMET  - Adequate pain control on oral analgesia-MET- pt denies pain at rest  - Vital signs normal or at patient baseline- MET  - Safe disposition plan has been identified-Home when medically stable  - Nurse to notify provider when observation goals have been met and patient is ready for discharge.    /74 (BP Location: Right arm)   Pulse 64   Temp 98.3  F (36.8  C) (Oral)   Resp 16   Ht 1.753 m (5' 9\")   Wt 79.4 kg (175 lb)   SpO2 96%   BMI 25.84 kg/m      "

## 2019-01-05 NOTE — PLAN OF CARE
"Problem: Patient Care Overview  Goal: Plan of Care/Patient Progress Review  Outpatient/Observation goals to be met before discharge home:  - Serial troponins and stress test complete.- UNMET-Cards consult to determine treatment plan  - Seen and cleared by consultant if applicable- UNMET  - Adequate pain control on oral analgesia-MET- pt denies pain at rest  - Vital signs normal or at patient baseline- MET  - Safe disposition plan has been identified-Home when medically stable  - Nurse to notify provider when observation goals have been met and patient is ready for discharge.    /69 (BP Location: Right arm)   Pulse 82   Temp 98.9  F (37.2  C) (Oral)   Resp 18   Ht 1.753 m (5' 9\")   Wt 79.4 kg (175 lb)   SpO2 95%   BMI 25.84 kg/m          "

## 2019-01-05 NOTE — ED NOTES
Observation Brochure and Video    Patient informed of observation status based on provider's order.  Observation brochure was given and the video watched. Patient/Family stated understanding. Questions answered.  MARSHAL ZAVALETA

## 2019-01-05 NOTE — CONSULTS
"[facility], [place]    Cardiology Consult       Date of Admission:  1/4/2019    Assessment & Plan   Frank Orellana is a 68 year old male with a PMH most remarkable for atrial fibrillation who was admitted to the ED observation service with chest pain. Cardiology was consulted for recommendation of a stress test.    1. Non-cardiac chest pain. Most likely secondary to pleuritis in the setting of a recent viral infection. It appears that patient is improving and his symptoms are gone. Patient has no changes on his EKG consistent with ischemia, 3 negative troponin's, and an unchanged echocardiogram. Would recommend against further ischemic evaluation and discharge patient with follow up with his primary care physician.    Thank you for the consult, cardiology will sign off. Please do not hesitate to call us with any further questions.    The patient's care was discussed with the Attending Physician, Dr. Albina English..    Angel Blair MD PhD  Cardiology Fellow  P:    ______________________________________________________________________    Reason for Consult: \"h/o afib with DCCV x 3 in 2018 followed by Dr. Matute p/t ED with sternal chest tightness. tropsx3 negative. EKG non acute. lexiscan 1/8/18 normal. please recommend further testing for ACS r/o\"    History is obtained from the patient    History of Present Illness   Frank Orellana is a 68 year old male who has a PMH most remarkable for atrial fibrillation who presented with a 4 day history of chest pain. This pain woke him up from his sleep last Tuesday night and responded to tylenol. Pain was described like a pressure across his chest and the pain was worse with inspiration, and patient felt that it was difficult to take deep breaths. Did not radiate to his arm but did to his neck bilaterally. Did not experience pain with exertion, specifically denies pain with going up stairs or with exercise. No known history of " coronary artery disease or angina. Patient states that he presented to the ED yesterday to get it checked out and since he was admitted his symptoms are now gone. Specifically denies fevers, chills, chest pain, SOB, abdominal pain, nausea, vomiting, diarrhea, constipation. No dizziness, syncope, or pre-syncope.    Review of Systems    The 10 point Review of Systems is negative other than noted in the HPI or here.    Past Medical History    I have reviewed this patient's medical history and updated it with pertinent information if needed.   Past Medical History:   Diagnosis Date     Actinic keratosis 2009     Atrial fibrillation (H) 6/15/96    Afib - 2 or 3 extended occurrences since     Chronic hepatitis C (H)     Chronic Hepatitis C,  genotype 1b                Stage 0 Fibrosis     Dupuytren's contracture of both hands      Elevated PSA      Hepatitis B 1969    acute infection at age 19; IV drug use     WINNIE (obstructive sleep apnea)     AHI 34.2     Pain in both hands      Patient is [employer]      Refusal of blood transfusions as patient is [employer]      Right shoulder pain      Tinnitus 1 or 2 years ago    both ears     Tubular adenoma of colon 1/17/17    ascending colon, 1/17/17      Past Surgical History   I have reviewed this patient's surgical history and updated it with pertinent information if needed.  Past Surgical History:   Procedure Laterality Date     ANESTHESIA CARDIOVERSION N/A 1/15/2018    Procedure: ANESTHESIA CARDIOVERSION;  Anesthesia Coverage Cardioversion With Transesophageal Echocardiogram @0930 ;  Surgeon: GENERIC ANESTHESIA PROVIDER;  Location: [facility]     ANESTHESIA CARDIOVERSION N/A 10/11/2018    Procedure: ANESTHESIA CARDIOVERSION;  Cardioversion;  Surgeon: GENERIC ANESTHESIA PROVIDER;  [facility]: [facility]     ANESTHESIA CARDIOVERSION N/A 11/28/2018    Procedure: Anesthesia Coverage Cardioversion @0830;  Surgeon: GENERIC ANESTHESIA PROVIDER;  [facility]: [facility]     ARTHROSCOPY  KNEE      left knee     COLONOSCOPY  17    tubular adenoma ascending colon     HERNIORRHAPHY INGUINAL Right 10/26/2017    Procedure: HERNIORRHAPHY INGUINAL;  Open Right Inguinal Hernia Repair with Mesh;  Surgeon: Suresh Raymond MD;  [facility]     KNEE SURGERY  2006??    torn meniscus     RELEASE DUPUYTRENS CONTRACTURE Right 2017    Procedure: RELEASE DUPUYTRENS CONTRACTURE;  Surgeon: Lars Oleary MD;  [facility]: [facility]     RELEASE DUPUYTRENS CONTRACTURE Left 12/15/2017    Procedure: RELEASE DUPUYTRENS CONTRACTURE;  Left Ring and Middle Finger Subtotal Palmar Fasciectomy;  Surgeon: Lars Oleary MD;  [facility]     TRANSESOPHAGEAL ECHOCARDIOGRAM INTRAOPERATIVE N/A 1/15/2018    Procedure: TRANSESOPHAGEAL ECHOCARDIOGRAM INTRAOPERATIVE;;  Surgeon: GENERIC ANESTHESIA PROVIDER;  Location: [facility]      Social History   I have reviewed this patient's social history and updated it with pertinent information if needed. Frank Orellana  reports that he quit smoking about 44 years ago. His smoking use included cigarettes. He started smoking about 54 years ago. He has a 5.00 pack-year smoking history. He quit smokeless tobacco use about 49 years ago. He reports that he drinks about 1.8 - 2.4 oz of alcohol per week. He reports that he does not use drugs.    Family History   I have reviewed this patient's family history and updated it with pertinent information if needed.   Family History   Problem Relation Age of Onset     Arthritis Paternal Grandmother      Rheumatoid Arthritis Paternal Grandmother      Gastrointestinal Disease Father          age 77 after colon surgery     Alcoholism Father      Alcohol/Drug Mother          age 64     Alcoholism Mother      Heart Disease Brother      Cardiac Sudden Death Brother      Uterine Cancer Sister      Glaucoma No family hx of      Macular Degeneration No family hx of      Retinal detachment No family hx of      Prior to Admission  Medications   Prior to Admission Medications   Prescriptions Last Dose Informant Patient Reported? Taking?   alfuzosin (UROXATRAL) 10 MG 24 hr tablet   No No   Sig: Take 1 tablet (10 mg) by mouth daily   aspirin EC 81 MG EC tablet   Yes No   Sig: Take 1 tablet (81 mg) by mouth daily   diltiazem ER COATED BEADS (CARDIZEM CD/CARTIA XT) 120 MG 24 hr capsule   No No   Sig: Take 1 capsule (120 mg) by mouth daily   order for DME  Self Yes No   Sig: Reported on 5/4/2017      Facility-Administered Medications: None     Allergies   Allergies   Allergen Reactions     Blood Transfusion Related (Informational Only)      Orthodoxy.  Declines blood transfusions.     Contrast Dye Rash       Physical Exam   Vital Signs: Temp: 98.9  F (37.2  C) Temp src: Oral BP: 109/69 Pulse: 82 Heart Rate: 58 Resp: 18 SpO2: 95 % O2 Device: None (Room air)    Weight: 175 lbs 0 oz    General Appearance: Well appearing, no distress  Eyes: RICHARD, EOMI  HEENT: NC, AT. MMM.   Respiratory: CTAB, normal work of breathing  Cardiovascular: Regular Rate and Rhythm, normal S1, S2. No murmurs, rubs, gallops  GI: Soft, non-tender, non-distedned  Lymph/Hematologic: No asymetric swelling, edema. No bruising.  Genitourinary: Deferred  Skin: No rashes, lesions, wounds.  Musculoskeletal: Warm, well perfused  Neurologic: AOx4, CNII-XII intact.  Psychiatric: Euthymic. Mood appropriate.     Data   Data reviewed today: I personally reviewed all medications, new labs, echocardiogram, EKGs, and imaging results over the last 24 hours.    Recent Labs   Lab 01/05/19  0522 01/05/19  0147 01/04/19  1916   WBC  --   --  8.8   HGB  --   --  14.2   MCV  --   --  94   PLT  --   --  193   NA  --   --  140   POTASSIUM  --   --  3.7   CHLORIDE  --   --  107   CO2  --   --  28   BUN  --   --  14   CR  --   --  0.82   ANIONGAP  --   --  6   VALENTINA  --   --  8.3*   GLC  --   --  98   ALBUMIN  --   --  4.0   PROTTOTAL  --   --  7.5   BILITOTAL  --   --  0.4   ALKPHOS  --   --  83    ALT  --   --  16   AST  --   --  8   LIPASE  --   --  135   TROPI <0.015 <0.015 <0.015     Most Recent 3 CBC's:  Recent Labs   Lab Test 01/04/19  1916 10/16/17  1418 09/07/17  1012 12/02/16  0227   WBC 8.8  --  4.7 5.5   HGB 14.2 13.9 13.4 14.5   MCV 94  --  92 91     --  214 184     Most Recent 3 BMP's:  Recent Labs   Lab Test 01/04/19 1916 11/28/18  0736 10/11/18  0740  10/16/17  1418 09/14/17  0859 09/07/17  1012 03/21/17  1008     --   --   --   --  140  --  143   POTASSIUM 3.7 3.8 4.0   < > 3.7 3.7  --  4.1   CHLORIDE 107  --   --   --   --  108  --  111*   CO2 28  --   --   --   --  22  --  25   BUN 14  --   --   --   --  14  --  14   CR 0.82  --   --   --  0.81 0.85  --  0.78   ANIONGAP 6  --   --   --   --  10  --  7   VALENTINA 8.3*  --   --   --   --  8.8  --  8.5   GLC 98  --   --   --   --  90 102* 122*    < > = values in this interval not displayed.     Most Recent 2 LFT's:  Recent Labs   Lab Test 01/04/19 1916 03/21/17  1008   AST 8 12   ALT 16 16   ALKPHOS 83 73   BILITOTAL 0.4 0.9     Most Recent 3 INR's:  Recent Labs   Lab Test 01/15/18  0724 01/06/18  1220 09/07/17  1012   INR 1.52* 1.10 1.08     Recent Results (from the past 24 hour(s))   XR Chest Port 1 View    Narrative    XR CHEST PORT 1 VW  1/4/2019 8:01 PM      HISTORY: Cough, SOB    COMPARISON: 10/6/2016    FINDINGS: Semiupright view of the chest, AP. The cardiac silhouette is  stable and within normal limits. There is no focal airspace  consolidation, pleural effusion, or pneumothorax. The pulmonary  vasculature is prominent. Multiple old right rib fractures.      Impression    IMPRESSION: Pulmonary vascular congestion    I have personally reviewed the examination and initial interpretation  and I agree with the findings.    DESHAUN JOSEPH MD   Lung perfusion scan (NM)    Narrative    Examination: Lung perfusion scan, 1/5/2019 1:19 AM     Indication:  PE suspected, intermediate probability, positive d-dimer.    Technique:    The  patient received 2 mCi of Tc-99m DTPA aerosol for ventilation and  7.2 mCi of Tc-99m MAA for perfusion. Multiple images were obtained of  the lungs in Anterior, posterior, TOLBERT, RPO, LPO, and  Maori projections.    Comparison: Radiograph 1/4/2019    Findings:    The ventilation study demonstrates no ventilation defects.    The perfusion study demonstrates no perfusion defects.      Impression    Impression:  Pulmonary emboli is not present.    I have personally reviewed the examination and initial interpretation  and I agree with the findings.    MEHRDAD LAWRENCE MD

## 2019-01-05 NOTE — PROGRESS NOTES
The patient was seen and examined by me and the case was discussed with the GREGG. We are in agreement with the assessment and plan.  Past Medical History:   Diagnosis Date     Actinic keratosis      Atrial fibrillation (H) 6/15/96    Afib - 2 or 3 extended occurrences since     Chronic hepatitis C (H)     Chronic Hepatitis C,  genotype 1b                Stage 0 Fibrosis     Dupuytren's contracture of both hands      Elevated PSA      Hepatitis B     acute infection at age 19; IV drug use     WINNIE (obstructive sleep apnea)     AHI 34.2     Pain in both hands      Patient is [employer]      Refusal of blood transfusions as patient is [employer]      Right shoulder pain      Tinnitus 1 or 2 years ago    both ears     Tubular adenoma of colon 16    ascending colon, 16     Social History     Socioeconomic History     Marital status:      Spouse name: Not on file     Number of children: Not on file     Years of education: Not on file     Highest education level: Not on file   Social Needs     Financial resource strain: Not on file     Food insecurity - worry: Not on file     Food insecurity - inability: Not on file     Transportation needs - medical: Not on file     Transportation needs - non-medical: Not on file   Occupational History     Not on file   Tobacco Use     Smoking status: Former Smoker     Packs/day: 0.50     Years: 10.00     Pack years: 5.00     Types: Cigarettes     Start date: 6/15/1964     Last attempt to quit: 1974     Years since quittin.5     Smokeless tobacco: Former User     Quit date: 1969   Substance and Sexual Activity     Alcohol use: Yes     Alcohol/week: 1.8 - 2.4 oz     Comment: 4 or 5 drinks/week, limit of 1     Drug use: No     Comment: --history of marijuana     Sexual activity: Yes     Partners: Female     Birth control/protection: None   Other Topics Concern      Service Not Asked     Blood Transfusions Not Asked     Caffeine  Concern Not Asked     Occupational Exposure Not Asked     Hobby Hazards Not Asked     Sleep Concern Not Asked     Stress Concern Not Asked     Weight Concern Not Asked     Special Diet No     Comment: eats healthy     Back Care Not Asked     Exercise No     Bike Helmet Not Asked     Seat Belt Not Asked     Self-Exams Not Asked     Parent/sibling w/ CABG, MI or angioplasty before 65F 55M? 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  He has a history of intermittent atrial fibrillation not anticoagulated currently in sinus rhythm.  Regarding the chest pain he said it was worse was a deep breath and worse when bending over.  It did not seem to be exertional or associated with diaphoresis or dyspnea.  He had a VQ scan that excluded pulmonary embolism there is nothing acute on his EKG and has had 3- troponins.  It is not clear that he needs any additional provocative testing but will defer to cardiology.    Physical exam:  General: Awake alert no acute distress  Cardiac: Regular rate and rhythm no murmurs rubs or gallops  Respiratory: Lungs are clear    Assessment and plan: 68-year-old gentleman with atypical chest discomfort symptoms negative chest x-ray negative VQ scan no acute EKG changes and 3- troponins.  It may be that this is enough at this point but will defer to cardiology regarding the issue of provocative testing.  Given the atypical nature of his symptoms I think it would be reasonable to stop at this point and discharge, either way I anticipate his discharge home today.

## 2019-01-05 NOTE — ED TRIAGE NOTES
Pt arrived in triage with concerns of chest pain since Tuesday. Hx of A-fib. Pt says it hurts when he takes a deep breath and when he bends over.

## 2019-01-05 NOTE — PROGRESS NOTES
All goals met for discharge. Discharge instructions given to patient and all questions answered. Patient able to verbalize understanding of instructions. PIV discontinued. All belongings with patient. Patient waiting for family to arrive to provide ride home. Patient will discharge at that time.

## 2019-01-05 NOTE — DISCHARGE SUMMARY
Discharge Summary    Frank Orellana MRN# [id number]   YOB: 1950 Age: 68 year old     Date of Admission:  1/4/2019  Date of Discharge:  1/5/2019  Admitting Physician:  Quinton Lujan MD  Discharge Physician:  Quinton Lujan  Discharging Service:  Emergency Medicine     Primary Provider: Vanessa Edwards          Discharge Diagnosis:     * No active hospital problems. *    * No resolved hospital problems. *               Discharge Disposition:   Discharged to home           Condition on Discharge:   Discharge condition: Stable   Code status on discharge: Full Code           Procedures:   No procedures performed during this admission          Discharge Medications:     Current Discharge Medication List      CONTINUE these medications which have NOT CHANGED    Details   alfuzosin (UROXATRAL) 10 MG 24 hr tablet Take 1 tablet (10 mg) by mouth daily  Qty: 90 tablet, Refills: 1    Associated Diagnoses: Urinary frequency      aspirin EC 81 MG EC tablet Take 1 tablet (81 mg) by mouth daily    Associated Diagnoses: Paroxysmal atrial fibrillation (H)      diltiazem ER COATED BEADS (CARDIZEM CD/CARTIA XT) 120 MG 24 hr capsule Take 1 capsule (120 mg) by mouth daily  Qty: 90 capsule, Refills: 3    Associated Diagnoses: Persistent atrial fibrillation (H)      order for DME Reported on 5/4/2017                   Consultations:   Consultation during this admission received from cardiology             Brief History of Illness:   Please see detailed H&P from 1/4/19, in brief: Frank Orellana is a 68 year old male admitted on 1/4/2019. He is a Yazidism with a history of chronic Hep C, Hep B, tubular adenoma, WINNIE (uses CPAP), recurrent Afib over 20 years s/p DCCV x 3 (in 2018) who presented to the ED with chest tightness and pleuritic chest pain.                 Hospital Course:   1. Atypical Chest Pain: 3 days ago developed sternal chest tightness at rest, no other associated symptoms  except heart pounding but not in any abnormal rhythm like when he would be in AFib. Chills that evening. Awoke in the middle of the night with bilateral jaw pain resolved with OTC analgesics. Following morning chest tightness had all subsided. Noticed chest tightness worsened with movement or inspiration. H/o tobacco use but not since . Brother had a massive MI in his 60's and . Denies heartburn or reflux symptoms, gassy or bloated, constipation, trauma, increased recent stress, increased caffeine intake from baseline, previous DVT/PE, recent long distance travel, LE edema. Lexiscan done 18 which was normal. Had DCCV , Oct,  and 2018. In ED, HR 60's-80's, 's/140's, RR 16-20, SaO2 %% on RA, Temp 98.1. Labs show normal CMP, lactic acid, lipase. Normal CBC.Troponin I negative x 1. EKG NSR. UA 10 protein. D-dimer 0.7. CXR reports multiple old right rib fractures; pulmonary vascular congestion. In the ED the patient was given ASA 325mg po x 1. He is being admitted for ACS. VQ scan negative for PE.  While in the observation unit the patient's vital signs remained stable, afebrile.  Serial troponins negative X 3, no ectopy on telemetry.  The patient had a resting echo that was normal, but did show aortic dilatation of 4.2 cm, unchanged from 2017.  Informed patient of this and to have continued monitoring of this with his PCP.  Cardiology consulted and recommended discharge to home with no further cardiology testing.  Patient was given all results and instructed to follow up with his PCP in one week.  Return if the patient has new or worsening chest pain, SOB, nausea, or lightheadedness. Patient was in agreement with the plan and was discharged to home in good condition.       Chronic Medical Problems:  # WINNIE: Usually uses CPAP.      # Recurrent Afib: States his Diltiazem dose was recently decreased from 180mg to 120mg however has not started new dose as he is still awaiting his mail  "order  - Continue with PTA Diltiazem                   Final Day of Progress before Discharge:       Physical Exam:  Blood pressure 109/69, pulse 82, temperature 98.9  F (37.2  C), temperature source Oral, resp. rate 18, height 1.753 m (5' 9\"), weight 79.4 kg (175 lb), SpO2 95 %.    EXAM:  Exam:  Constitutional: healthy, alert and no distress  Cardiovascular: No lifts, heaves, or thrills. RRR. No murmurs, clicks gallops or rub  Respiratory: Good diaphragmatic excursion. Lungs clear  Gastrointestinal: Abdomen soft, non-tender. BS normal. No masses, organomegaly  Musculoskeletal: extremities normal- no gross deformities noted, gait normal and normal muscle tone  Skin: no suspicious lesions or rashes  Neurologic: Gait normal. Alert and oriented.   Psychiatric: mentation appears normal and affect normal/bright    /69 (BP Location: Right arm)   Pulse 82   Temp 98.9  F (37.2  C) (Oral)   Resp 18   Ht 1.753 m (5' 9\")   Wt 79.4 kg (175 lb)   SpO2 95%   BMI 25.84 kg/m               Data:  All laboratory data reviewed             Significant Results:     Results for orders placed or performed during the hospital encounter of 01/04/19   XR Chest Port 1 View    Narrative    XR CHEST PORT 1 VW  1/4/2019 8:01 PM      HISTORY: Cough, SOB    COMPARISON: 10/6/2016    FINDINGS: Semiupright view of the chest, AP. The cardiac silhouette is  stable and within normal limits. There is no focal airspace  consolidation, pleural effusion, or pneumothorax. The pulmonary  vasculature is prominent. Multiple old right rib fractures.      Impression    IMPRESSION: Pulmonary vascular congestion    I have personally reviewed the examination and initial interpretation  and I agree with the findings.    DESHAUN JOSEPH MD   Lung perfusion scan (NM)    Narrative    Examination: Lung perfusion scan, 1/5/2019 1:19 AM     Indication:  PE suspected, intermediate probability, positive d-dimer.    Technique:    The patient received 2 mCi of " Tc-99m DTPA aerosol for ventilation and  7.2 mCi of Tc-99m MAA for perfusion. Multiple images were obtained of  the lungs in Anterior, posterior, TOLBERT, RPO, LPO, and  Upper sorbian projections.    Comparison: Radiograph 1/4/2019    Findings:    The ventilation study demonstrates no ventilation defects.    The perfusion study demonstrates no perfusion defects.      Impression    Impression:  Pulmonary emboli is not present.    I have personally reviewed the examination and initial interpretation  and I agree with the findings.    MEHRDAD LAWRENCE MD   CBC with platelets differential   Result Value Ref Range    WBC 8.8 4.0 - 11.0 10e9/L    RBC Count 4.62 4.4 - 5.9 10e12/L    Hemoglobin 14.2 13.3 - 17.7 g/dL    Hematocrit 43.6 40.0 - 53.0 %    MCV 94 78 - 100 fl    MCH 30.7 26.5 - 33.0 pg    MCHC 32.6 31.5 - 36.5 g/dL    RDW 12.9 10.0 - 15.0 %    Platelet Count 193 150 - 450 10e9/L    Diff Method Automated Method     % Neutrophils 76.5 %    % Lymphocytes 10.2 %    % Monocytes 10.6 %    % Eosinophils 2.4 %    % Basophils 0.2 %    % Immature Granulocytes 0.1 %    Nucleated RBCs 0 0 /100    Absolute Neutrophil 6.7 1.6 - 8.3 10e9/L    Absolute Lymphocytes 0.9 0.8 - 5.3 10e9/L    Absolute Monocytes 0.9 0.0 - 1.3 10e9/L    Absolute Eosinophils 0.2 0.0 - 0.7 10e9/L    Absolute Basophils 0.0 0.0 - 0.2 10e9/L    Abs Immature Granulocytes 0.0 0 - 0.4 10e9/L    Absolute Nucleated RBC 0.0    D dimer quantitative   Result Value Ref Range    D Dimer 0.7 (H) 0.0 - 0.50 ug/ml FEU   Comprehensive metabolic panel   Result Value Ref Range    Sodium 140 133 - 144 mmol/L    Potassium 3.7 3.4 - 5.3 mmol/L    Chloride 107 94 - 109 mmol/L    Carbon Dioxide 28 20 - 32 mmol/L    Anion Gap 6 3 - 14 mmol/L    Glucose 98 70 - 99 mg/dL    Urea Nitrogen 14 7 - 30 mg/dL    Creatinine 0.82 0.66 - 1.25 mg/dL    GFR Estimate 90 >60 mL/min/[1.73_m2]    GFR Estimate If Black >90 >60 mL/min/[1.73_m2]    Calcium 8.3 (L) 8.5 - 10.1 mg/dL    Bilirubin Total 0.4 0.2 - 1.3  mg/dL    Albumin 4.0 3.4 - 5.0 g/dL    Protein Total 7.5 6.8 - 8.8 g/dL    Alkaline Phosphatase 83 40 - 150 U/L    ALT 16 0 - 70 U/L    AST 8 0 - 45 U/L   Lipase   Result Value Ref Range    Lipase 135 73 - 393 U/L   Lactic acid whole blood   Result Value Ref Range    Lactic Acid 1.1 0.7 - 2.0 mmol/L   Troponin I   Result Value Ref Range    Troponin I ES <0.015 0.000 - 0.045 ug/L   UA reflex to Microscopic and Culture   Result Value Ref Range    Color Urine Yellow     Appearance Urine Clear     Glucose Urine Negative NEG^Negative mg/dL    Bilirubin Urine Negative NEG^Negative    Ketones Urine Negative NEG^Negative mg/dL    Specific Gravity Urine 1.021 1.003 - 1.035    Blood Urine Negative NEG^Negative    pH Urine 5.5 5.0 - 7.0 pH    Protein Albumin Urine 10 (A) NEG^Negative mg/dL    Urobilinogen mg/dL Normal 0.0 - 2.0 mg/dL    Nitrite Urine Negative NEG^Negative    Leukocyte Esterase Urine Negative NEG^Negative    Source Midstream Urine     RBC Urine 1 0 - 2 /HPF    WBC Urine 1 0 - 5 /HPF    Mucous Urine Present (A) NEG^Negative /LPF   Troponin I - Now then in 4 hours x 2    Result Value Ref Range    Troponin I ES <0.015 0.000 - 0.045 ug/L   Troponin I   Result Value Ref Range    Troponin I ES <0.015 0.000 - 0.045 ug/L   Lipid panel reflex to direct LDL   Result Value Ref Range    Cholesterol 122 <200 mg/dL    Triglycerides 43 <150 mg/dL    HDL Cholesterol 50 >39 mg/dL    LDL Cholesterol Calculated 64 <100 mg/dL    Non HDL Cholesterol 72 <130 mg/dL   EKG 12 lead   Result Value Ref Range    Interpretation ECG Click View Image link to view waveform and result    Cardiology General Adult IP Consult: Patient to be seen: Routine within 24 hrs; Call back #: [id number]; h/o afib with DCCV x 3 in 2018 followed by Dr. Matute p/t ED with sternal chest tightness. tropsx3 negative. EKG non acute. lexiscan 1/8/18 normal...    Narrative    Angel Blair MD     1/5/2019  1:58 PM  [facility]  "[place]    Cardiology Consult       Date of Admission:  1/4/2019    Assessment & Plan   Frank Orellana is a 68 year old male with a PMH most   remarkable for atrial fibrillation who was admitted to the ED   observation service with chest pain. Cardiology was consulted for   recommendation of a stress test.    1. Non-cardiac chest pain. Most likely secondary to pleuritis in   the setting of a recent viral infection. It appears that patient   is improving and his symptoms are gone. Patient has no changes on   his EKG consistent with ischemia, 3 negative troponin's, and an   unchanged echocardiogram. Would recommend against further   ischemic evaluation and discharge patient with follow up with his   primary care physician.    Thank you for the consult, cardiology will sign off. Please do   not hesitate to call us with any further questions.    The patient's care was discussed with the Attending Physician,   Dr. Albina English..    Angel Blair MD PhD  Cardiology Fellow  P:    __________________________________________________________________  ____    Reason for Consult: \"h/o afib with DCCV x 3 in 2018 followed by   Dr. Matute p/t ED with sternal chest tightness. tropsx3   negative. EKG non acute. lexiscan 1/8/18 normal. please recommend   further testing for ACS r/o\"    History is obtained from the patient    History of Present Illness   Frank Orelalna is a 68 year old male who has a PMH most   remarkable for atrial fibrillation who presented with a 4 day   history of chest pain. This pain woke him up from his sleep last   Tuesday night and responded to tylenol. Pain was described like a   pressure across his chest and the pain was worse with   inspiration, and patient felt that it was difficult to take deep   breaths. Did not radiate to his arm but did to his neck   bilaterally. Did not experience pain with exertion, specifically   denies pain with going up stairs or with exercise. No known "   history of coronary artery disease or angina. Patient states that   he presented to the ED yesterday to get it checked out and since   he was admitted his symptoms are now gone. Specifically denies   fevers, chills, chest pain, SOB, abdominal pain, nausea,   vomiting, diarrhea, constipation. No dizziness, syncope, or   pre-syncope.    Review of Systems    The 10 point Review of Systems is negative other than noted in   the HPI or here.    Past Medical History    I have reviewed this patient's medical history and updated it   with pertinent information if needed.   Past Medical History:   Diagnosis Date     Actinic keratosis 2009     Atrial fibrillation (H) 6/15/96    Afib - 2 or 3 extended occurrences since     Chronic hepatitis C (H)     Chronic Hepatitis C,  genotype 1b                Stage 0   Fibrosis     Dupuytren's contracture of both hands      Elevated PSA      Hepatitis B 1969    acute infection at age 19; IV drug use     WINNIE (obstructive sleep apnea)     AHI 34.2     Pain in both hands      Patient is Hindu      Refusal of blood transfusions as patient is Hindu      Right shoulder pain      Tinnitus 1 or 2 years ago    both ears     Tubular adenoma of colon 1/17/17    ascending colon, 1/17/17      Past Surgical History   I have reviewed this patient's surgical history and updated it   with pertinent information if needed.  Past Surgical History:   Procedure Laterality Date     ANESTHESIA CARDIOVERSION N/A 1/15/2018    Procedure: ANESTHESIA CARDIOVERSION;  Anesthesia Coverage   Cardioversion With Transesophageal Echocardiogram @0930 ;    Surgeon: GENERIC ANESTHESIA PROVIDER;  Location: [facility]     ANESTHESIA CARDIOVERSION N/A 10/11/2018    Procedure: ANESTHESIA CARDIOVERSION;  Cardioversion;  Surgeon:   GENERIC ANESTHESIA PROVIDER;  Location: [facility]     ANESTHESIA CARDIOVERSION N/A 11/28/2018    Procedure: Anesthesia Coverage Cardioversion @0830;  Surgeon:   GENERIC ANESTHESIA  PROVIDER;  Location: [facility] OR     ARTHROSCOPY KNEE      left knee     COLONOSCOPY  17    tubular adenoma ascending colon     HERNIORRHAPHY INGUINAL Right 10/26/2017    Procedure: HERNIORRHAPHY INGUINAL;  Open Right Inguinal Hernia   Repair with Mesh;  Surgeon: Suresh Raymond MD;  Location:   [facility] OR     KNEE SURGERY  ??    torn meniscus     RELEASE DUPUYTRENS CONTRACTURE Right 2017    Procedure: RELEASE DUPUYTRENS CONTRACTURE;  Surgeon: Lars Oleary MD;  Location: [facility] OR     RELEASE DUPUYTRENS CONTRACTURE Left 12/15/2017    Procedure: RELEASE DUPUYTRENS CONTRACTURE;  Left Ring and Middle   Finger Subtotal Palmar Fasciectomy;  Surgeon: Lars Oleary MD;  Location: [facility] OR     TRANSESOPHAGEAL ECHOCARDIOGRAM INTRAOPERATIVE N/A 1/15/2018    Procedure: TRANSESOPHAGEAL ECHOCARDIOGRAM INTRAOPERATIVE;;    Surgeon: GENERIC ANESTHESIA PROVIDER;  Location: [facility] OR      Social History   I have reviewed this patient's social history and updated it with   pertinent information if needed. Frank Orellana  reports that   he quit smoking about 44 years ago. His smoking use included   cigarettes. He started smoking about 54 years ago. He has a 5.00   pack-year smoking history. He quit smokeless tobacco use about 49   years ago. He reports that he drinks about 1.8 - 2.4 oz of   alcohol per week. He reports that he does not use drugs.    Family History   I have reviewed this patient's family history and updated it with   pertinent information if needed.   Family History   Problem Relation Age of Onset     Arthritis Paternal Grandmother      Rheumatoid Arthritis Paternal Grandmother      Gastrointestinal Disease Father          age 77 after colon surgery     Alcoholism Father      Alcohol/Drug Mother          age 64     Alcoholism Mother      Heart Disease Brother      Cardiac Sudden Death Brother      Uterine Cancer Sister      Glaucoma No family hx of      Macular Degeneration No family hx  of      Retinal detachment No family hx of      Prior to Admission Medications   Prior to Admission Medications   Prescriptions Last Dose Informant Patient Reported? Taking?   alfuzosin (UROXATRAL) 10 MG 24 hr tablet   No No   Sig: Take 1 tablet (10 mg) by mouth daily   aspirin EC 81 MG EC tablet   Yes No   Sig: Take 1 tablet (81 mg) by mouth daily   diltiazem ER COATED BEADS (CARDIZEM CD/CARTIA XT) 120 MG 24 hr   capsule   No No   Sig: Take 1 capsule (120 mg) by mouth daily   order for DME  Self Yes No   Sig: Reported on 5/4/2017      Facility-Administered Medications: None     Allergies   Allergies   Allergen Reactions     Blood Transfusion Related (Informational Only)      Shinto.  Declines blood transfusions.     Contrast Dye Rash       Physical Exam   Vital Signs: Temp: 98.9  F (37.2  C) Temp src: Oral BP: 109/69   Pulse: 82 Heart Rate: 58 Resp: 18 SpO2: 95 % O2 Device: None   (Room air)    Weight: 175 lbs 0 oz    General Appearance: Well appearing, no distress  Eyes: RICHARD, EOMI  HEENT: NC, AT. MMM.   Respiratory: CTAB, normal work of breathing  Cardiovascular: Regular Rate and Rhythm, normal S1, S2. No   murmurs, rubs, gallops  GI: Soft, non-tender, non-distedned  Lymph/Hematologic: No asymetric swelling, edema. No bruising.  Genitourinary: Deferred  Skin: No rashes, lesions, wounds.  Musculoskeletal: Warm, well perfused  Neurologic: AOx4, CNII-XII intact.  Psychiatric: Euthymic. Mood appropriate.     Data   Data reviewed today: I personally reviewed all medications, new   labs, echocardiogram, EKGs, and imaging results over the last 24   hours.    Recent Labs   Lab 01/05/19  0522 01/05/19  0147 01/04/19  1916   WBC  --   --  8.8   HGB  --   --  14.2   MCV  --   --  94   PLT  --   --  193   NA  --   --  140   POTASSIUM  --   --  3.7   CHLORIDE  --   --  107   CO2  --   --  28   BUN  --   --  14   CR  --   --  0.82   ANIONGAP  --   --  6   VALENTINA  --   --  8.3*   GLC  --   --  98   ALBUMIN  --   --  4.0    PROTTOTAL  --   --  7.5   BILITOTAL  --   --  0.4   ALKPHOS  --   --  83   ALT  --   --  16   AST  --   --  8   LIPASE  --   --  135   TROPI <0.015 <0.015 <0.015     Most Recent 3 CBC's:  Recent Labs   Lab Test 01/04/19  1916 10/16/17  1418 09/07/17  1012 12/02/16  0227   WBC 8.8  --  4.7 5.5   HGB 14.2 13.9 13.4 14.5   MCV 94  --  92 91     --  214 184     Most Recent 3 BMP's:  Recent Labs   Lab Test 01/04/19 1916 11/28/18  0736 10/11/18  0740  10/16/17  1418 09/14/17  0859 09/07/17  1012 03/21/17  1008     --   --   --   --  140  --  143   POTASSIUM 3.7 3.8 4.0   < > 3.7 3.7  --  4.1   CHLORIDE 107  --   --   --   --  108  --  111*   CO2 28  --   --   --   --  22  --  25   BUN 14  --   --   --   --  14  --  14   CR 0.82  --   --   --  0.81 0.85  --  0.78   ANIONGAP 6  --   --   --   --  10  --  7   VALENTINA 8.3*  --   --   --   --  8.8  --  8.5   GLC 98  --   --   --   --  90 102* 122*    < > = values in this interval not displayed.     Most Recent 2 LFT's:  Recent Labs   Lab Test 01/04/19 1916 03/21/17  1008   AST 8 12   ALT 16 16   ALKPHOS 83 73   BILITOTAL 0.4 0.9     Most Recent 3 INR's:  Recent Labs   Lab Test 01/15/18  0724 01/06/18  1220 09/07/17  1012   INR 1.52* 1.10 1.08     Recent Results (from the past 24 hour(s))   XR Chest Port 1 View    Narrative    XR CHEST PORT 1 VW  1/4/2019 8:01 PM      HISTORY: Cough, SOB    COMPARISON: 10/6/2016    FINDINGS: Semiupright view of the chest, AP. The cardiac   silhouette is  stable and within normal limits. There is no focal airspace  consolidation, pleural effusion, or pneumothorax. The pulmonary  vasculature is prominent. Multiple old right rib fractures.      Impression    IMPRESSION: Pulmonary vascular congestion    I have personally reviewed the examination and initial   interpretation  and I agree with the findings.    DESHAUN JOSEPH MD   Lung perfusion scan (NM)    Narrative    Examination: Lung perfusion scan, 1/5/2019 1:19 AM     Indication:   PE suspected, intermediate probability, positive   d-dimer.    Technique:    The patient received 2 mCi of Tc-99m DTPA aerosol for ventilation   and  7.2 mCi of Tc-99m MAA for perfusion. Multiple images were   obtained of  the lungs in Anterior, posterior, TOLBERT, RPO, LPO, and  [place]z   projections.    Comparison: Radiograph 2019    Findings:    The ventilation study demonstrates no ventilation defects.    The perfusion study demonstrates no perfusion defects.      Impression    Impression:  Pulmonary emboli is not present.    I have personally reviewed the examination and initial   interpretation  and I agree with the findings.    MEHRDAD LAWRENCE MD       Echo Complete    Narrative    [id number]  [id number]  [id number]  [id number]^TOI^LAURA^ARMANDO           [facility],[place]  Echocardiography Laboratory  [address] [place] [id number]     Name: CATHERINE OH  MRN: [id number]  : 1950  Study Date: 2019 08:52 AM  Age: 68 yrs  Gender: Male  Patient Location: [facility]  Reason For Study: Chest Pain  Ordering Physician: LAURA CM  Referring Physician: JOSE ADAMES  Performed By: LIZ Figueroa     BSA: 2.0 m2  Height: 69 in  Weight: 175 lb  BP: 109/69 mmHg  _____________________________________________________________________________  __        Procedure  Echocardiogram with two-dimensional, color and spectral Doppler performed.  _____________________________________________________________________________  __        Interpretation Summary  Global and regional left ventricular function is normal with an EF of 55-60%.  No regional wall motion abnormalities are seen.  Right ventricular function, chamber size, wall motion, and thickness are  normal.  Mild AI.  Mildly dilated ascending aorta measuring 4.2 cm (2.1 cm/m2.)     This study was compared with the study from 17: there has been no  significant change. Specifically, the ascending aorta is unchanged  in size on  direct remeasurement of the images from the prior study.  _____________________________________________________________________________  __        Left Ventricle  Global and regional left ventricular function is normal with an EF of 55-60%.  Left ventricular size is normal. Thickening of the anterobasal septum is  present. Grade I or early diastolic dysfunction. No regional wall motion  abnormalities are seen.     Right Ventricle  Right ventricular function, chamber size, wall motion, and thickness are  normal.     Atria  The right atria appears normal. Mild left atrial enlargement is present. The  atrial septum is intact as assessed by color Doppler .     Mitral Valve  The mitral valve is normal. Mild mitral insufficiency is present.        Aortic Valve  Mild aortic insufficiency is present.     Tricuspid Valve  The tricuspid valve is normal. Trace tricuspid insufficiency is present. The  peak velocity of the tricuspid regurgitant jet is not obtainable. Pulmonary  artery systolic pressure cannot be assessed.     Pulmonic Valve  The pulmonic valve is normal. Trace pulmonic insufficiency is present.     Vessels  The aorta root is normal. The pulmonary artery cannot be assessed. The  inferior vena cava was normal in size with preserved respiratory variability.  Ascending aorta 4.2 cm. Estimated mean right atrial pressure is 3 mmHg  (normal).     Compared to Previous Study  This study was compared with the study from 4/27/17 . There has been no  change.     _____________________________________________________________________________  __  MMode/2D Measurements & Calculations  IVSd: 1.2 cm  LVIDd: 5.6 cm  LVIDs: 3.8 cm  LVPWd: 1.1 cm  FS: 31.7 %     LV mass(C)d: 261.6 grams  LV mass(C)dI: 134.0 grams/m2  Ao root diam: 4.1 cm  LVOT diam: 2.5 cm  LVOT area: 4.9 cm2  LA Volume (BP): 71.8 ml  LA Volume Index (BP): 36.8 ml/m2  RWT: 0.39        Doppler Measurements & Calculations  MV E max home: 61.7 cm/sec  MV A  max home: 65.1 cm/sec  MV E/A: 0.95  MV dec time: 0.21 sec     E/E' avg: 10.3  Lateral E/e': 7.5  Medial E/e': 13.0     _____________________________________________________________________________  __           Report approved by: Heike Ngo 01/05/2019 11:03 AM         Recent Results (from the past 48 hour(s))   XR Chest Port 1 View    Narrative    XR CHEST PORT 1 VW  1/4/2019 8:01 PM      HISTORY: Cough, SOB    COMPARISON: 10/6/2016    FINDINGS: Semiupright view of the chest, AP. The cardiac silhouette is  stable and within normal limits. There is no focal airspace  consolidation, pleural effusion, or pneumothorax. The pulmonary  vasculature is prominent. Multiple old right rib fractures.      Impression    IMPRESSION: Pulmonary vascular congestion    I have personally reviewed the examination and initial interpretation  and I agree with the findings.    DESHAUN JOSEPH MD   Lung perfusion scan (NM)    Narrative    Examination: Lung perfusion scan, 1/5/2019 1:19 AM     Indication:  PE suspected, intermediate probability, positive d-dimer.    Technique:    The patient received 2 mCi of Tc-99m DTPA aerosol for ventilation and  7.2 mCi of Tc-99m MAA for perfusion. Multiple images were obtained of  the lungs in Anterior, posterior, TOLBERT, RPO, LPO, and  Tunisian projections.    Comparison: Radiograph 1/4/2019    Findings:    The ventilation study demonstrates no ventilation defects.    The perfusion study demonstrates no perfusion defects.      Impression    Impression:  Pulmonary emboli is not present.    I have personally reviewed the examination and initial interpretation  and I agree with the findings.    MEHRDAD LAWRENCE MD                Pending Results:   Unresulted Labs Ordered in the Past 30 Days of this Admission     No orders found for last 61 day(s).                  Discharge Instructions and Follow-Up:     Discharge Procedure Orders   Reason for your hospital stay   Order Comments: You were admitted to  the observation unit for evaluation of your chest pain.  You had no EKG changes, labs checking for heart damage were negative, your chest x-ray was normal.  You had a normal resting echocardiogram (it did show a slightly dilated aorta, but this was unchanged from 2017)-continue to have this monitored through your PCP.  Cardiology was consulted and recommended no further cardiac testing and discharge to home.     Adult [facility] Follow-up and recommended labs and tests   Order Comments: Follow up with primary care provider, Vanessa Edwards, within 7 days for hospital follow- up.      Appointments on [place] and/or [facility] (with [facility] or [facility] provider or service). Call [phone] if you haven't heard regarding these appointments within 7 days of discharge.     Activity   Order Comments: Your activity upon discharge: activity as tolerated     Order Specific Question Answer Comments   Is discharge order? Yes      When to contact your care team   Order Comments: Return to the ER if you have new or worsening chest pain, shortness of breath, jaw or arm pain, or fainting.     Diet   Order Comments: Follow this diet upon discharge: Orders Placed This Encounter      Regular Diet Adult     Order Specific Question Answer Comments   Is discharge order? Yes           Attestation:  Zenobia Alfredo.  APRN, CNP

## 2019-01-05 NOTE — H&P
[facility], [place]    History and Physical - ED Observation Service       Date of Admission:  2019    Assessment & Plan   Frank Orellana is a 68 year old male admitted on 2019. He is a Hoahaoism with a history of chronic Hep C, Hep B, tubular adenoma, WINNIE (uses CPAP), recurrent Afib over 20 years s/p DCCV x 3 (in 2018) who presented to the ED with chest tightness and pleuritic chest pain.     1. Atypical Chest Pain: 3 days ago developed sternal chest tightness at rest, no other associated symptoms except heart pounding but not in any abnormal rhythm like when he would be in AFib. Chills that evening. Awoke in the middle of the night with bilateral jaw pain resolved with OTC analgesics. Following morning chest tightness had all subsided. Noticed chest tightness worsened with movement or inspiration. H/o tobacco use but not since . Brother had a massive MI in his 60's and . Denies heartburn or reflux symptoms, gassy or bloated, constipation, trauma, increased recent stress, increased caffeine intake from baseline, previous DVT/PE, recent long distance travel, LE edema. Lexiscan done 18 which was normal. Had DCCV Jean-Claude, Oct,  and 2018. In ED, HR 60's-80's, 's/140's, RR 16-20, SaO2 %% on RA, Temp 98.1. Labs show normal CMP, lactic acid, lipase. Normal CBC.Troponin I negative x 1. EKG NSR. UA 10 protein. D-dimer 0.7. CXR reports multiple old right rib fractures; pulmonary vascular congestion. In the ED the patient was given ASA 325mg po x 1. He is being admitted for ACS. VQ scan was done in ED and pending result. Had a bad rash in the past with CT contrast. Risk Factors include age, male, h/o tobacco use, family h/o Afib. Last lipid panel in 2016 normal.  - Serial troponins q4h x 2 more  - Continuous telemetry  - Discuss with Cardiology what stress test will be appropriate for patient as we are limited to Dobutamine and Exercise stress test on  the weekend.  - Nitro PRN  - ASA 81mg daily  - Clear liquid diet after midnight; npo at 6am  - lipid panel in AM  - Follow up VQ scan  - Discuss further testing for ACS with Cardiology if VQ scan negative    Chronic Medical Problems:  # WINNIE: Usually uses CPAP. Declines CPAP while here    # Recurrent Afib: States his Diltiazem dose was recently decreased from 180mg to 120mg however has not started new dose as he is still awaiting his mail order  - Continue with PTA Diltiazem      Diet: CLD. NPO in AM  DVT Prophylaxis: Low Risk/Ambulatory with no VTE prophylaxis indicated  Cunningham Catheter: not present  Code Status: full    Disposition Plan   Expected discharge: Tomorrow, recommended to prior living arrangement once ACS work up negative, stable labs and vitals.  Entered: SAHIL Tripp 01/04/2019, 11:42 PM     The patient's care was discussed with the Attending Physician, Dr. Lujan.    SAHIL Tripp  [facility], [place]  Ascom: [id number]  Please see sticky note for cross cover information  ______________________________________________________________________    Chief Complaint   Chest tightness    History is obtained from the patient    History of Present Illness   Frank Orellana is a 68 year old male, Druze with a history of chronic Hep C, Hep B, tubular adenoma, WINNIE (uses CPAP), recurrent Afib s/p DCCV x 3 who presented to the ED with chest tightness and pleuritic chest pain. Patient reports 3 days ago he started having sternal chest tightness at rest, no other associated symptoms expect he felt his heart pounding but not in any abnormal rhythm like when he would be in AFib. That evening he had chills and couldn't get himself warmed enough no matter how much he tried. He awoke in the middle of the night with bilateral jaw pain. He took some OTC analgesics and went back to sleep. By the morning the pain and chest tightness had all subsided. But then he  noticed that his chest tightness was worsened with movement or inspiration. He gets the sensation of SOB but he states it could be because he is not fully inspiring due to pain. Has a history of tobacco use but not since . His brother has a massive MI in his 60's and . Otherwise no other family history.    He denies any heartburn or reflux symptoms, gassy or bloated, constipation, trauma, increased recent stress, increased caffeine intake from baseline, previous DVT/PE, recent long distance travel, LE edema.    He had a Lexiscan done 18 which was normal. He states he did it prior to DCCV. He had another DCCV in 2018 and 2018.     In the ED, HR 60's-80's, 's/140's, RR 16-20, SaO2 %% on RA, Temp 98.1. Labs show normal CMP, lactic acid, lipase. Normal CBC.Troponin I negative x 1. UA 10 protein. D-dimer 0.7. CXR reports multiple old right rib fractures; pulmonary vascular congestion. In the ED the patient was given ASA 325mg po x 1. He is being admitted for ACS.       Review of Systems    The 10 point Review of Systems is negative other than noted in the HPI or here.    Past Medical History    I have reviewed this patient's medical history and updated it with pertinent information if needed.   Past Medical History:   Diagnosis Date     Actinic keratosis      Atrial fibrillation (H) 6/15/96    Afib - 2 or 3 extended occurrences since     Chronic hepatitis C (H)     Chronic Hepatitis C,  genotype 1b                Stage 0 Fibrosis     Dupuytren's contracture of both hands      Elevated PSA      Hepatitis B     acute infection at age 19; IV drug use     WINNIE (obstructive sleep apnea)     AHI 34.2     Pain in both hands      Patient is Nondenominational      Refusal of blood transfusions as patient is Nondenominational      Right shoulder pain      Tinnitus 1 or 2 years ago    both ears     Tubular adenoma of colon 17    ascending colon, 16       Past Surgical  History   I have reviewed this patient's surgical history and updated it with pertinent information if needed.  Past Surgical History:   Procedure Laterality Date     ANESTHESIA CARDIOVERSION N/A 1/15/2018    Procedure: ANESTHESIA CARDIOVERSION;  Anesthesia Coverage Cardioversion With Transesophageal Echocardiogram @0930 ;  Surgeon: GENERIC ANESTHESIA PROVIDER;  [facility]     ANESTHESIA CARDIOVERSION N/A 10/11/2018    Procedure: ANESTHESIA CARDIOVERSION;  Cardioversion;  Surgeon: GENERIC ANESTHESIA PROVIDER;  [facility]     ANESTHESIA CARDIOVERSION N/A 2018    Procedure: Anesthesia Coverage Cardioversion @0830;  Surgeon: GENERIC ANESTHESIA PROVIDER;  [facility]     ARTHROSCOPY KNEE      left knee     COLONOSCOPY  17    tubular adenoma ascending colon     HERNIORRHAPHY INGUINAL Right 10/26/2017    Procedure: HERNIORRHAPHY INGUINAL;  Open Right Inguinal Hernia Repair with Mesh;  Surgeon: Suresh Raymond MD;  [facility]     KNEE SURGERY  ??    torn meniscus     RELEASE DUPUYTRENS CONTRACTURE Right 2017    Procedure: RELEASE DUPUYTRENS CONTRACTURE;  Surgeon: Lars Oleary MD;  [facility]     RELEASE DUPUYTRENS CONTRACTURE Left 12/15/2017    Procedure: RELEASE DUPUYTRENS CONTRACTURE;  Left Ring and Middle Finger Subtotal Palmar Fasciectomy;  Surgeon: Lars Oleary MD;  Location:  OR     TRANSESOPHAGEAL ECHOCARDIOGRAM INTRAOPERATIVE N/A 1/15/2018    Procedure: TRANSESOPHAGEAL ECHOCARDIOGRAM INTRAOPERATIVE;;  Surgeon: GENERIC ANESTHESIA PROVIDER;  Location: [facility] OR       Social History   I have reviewed this patient's social history and updated it with pertinent information if needed.  Social History     Tobacco Use     Smoking status: Former Smoker     Packs/day: 0.50     Years: 10.00     Pack years: 5.00     Types: Cigarettes     Start date: 6/15/1964     Last attempt to quit: 1974     Years since quittin.5     Smokeless tobacco: Former User      Quit date: 1969   Substance Use Topics     Alcohol use: Yes     Alcohol/week: 1.8 - 2.4 oz     Comment: 4 or 5 drinks/week, limit of 1     Drug use: No     Comment: --history of marijuana       Family History   I have reviewed this patient's family history and updated it with pertinent information if needed.   Family History   Problem Relation Age of Onset     Arthritis Paternal Grandmother      Rheumatoid Arthritis Paternal Grandmother      Gastrointestinal Disease Father          age 77 after colon surgery     Alcoholism Father      Alcohol/Drug Mother          age 64     Alcoholism Mother      Heart Disease Brother      Cardiac Sudden Death Brother      Glaucoma No family hx of      Macular Degeneration No family hx of      Retinal detachment No family hx of        Prior to Admission Medications   Prior to Admission Medications   Prescriptions Last Dose Informant Patient Reported? Taking?   alfuzosin (UROXATRAL) 10 MG 24 hr tablet   No No   Sig: Take 1 tablet (10 mg) by mouth daily   aspirin EC 81 MG EC tablet   Yes No   Sig: Take 1 tablet (81 mg) by mouth daily   diltiazem ER COATED BEADS (CARDIZEM CD/CARTIA XT) 120 MG 24 hr capsule   No No   Sig: Take 1 capsule (120 mg) by mouth daily   order for DME  Self Yes No   Sig: Reported on 2017      Facility-Administered Medications: None     Allergies   Allergies   Allergen Reactions     Blood Transfusion Related (Informational Only)      Cheondoism.  Declines blood transfusions.     Contrast Dye Rash       Physical Exam   Vital Signs: Temp: 98.1  F (36.7  C) Temp src: Oral BP: 141/80 Pulse: 61 Heart Rate: 65 Resp: 20 SpO2: 96 % O2 Device: None (Room air)    Weight: 175 lbs 0 oz    Constitutional: awake, alert, cooperative, no apparent distress, and appears stated age  Eyes: Lids and lashes normal, pupils equal, round and reactive to light, extra ocular muscles intact, sclera clear, conjunctiva normal  ENT: Normocephalic, without obvious  abnormality, atraumatic, sinuses nontender on palpation, external ears without lesions, oral pharynx with moist mucous membranes, tonsils without erythema or exudates, gums normal and good dentition.  Hematologic / Lymphatic: no cervical lymphadenopathy  Respiratory: No increased work of breathing, good air exchange, clear to auscultation bilaterally, no crackles or wheezing  Cardiovascular: Normal apical impulse, regular rate and rhythm, normal S1 and S2, no S3 or S4, and no murmur noted. No chest wall tenderness  GI: No scars, normal bowel sounds, soft, non-distended, non-tender, no masses palpated, no hepatosplenomegally  Skin: no bruising or bleeding  Musculoskeletal: There is no redness, warmth, or swelling of the joints.  Full range of motion noted.  Motor strength is 5 out of 5 all extremities bilaterally.  Tone is normal.  Neurologic: Awake, alert, oriented to name, place and time.  Cranial nerves II-XII are grossly intact.  Motor is 5 out of 5 bilaterally.  Cerebellar finger to nose, heel to shin intact.  Sensory is intact.  Babinski down going, Romberg negative, and gait is normal.  Neuropsychiatric: General: normal, calm and normal eye contact    Data   Data reviewed today: I reviewed all medications, new labs and imaging results over the last 24 hours. I personally reviewed the EKG tracing showing NSR.    Recent Labs   Lab 01/04/19 1916   WBC 8.8   HGB 14.2   MCV 94         POTASSIUM 3.7   CHLORIDE 107   CO2 28   BUN 14   CR 0.82   ANIONGAP 6   VALENTINA 8.3*   GLC 98   ALBUMIN 4.0   PROTTOTAL 7.5   BILITOTAL 0.4   ALKPHOS 83   ALT 16   AST 8   LIPASE 135   TROPI <0.015     Most Recent 3 CBC's:  Recent Labs   Lab Test 01/04/19  1916 10/16/17  1418 09/07/17  1012 12/02/16  0227   WBC 8.8  --  4.7 5.5   HGB 14.2 13.9 13.4 14.5   MCV 94  --  92 91     --  214 184     Most Recent 3 BMP's:  Recent Labs   Lab Test 01/04/19 1916 11/28/18  0736 10/11/18  0740  10/16/17  1418 09/14/17  0859  09/07/17  1012 03/21/17  1008     --   --   --   --  140  --  143   POTASSIUM 3.7 3.8 4.0   < > 3.7 3.7  --  4.1   CHLORIDE 107  --   --   --   --  108  --  111*   CO2 28  --   --   --   --  22  --  25   BUN 14  --   --   --   --  14  --  14   CR 0.82  --   --   --  0.81 0.85  --  0.78   ANIONGAP 6  --   --   --   --  10  --  7   VALENTINA 8.3*  --   --   --   --  8.8  --  8.5   GLC 98  --   --   --   --  90 102* 122*    < > = values in this interval not displayed.     Most Recent 2 LFT's:  Recent Labs   Lab Test 01/04/19 1916 03/21/17  1008   AST 8 12   ALT 16 16   ALKPHOS 83 73   BILITOTAL 0.4 0.9     Most Recent 3 Troponin's:  Recent Labs   Lab Test 01/04/19 1916 12/02/16  0227   TROPI <0.015 <0.015  The 99th percentile for upper reference range is 0.045 ug/L.  Troponin values in   the range of 0.045 - 0.120 ug/L may be associated with risks of adverse   clinical events.       Most Recent D-dimer:  Recent Labs   Lab Test 01/04/19 1916   DD 0.7*     Most Recent Cholesterol Panel:  Recent Labs   Lab Test 10/06/16  1127   CHOL 172   *   HDL 56   TRIG 81     Recent Results (from the past 24 hour(s))   XR Chest Port 1 View    Narrative    XR CHEST PORT 1 VW  1/4/2019 8:01 PM      HISTORY: Cough, SOB    COMPARISON: 10/6/2016    FINDINGS: Semiupright view of the chest, AP. The cardiac silhouette is  stable and within normal limits. There is no focal airspace  consolidation, pleural effusion, or pneumothorax. The pulmonary  vasculature is prominent. Multiple old right rib fractures.      Impression    IMPRESSION: Pulmonary vascular congestion    I have personally reviewed the examination and initial interpretation  and I agree with the findings.    DESHAUN JOSEPH MD   Lung perfusion scan (NM)    Narrative    Examination: Lung perfusion scan, 1/5/2019 1:19 AM     Indication:  PE suspected, intermediate probability, positive d-dimer.    Technique:    The patient received 2 mCi of Tc-99m DTPA aerosol for  ventilation and  7.2 mCi of Tc-99m MAA for perfusion. Multiple images were obtained of  the lungs in Anterior, posterior, TOLBERT, RPO, LPO, and  Pakistani projections.    Comparison: Radiograph 1/4/2019    Findings:    The ventilation study demonstrates no ventilation defects.    The perfusion study demonstrates no perfusion defects.      Impression    Impression:  Pulmonary emboli is not present.

## 2019-01-05 NOTE — PROGRESS NOTES
"Problem: Patient Care Overview  Goal: Plan of Care/Patient Progress Review  Outpatient/Observation goals to be met before discharge home:  - Serial troponins and stress test complete.- Yes.  -Cards consult to determine treatment plan, Consult completed. Recommendations pending.   - Seen and cleared by consultant if applicable-  Pending.  - Adequate pain control on oral analgesia- Yes.  - Vital signs normal or at patient baseline- Yes.  - Safe disposition plan has been identified- Pending.  - Nurse to notify provider when observation goals have been met and patient is ready for discharge.     /69 (BP Location: Right arm)   Pulse 82   Temp 98.9  F (37.2  C) (Oral)   Resp 18   Ht 1.753 m (5' 9\")   Wt 79.4 kg (175 lb)   SpO2 95%   BMI 25.84 kg/m         "

## 2019-01-11 ENCOUNTER — OFFICE VISIT (OUTPATIENT)
Dept: UROLOGY | Facility: CLINIC | Age: 69
End: 2019-01-11
Payer: COMMERCIAL

## 2019-01-11 VITALS
HEART RATE: 50 BPM | DIASTOLIC BLOOD PRESSURE: 78 MMHG | HEIGHT: 69 IN | SYSTOLIC BLOOD PRESSURE: 133 MMHG | WEIGHT: 182.6 LBS | BODY MASS INDEX: 27.05 KG/M2

## 2019-01-11 DIAGNOSIS — R97.20 ELEVATED PROSTATE SPECIFIC ANTIGEN (PSA): ICD-10-CM

## 2019-01-11 DIAGNOSIS — R97.20 ELEVATED PROSTATE SPECIFIC ANTIGEN (PSA): Primary | ICD-10-CM

## 2019-01-11 LAB — PSA SERPL-MCNC: 7.17 UG/L (ref 0–4)

## 2019-01-11 ASSESSMENT — PAIN SCALES - GENERAL: PAINLEVEL: NO PAIN (0)

## 2019-01-11 ASSESSMENT — MIFFLIN-ST. JEOR: SCORE: 1583.65

## 2019-01-11 NOTE — PROGRESS NOTES
UROLOGIC DIAGNOSES:       CURRENT INTERVENTIONS:     HISTORY:     Patient with history of elevated PSA with negative prostate MRI currently followed with serial PSAs. Found incidentally on u/s to have a renal lesion, most likely a cyst (hemorrhagic).     Patient presents after MRI to better characterize the cyst. MRI confirmed simple cyst with some hemorrhagic features.     Patient with recent repeat PSA.   Found to have a PSA of 5.78.     We discussed PSA elevation, previous work up, repeat PSA, repeat MRI (if PSa continues to be elevated)         PAST MEDICAL HISTORY:   Past Medical History:   Diagnosis Date     Actinic keratosis 2009     Atrial fibrillation (H) 6/15/96    Afib - 2 or 3 extended occurrences since     Chronic hepatitis C (H)     Chronic Hepatitis C,  genotype 1b                Stage 0 Fibrosis     Dupuytren's contracture of both hands      Elevated PSA      Hepatitis B 1969    acute infection at age 19; IV drug use     WINNIE (obstructive sleep apnea)     AHI 34.2     Pain in both hands      Patient is Hinduism      Refusal of blood transfusions as patient is Hinduism      Right shoulder pain      Tinnitus 1 or 2 years ago    both ears     Tubular adenoma of colon 1/17/17    ascending colon, 1/17/17       PAST SURGICAL HISTORY:   Past Surgical History:   Procedure Laterality Date     ANESTHESIA CARDIOVERSION N/A 1/15/2018    Procedure: ANESTHESIA CARDIOVERSION;  Anesthesia Coverage Cardioversion With Transesophageal Echocardiogram @0930 ;  Surgeon: GENERIC ANESTHESIA PROVIDER;  [facility]     ANESTHESIA CARDIOVERSION N/A 10/11/2018    Procedure: ANESTHESIA CARDIOVERSION;  Cardioversion;  Surgeon: GENERIC ANESTHESIA PROVIDER;  [facility]     ANESTHESIA CARDIOVERSION N/A 11/28/2018    Procedure: Anesthesia Coverage Cardioversion @0830;  Surgeon: GENERIC ANESTHESIA PROVIDER;  [facility]     ARTHROSCOPY KNEE      left knee     COLONOSCOPY  1/17/17    tubular adenoma  ascending colon     HERNIORRHAPHY INGUINAL Right 10/26/2017    Procedure: HERNIORRHAPHY INGUINAL;  Open Right Inguinal Hernia Repair with Mesh;  Surgeon: Suresh Raymond MD;  [facility]     KNEE SURGERY  ??    torn meniscus     RELEASE DUPUYTRENS CONTRACTURE Right 2017    Procedure: RELEASE DUPUYTRENS CONTRACTURE;  Surgeon: Lars Oleary MD;  [facility]     RELEASE DUPUYTRENS CONTRACTURE Left 12/15/2017    Procedure: RELEASE DUPUYTRENS CONTRACTURE;  Left Ring and Middle Finger Subtotal Palmar Fasciectomy;  Surgeon: Lars Oleary MD;  [facility]     TRANSESOPHAGEAL ECHOCARDIOGRAM INTRAOPERATIVE N/A 1/15/2018    Procedure: TRANSESOPHAGEAL ECHOCARDIOGRAM INTRAOPERATIVE;;  Surgeon: GENERIC ANESTHESIA PROVIDER;  [facility]       FAMILY HISTORY:   Family History   Problem Relation Age of Onset     Arthritis Paternal Grandmother      Rheumatoid Arthritis Paternal Grandmother      Gastrointestinal Disease Father          age 77 after colon surgery     Alcoholism Father      Alcohol/Drug Mother          age 64     Alcoholism Mother      Heart Disease Brother      Cardiac Sudden Death Brother      Uterine Cancer Sister      Glaucoma No family hx of      Macular Degeneration No family hx of      Retinal detachment No family hx of        SOCIAL HISTORY:   Social History     Tobacco Use     Smoking status: Former Smoker     Packs/day: 0.50     Years: 10.00     Pack years: 5.00     Types: Cigarettes     Start date: 6/15/1964     Last attempt to quit: 1974     Years since quittin.5     Smokeless tobacco: Former User     Quit date: 1969   Substance Use Topics     Alcohol use: Yes     Alcohol/week: 1.8 - 2.4 oz     Comment: 4 or 5 drinks/week, limit of 1       Current Outpatient Medications   Medication     alfuzosin (UROXATRAL) 10 MG 24 hr tablet     aspirin EC 81 MG EC tablet     diltiazem ER COATED BEADS (CARDIZEM CD/CARTIA XT) 120 MG 24 hr capsule      "order for DME     No current facility-administered medications for this visit.          PHYSICAL EXAM:    /78   Pulse 50   Ht 1.753 m (5' 9\")   Wt 82.8 kg (182 lb 9.6 oz)   BMI 26.97 kg/m      HEENT: Normocephalic and atraumatic   Cardiac: Not done  Back/Flank: Not done  CNS/PNS: Not done  Respiratory: Normal non-labored breathing  Abdomen: Soft nontender and nondistended  Peripheral Vascular: Not done  Mental Status: Not done    Penis: Not done  Scrotal Skin: Not done  Testicles: Not done  Epididymis: Not done  Digital Rectal Exam: 1+, no nodules     Cystoscopy: Not done    Imaging: None    Urinalysis: [facility] RESULTS:  Recent Labs   Lab Test  10/13/16   0816   COLOR  Yellow   APPEARANCE  Clear   URINEGLC  Negative   URINEBILI  Negative   URINEKETONE  Negative   SG  1.011   UBLD  Negative   URINEPH  7.0   PROTEIN  Negative   NITRITE  Negative   LEUKEST  Negative   RBCU  0   WBCU  <1       PSA:     Post Void Residual:     Other labs: None today      IMPRESSION:  69 y/o M with elevated PSA and renal cyst     PLAN:  Repeat PSA today   Will consider repeat MRI if PSA continues to be elevated   Follow up after above completed to review results     Total Time: 15 minutes                                      Total in Consultation: greater than 50%     "

## 2019-01-11 NOTE — LETTER
1/11/2019       RE: Frank Orellana  [address]  [facility] [address]     Dear Colleague,    Thank you for referring your patient, Frank Orellana, to the [facility] UROLOGY AND INST FOR PROSTATE AND UROLOGIC CANCERS at [facility]. Please see a copy of my visit note below.    UROLOGIC DIAGNOSES:       CURRENT INTERVENTIONS:     HISTORY:     Patient with history of elevated PSA with negative prostate MRI currently followed with serial PSAs. Found incidentally on u/s to have a renal lesion, most likely a cyst (hemorrhagic).     Patient presents after MRI to better characterize the cyst. MRI confirmed simple cyst with some hemorrhagic features.     Patient with recent repeat PSA.   Found to have a PSA of 5.78.     We discussed PSA elevation, previous work up, repeat PSA, repeat MRI (if PSa continues to be elevated)         PAST MEDICAL HISTORY:   Past Medical History:   Diagnosis Date     Actinic keratosis 2009     Atrial fibrillation (H) 6/15/96    Afib - 2 or 3 extended occurrences since     Chronic hepatitis C (H)     Chronic Hepatitis C,  genotype 1b                Stage 0 Fibrosis     Dupuytren's contracture of both hands      Elevated PSA      Hepatitis B 1969    acute infection at age 19; IV drug use     WINNIE (obstructive sleep apnea)     AHI 34.2     Pain in both hands      Patient is Taoism      Refusal of blood transfusions as patient is Taoism      Right shoulder pain      Tinnitus 1 or 2 years ago    both ears     Tubular adenoma of colon 1/17/17    ascending colon, 1/17/17       PAST SURGICAL HISTORY:   Past Surgical History:   Procedure Laterality Date     ANESTHESIA CARDIOVERSION N/A 1/15/2018    Procedure: ANESTHESIA CARDIOVERSION;  Anesthesia Coverage Cardioversion With Transesophageal Echocardiogram @0930 ;  Surgeon: GENERIC ANESTHESIA PROVIDER;  Location: [facility] OR     ANESTHESIA CARDIOVERSION N/A 10/11/2018    Procedure: ANESTHESIA  CARDIOVERSION;  Cardioversion;  Surgeon: GENERIC ANESTHESIA PROVIDER;  Location: [facility] OR     ANESTHESIA CARDIOVERSION N/A 2018    Procedure: Anesthesia Coverage Cardioversion @0830;  Surgeon: GENERIC ANESTHESIA PROVIDER;  Location: [facility] OR     ARTHROSCOPY KNEE      left knee     COLONOSCOPY  17    tubular adenoma ascending colon     HERNIORRHAPHY INGUINAL Right 10/26/2017    Procedure: HERNIORRHAPHY INGUINAL;  Open Right Inguinal Hernia Repair with Mesh;  Surgeon: Suresh Raymond MD;  Location: [facility] OR     KNEE SURGERY  ??    torn meniscus     RELEASE DUPUYTRENS CONTRACTURE Right 2017    Procedure: RELEASE DUPUYTRENS CONTRACTURE;  Surgeon: Lars Oleary MD;  Location: [facility] OR     RELEASE DUPUYTRENS CONTRACTURE Left 12/15/2017    Procedure: RELEASE DUPUYTRENS CONTRACTURE;  Left Ring and Middle Finger Subtotal Palmar Fasciectomy;  Surgeon: Lars Oleary MD;  Location: [facility]     TRANSESOPHAGEAL ECHOCARDIOGRAM INTRAOPERATIVE N/A 1/15/2018    Procedure: TRANSESOPHAGEAL ECHOCARDIOGRAM INTRAOPERATIVE;;  Surgeon: GENERIC ANESTHESIA PROVIDER;  Location: [facility] OR       FAMILY HISTORY:   Family History   Problem Relation Age of Onset     Arthritis Paternal Grandmother      Rheumatoid Arthritis Paternal Grandmother      Gastrointestinal Disease Father          age 77 after colon surgery     Alcoholism Father      Alcohol/Drug Mother          age 64     Alcoholism Mother      Heart Disease Brother      Cardiac Sudden Death Brother      Uterine Cancer Sister      Glaucoma No family hx of      Macular Degeneration No family hx of      Retinal detachment No family hx of        SOCIAL HISTORY:   Social History     Tobacco Use     Smoking status: Former Smoker     Packs/day: 0.50     Years: 10.00     Pack years: 5.00     Types: Cigarettes     Start date: 6/15/1964     Last attempt to quit: 1974     Years since quittin.5     Smokeless tobacco: Former User     Quit date: 1969  "  Substance Use Topics     Alcohol use: Yes     Alcohol/week: 1.8 - 2.4 oz     Comment: 4 or 5 drinks/week, limit of 1       Current Outpatient Medications   Medication     alfuzosin (UROXATRAL) 10 MG 24 hr tablet     aspirin EC 81 MG EC tablet     diltiazem ER COATED BEADS (CARDIZEM CD/CARTIA XT) 120 MG 24 hr capsule     order for DME     No current facility-administered medications for this visit.          PHYSICAL EXAM:    /78   Pulse 50   Ht 1.753 m (5' 9\")   Wt 82.8 kg (182 lb 9.6 oz)   BMI 26.97 kg/m       HEENT: Normocephalic and atraumatic   Cardiac: Not done  Back/Flank: Not done  CNS/PNS: Not done  Respiratory: Normal non-labored breathing  Abdomen: Soft nontender and nondistended  Peripheral Vascular: Not done  Mental Status: Not done    Penis: Not done  Scrotal Skin: Not done  Testicles: Not done  Epididymis: Not done  Digital Rectal Exam: 1+, no nodules     Cystoscopy: Not done    Imaging: None    Urinalysis: [facility] RESULTS:  Recent Labs   Lab Test  10/13/16   0816   COLOR  Yellow   APPEARANCE  Clear   URINEGLC  Negative   URINEBILI  Negative   URINEKETONE  Negative   SG  1.011   UBLD  Negative   URINEPH  7.0   PROTEIN  Negative   NITRITE  Negative   LEUKEST  Negative   RBCU  0   WBCU  <1       PSA:     Post Void Residual:     Other labs: None today      IMPRESSION:  67 y/o M with elevated PSA and renal cyst     PLAN:  Repeat PSA today   Will consider repeat MRI if PSA continues to be elevated   Follow up after above completed to review results     Total Time: 15 minutes                                      Total in Consultation: greater than 50%       Meggan Maravilla MD      "

## 2019-01-11 NOTE — NURSING NOTE
"Chief Complaint   Patient presents with     Follow Up     elevated PSA       Blood pressure 133/78, pulse 50, height 1.753 m (5' 9\"), weight 82.8 kg (182 lb 9.6 oz). Body mass index is 26.97 kg/m .    Patient Active Problem List   Diagnosis     History of actinic keratoses     Telangiectasia     SK (seborrheic keratosis)     Patient is Adventism     Refusal of blood transfusions as patient is Adventism     Paroxysmal atrial fibrillation (H)     Chronic left shoulder pain     WINNIE (obstructive sleep apnea) AHI 32     Atrial fibrillation (H)     Tubular adenoma of colon     ACP (advance care planning)     Bilateral sensorineural hearing loss     Elevated PSA       Allergies   Allergen Reactions     Blood Transfusion Related (Informational Only)      Pentecostalism.  Declines blood transfusions.     Contrast Dye Rash       Current Outpatient Medications   Medication Sig Dispense Refill     alfuzosin (UROXATRAL) 10 MG 24 hr tablet Take 1 tablet (10 mg) by mouth daily 90 tablet 1     aspirin EC 81 MG EC tablet Take 1 tablet (81 mg) by mouth daily       diltiazem ER COATED BEADS (CARDIZEM CD/CARTIA XT) 120 MG 24 hr capsule Take 1 capsule (120 mg) by mouth daily 90 capsule 3     order for DME Reported on 2017         Social History     Tobacco Use     Smoking status: Former Smoker     Packs/day: 0.50     Years: 10.00     Pack years: 5.00     Types: Cigarettes     Start date: 6/15/1964     Last attempt to quit: 1974     Years since quittin.5     Smokeless tobacco: Former User     Quit date: 1969   Substance Use Topics     Alcohol use: Yes     Alcohol/week: 1.8 - 2.4 oz     Comment: 4 or 5 drinks/week, limit of 1     Drug use: No     Comment: --history of marijuana       Makenzie Morel LPN  2019  12:55 PM       "

## 2019-01-11 NOTE — PATIENT INSTRUCTIONS
Please get PSA today and we will call you     It was a pleasure meeting with you today.  Thank you for allowing me and my team the privilege of caring for you today.  YOU are the reason we are here, and I truly hope we provided you with the excellent service you deserve.  Please let us know if there is anything else we can do for you so that we can be sure you are leaving completely satisfied with your care experience.

## 2019-01-14 DIAGNOSIS — R97.20 ELEVATED PSA: Primary | ICD-10-CM

## 2019-01-15 ENCOUNTER — OFFICE VISIT (OUTPATIENT)
Dept: INTERNAL MEDICINE | Facility: CLINIC | Age: 69
End: 2019-01-15
Payer: COMMERCIAL

## 2019-01-15 VITALS
HEART RATE: 61 BPM | WEIGHT: 182.4 LBS | RESPIRATION RATE: 16 BRPM | BODY MASS INDEX: 26.94 KG/M2 | SYSTOLIC BLOOD PRESSURE: 125 MMHG | DIASTOLIC BLOOD PRESSURE: 73 MMHG

## 2019-01-15 DIAGNOSIS — Z09 HOSPITAL DISCHARGE FOLLOW-UP: Primary | ICD-10-CM

## 2019-01-15 DIAGNOSIS — R97.20 ELEVATED PSA: ICD-10-CM

## 2019-01-15 DIAGNOSIS — I48.0 PAROXYSMAL ATRIAL FIBRILLATION (H): ICD-10-CM

## 2019-01-15 ASSESSMENT — PAIN SCALES - GENERAL: PAINLEVEL: NO PAIN (0)

## 2019-01-15 NOTE — PROGRESS NOTES
[facility]  Primary Care Center   Kelly Nixon, ANDREINA CNP  01/15/2019      Chief Complaint:   Hospital F/U       History of Present Illness:   Frank Orellana is a 69 year old male with a history of atrial fibrillation, hepatitis C and B who presents with his wife Larissa for hospital follow up. He went to the ED after 3 days of chest tightness; he had called the cardiology clinic, who advise that he present immediately to the ED. Chest tightness had been worsening. In ED serial troponins were negative x3. Resting echo was normal with the exception of aortic dilation to 4.2 cm, previously noted and stable from 2017. Cardiology recommended discharge home without further cardiology eval.     Sunday 10:35 he started having atrial fibrillation which lasted about 10 hours, and converted on its own. His Diltiazem was recently reduced from 180 mg to 120 mg. He has been feeling well, and denies SOB, lightheadedness/dizziness, return of CP or chest tightness, edema, or other concerning symptoms. He is eating/drinking normally and feeling generally well overall.    He is having difficulty with urination, which he was planning to see urology and have a prostate MRI. PSA recently found to be elevated to 7.17 micrograms/L.    Review of Systems:   Pertinent items are noted in HPI, remainder of complete ROS is negative.      Active Medications:     Current Outpatient Medications:      alfuzosin (UROXATRAL) 10 MG 24 hr tablet, Take 1 tablet (10 mg) by mouth daily, Disp: 90 tablet, Rfl: 1     aspirin EC 81 MG EC tablet, Take 1 tablet (81 mg) by mouth daily, Disp: , Rfl:      diltiazem ER COATED BEADS (CARDIZEM CD/CARTIA XT) 120 MG 24 hr capsule, Take 1 capsule (120 mg) by mouth daily, Disp: 90 capsule, Rfl: 3     order for DME, Reported on 5/4/2017, Disp: , Rfl:       Allergies:   Blood transfusion related (informational only) and Contrast dye      Past Medical History:  Atrial fibrillation  Hepatitis C  Dupuytren's  contracture  Elevated PSA  Hepatitis B  Obstructive sleep apnea  Right shoulder pain  Tinnitus  Tubular adenoma of colon  Refusal of blood transfusions as patient is Roman Catholic    Past Surgical History:  Cardioversion x3  Knee arthroscopy  Colonoscopy  Herniorrhaphy, inguinal  Knee surgery  Release dupuytren's contracture bilateral  Transesophageal echocardiogram    Family History:   Paternal grandmother: arthritis, rheumatoid arthritis  Father: gastrointestinal disease, alcoholism  Mother: alcohol/drug  Brother: heart disease, cardiac sudden death  Sister: uterine cancer     Social History:     Former 0.5 PPD smoker for 10 years, quit 1974    Physical Exam:   /73   Pulse 61   Resp 16   Wt 82.7 kg (182 lb 6.4 oz)   BMI 26.94 kg/m     Constitutional: Alert, oriented, pleasant, no acute distress  Head: Normocephalic, atraumatic  Eyes: Extra-ocular movements intact, pupils equally round and reactive bilaterally, no scleral icterus  Ears: tympanic membranes pearly gray with positive light reflex  ENT: Oropharynx clear, moist mucus membranes, good dentition  Neck: Supple, no lymphadenopathy, no thyromegaly  Cardiovascular: Regular rate and rhythm, no murmurs, rubs or gallops  Respiratory: Good air movement bilaterally, lungs clear, no wheezes/rales/rhonchi  Psychiatric: normal mentation, affect and mood     Assessment and Plan:    Hospital discharge follow-up,   Paroxysmal atrial fibrillation  Doing well since discharge. Single episode of atrial fibrillation since hospital discharge that spontaneously resolved. Will have him continue on Diltiazem, his dose was recently changed and he will start this. He is to notify us or Cardiology EP clinic if he has repeat A-fib episodes, and seek emergency care if any SOB, CP, syncope.    Elevated PSA  Had incidental finding of renal lesion on prostate MRI with recent elevation in PSA. Will try to have him schedule follow up MRI before he leaves  today.    Follow-up: if symptoms return, otherwise as needed for any changes or concerns         Scribe Disclosure:   I, Iraj Xiong, am serving as a scribe to document services personally performed by ANDREINA Bernstein CNP at this visit, based upon the provider's statements to me. All documentation has been reviewed by the aforementioned provider prior to being entered into the official medical record.     Portions of this medical record were completed by a scribe. UPON MY REVIEW AND AUTHENTICATION BY ELECTRONIC SIGNATURE, this confirms (a) I performed the applicable clinical services, and (b) the record is accurate.     ANDREINA Bernstein CNP

## 2019-01-16 DIAGNOSIS — R35.0 URINARY FREQUENCY: ICD-10-CM

## 2019-01-16 RX ORDER — ALFUZOSIN HYDROCHLORIDE 10 MG/1
1 TABLET, EXTENDED RELEASE ORAL DAILY
Qty: 90 TABLET | Refills: 3 | Status: SHIPPED | OUTPATIENT
Start: 2019-01-16 | End: 2019-11-04

## 2019-01-16 NOTE — NURSING NOTE
Chief Complaint   Patient presents with     Hospital F/U     patient states he is here for a hospital follow up     MARLENY ROBBINS at 6:42 PM on 1/15/2019.

## 2019-01-16 NOTE — PATIENT INSTRUCTIONS
[facility] Medication Refill Request Information:  * Please contact your pharmacy regarding ANY request for medication refills.  ** [facility] Prescription Fax = [phone]  * Please allow 3 business days for routine medication refills.  * Please allow 5 business days for controlled substance medication refills.     [facility] Test Result notification information:  *You will be notified with in 7-10 days of your appointment day regarding the results of your test.  If you are on MyChart you will be notified as soon as the provider has reviewed the results and signed off on them.    [facility]: [phone]

## 2019-01-18 ENCOUNTER — APPOINTMENT (OUTPATIENT)
Dept: GENERAL RADIOLOGY | Facility: CLINIC | Age: 69
End: 2019-01-18
Payer: COMMERCIAL

## 2019-01-18 ENCOUNTER — HOSPITAL ENCOUNTER (EMERGENCY)
Facility: CLINIC | Age: 69
Discharge: HOME | End: 2019-01-18
Attending: EMERGENCY MEDICINE | Admitting: EMERGENCY MEDICINE
Payer: COMMERCIAL

## 2019-01-18 VITALS
BODY MASS INDEX: 26.66 KG/M2 | HEART RATE: 54 BPM | SYSTOLIC BLOOD PRESSURE: 115 MMHG | TEMPERATURE: 97.6 F | HEIGHT: 69 IN | RESPIRATION RATE: 13 BRPM | WEIGHT: 180 LBS | OXYGEN SATURATION: 95 % | DIASTOLIC BLOOD PRESSURE: 78 MMHG

## 2019-01-18 DIAGNOSIS — I48.0 PAROXYSMAL ATRIAL FIBRILLATION (H): ICD-10-CM

## 2019-01-18 LAB
ANION GAP SERPL CALCULATED.3IONS-SCNC: 9 MMOL/L (ref 3–14)
BASOPHILS # BLD AUTO: 0 10E9/L (ref 0–0.2)
BASOPHILS NFR BLD AUTO: 0.3 %
BUN SERPL-MCNC: 14 MG/DL (ref 7–30)
CALCIUM SERPL-MCNC: 8.7 MG/DL (ref 8.5–10.1)
CHLORIDE SERPL-SCNC: 109 MMOL/L (ref 94–109)
CO2 SERPL-SCNC: 25 MMOL/L (ref 20–32)
CREAT SERPL-MCNC: 0.89 MG/DL (ref 0.66–1.25)
DIFFERENTIAL METHOD BLD: NORMAL
EOSINOPHIL # BLD AUTO: 0.1 10E9/L (ref 0–0.7)
EOSINOPHIL NFR BLD AUTO: 1.7 %
ERYTHROCYTE [DISTWIDTH] IN BLOOD BY AUTOMATED COUNT: 12.8 % (ref 10–15)
GFR SERPL CREATININE-BSD FRML MDRD: 87 ML/MIN/{1.73_M2}
GLUCOSE SERPL-MCNC: 95 MG/DL (ref 70–99)
HCT VFR BLD AUTO: 43.7 % (ref 40–53)
HGB BLD-MCNC: 14.6 G/DL (ref 13.3–17.7)
IMM GRANULOCYTES # BLD: 0 10E9/L (ref 0–0.4)
IMM GRANULOCYTES NFR BLD: 0.2 %
INTERPRETATION ECG - MUSE: NORMAL
INTERPRETATION ECG - MUSE: NORMAL
LYMPHOCYTES # BLD AUTO: 1.1 10E9/L (ref 0.8–5.3)
LYMPHOCYTES NFR BLD AUTO: 18.3 %
MCH RBC QN AUTO: 30.9 PG (ref 26.5–33)
MCHC RBC AUTO-ENTMCNC: 33.4 G/DL (ref 31.5–36.5)
MCV RBC AUTO: 92 FL (ref 78–100)
MONOCYTES # BLD AUTO: 0.6 10E9/L (ref 0–1.3)
MONOCYTES NFR BLD AUTO: 10.6 %
NEUTROPHILS # BLD AUTO: 4.1 10E9/L (ref 1.6–8.3)
NEUTROPHILS NFR BLD AUTO: 68.9 %
NRBC # BLD AUTO: 0 10*3/UL
NRBC BLD AUTO-RTO: 0 /100
PLATELET # BLD AUTO: 217 10E9/L (ref 150–450)
POTASSIUM SERPL-SCNC: 4 MMOL/L (ref 3.4–5.3)
RBC # BLD AUTO: 4.73 10E12/L (ref 4.4–5.9)
SODIUM SERPL-SCNC: 143 MMOL/L (ref 133–144)
TROPONIN I SERPL-MCNC: <0.015 UG/L (ref 0–0.04)
WBC # BLD AUTO: 6 10E9/L (ref 4–11)

## 2019-01-18 PROCEDURE — 93005 ELECTROCARDIOGRAM TRACING: CPT | Performed by: EMERGENCY MEDICINE

## 2019-01-18 PROCEDURE — 85025 COMPLETE CBC W/AUTO DIFF WBC: CPT | Performed by: EMERGENCY MEDICINE

## 2019-01-18 PROCEDURE — 96366 THER/PROPH/DIAG IV INF ADDON: CPT | Performed by: EMERGENCY MEDICINE

## 2019-01-18 PROCEDURE — 99285 EMERGENCY DEPT VISIT HI MDM: CPT | Mod: 25 | Performed by: EMERGENCY MEDICINE

## 2019-01-18 PROCEDURE — 80048 BASIC METABOLIC PNL TOTAL CA: CPT | Performed by: EMERGENCY MEDICINE

## 2019-01-18 PROCEDURE — 99284 EMERGENCY DEPT VISIT MOD MDM: CPT | Mod: 25 | Performed by: EMERGENCY MEDICINE

## 2019-01-18 PROCEDURE — 96361 HYDRATE IV INFUSION ADD-ON: CPT | Performed by: EMERGENCY MEDICINE

## 2019-01-18 PROCEDURE — 84484 ASSAY OF TROPONIN QUANT: CPT | Performed by: EMERGENCY MEDICINE

## 2019-01-18 PROCEDURE — 93010 ELECTROCARDIOGRAM REPORT: CPT | Mod: Z6 | Performed by: EMERGENCY MEDICINE

## 2019-01-18 PROCEDURE — 71046 X-RAY EXAM CHEST 2 VIEWS: CPT

## 2019-01-18 PROCEDURE — 99214 OFFICE O/P EST MOD 30 MIN: CPT | Performed by: NURSE PRACTITIONER

## 2019-01-18 PROCEDURE — 25000128 H RX IP 250 OP 636: Performed by: EMERGENCY MEDICINE

## 2019-01-18 PROCEDURE — 96365 THER/PROPH/DIAG IV INF INIT: CPT | Performed by: EMERGENCY MEDICINE

## 2019-01-18 RX ORDER — MAGNESIUM SULFATE HEPTAHYDRATE 40 MG/ML
2 INJECTION, SOLUTION INTRAVENOUS ONCE
Status: COMPLETED | OUTPATIENT
Start: 2019-01-18 | End: 2019-01-18

## 2019-01-18 RX ORDER — SOTALOL HYDROCHLORIDE 80 MG/1
80 TABLET ORAL 2 TIMES DAILY
Qty: 60 TABLET | Refills: 0 | Status: SHIPPED | OUTPATIENT
Start: 2019-01-18 | End: 2019-01-28

## 2019-01-18 RX ORDER — MAGNESIUM SULFATE HEPTAHYDRATE 500 MG/ML
2 INJECTION, SOLUTION INTRAMUSCULAR; INTRAVENOUS ONCE
Status: DISCONTINUED | OUTPATIENT
Start: 2019-01-18 | End: 2019-01-18

## 2019-01-18 RX ADMIN — SODIUM CHLORIDE 500 ML: 9 INJECTION, SOLUTION INTRAVENOUS at 11:10

## 2019-01-18 RX ADMIN — MAGNESIUM SULFATE HEPTAHYDRATE 2 G: 40 INJECTION, SOLUTION INTRAVENOUS at 11:50

## 2019-01-18 ASSESSMENT — MIFFLIN-ST. JEOR: SCORE: 1566.85

## 2019-01-18 NOTE — DISCHARGE INSTRUCTIONS
Plan:    Stop diltiazem now    Start Sotalol 80 mg twice daily on Sunday 1/20/18    EKG on Tuesday after 5th dose of Sotalol- we will call you to arrange    Follow up with Dr. Felipe/Yamilka Skinner NP in 3 months    Cardiovascular Clinic:   [address] [place] [address]. [place] [address]  Your Care Team:  EP Cardiology   Telephone Number     Yamilka Felipe [phone]   Meggan Teixeira RN  [phone]     For scheduling appts or procedures:    Yelitza Wright   [phone]     As always, Thank you for trusting us with your health care needs!

## 2019-01-18 NOTE — CONSULTS
Electrophysiology Consultation Note   EP Attending: .   Reason for consultation: AF.   Provider requesting consultation: CONNIE Acuna MD.  Date of Service: 1/18/2019      HPI:   Mr. Orellana is a 69 year old male who has a past medical history significant for prior tobacco use, PAF (CHADSVASC 1), chronic hepatitis C, hepatitis B, WINNIE (usses CPAP), tubular adenoma of colon, and refusal of blood products as he is Bahai.   He has had a >20 year history of paroxysmal atrial fibrillation manifesting as chest pressure and palpitations.  The first episode occurred during a picnic and converted spontaneously to sinus rhythm.  He took metoprolol for 30 days.  Approximately 10 years later he had a second episode in the setting of a meniscal tear and perhaps 2 or 3 more episodes in the intervening years until his palpitations became more frequent starting around 2016.  A 48-hour Holter monitor in 2016 showed variation between sinus rhythm, junctional rhythm, and A.fib (average HR 63, range ) with 1% PVCs and <1% supraventricular ectopic beats, with 33 runs of PAT vs. A.fib (longest 10 beats at 164 bpm.). He then presented to the ED on 12/2/16 for palpitations, was in A.fib with RVR~111 bpm, started on diltiazem and ASA 81 mg daily, and discharged.  He had recurrent episode of AF end of 12/2017 that manifested as palpitations, SOB and fatigue. He felt the symptoms for about 10 days prior and was seen by Dr. Hussein on 1/6/18. At this time he was noted to be in symptomatic AF (rate controlled), and was scheduled to undergo JIM/DCCV, which was performed on 1/15/18. He was on Dabigatran for 4 weeks following the DCCV and his ASA was held. He last saw Dr. Felipe in EP clinic in 2/2018 and was doing well. More recently, he called reporting he was back in AF. He was started on Pradaxa within 1.5 days of his symptoms. He was then arranged for DCCV. He then had DCCV on 10/11/18. He had recurrence and required  another DCCV on 11/28/18. He followed up in EP clinic on 12/27/18 and was feeling well without issues. He was admitted to ED observation on 1/4/19-1/5/19 with atypical chest pain. VQ scan was negative.  Serial troponins negative X 3, no ectopy on telemetry.  The patient had a resting echo that was normal, but did show aortic dilatation of 4.2 cm, unchanged from 2017. Prior Lexiscan done 1/8/18 which was normal. Cardiology consulted and recommended no further evaluation. He now presents today to [facility] with racing heart sensation associated with diaphoresis and mild chest discomfort and lightheadedness c/w prior AF symptoms. He stated this started this morning. He was found to be in AF. He states he has had a few other AF symptoms recently and feels they are growing in frequency/severity. His AF did spontaneously terminated when in ER.  Past Medical History:   Past Medical History:   Diagnosis Date     Actinic keratosis 2009     Atrial fibrillation (H) 6/15/96    Afib - 2 or 3 extended occurrences since     Chronic hepatitis C (H)     Chronic Hepatitis C,  genotype 1b                Stage 0 Fibrosis     Dupuytren's contracture of both hands      Elevated PSA      Hepatitis B 1969    acute infection at age 19; IV drug use     WINNIE (obstructive sleep apnea)     AHI 34.2     Pain in both hands      Patient is Mormon      Refusal of blood transfusions as patient is Mormon      Right shoulder pain      Tinnitus 1 or 2 years ago    both ears     Tubular adenoma of colon 1/17/17    ascending colon, 1/17/17     Past Surgical History:   Past Surgical History:   Procedure Laterality Date     ANESTHESIA CARDIOVERSION N/A 1/15/2018    Procedure: ANESTHESIA CARDIOVERSION;  Anesthesia Coverage Cardioversion With Transesophageal Echocardiogram @0930 ;  Surgeon: GENERIC ANESTHESIA PROVIDER;  [facility]     ANESTHESIA CARDIOVERSION N/A 10/11/2018    Procedure: ANESTHESIA CARDIOVERSION;  Cardioversion;  Surgeon:  GENERIC ANESTHESIA PROVIDER;  [facility]     ANESTHESIA CARDIOVERSION N/A 11/28/2018    Procedure: Anesthesia Coverage Cardioversion @0830;  Surgeon: GENERIC ANESTHESIA PROVIDER;  [facility]     ARTHROSCOPY KNEE      left knee     COLONOSCOPY  1/17/17    tubular adenoma ascending colon     HERNIORRHAPHY INGUINAL Right 10/26/2017    Procedure: HERNIORRHAPHY INGUINAL;  Open Right Inguinal Hernia Repair with Mesh;  Surgeon: Suresh Raymond MD;  [facility]     KNEE SURGERY  2006??    torn meniscus     RELEASE DUPUYTRENS CONTRACTURE Right 1/20/2017    Procedure: RELEASE DUPUYTRENS CONTRACTURE;  Surgeon: Lars Oleary MD;  [facility] OR     RELEASE DUPUYTRENS CONTRACTURE Left 12/15/2017    Procedure: RELEASE DUPUYTRENS CONTRACTURE;  Left Ring and Middle Finger Subtotal Palmar Fasciectomy;  Surgeon: Lars Oleary MD;  [facility]     TRANSESOPHAGEAL ECHOCARDIOGRAM INTRAOPERATIVE N/A 1/15/2018    Procedure: TRANSESOPHAGEAL ECHOCARDIOGRAM INTRAOPERATIVE;;  Surgeon: GENERIC ANESTHESIA PROVIDER;  [facility] OR     Allergies: Per MAR     Allergies   Allergen Reactions     Blood Transfusion Related (Informational Only)      Samaritan.  Declines blood transfusions.     Contrast Dye Rash     Medications:   Per MAR current outpatient cardiovascular medications include:   (Not in a hospital admission)  Current Outpatient Medications   Medication Sig Dispense Refill     aspirin EC 81 MG EC tablet Take 1 tablet (81 mg) by mouth daily       diltiazem ER COATED BEADS (CARDIZEM CD/CARTIA XT) 120 MG 24 hr capsule Take 1 capsule (120 mg) by mouth daily 90 capsule 3     alfuzosin ER (UROXATRAL) 10 MG 24 hr tablet Take 1 tablet (10 mg) by mouth daily 90 tablet 3     order for DME Reported on 5/4/2017         Family History:   Family History   Problem Relation Age of Onset     Arthritis Paternal Grandmother      Rheumatoid Arthritis Paternal Grandmother      Gastrointestinal Disease  "Father          age 77 after colon surgery     Alcoholism Father      Alcohol/Drug Mother          age 64     Alcoholism Mother      Heart Disease Brother      Cardiac Sudden Death Brother      Uterine Cancer Sister      Glaucoma No family hx of      Macular Degeneration No family hx of      Retinal detachment No family hx of      Social History:   Social History     Tobacco Use     Smoking status: Former Smoker     Packs/day: 0.50     Years: 10.00     Pack years: 5.00     Types: Cigarettes     Start date: 6/15/1964     Last attempt to quit: 1974     Years since quittin.5     Smokeless tobacco: Former User     Quit date: 1969   Substance Use Topics     Alcohol use: Yes     Alcohol/week: 1.8 - 2.4 oz     Comment: 4 or 5 drinks/week, limit of 1       ROS:   A comprehensive 10 point ROS was negative other than as mentioned in HPI.    Physical Examination:   VITALS: /87   Pulse 86   Temp 97.6  F (36.4  C) (Oral)   Resp 19   Ht 1.753 m (5' 9\")   Wt 81.6 kg (180 lb)   SpO2 96%   BMI 26.58 kg/m    GENERAL APPEARANCE: AxO, NAD   HEENT: NCAT, EOMI, MMM. PERRLA.   NECK: Supple.   CHEST: CTAB   CARDIOVASCULAR: S1S2, Reg, No m/r/g.   ABDOMEN: BS+, soft, NT, ND.   EXTREMITIES: No pedal edema. Distal pulses intact.   NEURO: Grossly nonfocal.   PSYCH: Normal affect.  SKIN: Warm and dry.   Data:   Labs:  BMP  Recent Labs   Lab 19  1111      POTASSIUM 4.0   CHLORIDE 109   [place] 8.7   CO2 25   BUN 14   CR 0.89   GLC 95     CBC  Recent Labs   Lab 19  1111   WBC 6.0   RBC 4.73   HGB 14.6   HCT 43.7   MCV 92   MCH 30.9   MCHC 33.4   RDW 12.8        Cholesterol (mg/dL)   Date Value   2019 122   10/06/2016 172   2014 163   2013 188     Cholesterol/HDL Ratio (no units)   Date Value   2014 3.5   2013 3.5   2006 3.7     HDL Cholesterol (mg/dL)   Date Value   2019 50   10/06/2016 56   2014 46   2013 54     LDL Cholesterol Calculated " (mg/dL)   Date Value   2019 64   10/06/2016 100 (H)   2014 97   2013 118     EK/2019 ECHO:   Interpretation Summary  Global and regional left ventricular function is normal with an EF of 55-60%.  No regional wall motion abnormalities are seen.  Right ventricular function, chamber size, wall motion, and thickness are  normal.  Mild AI.  Mildly dilated ascending aorta measuring 4.2 cm (2.1 cm/m2.)     This study was compared with the study from 17: there has been no  significant change. Specifically, the ascending aorta is unchanged in size on  direct remeasurement of the images from the prior study  Assessment:   Mr. Orellana is a 69 year old male who has a past medical history significant for prior tobacco use, PAF (CHADSVASC 1), chronic hepatitis C, hepatitis B, WINNIE (usses CPAP), tubular adenoma of colon, and refusal of blood products as he is Church.   He has had a >20 year history of paroxysmal atrial fibrillation manifesting as chest pressure and palpitations.  The first episode occurred during a picnic and converted spontaneously to sinus rhythm.  He took metoprolol for 30 days.  Approximately 10 years later he had a second episode in the setting of a meniscal tear and perhaps 2 or 3 more episodes in the intervening years until his palpitations became more frequent starting around .  A 48-hour Holter monitor in  showed variation between sinus rhythm, junctional rhythm, and A.fib (average HR 63, range ) with 1% PVCs and <1% supraventricular ectopic beats, with 33 runs of PAT vs. A.fib (longest 10 beats at 164 bpm.). He then presented to the ED on 16 for palpitations, was in A.fib with RVR~111 bpm, started on diltiazem and ASA 81 mg daily, and discharged.  He had recurrent episode of AF end of 2017 that manifested as palpitations, SOB and fatigue. He felt the symptoms for about 10 days prior and was seen by Dr. Hussein on 18. At this time he was  noted to be in symptomatic AF (rate controlled), and was scheduled to undergo JIM/DCCV, which was performed on 1/15/18. He was on Dabigatran for 4 weeks following the DCCV and his ASA was held. He last saw Dr. Felipe in EP clinic in 2018 and was doing well. More recently, he called reporting he was back in AF. He was started on Pradaxa within 1.5 days of his symptoms. He was then arranged for DCCV. He then had DCCV on 10/11/18. He had recurrence and required another DCCV on 18. He followed up in EP clinic on 18 and was feeling well without issues. He was admitted to ED observation on 19-19 with atypical chest pain. VQ scan was negative.  Serial troponins negative X 3, no ectopy on telemetry.  The patient had a resting echo that was normal, but did show aortic dilatation of 4.2 cm, unchanged from 2017. Prior Lexiscan done 18 which was normal. Cardiology consulted and recommended no further evaluation. He now presents today to [facility] with racing heart sensation associated with diaphoresis and mild chest discomfort and lightheadedness c/w prior AF symptoms. He stated this started this morning. He was found to be in AF. He states he has had a few other AF symptoms recently and feels they are growing in frequency/severity. His AF did spontaneously terminated when in ER.    EP Recommendations:  We discussed in detail with the patient management/treatment options for Oneal includin. Stroke Prophylaxis:  CHADSVASC=+age  1, corresponding to a 1.3% annual stroke / systemic emolism event rate. He has elected to be on ASA only.  2. Rate Control: has resting bradycardia so will avoid addition of BB or CCB for now.   3. Rhythm Control: Cardioversion, Antiarrhythmics and/or ablation are options for rhythm control. He has had several DCCVs and now feels his AF symptoms are growing in frequency/severtiy. Discussed role of AAT.He does not have any preclusions. After discussion of AATs, he wishes to start  Sotalol. We will have him start Sotalol 80 mg BID and get ECG after 5th dose.  Ablation can be considered for refractory AF.   4. Risk Factor Management:maintain BP control, and continued use of CPAP for WINNIE.      Follow up with EP in 2-3 months with monitor completed prior.   The patient states understanding and is agreeable with plan.   Thank you for allowing us to participate in the care of this patient.     The patient was discussed w/ Dr. Felipe.  The above note reflects our joint plan.    ANDREINA Small CNP  Electrophysiology Consult Service  Pager: [phone]

## 2019-01-18 NOTE — ED PROVIDER NOTES
History     Chief Complaint   Patient presents with     Chest Pain     HPI  Frank Orellana is a 69 year old male with a history of atrial fibrillation x3 cardioversion (1/15/2018, 10/11/2018, and 11/28/2018), tubular adenoma of the colon, Hepatitis B, chronic Hepatitis C, and WINNIE, who presents to the Emergency Department for evaluation of palpitations. Patient reports that around midnight this morning, he awoke with sensation of a racing heart with diaphoresis and mild chest discomfort. He also notes having some light headedness and bilateral upper extremity weakness. He is not anticoagulated but is on daily aspirin which he did not take today. Patient reports that his symptoms this morning were consistent with pervious atrial fibrillation presentations.     I have reviewed the Medications, Allergies, Past Medical and Surgical History, and Social History in the tab ticketbroker system.    Past Medical History:   Diagnosis Date     Actinic keratosis 2009     Atrial fibrillation (H) 6/15/96    Afib - 2 or 3 extended occurrences since     Chronic hepatitis C (H)     Chronic Hepatitis C,  genotype 1b                Stage 0 Fibrosis     Dupuytren's contracture of both hands      Elevated PSA      Hepatitis B 1969    acute infection at age 19; IV drug use     WINNIE (obstructive sleep apnea)     AHI 34.2     Pain in both hands      Patient is Orthodox      Refusal of blood transfusions as patient is Orthodox      Right shoulder pain      Tinnitus 1 or 2 years ago    both ears     Tubular adenoma of colon 1/17/17    ascending colon, 1/17/17       Past Surgical History:   Procedure Laterality Date     ANESTHESIA CARDIOVERSION N/A 1/15/2018    Procedure: ANESTHESIA CARDIOVERSION;  Anesthesia Coverage Cardioversion With Transesophageal Echocardiogram @0930 ;  Surgeon: GENERIC ANESTHESIA PROVIDER;  [facility]     ANESTHESIA CARDIOVERSION N/A 10/11/2018    Procedure: ANESTHESIA CARDIOVERSION;  Cardioversion;   Surgeon: GENERIC ANESTHESIA PROVIDER;  [facility]     ANESTHESIA CARDIOVERSION N/A 2018    Procedure: Anesthesia Coverage Cardioversion @0830;  Surgeon: GENERIC ANESTHESIA PROVIDER;  [facility]     ARTHROSCOPY KNEE      left knee     COLONOSCOPY  17    tubular adenoma ascending colon     HERNIORRHAPHY INGUINAL Right 10/26/2017    Procedure: HERNIORRHAPHY INGUINAL;  Open Right Inguinal Hernia Repair with Mesh;  Surgeon: Suresh Raymond MD;  [facility]     KNEE SURGERY  2006??    torn meniscus     RELEASE DUPUYTRENS CONTRACTURE Right 2017    Procedure: RELEASE DUPUYTRENS CONTRACTURE;  Surgeon: Lars Oleary MD;  [facility]     RELEASE DUPUYTRENS CONTRACTURE Left 12/15/2017    Procedure: RELEASE DUPUYTRENS CONTRACTURE;  Left Ring and Middle Finger Subtotal Palmar Fasciectomy;  Surgeon: Lars Oleary MD;  [facility]     TRANSESOPHAGEAL ECHOCARDIOGRAM INTRAOPERATIVE N/A 1/15/2018    Procedure: TRANSESOPHAGEAL ECHOCARDIOGRAM INTRAOPERATIVE;;  Surgeon: GENERIC ANESTHESIA PROVIDER;  [facility] OR       Family History   Problem Relation Age of Onset     Arthritis Paternal Grandmother      Rheumatoid Arthritis Paternal Grandmother      Gastrointestinal Disease Father          age 77 after colon surgery     Alcoholism Father      Alcohol/Drug Mother          age 64     Alcoholism Mother      Heart Disease Brother      Cardiac Sudden Death Brother      Uterine Cancer Sister      Glaucoma No family hx of      Macular Degeneration No family hx of      Retinal detachment No family hx of        Social History     Tobacco Use     Smoking status: Former Smoker     Packs/day: 0.50     Years: 10.00     Pack years: 5.00     Types: Cigarettes     Start date: 6/15/1964     Last attempt to quit: 1974     Years since quittin.5     Smokeless tobacco: Former User     Quit date: 1969   Substance Use Topics     Alcohol use: Yes     Alcohol/week: 1.8 - 2.4  "oz     Comment: 4 or 5 drinks/week, limit of 1     Current Facility-Administered Medications   Medication     0.9% sodium chloride BOLUS     magnesium sulfate 2 g in water intermittent infusion     Current Outpatient Medications   Medication     aspirin EC 81 MG EC tablet     diltiazem ER COATED BEADS (CARDIZEM CD/CARTIA XT) 120 MG 24 hr capsule     alfuzosin ER (UROXATRAL) 10 MG 24 hr tablet     order for DME        Allergies   Allergen Reactions     Blood Transfusion Related (Informational Only)      Jew.  Declines blood transfusions.     Contrast Dye Rash       Review of Systems     ROS: 14 point ROS neg other than the symptoms noted above in the HPI.      Physical Exam   BP: (!) 138/96  Heart Rate: 100  Temp: 97.6  F (36.4  C)  Resp: 18  Height: 175.3 cm (5' 9\")  Weight: 81.6 kg (180 lb)  SpO2: 96 %      Physical Exam    GEN: Well appearing, non toxic, cooperative.   HEENT: The head is normocephalic and atraumatic. Pupils are equal round and reactive to light. Extraocular motions are intact. There is no facial swelling. The neck is nontender and supple.   CV: Irregular rhythm without murmurs rubs or gallops. 2+ radial pulses bilaterally.  PULM: Clear to auscultation bilaterally.  ABD: Soft, nontender, nondistended.   EXT: Full range of motion.  No edema.  NEURO: Cranial nerves II through XII are intact and symmetric. Bilateral upper and lower extremities grossly show full range of motion without any focal deficits.   SKIN: No rashes, ecchymosis, or lacerations  PSYCH: Calm and cooperative, interactive.     ED Course        Procedures             EKG Interpretation:      Interpreted by Hermann Harper  Time reviewed: 1039  Symptoms at time of EKG: Palpitations  Rhythm: atrial fibrillation - rapid  Rate: Normal  Axis: Normal  Ectopy: none  Conduction: normal  ST Segments/ T Waves: No ST-T wave changes  Q Waves: none  Comparison to prior: prior was NSR    Clinical Impression: atrial fibrillation " (chronic)                Critical Care time:  none             Labs Ordered and Resulted from Time of ED Arrival Up to the Time of Departure from the ED   CBC WITH PLATELETS DIFFERENTIAL   BASIC METABOLIC PANEL   TROPONIN I   PERIPHERAL IV CATHETER            Assessments & Plan (with Medical Decision Making)   Patient is a 69-year-old male with paroxysmal A. fib who presents with palpitations and concerns for atrial fibrillation.  Initial vitals were within normal limits.  Exam as above notable for irregularly irregular heart rate.  EKG obtained and showed atrial fibrillation.  Labs were obtained and all within normal limits.  Patient was given IV fluids and 2 g of magnesium, however he did not convert.  EP was consulted and evaluated the patient emergency department.  They performed cardioversion and patient now in normal sinus rhythm.  Per the recommendations, diltiazem was stopped and patient was started on sotalol.  He is supposed to start sotalol on Sunday and return to EP clinic on Tuesday for repeat EKG.  Is also advised to follow-up with cardiology and EP in 3 months.  Patient given strict return precautions and discharged home in stable condition.    I have reviewed the nursing notes.    I have reviewed the findings, diagnosis, plan and need for follow up with the patient.       Medication List      Started    sotalol 80 MG tablet  Commonly known as:  BETAPACE  80 mg, Oral, 2 TIMES DAILY        Discontinued    diltiazem ER COATED BEADS 120 MG 24 hr capsule  Commonly known as:  CARDIZEM CD/CARTIA XT            Final diagnoses:   Paroxysmal atrial fibrillation (H)     IRuby, am serving as a trained medical scribe to document services personally performed by Hermann Harper MD, based on the provider's statements to me.   Hermann BOB MD, was physically present and have reviewed and verified the accuracy of this note documented by Ruby Obrien.    1/18/2019   [facility] EMERGENCY DEPARTMENT      Hermann Harper MD  01/18/19 6988

## 2019-01-18 NOTE — ED AVS SNAPSHOT
[facility], [place], Emergency Department  [address] [place] [id number]  Phone:  [phone]                                    Frank Orellana   MRN: [id number]    Department:  [facility], Emergency Department   Date of Visit:  1/18/2019           After Visit Summary Signature Page    I have received my discharge instructions, and my questions have been answered. I have discussed any challenges I see with this plan with the nurse or doctor.    ..........................................................................................................................................  Patient/Patient Representative Signature      ..........................................................................................................................................  Patient Representative Print Name and Relationship to Patient    ..................................................               ................................................  Date                                   Time    ..........................................................................................................................................  Reviewed by Signature/Title    ...................................................              ..............................................  Date                                               Time          22EPIC Rev 08/18

## 2019-01-18 NOTE — ED TRIAGE NOTES
"Per patient report woke up at approx 1230am diaphoretic, clammy, lightheaded, SOB . Patient states \" I feel that my heart is not in rhythm.\"  "

## 2019-01-22 ENCOUNTER — ANCILLARY PROCEDURE (OUTPATIENT)
Dept: MRI IMAGING | Facility: CLINIC | Age: 69
End: 2019-01-22
Payer: COMMERCIAL

## 2019-01-22 DIAGNOSIS — I48.0 PAROXYSMAL ATRIAL FIBRILLATION (H): ICD-10-CM

## 2019-01-22 DIAGNOSIS — R97.20 ELEVATED PSA: ICD-10-CM

## 2019-01-22 RX ORDER — GADOBUTROL 604.72 MG/ML
7.5 INJECTION INTRAVENOUS ONCE
Status: COMPLETED | OUTPATIENT
Start: 2019-01-22 | End: 2019-01-22

## 2019-01-22 RX ADMIN — GADOBUTROL 7.5 ML: 604.72 INJECTION INTRAVENOUS at 18:51

## 2019-01-23 LAB — INTERPRETATION ECG - MUSE: NORMAL

## 2019-01-23 NOTE — DISCHARGE INSTRUCTIONS
MRI Contrast Discharge Instructions    The IV contrast you received today will pass out of your body in your  urine. This will happen in the next 24 hours. You will not feel this process.  Your urine will not change color.    Drink at least 4 extra glasses of water or juice today (unless your doctor  has restricted your fluids). This reduces the stress on your kidneys.  You may take your regular medicines.    If you are on dialysis: It is best to have dialysis today.    If you have a reaction: Most reactions happen right away. If you have  any new symptoms after leaving the hospital (such as hives or swelling),  call your hospital at the correct number below. Or call your family doctor.  If you have breathing distress or wheezing, call 911.    Special instructions: ***    I have read and understand the above information.    Signature:______________________________________ Date:___________    Staff:__________________________________________ Date:___________     Time:__________    [place] [facility] Departments:    ___[place]: [phone]  ___[facility]: [phone]  ___[place]: [phone] ___[facility]: [phone]  ___[facility]: [phone]  ___[facility]: [phone]  ___[phone]  ___[facility]: [phone]  ___[phone]

## 2019-01-28 ENCOUNTER — MYC MEDICAL ADVICE (OUTPATIENT)
Dept: CARDIOLOGY | Facility: CLINIC | Age: 69
End: 2019-01-28

## 2019-01-28 DIAGNOSIS — I48.0 PAROXYSMAL ATRIAL FIBRILLATION (H): ICD-10-CM

## 2019-01-28 DIAGNOSIS — I48.0 PAROXYSMAL ATRIAL FIBRILLATION (H): Primary | ICD-10-CM

## 2019-01-28 RX ORDER — SOTALOL HYDROCHLORIDE 80 MG/1
80 TABLET ORAL 2 TIMES DAILY
Qty: 60 TABLET | Refills: 11 | Status: SHIPPED | OUTPATIENT
Start: 2019-01-28 | End: 2019-02-11

## 2019-01-28 NOTE — TELEPHONE ENCOUNTER
Date: 1/28/2019    Time of Call: 7:33 AM     Diagnosis:  Atrial fibrillation     [ TORB] Ordering provider: Yamilka Skinner NP   Order: [facility] prior to follow up      Order received by: Meggan Brar RN      Follow-up/additional notes: MyC msg sent

## 2019-02-11 ENCOUNTER — MYC MEDICAL ADVICE (OUTPATIENT)
Dept: CARDIOLOGY | Facility: CLINIC | Age: 69
End: 2019-02-11

## 2019-02-11 DIAGNOSIS — I48.0 PAROXYSMAL ATRIAL FIBRILLATION (H): ICD-10-CM

## 2019-02-11 RX ORDER — SOTALOL HYDROCHLORIDE 80 MG/1
80 TABLET ORAL 2 TIMES DAILY
Qty: 180 TABLET | Refills: 3 | Status: SHIPPED | OUTPATIENT
Start: 2019-02-11 | End: 2019-04-24

## 2019-03-13 ENCOUNTER — DOCUMENTATION ONLY (OUTPATIENT)
Dept: SLEEP MEDICINE | Facility: CLINIC | Age: 69
End: 2019-03-13
Payer: COMMERCIAL

## 2019-03-13 DIAGNOSIS — G47.33 OSA (OBSTRUCTIVE SLEEP APNEA): Primary | ICD-10-CM

## 2019-03-13 NOTE — Clinical Note
Hi spoke will Frank and he is using a nasal mask. Request original pressures 7-9 cm H2 in [facility]. Thanks

## 2019-03-13 NOTE — PROGRESS NOTES
STM Re-Check: Spoke with patient and he would like to go back to his original pressures. Patient feels the pressure is to high. I will send an order to his Provider.

## 2019-03-25 ENCOUNTER — ALLIED HEALTH/NURSE VISIT (OUTPATIENT)
Dept: CARDIOLOGY | Facility: CLINIC | Age: 69
End: 2019-03-25
Attending: INTERNAL MEDICINE
Payer: COMMERCIAL

## 2019-03-25 DIAGNOSIS — I48.0 PAROXYSMAL ATRIAL FIBRILLATION (H): ICD-10-CM

## 2019-04-21 ASSESSMENT — ENCOUNTER SYMPTOMS
HYPOTENSION: 0
DIFFICULTY URINATING: 1
EXERCISE INTOLERANCE: 1
SYNCOPE: 0
SLEEP DISTURBANCES DUE TO BREATHING: 0
INCREASED ENERGY: 1
PALPITATIONS: 1
LIGHT-HEADEDNESS: 0
LEG PAIN: 0
HEMATURIA: 0
FATIGUE: 1
HYPERTENSION: 0
FLANK PAIN: 0
DYSURIA: 0
ORTHOPNEA: 0

## 2019-04-24 ENCOUNTER — OFFICE VISIT (OUTPATIENT)
Dept: CARDIOLOGY | Facility: CLINIC | Age: 69
End: 2019-04-24
Attending: INTERNAL MEDICINE
Payer: COMMERCIAL

## 2019-04-24 VITALS
WEIGHT: 187.3 LBS | OXYGEN SATURATION: 96 % | HEART RATE: 50 BPM | SYSTOLIC BLOOD PRESSURE: 113 MMHG | DIASTOLIC BLOOD PRESSURE: 66 MMHG | BODY MASS INDEX: 27.74 KG/M2 | HEIGHT: 69 IN

## 2019-04-24 DIAGNOSIS — I48.0 PAF (PAROXYSMAL ATRIAL FIBRILLATION) (H): ICD-10-CM

## 2019-04-24 DIAGNOSIS — Z51.81 ENCOUNTER FOR MONITORING FLECAINIDE THERAPY: Primary | ICD-10-CM

## 2019-04-24 DIAGNOSIS — Z79.899 ENCOUNTER FOR MONITORING FLECAINIDE THERAPY: Primary | ICD-10-CM

## 2019-04-24 PROCEDURE — G0463 HOSPITAL OUTPT CLINIC VISIT: HCPCS | Mod: 25,ZF

## 2019-04-24 PROCEDURE — 93005 ELECTROCARDIOGRAM TRACING: CPT | Mod: ZF

## 2019-04-24 PROCEDURE — 99214 OFFICE O/P EST MOD 30 MIN: CPT | Mod: GC | Performed by: INTERNAL MEDICINE

## 2019-04-24 PROCEDURE — 93010 ELECTROCARDIOGRAM REPORT: CPT | Mod: ZP | Performed by: INTERNAL MEDICINE

## 2019-04-24 RX ORDER — FLECAINIDE ACETATE 50 MG/1
50 TABLET ORAL 2 TIMES DAILY
Qty: 60 TABLET | Refills: 0 | Status: SHIPPED | OUTPATIENT
Start: 2019-04-24 | End: 2019-05-14

## 2019-04-24 RX ORDER — DILTIAZEM HYDROCHLORIDE 120 MG/1
120 CAPSULE, COATED, EXTENDED RELEASE ORAL DAILY
Qty: 30 CAPSULE | Refills: 0 | Status: SHIPPED | OUTPATIENT
Start: 2019-04-24 | End: 2019-08-12

## 2019-04-24 ASSESSMENT — PAIN SCALES - GENERAL: PAINLEVEL: NO PAIN (0)

## 2019-04-24 ASSESSMENT — MIFFLIN-ST. JEOR: SCORE: 1599.97

## 2019-04-24 NOTE — LETTER
4/24/2019      RE: Frank Orellana  [address]  [facility] [id number]       Dear Colleague,    Thank you for the opportunity to participate in the care of your patient, Frank Orellana, at the [facility]. Please see a copy of my visit note below.    Electrophysiology Clinic Note  HPI:   Frank Orellana is a 70 yo formerly smoking Samaritan male who returns for follow up of symptomatic paroxysmal atrial fibrillation (s/p multiple DCCV's most recent 11/28/2018). He has an additionalhistory of tobacco use, chronic hepatitis C, hepatitis B, WINNIE (uses CPAP), tubular adenoma of colon. At our initial visit in 12/2016 he had reported ~20 years of paroxysmal atrial fibrillation manifesting as chest pressure and palpitations. The first episode occurred during a picnic and converted spontaneously to sinus rhythm.  He took metoprolol for 30 days.  Approximately 10 years later he had a second episode in the setting of a meniscal tear and perhaps 2 or 3 more episodes in the intervening years until his palpitations became more frequent in the year leading up to his most recent presentation.  A 48-hour Holter monitor which showed variation between sinus rhythm, junctional rhythm, and A.fib (average HR 63, range ) with 1% PVCs and <1% supraventricular ectopic beats, with 33 runs of PAT vs. A.fib (longest 10 beats at 164 bpm). He then presented to the ED on 12/2/16 for palpitations and was in A.fib with RVR~111 bpm, started on diltiazem and ASA 81 mg daily, and discharged. He had recurrent episode of AF end of 12/2017 that manifested as palpitations, SOB and fatigue. He felt the symptoms for about 10 days prior and was seen by Dr. Hussein on 1/6/18. At this time he was noted to be in symptomatic AF (rate controlled), and was scheduled to undergo JIM/DCCV, which was performed on 1/15/18. He was on Dabigatran for 4 weeks following the DCCV and his  ASA was held. He last saw Dr. Felipe in EP clinic in 2/2018 and was doing well. More recently, he called reporting he was back in AF. He was started on Pradaxa within 1.5 days of his symptoms. He was then arranged for DCCV. He then had DCCV on 10/11/18. He had recurrence and required another DCCV on 11/28/18. He followed up in EP clinic on 12/27/18 and was feeling well without issues. He presented 1/18 to [facility] with racing heart sensation associated with diaphoresis and mild chest discomfort and lightheadedness c/w prior AF symptoms. Found to be in AF. He states he has had a few other AF symptoms recently and feels they are growing in frequency/severity. His AF did spontaneously terminated when in ER. He was initiated on Sotalol during this admission by EP.    EP visit 4/24/19: He presents today for follow-up after having initiated sotalol and cardioverted in ER 1/18/19. He reports no recurrent sustained palpitations. He does have intermittent, infrequent, short-lived palpitations which are not bothersome (and corroborated with PAC's / PVC's on recent Zio). Recent Holter shows no atrial fibrillation but run of AT (13 seconds). He does report, and his wife agrees, new onset significant fatigue since initiating sotalol. Now taking regular naps and just not doing usual enjoyable activities as long as he is used to. EKG: VR 47 bpm,  ms,  ms, QTc 421 ms. Sotalol 80 mg BID, asa 81 mg.    Past Medical History:   Diagnosis Date     Actinic keratosis 2009     Atrial fibrillation (H) 6/15/96    Afib - 2 or 3 extended occurrences since     Chronic hepatitis C (H)     Chronic Hepatitis C,  genotype 1b                Stage 0 Fibrosis     Dupuytren's contracture of both hands      Elevated PSA      Hepatitis B 1969    acute infection at age 19; IV drug use     WINNIE (obstructive sleep apnea)     AHI 34.2     Pain in both hands      Patient is Religion      Refusal of blood transfusions as patient is Jehovah's  "Witness      Right shoulder pain      Tinnitus 1 or 2 years ago    both ears     Tubular adenoma of colon 1/17/17    ascending colon, 1/17/17     Current Outpatient Medications   Medication     alfuzosin ER (UROXATRAL) 10 MG 24 hr tablet     aspirin EC 81 MG EC tablet     diltiazem ER COATED BEADS (CARDIZEM CD/CARTIA XT) 120 MG 24 hr capsule     flecainide (TAMBOCOR) 50 MG tablet     order for DME     No current facility-administered medications for this visit.        ROS:  A comprehensive 10 point review of systems negative other than as mentioned in HPI.    Examination:  /66 (BP Location: Left arm, Patient Position: Chair, Cuff Size: Adult Regular)   Pulse 50   Ht 1.753 m (5' 9\")   Wt 85 kg (187 lb 4.8 oz)   SpO2 96%   BMI 27.66 kg/m     GENERAL APPEARANCE: comfortable appearing male with unlabored breathing.  HEENT: no icterus, no xanthelasmas  NECK:  Supple.  JVP is not elevated.  RESPIRATORY: lungs clear to auscultation  CARDIOVASCULAR: regular rhythm, normal S1 and S2, warm extremities with no peripheral edema.  ABDOMEN: soft, not tender  EXTREMITIES: peripheral pulses normal, no edema, no bruits  NEURO: alert and fully oriented with fluent speech and steady gait.  SKIN: no petechiae/ecchymoses, not jaundiced    Labs:  Reviewed.     Testing/Procedures:  Echocardiogram 4/27/2017 showed LV EF 60-65%, mild RV dilation but normal RV function, mild AI, and mild dilation of the ascending aorta to 4.5 cm, indexed to 2.3 cm/m2.    MR angiography of the chest 12/27/2016 showed \"Normal caliber of thoracic aorta. The ascending aorta measures 3.9 cm, which is normal when indexed for age/gender/size.\"    Echocardiogram 10/14/16 showed LV EF 55-60%, mild RV dilation but normal RV function, mild to moderate AI, and dilation of the proximal ascending aorta to 45 mm, index 2.4 cm/m2.\"    Lexiscan NM stress test 1/8/18 showed \"Normal  myocardial SPECT study with a summed stress score of  zero. No ischemia or infarct " "identified. Normal LV function. No change  from prior study.\"    JIM (1/15/18):  Interpretation Summary  The left atrial appendage is normal. It is free of spontaneous echo contrast and thrombus.  Left ventricular function, chamber size, wall motion, and wall thickness are normal.The EF is 60-65%.  Mild aortic insufficiency is present.  Mild dilatation of the aorta is present.  Ascending aorta 4.2 cm.  No pericardial effusion is present.  There has been no change.    Ziopatch (3/21 - 4/4/2019):      Assessment and Plan:   69 year old Amish male who returns for follow up of symptomatic paroxysmal atrial fibrillation.    He is on aspirin only for CHADS VASc = 1 and patient preference (Amish). He is on sotalol with recent Holter and symptoms consistent with no significant recurrence over the past 3 months. However, he does have significant fatigue symptomatic enough to warrant change in therapy. Will stop sotalol and start flecainide 50 mg BID with exercise EKG stress test per usual protocol in two weeks after initiation. Would wait 48 hours between last dose of sotalol and first dose of flecainide. Given discontinuation of beta-blocker, will start low dose diltiazem 120 mg SR as well.    Follow up 3 months.  I discussed the patient with Dr. Jovanni Felipe.    Brodie Chen  Cardiovascular Disease Fellow    EP STAFF NOTE  Patient seen and examined and management plan personally reviewed with the patient. I agree with the note above by the CV/EP fellow.    Jovanni Felipe MD [facility]  Cardiology - Electrophysiology    CC  MARY ANNE LEO      "

## 2019-04-24 NOTE — NURSING NOTE
Chief Complaint   Patient presents with     Follow Up     Follow up PAF. DCCV 1/18 started on sotalol.     Vitals were taken and medications were reconciled. EKG was performed.    ROX Duckworth  7:55 AM

## 2019-04-24 NOTE — PROGRESS NOTES
Electrophysiology Clinic Note  HPI:   Frank Orellana is a 70 yo formerly smoking Confucianism male who returns for follow up of symptomatic paroxysmal atrial fibrillation (s/p multiple DCCV's most recent 11/28/2018). He has an additionalhistory of tobacco use, chronic hepatitis C, hepatitis B, WINNIE (uses CPAP), tubular adenoma of colon. At our initial visit in 12/2016 he had reported ~20 years of paroxysmal atrial fibrillation manifesting as chest pressure and palpitations. The first episode occurred during a picnic and converted spontaneously to sinus rhythm.  He took metoprolol for 30 days.  Approximately 10 years later he had a second episode in the setting of a meniscal tear and perhaps 2 or 3 more episodes in the intervening years until his palpitations became more frequent in the year leading up to his most recent presentation.  A 48-hour Holter monitor which showed variation between sinus rhythm, junctional rhythm, and A.fib (average HR 63, range ) with 1% PVCs and <1% supraventricular ectopic beats, with 33 runs of PAT vs. A.fib (longest 10 beats at 164 bpm). He then presented to the ED on 12/2/16 for palpitations and was in A.fib with RVR~111 bpm, started on diltiazem and ASA 81 mg daily, and discharged. He had recurrent episode of AF end of 12/2017 that manifested as palpitations, SOB and fatigue. He felt the symptoms for about 10 days prior and was seen by Dr. Hussein on 1/6/18. At this time he was noted to be in symptomatic AF (rate controlled), and was scheduled to undergo JIM/DCCV, which was performed on 1/15/18. He was on Dabigatran for 4 weeks following the DCCV and his ASA was held. He last saw Dr. Felipe in EP clinic in 2/2018 and was doing well. More recently, he called reporting he was back in AF. He was started on Pradaxa within 1.5 days of his symptoms. He was then arranged for DCCV. He then had DCCV on 10/11/18. He had recurrence and required another DCCV on 11/28/18. He followed  up in EP clinic on 12/27/18 and was feeling well without issues. He presented 1/18 to [facility] with racing heart sensation associated with diaphoresis and mild chest discomfort and lightheadedness c/w prior AF symptoms. Found to be in AF. He states he has had a few other AF symptoms recently and feels they are growing in frequency/severity. His AF did spontaneously terminated when in ER. He was initiated on Sotalol during this admission by EP.    EP visit 4/24/19: He presents today for follow-up after having initiated sotalol and cardioverted in ER 1/18/19. He reports no recurrent sustained palpitations. He does have intermittent, infrequent, short-lived palpitations which are not bothersome (and corroborated with PAC's / PVC's on recent Zio). Recent Holter shows no atrial fibrillation but run of AT (13 seconds). He does report, and his wife agrees, new onset significant fatigue since initiating sotalol. Now taking regular naps and just not doing usual enjoyable activities as long as he is used to. EKG: VR 47 bpm,  ms,  ms, QTc 421 ms. Sotalol 80 mg BID, asa 81 mg.    Past Medical History:   Diagnosis Date     Actinic keratosis 2009     Atrial fibrillation (H) 6/15/96    Afib - 2 or 3 extended occurrences since     Chronic hepatitis C (H)     Chronic Hepatitis C,  genotype 1b                Stage 0 Fibrosis     Dupuytren's contracture of both hands      Elevated PSA      Hepatitis B 1969    acute infection at age 19; IV drug use     WINNIE (obstructive sleep apnea)     AHI 34.2     Pain in both hands      Patient is Lutheran      Refusal of blood transfusions as patient is Lutheran      Right shoulder pain      Tinnitus 1 or 2 years ago    both ears     Tubular adenoma of colon 1/17/17    ascending colon, 1/17/17     Current Outpatient Medications   Medication     alfuzosin ER (UROXATRAL) 10 MG 24 hr tablet     aspirin EC 81 MG EC tablet     diltiazem ER COATED BEADS (CARDIZEM CD/CARTIA XT)  "120 MG 24 hr capsule     flecainide (TAMBOCOR) 50 MG tablet     order for DME     No current facility-administered medications for this visit.        ROS:  A comprehensive 10 point review of systems negative other than as mentioned in HPI.    Examination:  /66 (BP Location: Left arm, Patient Position: Chair, Cuff Size: Adult Regular)   Pulse 50   Ht 1.753 m (5' 9\")   Wt 85 kg (187 lb 4.8 oz)   SpO2 96%   BMI 27.66 kg/m    GENERAL APPEARANCE: comfortable appearing male with unlabored breathing.  HEENT: no icterus, no xanthelasmas  NECK:  Supple.  JVP is not elevated.  RESPIRATORY: lungs clear to auscultation  CARDIOVASCULAR: regular rhythm, normal S1 and S2, warm extremities with no peripheral edema.  ABDOMEN: soft, not tender  EXTREMITIES: peripheral pulses normal, no edema, no bruits  NEURO: alert and fully oriented with fluent speech and steady gait.  SKIN: no petechiae/ecchymoses, not jaundiced    Labs:  Reviewed.     Testing/Procedures:  Echocardiogram 4/27/2017 showed LV EF 60-65%, mild RV dilation but normal RV function, mild AI, and mild dilation of the ascending aorta to 4.5 cm, indexed to 2.3 cm/m2.    MR angiography of the chest 12/27/2016 showed \"Normal caliber of thoracic aorta. The ascending aorta measures 3.9 cm, which is normal when indexed for age/gender/size.\"    Echocardiogram 10/14/16 showed LV EF 55-60%, mild RV dilation but normal RV function, mild to moderate AI, and dilation of the proximal ascending aorta to 45 mm, index 2.4 cm/m2.\"    Lexiscan NM stress test 1/8/18 showed \"Normal  myocardial SPECT study with a summed stress score of  zero. No ischemia or infarct identified. Normal LV function. No change  from prior study.\"    JIM (1/15/18):  Interpretation Summary  The left atrial appendage is normal. It is free of spontaneous echo contrast and thrombus.  Left ventricular function, chamber size, wall motion, and wall thickness are normal.The EF is 60-65%.  Mild aortic " insufficiency is present.  Mild dilatation of the aorta is present.  Ascending aorta 4.2 cm.  No pericardial effusion is present.  There has been no change.    Apamandeep (3/21 - 4/4/2019):      Assessment and Plan:   69 year old [place] male who returns for follow up of symptomatic paroxysmal atrial fibrillation.    He is on aspirin only for CHADS VASc = 1 and patient preference ([place]). He is on sotalol with recent Holter and symptoms consistent with no significant recurrence over the past 3 months. However, he does have significant fatigue symptomatic enough to warrant change in therapy. Will stop sotalol and start flecainide 50 mg BID with exercise EKG stress test per usual protocol in two weeks after initiation. Would wait 48 hours between last dose of sotalol and first dose of flecainide. Given discontinuation of beta-blocker, will start low dose diltiazem 120 mg SR as well.    Follow up 3 months.  I discussed the patient with Dr. Jovanni Felipe.    Brodie Chen  Cardiovascular Disease Fellow    EP STAFF NOTE  Patient seen and examined and management plan personally reviewed with the patient. I agree with the note above by the CV/EP fellow.  Jovanni Felipe MD [facility]  Cardiology - Electrophysiology    CC  MARY ANNE LEO

## 2019-04-24 NOTE — PATIENT INSTRUCTIONS
Cardiology Provider you saw in clinic today: Dr. Felipe    Medication Changes:     1. Stop sotalol today   2. On Saturday: Start flecainide 50mg twice a day & diltiazem 120mg daily    Labs/Tests needed:     1. In 2 weeks (~5/10) have a EKG treadmill stress test.     Follow-up Visit:  3 months       Exercise Stress Test:    A.  Do not eat or drink 3 hours prior to the test  B.  No caffeine, alcohol or smoking 12 hours prior to the test  C. Wear comfortable clothes.  D. Take your usual morning medications with a sip of water.         If you have further questions, please utilize [employer] to contact us.   If your question concerns the above instructions, contact:  Meggan Brar RN   Electrophysiology Nurse Coordinator.  [phone]    If your question concerns the schedule/appointment times, contact:  JESSICA Singh Procedure   [phone]            Patient Education     Flecainide Acetate Oral tablet  What is this medicine?  FLECAINIDE (FLEK a nide) is an antiarrhythmic drug. This medicine is used to prevent irregular heart rhythm. It can also slow down fast heartbeats called tachycardia.  This medicine may be used for other purposes; ask your health care provider or pharmacist if you have questions.  What should I tell my health care provider before I take this medicine?  They need to know if you have any of these conditions:    abnormal levels of potassium in the blood    heart disease including heart rhythm and heart rate problems    kidney or liver disease    recent heart attack    an unusual or allergic reaction to flecainide, local anesthetics, other medicines, foods, dyes, or preservatives    pregnant or trying to get pregnant    breast-feeding  How should I use this medicine?  Take this medicine by mouth with a glass of water. Follow the directions on the prescription label. You can take this medicine with or without food. Take your doses at regular intervals. Do not take your medicine more often  than directed. Do not stop taking this medicine suddenly. This may cause serious, heart-related side effects. If your doctor wants you to stop the medicine, the dose may be slowly lowered over time to avoid any side effects.  Talk to your pediatrician regarding the use of this medicine in children. While this drug may be prescribed for children as young as 1 year of age for selected conditions, precautions do apply.  Overdosage: If you think you have taken too much of this medicine contact a poison control center or emergency room at once.  NOTE: This medicine is only for you. Do not share this medicine with others.  What if I miss a dose?  If you miss a dose, take it as soon as you can. If it is almost time for your next dose, take only that dose. Do not take double or extra doses.  What may interact with this medicine?  Do not take this medicine with any of the following medications:    amoxapine    arsenic trioxide    certain antibiotics like clarithromycin, erythromycin, gatifloxacin, gemifloxacin, levofloxacin, moxifloxacin, sparfloxacin, or troleandomycin    certain antidepressants called tricyclic antidepressants like amitriptyline, imipramine, or nortriptyline    certain medicines to control heart rhythm like disopyramide, dofetilide, encainide, moricizine, procainamide, propafenone, and quinidine    cisapride    cyclobenzaprine    delavirdine    droperidol    haloperidol    hawthorn    imatinib    levomethadyl    maprotiline    medicines for malaria like chloroquine and halofantrine    pentamidine    phenothiazines like chlorpromazine, mesoridazine, prochlorperazine, thioridazine    pimozide    quinine    ranolazine    ritonavir    sertindole    ziprasidone  This medicine may also interact with the following medications:    cimetidine    medicines for angina or high blood pressure    medicines to control heart rhythm like amiodarone and digoxin  This list may not describe all possible interactions. Give  your health care provider a list of all the medicines, herbs, non-prescription drugs, or dietary supplements you use. Also tell them if you smoke, drink alcohol, or use illegal drugs. Some items may interact with your medicine.  What should I watch for while using this medicine?  Visit your doctor or health care professional for regular checks on your progress. Because your condition and the use of this medicine carries some risk, it is a good idea to carry an identification card, necklace or bracelet with details of your condition, medications and doctor or health care professional.  Check your blood pressure and pulse rate regularly. Ask your health care professional what your blood pressure and pulse rate should be, and when you should contact him or her. Your doctor or health care professional also may schedule regular blood tests and electrocardiograms to check your progress.  You may get drowsy or dizzy. Do not drive, use machinery, or do anything that needs mental alertness until you know how this medicine affects you. Do not stand or sit up quickly, especially if you are an older patient. This reduces the risk of dizzy or fainting spells. Alcohol can make you more dizzy, increase flushing and rapid heartbeats. Avoid alcoholic drinks.  What side effects may I notice from receiving this medicine?  Side effects that you should report to your doctor or health care professional as soon as possible:    chest pain, continued irregular heartbeats    difficulty breathing    swelling of the legs or feet    trembling, shaking    unusually weak or tired  Side effects that usually do not require medical attention (report to your doctor or health care professional if they continue or are bothersome):    blurred vision    constipation    headache    nausea, vomiting    stomach pain  This list may not describe all possible side effects. Call your doctor for medical advice about side effects. You may report side effects to  FDA at 9-321-EXV-4205.  Where should I keep my medicine?  Keep out of the reach of children.  Store at room temperature between 15 and 30 degrees C (59 and 86 degrees F). Protect from light. Keep container tightly closed. Throw away any unused medicine after the expiration date.  NOTE:This sheet is a summary. It may not cover all possible information. If you have questions about this medicine, talk to your doctor, pharmacist, or health care provider. Copyright  2016 [employer]           Patient Education     Diltiazem extended-release capsules or tablets  Brand Names: Cardizem CD, Cardizem LA, Cardizem SR, Cartia XT, Dilacor XR, Dilt-CD, Diltia XT, Diltzac, Matzim LA, Taztia XT, Tiazac  What is this medicine?  DILTIAZEM (dil ALONDRA a zem) is a calcium-channel blocker. It affects the amount of calcium found in your heart and muscle cells. This relaxes your blood vessels, which can reduce the amount of work the heart has to do. This medicine is used to treat high blood pressure and chest pain caused by angina.  How should I use this medicine?  Take this medicine by mouth with a glass of water. Follow the directions on the prescription label. Swallow whole, do not crush or chew. Ask your doctor or pharmacist if your should take this medicine with food. Take your doses at regular intervals. Do not take your medicine more often then directed. Do not stop taking except on the advice of your doctor or health care professional. Ask your doctor or health care professional how to gradually reduce the dose.  Talk to your pediatrician regarding the use of this medicine in children. Special care may be needed.  What side effects may I notice from receiving this medicine?  Side effects that you should report to your doctor or health care professional as soon as possible:    allergic reactions like skin rash, itching or hives, swelling of the face, lips, or tongue    confusion, mental depression    feeling faint or lightheaded,  falls    redness, blistering, peeling or loosening of the skin, including inside the mouth    slow, irregular heartbeat    swelling of the feet and ankles    unusual bleeding or bruising, pinpoint red spots on the skin  Side effects that usually do not require medical attention (report to your doctor or health care professional if they continue or are bothersome):    constipation or diarrhea    difficulty sleeping    facial flushing    headache    nausea, vomiting    sexual dysfunction    weak or tired  What may interact with this medicine?  Do not take this medicine with any of the following medications:    cisapride    hawthorn    pimozide    ranolazine    red yeast rice  This medicine may also interact with the following medications:    buspirone    carbamazepine    cimetidine    cyclosporine    digoxin    local anesthetics or general anesthetics    lovastatin    medicines for anxiety or difficulty sleeping like midazolam and triazolam    medicines for high blood pressure or heart problems    quinidine    rifampin, rifabutin, or rifapentine  What if I miss a dose?  If you miss a dose, take it as soon as you can. If it is almost time for your next dose, take only that dose. Do not take double or extra doses.  Where should I keep my medicine?  Keep out of the reach of children.  Store at room temperature between 15 and 30 degrees C (59 and 86 degrees F). Protect from humidity. Throw away any unused medicine after the expiration date.  What should I tell my health care provider before I take this medicine?  They need to know if you have any of these conditions:    heart problems, low blood pressure, irregular heartbeat    liver disease    previous heart attack    an unusual or allergic reaction to diltiazem, other medicines, foods, dyes, or preservatives    pregnant or trying to get pregnant    breast-feeding  What should I watch for while using this medicine?  Check your blood pressure and pulse rate regularly. Ask  your doctor or health care professional what your blood pressure and pulse rate should be and when you should contact him or her.  You may feel dizzy or lightheaded. Do not drive, use machinery, or do anything that needs mental alertness until you know how this medicine affects you. To reduce the risk of dizzy or fainting spells, do not sit or stand up quickly, especially if you are an older patient. Alcohol can make you more dizzy or increase flushing and rapid heartbeats. Avoid alcoholic drinks.  NOTE:This sheet is a summary. It may not cover all possible information. If you have questions about this medicine, talk to your doctor, pharmacist, or health care provider. Copyright  2018 [employer]

## 2019-04-25 LAB — INTERPRETATION ECG - MUSE: NORMAL

## 2019-05-02 ENCOUNTER — OFFICE VISIT (OUTPATIENT)
Dept: FAMILY MEDICINE | Facility: CLINIC | Age: 69
End: 2019-05-02
Payer: COMMERCIAL

## 2019-05-02 VITALS — DIASTOLIC BLOOD PRESSURE: 76 MMHG | HEART RATE: 63 BPM | TEMPERATURE: 97.5 F | SYSTOLIC BLOOD PRESSURE: 130 MMHG

## 2019-05-02 DIAGNOSIS — Z71.84 TRAVEL ADVICE ENCOUNTER: Primary | ICD-10-CM

## 2019-05-02 PROCEDURE — 90715 TDAP VACCINE 7 YRS/> IM: CPT | Performed by: NURSE PRACTITIONER

## 2019-05-02 PROCEDURE — 90471 IMMUNIZATION ADMIN: CPT | Performed by: NURSE PRACTITIONER

## 2019-05-02 PROCEDURE — 99402 PREV MED CNSL INDIV APPRX 30: CPT | Mod: 25 | Performed by: NURSE PRACTITIONER

## 2019-05-02 PROCEDURE — 90691 TYPHOID VACCINE IM: CPT | Performed by: NURSE PRACTITIONER

## 2019-05-02 PROCEDURE — 90717 YELLOW FEVER VACCINE SUBQ: CPT | Performed by: NURSE PRACTITIONER

## 2019-05-02 PROCEDURE — 90472 IMMUNIZATION ADMIN EACH ADD: CPT | Performed by: NURSE PRACTITIONER

## 2019-05-02 NOTE — PATIENT INSTRUCTIONS
Today May 2, 2019 you received the    Yellow Fever (YF)    Tetanus (Tdap) Vaccine    Typhoid - injectable. This vaccine is valid for two years.   .    These appointments can be made as a NURSE ONLY visit.    **It is very important for the vaccinations to be given on the scheduled day(s), this helps ensure you receive the full effectiveness of the vaccine.**    Please call [facility] with any questions [phone]    Thank you for visiting [facility]

## 2019-05-02 NOTE — PROGRESS NOTES
Nurse Note      Itinerary:  [place]      Departure Date: 07/05/2019      Return Date: 07/18/2019      Length of Trip 2 WEEKS      Reason for Travel: Tourism           Urban or rural: both      Accommodations: Hotel        IMMUNIZATION HISTORY  Have you received any immunizations within the past 4 weeks?  No  Have you ever fainted from having your blood drawn or from an injection?  No  Have you ever had a fever reaction to vaccination?  No  Have you ever had any bad reaction or side effect from any vaccination?  No  Have you ever had hepatitis A or B vaccine?  No  Do you live (or work closely) with anyone who has AIDS, an AIDS-like condition, any other immune disorder or who is on chemotherapy for cancer?  No  Do you have a family history of immunodeficiency?  No  Have you received any injection of immune globulin or any blood products during the past 12 months?  No    Patient roomed by [employer] [facility]  Frank Sulaiman Orellana is a 69 year old male seen today with spouse for counsultation for international travel to [place] for Tourism.  Patient will be departing in  2 month(s) and staying for   2 weeks and  traveling with spouse.      Patient itinerary :  will be in the [facility] region of [place] which presents risk for Yellow Fever and Dengue Fever. exposure.      Patient's activities will include attending a conference and tours.    Patient's country of birth is [place]    Special medical concerns: Jehovah Witness, Afib  Pre-travel questionnaire was completed by patient and reviewed by provider.     Vitals: /76 (BP Location: Left arm, Patient Position: Sitting, Cuff Size: Adult Regular)   Pulse 63   Temp 97.5  F (36.4  C) (Oral)   BMI= There is no height or weight on file to calculate BMI.    EXAM:  General:  Well-nourished, well-developed in no acute distress.  Appears to be stated age, interacts appropriately and expresses understanding of information given to patient.    Current Outpatient  Medications   Medication Sig Dispense Refill     alfuzosin ER (UROXATRAL) 10 MG 24 hr tablet Take 1 tablet (10 mg) by mouth daily 90 tablet 3     aspirin EC 81 MG EC tablet Take 1 tablet (81 mg) by mouth daily       diltiazem ER COATED BEADS (CARDIZEM CD/CARTIA XT) 120 MG 24 hr capsule Take 1 capsule (120 mg) by mouth daily 30 capsule 0     flecainide (TAMBOCOR) 50 MG tablet Take 1 tablet (50 mg) by mouth 2 times daily 60 tablet 0     order for DME Reported on 5/4/2017       Patient Active Problem List   Diagnosis     History of actinic keratoses     Telangiectasia     SK (seborrheic keratosis)     Patient is Yazdanism     Refusal of blood transfusions as patient is Yazdanism     Paroxysmal atrial fibrillation (H)     Chronic left shoulder pain     WINNIE (obstructive sleep apnea) AHI 32     Atrial fibrillation (H)     Tubular adenoma of colon     ACP (advance care planning)     Bilateral sensorineural hearing loss     Elevated PSA     Allergies   Allergen Reactions     Blood Transfusion Related (Informational Only)      Jain.  Declines blood transfusions.     Contrast Dye Rash         Immunizations discussed include:   Hepatitis A:  Up to date  Hepatitis B: Declined  Not concerned about risk of disease  Influenza: Up to date  Typhoid: Ordered/given today, risks, benefits and side effects reviewed  Rabies: Not indicated  Yellow Fever: after thorough discussion of risks:  Stamaril Ordered/given today - consent completed, side effects, precautions, allergies, risks discussed. Patient expressed understanding.  [place] Encephalitis: Not indicated  Meningococcus: Not indicated  Tetanus/Diphtheria: Ordered/given today, risks, benefits and side effects reviewed  Measles/Mumps/Rubella: Immune by disease history per patient report  Cholera: Not needed  Polio: Up to date  Pneumococcal: Up to date  Varicella: Immune by disease history per patient report  Zostavax:  Up to date  Shingrix: Up to  date  HPV:  Not indicated  TB:  Low risk     Stamaril Informed Consent    The patient was provided with a copy of the IRB-approved consent form and all questions were answered before the patient agreed to participate by signing the informed consent document.   A copy of the form was provided to the patient.    Date: May 2, 2019   Consent Version Date: 5/10/2017   Consent Obtained by:  Pamella Walter CNP (Lori)     HIPAA:  Yes  HIPAA Authorization Signed Date: 5/2/2019      Inclusion/Exclusion Criteria:    (Similar to Yellow Fever-VAX)      The patient met all of the following inclusion criteria in order to be eligible for the Stamaril vaccination under this EAP (Expanded Access Investigational New Drug Program)           At increased risk for YF, including researchers, laboratory workers, vaccine production staff, and those who are traveling within 30 days to a YF-endemic region or to a country requiring proof of YF vaccination under IHRs (International Health Regulations)?       Yes     Patient is greater than or equal to 9 months of age on the day of vaccination?     Yes     Patient is greater than or equal to 18 years of age and signed and dated the Consent Forms?     Yes     Patient is < 18 years of age and parent(s)/guardian(s) signed and dated the Consent Forms?      Patient is 7 years to < 18 years of age and signed and dated the Assent form?        No Assent is required.  Patient is <7 years of age.     No      No      N/A     The patient did not meet any of the following criteria that would have excluded the patient from receiving the Stamaril vaccination under this EAP              Patient is less than 9 months of age.       No     The patient is breast-feeding and cannot stop nursing for at least 14 days after vaccination.    Note: Yellow Fever vaccine virus may be transmissible via breast milk by nursing mothers who are vaccinated during the final 2 weeks of pregnancy or post-partum.   Following  transmission, infants may develop encephalitis.  The minimum time of discontinuation of breastfeeding for 14 days after vaccination is based on the expected clearance of live-attenuated vaccine virus.       No     The patient is immunosuppressed, whether congenital or idiopathic, including for example, leukemia, lymphoma, other malignancies, and patients who are receiving immunosuppressant medications (e.g. Systemic corticosteroids [greater than the standard dose of topical or inhaled steroids], alkylating drugs, antimetabolites, of other cytotoxic or immunomodulatory drugs) or radiation therapy or organ transplantation.       No     The patient has known hypersensitivity to the active substance or to any of the excipients of Stamaril vaccine or to eggs or chicken proteins.     No     The patient is symptomatic for human immunodeficiency virus (HIV) infection     No     The patient is asymptomatic for HIV infection but accompanied by evidence of severe immune suppression    Note:  Evidence of severe immune suppression includes CD4+ T-cell counts < 200 cubic millimeters (or < 15% total lymphocytes in children aged < 6 years), or as determined by the health care provider.       No     The patient has a history of thymus dysfunction (including myasthenia gravis, thymoma, thymectomy)     No     Moderate or severe febrile illness or acute illness    Note: Participation in the EAP can be reassessed when moderate or severe febrile illness or acute illness has resolved.       No           Altitude Exposure on this trip: no  Past tolerance to Altitude: na    ASSESSMENT/PLAN:  No diagnosis found.  I have reviewed general recommendations for safe travel   including: food/water precautions, insect precautions, safer sex   practices given high prevalence of Zika, HIV and other STDs,   roadway safety. Educational materials and [employer] report provided.    Malaraia prophylaxis recommended: none  Symptomatic treatment for traveler's  diarrhea: loperamide/diphenoxylate  Altitude illness prevention and treatment: na      Evacuation insurance advised and resources were provided to patient.    Total visit time 30 minutes  with over 50% of time spent counseling patient as detailed above.    Pamella Walter CNP

## 2019-05-09 ENCOUNTER — OFFICE VISIT (OUTPATIENT)
Dept: SLEEP MEDICINE | Facility: CLINIC | Age: 69
End: 2019-05-09
Payer: COMMERCIAL

## 2019-05-09 VITALS
BODY MASS INDEX: 27.99 KG/M2 | HEIGHT: 69 IN | WEIGHT: 189 LBS | DIASTOLIC BLOOD PRESSURE: 70 MMHG | OXYGEN SATURATION: 96 % | RESPIRATION RATE: 16 BRPM | SYSTOLIC BLOOD PRESSURE: 115 MMHG | HEART RATE: 72 BPM

## 2019-05-09 DIAGNOSIS — G47.33 OSA (OBSTRUCTIVE SLEEP APNEA): Primary | ICD-10-CM

## 2019-05-09 PROCEDURE — 99214 OFFICE O/P EST MOD 30 MIN: CPT | Performed by: INTERNAL MEDICINE

## 2019-05-09 RX ORDER — ZOLPIDEM TARTRATE 5 MG/1
TABLET ORAL
Qty: 1 TABLET | Refills: 0 | Status: SHIPPED | OUTPATIENT
Start: 2019-05-09 | End: 2019-10-22

## 2019-05-09 ASSESSMENT — MIFFLIN-ST. JEOR: SCORE: 1607.68

## 2019-05-09 NOTE — PATIENT INSTRUCTIONS
stop using CPAP until sleep study  I will get you results of the sleep study via [employer]    Your BMI is Body mass index is 27.91 kg/m .  Weight management is a personal decision.  If you are interested in exploring weight loss strategies, the following discussion covers the approaches that may be successful. Body mass index (BMI) is one way to tell whether you are at a healthy weight, overweight, or obese. It measures your weight in relation to your height.  A BMI of 18.5 to 24.9 is in the healthy range. A person with a BMI of 25 to 29.9 is considered overweight, and someone with a BMI of 30 or greater is considered obese. More than two-thirds of American adults are considered overweight or obese.  Being overweight or obese increases the risk for further weight gain. Excess weight may lead to heart disease and diabetes.  Creating and following plans for healthy eating and physical activity may help you improve your health.  Weight control is part of healthy lifestyle and includes exercise, emotional health, and healthy eating habits. Careful eating habits lifelong are the mainstay of weight control. Though there are significant health benefits from weight loss, long-term weight loss with diet alone may be very difficult to achieve- studies show long-term success with dietary management in less than 10% of people. Attaining a healthy weight may be especially difficult to achieve in those with severe obesity. In some cases, medications, devices and surgical management might be considered.  What can you do?  If you are overweight or obese and are interested in methods for weight loss, you should discuss this with your provider.     Consider reducing daily calorie intake by 500 calories.     Keep a food journal.     Avoiding skipping meals, consider cutting portions instead.    Diet combined with exercise helps maintain muscle while optimizing fat loss. Strength training is particularly important for building and  maintaining muscle mass. Exercise helps reduce stress, increase energy, and improves fitness. Increasing exercise without diet control, however, may not burn enough calories to loose weight.       Start walking three days a week 10-20 minutes at a time    Work towards walking thirty minutes five days a week     Eventually, increase the speed of your walking for 1-2 minutes at time    In addition, we recommend that you review healthy lifestyles and methods for weight loss available through the National Institutes of Health patient information sites:  http://win.niddk.nih.gov/publications/index.htm    And look into health and wellness programs that may be available through your health insurance provider, employer, local community center, or [employer] club.    Weight management plan: Patient was referred to their PCP to discuss a diet and exercise plan.

## 2019-05-09 NOTE — PROGRESS NOTES
Chief complaint: Follow-up of sleep apnea and difficulties with CPAP tolerance    History of Present Illness: 69-year-old gentleman with history of paroxysmal atrial fibrillation and severe obstructive sleep apnea identified on PSG in 2017.  He also has a history of snoring. With PAP, he has been struggling with air swallowing and has had to have multiple pressure changes to try to resolve this.  He is also tried different masks and no optimal combination has yet been identified.  He denies problems with gastroesophageal reflux or nasal congestion; there is no history of hiatal hernia.  He does sleep on his sides at home.  And notes that he was told to sleep on his back during the PSG.  Reviewed the PSG again today and indeed there was no lateral sleep achieved the entire night.  Sleep apnea is REM predominant as well.  Patient and his wife report that on a couple of occasions he has gone for up to 2 weeks without the Pap therapy without significant clinical consequences such as the development of sleep disruption or daytime sleepiness.  In fact minimal loud snoring is reported.    Patient currently is in normal sinus rhythm on flecainide.  He had excessive fatigue on sotalol.  He did have 3 cardioversions most recently in January.  He does know when he goes into A. fib.  He also had a recent monitor for 2 weeks that showed no asymptomatic  paroxysms of A. Fib.    He denies the development of any new's significant sleep concerns although he is noting that he is waking up sometimes early in the morning and having some difficulty returning to sleep.  He is not taking naps currently however he was taking more when he was on the sotalol.    He drinks no more than 1 alcoholic beverage in the evening may be 5 evenings a week. No excessive caffeine use.  He usually sleeps on his right side as he has a little bit of hip pain on his left.  He thinks this may be arthritis.    [place] Seepiness Scale:3 (Less than 10  normal)    PORSCHE Total Score: 6    Past Medical History:   Diagnosis Date     Actinic keratosis      Atrial fibrillation (H) 6/15/96    Afib - 2 or 3 extended occurrences since     Chronic hepatitis C (H)     Chronic Hepatitis C,  genotype 1b                Stage 0 Fibrosis     Dupuytren's contracture of both hands      Elevated PSA      Hepatitis B     acute infection at age 19; IV drug use     WINNIE (obstructive sleep apnea)     AHI 34.2     Pain in both hands      Patient is Anglican      Refusal of blood transfusions as patient is Anglican      Right shoulder pain      Tinnitus 1 or 2 years ago    both ears     Tubular adenoma of colon 16    ascending colon, 16       Allergies   Allergen Reactions     Blood Transfusion Related (Informational Only)      Holiness.  Declines blood transfusions.     Contrast Dye Rash       Current Outpatient Medications   Medication     alfuzosin ER (UROXATRAL) 10 MG 24 hr tablet     aspirin EC 81 MG EC tablet     diltiazem ER COATED BEADS (CARDIZEM CD/CARTIA XT) 120 MG 24 hr capsule     flecainide (TAMBOCOR) 50 MG tablet     order for DME     zolpidem (AMBIEN) 5 MG tablet     No current facility-administered medications for this visit.        Social History     Socioeconomic History     Marital status:      Spouse name: Not on file     Number of children: Not on file     Years of education: Not on file     Highest education level: Not on file   Occupational History     Not on file   Social Needs     Financial resource strain: Not on file     Food insecurity:     Worry: Not on file     Inability: Not on file     Transportation needs:     Medical: Not on file     Non-medical: Not on file   Tobacco Use     Smoking status: Former Smoker     Packs/day: 0.50     Years: 10.00     Pack years: 5.00     Types: Cigarettes     Start date: 6/15/1964     Last attempt to quit: 1974     Years since quittin.8     Smokeless tobacco: Former  User     Quit date: 1969   Substance and Sexual Activity     Alcohol use: Yes     Alcohol/week: 1.8 - 2.4 oz     Comment: 4 or 5 drinks/week, limit of 1     Drug use: No     Comment: --history of marijuana     Sexual activity: Yes     Partners: Female     Birth control/protection: None   Lifestyle     Physical activity:     Days per week: Not on file     Minutes per session: Not on file     Stress: Not on file   Relationships     Social connections:     Talks on phone: Not on file     Gets together: Not on file     Attends Denominational service: Not on file     Active member of club or organization: Not on file     Attends meetings of clubs or organizations: Not on file     Relationship status: Not on file     Intimate partner violence:     Fear of current or ex partner: Not on file     Emotionally abused: Not on file     Physically abused: Not on file     Forced sexual activity: Not on file   Other Topics Concern      Service Not Asked     Blood Transfusions Not Asked     Caffeine Concern Not Asked     Occupational Exposure Not Asked     Hobby Hazards Not Asked     Sleep Concern Not Asked     Stress Concern Not Asked     Weight Concern Not Asked     Special Diet No     Comment: eats healthy     Back Care Not Asked     Exercise No     Bike Helmet Not Asked     Seat Belt Not Asked     Self-Exams Not Asked     Parent/sibling w/ CABG, MI or angioplasty before 65F 55M? 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[employer], followed by [employer]    Retired    Chemical manufacturing solvents    , no kids       Family History   Problem Relation Age of Onset     Arthritis Paternal Grandmother      Rheumatoid Arthritis Paternal Grandmother      Gastrointestinal Disease Father          age 77 after colon surgery     Alcoholism Father      Alcohol/Drug Mother          age 64     Alcoholism Mother      Heart Disease Brother      Cardiac Sudden Death Brother       "Uterine Cancer Sister      Glaucoma No family hx of      Macular Degeneration No family hx of      Retinal detachment No family hx of          EXAM: Pleasant and alert no distress here with his wife  /69   Pulse 72   Resp 16   Ht 1.753 m (5' 9\")   Wt 85.7 kg (189 lb)   SpO2 96%   BMI 27.91 kg/m    Psychiatric: Mood and affect appear normal    PSG date:1/8/2017  Wt:180 lbs  AHI: 32.3 REM predominant with hypoxemia. (ALL SUPINE SLEEP-no lateral sleep obtained)    PAP download from 4/8/2019 to 5/7/2019 reviewed:  Per cent of days used greater than 4 Hours 97 % (minimum goal greater than 70%)  Average use on days used: 5 hours 40 min  Settings: Min EPAP 7 cmH2O    Max EPAP 9 cmH2O  Pressures delivered 90/95th percentile for pressure 9 cmH2O  Average AHI 0.8 events per hour (goal less than 5)  Leak separable    ASSESSMENT: 69-year-old gentleman with history of severe REM predominant obstructive sleep apnea.  He continues to report difficulties with air swallowing and mask issues.  He is sleeping mostly on his sides at home and it is possible he may have insignificant obstructive sleep apnea when sleeping laterally. Position restriction may be a excellent option for him if this is indeed the case.    PLAN: We reviewed his previous sleep study together and the option of reassessing his sleep apnea in the lateral sleep positions.  Given the REM predominance would recommend doing this in the sleep lab to ensure achieving REM sleep.  Patient should avoid sleeping on his back and a position restricting device was demonstrated to him today.  His wife is thinking she may be able to sew him something that would work.  They are in agreement with this plan and I placed orders for a diagnostic PSG to be performed in the lateral sleep positions.  I will be contacting patient with results when they become available.  Patient is instructed to stop using CPAP for at least 2 weeks up to the sleep study.  If this would ensure " that any residual obstructive sleep apnea would come back after being treated optimally as it has been with CPAP.  Patient is reminded of the effects of alcohol, sedatives, narcotics on worsening obstructive sleep apnea.  He may need to investigate options to treat his left sided hip pain if this impacts his lateral sleep.    Twenty-five minutes spent with patient, >50% spent counseling and coordinating care.      Melissa Stanford M.D.  Pulmonary/Critical Care/Sleep Medicine    [facility]   Floor 1, [address]   [address] [place] [address]. [place] [address]   Appointments: [phone]    The above note was dictated using voice recognition software and may include typographical errors. Please contact the author for any clarifications.

## 2019-05-10 ENCOUNTER — ANCILLARY PROCEDURE (OUTPATIENT)
Dept: CARDIOLOGY | Facility: CLINIC | Age: 69
End: 2019-05-10
Attending: INTERNAL MEDICINE
Payer: COMMERCIAL

## 2019-05-10 DIAGNOSIS — Z79.899 ENCOUNTER FOR MONITORING FLECAINIDE THERAPY: ICD-10-CM

## 2019-05-10 DIAGNOSIS — I48.0 PAF (PAROXYSMAL ATRIAL FIBRILLATION) (H): ICD-10-CM

## 2019-05-10 DIAGNOSIS — Z51.81 ENCOUNTER FOR MONITORING FLECAINIDE THERAPY: ICD-10-CM

## 2019-05-12 ENCOUNTER — MYC MEDICAL ADVICE (OUTPATIENT)
Dept: CARDIOLOGY | Facility: CLINIC | Age: 69
End: 2019-05-12

## 2019-05-12 DIAGNOSIS — I48.0 PAF (PAROXYSMAL ATRIAL FIBRILLATION) (H): ICD-10-CM

## 2019-05-14 NOTE — TELEPHONE ENCOUNTER
flecainide (TAMBOCOR) 50 MG tablet  Last Written Prescription Date:  4/24/19  Last Fill Quantity: 60,   # refills: 0  Last Office Visit :4/24/19  Future Office visit:  7/24/19

## 2019-05-15 RX ORDER — FLECAINIDE ACETATE 50 MG/1
50 TABLET ORAL 2 TIMES DAILY
Qty: 180 TABLET | Refills: 3 | Status: SHIPPED | OUTPATIENT
Start: 2019-05-15 | End: 2020-02-28

## 2019-05-16 ENCOUNTER — OFFICE VISIT (OUTPATIENT)
Dept: INTERNAL MEDICINE | Facility: CLINIC | Age: 69
End: 2019-05-16
Payer: COMMERCIAL

## 2019-05-16 VITALS
WEIGHT: 186.1 LBS | BODY MASS INDEX: 27.48 KG/M2 | HEART RATE: 54 BPM | OXYGEN SATURATION: 95 % | RESPIRATION RATE: 20 BRPM | DIASTOLIC BLOOD PRESSURE: 65 MMHG | SYSTOLIC BLOOD PRESSURE: 109 MMHG

## 2019-05-16 DIAGNOSIS — I48.0 PAROXYSMAL ATRIAL FIBRILLATION (H): Primary | ICD-10-CM

## 2019-05-16 DIAGNOSIS — R97.20 ELEVATED PSA: ICD-10-CM

## 2019-05-16 DIAGNOSIS — G47.33 OSA (OBSTRUCTIVE SLEEP APNEA): ICD-10-CM

## 2019-05-16 LAB — PSA SERPL-MCNC: 6.03 UG/L (ref 0–4)

## 2019-05-16 ASSESSMENT — PAIN SCALES - GENERAL: PAINLEVEL: NO PAIN (0)

## 2019-05-16 NOTE — NURSING NOTE
"Chief Complaint   Patient presents with     Heart Problem     \" I am not sure what I am here for but I did see the cardiologist\"   Alessandra Hurt LPN 6:59 AM on 5/16/2019    "

## 2019-05-16 NOTE — PROGRESS NOTES
CC: Heart problem, routine f/u    HPI:  Reviewed hx since I saw him last fall.    My last OV was 11/13/18  Hx a fib with RVR requiring cardioversion 10/2018, 11/28/18 and 1/18/19 off Pradaxa, on ASA daily  Urology f/u reviewed  WINNIE, Saw Dr. Stanford 5/9/19--having sleep study soon (currently not on CPAP x few weeks, may not need it), May 30 sleep study pending.  Saw EP Dr. Felipe 4/24/19, patient c/o fatigue, sotalol staopped and flecanide started, also started diltiazem exercise EKG stress test ordered in 2 weeks.  Stress test negative. No longer fatigued. Has f/u appointment in 7/24/19  Saw Talita Rose 1/15/19 regarding elevated PSA, inidental renal lesion on prostate MRI.  F/U MRI scheduled, most likely a cyst. I contacted radiology today who also advised that it was a hemorrhagic cyst and does not require surveillance.  Saw Dr. Maravilla 1/11/19, PSA elevated, prostate MRI BPH negative for suspicious lesion on 1/22/19, PSA repeated, considereing repeat MRI if PSA elevated.  I put an order in for one today as a future order was not available.  Atypical chest pain ED visit 1/5/19 w/u negative.  1/4/19 ED visit for chest wall pain, w/u negative  EP APRN/CNP Natalee 12/27/18 note reviewed.    Leaving country to [place] for convention x 2 weeks, city, July 5,  Yellow fever vaccine recently, had outbreak last year there  Up to day on tetanus and typhoid  Advised Shingrix when available.    Patient Active Problem List   Diagnosis     History of actinic keratoses     Telangiectasia     SK (seborrheic keratosis)     Patient is Yazidism     Refusal of blood transfusions as patient is Yazidism     Paroxysmal atrial fibrillation (H)     Chronic left shoulder pain     WINNIE (obstructive sleep apnea) AHI 32     Atrial fibrillation (H)     Tubular adenoma of colon     ACP (advance care planning)     Bilateral sensorineural hearing loss     Elevated PSA     Current Outpatient Medications   Medication Sig  Dispense Refill     alfuzosin ER (UROXATRAL) 10 MG 24 hr tablet Take 1 tablet (10 mg) by mouth daily 90 tablet 3     aspirin EC 81 MG EC tablet Take 1 tablet (81 mg) by mouth daily       diltiazem ER COATED BEADS (CARDIZEM CD/CARTIA XT) 120 MG 24 hr capsule Take 1 capsule (120 mg) by mouth daily 30 capsule 0     flecainide (TAMBOCOR) 50 MG tablet Take 1 tablet (50 mg) by mouth 2 times daily 180 tablet 3     zolpidem (AMBIEN) 5 MG tablet Take tablet by mouth 15 minutes prior to sleep, for Sleep Study 1 tablet 0     Allergies   Allergen Reactions     Blood Transfusion Related (Informational Only)      [facility].  Declines blood transfusions.     Contrast Dye Rash     /65 (BP Location: Right arm, Patient Position: Sitting, Cuff Size: Adult Regular)   Pulse 54   Resp 20   Wt 84.4 kg (186 lb 1.6 oz)   SpO2 95%   BMI 27.48 kg/m    Gen:  Well-appearing, LOLY Marie was seen today for heart problem.    Diagnoses and all orders for this visit:    Paroxysmal atrial fibrillation (H)    Elevated PSA  -     PSA tumor marker; Future    WINNIE (obstructive sleep apnea) AHI 32    See details of discussion under HPI  Total time spent 25 minutes.  More than 50% of the time spent with Mr. Orellana on counseling / coordinating his care  Dina betts/u 1 year and as ariadna Edwards M.D.  Internal Medicine  [facility]   pager [phone]

## 2019-05-20 DIAGNOSIS — R97.20 ELEVATED PROSTATE SPECIFIC ANTIGEN (PSA): Primary | ICD-10-CM

## 2019-05-20 NOTE — PROGRESS NOTES
Spoke with patient and let him know that his PSA has come down a little bit from 7.17 to 6.03 and that Dr. Maravilla would like him to repeat it in 6 months and appointment with her. Shadia Hurt RN

## 2019-05-21 ENCOUNTER — MYC MEDICAL ADVICE (OUTPATIENT)
Dept: CARDIOLOGY | Facility: CLINIC | Age: 69
End: 2019-05-21

## 2019-05-21 DIAGNOSIS — I48.0 PAF (PAROXYSMAL ATRIAL FIBRILLATION) (H): Primary | ICD-10-CM

## 2019-05-21 RX ORDER — FLECAINIDE ACETATE 50 MG/1
50 TABLET ORAL 2 TIMES DAILY
Qty: 60 TABLET | Refills: 0 | Status: SHIPPED | OUTPATIENT
Start: 2019-05-21 | End: 2019-07-19

## 2019-05-30 ENCOUNTER — THERAPY VISIT (OUTPATIENT)
Dept: SLEEP MEDICINE | Facility: CLINIC | Age: 69
End: 2019-05-30
Payer: COMMERCIAL

## 2019-05-30 DIAGNOSIS — G47.33 OSA (OBSTRUCTIVE SLEEP APNEA): ICD-10-CM

## 2019-05-30 PROCEDURE — 95810 POLYSOM 6/> YRS 4/> PARAM: CPT | Performed by: INTERNAL MEDICINE

## 2019-05-31 NOTE — PROCEDURES
" SLEEP STUDY INTERPRETATION  DIAGNOSTIC POLYSOMNOGRAPHY REPORT      Patient: CATHERINE OH  YOB: 1950  Study Date: 5/30/2019  MRN: [id number]  Referring Provider: Self  Ordering Provider: Melissa Stanford MD    Indications for Polysomnography: The patient is a 69 y old Male who is 5' 9\" and weighs 189.0 lbs. His BMI is 28.0, [place] sleepiness scale 3.0 and neck circumference is 40.0 cm. Relevant medical history includes paroxysmal atrial fibrillation, severe WINNIE PSG 1/8/2017 AHI 32.3). A diagnostic polysomnogram was performed to evaluate for positional sleep apnea as patient struggling with PAP. Patient was instructed to sleep lateral.    Polysomnogram Data: A full night polysomnogram recorded the standard physiologic parameters including EEG, EOG, EMG, ECG, nasal and oral airflow. Respiratory parameters of chest and abdominal movements were recorded with respiratory inductance plethysmography. Oxygen saturation was recorded by pulse oximetry. Hypopnea scoring rule used: 1B 4%.    Sleep Architecture: Normal sleep architecture  The total recording time of the polysomnogram was 432.6 minutes. The total sleep time was 416.5 minutes. Sleep latency was normal at 2.5 minutes with the use of a sleep aid (zolpidem 10 mg. REM latency was 45.5 minutes. Arousal index was normal at 23.0 arousals per hour. Sleep efficiency was normal at 96.3%. Wake after sleep onset was 13.0 minutes. The patient spent 8.0% of total sleep time in Stage N1, 63.7% in Stage N2, 10.6% in Stage N3, and 17.6% in REM. Time in REM supine was 0 minutes.      Respiration: Overall mild, supine predominant obstructive sleep apnea,  (AHI 7.9, lateral AHI 3.5, supine AHI 33.7)    Events ? The polysomnogram revealed a presence of 33 obstructive, 1 central, and 2 mixed apneas resulting in an apnea index of 5.2 events per hour. There were 19 obstructive hypopneas and 0 central hypopneas resulting in an obstructive hypopnea index of 2.7 and central " hypopnea index of 0 events per hour. The combined apnea/hypopnea index was 7.9 events per hour (central apnea/hypopnea index was 0.1 events per hour). The REM AHI was 9.8 events per hour. The supine AHI was 33.7 events per hour. The RERA index was 9.2 events per hour.  The RDI was 17.1 events per hour.    Snoring - was reported as loud when  supine, mild when right lateral.    Respiratory rate and pattern - was notable for normal respiratory rate and pattern.    Sustained Sleep Associated Hypoventilation - Transcutaneous carbon dioxide monitoring was not used, however significant hypoventilation was not suggested by oximetry.    Sleep Associated Hypoxemia - (Greater than 5 minutes O2 sat at or below 88%) was not present. Baseline oxygen saturation was 94.2%. Lowest oxygen saturation was 79.8%. Time spent less than or equal to 88% was 0.9 minutes. Time spent less than or equal to 89% was 1.4 minutes.      Movement Activity: Frequent periodic limb movements most of which were not associated with arousals.    Periodic Limb Activity - There were 100 PLMs during the entire study. The PLM index was 14.4 movements per hour. The PLM Arousal Index was 0.1 per hour.    REM EMG Activity - Excessive transient/sustained muscle activity was/was not present.    Nocturnal Behavior - Abnormal sleep related behaviors were not noted.    Bruxism - None apparent.      Cardiac Summary: Sinus rhythm with frequent early wide QRS complex beats (PVCs.)  The average pulse rate was 48.5 bpm. The minimum pulse rate was 38.2 bpm while the maximum pulse rate was 76.9 bpm.  Arrhythmias were not noted.    Assessment:     Overall mild, supine predominant, obstructive sleep apnea,  (AHI 7.9, lateral AHI 3.5, supine AHI 33.7)    Normal sleep architecture    Frequent periodic limb movements most of which were not associated with arousals.    Sinus rhythm with frequent early wide QRS complex beats (PVCs.)    Recommendations:    Formal position restrict  to lateral sleep position appears to be an alternative to CPAP that would provide resolution of WINNIE.     Patient may be a candidate for dental appliance through referral to Sleep Dentistry for the treatment of obstructive sleep apnea, this too would be optimal when used with lateral sleep positioning.     Advice regarding the risks of drowsy driving.    Suggest optimizing sleep schedule and avoiding sleep deprivation.    Pharmacologic therapy should be used for management of restless legs syndrome only if present and clinically indicated and not based on the presence of periodic limb movements alone.    Diagnostic Codes:   Obstructive Sleep Apnea G47.33        _____________________________________   Electronically Signed By: Melissa Stanford MD 5/31/2019

## 2019-05-31 NOTE — PROGRESS NOTES
Diagnostic PSG completed per provider order.  Patient did not meet criteria for PAP therapy.        ROS      Physical Exam

## 2019-06-03 LAB — SLPCOMP: NORMAL

## 2019-06-17 ENCOUNTER — DOCUMENTATION ONLY (OUTPATIENT)
Dept: CARE COORDINATION | Facility: CLINIC | Age: 69
End: 2019-06-17

## 2019-07-19 DIAGNOSIS — I48.0 PAF (PAROXYSMAL ATRIAL FIBRILLATION) (H): ICD-10-CM

## 2019-07-22 ENCOUNTER — APPOINTMENT (OUTPATIENT)
Dept: LAB | Facility: CLINIC | Age: 69
End: 2019-07-22
Attending: INTERNAL MEDICINE
Payer: COMMERCIAL

## 2019-07-22 ENCOUNTER — HOSPITAL ENCOUNTER (OUTPATIENT)
Facility: CLINIC | Age: 69
End: 2019-07-22

## 2019-07-22 ENCOUNTER — TELEPHONE (OUTPATIENT)
Dept: CALL CENTER | Age: 69
End: 2019-07-22

## 2019-07-22 ENCOUNTER — APPOINTMENT (OUTPATIENT)
Dept: MEDSURG UNIT | Facility: CLINIC | Age: 69
End: 2019-07-22
Attending: INTERNAL MEDICINE
Payer: COMMERCIAL

## 2019-07-22 ENCOUNTER — PATIENT OUTREACH (OUTPATIENT)
Dept: CARDIOLOGY | Facility: CLINIC | Age: 69
End: 2019-07-22

## 2019-07-22 DIAGNOSIS — I48.91 ATRIAL FIBRILLATION (H): Primary | ICD-10-CM

## 2019-07-22 PROCEDURE — 40000065 ZZH STATISTIC EKG NON-CHARGEABLE

## 2019-07-22 RX ORDER — DABIGATRAN ETEXILATE 150 MG/1
150 CAPSULE ORAL 2 TIMES DAILY
Qty: 180 CAPSULE | Refills: 3 | Status: SHIPPED | OUTPATIENT
Start: 2019-07-22 | End: 2019-10-22

## 2019-07-22 RX ORDER — POTASSIUM CHLORIDE 1500 MG/1
20 TABLET, EXTENDED RELEASE ORAL
Status: CANCELLED | OUTPATIENT
Start: 2019-07-22

## 2019-07-22 RX ORDER — MAGNESIUM SULFATE HEPTAHYDRATE 40 MG/ML
2 INJECTION, SOLUTION INTRAVENOUS
Status: CANCELLED | OUTPATIENT
Start: 2019-07-22

## 2019-07-22 RX ORDER — FLECAINIDE ACETATE 50 MG/1
50 TABLET ORAL 2 TIMES DAILY
Qty: 180 TABLET | Refills: 2 | Status: SHIPPED | OUTPATIENT
Start: 2019-07-22 | End: 2019-07-24

## 2019-07-22 RX ORDER — POTASSIUM CHLORIDE 1500 MG/1
40 TABLET, EXTENDED RELEASE ORAL
Status: CANCELLED | OUTPATIENT
Start: 2019-07-22

## 2019-07-22 RX ORDER — LIDOCAINE 40 MG/G
CREAM TOPICAL
Status: CANCELLED | OUTPATIENT
Start: 2019-07-22

## 2019-07-22 ASSESSMENT — ENCOUNTER SYMPTOMS
SORE THROAT: 0
HOARSE VOICE: 0
FLANK PAIN: 0
SINUS CONGESTION: 0
SINUS PAIN: 0
TROUBLE SWALLOWING: 0
HEMATURIA: 0
TASTE DISTURBANCE: 0
DIFFICULTY URINATING: 1
SMELL DISTURBANCE: 0
DYSURIA: 0
NECK MASS: 0

## 2019-07-22 NOTE — PROGRESS NOTES
Date: 7/22/2019    Time of Call: 10:40 AM     Diagnosis:  See patients My Chart message.     [ TORB ] Ordering provider: Yamilka Hassan NP  Order: Start Pradaxa 150 mg BID. Schedule outpatient DCCV, no JIM needed as <25 hours from onset.     Order received by: Rene Tan      Follow-up/additional notes: Spoke with patient.  Agreeable to restarting Pradaxa and will await time for DCCV.    Rene Tan RN, BSN  Cardiology Care Coordinator  [facility]  [email or web address]  [phone]

## 2019-07-22 NOTE — TELEPHONE ENCOUNTER
"Assessment: Patient calling cardiology to report he's been in A-Fib since yesterday evening.  Described as heart beating \"faster and irregular\".  Reports some dizziness... \"feeling lightheaded\" when standing from a seating/laying position.  Also weakness when walking and some SOB when walking across the room.  Patient measured his heart rate with a home monitor = 113.  Denies chest pain, LOC.  Patient is at home with his wife.      Plan:   Protocol Reference:  Gregoria Aguilar  Protocol Used: Heart Rate Problems  Plan:   contact Cardiology now.  Patient/wife will call 911 if developes chest pain or LOC.  Otherwise patient waiting for callback with instructions-Home phone [phone]    Counseling/Education: Caller verbalized understanding of plan. Caller agrees with advice given.     *called with update to cardiology clinic.  They will follow up with patient.  "

## 2019-07-24 ENCOUNTER — OFFICE VISIT (OUTPATIENT)
Dept: CARDIOLOGY | Facility: CLINIC | Age: 69
End: 2019-07-24
Attending: INTERNAL MEDICINE
Payer: COMMERCIAL

## 2019-07-24 VITALS
DIASTOLIC BLOOD PRESSURE: 74 MMHG | HEART RATE: 69 BPM | SYSTOLIC BLOOD PRESSURE: 111 MMHG | HEIGHT: 69 IN | WEIGHT: 184 LBS | OXYGEN SATURATION: 98 % | BODY MASS INDEX: 27.25 KG/M2

## 2019-07-24 DIAGNOSIS — I48.0 PAF (PAROXYSMAL ATRIAL FIBRILLATION) (H): ICD-10-CM

## 2019-07-24 PROCEDURE — 93010 ELECTROCARDIOGRAM REPORT: CPT | Mod: ZP | Performed by: INTERNAL MEDICINE

## 2019-07-24 PROCEDURE — 99214 OFFICE O/P EST MOD 30 MIN: CPT | Mod: ZP | Performed by: INTERNAL MEDICINE

## 2019-07-24 PROCEDURE — 93005 ELECTROCARDIOGRAM TRACING: CPT | Mod: ZF

## 2019-07-24 PROCEDURE — G0463 HOSPITAL OUTPT CLINIC VISIT: HCPCS | Mod: 25,ZF

## 2019-07-24 ASSESSMENT — PAIN SCALES - GENERAL: PAINLEVEL: NO PAIN (0)

## 2019-07-24 ASSESSMENT — MIFFLIN-ST. JEOR: SCORE: 1585

## 2019-07-24 NOTE — PATIENT INSTRUCTIONS
Cardiology Provider you saw in clinic today: Dr Jovanni Felipe    Medication Changes:  None    Labs/Tests needed:  None     Follow-up Visit:  Follow up with Dr Felipe in 6 months with labs     Further Instructions:  None.         Please contact us via [facility] for questions or concerns.    Meggan Brar RN   Electrophysiology Nurse Coordinator.  [phone]    If your question concerns the schedule/appointment times, contact:  [facility] Procedure   [phone]    Device Clinic (Pacemakers, ICD, Loop Recorders)   [phone]      You will receive all normal lab and testing results via [employer] or mail if not reviewed in clinic today.   If you need a medication refill please contact your pharmacy.    As always, thank you for trusting us with your health care needs!

## 2019-07-24 NOTE — NURSING NOTE
Vitals completed successfully and medication reconciled. EKG done.   Eri Yanez CMA  8:30 AM  Chief Complaint   Patient presents with     Follow Up     3 mo follow-up AF - started flecainide.

## 2019-07-24 NOTE — LETTER
7/24/2019      RE: Frank Orellana  [address]  [facility] [address]       Dear Colleague,    Thank you for the opportunity to participate in the care of your patient, Frank Orellana, at the [facility]. Please see a copy of my visit note below.    Electrophysiology Clinic Note  HPI:     Frank Orellana is a 70 yo formerly smoking Taoist male who returns for follow up of symptomatic paroxysmal atrial fibrillation (s/p multiple DCCV's most recent 11/28/2018). He has an additionalhistory of tobacco use, chronic hepatitis C, hepatitis B, WNINIE (uses CPAP), tubular adenoma of colon. At our initial visit in 12/2016 he had reported ~20 years of paroxysmal atrial fibrillation manifesting as chest pressure and palpitations. The first episode occurred during a picnic and converted spontaneously to sinus rhythm.  He took metoprolol for 30 days.  Approximately 10 years later he had a second episode in the setting of a meniscal tear and perhaps 2 or 3 more episodes in the intervening years until his palpitations became more frequent in the year leading up to his most recent presentation.  A 48-hour Holter monitor which showed variation between sinus rhythm, junctional rhythm, and A.fib (average HR 63, range ) with 1% PVCs and <1% supraventricular ectopic beats, with 33 runs of PAT vs. A.fib (longest 10 beats at 164 bpm). He then presented to the ED on 12/2/16 for palpitations and was in A.fib with RVR~111 bpm, started on diltiazem and ASA 81 mg daily, and discharged. He had recurrent episode of AF end of 12/2017 that manifested as palpitations, SOB and fatigue. He felt the symptoms for about 10 days prior and was seen by Dr. Hussein on 1/6/18. At this time he was noted to be in symptomatic AF (rate controlled), and was scheduled to undergo JIM/DCCV, which was performed on 1/15/18. He was on Dabigatran for 4 weeks following the DCCV and his  "ASA was held. He last saw Dr. Felipe in EP clinic in 2/2018 and was doing well. More recently, he called reporting he was back in AF. He was started on Pradaxa within 1.5 days of his symptoms. He was then arranged for DCCV. He then had DCCV on 10/11/18. He had recurrence and required another DCCV on 11/28/18. He followed up in EP clinic on 12/27/18 and was feeling well without issues. He presented 1/18 to [facility] with racing heart sensation associated with diaphoresis and mild chest discomfort and lightheadedness c/w prior AF symptoms. Found to be in AF. He states he has had a few other AF symptoms recently and feels they are growing in frequency/severity. His AF did spontaneously terminated when in ER. He was initiated on Sotalol during this admission by EP.     EP visit 4/24/19: He presents today for follow-up after having initiated sotalol and cardioverted in ER 1/18/19. He reports no recurrent sustained palpitations. He does have intermittent, infrequent, short-lived palpitations which are not bothersome (and corroborated with PAC's / PVC's on recent Zio). Recent Holter shows no atrial fibrillation but run of AT (13 seconds). He does report, and his wife agrees, new onset significant fatigue since initiating sotalol. Now taking regular naps and just not doing usual enjoyable activities as long as he is used to. EKG: VR 47 bpm,  ms,  ms, QTc 421 ms. Sotalol 80 mg BID, asa 81 mg.      EP Vist 7/24/19  After developing symptoms of a URI last week, felt his heart \"beat out of rhythm\" on Monday evening. Call ahead to clinic was scheduled for Cardioversion, however on his way to hospital, he went out of afib and improved. Since then denies any symptoms of lightheadedness and dizziness. Per discussion with patient, did not start on Pradaxa and is still on ASA today. Denies chest pain, SOB, lightheadedness and dizziness today.               PAST MEDICAL HISTORY:  Past Medical History:   Diagnosis Date     " Actinic keratosis 2009     Atrial fibrillation (H) 6/15/96    Afib - 2 or 3 extended occurrences since     Chronic hepatitis C (H)     Chronic Hepatitis C,  genotype 1b                Stage 0 Fibrosis     Dupuytren's contracture of both hands      Elevated PSA      Hepatitis B 1969    acute infection at age 19; IV drug use     WINNIE (obstructive sleep apnea)     AHI 34.2     Pain in both hands      Patient is Jehovah's witness      Refusal of blood transfusions as patient is Jehovah's witness      Right shoulder pain      Tinnitus 1 or 2 years ago    both ears     Tubular adenoma of colon 1/17/17    ascending colon, 1/17/17       CURRENT MEDICATIONS:  Current Outpatient Medications   Medication Sig Dispense Refill     alfuzosin ER (UROXATRAL) 10 MG 24 hr tablet Take 1 tablet (10 mg) by mouth daily 90 tablet 3     aspirin EC 81 MG EC tablet Take 1 tablet (81 mg) by mouth daily       dabigatran ANTICOAGULANT (PRADAXA ANTICOAGULANT) 150 MG capsule Take 1 capsule (150 mg) by mouth 2 times daily Store in original 's bottle or blister pack; use within 120 days of opening. 180 capsule 3     diltiazem ER COATED BEADS (CARDIZEM CD/CARTIA XT) 120 MG 24 hr capsule Take 1 capsule (120 mg) by mouth daily 30 capsule 0     flecainide (TAMBOCOR) 50 MG tablet TAKE 1 TABLET (50 MG) BY MOUTH 2 TIMES DAILY 180 tablet 2     flecainide (TAMBOCOR) 50 MG tablet Take 1 tablet (50 mg) by mouth 2 times daily 180 tablet 3     zolpidem (AMBIEN) 5 MG tablet Take tablet by mouth 15 minutes prior to sleep, for Sleep Study 1 tablet 0       PAST SURGICAL HISTORY:  Past Surgical History:   Procedure Laterality Date     ANESTHESIA CARDIOVERSION N/A 1/15/2018    Procedure: ANESTHESIA CARDIOVERSION;  Anesthesia Coverage Cardioversion With Transesophageal Echocardiogram @0930 ;  Surgeon: GENERIC ANESTHESIA PROVIDER;  Location: [facility]     ANESTHESIA CARDIOVERSION N/A 10/11/2018    Procedure: ANESTHESIA CARDIOVERSION;  Cardioversion;  Surgeon:  GENERIC ANESTHESIA PROVIDER;  Location: [facility] OR     ANESTHESIA CARDIOVERSION N/A 2018    Procedure: Anesthesia Coverage Cardioversion @0830;  Surgeon: GENERIC ANESTHESIA PROVIDER;  Location: [facility] OR     ARTHROSCOPY KNEE      left knee     COLONOSCOPY  17    tubular adenoma ascending colon     HERNIORRHAPHY INGUINAL Right 10/26/2017    Procedure: HERNIORRHAPHY INGUINAL;  Open Right Inguinal Hernia Repair with Mesh;  Surgeon: Suresh Raymond MD;  Location: [facility] OR     KNEE SURGERY  2006??    torn meniscus     RELEASE DUPUYTRENS CONTRACTURE Right 2017    Procedure: RELEASE DUPUYTRENS CONTRACTURE;  Surgeon: Lars Oleary MD;  Location: [facility]     RELEASE DUPUYTRENS CONTRACTURE Left 12/15/2017    Procedure: RELEASE DUPUYTRENS CONTRACTURE;  Left Ring and Middle Finger Subtotal Palmar Fasciectomy;  Surgeon: Lars Oleary MD;  [facility]     TRANSESOPHAGEAL ECHOCARDIOGRAM INTRAOPERATIVE N/A 1/15/2018    Procedure: TRANSESOPHAGEAL ECHOCARDIOGRAM INTRAOPERATIVE;;  Surgeon: GENERIC ANESTHESIA PROVIDER;  Location: [facility] OR       ALLERGIES:     Allergies   Allergen Reactions     Blood Transfusion Related (Informational Only)      Rastafarian.  Declines blood transfusions.     Contrast Dye Rash       FAMILY HISTORY:  Family History   Problem Relation Age of Onset     Arthritis Paternal Grandmother      Rheumatoid Arthritis Paternal Grandmother      Gastrointestinal Disease Father          age 77 after colon surgery     Alcoholism Father      Alcohol/Drug Mother          age 64     Alcoholism Mother      Heart Disease Brother      Cardiac Sudden Death Brother      Uterine Cancer Sister      Glaucoma No family hx of      Macular Degeneration No family hx of      Retinal detachment No family hx of        SOCIAL HISTORY:  Social History     Tobacco Use     Smoking status: Former Smoker     Packs/day: 0.50     Years: 10.00     Pack years: 5.00     Types: Cigarettes     Start date:  "6/15/1964     Last attempt to quit: 1974     Years since quittin.0     Smokeless tobacco: Former User     Quit date: 1969   Substance Use Topics     Alcohol use: Yes     Alcohol/week: 1.8 - 2.4 oz     Comment: 4 or 5 drinks/week, limit of 1     Drug use: No     Comment: --history of marijuana       ROS:   A comprehensive 10 point review of systems negative other than as mentioned in HPI.  Exam:  There were no vitals taken for this visit.  GENERAL APPEARANCE: alert and no distress  HEENT: no icterus, no xanthelasmas, normal pupil size and reaction, normal palate, mucosa moist, no central cyanosis  NECK: no adenopathy, no asymmetry, masses, or scars, thyroid normal to palpation and no bruits, JVP not elevated  RESPIRATORY: lungs clear to auscultation - no rales, rhonchi or wheezes, no use of accessory muscles, no retractions, respirations are unlabored, normal respiratory rate  CARDIOVASCULAR: regular rhythm, normal S1 with physiologic split S2, no S3 or S4 and no murmur, click or rub, precordium quiet with normal PMI.  ABDOMEN: soft, non tender, bowel sounds normal, non-distended  EXTREMITIES: peripheral pulses normal, no edema  NEURO: alert and oriented to person/place/time, normal speech, gait and affect  SKIN: no ecchymoses, no rashes  PSYCH: normal affect, cooperative    Labs:  Reviewed.     Testing/Procedures:  PULMONARY FUNCTION TESTS:   No flowsheet data found.        Echocardiogram 2017 showed LV EF 60-65%, mild RV dilation but normal RV function, mild AI, and mild dilation of the ascending aorta to 4.5 cm, indexed to 2.3 cm/m2.     MR angiography of the chest 2016 showed \"Normal caliber of thoracic aorta. The ascending aorta measures 3.9 cm, which is normal when indexed for age/gender/size.\"     Echocardiogram 10/14/16 showed LV EF 55-60%, mild RV dilation but normal RV function, mild to moderate AI, and dilation of the proximal ascending aorta to 45 mm, index 2.4 " "cm/m2.\"     Lexiscan NM stress test 1/8/18 showed \"Normal  myocardial SPECT study with a summed stress score of  zero. No ischemia or infarct identified. Normal LV function. No change  from prior study.\"     JIM (1/15/18):  Interpretation Summary  The left atrial appendage is normal. It is free of spontaneous echo contrast and thrombus.  Left ventricular function, chamber size, wall motion, and wall thickness are normal.The EF is 60-65%.  Mild aortic insufficiency is present.  Mild dilatation of the aorta is present.  Ascending aorta 4.2 cm.  No pericardial effusion is present.  There has been no change.     Ziopatch (3/21 - 4/4/2019):        Exercise EKG 5/2019    Interpretation Summary     Normal, low-risk exercise electrocardiogram.  The target heart rate was achieved. Normal heart rate and BP response to  exercise.  Normal resting ECG. No ECG evidence of ischemia with exercise.  There were occasional uniform PVCs elicited during exercise. QT prolongation  was unable to be assessed due to artifact.  No angina was elicited.  Functional capacity is average for age.         Assessment and Plan:   69 year old Gnosticism male who returns for follow up of symptomatic paroxysmal atrial fibrillation. Had a recent episode of afib w/RVR that was likely exacerbated by recent URI symptoms. Presently in NSR and asymptomatic    Rate Control: Currently on Diltiazem 120mg SR     Rhythm control: s/p multiple DCCV(last one 11/2018). Was on sotalol for a period of time, but transitioned over due to drug related fatigue. Recently initiated on flecanide 50mg BID on 4/2019. Recent episode of afib w/RVR likely provoked by URI symptoms. Now asymptomatic, as a result will remain on flecanide 50mg BID with consideration of uptitrating to 100mg BID if he develops an episode of unprovoked afib w/RVR.     Anticoag: CHADSVASC:1 on ASA due to patient preference. On Pradaxa in the past.    F/u: 3 months     Patient seen and staffed with " Dr. Rosita Gonzalez MD  Cardiology Fellow     EP STAFF NOTE  Patient seen and examined and management plan personally reviewed with the patient. I agree with the note above by the CV/EP fellow.  Jovanni Felipe MD [facility]  Cardiology - Electrophysiology

## 2019-07-24 NOTE — PROGRESS NOTES
Electrophysiology Clinic Note  HPI:     Frank Orellana is a 68 yo formerly smoking Mormon male who returns for follow up of symptomatic paroxysmal atrial fibrillation (s/p multiple DCCV's most recent 11/28/2018). He has an additionalhistory of tobacco use, chronic hepatitis C, hepatitis B, WINNIE (uses CPAP), tubular adenoma of colon. At our initial visit in 12/2016 he had reported ~20 years of paroxysmal atrial fibrillation manifesting as chest pressure and palpitations. The first episode occurred during a picnic and converted spontaneously to sinus rhythm.  He took metoprolol for 30 days.  Approximately 10 years later he had a second episode in the setting of a meniscal tear and perhaps 2 or 3 more episodes in the intervening years until his palpitations became more frequent in the year leading up to his most recent presentation.  A 48-hour Holter monitor which showed variation between sinus rhythm, junctional rhythm, and A.fib (average HR 63, range ) with 1% PVCs and <1% supraventricular ectopic beats, with 33 runs of PAT vs. A.fib (longest 10 beats at 164 bpm). He then presented to the ED on 12/2/16 for palpitations and was in A.fib with RVR~111 bpm, started on diltiazem and ASA 81 mg daily, and discharged. He had recurrent episode of AF end of 12/2017 that manifested as palpitations, SOB and fatigue. He felt the symptoms for about 10 days prior and was seen by Dr. Hussein on 1/6/18. At this time he was noted to be in symptomatic AF (rate controlled), and was scheduled to undergo JIM/DCCV, which was performed on 1/15/18. He was on Dabigatran for 4 weeks following the DCCV and his ASA was held. He last saw Dr. Felipe in EP clinic in 2/2018 and was doing well. More recently, he called reporting he was back in AF. He was started on Pradaxa within 1.5 days of his symptoms. He was then arranged for DCCV. He then had DCCV on 10/11/18. He had recurrence and required another DCCV on 11/28/18. He followed  "up in EP clinic on 12/27/18 and was feeling well without issues. He presented 1/18 to [facility] with racing heart sensation associated with diaphoresis and mild chest discomfort and lightheadedness c/w prior AF symptoms. Found to be in AF. He states he has had a few other AF symptoms recently and feels they are growing in frequency/severity. His AF did spontaneously terminated when in ER. He was initiated on Sotalol during this admission by EP.     EP visit 4/24/19: He presents today for follow-up after having initiated sotalol and cardioverted in ER 1/18/19. He reports no recurrent sustained palpitations. He does have intermittent, infrequent, short-lived palpitations which are not bothersome (and corroborated with PAC's / PVC's on recent Zio). Recent Holter shows no atrial fibrillation but run of AT (13 seconds). He does report, and his wife agrees, new onset significant fatigue since initiating sotalol. Now taking regular naps and just not doing usual enjoyable activities as long as he is used to. EKG: VR 47 bpm,  ms,  ms, QTc 421 ms. Sotalol 80 mg BID, asa 81 mg.      EP Vist 7/24/19  After developing symptoms of a URI last week, felt his heart \"beat out of rhythm\" on Monday evening. Call ahead to clinic was scheduled for Cardioversion, however on his way to hospital, he went out of afib and improved. Since then denies any symptoms of lightheadedness and dizziness. Per discussion with patient, did not start on Pradaxa and is still on ASA today. Denies chest pain, SOB, lightheadedness and dizziness today.               PAST MEDICAL HISTORY:  Past Medical History:   Diagnosis Date     Actinic keratosis 2009     Atrial fibrillation (H) 6/15/96    Afib - 2 or 3 extended occurrences since     Chronic hepatitis C (H)     Chronic Hepatitis C,  genotype 1b                Stage 0 Fibrosis     Dupuytren's contracture of both hands      Elevated PSA      Hepatitis B 1969    acute infection at age 19; IV drug use     " WINNIE (obstructive sleep apnea)     AHI 34.2     Pain in both hands      Patient is Sikhism      Refusal of blood transfusions as patient is Sikhism      Right shoulder pain      Tinnitus 1 or 2 years ago    both ears     Tubular adenoma of colon 1/17/17    ascending colon, 1/17/17       CURRENT MEDICATIONS:  Current Outpatient Medications   Medication Sig Dispense Refill     alfuzosin ER (UROXATRAL) 10 MG 24 hr tablet Take 1 tablet (10 mg) by mouth daily 90 tablet 3     aspirin EC 81 MG EC tablet Take 1 tablet (81 mg) by mouth daily       dabigatran ANTICOAGULANT (PRADAXA ANTICOAGULANT) 150 MG capsule Take 1 capsule (150 mg) by mouth 2 times daily Store in original 's bottle or blister pack; use within 120 days of opening. 180 capsule 3     diltiazem ER COATED BEADS (CARDIZEM CD/CARTIA XT) 120 MG 24 hr capsule Take 1 capsule (120 mg) by mouth daily 30 capsule 0     flecainide (TAMBOCOR) 50 MG tablet TAKE 1 TABLET (50 MG) BY MOUTH 2 TIMES DAILY 180 tablet 2     flecainide (TAMBOCOR) 50 MG tablet Take 1 tablet (50 mg) by mouth 2 times daily 180 tablet 3     zolpidem (AMBIEN) 5 MG tablet Take tablet by mouth 15 minutes prior to sleep, for Sleep Study 1 tablet 0       PAST SURGICAL HISTORY:  Past Surgical History:   Procedure Laterality Date     ANESTHESIA CARDIOVERSION N/A 1/15/2018    Procedure: ANESTHESIA CARDIOVERSION;  Anesthesia Coverage Cardioversion With Transesophageal Echocardiogram @0930 ;  Surgeon: GENERIC ANESTHESIA PROVIDER;  Location: [facility] OR     ANESTHESIA CARDIOVERSION N/A 10/11/2018    Procedure: ANESTHESIA CARDIOVERSION;  Cardioversion;  Surgeon: GENERIC ANESTHESIA PROVIDER;  Location: [facility] OR     ANESTHESIA CARDIOVERSION N/A 11/28/2018    Procedure: Anesthesia Coverage Cardioversion @0830;  Surgeon: GENERIC ANESTHESIA PROVIDER;  Location: [facility] OR     ARTHROSCOPY KNEE      left knee     COLONOSCOPY  1/17/17    tubular adenoma ascending colon     HERNIORRHAPHY INGUINAL Right  10/26/2017    Procedure: HERNIORRHAPHY INGUINAL;  Open Right Inguinal Hernia Repair with Mesh;  Surgeon: Suresh Raymond MD;  [facility]     KNEE SURGERY  ??    torn meniscus     RELEASE DUPUYTRENS CONTRACTURE Right 2017    Procedure: RELEASE DUPUYTRENS CONTRACTURE;  Surgeon: Lars Oleary MD;  [facility]: [facility] OR     RELEASE DUPUYTRENS CONTRACTURE Left 12/15/2017    Procedure: RELEASE DUPUYTRENS CONTRACTURE;  Left Ring and Middle Finger Subtotal Palmar Fasciectomy;  Surgeon: Lars Oleary MD;  [facility]     TRANSESOPHAGEAL ECHOCARDIOGRAM INTRAOPERATIVE N/A 1/15/2018    Procedure: TRANSESOPHAGEAL ECHOCARDIOGRAM INTRAOPERATIVE;;  Surgeon: GENERIC ANESTHESIA PROVIDER;  Location: [facility] OR       ALLERGIES:     Allergies   Allergen Reactions     Blood Transfusion Related (Informational Only)      Temple.  Declines blood transfusions.     Contrast Dye Rash       FAMILY HISTORY:  Family History   Problem Relation Age of Onset     Arthritis Paternal Grandmother      Rheumatoid Arthritis Paternal Grandmother      Gastrointestinal Disease Father          age 77 after colon surgery     Alcoholism Father      Alcohol/Drug Mother          age 64     Alcoholism Mother      Heart Disease Brother      Cardiac Sudden Death Brother      Uterine Cancer Sister      Glaucoma No family hx of      Macular Degeneration No family hx of      Retinal detachment No family hx of        SOCIAL HISTORY:  Social History     Tobacco Use     Smoking status: Former Smoker     Packs/day: 0.50     Years: 10.00     Pack years: 5.00     Types: Cigarettes     Start date: 6/15/1964     Last attempt to quit: 1974     Years since quittin.0     Smokeless tobacco: Former User     Quit date: 1969   Substance Use Topics     Alcohol use: Yes     Alcohol/week: 1.8 - 2.4 oz     Comment: 4 or 5 drinks/week, limit of 1     Drug use: No     Comment: --history of marijuana       ROS:   A  "comprehensive 10 point review of systems negative other than as mentioned in HPI.  Exam:  There were no vitals taken for this visit.  GENERAL APPEARANCE: alert and no distress  HEENT: no icterus, no xanthelasmas, normal pupil size and reaction, normal palate, mucosa moist, no central cyanosis  NECK: no adenopathy, no asymmetry, masses, or scars, thyroid normal to palpation and no bruits, JVP not elevated  RESPIRATORY: lungs clear to auscultation - no rales, rhonchi or wheezes, no use of accessory muscles, no retractions, respirations are unlabored, normal respiratory rate  CARDIOVASCULAR: regular rhythm, normal S1 with physiologic split S2, no S3 or S4 and no murmur, click or rub, precordium quiet with normal PMI.  ABDOMEN: soft, non tender, bowel sounds normal, non-distended  EXTREMITIES: peripheral pulses normal, no edema  NEURO: alert and oriented to person/place/time, normal speech, gait and affect  SKIN: no ecchymoses, no rashes  PSYCH: normal affect, cooperative    Labs:  Reviewed.     Testing/Procedures:  PULMONARY FUNCTION TESTS:   No flowsheet data found.        Echocardiogram 4/27/2017 showed LV EF 60-65%, mild RV dilation but normal RV function, mild AI, and mild dilation of the ascending aorta to 4.5 cm, indexed to 2.3 cm/m2.     MR angiography of the chest 12/27/2016 showed \"Normal caliber of thoracic aorta. The ascending aorta measures 3.9 cm, which is normal when indexed for age/gender/size.\"     Echocardiogram 10/14/16 showed LV EF 55-60%, mild RV dilation but normal RV function, mild to moderate AI, and dilation of the proximal ascending aorta to 45 mm, index 2.4 cm/m2.\"     Lexiscan NM stress test 1/8/18 showed \"Normal  myocardial SPECT study with a summed stress score of  zero. No ischemia or infarct identified. Normal LV function. No change  from prior study.\"     JIM (1/15/18):  Interpretation Summary  The left atrial appendage is normal. It is free of spontaneous echo contrast and " thrombus.  Left ventricular function, chamber size, wall motion, and wall thickness are normal.The EF is 60-65%.  Mild aortic insufficiency is present.  Mild dilatation of the aorta is present.  Ascending aorta 4.2 cm.  No pericardial effusion is present.  There has been no change.     Jocelyne (3/21 - 4/4/2019):        Exercise EKG 5/2019    Interpretation Summary     Normal, low-risk exercise electrocardiogram.  The target heart rate was achieved. Normal heart rate and BP response to  exercise.  Normal resting ECG. No ECG evidence of ischemia with exercise.  There were occasional uniform PVCs elicited during exercise. QT prolongation  was unable to be assessed due to artifact.  No angina was elicited.  Functional capacity is average for age.         Assessment and Plan:   69 year old Confucianist male who returns for follow up of symptomatic paroxysmal atrial fibrillation. Had a recent episode of afib w/RVR that was likely exacerbated by recent URI symptoms. Presently in NSR and asymptomatic    Rate Control: Currently on Diltiazem 120mg SR     Rhythm control: s/p multiple DCCV(last one 11/2018). Was on sotalol for a period of time, but transitioned over due to drug related fatigue. Recently initiated on flecanide 50mg BID on 4/2019. Recent episode of afib w/RVR likely provoked by URI symptoms. Now asymptomatic, as a result will remain on flecanide 50mg BID with consideration of uptitrating to 100mg BID if he develops an episode of unprovoked afib w/RVR.     Anticoag: CHADSVASC:1 on ASA due to patient preference. On Pradaxa in the past.    F/u: 3 months     Patient seen and staffed with Dr. Rosita Gonzalez MD  Cardiology Fellow     EP STAFF NOTE  Patient seen and examined and management plan personally reviewed with the patient. I agree with the note above by the CV/EP fellow.  Jovanni Felipe MD [facility] [employer]  Cardiology - Electrophysiology

## 2019-07-24 NOTE — NURSING NOTE
Labs: Patient was given results of the laboratory testing obtained today. Patient was instructed to return for the next laboratory testing in 6 mo . Patient demonstrated understanding of this information and agreed to call with further questions or concerns.     Med Reconcile: Reviewed and verified all current medications with the patient. The updated medication list was printed and given to the patient.    Return Appointment: Follow up with Dr Felipe in 6 mo with labs.  Patient given instructions regarding scheduling next clinic visit. Patient demonstrated understanding of this information and agreed to call with further questions or concerns.    Patient stated he understood all health information given and agreed to call with further questions or concerns.    Farhat Hurt, RN  RN Care Coordinator  [facility]  [phone]

## 2019-07-25 LAB — INTERPRETATION ECG - MUSE: NORMAL

## 2019-08-12 ENCOUNTER — MYC REFILL (OUTPATIENT)
Dept: CARDIOLOGY | Facility: CLINIC | Age: 69
End: 2019-08-12

## 2019-08-12 DIAGNOSIS — I48.0 PAF (PAROXYSMAL ATRIAL FIBRILLATION) (H): ICD-10-CM

## 2019-08-12 RX ORDER — DILTIAZEM HYDROCHLORIDE 120 MG/1
120 CAPSULE, COATED, EXTENDED RELEASE ORAL DAILY
Qty: 90 CAPSULE | Refills: 3 | Status: SHIPPED | OUTPATIENT
Start: 2019-08-12 | End: 2020-05-19

## 2019-10-04 ENCOUNTER — HEALTH MAINTENANCE LETTER (OUTPATIENT)
Age: 69
End: 2019-10-04

## 2019-10-22 ENCOUNTER — OFFICE VISIT (OUTPATIENT)
Dept: OPHTHALMOLOGY | Facility: CLINIC | Age: 69
End: 2019-10-22
Attending: OPHTHALMOLOGY
Payer: COMMERCIAL

## 2019-10-22 ENCOUNTER — ALLIED HEALTH/NURSE VISIT (OUTPATIENT)
Dept: NURSING | Facility: CLINIC | Age: 69
End: 2019-10-22
Payer: COMMERCIAL

## 2019-10-22 DIAGNOSIS — H43.811 PVD (POSTERIOR VITREOUS DETACHMENT), RIGHT: ICD-10-CM

## 2019-10-22 DIAGNOSIS — H40.003 GLAUCOMA SUSPECT OF BOTH EYES: Primary | ICD-10-CM

## 2019-10-22 DIAGNOSIS — Z23 NEED FOR PROPHYLACTIC VACCINATION AND INOCULATION AGAINST INFLUENZA: Primary | ICD-10-CM

## 2019-10-22 DIAGNOSIS — H25.13 SENILE NUCLEAR SCLEROSIS, BILATERAL: ICD-10-CM

## 2019-10-22 PROCEDURE — 92133 CPTRZD OPH DX IMG PST SGM ON: CPT | Mod: ZF | Performed by: OPHTHALMOLOGY

## 2019-10-22 PROCEDURE — 92083 EXTENDED VISUAL FIELD XM: CPT | Mod: ZF | Performed by: OPHTHALMOLOGY

## 2019-10-22 PROCEDURE — 99207 ZZC NO CHARGE NURSE ONLY: CPT

## 2019-10-22 PROCEDURE — G0008 ADMIN INFLUENZA VIRUS VAC: HCPCS

## 2019-10-22 PROCEDURE — G0463 HOSPITAL OUTPT CLINIC VISIT: HCPCS | Mod: ZF

## 2019-10-22 PROCEDURE — 90662 IIV NO PRSV INCREASED AG IM: CPT

## 2019-10-22 ASSESSMENT — REFRACTION_WEARINGRX
OS_SPHERE: +2.25
OD_ADD: +2.50
OD_SPHERE: +2.00
OS_AXIS: 000
OS_ADD: +2.50
OD_AXIS: 035
OS_CYLINDER: +0.00
OD_CYLINDER: +0.25

## 2019-10-22 ASSESSMENT — CUP TO DISC RATIO
OS_RATIO: 0.4
OD_RATIO: 0.55

## 2019-10-22 ASSESSMENT — PACHYMETRY
OS_CT(UM): 589
OD_CT(UM): 595

## 2019-10-22 ASSESSMENT — TONOMETRY
OS_IOP_MMHG: 16
OD_IOP_MMHG: 15
IOP_METHOD: APPLANATION

## 2019-10-22 ASSESSMENT — EXTERNAL EXAM - RIGHT EYE: OD_EXAM: NORMAL

## 2019-10-22 ASSESSMENT — CONF VISUAL FIELD
OD_NORMAL: 1
OS_NORMAL: 1

## 2019-10-22 ASSESSMENT — SLIT LAMP EXAM - LIDS
COMMENTS: NORMAL
COMMENTS: NORMAL

## 2019-10-22 ASSESSMENT — VISUAL ACUITY
OD_CC+: -2
OS_CC: 20/20
OD_CC: 20/20
OS_CC+: -2
METHOD: SNELLEN - LINEAR
CORRECTION_TYPE: GLASSES

## 2019-10-22 ASSESSMENT — EXTERNAL EXAM - LEFT EYE: OS_EXAM: NORMAL

## 2019-10-22 NOTE — NURSING NOTE
Chief Complaints and History of Present Illnesses   Patient presents with     Follow Up     Primary open angle glaucoma (POAG) suspect     Chief Complaint(s) and History of Present Illness(es)     Follow Up     Laterality: both eyes    Associated symptoms: Negative for glare, haloes, eye pain and dryness    Pain scale: 0/10    Comments: Primary open angle glaucoma (POAG) suspect              Comments      He states that his vision has seemed slightly decreased in both eyes, since his last eye exam.   He is less comfortable driving in the rain, though in general is not struggling with any visual activities.    He is not using any eye medications at this time.    Tiffanie Green, COT 7:44 AM  October 22, 2019

## 2019-10-22 NOTE — PROGRESS NOTES
Chief Complaint(s) and History of Present Illness(es)     Follow Up     Laterality: both eyes    Associated symptoms: Negative for glare, haloes, eye pain and dryness    Pain scale: 0/10    Comments: Primary open angle glaucoma (POAG) suspect              Comments      He states that his vision has seemed slightly decreased in both eyes,   since his last eye exam.   He is less comfortable driving in the rain,   though in general is not struggling with any visual activities.    He is not using any eye medications at this time.    Tiffanie Green, COT 7:44 AM  October 22, 2019               Review of systems for the eyes was negative other than the pertinent positives/negatives listed in the HPI.      Assessment & Plan      Frank Orellana is a 69 year old male with the following diagnoses:   1. Glaucoma suspect of both eyes    2. Senile nuclear sclerosis, bilateral    3. PVD (posterior vitreous detachment), right         Referral from Dr. Boone for cupping in both eyes   Maximum intraocular pressure 18/18  Family history: negative  Trauma history: negative (possible rake injury as a small child, unknown eye)  Gonioscopy: Open both eyes   Pachmetry: 595/589    Today's testing:  Visual field October 22, 2019: Right eye - full, reliable; Left eye - full, reliable  OCT nerve fiber layer October 22, 2019: Normal both eyes - stable from previous  Large disc right eye > left eye     Low risk, monitor with annual intraocular pressure, dilated fundus exam and OCT Nerve fiber layer for now  Cataracts not visually significant at this time, monitor  Discussed symptoms of retinal tear/detachment and the need to be seen urgently should they occur        Patient disposition:   Return in about 1 year (around 10/22/2020) for DFE, OCT NFL.           Attending Physician Attestation:  Complete documentation of historical and exam elements from today's encounter can be found in the full encounter summary report (not reduplicated in  this progress note).  I personally obtained the chief complaint(s) and history of present illness.  I confirmed and edited as necessary the review of systems, past medical/surgical history, family history, social history, and examination findings as documented by others; and I examined the patient myself.  I personally reviewed the relevant tests, images, and reports as documented above.  I formulated and edited as necessary the assessment and plan and discussed the findings and management plan with the patient and family. . - Den Quintana MD

## 2019-11-04 DIAGNOSIS — R35.0 URINARY FREQUENCY: ICD-10-CM

## 2019-11-05 ENCOUNTER — TELEPHONE (OUTPATIENT)
Dept: UROLOGY | Facility: CLINIC | Age: 69
End: 2019-11-05

## 2019-11-05 RX ORDER — ALFUZOSIN HYDROCHLORIDE 10 MG/1
1 TABLET, EXTENDED RELEASE ORAL DAILY
Qty: 90 TABLET | Refills: 0 | Status: SHIPPED | OUTPATIENT
Start: 2019-11-05 | End: 2020-03-01

## 2019-11-05 NOTE — TELEPHONE ENCOUNTER
[facility] Call Center    Phone Message    May a detailed message be left on voicemail: yes    Reason for Call: Other: Pt called back. Unable to schedule pt until February 2020. Please take a look and see if this pt can be seen sooner than Feb 2020     Action Taken: Message routed to:  [facility] ([facility] TRE Al[place]ga

## 2019-11-05 NOTE — TELEPHONE ENCOUNTER
Per 5/20/2019 note/ Urology PSA result: Spoke with patient and let him know that his PSA has come down a little bit from 7.17 to 6.03 and that Dr. Maravilla would like him to repeat it in 6 months and appointment with her. #90 refilled.  Scheduling has been notified to contact the pt for appointment.

## 2019-11-15 ASSESSMENT — ENCOUNTER SYMPTOMS
DYSURIA: 0
DIFFICULTY URINATING: 1
SWOLLEN GLANDS: 0
FLANK PAIN: 0
BRUISES/BLEEDS EASILY: 1
HEMATURIA: 0

## 2019-11-19 NOTE — PROGRESS NOTES
CC:  Health care maintenance    HPI:  Other health care team members:  Ophthalmology Dr. Quintana  Ophthalmology Dr. Gonzalez Cardiology Dr. Felipe  Sleep Dr. Stanford  Urology Dr. Maravilla  Cardiology NP AZRA Albright, Dr. Hussein  Ortho     Routine HCM reviewed in detail.  Up to date except for Shingrjaren  [place] July, sick 7-8 weeks, wife had to go to ER (cough, drainage, cough at night, chills/fevers low grade, tired)  Right ear crackles, fatigue x last 10 days  Mini Cog normal (5/5), scanned into EHR  PHQ score 1, JARVIS 0  Advance directives are on file    Rest of 10 point ROS negative.      Since I saw him in May:  Sleep Study  Assessment:     Overall mild, supine predominant, obstructive sleep apnea,  (AHI 7.9, lateral AHI 3.5, supine AHI 33.7)    Normal sleep architecture    Frequent periodic limb movements most of which were not associated with arousals.    Sinus rhythm with frequent early wide QRS complex beats (PVCs.)  Recommendations:    Formal position restrict to lateral sleep position appears to be an alternative to CPAP that would provide resolution of WINNIE.     Patient may be a candidate for dental appliance through referral to Sleep Dentistry for the treatment of obstructive sleep apnea, this too would be optimal when used with lateral sleep positioning.     Dr. Felipe 7/24/19:  Continue Diltiazem for rate control and flecanide for rhythm control, continue ASA    Dr. Quintana 10/22/19, eval'd for increased cup/disc ratio, exam noted, plans to follow    ROS:  General Symptoms: No     Skin Symptoms: No    HENT Symptoms: No    EYE SYMPTOMS: No    HEART SYMPTOMS: No    LUNG SYMPTOMS: No    INTESTINAL SYMPTOMS: No    URINARY SYMPTOMS: Yes    REPRODUCTIVE SYMPTOMS: No    SKELETAL SYMPTOMS: No    BLOOD SYMPTOMS: Yes    NERVOUS SYSTEM SYMPTOMS: No    MENTAL HEALTH SYMPTOMS: No    Trouble holding urine or incontinence: No    Pain or burning: No    Trouble starting or stopping: No    Increased frequency of  urination: No    Blood in urine: No    Decreased frequency of urination: No    Frequent nighttime urination: No    Flank pain: No    Difficulty emptying bladder: Yes    Anemia: No    Swollen glands: No    Easy bleeding or bruising: Yes    Edema or swelling: No         Patient Active Problem List   Diagnosis     History of actinic keratoses     Telangiectasia     SK (seborrheic keratosis)     Patient is Mormonism     Refusal of blood transfusions as patient is Mormonism     Paroxysmal atrial fibrillation (H)     Chronic left shoulder pain     WINNIE (obstructive sleep apnea) positional     Atrial fibrillation (H)     Tubular adenoma of colon     ACP (advance care planning)     Bilateral sensorineural hearing loss     Elevated PSA     Past Medical History:   Diagnosis Date     Actinic keratosis 2009     Atrial fibrillation (H) 6/15/96    Afib - 2 or 3 extended occurrences since     Chronic hepatitis C (H)     Chronic Hepatitis C,  genotype 1b                Stage 0 Fibrosis     Dupuytren's contracture of both hands      Elevated PSA      Hepatitis B 1969    acute infection at age 19; IV drug use     WINNIE (obstructive sleep apnea)     AHI 34.2     Pain in both hands      Patient is Mormonism      Refusal of blood transfusions as patient is Mormonism      Right shoulder pain      Tinnitus 1 or 2 years ago    both ears     Tubular adenoma of colon 1/17/17    ascending colon, 1/17/17     Past Surgical History:   Procedure Laterality Date     ANESTHESIA CARDIOVERSION N/A 1/15/2018    Procedure: ANESTHESIA CARDIOVERSION;  Anesthesia Coverage Cardioversion With Transesophageal Echocardiogram @0930 ;  Surgeon: GENERIC ANESTHESIA PROVIDER;  Location: [facility]     ANESTHESIA CARDIOVERSION N/A 10/11/2018    Procedure: ANESTHESIA CARDIOVERSION;  Cardioversion;  Surgeon: GENERIC ANESTHESIA PROVIDER;  Location: [facility] OR     ANESTHESIA CARDIOVERSION N/A 11/28/2018    Procedure: Anesthesia Coverage Cardioversion  @0830;  Surgeon: GENERIC ANESTHESIA PROVIDER;  Location: [facility] OR     ARTHROSCOPY KNEE      left knee     COLONOSCOPY  17    tubular adenoma ascending colon     HERNIORRHAPHY INGUINAL Right 10/26/2017    Procedure: HERNIORRHAPHY INGUINAL;  Open Right Inguinal Hernia Repair with Mesh;  Surgeon: Suresh Raymond MD;  Location: [facility] OR     KNEE SURGERY  ??    torn meniscus     RELEASE DUPUYTRENS CONTRACTURE Right 2017    Procedure: RELEASE DUPUYTRENS CONTRACTURE;  Surgeon: Lars Oleary MD;  Location: [facility] OR     RELEASE DUPUYTRENS CONTRACTURE Left 12/15/2017    Procedure: RELEASE DUPUYTRENS CONTRACTURE;  Left Ring and Middle Finger Subtotal Palmar Fasciectomy;  Surgeon: Lars Oleary MD;  Location: [facility]     TRANSESOPHAGEAL ECHOCARDIOGRAM INTRAOPERATIVE N/A 1/15/2018    Procedure: TRANSESOPHAGEAL ECHOCARDIOGRAM INTRAOPERATIVE;;  Surgeon: GENERIC ANESTHESIA PROVIDER;  Location: [facility] OR     Family History   Problem Relation Age of Onset     Arthritis Paternal Grandmother      Rheumatoid Arthritis Paternal Grandmother      Gastrointestinal Disease Father          age 77 after colon surgery     Alcoholism Father      Alcohol/Drug Mother          age 64     Alcoholism Mother      Heart Disease Brother      Cardiac Sudden Death Brother      Uterine Cancer Sister      Glaucoma No family hx of      Macular Degeneration No family hx of      Retinal detachment No family hx of      Social History     Tobacco Use     Smoking status: Former Smoker     Packs/day: 0.50     Years: 10.00     Pack years: 5.00     Types: Cigarettes     Start date: 6/15/1964     Last attempt to quit: 1974     Years since quittin.4     Smokeless tobacco: Former User     Quit date: 1969   Substance Use Topics     Alcohol use: Yes     Alcohol/week: 3.0 - 4.0 standard drinks     Comment: 4 or 5 drinks/week, limit of 1     Drug use: No     Comment: --history of marijuana     Social History     Social  History Narrative    Social history:    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, followed by [employer]    Retired    Chemical manufacturing solvents    , no kids     Current Outpatient Medications   Medication Sig Dispense Refill     alfuzosin ER (UROXATRAL) 10 MG 24 hr tablet Take 1 tablet (10 mg) by mouth daily Please call for follow up with Dr Maravilla. 90 tablet 0     aspirin EC 81 MG EC tablet Take 1 tablet (81 mg) by mouth daily       diltiazem ER COATED BEADS (CARDIZEM CD/CARTIA XT) 120 MG 24 hr capsule Take 1 capsule (120 mg) by mouth daily 90 capsule 3     flecainide (TAMBOCOR) 50 MG tablet Take 1 tablet (50 mg) by mouth 2 times daily 180 tablet 3     Allergies   Allergen Reactions     Blood Transfusion Related (Informational Only)      Sabianism.  Declines blood transfusions.     Contrast Dye Rash     /61 (BP Location: Right arm, Patient Position: Sitting, Cuff Size: Adult Regular)   Pulse 60   Wt 81.6 kg (180 lb)   SpO2 96%   BMI 26.58 kg/m      BP Readings from Last 6 Encounters:   07/24/19 111/74   05/16/19 109/65   05/09/19 115/70   05/02/19 130/76   04/24/19 113/66   01/18/19 115/78     Wt Readings from Last 5 Encounters:   07/24/19 83.5 kg (184 lb)   05/16/19 84.4 kg (186 lb 1.6 oz)   05/09/19 85.7 kg (189 lb)   04/24/19 85 kg (187 lb 4.8 oz)   01/18/19 81.6 kg (180 lb)   Physical Examination:    General:  Conversant, generally healthy appearing, no acute distress  Head: atraumatic  Eyes:  Pupils 2-3 mm, sclera white, EOM's full, conjunctiva moist, no periorbital swelling    Ears:  TM's normal, EAC's patent, clear of cerumen  Nose:  Nasal passages clear, turbinates not swollen  Throat/Mouth:  No pharyngeal erythema, exudate, ulcers, oral mucosa and tongue moist, normal hard and soft palate  Neck:  Trachea midline, Full AROM, supple, thyroid smooth, symmetric, not enlarged, no nodules, no neck lymphadenopathy  Lungs:  Clear to auscultation throughout, no wheezes, rhonchi or rales.  Normal respiratory effort and no intercostal retractions.  C/V:  Regular rate and rhythm, no murmurs, rubs or gallops.  No JVD, no carotid bruits. Radial and DP pulses 2+, equal and regular.  Abdomen:  Not distended.  Bowel sounds active.  No tenderness, no hepatosplenomegaly or masses.  No CVA tenderness or masses.  Lymph:  No cervical lymph nodes.  Neuro: Alert and oriented, face symmetric. Able to get on/off exam table without assistance.  Strength grossly intact. No tremor.  Gait steady.   M/S:   No joint deformities noted.  No joint swelling.  Skin:   Normal temperature., turgor and texture. No rashes, suspicious lesions,  jaundice or ulcers    Extremities:  No peripheral edema, no digital cyanosis  Psych:  Alert and oriented. Appropriate affect.  Not psychomotor slowed.  No signs of anxiety or agitation.      Component      Latest Ref Rng & Units 1/18/2019   WBC      4.0 - 11.0 10e9/L 6.0   RBC Count      4.4 - 5.9 10e12/L 4.73   Hemoglobin      13.3 - 17.7 g/dL 14.6   Hematocrit      40.0 - 53.0 % 43.7   MCV      78 - 100 fl 92   MCH      26.5 - 33.0 pg 30.9   MCHC      31.5 - 36.5 g/dL 33.4   RDW      10.0 - 15.0 % 12.8   Platelet Count      150 - 450 10e9/L 217     Component      Latest Ref Rng & Units 1/18/2019   Sodium      133 - 144 mmol/L 143   Potassium      3.4 - 5.3 mmol/L 4.0   Chloride      94 - 109 mmol/L 109   Carbon Dioxide      20 - 32 mmol/L 25   Anion Gap      3 - 14 mmol/L 9   Glucose      70 - 99 mg/dL 95   Urea Nitrogen      7 - 30 mg/dL 14   Creatinine      0.66 - 1.25 mg/dL 0.89   GFR Estimate      >60 mL/min/1.73:m2 87     Frank was seen today for physical.    Diagnoses and all orders for this visit:    Health care maintenance    Need for shingles vaccine  -     HC ZOSTER VACCINE RECOMBINANT ADJUVANTED IM NJX    Paroxysmal atrial fibrillation (H)  -     Cancel: Basic metabolic panel; Future  -     CBC with platelets differential; Future  -     TSH with free T4 reflex;  Future    Tiredness    PAF (paroxysmal atrial fibrillation) (H)  -     Comprehensive metabolic panel    Elevated prostate specific antigen (PSA)  -     PSA tumor marker      Follow up in one year and as needed.    Total time spent 40 minutes.  More than 50% of the time spent with Mr. Orellana on counseling / coordinating his care        Vanessa Edwards M.D.  Internal Medicine  [facility]   pager [phone]

## 2019-11-21 ENCOUNTER — OFFICE VISIT (OUTPATIENT)
Dept: INTERNAL MEDICINE | Facility: CLINIC | Age: 69
End: 2019-11-21
Payer: COMMERCIAL

## 2019-11-21 VITALS
OXYGEN SATURATION: 96 % | SYSTOLIC BLOOD PRESSURE: 103 MMHG | DIASTOLIC BLOOD PRESSURE: 61 MMHG | WEIGHT: 180 LBS | HEART RATE: 60 BPM | BODY MASS INDEX: 26.58 KG/M2

## 2019-11-21 DIAGNOSIS — Z23 NEED FOR SHINGLES VACCINE: ICD-10-CM

## 2019-11-21 DIAGNOSIS — I48.0 PAF (PAROXYSMAL ATRIAL FIBRILLATION) (H): ICD-10-CM

## 2019-11-21 DIAGNOSIS — I48.0 PAROXYSMAL ATRIAL FIBRILLATION (H): ICD-10-CM

## 2019-11-21 DIAGNOSIS — Z00.00 HEALTH CARE MAINTENANCE: Primary | ICD-10-CM

## 2019-11-21 DIAGNOSIS — R97.20 ELEVATED PROSTATE SPECIFIC ANTIGEN (PSA): ICD-10-CM

## 2019-11-21 DIAGNOSIS — R53.83 TIREDNESS: ICD-10-CM

## 2019-11-21 LAB
ALBUMIN SERPL-MCNC: 4 G/DL (ref 3.4–5)
ALP SERPL-CCNC: 81 U/L (ref 40–150)
ALT SERPL W P-5'-P-CCNC: 16 U/L (ref 0–70)
ANION GAP SERPL CALCULATED.3IONS-SCNC: 2 MMOL/L (ref 3–14)
AST SERPL W P-5'-P-CCNC: 11 U/L (ref 0–45)
BASOPHILS # BLD AUTO: 0 10E9/L (ref 0–0.2)
BASOPHILS NFR BLD AUTO: 0.4 %
BILIRUB SERPL-MCNC: 1.2 MG/DL (ref 0.2–1.3)
BUN SERPL-MCNC: 13 MG/DL (ref 7–30)
CALCIUM SERPL-MCNC: 8.6 MG/DL (ref 8.5–10.1)
CHLORIDE SERPL-SCNC: 110 MMOL/L (ref 94–109)
CO2 SERPL-SCNC: 27 MMOL/L (ref 20–32)
CREAT SERPL-MCNC: 0.83 MG/DL (ref 0.66–1.25)
DIFFERENTIAL METHOD BLD: ABNORMAL
EOSINOPHIL # BLD AUTO: 0.1 10E9/L (ref 0–0.7)
EOSINOPHIL NFR BLD AUTO: 1.8 %
ERYTHROCYTE [DISTWIDTH] IN BLOOD BY AUTOMATED COUNT: 12.4 % (ref 10–15)
GFR SERPL CREATININE-BSD FRML MDRD: 88 ML/MIN/{1.73_M2}
GLUCOSE SERPL-MCNC: 92 MG/DL (ref 70–99)
HCT VFR BLD AUTO: 40.8 % (ref 40–53)
HGB BLD-MCNC: 13.6 G/DL (ref 13.3–17.7)
IMM GRANULOCYTES # BLD: 0 10E9/L (ref 0–0.4)
IMM GRANULOCYTES NFR BLD: 0.6 %
LYMPHOCYTES # BLD AUTO: 0.8 10E9/L (ref 0.8–5.3)
LYMPHOCYTES NFR BLD AUTO: 12.5 %
MCH RBC QN AUTO: 31.1 PG (ref 26.5–33)
MCHC RBC AUTO-ENTMCNC: 33.3 G/DL (ref 31.5–36.5)
MCV RBC AUTO: 93 FL (ref 78–100)
MONOCYTES # BLD AUTO: 0.6 10E9/L (ref 0–1.3)
MONOCYTES NFR BLD AUTO: 9.1 %
NEUTROPHILS # BLD AUTO: 5.1 10E9/L (ref 1.6–8.3)
NEUTROPHILS NFR BLD AUTO: 75.6 %
NRBC # BLD AUTO: 0 10*3/UL
NRBC BLD AUTO-RTO: 0 /100
PLATELET # BLD AUTO: 190 10E9/L (ref 150–450)
POTASSIUM SERPL-SCNC: 3.9 MMOL/L (ref 3.4–5.3)
PROT SERPL-MCNC: 7 G/DL (ref 6.8–8.8)
PSA SERPL-MCNC: 8.72 UG/L (ref 0–4)
RBC # BLD AUTO: 4.37 10E12/L (ref 4.4–5.9)
SODIUM SERPL-SCNC: 139 MMOL/L (ref 133–144)
TSH SERPL DL<=0.005 MIU/L-ACNC: 1.42 MU/L (ref 0.4–4)
WBC # BLD AUTO: 6.7 10E9/L (ref 4–11)

## 2019-11-21 ASSESSMENT — ANXIETY QUESTIONNAIRES
3. WORRYING TOO MUCH ABOUT DIFFERENT THINGS: NOT AT ALL
IF YOU CHECKED OFF ANY PROBLEMS ON THIS QUESTIONNAIRE, HOW DIFFICULT HAVE THESE PROBLEMS MADE IT FOR YOU TO DO YOUR WORK, TAKE CARE OF THINGS AT HOME, OR GET ALONG WITH OTHER PEOPLE: NOT DIFFICULT AT ALL
GAD7 TOTAL SCORE: 0
5. BEING SO RESTLESS THAT IT IS HARD TO SIT STILL: NOT AT ALL
2. NOT BEING ABLE TO STOP OR CONTROL WORRYING: NOT AT ALL
7. FEELING AFRAID AS IF SOMETHING AWFUL MIGHT HAPPEN: NOT AT ALL
6. BECOMING EASILY ANNOYED OR IRRITABLE: NOT AT ALL
1. FEELING NERVOUS, ANXIOUS, OR ON EDGE: NOT AT ALL

## 2019-11-21 ASSESSMENT — PATIENT HEALTH QUESTIONNAIRE - PHQ9
SUM OF ALL RESPONSES TO PHQ QUESTIONS 1-9: 1
5. POOR APPETITE OR OVEREATING: NOT AT ALL

## 2019-11-21 ASSESSMENT — PAIN SCALES - GENERAL: PAINLEVEL: NO PAIN (0)

## 2019-11-21 NOTE — PATIENT INSTRUCTIONS
[facility] Medication Refill Request Information:  * Please contact your pharmacy regarding ANY request for medication refills.  ** [facility] Prescription Fax = [phone]  * Please allow 3 business days for routine medication refills.  * Please allow 5 business days for controlled substance medication refills.     [facility] Test Result notification information:  *You will be notified with in 7-10 days of your appointment day regarding the results of your test.  If you are on MyChart you will be notified as soon as the provider has reviewed the results and signed off on them.    [facility]: [phone]

## 2019-11-21 NOTE — NURSING NOTE
Chief Complaint   Patient presents with     Physical     patient is here for physical, not medicare wellness        Susana Archer, EMT at 7:34 AM on 11/21/2019.

## 2019-11-21 NOTE — NURSING NOTE
Frank Orellana comes into clinic today at the request of Dr. Vanessa Edwards, Ordering Provider for an immunization/s.    I gave the shingrix immunization/s while the patient was in clinic. They received an informational sheet and I explained the common side effects of the injection. The patient tolerated the procedure well and had no complications while here in clinic.     This service provided today was under the supervising provider of the day Dr. Vanessa Edwards, who was available if needed.    Susana Archer, EMT at 9:14 AM on 11/21/2019.

## 2019-11-22 ASSESSMENT — ANXIETY QUESTIONNAIRES: GAD7 TOTAL SCORE: 0

## 2020-01-10 ASSESSMENT — ENCOUNTER SYMPTOMS
HYPERTENSION: 0
FATIGUE: 0
SYNCOPE: 0
LEG PAIN: 0
LIGHT-HEADEDNESS: 0
SMELL DISTURBANCE: 0
EXERCISE INTOLERANCE: 0
SINUS CONGESTION: 0
NIGHT SWEATS: 0
FEVER: 0
ORTHOPNEA: 0
FLANK PAIN: 0
HOARSE VOICE: 0
SINUS PAIN: 0
SLEEP DISTURBANCES DUE TO BREATHING: 0
POLYPHAGIA: 0
PALPITATIONS: 0
HYPOTENSION: 0
POLYDIPSIA: 0
ALTERED TEMPERATURE REGULATION: 1
DYSURIA: 0
EYE IRRITATION: 0
WEIGHT LOSS: 0
EYE WATERING: 0
DOUBLE VISION: 0
CHILLS: 0
TASTE DISTURBANCE: 0
DIFFICULTY URINATING: 1
HALLUCINATIONS: 0
INCREASED ENERGY: 0
NECK MASS: 0
SORE THROAT: 0
WEIGHT GAIN: 0
DECREASED APPETITE: 0
EYE REDNESS: 0
EYE PAIN: 0
TROUBLE SWALLOWING: 0
HEMATURIA: 0

## 2020-01-24 ENCOUNTER — ALLIED HEALTH/NURSE VISIT (OUTPATIENT)
Dept: INTERNAL MEDICINE | Facility: CLINIC | Age: 70
End: 2020-01-24
Payer: COMMERCIAL

## 2020-01-24 ENCOUNTER — OFFICE VISIT (OUTPATIENT)
Dept: CARDIOLOGY | Facility: CLINIC | Age: 70
End: 2020-01-24
Attending: INTERNAL MEDICINE
Payer: COMMERCIAL

## 2020-01-24 VITALS
HEART RATE: 57 BPM | SYSTOLIC BLOOD PRESSURE: 113 MMHG | HEIGHT: 69 IN | BODY MASS INDEX: 26.67 KG/M2 | WEIGHT: 180.1 LBS | OXYGEN SATURATION: 98 % | DIASTOLIC BLOOD PRESSURE: 68 MMHG

## 2020-01-24 DIAGNOSIS — Z23 NEED FOR SHINGLES VACCINE: Primary | ICD-10-CM

## 2020-01-24 DIAGNOSIS — Z51.81 ENCOUNTER FOR MONITORING FLECAINIDE THERAPY: Primary | ICD-10-CM

## 2020-01-24 DIAGNOSIS — Z79.899 ENCOUNTER FOR MONITORING FLECAINIDE THERAPY: Primary | ICD-10-CM

## 2020-01-24 DIAGNOSIS — I48.0 PAF (PAROXYSMAL ATRIAL FIBRILLATION) (H): ICD-10-CM

## 2020-01-24 PROCEDURE — 99214 OFFICE O/P EST MOD 30 MIN: CPT | Mod: 25 | Performed by: INTERNAL MEDICINE

## 2020-01-24 PROCEDURE — 93005 ELECTROCARDIOGRAM TRACING: CPT | Mod: ZF

## 2020-01-24 PROCEDURE — G0463 HOSPITAL OUTPT CLINIC VISIT: HCPCS | Mod: 25,ZF

## 2020-01-24 ASSESSMENT — PAIN SCALES - GENERAL: PAINLEVEL: NO PAIN (0)

## 2020-01-24 ASSESSMENT — MIFFLIN-ST. JEOR: SCORE: 1562.31

## 2020-01-24 NOTE — PATIENT INSTRUCTIONS
You were seen in the Electrophysiology today by: Dr. Felipe    Plan:     Follow up visit: 1 year with Yamilka Skinner NP with labs      Your Care Team:  EP Cardiology   Telephone Number     Meggan Brar RN [phone]     For scheduling appts or procedures:    Yelitza Wright   [phone]   For the Device Clinic (Pacemakers, ICDs, Loop Recorders)    During business hours: [phone]  After business hours:   [phone]- select option 4 and ask for job code [id number].       [facility] Clinic:   [address] [place] [address]. [place] [address]      As always, Thank you for trusting us with your health care needs!

## 2020-01-24 NOTE — PROGRESS NOTES
Frank Orellana comes into clinic today at the request of Dr. Edwards Ordering Provider for Med Injection only 2nd shingles vaccine.    Pt tolerated well.    This service provided today was under the supervising provider of the day Dr. Wu, who was available if needed.    Vane Toney, EMT

## 2020-01-24 NOTE — LETTER
1/24/2020      RE: Frank Orellana  [address]  [facility] [address]       Dear Colleague,    Thank you for the opportunity to participate in the care of your patient, Frank Orellana, at the [facility] at [facility]. Please see a copy of my visit note below.    Electrophysiology Clinic Note  HPI:     Ms. Orellana is a [age] year old [employer] male who has a past medical history significant for PAF (CHADSVASC 1) s/p DCCVs, chronic hepatitis C, hepatitis B, WINNIE (uses CPAP), tubular adenoma of colon, and tobacco use.     At our initial visit in 12/2016 he had reported ~20 years of paroxysmal atrial fibrillation manifesting as chest pressure and palpitations. The first episode occurred during a picnic and converted spontaneously to sinus rhythm.  He took metoprolol for 30 days.  Approximately 10 years later he had a second episode in the setting of a meniscal tear and perhaps 2 or 3 more episodes in the intervening years until his palpitations became more frequent in the year leading up to his most recent presentation.  A 48-hour Holter monitor which showed variation between sinus rhythm, junctional rhythm, and A.fib (average HR 63, range ) with 1% PVCs and <1% supraventricular ectopic beats, with 33 runs of PAT vs. A.fib (longest 10 beats at 164 bpm). He then presented to the ED on 12/2/16 for palpitations and was in A.fib with RVR~111 bpm, started on diltiazem and ASA 81 mg daily, and discharged. He had recurrent episode of AF end of 12/2017 that manifested as palpitations, SOB and fatigue. He felt the symptoms for about 10 days prior and was seen by Dr. Hussein on 1/6/18. At this time he was noted to be in symptomatic AF (rate controlled), and was scheduled to undergo JIM/DCCV, which was performed on 1/15/18. He was on Dabigatran for 4 weeks following the DCCV and his ASA was held. He last saw Dr. Felipe in EP clinic in 2/2018 and was doing well.  "More recently, he called reporting he was back in AF. He was started on Pradaxa within 1.5 days of his symptoms. He was then arranged for DCCV. He then had DCCV on 10/11/18. He had recurrence and required another DCCV on 11/28/18. He followed up in EP clinic on 12/27/18 and was feeling well without issues. He presented 1/18 to [facility] with racing heart sensation associated with diaphoresis and mild chest discomfort and lightheadedness c/w prior AF symptoms. Found to be in AF. He states he has had a few other AF symptoms recently and feels they are growing in frequency/severity. His AF did spontaneously terminated when in ER. He was initiated on Sotalol during this admission by EP.     EP visit 4/24/19: He presents today for follow-up after having initiated sotalol and cardioverted in ER 1/18/19. He reports no recurrent sustained palpitations. He does have intermittent, infrequent, short-lived palpitations which are not bothersome (and corroborated with PAC's / PVC's on recent Zio). Recent Holter shows no atrial fibrillation but run of AT (13 seconds). He does report, and his wife agrees, new onset significant fatigue since initiating sotalol. Now taking regular naps and just not doing usual enjoyable activities as long as he is used to. EKG: VR 47 bpm,  ms,  ms, QTc 421 ms. Sotalol 80 mg BID, asa 81 mg. Given recurrences Sotalol was stopped and Flecainide was started.       EP Vist 7/24/19: After developing symptoms of a URI last week, felt his heart \"beat out of rhythm\" on Monday evening. Call ahead to clinic was scheduled for Cardioversion, however on his way to hospital, he went out of afib and improved. Since then denies any symptoms of lightheadedness and dizziness. Per discussion with patient, did not start on Pradaxa and is still on ASA today. Denies chest pain, SOB, lightheadedness and dizziness today.     He presents today for follow up. He reports feeling well. He denies any chest pain/pressures, " dizziness, lightheadedness, worsening shortness of breath, leg/ankle swelling, PND, orthopnea, palpitations, or syncopal symptoms. Presenting 12 lead ECG shows SB IVCD Vent Rate 56 bpm,  ms,  ms, QTc 441 ms. Current cardiac medications include: Flecainide, Diltiazem, and ASA.         PAST MEDICAL HISTORY:  Past Medical History:   Diagnosis Date     Actinic keratosis 2009     Atrial fibrillation (H) 6/15/96    Afib - 2 or 3 extended occurrences since     Chronic hepatitis C (H)     Chronic Hepatitis C,  genotype 1b                Stage 0 Fibrosis     Dupuytren's contracture of both hands      Elevated PSA      Hepatitis B 1969    acute infection at age 19; IV drug use     WINNIE (obstructive sleep apnea)     AHI 34.2     Pain in both hands      Patient is Hinduism      Refusal of blood transfusions as patient is Hinduism      Right shoulder pain      Tinnitus 1 or 2 years ago    both ears     Tubular adenoma of colon 1/17/17    ascending colon, 1/17/17       CURRENT MEDICATIONS:  Current Outpatient Medications   Medication Sig Dispense Refill     alfuzosin ER (UROXATRAL) 10 MG 24 hr tablet Take 1 tablet (10 mg) by mouth daily Please call for follow up with Dr Maravilla. 90 tablet 0     aspirin EC 81 MG EC tablet Take 1 tablet (81 mg) by mouth daily       diltiazem ER COATED BEADS (CARDIZEM CD/CARTIA XT) 120 MG 24 hr capsule Take 1 capsule (120 mg) by mouth daily 90 capsule 3     flecainide (TAMBOCOR) 50 MG tablet Take 1 tablet (50 mg) by mouth 2 times daily 180 tablet 3       PAST SURGICAL HISTORY:  Past Surgical History:   Procedure Laterality Date     ANESTHESIA CARDIOVERSION N/A 1/15/2018    Procedure: ANESTHESIA CARDIOVERSION;  Anesthesia Coverage Cardioversion With Transesophageal Echocardiogram @0930 ;  Surgeon: GENERIC ANESTHESIA PROVIDER;  Location: [facility] OR     ANESTHESIA CARDIOVERSION N/A 10/11/2018    Procedure: ANESTHESIA CARDIOVERSION;  Cardioversion;  Surgeon: GENERIC ANESTHESIA  PROVIDER;  Location: [facility] OR     ANESTHESIA CARDIOVERSION N/A 2018    Procedure: Anesthesia Coverage Cardioversion @0830;  Surgeon: GENERIC ANESTHESIA PROVIDER;  Location: [facility] OR     ARTHROSCOPY KNEE      left knee     COLONOSCOPY  17    tubular adenoma ascending colon     HERNIORRHAPHY INGUINAL Right 10/26/2017    Procedure: HERNIORRHAPHY INGUINAL;  Open Right Inguinal Hernia Repair with Mesh;  Surgeon: Suresh Raymond MD;  Location: [facility] OR     KNEE SURGERY  2006??    torn meniscus     RELEASE DUPUYTRENS CONTRACTURE Right 2017    Procedure: RELEASE DUPUYTRENS CONTRACTURE;  Surgeon: Lars Oleary MD;  Location: [facility] OR     RELEASE DUPUYTRENS CONTRACTURE Left 12/15/2017    Procedure: RELEASE DUPUYTRENS CONTRACTURE;  Left Ring and Middle Finger Subtotal Palmar Fasciectomy;  Surgeon: Lars Oleary MD;  Location: [facility] OR     TRANSESOPHAGEAL ECHOCARDIOGRAM INTRAOPERATIVE N/A 1/15/2018    Procedure: TRANSESOPHAGEAL ECHOCARDIOGRAM INTRAOPERATIVE;;  Surgeon: GENERIC ANESTHESIA PROVIDER;  Location: [facility] OR       ALLERGIES:     Allergies   Allergen Reactions     Blood Transfusion Related (Informational Only)      Pentecostal.  Declines blood transfusions.     Contrast Dye Rash       FAMILY HISTORY:  Family History   Problem Relation Age of Onset     Arthritis Paternal Grandmother      Rheumatoid Arthritis Paternal Grandmother      Gastrointestinal Disease Father          age 77 after colon surgery     Alcoholism Father      Alcohol/Drug Mother          age 64     Alcoholism Mother      Heart Disease Brother      Cardiac Sudden Death Brother      Uterine Cancer Sister      Glaucoma No family hx of      Macular Degeneration No family hx of      Retinal detachment No family hx of        SOCIAL HISTORY:  Social History     Tobacco Use     Smoking status: Former Smoker     Packs/day: 0.50     Years: 10.00     Pack years: 5.00     Types: Cigarettes     Start date: 6/15/1964     Last  "attempt to quit: 1974     Years since quittin.5     Smokeless tobacco: Former User     Quit date: 1969   Substance Use Topics     Alcohol use: Yes     Alcohol/week: 3.0 - 4.0 standard drinks     Comment: 4 or 5 drinks/week, limit of 1     Drug use: No     Comment: --history of marijuana       ROS:   A comprehensive 10 point review of systems negative other than as mentioned in HPI.  Exam:  /68 (BP Location: Right arm, Patient Position: Chair, Cuff Size: Adult Regular)   Pulse 57   Ht 1.753 m (5' 9\")   Wt 81.7 kg (180 lb 1.6 oz)   SpO2 98%   BMI 26.60 kg/m     GENERAL APPEARANCE: alert and no distress  HEENT: no icterus, no xanthelasmas, normal pupil size and reaction, normal palate, mucosa moist, no central cyanosis  NECK: no adenopathy, no asymmetry, masses, or scars, thyroid normal to palpation and no bruits, JVP not elevated  RESPIRATORY: lungs clear to auscultation - no rales, rhonchi or wheezes, no use of accessory muscles, no retractions, respirations are unlabored, normal respiratory rate  CARDIOVASCULAR: regular rhythm, normal S1 with physiologic split S2, no S3 or S4 and no murmur, click or rub, precordium quiet with normal PMI.  ABDOMEN: soft, non tender, bowel sounds normal, non-distended  EXTREMITIES: peripheral pulses normal, no edema  NEURO: alert and oriented to person/place/time, normal speech, gait and affect  SKIN: no ecchymoses, no rashes  PSYCH: normal affect, cooperative    Labs:  Reviewed.     Testing/Procedures:    Echocardiogram 2017 showed LV EF 60-65%, mild RV dilation but normal RV function, mild AI, and mild dilation of the ascending aorta to 4.5 cm, indexed to 2.3 cm/m2.     MR angiography of the chest 2016 showed \"Normal caliber of thoracic aorta. The ascending aorta measures 3.9 cm, which is normal when indexed for age/gender/size.\"     Echocardiogram 10/14/16 showed LV EF 55-60%, mild RV dilation but normal RV function, mild to moderate AI, and " "dilation of the proximal ascending aorta to 45 mm, index 2.4 cm/m2.\"     Lexiscan NM stress test 18 showed \"Normal  myocardial SPECT study with a summed stress score of  zero. No ischemia or infarct identified. Normal LV function. No change  from prior study.\"     JIM (1/15/18):  Interpretation Summary  The left atrial appendage is normal. It is free of spontaneous echo contrast and thrombus.  Left ventricular function, chamber size, wall motion, and wall thickness are normal.The EF is 60-65%.  Mild aortic insufficiency is present.  Mild dilatation of the aorta is present.  Ascending aorta 4.2 cm.  No pericardial effusion is present.  There has been no change.     Ziopatch (3/21 - 2019):        Exercise EKG 2019    Interpretation Summary     Normal, low-risk exercise electrocardiogram.  The target heart rate was achieved. Normal heart rate and BP response to  exercise.  Normal resting ECG. No ECG evidence of ischemia with exercise.  There were occasional uniform PVCs elicited during exercise. QT prolongation  was unable to be assessed due to artifact.  No angina was elicited.  Functional capacity is average for age.         Assessment and Plan:   Ms. Orellana is [age] year old [employer]ism male who has a past medical history significant for PAF (CHADSVASC 1) s/p DCCVs, chronic hepatitis C, hepatitis B, WINNIE (uses CPAP), tubular adenoma of colon, and tobacco use. He presents today for follow up. He reports feeling well. He denies any chest pain/pressures, dizziness, lightheadedness, worsening shortness of breath, leg/ankle swelling, PND, orthopnea, palpitations, or syncopal symptoms. Presenting 12 lead ECG shows SB IVCD Vent Rate 56 bpm,  ms,  ms, QTc 441 ms. Current cardiac medications include: Flecainide, Diltiazem, and ASA.     Paroxsymal Atrial Fibrillation:  We discussed in detail with the patient management/treatment options for Oneal includin. Stroke Prophylaxis:  CHADSVASC=+age  " 1, corresponding to a 1.3% annual stroke / systemic emolism event rate. He is on ASA only and only uses DOAC surrounding DCCVs.   2. Rate Control: Continue Diltiazem.   3. Rhythm Control: He has had several DCCVs, He failed Sotalol. He has now been on Flecainide now and doing well.   4. Risk Factor Management: maintain good BP control and continued WINNIE treatment with CPAP.      Follow up in 1 year.    The patient states understanding and is agreeable with the plan.   This patient was seen and examined with Dr. Felipe. The above note reflects our joint assessment and plan.   ANDREINA Small CNP  Electrophysiology Consult Service  Pager: [phone]    EP STAFF NOTE  I have seen and examined the patient as part of a shared visit with JESSICA Small NP.  I agree with the note above. I reviewed today's vital signs and medications. I have reviewed and discussed with the advanced practice provider their physical examination, assessment, and plan   Briefly, doing well on flecainide low dose  My key history/exam findings are: RRR.   The key management decisions made by me: f/u in one year.    Jovanni Felipe MD [facility]  Cardiology - Electrophysiology

## 2020-01-24 NOTE — PROGRESS NOTES
Electrophysiology Clinic Note  HPI:     Ms. Orellana is a 70 year old Jew male who has a past medical history significant for PAF (CHADSVASC 1) s/p DCCVs, chronic hepatitis C, hepatitis B, WINNIE (uses CPAP), tubular adenoma of colon, and tobacco use.     At our initial visit in 12/2016 he had reported ~20 years of paroxysmal atrial fibrillation manifesting as chest pressure and palpitations. The first episode occurred during a picnic and converted spontaneously to sinus rhythm.  He took metoprolol for 30 days.  Approximately 10 years later he had a second episode in the setting of a meniscal tear and perhaps 2 or 3 more episodes in the intervening years until his palpitations became more frequent in the year leading up to his most recent presentation.  A 48-hour Holter monitor which showed variation between sinus rhythm, junctional rhythm, and A.fib (average HR 63, range ) with 1% PVCs and <1% supraventricular ectopic beats, with 33 runs of PAT vs. A.fib (longest 10 beats at 164 bpm). He then presented to the ED on 12/2/16 for palpitations and was in A.fib with RVR~111 bpm, started on diltiazem and ASA 81 mg daily, and discharged. He had recurrent episode of AF end of 12/2017 that manifested as palpitations, SOB and fatigue. He felt the symptoms for about 10 days prior and was seen by Dr. Hussein on 1/6/18. At this time he was noted to be in symptomatic AF (rate controlled), and was scheduled to undergo JIM/DCCV, which was performed on 1/15/18. He was on Dabigatran for 4 weeks following the DCCV and his ASA was held. He last saw Dr. Felipe in EP clinic in 2/2018 and was doing well. More recently, he called reporting he was back in AF. He was started on Pradaxa within 1.5 days of his symptoms. He was then arranged for DCCV. He then had DCCV on 10/11/18. He had recurrence and required another DCCV on 11/28/18. He followed up in EP clinic on 12/27/18 and was feeling well without issues. He presented 1/18  "to [facility] with racing heart sensation associated with diaphoresis and mild chest discomfort and lightheadedness c/w prior AF symptoms. Found to be in AF. He states he has had a few other AF symptoms recently and feels they are growing in frequency/severity. His AF did spontaneously terminated when in ER. He was initiated on Sotalol during this admission by EP.     EP visit 4/24/19: He presents today for follow-up after having initiated sotalol and cardioverted in ER 1/18/19. He reports no recurrent sustained palpitations. He does have intermittent, infrequent, short-lived palpitations which are not bothersome (and corroborated with PAC's / PVC's on recent Zio). Recent Holter shows no atrial fibrillation but run of AT (13 seconds). He does report, and his wife agrees, new onset significant fatigue since initiating sotalol. Now taking regular naps and just not doing usual enjoyable activities as long as he is used to. EKG: VR 47 bpm,  ms,  ms, QTc 421 ms. Sotalol 80 mg BID, asa 81 mg. Given recurrences Sotalol was stopped and Flecainide was started.       EP Vist 7/24/19: After developing symptoms of a URI last week, felt his heart \"beat out of rhythm\" on Monday evening. Call ahead to clinic was scheduled for Cardioversion, however on his way to hospital, he went out of afib and improved. Since then denies any symptoms of lightheadedness and dizziness. Per discussion with patient, did not start on Pradaxa and is still on ASA today. Denies chest pain, SOB, lightheadedness and dizziness today.     He presents today for follow up. He reports feeling well. He denies any chest pain/pressures, dizziness, lightheadedness, worsening shortness of breath, leg/ankle swelling, PND, orthopnea, palpitations, or syncopal symptoms. Presenting 12 lead ECG shows SB IVCD Vent Rate 56 bpm,  ms,  ms, QTc 441 ms. Current cardiac medications include: Flecainide, Diltiazem, and ASA.         PAST MEDICAL HISTORY:  Past " Medical History:   Diagnosis Date     Actinic keratosis 2009     Atrial fibrillation (H) 6/15/96    Afib - 2 or 3 extended occurrences since     Chronic hepatitis C (H)     Chronic Hepatitis C,  genotype 1b                Stage 0 Fibrosis     Dupuytren's contracture of both hands      Elevated PSA      Hepatitis B 1969    acute infection at age 19; IV drug use     WINNIE (obstructive sleep apnea)     AHI 34.2     Pain in both hands      Patient is Adventist      Refusal of blood transfusions as patient is Adventist      Right shoulder pain      Tinnitus 1 or 2 years ago    both ears     Tubular adenoma of colon 1/17/17    ascending colon, 1/17/17       CURRENT MEDICATIONS:  Current Outpatient Medications   Medication Sig Dispense Refill     alfuzosin ER (UROXATRAL) 10 MG 24 hr tablet Take 1 tablet (10 mg) by mouth daily Please call for follow up with Dr Maravilla. 90 tablet 0     aspirin EC 81 MG EC tablet Take 1 tablet (81 mg) by mouth daily       diltiazem ER COATED BEADS (CARDIZEM CD/CARTIA XT) 120 MG 24 hr capsule Take 1 capsule (120 mg) by mouth daily 90 capsule 3     flecainide (TAMBOCOR) 50 MG tablet Take 1 tablet (50 mg) by mouth 2 times daily 180 tablet 3       PAST SURGICAL HISTORY:  Past Surgical History:   Procedure Laterality Date     ANESTHESIA CARDIOVERSION N/A 1/15/2018    Procedure: ANESTHESIA CARDIOVERSION;  Anesthesia Coverage Cardioversion With Transesophageal Echocardiogram @0930 ;  Surgeon: GENERIC ANESTHESIA PROVIDER;  Location: [facility] OR     ANESTHESIA CARDIOVERSION N/A 10/11/2018    Procedure: ANESTHESIA CARDIOVERSION;  Cardioversion;  Surgeon: GENERIC ANESTHESIA PROVIDER;  Location: [facility] OR     ANESTHESIA CARDIOVERSION N/A 11/28/2018    Procedure: Anesthesia Coverage Cardioversion @0830;  Surgeon: GENERIC ANESTHESIA PROVIDER;  Location: [facility] OR     ARTHROSCOPY KNEE      left knee     COLONOSCOPY  1/17/17    tubular adenoma ascending colon     HERNIORRHAPHY INGUINAL Right 10/26/2017     Procedure: HERNIORRHAPHY INGUINAL;  Open Right Inguinal Hernia Repair with Mesh;  Surgeon: Suresh Raymond MD;  Location: [facility]     KNEE SURGERY  2006??    torn meniscus     RELEASE DUPUYTRENS CONTRACTURE Right 2017    Procedure: RELEASE DUPUYTRENS CONTRACTURE;  Surgeon: Lars Oleary MD;  Location: [facility] OR     RELEASE DUPUYTRENS CONTRACTURE Left 12/15/2017    Procedure: RELEASE DUPUYTRENS CONTRACTURE;  Left Ring and Middle Finger Subtotal Palmar Fasciectomy;  Surgeon: Lars Oleary MD;  Location: [facility]     TRANSESOPHAGEAL ECHOCARDIOGRAM INTRAOPERATIVE N/A 1/15/2018    Procedure: TRANSESOPHAGEAL ECHOCARDIOGRAM INTRAOPERATIVE;;  Surgeon: GENERIC ANESTHESIA PROVIDER;  Location: [facility] OR       ALLERGIES:     Allergies   Allergen Reactions     Blood Transfusion Related (Informational Only)      Confucianism.  Declines blood transfusions.     Contrast Dye Rash       FAMILY HISTORY:  Family History   Problem Relation Age of Onset     Arthritis Paternal Grandmother      Rheumatoid Arthritis Paternal Grandmother      Gastrointestinal Disease Father          age 77 after colon surgery     Alcoholism Father      Alcohol/Drug Mother          age 64     Alcoholism Mother      Heart Disease Brother      Cardiac Sudden Death Brother      Uterine Cancer Sister      Glaucoma No family hx of      Macular Degeneration No family hx of      Retinal detachment No family hx of        SOCIAL HISTORY:  Social History     Tobacco Use     Smoking status: Former Smoker     Packs/day: 0.50     Years: 10.00     Pack years: 5.00     Types: Cigarettes     Start date: 6/15/1964     Last attempt to quit: 1974     Years since quittin.5     Smokeless tobacco: Former User     Quit date: 1969   Substance Use Topics     Alcohol use: Yes     Alcohol/week: 3.0 - 4.0 standard drinks     Comment: 4 or 5 drinks/week, limit of 1     Drug use: No     Comment: --history of marijuana       ROS:   A  "comprehensive 10 point review of systems negative other than as mentioned in HPI.  Exam:  /68 (BP Location: Right arm, Patient Position: Chair, Cuff Size: Adult Regular)   Pulse 57   Ht 1.753 m (5' 9\")   Wt 81.7 kg (180 lb 1.6 oz)   SpO2 98%   BMI 26.60 kg/m    GENERAL APPEARANCE: alert and no distress  HEENT: no icterus, no xanthelasmas, normal pupil size and reaction, normal palate, mucosa moist, no central cyanosis  NECK: no adenopathy, no asymmetry, masses, or scars, thyroid normal to palpation and no bruits, JVP not elevated  RESPIRATORY: lungs clear to auscultation - no rales, rhonchi or wheezes, no use of accessory muscles, no retractions, respirations are unlabored, normal respiratory rate  CARDIOVASCULAR: regular rhythm, normal S1 with physiologic split S2, no S3 or S4 and no murmur, click or rub, precordium quiet with normal PMI.  ABDOMEN: soft, non tender, bowel sounds normal, non-distended  EXTREMITIES: peripheral pulses normal, no edema  NEURO: alert and oriented to person/place/time, normal speech, gait and affect  SKIN: no ecchymoses, no rashes  PSYCH: normal affect, cooperative    Labs:  Reviewed.     Testing/Procedures:    Echocardiogram 4/27/2017 showed LV EF 60-65%, mild RV dilation but normal RV function, mild AI, and mild dilation of the ascending aorta to 4.5 cm, indexed to 2.3 cm/m2.     MR angiography of the chest 12/27/2016 showed \"Normal caliber of thoracic aorta. The ascending aorta measures 3.9 cm, which is normal when indexed for age/gender/size.\"     Echocardiogram 10/14/16 showed LV EF 55-60%, mild RV dilation but normal RV function, mild to moderate AI, and dilation of the proximal ascending aorta to 45 mm, index 2.4 cm/m2.\"     Lexiscan NM stress test 1/8/18 showed \"Normal  myocardial SPECT study with a summed stress score of  zero. No ischemia or infarct identified. Normal LV function. No change  from prior study.\"     JIM (1/15/18):  Interpretation Summary  The left " atrial appendage is normal. It is free of spontaneous echo contrast and thrombus.  Left ventricular function, chamber size, wall motion, and wall thickness are normal.The EF is 60-65%.  Mild aortic insufficiency is present.  Mild dilatation of the aorta is present.  Ascending aorta 4.2 cm.  No pericardial effusion is present.  There has been no change.     Jocelyne (3/21 - 2019):        Exercise EKG 2019    Interpretation Summary     Normal, low-risk exercise electrocardiogram.  The target heart rate was achieved. Normal heart rate and BP response to  exercise.  Normal resting ECG. No ECG evidence of ischemia with exercise.  There were occasional uniform PVCs elicited during exercise. QT prolongation  was unable to be assessed due to artifact.  No angina was elicited.  Functional capacity is average for age.         Assessment and Plan:   Ms. Orellana is a 70 year old Oriental orthodox male who has a past medical history significant for PAF (CHADSVASC 1) s/p DCCVs, chronic hepatitis C, hepatitis B, WINNIE (uses CPAP), tubular adenoma of colon, and tobacco use. He presents today for follow up. He reports feeling well. He denies any chest pain/pressures, dizziness, lightheadedness, worsening shortness of breath, leg/ankle swelling, PND, orthopnea, palpitations, or syncopal symptoms. Presenting 12 lead ECG shows SB IVCD Vent Rate 56 bpm,  ms,  ms, QTc 441 ms. Current cardiac medications include: Flecainide, Diltiazem, and ASA.     Paroxsymal Atrial Fibrillation:  We discussed in detail with the patient management/treatment options for Oneal includin. Stroke Prophylaxis:  CHADSVASC=+age  1, corresponding to a 1.3% annual stroke / systemic emolism event rate. He is on ASA only and only uses DOAC surrounding DCCVs.   2. Rate Control: Continue Diltiazem.   3. Rhythm Control: He has had several DCCVs, He failed Sotalol. He has now been on Flecainide now and doing well.   4. Risk Factor Management:  maintain good BP control and continued WINNIE treatment with CPAP.      Follow up in 1 year.    The patient states understanding and is agreeable with the plan.   This patient was seen and examined with Dr. Felipe. The above note reflects our joint assessment and plan.   ANDREINA Small CNP  Electrophysiology Consult Service  Pager: [phone]    EP STAFF NOTE  I have seen and examined the patient as part of a shared visit with JESSICA Small NP.  I agree with the note above. I reviewed today's vital signs and medications. I have reviewed and discussed with the advanced practice provider their physical examination, assessment, and plan   Briefly, doing well on flecainide low dose  My key history/exam findings are: RRR.   The key management decisions made by me: f/u in one year.    Jovanni Felipe MD [facility]  Cardiology - Electrophysiology

## 2020-01-24 NOTE — NURSING NOTE
Vitals were taken and medications were reconciled.    EKG complete    Anton Vinson CMA    1:04 PM

## 2020-02-08 ENCOUNTER — HEALTH MAINTENANCE LETTER (OUTPATIENT)
Age: 70
End: 2020-02-08

## 2020-02-28 ENCOUNTER — MYC REFILL (OUTPATIENT)
Dept: CARDIOLOGY | Facility: CLINIC | Age: 70
End: 2020-02-28

## 2020-02-28 DIAGNOSIS — R35.0 URINARY FREQUENCY: ICD-10-CM

## 2020-02-28 DIAGNOSIS — I48.0 PAF (PAROXYSMAL ATRIAL FIBRILLATION) (H): ICD-10-CM

## 2020-02-28 RX ORDER — FLECAINIDE ACETATE 50 MG/1
50 TABLET ORAL 2 TIMES DAILY
Qty: 180 TABLET | Refills: 3 | Status: SHIPPED | OUTPATIENT
Start: 2020-02-28 | End: 2020-12-28

## 2020-03-01 RX ORDER — ALFUZOSIN HYDROCHLORIDE 10 MG/1
10 TABLET, EXTENDED RELEASE ORAL DAILY
Qty: 30 TABLET | Refills: 0 | Status: SHIPPED | OUTPATIENT
Start: 2020-03-01 | End: 2020-04-03

## 2020-03-01 NOTE — TELEPHONE ENCOUNTER
"   alfuzosin ER (UROXATRAL) 10 MG 24 hr tablet  Last Written Prescription Date:  11/5/19  Last Fill Quantity: 90,   # refills: 0  Last Office Visit : 1/11/19  Future Office visit: none    \" Follow up after above completed to review results\"      Scheduling has been notified to contact the pt for appointment.    30 day to pharmacy    "

## 2020-03-02 DIAGNOSIS — R97.20 ELEVATED PROSTATE SPECIFIC ANTIGEN (PSA): Primary | ICD-10-CM

## 2020-03-09 ENCOUNTER — NURSE TRIAGE (OUTPATIENT)
Dept: NURSING | Facility: CLINIC | Age: 70
End: 2020-03-09

## 2020-03-09 ENCOUNTER — HOSPITAL ENCOUNTER (EMERGENCY)
Facility: CLINIC | Age: 70
Discharge: HOME | End: 2020-03-09
Attending: EMERGENCY MEDICINE | Admitting: EMERGENCY MEDICINE
Payer: COMMERCIAL

## 2020-03-09 VITALS
TEMPERATURE: 97.1 F | HEART RATE: 58 BPM | OXYGEN SATURATION: 98 % | SYSTOLIC BLOOD PRESSURE: 111 MMHG | DIASTOLIC BLOOD PRESSURE: 76 MMHG | RESPIRATION RATE: 16 BRPM | BODY MASS INDEX: 25.92 KG/M2 | WEIGHT: 175 LBS | HEIGHT: 69 IN

## 2020-03-09 DIAGNOSIS — I48.91 ATRIAL FIBRILLATION, TRANSIENT (H): ICD-10-CM

## 2020-03-09 LAB
ANION GAP SERPL CALCULATED.3IONS-SCNC: 6 MMOL/L (ref 3–14)
BASOPHILS # BLD AUTO: 0 10E9/L (ref 0–0.2)
BASOPHILS NFR BLD AUTO: 0.6 %
BUN SERPL-MCNC: 12 MG/DL (ref 7–30)
CALCIUM SERPL-MCNC: 8.6 MG/DL (ref 8.5–10.1)
CHLORIDE SERPL-SCNC: 112 MMOL/L (ref 94–109)
CO2 SERPL-SCNC: 25 MMOL/L (ref 20–32)
CREAT SERPL-MCNC: 0.86 MG/DL (ref 0.66–1.25)
DIFFERENTIAL METHOD BLD: NORMAL
EOSINOPHIL # BLD AUTO: 0.1 10E9/L (ref 0–0.7)
EOSINOPHIL NFR BLD AUTO: 1.7 %
ERYTHROCYTE [DISTWIDTH] IN BLOOD BY AUTOMATED COUNT: 12.6 % (ref 10–15)
GFR SERPL CREATININE-BSD FRML MDRD: 87 ML/MIN/{1.73_M2}
GLUCOSE SERPL-MCNC: 113 MG/DL (ref 70–99)
HCT VFR BLD AUTO: 44 % (ref 40–53)
HGB BLD-MCNC: 14.6 G/DL (ref 13.3–17.7)
IMM GRANULOCYTES # BLD: 0 10E9/L (ref 0–0.4)
IMM GRANULOCYTES NFR BLD: 0.2 %
LYMPHOCYTES # BLD AUTO: 1.1 10E9/L (ref 0.8–5.3)
LYMPHOCYTES NFR BLD AUTO: 16.5 %
MCH RBC QN AUTO: 30.8 PG (ref 26.5–33)
MCHC RBC AUTO-ENTMCNC: 33.2 G/DL (ref 31.5–36.5)
MCV RBC AUTO: 93 FL (ref 78–100)
MONOCYTES # BLD AUTO: 0.6 10E9/L (ref 0–1.3)
MONOCYTES NFR BLD AUTO: 8.6 %
NEUTROPHILS # BLD AUTO: 4.7 10E9/L (ref 1.6–8.3)
NEUTROPHILS NFR BLD AUTO: 72.4 %
NRBC # BLD AUTO: 0 10*3/UL
NRBC BLD AUTO-RTO: 0 /100
PLATELET # BLD AUTO: 196 10E9/L (ref 150–450)
POTASSIUM SERPL-SCNC: 3.9 MMOL/L (ref 3.4–5.3)
RBC # BLD AUTO: 4.74 10E12/L (ref 4.4–5.9)
SODIUM SERPL-SCNC: 142 MMOL/L (ref 133–144)
TROPONIN I SERPL-MCNC: <0.015 UG/L (ref 0–0.04)
WBC # BLD AUTO: 6.4 10E9/L (ref 4–11)

## 2020-03-09 PROCEDURE — 85025 COMPLETE CBC W/AUTO DIFF WBC: CPT | Performed by: EMERGENCY MEDICINE

## 2020-03-09 PROCEDURE — 93005 ELECTROCARDIOGRAM TRACING: CPT | Performed by: EMERGENCY MEDICINE

## 2020-03-09 PROCEDURE — 93005 ELECTROCARDIOGRAM TRACING: CPT | Mod: 76 | Performed by: EMERGENCY MEDICINE

## 2020-03-09 PROCEDURE — 80048 BASIC METABOLIC PNL TOTAL CA: CPT | Performed by: EMERGENCY MEDICINE

## 2020-03-09 PROCEDURE — 84484 ASSAY OF TROPONIN QUANT: CPT | Performed by: EMERGENCY MEDICINE

## 2020-03-09 PROCEDURE — 99284 EMERGENCY DEPT VISIT MOD MDM: CPT | Performed by: EMERGENCY MEDICINE

## 2020-03-09 PROCEDURE — 99285 EMERGENCY DEPT VISIT HI MDM: CPT | Mod: 25 | Performed by: EMERGENCY MEDICINE

## 2020-03-09 PROCEDURE — 93010 ELECTROCARDIOGRAM REPORT: CPT | Mod: Z6 | Performed by: EMERGENCY MEDICINE

## 2020-03-09 ASSESSMENT — ENCOUNTER SYMPTOMS
COLOR CHANGE: 0
PALPITATIONS: 0
CONFUSION: 0
DIFFICULTY URINATING: 0
FEVER: 0
HEADACHES: 0
ABDOMINAL PAIN: 0
NECK STIFFNESS: 0
EYE REDNESS: 0
SHORTNESS OF BREATH: 0
ARTHRALGIAS: 0

## 2020-03-09 ASSESSMENT — MIFFLIN-ST. JEOR: SCORE: 1539.17

## 2020-03-09 NOTE — ED AVS SNAPSHOT
[facility], [place], Emergency Department  [address] [place] [id number]  Phone:  [phone]                                    Frank Orellana   MRN: [id number]    Department:  [facility], Emergency Department   Date of Visit:  3/9/2020           After Visit Summary Signature Page    I have received my discharge instructions, and my questions have been answered. I have discussed any challenges I see with this plan with the nurse or doctor.    ..........................................................................................................................................  Patient/Patient Representative Signature      ..........................................................................................................................................  Patient Representative Print Name and Relationship to Patient    ..................................................               ................................................  Date                                   Time    ..........................................................................................................................................  Reviewed by Signature/Title    ...................................................              ..............................................  Date                                               Time          22EPIC Rev 08/18

## 2020-03-09 NOTE — ED TRIAGE NOTES
ED Triage Provider Note  [facility]  Encounter Date: Mar 9, 2020    History:  No chief complaint on file.    Frank Orellana is a 70 year old male who presents to the ED with palpitations. Hx of atrial fibrillation. Awoke at 2 AM with mild chest pressure and pain, associated with sensation of irregular heart beat. + SOB and lightheaded. Called the Cards clinic and was directed here. 3 previous cardioversions. Not currently anticoagulated.     Review of Systems:  No fever or cough.   No LE edema    Exam:  Temp 97.1  F (36.2  C)   Resp 18   General: No acute distress. Appears stated age.   Cardio: Regular rate, HR in 90s, extremities well perfused  Resp: Normal work of breathing, grossly normal respiratory rate  Neuro: Alert. CN II-XII grossly intact. Grossly intact strength.       Medical Decision Making:  Patient arriving to the ED with problem as above. A medical screening exam was performed. EKG, CBC, BMP, troponin orders initiated from Triage. The patient is appropriate to wait in triage.      Shraddha Love MD on 3/9/2020 at 12:11 PM  Correction: pt is taking 81mg ASA and took it today as usual.  Took both diltiazem and flecanide today. No missed doses other than 1/2 flecanide doses 3 days ago.          EKG Interpretation:      Interpreted by Shraddha Love MD  Time reviewed: 12:24 PM  Symptoms at time of EKG: palpitations  Rhythm: atrial flutter  Rate: 119  Axis: NORMAL  Ectopy: none  Conduction: normal  ST Segments/ T Waves: No ST-T wave changes  Q Waves: none  Comparison to prior: return of atrial flutter    Clinical Impression: abnormal EKG

## 2020-03-09 NOTE — ED TRIAGE NOTES
Pt presents to ED with c/o afib. Woke up feeling like HR was irregular and racing. Feels lightheaded. Having minor chest pain, some pressure. Hx afib, has been cardioverted. Denies recent illness.

## 2020-03-09 NOTE — TELEPHONE ENCOUNTER
I notified Meggan PEREZ RN and let her know of this message and she will address. I will route this to the  pool.    Deangelo GRAMAJO

## 2020-03-09 NOTE — ED PROVIDER NOTES
[place] EMERGENCY DEPARTMENT ([facility])  3/09/20   History     Chief Complaint   Patient presents with     Irregular Heart Beat     HPI  Frank Orellana is a 70 year old male who has a PMH of PAF s/p repeat direct-current cardioversions, chronic hepatitis C, hepatitis B who presents to the Emergency Department with palpitations.  Patient states that he woke up at approximately 2 AM with mild left anterior chest pain and associated sensation of fast and irregular heartbeat.  He states that after walking from room to room he started to feel a bit short of breath and lightheaded.  [facility] as he is followed there with a history of A. fib and was started here.  He states that he is undergone 3 previous cardioversions.  Patient states that he did not measure his heart rate using a blood pressure monitor at home and it was approximately 107.    Patient states that while waiting in the [facility] he felt that his heart rate was somewhat irregular and when seen by triage provider does resolve spontaneously but his heartbeat continues to be fast and then when he went back to [facility] became irregular again.  Here during my interview states that he currently does not have chest pain or shortness of breath and feels that he have palpitations have resolved.  He has not had leg swelling.  He states that he is not anticoagulated and takes aspirin.  Patient states that he takes flecainide, along with diltiazem and Uroxatral.  He is missed 1 dose of flecainide recently but is otherwise been adherent to his medications.  He states that there are no current plans for ablation although this is been brought up to his electrophysiologist, Dr. Felipe.  States that this is probably likely because he spends most of his time in sinus rhythm.    I have reviewed the Medications, Allergies, Past Medical and Surgical History, and Social History in the [facility] system.  PAST MEDICAL HISTORY:   Past Medical History:    Diagnosis Date     Actinic keratosis 2009     Atrial fibrillation (H) 6/15/96    Afib - 2 or 3 extended occurrences since     Chronic hepatitis C (H)     Chronic Hepatitis C,  genotype 1b                Stage 0 Fibrosis     Dupuytren's contracture of both hands      Elevated PSA      Hepatitis B 1969    acute infection at age 19; IV drug use     WINNIE (obstructive sleep apnea)     AHI 34.2     Pain in both hands      Patient is Restorationist      Refusal of blood transfusions as patient is Restorationist      Right shoulder pain      Tinnitus 1 or 2 years ago    both ears     Tubular adenoma of colon 1/17/17    ascending colon, 1/17/17       PAST SURGICAL HISTORY:   Past Surgical History:   Procedure Laterality Date     ANESTHESIA CARDIOVERSION N/A 1/15/2018    Procedure: ANESTHESIA CARDIOVERSION;  Anesthesia Coverage Cardioversion With Transesophageal Echocardiogram @0930 ;  Surgeon: GENERIC ANESTHESIA PROVIDER;  Location: [facility] OR     ANESTHESIA CARDIOVERSION N/A 10/11/2018    Procedure: ANESTHESIA CARDIOVERSION;  Cardioversion;  Surgeon: GENERIC ANESTHESIA PROVIDER;  Location: [facility] OR     ANESTHESIA CARDIOVERSION N/A 11/28/2018    Procedure: Anesthesia Coverage Cardioversion @0830;  Surgeon: GENERIC ANESTHESIA PROVIDER;  Location: [facility] OR     ARTHROSCOPY KNEE      left knee     COLONOSCOPY  1/17/17    tubular adenoma ascending colon     HERNIORRHAPHY INGUINAL Right 10/26/2017    Procedure: HERNIORRHAPHY INGUINAL;  Open Right Inguinal Hernia Repair with Mesh;  Surgeon: Suresh Raymond MD;  Location:  OR     KNEE SURGERY  2006??    torn meniscus     RELEASE DUPUYTRENS CONTRACTURE Right 1/20/2017    Procedure: RELEASE DUPUYTRENS CONTRACTURE;  Surgeon: Lars Oleary MD;  Location:  OR     RELEASE DUPUYTRENS CONTRACTURE Left 12/15/2017    Procedure: RELEASE DUPUYTRENS CONTRACTURE;  Left Ring and Middle Finger Subtotal Palmar Fasciectomy;  Surgeon: Lars Oleary MD;  Location:  OR      TRANSESOPHAGEAL ECHOCARDIOGRAM INTRAOPERATIVE N/A 1/15/2018    Procedure: TRANSESOPHAGEAL ECHOCARDIOGRAM INTRAOPERATIVE;;  Surgeon: GENERIC ANESTHESIA PROVIDER;  Location: [facility] OR       FAMILY HISTORY:   Family History   Problem Relation Age of Onset     Arthritis Paternal Grandmother      Rheumatoid Arthritis Paternal Grandmother      Gastrointestinal Disease Father          age 77 after colon surgery     Alcoholism Father      Alcohol/Drug Mother          age 64     Alcoholism Mother      Heart Disease Brother      Cardiac Sudden Death Brother      Uterine Cancer Sister      Glaucoma No family hx of      Macular Degeneration No family hx of      Retinal detachment No family hx of        SOCIAL HISTORY:   Social History     Tobacco Use     Smoking status: Former Smoker     Packs/day: 0.50     Years: 10.00     Pack years: 5.00     Types: Cigarettes     Start date: 6/15/1964     Last attempt to quit: 1974     Years since quittin.7     Smokeless tobacco: Former User     Quit date: 1969   Substance Use Topics     Alcohol use: Yes     Alcohol/week: 3.0 - 4.0 standard drinks     Comment: 4 or 5 drinks/week, limit of 1       Discharge Medication List as of 3/9/2020  2:49 PM      CONTINUE these medications which have NOT CHANGED    Details   alfuzosin ER (UROXATRAL) 10 MG 24 hr tablet Take 1 tablet (10 mg) by mouth daily, Disp-30 tablet,R-0, E-PrescribeCall clinic to schedule follow up appointment.      aspirin EC 81 MG EC tablet Take 1 tablet (81 mg) by mouth daily, Historical      diltiazem ER COATED BEADS (CARDIZEM CD/CARTIA XT) 120 MG 24 hr capsule Take 1 capsule (120 mg) by mouth daily, Disp-90 capsule, R-3, E-Prescribe      flecainide (TAMBOCOR) 50 MG tablet Take 1 tablet (50 mg) by mouth 2 times daily, Disp-180 tablet, R-3, E-Prescribe                Allergies   Allergen Reactions     Blood Transfusion Related (Informational Only)      Sabianism.  Declines blood transfusions.     Contrast  "Dye Rash        Review of Systems   Constitutional: Negative for fever.   HENT: Negative for congestion.    Eyes: Negative for redness.   Respiratory: Negative for shortness of breath.    Cardiovascular: Negative for chest pain, palpitations and leg swelling.   Gastrointestinal: Negative for abdominal pain.   Genitourinary: Negative for difficulty urinating.   Musculoskeletal: Negative for arthralgias and neck stiffness.   Skin: Negative for color change.   Neurological: Negative for headaches.   Psychiatric/Behavioral: Negative for confusion.   All other systems reviewed and are negative.      Physical Exam   BP: 96/67  Pulse: 116  Heart Rate: 74  Temp: 97.1  F (36.2  C)  Resp: 18  Height: 175.3 cm (5' 9\")  Weight: 79.4 kg (175 lb)  SpO2: 96 %      Physical Exam  Constitutional:       General: He is not in acute distress.     Appearance: Normal appearance.   HENT:      Head: Normocephalic and atraumatic.      Mouth/Throat:      Mouth: Mucous membranes are moist.   Eyes:      Extraocular Movements: Extraocular movements intact.   Neck:      Musculoskeletal: Normal range of motion.   Cardiovascular:      Rate and Rhythm: Normal rate and regular rhythm.      Heart sounds: Normal heart sounds.   Pulmonary:      Effort: Pulmonary effort is normal.      Breath sounds: Normal breath sounds.   Abdominal:      General: Abdomen is flat.      Palpations: Abdomen is soft.      Tenderness: There is no abdominal tenderness.   Musculoskeletal: Normal range of motion.   Neurological:      General: No focal deficit present.      Mental Status: He is alert.         ED Course        Procedures             EKG Interpretation:      Interpreted by Bruce Hartley MD  Time reviewed: 1233  Symptoms at time of EKG: cp/palpitations   Rhythm: atrial flutter  Rate: 119  Axis: Left Axis Deviation  Ectopy: none  Conduction: normal  ST Segments/ T Waves: No acute ischemic changes  Q Waves: nonspecific  Comparison to prior: No old EKG " available    Clinical Impression: atrial flutter (new onset)         EKG Interpretation:      Interpreted by Bruce Hartley MD  Time reviewed: 1328   Symptoms at time of EKG: None   Rhythm: Normal sinus   Rate: 65  Axis: Left Axis Deviation  Ectopy: None  Conduction: Normal  ST Segments/ T Waves: No ST-T wave changes and No acute ischemic changes  Q Waves: None and Nonspecific  Comparison to prior: spontaneous conversion to NSR    Clinical Impression: spontaneous conversion to NSR from previous a flutter           Results for orders placed or performed during the hospital encounter of 03/09/20 (from the past 24 hour(s))   CBC with platelets differential   Result Value Ref Range    WBC 6.4 4.0 - 11.0 10e9/L    RBC Count 4.74 4.4 - 5.9 10e12/L    Hemoglobin 14.6 13.3 - 17.7 g/dL    Hematocrit 44.0 40.0 - 53.0 %    MCV 93 78 - 100 fl    MCH 30.8 26.5 - 33.0 pg    MCHC 33.2 31.5 - 36.5 g/dL    RDW 12.6 10.0 - 15.0 %    Platelet Count 196 150 - 450 10e9/L    Diff Method Automated Method     % Neutrophils 72.4 %    % Lymphocytes 16.5 %    % Monocytes 8.6 %    % Eosinophils 1.7 %    % Basophils 0.6 %    % Immature Granulocytes 0.2 %    Nucleated RBCs 0 0 /100    Absolute Neutrophil 4.7 1.6 - 8.3 10e9/L    Absolute Lymphocytes 1.1 0.8 - 5.3 10e9/L    Absolute Monocytes 0.6 0.0 - 1.3 10e9/L    Absolute Eosinophils 0.1 0.0 - 0.7 10e9/L    Absolute Basophils 0.0 0.0 - 0.2 10e9/L    Abs Immature Granulocytes 0.0 0 - 0.4 10e9/L    Absolute Nucleated RBC 0.0    Basic metabolic panel   Result Value Ref Range    Sodium 142 133 - 144 mmol/L    Potassium 3.9 3.4 - 5.3 mmol/L    Chloride 112 (H) 94 - 109 mmol/L    Carbon Dioxide 25 20 - 32 mmol/L    Anion Gap 6 3 - 14 mmol/L    Glucose 113 (H) 70 - 99 mg/dL    Urea Nitrogen 12 7 - 30 mg/dL    Creatinine 0.86 0.66 - 1.25 mg/dL    GFR Estimate 87 >60 mL/min/[1.73_m2]    GFR Estimate If Black >90 >60 mL/min/[1.73_m2]    Calcium 8.6 8.5 - 10.1 mg/dL   Troponin I   Result Value Ref Range     Troponin I ES <0.015 0.000 - 0.045 ug/L   EKG 12 lead   Result Value Ref Range    Interpretation ECG Click View Image link to view waveform and result    EKG 12 lead   Result Value Ref Range    Interpretation ECG Click View Image link to view waveform and result      Medications - No data to display          Assessments & Plan (with Medical Decision Making)   Pt with PAF presented for cardiac symptoms and fast heart rate similar to when he is in afib. EKG shows 2:1 a flutter rate 119 from triage but patient was asymptomatic when roomed and felt no symptoms and that his a fib has resolved. Tele shows NSR so repeat ekg performed and shows NSR. Labs unremarkable. Pt is stable for discharge and outpatient follow up with EP. Discussed with EP NP and no need for med adjustments due to infrequent episodes. Pt understands sign and symptoms to return. Discharged in stable condition after pt spontaneously converted.    I have reviewed the nursing notes.    I have reviewed the findings, diagnosis, plan and need for follow up with the patient.    Discharge Medication List as of 3/9/2020  2:49 PM          Final diagnoses:   Atrial fibrillation, transient (H)       3/9/2020   [facility], [place], EMERGENCY DEPARTMENT     Bruce Hartley MD  03/09/20 0170

## 2020-03-10 LAB
INTERPRETATION ECG - MUSE: NORMAL
INTERPRETATION ECG - MUSE: NORMAL

## 2020-03-25 ENCOUNTER — PRE VISIT (OUTPATIENT)
Dept: UROLOGY | Facility: CLINIC | Age: 70
End: 2020-03-25

## 2020-03-25 DIAGNOSIS — R97.20 ELEVATED PROSTATE SPECIFIC ANTIGEN (PSA): ICD-10-CM

## 2020-03-25 PROCEDURE — 36415 COLL VENOUS BLD VENIPUNCTURE: CPT | Performed by: UROLOGY

## 2020-03-25 PROCEDURE — 84153 ASSAY OF PSA TOTAL: CPT | Performed by: UROLOGY

## 2020-03-25 NOTE — TELEPHONE ENCOUNTER
Chief Complaint : Phone Visit- Call #[phone].    Hx/Sx: Elevated PSA    Records/Orders: Available     Pt Contacted: Called on 3/25 to convert to phone visit. VM about change to phone visit and MyChart    At Rooming: Check in

## 2020-03-26 LAB — PSA SERPL-MCNC: 5.83 UG/L (ref 0–4)

## 2020-04-03 ENCOUNTER — TELEPHONE (OUTPATIENT)
Dept: UROLOGY | Facility: CLINIC | Age: 70
End: 2020-04-03

## 2020-04-03 ENCOUNTER — VIRTUAL VISIT (OUTPATIENT)
Dept: UROLOGY | Facility: CLINIC | Age: 70
End: 2020-04-03
Payer: COMMERCIAL

## 2020-04-03 DIAGNOSIS — R35.0 URINARY FREQUENCY: ICD-10-CM

## 2020-04-03 RX ORDER — ALFUZOSIN HYDROCHLORIDE 10 MG/1
10 TABLET, EXTENDED RELEASE ORAL DAILY
Qty: 90 TABLET | Refills: 3 | Status: SHIPPED | OUTPATIENT
Start: 2020-04-03 | End: 2020-12-15

## 2020-04-03 NOTE — PATIENT INSTRUCTIONS
Please schedule a cystoscopy with Dr. Maravilla in 3 months.      It was a pleasure meeting with you today.  Thank you for allowing me and my team the privilege of caring for you today.  YOU are the reason we are here, and I truly hope we provided you with the excellent service you deserve.  Please let us know if there is anything else we can do for you so that we can be sure you are leaving completely satisfied with your care experience.

## 2020-04-03 NOTE — PROGRESS NOTES
"Frank Orellana is a 70 year old male who is being evaluated via a billable telephone visit.      The patient has been notified of following:     \"This telephone visit will be conducted via a call between you and your physician/provider. We have found that certain health care needs can be provided without the need for a physical exam.  This service lets us provide the care you need with a short phone conversation.  If a prescription is necessary we can send it directly to your pharmacy.  If lab work is needed we can place an order for that and you can then stop by our lab to have the test done at a later time.    If during the course of the call the physician/provider feels a telephone visit is not appropriate, you will not be charged for this service.\"     Patient has given verbal consent for Telephone visit?  Yes    Frank Orellana complains of  No chief complaint on file.      I have reviewed and updated the patient's Past Medical History, Social History, Family History and Medication List.    ALLERGIES  Blood transfusion related (informational only) and Contrast dye    Additional provider notes:   PSA was 5.83, consistent with patient's trend   History of negative MRI prostate   Patient with lower urinary tract symptoms but some relief with alpha blocker   Would like to consider TURP   Uroflow/PVR in three months   Schedule for cystoscopy in three months       Phone call duration: 6 minutes    Meggan Maravilla MD      "

## 2020-04-03 NOTE — TELEPHONE ENCOUNTER
Left voicemail instructing the patient to call the clinic back at [phone] to get checked in for his upcoming telephone visit.      Karina Pham, EMT

## 2020-04-25 ENCOUNTER — MYC MEDICAL ADVICE (OUTPATIENT)
Dept: INTERNAL MEDICINE | Facility: CLINIC | Age: 70
End: 2020-04-25

## 2020-05-14 DIAGNOSIS — I48.0 PAF (PAROXYSMAL ATRIAL FIBRILLATION) (H): ICD-10-CM

## 2020-05-14 RX ORDER — DILTIAZEM HYDROCHLORIDE 120 MG/1
CAPSULE, COATED, EXTENDED RELEASE ORAL
Qty: 90 CAPSULE | Refills: 3 | OUTPATIENT
Start: 2020-05-14

## 2020-05-19 ENCOUNTER — MYC REFILL (OUTPATIENT)
Dept: CARDIOLOGY | Facility: CLINIC | Age: 70
End: 2020-05-19

## 2020-05-19 DIAGNOSIS — I48.0 PAF (PAROXYSMAL ATRIAL FIBRILLATION) (H): ICD-10-CM

## 2020-05-19 RX ORDER — DILTIAZEM HYDROCHLORIDE 120 MG/1
120 CAPSULE, COATED, EXTENDED RELEASE ORAL DAILY
Qty: 90 CAPSULE | Refills: 0 | Status: SHIPPED | OUTPATIENT
Start: 2020-05-19 | End: 2020-08-11

## 2020-06-23 ENCOUNTER — PRE VISIT (OUTPATIENT)
Dept: UROLOGY | Facility: CLINIC | Age: 70
End: 2020-06-23

## 2020-06-23 ENCOUNTER — TELEPHONE (OUTPATIENT)
Dept: UROLOGY | Facility: CLINIC | Age: 70
End: 2020-06-23

## 2020-06-23 NOTE — TELEPHONE ENCOUNTER
Chief Complaint : Cysto    Hx/Sx: LUTS/TURP consult     Records/Orders: Available     Pt Contacted: Confirmed new apt date    At Rooming: Flow/PVR before if possible

## 2020-06-26 ENCOUNTER — OFFICE VISIT (OUTPATIENT)
Dept: UROLOGY | Facility: CLINIC | Age: 70
End: 2020-06-26
Payer: COMMERCIAL

## 2020-06-26 VITALS — BODY MASS INDEX: 25.18 KG/M2 | WEIGHT: 170 LBS | HEIGHT: 69 IN

## 2020-06-26 DIAGNOSIS — N40.1 BPH WITH URINARY OBSTRUCTION: Primary | ICD-10-CM

## 2020-06-26 DIAGNOSIS — N13.8 BPH WITH URINARY OBSTRUCTION: Primary | ICD-10-CM

## 2020-06-26 RX ORDER — LIDOCAINE HYDROCHLORIDE 20 MG/ML
10 JELLY TOPICAL ONCE
Status: DISCONTINUED | OUTPATIENT
Start: 2020-06-26 | End: 2020-12-15

## 2020-06-26 RX ORDER — CEFAZOLIN SODIUM 2 G/50ML
2 SOLUTION INTRAVENOUS
Status: CANCELLED | OUTPATIENT
Start: 2020-06-26

## 2020-06-26 RX ORDER — CEFAZOLIN SODIUM 1 G/50ML
1 INJECTION, SOLUTION INTRAVENOUS SEE ADMIN INSTRUCTIONS
Status: CANCELLED | OUTPATIENT
Start: 2020-06-26

## 2020-06-26 ASSESSMENT — PAIN SCALES - GENERAL
PAINLEVEL: NO PAIN (0)
PAINLEVEL: NO PAIN (0)

## 2020-06-26 ASSESSMENT — MIFFLIN-ST. JEOR: SCORE: 1516.49

## 2020-06-26 NOTE — NURSING NOTE
Chief Complaint   Patient presents with     Cystoscopy     LUTS/TURP consult        Brenda Ambriz MA

## 2020-06-26 NOTE — PROGRESS NOTES
PRE-PROCEDURE DIAGNOSIS: BPH with urinary obstruction   POST-PROCEDURE DIAGNOSIS: same   PROCEDURE: Cystoscopy  HISTORY: Frank Orellana is a 70 year old male with history of BPH with urinary obstruction   REVIEW OF OFFICE STUDIES (e.g., uroflow or PVR):   DESCRIPTION OF PROCEDURE: After informed consent was obtained, the patient was brought to the procedure room where he was placed in the supine position with all pressure points well padded.  The penis and scrotum were prepped and draped in a sterile fashion. A flexible cystoscope was introduced through a well-lubricated urethra. Anterior urethra strictures were absent.   The urinary sphincter was intact.  The prostate demonstrated trilobar hypertrophy.  Bladder neck was open.   Bladder signififcant for presence of the following:      Diverticuli: absent      Cellules: absent      Trabeculation: present 1+      Tumors: absent      Stones: absent  The flexible cystoscope was removed and the findings were described to the patient.   ASSESSMENT AND PLAN: 70 year old male with BPH and elevated PSA would like to consider outlet procedure     Will schedule for TURP next available

## 2020-06-26 NOTE — LETTER
6/26/2020       RE: Frank Orellana  [address]  [facility] [address]     Dear Colleague,    Thank you for referring your patient, Frank Orellana, to the [facility] UROLOGY AND INST FOR PROSTATE AND UROLOGIC CANCERS at [facility]. Please see a copy of my visit note below.    PRE-PROCEDURE DIAGNOSIS: BPH with urinary obstruction   POST-PROCEDURE DIAGNOSIS: same   PROCEDURE: Cystoscopy  HISTORY: Frank Orellana is a 70 year old male with history of BPH with urinary obstruction   REVIEW OF OFFICE STUDIES (e.g., uroflow or PVR):   DESCRIPTION OF PROCEDURE: After informed consent was obtained, the patient was brought to the procedure room where he was placed in the supine position with all pressure points well padded.  The penis and scrotum were prepped and draped in a sterile fashion. A flexible cystoscope was introduced through a well-lubricated urethra. Anterior urethra strictures were absent.   The urinary sphincter was intact.  The prostate demonstrated trilobar hypertrophy.  Bladder neck was open.   Bladder signififcant for presence of the following:      Diverticuli: absent      Cellules: absent      Trabeculation: present 1+      Tumors: absent      Stones: absent  The flexible cystoscope was removed and the findings were described to the patient.   ASSESSMENT AND PLAN: 70 year old male with BPH and elevated PSA would like to consider outlet procedure     Will schedule for TURP next available       Again, thank you for allowing me to participate in the care of your patient.      Sincerely,    Meggan Maravilla MD

## 2020-06-26 NOTE — NURSING NOTE
"Chief Complaint   Patient presents with     Cystoscopy     LUTS/TURP consult        Height 1.753 m (5' 9\"), weight 77.1 kg (170 lb). Body mass index is 25.1 kg/m .    Patient Active Problem List   Diagnosis     History of actinic keratoses     Telangiectasia     SK (seborrheic keratosis)     Patient is Uatsdin     Refusal of blood transfusions as patient is Uatsdin     Paroxysmal atrial fibrillation (H)     Chronic left shoulder pain     WINNIE (obstructive sleep apnea) positional     Atrial fibrillation (H)     Tubular adenoma of colon     ACP (advance care planning)     Bilateral sensorineural hearing loss     Elevated PSA       Allergies   Allergen Reactions     Blood Transfusion Related (Informational Only)      Quaker.  Declines blood transfusions.     Contrast Dye Rash       Current Outpatient Medications   Medication Sig Dispense Refill     alfuzosin ER (UROXATRAL) 10 MG 24 hr tablet Take 1 tablet (10 mg) by mouth daily 90 tablet 3     aspirin EC 81 MG EC tablet Take 1 tablet (81 mg) by mouth daily       diltiazem ER COATED BEADS (CARDIZEM CD/CARTIA XT) 120 MG 24 hr capsule Take 1 capsule (120 mg) by mouth daily 90 capsule 0     flecainide (TAMBOCOR) 50 MG tablet Take 1 tablet (50 mg) by mouth 2 times daily 180 tablet 3       Social History     Tobacco Use     Smoking status: Former Smoker     Packs/day: 0.50     Years: 10.00     Pack years: 5.00     Types: Cigarettes     Start date: 6/15/1964     Last attempt to quit: 1974     Years since quittin.0     Smokeless tobacco: Former User     Quit date: 1969   Substance Use Topics     Alcohol use: Yes     Alcohol/week: 3.0 - 4.0 standard drinks     Comment: 4 or 5 drinks/week, limit of 1     Drug use: No     Comment: --history of marijuana       Invasive Procedure Safety Checklist:    Procedure: Cystoscopy    Action: Complete sections and checkboxes as appropriate.    Pre-procedure:  1. Patient ID Verified with 2 " identifiers (Kathy and  or MRN) : YES    2. Procedure and site verified with patient/designee (when able) : YES    3. Accurate consent documentation in medical record : YES    4. H&P (or appropriate assessment) documented in medical record : N/A  H&P must be up to 30 days prior to procedure an updated within 24 hours of                 Procedure as applicable.     5. Relevant diagnostic and radiology test results appropriately labeled and displayed as applicable : YES    6. Blood products, implants, devices, and/or special equipment available for the procedure as applicable : YES    7. Procedure site(s) marked with provider initials [Exclusions: none] : NO    8. Marking not required. Reason : Yes  Procedure does not require site marking    Time Out:     Time-Out performed immediately prior to starting procedure, including verbal and active participation of all team members addressing: YES    1. Correct patient identity.  2. Confirmed that the correct side and site are marked.  3. An accurate procedure to be done.  4. Agreement on the procedure to be done.  5. Correct patient position.  6. Relevant images and results are properly labeled and appropriately displayed.  7. The need to administer antibiotics or fluids for irrigation purposes during the procedure as applicable.  8. Safety precautions based on patient history or medication use.    During Procedure: Verification of correct person, site, and procedure occurs any time the responsibility for care of the patient is transferred to another member of the care team.    The following medication was given:     MEDICATION: Lidocaine Uro-Jet 2% 200mg (20mg/mL)  ROUTE: Urethral   SITE: Urethra   DOSE: 10mL  LOT #: [id number]  : [employer]   EXPIRATION DATE:   NDC#: [address]-36   Was there drug waste? No    Prior to injection, verified patient identity using patient's name and date of birth.  Due to injection administration, patient instructed to remain in  clinic for 15 minutes  afterwards, and to report any adverse reaction to me immediately.    Drug Amount Wasted:  None.  Vial/Syringe: Single dose vial      Brenda Ambriz CMA  6/26/2020  2:57 Pm

## 2020-07-23 ENCOUNTER — PREP FOR PROCEDURE (OUTPATIENT)
Dept: UROLOGY | Facility: CLINIC | Age: 70
End: 2020-07-23

## 2020-07-29 ENCOUNTER — MYC MEDICAL ADVICE (OUTPATIENT)
Dept: INTERNAL MEDICINE | Facility: CLINIC | Age: 70
End: 2020-07-29

## 2020-07-29 DIAGNOSIS — Z11.59 ENCOUNTER FOR SCREENING FOR OTHER VIRAL DISEASES: Primary | ICD-10-CM

## 2020-07-29 PROBLEM — N40.1 BPH WITH URINARY OBSTRUCTION: Status: ACTIVE | Noted: 2020-07-29

## 2020-07-29 PROBLEM — N13.8 BPH WITH URINARY OBSTRUCTION: Status: ACTIVE | Noted: 2020-07-29

## 2020-07-30 ENCOUNTER — TELEPHONE (OUTPATIENT)
Dept: UROLOGY | Facility: CLINIC | Age: 70
End: 2020-07-30

## 2020-07-30 NOTE — TELEPHONE ENCOUNTER
Patient is scheduled for surgery with Dr. Jairon Marie    Date of Surgery: 8/21/20    Location: [place] OR    Informed patient they will need an adult  yes    H&P: Scheduled with PCP    Additional imaging/appointments: n/a    Surgery packet: to be mailed by 7/31/20     Additional comments: informed to get covid test within 4 days of surgery

## 2020-07-31 ASSESSMENT — ENCOUNTER SYMPTOMS: DIFFICULTY URINATING: 1

## 2020-08-11 ENCOUNTER — MYC REFILL (OUTPATIENT)
Dept: CARDIOLOGY | Facility: CLINIC | Age: 70
End: 2020-08-11

## 2020-08-11 DIAGNOSIS — I48.0 PAF (PAROXYSMAL ATRIAL FIBRILLATION) (H): ICD-10-CM

## 2020-08-12 ENCOUNTER — PATIENT OUTREACH (OUTPATIENT)
Dept: UROLOGY | Facility: CLINIC | Age: 70
End: 2020-08-12

## 2020-08-12 RX ORDER — DILTIAZEM HYDROCHLORIDE 120 MG/1
120 CAPSULE, COATED, EXTENDED RELEASE ORAL DAILY
Qty: 90 CAPSULE | Refills: 1 | Status: SHIPPED | OUTPATIENT
Start: 2020-08-12 | End: 2021-01-06

## 2020-08-13 ENCOUNTER — OFFICE VISIT (OUTPATIENT)
Dept: INTERNAL MEDICINE | Facility: CLINIC | Age: 70
End: 2020-08-13
Payer: COMMERCIAL

## 2020-08-13 VITALS
OXYGEN SATURATION: 97 % | SYSTOLIC BLOOD PRESSURE: 115 MMHG | DIASTOLIC BLOOD PRESSURE: 61 MMHG | HEART RATE: 60 BPM | BODY MASS INDEX: 26.05 KG/M2 | WEIGHT: 176.37 LBS

## 2020-08-13 DIAGNOSIS — Z01.818 PRE-OP EXAM: ICD-10-CM

## 2020-08-13 DIAGNOSIS — Z01.818 PRE-OP EXAM: Primary | ICD-10-CM

## 2020-08-13 LAB
ALBUMIN UR-MCNC: NEGATIVE MG/DL
ANION GAP SERPL CALCULATED.3IONS-SCNC: 4 MMOL/L (ref 3–14)
APPEARANCE UR: CLEAR
BILIRUB UR QL STRIP: NEGATIVE
BUN SERPL-MCNC: 14 MG/DL (ref 7–30)
CALCIUM SERPL-MCNC: 8.6 MG/DL (ref 8.5–10.1)
CHLORIDE SERPL-SCNC: 109 MMOL/L (ref 94–109)
CO2 SERPL-SCNC: 27 MMOL/L (ref 20–32)
COLOR UR AUTO: YELLOW
CREAT SERPL-MCNC: 0.9 MG/DL (ref 0.66–1.25)
ERYTHROCYTE [DISTWIDTH] IN BLOOD BY AUTOMATED COUNT: 12.5 % (ref 10–15)
GFR SERPL CREATININE-BSD FRML MDRD: 85 ML/MIN/{1.73_M2}
GLUCOSE SERPL-MCNC: 106 MG/DL (ref 70–99)
GLUCOSE UR STRIP-MCNC: NEGATIVE MG/DL
HCT VFR BLD AUTO: 39.2 % (ref 40–53)
HGB BLD-MCNC: 13.2 G/DL (ref 13.3–17.7)
HGB UR QL STRIP: NEGATIVE
KETONES UR STRIP-MCNC: NEGATIVE MG/DL
LEUKOCYTE ESTERASE UR QL STRIP: NEGATIVE
MCH RBC QN AUTO: 31.7 PG (ref 26.5–33)
MCHC RBC AUTO-ENTMCNC: 33.7 G/DL (ref 31.5–36.5)
MCV RBC AUTO: 94 FL (ref 78–100)
MUCOUS THREADS #/AREA URNS LPF: PRESENT /LPF
NITRATE UR QL: NEGATIVE
PH UR STRIP: 5 PH (ref 5–7)
PLATELET # BLD AUTO: 189 10E9/L (ref 150–450)
POTASSIUM SERPL-SCNC: 3.8 MMOL/L (ref 3.4–5.3)
RBC # BLD AUTO: 4.16 10E12/L (ref 4.4–5.9)
RBC #/AREA URNS AUTO: 0 /HPF (ref 0–2)
SODIUM SERPL-SCNC: 140 MMOL/L (ref 133–144)
SOURCE: ABNORMAL
SP GR UR STRIP: 1.01 (ref 1–1.03)
SQUAMOUS #/AREA URNS AUTO: <1 /HPF (ref 0–1)
UROBILINOGEN UR STRIP-MCNC: 0 MG/DL (ref 0–2)
WBC # BLD AUTO: 5.7 10E9/L (ref 4–11)
WBC #/AREA URNS AUTO: <1 /HPF (ref 0–5)

## 2020-08-13 NOTE — NURSING NOTE
Chief Complaint   Patient presents with     Pre-Op Exam     DOS: 8/21/20  CYSTOSCOPY, WITH TRANSURETHRAL RESECTION PROSTATE with Dr. Warlick Kimberly Nissen, EMT at 1:23 PM on 8/13/2020

## 2020-08-15 DIAGNOSIS — D64.9 ANEMIA OF UNKNOWN ETIOLOGY: Primary | ICD-10-CM

## 2020-08-18 DIAGNOSIS — Z11.59 ENCOUNTER FOR SCREENING FOR OTHER VIRAL DISEASES: ICD-10-CM

## 2020-08-19 LAB
SARS-COV-2 RNA SPEC QL NAA+PROBE: NOT DETECTED
SPECIMEN SOURCE: NORMAL

## 2020-08-19 NOTE — PATIENT INSTRUCTIONS
Pre-Op Plan:  Proceed with surgery as planned.  No food for 8 hours before surgery.  No liquid for 2 hours before surgery.   Call surgeon  If you develop a fever, respiratory illness or other symptoms.  Hold the following medications the morning of  Surgery: ASA, Uroxsal  Take the following medications the morning of surgery with a sip of water:  flecanide and cardiazem 120 mg.  Letter sent to requesting surgeon  listed above  Written pre-operative instructions given  Laboratory studies:    He needs a Covid test next week.     Results for CATHERINE OH (MRN [id number]) as of 8/18/2020 22:12   Ref. Range 8/13/2020 14:41 8/13/2020 14:51   Sodium Latest Ref Range: 133 - 144 mmol/L 140    Potassium Latest Ref Range: 3.4 - 5.3 mmol/L 3.8    Chloride Latest Ref Range: 94 - 109 mmol/L 109    Carbon Dioxide Latest Ref Range: 20 - 32 mmol/L 27    Urea Nitrogen Latest Ref Range: 7 - 30 mg/dL 14    Creatinine Latest Ref Range: 0.66 - 1.25 mg/dL 0.90    GFR Estimate Latest Ref Range: >60 mL/min/1.73_m2 85    GFR Estimate If Black Latest Ref Range: >60 mL/min/1.73_m2 >90    Calcium Latest Ref Range: 8.5 - 10.1 mg/dL 8.6    Anion Gap Latest Ref Range: 3 - 14 mmol/L 4    Glucose Latest Ref Range: 70 - 99 mg/dL 106 (H)    WBC Latest Ref Range: 4.0 - 11.0 10e9/L 5.7    Hemoglobin Latest Ref Range: 13.3 - 17.7 g/dL 13.2 (L)    Hematocrit Latest Ref Range: 40.0 - 53.0 % 39.2 (L)    Platelet Count Latest Ref Range: 150 - 450 10e9/L 189    RBC Count Latest Ref Range: 4.4 - 5.9 10e12/L 4.16 (L)    MCV Latest Ref Range: 78 - 100 fl 94    MCH Latest Ref Range: 26.5 - 33.0 pg 31.7    MCHC Latest Ref Range: 31.5 - 36.5 g/dL 33.7    RDW Latest Ref Range: 10.0 - 15.0 % 12.5    Color Urine Unknown  Yellow   Appearance Urine Unknown  Clear   Glucose Urine Latest Ref Range: NEG^Negative mg/dL  Negative   Bilirubin Urine Latest Ref Range: NEG^Negative   Negative   Ketones Urine Latest Ref Range: NEG^Negative mg/dL  Negative   Specific Gravity  Urine Latest Ref Range: 1.003 - 1.035   1.009   pH Urine Latest Ref Range: 5.0 - 7.0 pH  5.0   Protein Albumin Urine Latest Ref Range: NEG^Negative mg/dL  Negative   Urobilinogen mg/dL Latest Ref Range: 0.0 - 2.0 mg/dL  0.0   Nitrite Urine Latest Ref Range: NEG^Negative   Negative   Blood Urine Latest Ref Range: NEG^Negative   Negative   Leukocyte Esterase Urine Latest Ref Range: NEG^Negative   Negative   Source Unknown  Midstream Urine   WBC Urine Latest Ref Range: 0 - 5 /HPF  <1   RBC Urine Latest Ref Range: 0 - 2 /HPF  0   Squamous Epithelial /HPF Urine Latest Ref Range: 0 - 1 /HPF  <1   Mucous Urine Latest Ref Range: NEG^Negative /LPF  Present (A)       Cardiovascular: EKG was indicated based on risk Hx of atrial fibrillation (chronic).  EKG Sinus bradycardia with rate of 55 and incomplete right bundle branch block.    Please contact our office if there are any further questions or information required about this patient.    ANDREINA Hinojosa CNP  .

## 2020-08-19 NOTE — PROGRESS NOTES
Visit of 8/13/20  Frank Orellana is 70 year old male here at the request of Dr. Destiny Murdock at [facility] for cardiovascular, pulmonary, and perioperative risk assessment prior to surgery.The intended surgical procedure is cystoscopy with TURP.  A copy of this note will be sent to the surgeon.    Patient Active Problem List   Diagnosis     History of actinic keratoses     Telangiectasia     SK (seborrheic keratosis)     Patient is Oriental orthodox     Refusal of blood transfusions as patient is Oriental orthodox     Paroxysmal atrial fibrillation (H)     Chronic left shoulder pain     WINNIE (obstructive sleep apnea) positional     Atrial fibrillation (H)     Tubular adenoma of colon     ACP (advance care planning)     Bilateral sensorineural hearing loss     Elevated PSA     BPH with urinary obstruction     Past Medical History:   Diagnosis Date     Actinic keratosis 2009     Atrial fibrillation (H) 6/15/96    Afib - 2 or 3 extended occurrences since     Chronic hepatitis C (H)     Chronic Hepatitis C,  genotype 1b                Stage 0 Fibrosis     Dupuytren's contracture of both hands      Elevated PSA      Hepatitis B 1969    acute infection at age 19; IV drug use     WINNIE (obstructive sleep apnea)     AHI 34.2     Pain in both hands      Patient is Oriental orthodox      Refusal of blood transfusions as patient is Oriental orthodox      Right shoulder pain      Tinnitus, bilateral 1 or 2 years ago     Tubular adenoma of colon 1/17/17    ascending colon, 1/17/17     Past Surgical History:   Procedure Laterality Date     ANESTHESIA CARDIOVERSION N/A 1/15/2018    Procedure: ANESTHESIA CARDIOVERSION;  Anesthesia Coverage Cardioversion With Transesophageal Echocardiogram @0930 ;  Surgeon: GENERIC ANESTHESIA PROVIDER;  Location: [facility] OR     ANESTHESIA CARDIOVERSION N/A 10/11/2018    Procedure: ANESTHESIA CARDIOVERSION;  Cardioversion;  Surgeon: GENERIC ANESTHESIA PROVIDER;  Location: [facility] OR     ANESTHESIA  CARDIOVERSION N/A 11/28/2018    Procedure: Anesthesia Coverage Cardioversion @0830;  Surgeon: GENERIC ANESTHESIA PROVIDER;  Location: [facility] OR     ARTHROSCOPY KNEE      left knee     COLONOSCOPY  1/17/17    tubular adenoma ascending colon     HERNIORRHAPHY INGUINAL Right 10/26/2017    Procedure: HERNIORRHAPHY INGUINAL;  Open Right Inguinal Hernia Repair with Mesh;  Surgeon: Suresh Raymond MD;  Location: [facility] OR     KNEE SURGERY  2006??    torn meniscus     RELEASE DUPUYTRENS CONTRACTURE Right 1/20/2017    Procedure: RELEASE DUPUYTRENS CONTRACTURE;  Surgeon: Lars Oleary MD;  Location: [facility] OR     RELEASE DUPUYTRENS CONTRACTURE Left 12/15/2017    Procedure: RELEASE DUPUYTRENS CONTRACTURE;  Left Ring and Middle Finger Subtotal Palmar Fasciectomy;  Surgeon: Lars Oleary MD;  Location: [facility]     TRANSESOPHAGEAL ECHOCARDIOGRAM INTRAOPERATIVE N/A 1/15/2018    Procedure: TRANSESOPHAGEAL ECHOCARDIOGRAM INTRAOPERATIVE;;  Surgeon: GENERIC ANESTHESIA PROVIDER;  Location: [facility] OR          Allergies   Allergen Reactions     Blood Transfusion Related (Informational Only)      Nondenominational.  Declines blood transfusions.     Contrast Dye Rash       Smoke exposure in home: no      This is a LOW risk surgery.      HPI: 69 yo with hx of urinary obstruction seen by Dr. Maravilla 7/30/20.  Cystoscopy indicated BPH with urinary obstruction due to tri-lobar hypertrophy.  His PSA 3/25/20 was 5.83    Cardiovascular Risk: Paroxsysmal Atrial fibrillation.  Stable oncurrent medications.    This patient denies chest pain with stair climbing, rapid ambulation..    He does have a history of known cardiac disease and paroxsymal atrial fib.  The patient does not have a history of stroke, and does not have a history of valvular disease.    Pulmonary Risk:  In terms of risk factors for pulmonary complications, the patient has WINNIE, does not have a history of Asthma    Perioperative Complications:  The patient does not have a  history of bleeding or clotting problems in the past. The patient has not had complications from past surgeries.  The patient does not have a family history of any anesthesia or surgical complications.      ROS:  Constitutional: no fevers, night sweats or unintentional weight change   Eyes: no vision change, diplopia or red eyes   Ears, Nose, Mouth, Throat:  Tinnitus+, no epistaxis or nasal discharge, no oral lesions, throat clear   Cardiovascular: no chest pain, palpitations, or pain with walking, WINNIE. He has paroxsysmal A fib.  Respiratory: no dyspnea, cough, shortness of breath or wheezing, WINNIE  GI: no nausea, vomiting, diarrhea or constipation, no abdominal pain   :prostate enlargement.  Musculoskeletal: no joint or muscle pain or swelling   Integumentary: no concerning lesions or moles   Neuro: no loss of strength or sensation, no numbness or tingling, no tremor, no dizziness, no headache   Endo: no polyuria or polydipsia, no temperature intolerance   Heme/Lymph: no concerning bumps, no bleeding problems   Allergy: no environmental allergies   Psych: no depression or anxiety, no sleep problems  General Symptoms: No  Skin Symptoms: No  HENT Symptoms: No  EYE SYMPTOMS: No  HEART SYMPTOMS: No  LUNG SYMPTOMS: No  INTESTINAL SYMPTOMS: No  URINARY SYMPTOMS: Yes  REPRODUCTIVE SYMPTOMS: Yes  SKELETAL SYMPTOMS: No  BLOOD SYMPTOMS: No  NERVOUS SYSTEM SYMPTOMS: No  MENTAL HEALTH SYMPTOMS: No  Trouble starting or stopping: Yes  Difficulty emptying bladder: Yes  Scrotal pain or swelling: No  Erectile dysfunction: No  Penile discharge: No  Genital ulcers: No  Reduced libido: No    PHYSICAL EXAM:  /61   Pulse 60   Wt 80 kg (176 lb 5.9 oz)   SpO2 97%   BMI 26.05 kg/m      Wt Readings from Last 1 Encounters:   08/13/20 80 kg (176 lb 5.9 oz)       Constitutional: no distress, comfortable, pleasant   Eyes: anicteric, conjunctiva pink.  Ears, Nose and Throat: tympanic membranes clear, nose clear and free of lesions,  throat clear, neck supple with full range of motion, no thyromegaly.   Cardiovascular: regular rate and rhythm, normal S1 and S2, no murmurs, rubs or gallops, peripheral pulses full and symmetric   Respiratory: clear to auscultation, no wheezes or crackles, normal breath sounds   Gastrointestinal: positive bowel sounds, nontender, no hepatosplenomegaly, no masses   Musculoskeletal: full range of motion, no edema   Skin: no concerning lesions, no jaundice   Neurological: normal gait, no tremor   Psychological: appropriate mood   Lymphatic: no cervical  lymphadenopathy      A/P:    The patient with   Past Medical History:   Diagnosis Date     Actinic keratosis 2009     Atrial fibrillation (H) 6/15/96    Afib - 2 or 3 extended occurrences since     Chronic hepatitis C (H)     Chronic Hepatitis C,  genotype 1b                Stage 0 Fibrosis     Dupuytren's contracture of both hands      Elevated PSA      Hepatitis B 1969    acute infection at age 19; IV drug use     WINNIE (obstructive sleep apnea)     AHI 34.2     Pain in both hands      Patient is Tenriism      Refusal of blood transfusions as patient is Tenriism      Right shoulder pain      Tinnitus 1 or 2 years ago    both ears     Tubular adenoma of colon 1/17/17    ascending colon, 1/17/17    presents prior to surgery for assessment of perioperative risk. The patient is at LOW risk for cardiovascular complications and at LOW risk for pulmonary complications of this LOWrisk surgery.  Reason for surgery/procedure: cystoscopy for bladder obstruction due to prostatic hypertrophy    --Approval given to proceed with proposed procedure, without further diagnostic evaluation    No evidence of functionally compromising cardiac or pulmonary status  Clear for anesthesia and surgery  The patient is recommended to hold aspirin or NSAIDS for 10 days prior to surgery.  The patient is instructed as to which medications to take with sips of water the morning of  surgery    Pre-Op Plan:  Proceed with surgery as planned.  No food for 8 hours before surgery.  No liquid for 2 hours before surgery.   Call surgeon  If you develop a fever, respiratory illness or other symptoms.  Hold the following medications the morning of  Surgery: ASA, Uroxsal  Take the following medications the morning of surgery with a sip of water:  flecanide and cardiazem 120 mg.  Letter sent to requesting surgeon  listed above  Written pre-operative instructions given      Laboratory studies:    He needs a Covid test next week.     Results for CATHERINE OH (MRN [id number]) as of 8/18/2020 22:12   Ref. Range 8/13/2020 14:41 8/13/2020 14:51   Sodium Latest Ref Range: 133 - 144 mmol/L 140    Potassium Latest Ref Range: 3.4 - 5.3 mmol/L 3.8    Chloride Latest Ref Range: 94 - 109 mmol/L 109    Carbon Dioxide Latest Ref Range: 20 - 32 mmol/L 27    Urea Nitrogen Latest Ref Range: 7 - 30 mg/dL 14    Creatinine Latest Ref Range: 0.66 - 1.25 mg/dL 0.90    GFR Estimate Latest Ref Range: >60 mL/min/1.73_m2 85    GFR Estimate If Black Latest Ref Range: >60 mL/min/1.73_m2 >90    Calcium Latest Ref Range: 8.5 - 10.1 mg/dL 8.6    Anion Gap Latest Ref Range: 3 - 14 mmol/L 4    Glucose Latest Ref Range: 70 - 99 mg/dL 106 (H)    WBC Latest Ref Range: 4.0 - 11.0 10e9/L 5.7    Hemoglobin Latest Ref Range: 13.3 - 17.7 g/dL 13.2 (L)    Hematocrit Latest Ref Range: 40.0 - 53.0 % 39.2 (L)    Platelet Count Latest Ref Range: 150 - 450 10e9/L 189    RBC Count Latest Ref Range: 4.4 - 5.9 10e12/L 4.16 (L)    MCV Latest Ref Range: 78 - 100 fl 94    MCH Latest Ref Range: 26.5 - 33.0 pg 31.7    MCHC Latest Ref Range: 31.5 - 36.5 g/dL 33.7    RDW Latest Ref Range: 10.0 - 15.0 % 12.5    Color Urine Unknown  Yellow   Appearance Urine Unknown  Clear   Glucose Urine Latest Ref Range: NEG^Negative mg/dL  Negative   Bilirubin Urine Latest Ref Range: NEG^Negative   Negative   Ketones Urine Latest Ref Range: NEG^Negative mg/dL  Negative    Specific Gravity Urine Latest Ref Range: 1.003 - 1.035   1.009   pH Urine Latest Ref Range: 5.0 - 7.0 pH  5.0   Protein Albumin Urine Latest Ref Range: NEG^Negative mg/dL  Negative   Urobilinogen mg/dL Latest Ref Range: 0.0 - 2.0 mg/dL  0.0   Nitrite Urine Latest Ref Range: NEG^Negative   Negative   Blood Urine Latest Ref Range: NEG^Negative   Negative   Leukocyte Esterase Urine Latest Ref Range: NEG^Negative   Negative   Source Unknown  Midstream Urine   WBC Urine Latest Ref Range: 0 - 5 /HPF  <1   RBC Urine Latest Ref Range: 0 - 2 /HPF  0   Squamous Epithelial /HPF Urine Latest Ref Range: 0 - 1 /HPF  <1   Mucous Urine Latest Ref Range: NEG^Negative /LPF  Present (A)       Cardiovascular: EKG was indicated based on risk Hx of atrial fibrillation (chronic).  EKG Sinus bradycardia with rate of 55 and incomplete right bundle branch block.    Please contact our office if there are any further questions or information required about this patient.    ANDREINA Hinojosa CNP

## 2020-08-20 ENCOUNTER — ANESTHESIA EVENT (OUTPATIENT)
Dept: SURGERY | Facility: CLINIC | Age: 70
End: 2020-08-20
Payer: COMMERCIAL

## 2020-08-21 ENCOUNTER — DOCUMENTATION ONLY (OUTPATIENT)
Dept: OTHER | Facility: CLINIC | Age: 70
End: 2020-08-21

## 2020-08-21 ENCOUNTER — HOSPITAL ENCOUNTER (OUTPATIENT)
Facility: CLINIC | Age: 70
Discharge: HOME | End: 2020-08-22
Attending: UROLOGY | Admitting: UROLOGY
Payer: COMMERCIAL

## 2020-08-21 ENCOUNTER — ANESTHESIA (OUTPATIENT)
Dept: SURGERY | Facility: CLINIC | Age: 70
End: 2020-08-21
Payer: COMMERCIAL

## 2020-08-21 DIAGNOSIS — N13.8 BPH WITH URINARY OBSTRUCTION: ICD-10-CM

## 2020-08-21 DIAGNOSIS — N40.1 BPH WITH URINARY OBSTRUCTION: ICD-10-CM

## 2020-08-21 PROBLEM — N40.0 BPH (BENIGN PROSTATIC HYPERPLASIA): Status: ACTIVE | Noted: 2020-08-21

## 2020-08-21 LAB
ANION GAP SERPL CALCULATED.3IONS-SCNC: 3 MMOL/L (ref 3–14)
ANION GAP SERPL CALCULATED.3IONS-SCNC: 4 MMOL/L (ref 3–14)
BUN SERPL-MCNC: 10 MG/DL (ref 7–30)
BUN SERPL-MCNC: 11 MG/DL (ref 7–30)
CALCIUM SERPL-MCNC: 8.2 MG/DL (ref 8.5–10.1)
CALCIUM SERPL-MCNC: 8.4 MG/DL (ref 8.5–10.1)
CHLORIDE SERPL-SCNC: 111 MMOL/L (ref 94–109)
CHLORIDE SERPL-SCNC: 111 MMOL/L (ref 94–109)
CO2 SERPL-SCNC: 25 MMOL/L (ref 20–32)
CO2 SERPL-SCNC: 27 MMOL/L (ref 20–32)
CREAT SERPL-MCNC: 0.77 MG/DL (ref 0.66–1.25)
CREAT SERPL-MCNC: 0.82 MG/DL (ref 0.66–1.25)
ERYTHROCYTE [DISTWIDTH] IN BLOOD BY AUTOMATED COUNT: 12.4 % (ref 10–15)
GFR SERPL CREATININE-BSD FRML MDRD: 88 ML/MIN/{1.73_M2}
GFR SERPL CREATININE-BSD FRML MDRD: >90 ML/MIN/{1.73_M2}
GLUCOSE BLDC GLUCOMTR-MCNC: 85 MG/DL (ref 70–99)
GLUCOSE SERPL-MCNC: 107 MG/DL (ref 70–99)
GLUCOSE SERPL-MCNC: 193 MG/DL (ref 70–99)
HCT VFR BLD AUTO: 37.2 % (ref 40–53)
HGB BLD-MCNC: 12.4 G/DL (ref 13.3–17.7)
MAGNESIUM SERPL-MCNC: 2.2 MG/DL (ref 1.6–2.3)
MCH RBC QN AUTO: 31.4 PG (ref 26.5–33)
MCHC RBC AUTO-ENTMCNC: 33.3 G/DL (ref 31.5–36.5)
MCV RBC AUTO: 94 FL (ref 78–100)
PLATELET # BLD AUTO: 162 10E9/L (ref 150–450)
POTASSIUM SERPL-SCNC: 3.6 MMOL/L (ref 3.4–5.3)
POTASSIUM SERPL-SCNC: 4 MMOL/L (ref 3.4–5.3)
RBC # BLD AUTO: 3.95 10E12/L (ref 4.4–5.9)
SODIUM SERPL-SCNC: 140 MMOL/L (ref 133–144)
SODIUM SERPL-SCNC: 141 MMOL/L (ref 133–144)
WBC # BLD AUTO: 6.5 10E9/L (ref 4–11)

## 2020-08-21 PROCEDURE — 93010 ELECTROCARDIOGRAM REPORT: CPT | Mod: 59 | Performed by: INTERNAL MEDICINE

## 2020-08-21 PROCEDURE — 82962 GLUCOSE BLOOD TEST: CPT

## 2020-08-21 PROCEDURE — 25000125 ZZHC RX 250: Performed by: NURSE ANESTHETIST, CERTIFIED REGISTERED

## 2020-08-21 PROCEDURE — 40000170 ZZH STATISTIC PRE-PROCEDURE ASSESSMENT II: Performed by: UROLOGY

## 2020-08-21 PROCEDURE — 25000566 ZZH SEVOFLURANE, EA 15 MIN: Performed by: UROLOGY

## 2020-08-21 PROCEDURE — 25800030 ZZH RX IP 258 OP 636: Performed by: NURSE ANESTHETIST, CERTIFIED REGISTERED

## 2020-08-21 PROCEDURE — 85027 COMPLETE CBC AUTOMATED: CPT

## 2020-08-21 PROCEDURE — 36415 COLL VENOUS BLD VENIPUNCTURE: CPT

## 2020-08-21 PROCEDURE — 80048 BASIC METABOLIC PNL TOTAL CA: CPT | Performed by: PHYSICIAN ASSISTANT

## 2020-08-21 PROCEDURE — 37000009 ZZH ANESTHESIA TECHNICAL FEE, EACH ADDTL 15 MIN: Performed by: UROLOGY

## 2020-08-21 PROCEDURE — 37000008 ZZH ANESTHESIA TECHNICAL FEE, 1ST 30 MIN: Performed by: UROLOGY

## 2020-08-21 PROCEDURE — 25800030 ZZH RX IP 258 OP 636

## 2020-08-21 PROCEDURE — 25000128 H RX IP 250 OP 636: Performed by: NURSE ANESTHETIST, CERTIFIED REGISTERED

## 2020-08-21 PROCEDURE — 40000065 ZZH STATISTIC EKG NON-CHARGEABLE

## 2020-08-21 PROCEDURE — 27210794 ZZH OR GENERAL SUPPLY STERILE: Performed by: UROLOGY

## 2020-08-21 PROCEDURE — 88305 TISSUE EXAM BY PATHOLOGIST: CPT | Performed by: UROLOGY

## 2020-08-21 PROCEDURE — 25000125 ZZHC RX 250

## 2020-08-21 PROCEDURE — 25000128 H RX IP 250 OP 636: Performed by: ANESTHESIOLOGY

## 2020-08-21 PROCEDURE — 83735 ASSAY OF MAGNESIUM: CPT | Performed by: PHYSICIAN ASSISTANT

## 2020-08-21 PROCEDURE — 36000057 ZZH SURGERY LEVEL 3 1ST 30 MIN - UMMC: Performed by: UROLOGY

## 2020-08-21 PROCEDURE — 93010 ELECTROCARDIOGRAM REPORT: CPT | Performed by: INTERNAL MEDICINE

## 2020-08-21 PROCEDURE — 25000132 ZZH RX MED GY IP 250 OP 250 PS 637

## 2020-08-21 PROCEDURE — 36415 COLL VENOUS BLD VENIPUNCTURE: CPT | Performed by: PHYSICIAN ASSISTANT

## 2020-08-21 PROCEDURE — 80048 BASIC METABOLIC PNL TOTAL CA: CPT

## 2020-08-21 PROCEDURE — 36000059 ZZH SURGERY LEVEL 3 EA 15 ADDTL MIN UMMC: Performed by: UROLOGY

## 2020-08-21 PROCEDURE — 71000012 ZZH RECOVERY PHASE 1 LEVEL 1 FIRST HR: Performed by: UROLOGY

## 2020-08-21 PROCEDURE — 25000128 H RX IP 250 OP 636: Performed by: UROLOGY

## 2020-08-21 PROCEDURE — 25800025 ZZH RX 258

## 2020-08-21 PROCEDURE — 93005 ELECTROCARDIOGRAM TRACING: CPT | Mod: 59

## 2020-08-21 RX ORDER — ONDANSETRON 2 MG/ML
4 INJECTION INTRAMUSCULAR; INTRAVENOUS EVERY 6 HOURS PRN
Status: DISCONTINUED | OUTPATIENT
Start: 2020-08-21 | End: 2020-08-22 | Stop reason: HOSPADM

## 2020-08-21 RX ORDER — PROPOFOL 10 MG/ML
INJECTION, EMULSION INTRAVENOUS PRN
Status: DISCONTINUED | OUTPATIENT
Start: 2020-08-21 | End: 2020-08-21

## 2020-08-21 RX ORDER — CEFAZOLIN SODIUM 1 G/3ML
1 INJECTION, POWDER, FOR SOLUTION INTRAMUSCULAR; INTRAVENOUS SEE ADMIN INSTRUCTIONS
Status: DISCONTINUED | OUTPATIENT
Start: 2020-08-21 | End: 2020-08-21 | Stop reason: HOSPADM

## 2020-08-21 RX ORDER — CEFAZOLIN SODIUM 2 G/100ML
2 INJECTION, SOLUTION INTRAVENOUS
Status: COMPLETED | OUTPATIENT
Start: 2020-08-21 | End: 2020-08-21

## 2020-08-21 RX ORDER — DEXAMETHASONE SODIUM PHOSPHATE 4 MG/ML
INJECTION, SOLUTION INTRA-ARTICULAR; INTRALESIONAL; INTRAMUSCULAR; INTRAVENOUS; SOFT TISSUE PRN
Status: DISCONTINUED | OUTPATIENT
Start: 2020-08-21 | End: 2020-08-21

## 2020-08-21 RX ORDER — LIDOCAINE 40 MG/G
CREAM TOPICAL
Status: DISCONTINUED | OUTPATIENT
Start: 2020-08-21 | End: 2020-08-22 | Stop reason: HOSPADM

## 2020-08-21 RX ORDER — NALOXONE HYDROCHLORIDE 0.4 MG/ML
.1-.4 INJECTION, SOLUTION INTRAMUSCULAR; INTRAVENOUS; SUBCUTANEOUS
Status: DISCONTINUED | OUTPATIENT
Start: 2020-08-21 | End: 2020-08-21 | Stop reason: HOSPADM

## 2020-08-21 RX ORDER — NALOXONE HYDROCHLORIDE 0.4 MG/ML
.1-.4 INJECTION, SOLUTION INTRAMUSCULAR; INTRAVENOUS; SUBCUTANEOUS
Status: DISCONTINUED | OUTPATIENT
Start: 2020-08-21 | End: 2020-08-22 | Stop reason: HOSPADM

## 2020-08-21 RX ORDER — SODIUM CHLORIDE 9 MG/ML
INJECTION, SOLUTION INTRAVENOUS CONTINUOUS
Status: DISCONTINUED | OUTPATIENT
Start: 2020-08-21 | End: 2020-08-22

## 2020-08-21 RX ORDER — ATROPA BELLADONNA AND OPIUM 16.2; 3 MG/1; MG/1
15 SUPPOSITORY RECTAL ONCE
Status: COMPLETED | OUTPATIENT
Start: 2020-08-21 | End: 2020-08-21

## 2020-08-21 RX ORDER — EPHEDRINE SULFATE 50 MG/ML
INJECTION, SOLUTION INTRAMUSCULAR; INTRAVENOUS; SUBCUTANEOUS PRN
Status: DISCONTINUED | OUTPATIENT
Start: 2020-08-21 | End: 2020-08-21

## 2020-08-21 RX ORDER — SODIUM CHLORIDE, SODIUM LACTATE, POTASSIUM CHLORIDE, CALCIUM CHLORIDE 600; 310; 30; 20 MG/100ML; MG/100ML; MG/100ML; MG/100ML
INJECTION, SOLUTION INTRAVENOUS CONTINUOUS
Status: DISCONTINUED | OUTPATIENT
Start: 2020-08-21 | End: 2020-08-21 | Stop reason: HOSPADM

## 2020-08-21 RX ORDER — ONDANSETRON 4 MG/1
4 TABLET, ORALLY DISINTEGRATING ORAL EVERY 6 HOURS PRN
Status: DISCONTINUED | OUTPATIENT
Start: 2020-08-21 | End: 2020-08-22 | Stop reason: HOSPADM

## 2020-08-21 RX ORDER — FENTANYL CITRATE 50 UG/ML
25-50 INJECTION, SOLUTION INTRAMUSCULAR; INTRAVENOUS EVERY 5 MIN PRN
Status: DISCONTINUED | OUTPATIENT
Start: 2020-08-21 | End: 2020-08-21 | Stop reason: HOSPADM

## 2020-08-21 RX ORDER — ONDANSETRON 4 MG/1
4 TABLET, ORALLY DISINTEGRATING ORAL EVERY 30 MIN PRN
Status: DISCONTINUED | OUTPATIENT
Start: 2020-08-21 | End: 2020-08-21 | Stop reason: HOSPADM

## 2020-08-21 RX ORDER — ACETAMINOPHEN 325 MG/1
650 TABLET ORAL EVERY 6 HOURS PRN
Status: DISCONTINUED | OUTPATIENT
Start: 2020-08-21 | End: 2020-08-22 | Stop reason: HOSPADM

## 2020-08-21 RX ORDER — MEPERIDINE HYDROCHLORIDE 25 MG/ML
12.5 INJECTION INTRAMUSCULAR; INTRAVENOUS; SUBCUTANEOUS
Status: DISCONTINUED | OUTPATIENT
Start: 2020-08-21 | End: 2020-08-21 | Stop reason: HOSPADM

## 2020-08-21 RX ORDER — FENTANYL CITRATE 50 UG/ML
INJECTION, SOLUTION INTRAMUSCULAR; INTRAVENOUS PRN
Status: DISCONTINUED | OUTPATIENT
Start: 2020-08-21 | End: 2020-08-21

## 2020-08-21 RX ORDER — DILTIAZEM HYDROCHLORIDE 120 MG/1
120 CAPSULE, COATED, EXTENDED RELEASE ORAL DAILY
Status: DISCONTINUED | OUTPATIENT
Start: 2020-08-22 | End: 2020-08-22 | Stop reason: HOSPADM

## 2020-08-21 RX ORDER — FENTANYL CITRATE 50 UG/ML
25-50 INJECTION, SOLUTION INTRAMUSCULAR; INTRAVENOUS
Status: CANCELLED | OUTPATIENT
Start: 2020-08-21

## 2020-08-21 RX ORDER — OXYCODONE AND ACETAMINOPHEN 5; 325 MG/1; MG/1
1-2 TABLET ORAL EVERY 4 HOURS PRN
Status: DISCONTINUED | OUTPATIENT
Start: 2020-08-21 | End: 2020-08-21 | Stop reason: HOSPADM

## 2020-08-21 RX ORDER — ONDANSETRON 2 MG/ML
INJECTION INTRAMUSCULAR; INTRAVENOUS PRN
Status: DISCONTINUED | OUTPATIENT
Start: 2020-08-21 | End: 2020-08-21

## 2020-08-21 RX ORDER — FLECAINIDE ACETATE 50 MG/1
50 TABLET ORAL 2 TIMES DAILY
Status: DISCONTINUED | OUTPATIENT
Start: 2020-08-21 | End: 2020-08-22 | Stop reason: HOSPADM

## 2020-08-21 RX ORDER — ALFUZOSIN HYDROCHLORIDE 10 MG/1
10 TABLET, EXTENDED RELEASE ORAL DAILY
Status: DISCONTINUED | OUTPATIENT
Start: 2020-08-22 | End: 2020-08-22 | Stop reason: HOSPADM

## 2020-08-21 RX ORDER — ONDANSETRON 2 MG/ML
4 INJECTION INTRAMUSCULAR; INTRAVENOUS EVERY 30 MIN PRN
Status: DISCONTINUED | OUTPATIENT
Start: 2020-08-21 | End: 2020-08-21 | Stop reason: HOSPADM

## 2020-08-21 RX ORDER — SODIUM CHLORIDE, SODIUM LACTATE, POTASSIUM CHLORIDE, CALCIUM CHLORIDE 600; 310; 30; 20 MG/100ML; MG/100ML; MG/100ML; MG/100ML
INJECTION, SOLUTION INTRAVENOUS CONTINUOUS PRN
Status: DISCONTINUED | OUTPATIENT
Start: 2020-08-21 | End: 2020-08-21

## 2020-08-21 RX ADMIN — DEXAMETHASONE SODIUM PHOSPHATE 4 MG: 4 INJECTION, SOLUTION INTRAMUSCULAR; INTRAVENOUS at 08:19

## 2020-08-21 RX ADMIN — SODIUM CHLORIDE: 900 IRRIGANT IRRIGATION at 13:01

## 2020-08-21 RX ADMIN — Medication 5 MG: at 09:26

## 2020-08-21 RX ADMIN — SODIUM CHLORIDE: 9 INJECTION, SOLUTION INTRAVENOUS at 13:01

## 2020-08-21 RX ADMIN — FENTANYL CITRATE 100 MCG: 50 INJECTION, SOLUTION INTRAMUSCULAR; INTRAVENOUS at 07:52

## 2020-08-21 RX ADMIN — PROPOFOL 50 MG: 10 INJECTION, EMULSION INTRAVENOUS at 07:52

## 2020-08-21 RX ADMIN — ATROPA BELLADONNA AND OPIUM 0.5 SUPPOSITORY: 16.2; 3 SUPPOSITORY RECTAL at 10:55

## 2020-08-21 RX ADMIN — ROCURONIUM BROMIDE 10 MG: 10 INJECTION INTRAVENOUS at 08:52

## 2020-08-21 RX ADMIN — FLECAINIDE ACETATE 50 MG: 50 TABLET ORAL at 21:01

## 2020-08-21 RX ADMIN — SODIUM CHLORIDE, POTASSIUM CHLORIDE, SODIUM LACTATE AND CALCIUM CHLORIDE: 600; 310; 30; 20 INJECTION, SOLUTION INTRAVENOUS at 08:08

## 2020-08-21 RX ADMIN — PROPOFOL 30 MG: 10 INJECTION, EMULSION INTRAVENOUS at 09:50

## 2020-08-21 RX ADMIN — SUGAMMADEX 200 MG: 100 INJECTION, SOLUTION INTRAVENOUS at 10:05

## 2020-08-21 RX ADMIN — SODIUM CHLORIDE, POTASSIUM CHLORIDE, SODIUM LACTATE AND CALCIUM CHLORIDE: 600; 310; 30; 20 INJECTION, SOLUTION INTRAVENOUS at 07:42

## 2020-08-21 RX ADMIN — CEFAZOLIN 2 G: 10 INJECTION, POWDER, FOR SOLUTION INTRAVENOUS at 08:05

## 2020-08-21 RX ADMIN — SODIUM CHLORIDE 6000 ML: 900 IRRIGANT IRRIGATION at 17:46

## 2020-08-21 RX ADMIN — Medication 5 MG: at 08:12

## 2020-08-21 RX ADMIN — Medication 5 MG: at 08:45

## 2020-08-21 RX ADMIN — Medication 5 MG: at 08:06

## 2020-08-21 RX ADMIN — ONDANSETRON 4 MG: 2 INJECTION INTRAMUSCULAR; INTRAVENOUS at 10:05

## 2020-08-21 RX ADMIN — Medication 5 MG: at 08:42

## 2020-08-21 RX ADMIN — PROPOFOL 50 MG: 10 INJECTION, EMULSION INTRAVENOUS at 08:52

## 2020-08-21 RX ADMIN — ROCURONIUM BROMIDE 40 MG: 10 INJECTION INTRAVENOUS at 07:53

## 2020-08-21 RX ADMIN — FENTANYL CITRATE 25 MCG: 50 INJECTION INTRAMUSCULAR; INTRAVENOUS at 10:26

## 2020-08-21 RX ADMIN — FENTANYL CITRATE 25 MCG: 50 INJECTION INTRAMUSCULAR; INTRAVENOUS at 10:57

## 2020-08-21 ASSESSMENT — ACTIVITIES OF DAILY LIVING (ADL)
DRESS: 0-->INDEPENDENT
FALL_HISTORY_WITHIN_LAST_SIX_MONTHS: NO
RETIRED_EATING: 0-->INDEPENDENT
BATHING: 0-->INDEPENDENT
TOILETING: 0-->INDEPENDENT
COGNITION: 0 - NO COGNITION ISSUES REPORTED
AMBULATION: 0-->INDEPENDENT
SWALLOWING: 0-->SWALLOWS FOODS/LIQUIDS WITHOUT DIFFICULTY
TRANSFERRING: 0-->INDEPENDENT
RETIRED_COMMUNICATION: 0-->UNDERSTANDS/COMMUNICATES WITHOUT DIFFICULTY

## 2020-08-21 ASSESSMENT — ENCOUNTER SYMPTOMS
APNEA: 1
DYSRHYTHMIAS: 1

## 2020-08-21 ASSESSMENT — MIFFLIN-ST. JEOR: SCORE: 1532.63

## 2020-08-21 NOTE — PLAN OF CARE
"      VS:   /58   Pulse 58   Temp 97.3  F (36.3  C) (Oral)   Resp 13   Ht 1.753 m (5' 9.02\")   Wt 78.7 kg (173 lb 8 oz)   SpO2 96%   BMI 25.61 kg/m    Tele- sinus whitney  LS clear  BS active   Output:   Cunningham patent and draining good amounts, urin clear- watermelon color.    Activity:   Up and ambulating in hallway with SBA- steady on feet and moving well. Repositions self in bed.    Skin: Intact   Pain:   Pt complaining of bladder pressure, CBI rate turned down and pt states pressure improved significantly. Denies pain otherwise.    Neuro/CMS:   A&Ox4, CMS/Neuros intact, pt denies any numbness/tingling. Denies chest pain/SOB.   Dressing(s):   No dressings in place.    Diet:   Diet advanced to regular- tolerating well.    LDA:   PIV infusing NS@50mL/hr.    Equipment:   IV pole, CAPNO, PCDs, walker and gait belt.    Plan:   Pt is able to make needs known, call light within reach- continue with POC.    Additional Info:   Pt notified RN of feeling some heart palpitations and possibly going into a-fib but then felt himself convert back to NSR. Urology notified, ECG complete and pt place on telemetry. Labs ordered- WNL. Tele whitney sinus. Pt denies chest pain/SOB, denies dizziness/lightheadedness.        "

## 2020-08-21 NOTE — OR NURSING
Patient is Jehovahs witness. NO blood products. Refusal of blood products signed and in chart.   Patient IS okay with IV Albumin, discussed with Dr Hanley

## 2020-08-21 NOTE — OR NURSING
PACU to Inpatient Nursing Handoff    Patient Frank Orellana is a 70 year old male who speaks English.   Procedure Procedure(s):  CYSTOSCOPY, WITH TRANSURETHRAL RESECTION PROSTATE   Surgeon(s) Primary: Roldan De La O MD  Resident - Assisting: Emilio Garcia MD     Allergies   Allergen Reactions     Blood Transfusion Related (Informational Only)      Amish.  Declines blood transfusions.     Contrast Dye Rash       Isolation  [unfilled]     Past Medical History   has a past medical history of Actinic keratosis (2009), Atrial fibrillation (H) (6/15/96), Chronic hepatitis C (H), Dupuytren's contracture of both hands, Elevated PSA, Hepatitis B (1969), WINNIE (obstructive sleep apnea), Pain in both hands, Patient is Jew, Refusal of blood transfusions as patient is Jew, Right shoulder pain, Tinnitus (1 or 2 years ago), and Tubular adenoma of colon (1/17/17).    Anesthesia General   Dermatome Level     Preop Meds Not applicable   Nerve block Not applicable   Intraop Meds dexamethasone (Decadron)  fentanyl (Sublimaze): 100 mcg total  ondansetron (Zofran): last given at 1005   Local Meds No   Antibiotics cefazolin (Ancef) - last given at 0805     Pain Patient Currently in Pain: yes   PACU meds  fentanyl (Sublimaze): 50 mcg (total dose) last given at 1057   belladonna opium suppository 1/2 tab   PCA / epidural No   Capnography     Telemetry ECG Rhythm: Sinus rhythm   Inpatient Telemetry Monitor Ordered? No        Labs Glucose Lab Results   Component Value Date     08/21/2020       Hgb Lab Results   Component Value Date    HGB 12.4 08/21/2020       INR Lab Results   Component Value Date    INR 1.52 01/15/2018      PACU Imaging Not applicable     Wound/Incision Incision/Surgical Site 01/20/17 Right Palm (Active)   Number of days: 1309       Incision/Surgical Site 10/26/17 Right Abdomen (Active)   Number of days: 1030       Incision/Surgical Site 12/15/17 Left Hand  (Active)   Number of days: 980      CMS        Equipment CBI   Other LDA       IV Access Peripheral IV 08/21/20 Left Lower forearm (Active)   Site Assessment WDL 08/21/20 1015   Line Status Infusing 08/21/20 1015   Phlebitis Scale 0-->no symptoms 08/21/20 1015   Infiltration Scale 0 08/21/20 1015   Dressing Intervention New dressing  08/21/20 0706   Number of days: 0      Blood Products Not applicable    mL   Intake/Output Date 08/21/20 0700 - 08/22/20 0659   Shift 1547-6452 [id number] 24 Hour Total   INTAKE   I.V. 1800   1800   Shift Total(mL/kg) 1800(22.87)   1800(22.87)   OUTPUT   Urine 225   225   Blood 200   200   Shift Total(mL/kg) 425(5.4)   425(5.4)   Weight (kg) 78.7 78.7 78.7 78.7      Drains / Cunningham Urethral Catheter Non-latex 22 fr (Active)   Tube Description UTV 08/21/20 1015   Collection Container Standard 08/21/20 1015   Securement Method Securing device (Describe) 08/21/20 1015   Number of days: 0      Time of void PreOp Void Prior to Procedure: 0600 (08/21/20 0650)    PostOp      Diapered? No   Bladder Scan     PO    tolerating sips     Vitals    B/P: 109/64  T: 97.9  F (36.6  C)    Temp src: Axillary  P:  Pulse: 59 (08/21/20 1100)          R: 21  O2:  SpO2: 96 %    O2 Device: None (Room air) (08/21/20 1045)    Oxygen Delivery: 8 LPM (08/21/20 1015)         Family/support present wife   Patient belongings     Patient transported on cart   DC meds/scripts (obs/outpt) Not applicable   Inpatient Pain Meds Released? No       Special needs/considerations None   Tasks needing completion None       Eva Royal, RN  [facility] [id number]

## 2020-08-21 NOTE — OP NOTE
Procedure Date: 08/21/2020      PREOPERATIVE DIAGNOSIS:  Benign prostatic hypertrophy.      POSTOPERATIVE DIAGNOSIS:  Benign prostatic hypertrophy.      PROCEDURE:  Cystoscopy with transurethral resection of the prostate.      INDICATIONS:  Mr. Orellana is a 70-year-old gentleman followed in our clinic for history of BPH with lower urinary tract symptoms.  The patient has failed medical therapy.  He presents today for transurethral resection of the prostate.      DESCRIPTION OF PROCEDURE:  After informed consent was obtained, the patient was brought to the operating room where he was administered general endotracheal tube anesthesia.  After suitable level of anesthesia was attained, he was placed in lithotomy position with all pressure points padded.  He was administered preoperative antibiotics.  He was prepped and draped in standard sterile fashion.  Next, the bipolar resectoscope was introduced into the patient's bladder using the visual obturator.  We then connected the working element.  Inspection demonstrated trilobar hypertrophy with a significant median lobe.  We began the operation by making cuts at 5 and 7 o'clock at the clock face and bringing this cut down to the level of the bladder floor.  Once we did this bilaterally and defined the median lobe and  it from the lateral lobes.  The median lobe was resected down to the level of the bladder floor.  We then resected the lateral lobes down to capsular fibers. We continued the resection on the floor of the prostate back to the veru.  We then obtained excellent hemostasis.  We used a bladder evacuator to remove the prostate chips.  Subsequent inspection demonstrated no injury to the ureteral orifices and no further chips in the bladder.  We then obtained meticulous hemostasis one last time and the scope was removed.  A 24 three-way catheter was then advanced into the patient's bladder easily and the balloon inflated with 20 mL of water.  The catheter  was irrigated last time to ensure no final clots or chips were present.  The catheter was then connected to continuous bladder irrigation.  The patient was then awakened, extubated and brought to the recovery room in stable condition.      SURGEON:  Roldan De La O MD.      ASSISTANT:  Emilio Garcia MD and VELMA Huitron.         ROLDAN DE LA O MD             D: 2020   T: 2020   MT: DIDIER      Name:     CATHERINE OH   MRN:      [id number]        Account:        [id number]   :      1950           Procedure Date: 2020      Document: [id number]

## 2020-08-21 NOTE — ANESTHESIA PREPROCEDURE EVALUATION
"Anesthesia Pre-Procedure Evaluation    Patient: Frank Orellana   MRN:     [id number] Gender:   male   Age:    70 year old :      1950        Preoperative Diagnosis: BPH with urinary obstruction [N40.1, N13.8]   Procedure(s):  CYSTOSCOPY, WITH TRANSURETHRAL RESECTION PROSTATE     LABS:  CBC:   Lab Results   Component Value Date    WBC 5.7 2020    WBC 6.4 2020    HGB 13.2 (L) 2020    HGB 14.6 2020    HCT 39.2 (L) 2020    HCT 44.0 2020     2020     2020     BMP:   Lab Results   Component Value Date     2020     2020    POTASSIUM 3.8 2020    POTASSIUM 3.9 2020    CHLORIDE 109 2020    CHLORIDE 112 (H) 2020    CO2 27 2020    CO2 25 2020    BUN 14 2020    BUN 12 2020    CR 0.90 2020    CR 0.86 2020     (H) 2020     (H) 2020     COAGS:   Lab Results   Component Value Date    PTT 29 2006    INR 1.52 (H) 01/15/2018     POC:   Lab Results   Component Value Date    BGM 85 2020     OTHER:   Lab Results   Component Value Date    LACT 1.1 2019    A1C 4.9 2017    VALENTINA 8.6 2020    MAG 2.3 2018    ALBUMIN 4.0 2019    PROTTOTAL 7.0 2019    ALT 16 2019    AST 11 2019    ALKPHOS 81 2019    BILITOTAL 1.2 2019    LIPASE 135 2019    TSH 1.42 2019    SED 5 2014        Preop Vitals    BP Readings from Last 3 Encounters:   20 131/79   20 115/61   20 111/76    Pulse Readings from Last 3 Encounters:   20 58   20 60   20 58      Resp Readings from Last 3 Encounters:   20 20   20 16   19 20    SpO2 Readings from Last 3 Encounters:   20 98%   20 97%   20 98%      Temp Readings from Last 1 Encounters:   20 36.7  C (98  F) (Oral)    Ht Readings from Last 1 Encounters:   20 1.753 m (5' 9.02\")      Wt " "Readings from Last 1 Encounters:   08/21/20 78.7 kg (173 lb 8 oz)    Estimated body mass index is 25.61 kg/m  as calculated from the following:    Height as of this encounter: 1.753 m (5' 9.02\").    Weight as of this encounter: 78.7 kg (173 lb 8 oz).     LDA:        Past Medical History:   Diagnosis Date     Actinic keratosis 2009     Atrial fibrillation (H) 6/15/96    Afib - 2 or 3 extended occurrences since     Chronic hepatitis C (H)     Chronic Hepatitis C,  genotype 1b                Stage 0 Fibrosis     Dupuytren's contracture of both hands      Elevated PSA      Hepatitis B 1969    acute infection at age 19; IV drug use     WINNIE (obstructive sleep apnea)     AHI 34.2     Pain in both hands      Patient is Jainism      Refusal of blood transfusions as patient is Jainism      Right shoulder pain      Tinnitus 1 or 2 years ago    both ears     Tubular adenoma of colon 1/17/17    ascending colon, 1/17/17      Past Surgical History:   Procedure Laterality Date     ANESTHESIA CARDIOVERSION N/A 1/15/2018    Procedure: ANESTHESIA CARDIOVERSION;  Anesthesia Coverage Cardioversion With Transesophageal Echocardiogram @0930 ;  Surgeon: GENERIC ANESTHESIA PROVIDER;  [facility]     ANESTHESIA CARDIOVERSION N/A 10/11/2018    Procedure: ANESTHESIA CARDIOVERSION;  Cardioversion;  Surgeon: GENERIC ANESTHESIA PROVIDER;  Location: [facility]     ANESTHESIA CARDIOVERSION N/A 11/28/2018    Procedure: Anesthesia Coverage Cardioversion @0830;  Surgeon: GENERIC ANESTHESIA PROVIDER;  Location: [facility]     ARTHROSCOPY KNEE      left knee     COLONOSCOPY  1/17/17    tubular adenoma ascending colon     HERNIORRHAPHY INGUINAL Right 10/26/2017    Procedure: HERNIORRHAPHY INGUINAL;  Open Right Inguinal Hernia Repair with Mesh;  Surgeon: Suresh Raymond MD;  [facility]     KNEE SURGERY  2006??    torn meniscus     RELEASE DUPUYTRENS CONTRACTURE Right 1/20/2017    Procedure: RELEASE DUPUYTRENS CONTRACTURE;  " Surgeon: Lars Oleary MD;  Location: [facility]     RELEASE DUPUYTRENS CONTRACTURE Left 12/15/2017    Procedure: RELEASE DUPUYTRENS CONTRACTURE;  Left Ring and Middle Finger Subtotal Palmar Fasciectomy;  Surgeon: Lars Oleary MD;  [facility]     TRANSESOPHAGEAL ECHOCARDIOGRAM INTRAOPERATIVE N/A 1/15/2018    Procedure: TRANSESOPHAGEAL ECHOCARDIOGRAM INTRAOPERATIVE;;  Surgeon: GENERIC ANESTHESIA PROVIDER;  Location: [facility] OR      Allergies   Allergen Reactions     Blood Transfusion Related (Informational Only)      Lutheran.  Declines blood transfusions.     Contrast Dye Rash        Anesthesia Evaluation    ROS/Med Hx    No history of anesthetic complications  (-) malignant hyperthermia and tuberculosis  Comments: Met with Frank and his wife. He has been NPO and did take his heart medications today; He has done well with prior anesthesia cares. He does have atrial fibrillation history but is now in sinus and is only on aspiring. No history of strokes, MI, CHF. He denies smoking. He has mild sleep apnea and is not on any CPAP. He is a Lutheran and refuses blood products but is willing to receive albumin. He denies history of coagulopathy or DVT. No history of diabetes mellitus.     Cardiovascular Findings   (+) dysrhythmias,  Comments: Atrial fibrillation has had cardioversion in the past. Now in sinus rhythm.     Neuro Findings - negative ROS    Pulmonary Findings   (+) apnea (mild sleep apnea but not on CPAP - sleeps on the side)  (-) asthma    HENT Findings - negative HENT ROS    Skin Findings - negative skin ROS      GI/Hepatic/Renal Findings   (+) liver disease (history of chronic Hep C and B)  (-) GERD    Endocrine/Metabolic Findings - negative ROS      Genetic/Syndrome Findings - negative genetics/syndromes ROS    Hematology/Oncology Findings   (-) blood dyscrasia  Comments: Lutheran - agreeable to receive albumin.     Additional Notes  Allergies:   -- Blood Transfusion  Related (Informational Only)     --  Hoahaoism.  Declines blood transfusions.   -- Contrast Dye -- Rash    Facility-Administered Medications Prior to Admission:  lidocaine (XYLOCAINE) 2 % external gel 10 mL, 10 mL, Urethral, Once, Meggan Maravilla MD    Medications Prior to Admission:  alfuzosin ER (UROXATRAL) 10 MG 24 hr tablet, Take 1 tablet (10 mg) by mouth daily, Disp: 90 tablet, Rfl: 3, 8/21/2020 at Unknown time  aspirin EC 81 MG EC tablet, Take 1 tablet (81 mg) by mouth daily, Disp: , Rfl: , Past Week at Unknown time  diltiazem ER COATED BEADS (CARDIZEM CD/CARTIA XT) 120 MG 24 hr capsule, Take 1 capsule (120 mg) by mouth daily, Disp: 90 capsule, Rfl: 1, 8/21/2020 at Unknown time  flecainide (TAMBOCOR) 50 MG tablet, Take 1 tablet (50 mg) by mouth 2 times daily, Disp: 180 tablet, Rfl: 3, 8/21/2020 at Unknown time            PHYSICAL EXAM:   Mental Status/Neuro: A/A/O   Airway: Facies: Feasible  Mallampati: I  Mouth/Opening: Full  TM distance: > 6 cm  Neck ROM: Full   Respiratory: Auscultation: CTAB     Resp. Rate: Normal     Resp. Effort: Normal      CV: Rhythm: Regular  Rate: Age appropriate  Heart: Normal Sounds  Edema: None   Comments:      Dental: Details                  Assessment:   ASA SCORE: 3    H&P: History and physical reviewed and following examination; no interval change.   Smoking Status:  Non-Smoker/Unknown   NPO Status: NPO Appropriate     Plan:   Anes. Type:  General   Pre-Medication: None   Induction:  IV (Standard)   Airway: ETT; Oral   Access/Monitoring: PIV   Maintenance: Balanced     Postop Plan:   Postop Pain: Opioids  Postop Sedation/Airway: Not planned     PONV Management:   Adult Risk Factors:, Non-Smoker, Postop Opioids   Prevention: Ondansetron, Dexamethasone     CONSENT: Direct conversation   Plan and risks discussed with: Patient; Spouse   Blood Products: Refusal (SOME/ALL Blood Products)  Reason for Refusal: [facility]  Willing to accept: Albumin        Comments for Plan/Consent:  Frank requests GA. Procedures and risks explained including those of positioning. He understood and consented. Qs answered.            Talha Hanley MD

## 2020-08-21 NOTE — ANESTHESIA CARE TRANSFER NOTE
Patient: Frank Orellana    Procedure(s):  CYSTOSCOPY, WITH TRANSURETHRAL RESECTION PROSTATE    Diagnosis: BPH with urinary obstruction [N40.1, N13.8]  Diagnosis Additional Information: No value filed.    Anesthesia Type:   General     Note:  Airway :Face Mask  Patient transferred to:PACU  Comments: Temp 37.2  /68  P 63  RR 12  O2 Sat 99% on facemask Handoff Report: Identifed the Patient, Identified the Reponsible Provider, Reviewed the pertinent medical history, Discussed the surgical course, Reviewed Intra-OP anesthesia mangement and issues during anesthesia, Set expectations for post-procedure period and Allowed opportunity for questions and acknowledgement of understanding      Vitals: (Last set prior to Anesthesia Care Transfer)    CRNA VITALS  8/21/2020 0942 - 8/21/2020 1018      8/21/2020             Resp Rate (observed):  (!) 1                Electronically Signed By: ANDREINA Dash CRNA  August 21, 2020  10:18 AM

## 2020-08-21 NOTE — BRIEF OP NOTE
[facility], [place]    Brief Operative Note    Pre-operative diagnosis: BPH with urinary obstruction [N40.1, N13.8]  Post-operative diagnosis Same as pre-operative diagnosis    Procedure: Procedure(s):  CYSTOSCOPY, WITH TRANSURETHRAL RESECTION PROSTATE  Surgeon: Surgeon(s) and Role:     * Roldan De La O MD - Primary     * Emilio Garcia MD - Resident - Assisting  Anesthesia: General   Estimated blood loss: 200 ml  Drains: 22-Fr three way gomez catheter  Specimens:   ID Type Source Tests Collected by Time Destination   A : Prostate Tissue Tissue Prostate SURGICAL PATHOLOGY EXAM Roldan De La O MD 8/21/2020 10:02 AM      Findings:   Trilobar hypertrophy.  Complications: None.  Implants: * No implants in log *    Emilio Garcia MD  Urology Resident

## 2020-08-22 VITALS
TEMPERATURE: 98.2 F | DIASTOLIC BLOOD PRESSURE: 56 MMHG | OXYGEN SATURATION: 97 % | BODY MASS INDEX: 25.7 KG/M2 | RESPIRATION RATE: 16 BRPM | HEIGHT: 69 IN | HEART RATE: 58 BPM | WEIGHT: 173.5 LBS | SYSTOLIC BLOOD PRESSURE: 112 MMHG

## 2020-08-22 LAB
ANION GAP SERPL CALCULATED.3IONS-SCNC: 3 MMOL/L (ref 3–14)
BUN SERPL-MCNC: 13 MG/DL (ref 7–30)
CALCIUM SERPL-MCNC: 7.9 MG/DL (ref 8.5–10.1)
CHLORIDE SERPL-SCNC: 111 MMOL/L (ref 94–109)
CO2 SERPL-SCNC: 28 MMOL/L (ref 20–32)
CREAT SERPL-MCNC: 0.92 MG/DL (ref 0.66–1.25)
ERYTHROCYTE [DISTWIDTH] IN BLOOD BY AUTOMATED COUNT: 12.7 % (ref 10–15)
GFR SERPL CREATININE-BSD FRML MDRD: 83 ML/MIN/{1.73_M2}
GLUCOSE SERPL-MCNC: 98 MG/DL (ref 70–99)
HCT VFR BLD AUTO: 35.9 % (ref 40–53)
HGB BLD-MCNC: 11.9 G/DL (ref 13.3–17.7)
MCH RBC QN AUTO: 31.8 PG (ref 26.5–33)
MCHC RBC AUTO-ENTMCNC: 33.1 G/DL (ref 31.5–36.5)
MCV RBC AUTO: 96 FL (ref 78–100)
PLATELET # BLD AUTO: 163 10E9/L (ref 150–450)
POTASSIUM SERPL-SCNC: 4.4 MMOL/L (ref 3.4–5.3)
RBC # BLD AUTO: 3.74 10E12/L (ref 4.4–5.9)
SODIUM SERPL-SCNC: 142 MMOL/L (ref 133–144)
WBC # BLD AUTO: 9.1 10E9/L (ref 4–11)

## 2020-08-22 PROCEDURE — 80048 BASIC METABOLIC PNL TOTAL CA: CPT

## 2020-08-22 PROCEDURE — 93010 ELECTROCARDIOGRAM REPORT: CPT | Performed by: INTERNAL MEDICINE

## 2020-08-22 PROCEDURE — 36415 COLL VENOUS BLD VENIPUNCTURE: CPT

## 2020-08-22 PROCEDURE — 25000132 ZZH RX MED GY IP 250 OP 250 PS 637

## 2020-08-22 PROCEDURE — 85027 COMPLETE CBC AUTOMATED: CPT

## 2020-08-22 RX ADMIN — DILTIAZEM HYDROCHLORIDE 120 MG: 120 CAPSULE, COATED, EXTENDED RELEASE ORAL at 08:17

## 2020-08-22 RX ADMIN — ALFUZOSIN HYDROCHLORIDE 10 MG: 10 TABLET, EXTENDED RELEASE ORAL at 08:17

## 2020-08-22 RX ADMIN — FLECAINIDE ACETATE 50 MG: 50 TABLET ORAL at 08:17

## 2020-08-22 NOTE — PLAN OF CARE
"      VS:   /52 (BP Location: Right arm)   Pulse 53   Temp 97.2  F (36.2  C) (Oral)   Resp 14   Ht 1.753 m (5' 9.02\")   Wt 78.7 kg (173 lb 8 oz)   SpO2 96%   BMI 25.61 kg/m    Room air.   Output:   Cunningham putting out adequate amounts with watermelon color. CBI running slow. +gas   Lungs CTA   Activity:   Up with SBA due to IV pole, otherwise IND   Skin: Intact ex small scabs on legs   Pain:   denies   Neuro/CMS:   A&Ox3, denies numbness and tingling   Dressing(s):   none   Diet:   Regular-tolerating   LDA:   CBI, PIV infusing   Equipment:   CAPNO, CBI, IV pole,   Plan:   Continue to monitor and follow plan of care. Able to make needs known and call light within reach.   Additional Info:          "

## 2020-08-22 NOTE — PLAN OF CARE
Trial void complete and gomez D/Francisco, 300mL saline instilled and pt voided 225 adriana colored urine, PVR 20mL.

## 2020-08-22 NOTE — PROGRESS NOTES
"Urology  Progress Note    -Postop developed transient palpitation. ECG NSR, lytes nml. Placed on tele, some asymptomatic bradycardia overnight  -Pain controlled  -Ambulating    Exam  /52 (BP Location: Right arm)   Pulse 53   Temp 97.2  F (36.2  C) (Oral)   Resp 14   Ht 1.753 m (5' 9.02\")   Wt 78.7 kg (173 lb 8 oz)   SpO2 96%   BMI 25.61 kg/m    No acute distress  Unlabored breathing  Abdomen soft, nontender, nondistended.  Urine light pink with CBI at slow rate    Labs  AM labs pending    Assessment/Plan  70 year old y/o male POD#1 s/p TURP    -Clamp CBI, TOV later this AM if urine remains clear-pink  -Anticipate discharge after TOV  -Okay to resume ASA-81 in 4 days if urine remains clear-pink    Seen and examined with the chief resident and Dr. Shane. Will discuss with Dr. De La O.    Emilio Garcia MD  Urology Resident       Contacting the Urology Team     Please use the following job codes to reach the Urology Team. Note that you must use an in house phone and that job codes cannot receive text pages.     On weekdays, dial [phone] (or star-star-star [phone] on the new [employer] telephones) then [phone] to reach the Adult Urology resident or PA on call    On weekdays, dial [phone] (or star-star-star 777 on the new [employer] telephones) then [phone] to reach the Pediatric Urology resident    On weeknights and weekends, dial [phone] (or star-star-star 777 on the new [employer] telephones) then [phone] to reach the Urology resident on call (for both Adult and Pediatrics)                "

## 2020-08-23 LAB — INTERPRETATION ECG - MUSE: NORMAL

## 2020-08-24 LAB — INTERPRETATION ECG - MUSE: NORMAL

## 2020-08-25 LAB — COPATH REPORT: NORMAL

## 2020-08-26 ENCOUNTER — PATIENT OUTREACH (OUTPATIENT)
Dept: UROLOGY | Facility: CLINIC | Age: 70
End: 2020-08-26

## 2020-09-14 ENCOUNTER — VIRTUAL VISIT (OUTPATIENT)
Dept: UROLOGY | Facility: CLINIC | Age: 70
End: 2020-09-14
Payer: COMMERCIAL

## 2020-09-14 DIAGNOSIS — N13.8 BPH WITH URINARY OBSTRUCTION: Primary | ICD-10-CM

## 2020-09-14 DIAGNOSIS — N40.1 BPH WITH URINARY OBSTRUCTION: Primary | ICD-10-CM

## 2020-09-14 NOTE — LETTER
"9/14/2020       RE: Frank Orellana  [address]  [facility] [address]     Dear Colleague,    Thank you for referring your patient, Frank Orellana, to the [facility] UROLOGY AND INST FOR PROSTATE AND UROLOGIC CANCERS at [facility]. Please see a copy of my visit note below.    Frank Orellana is a 70 year old male who is being evaluated via a billable telephone visit.      The patient has been notified of following:     \"This telephone visit will be conducted via a call between you and your physician/provider. We have found that certain health care needs can be provided without the need for a physical exam.  This service lets us provide the care you need with a short phone conversation.  If a prescription is necessary we can send it directly to your pharmacy.  If lab work is needed we can place an order for that and you can then stop by our lab to have the test done at a later time.    Telephone visits are billed at different rates depending on your insurance coverage. During this emergency period, for some insurers they may be billed the same as an in-person visit.  Please reach out to your insurance provider with any questions.    If during the course of the call the physician/provider feels a telephone visit is not appropriate, you will not be charged for this service.\"    Patient has given verbal consent for Telephone visit?  Yes    What phone number would you like to be contacted at?     How would you like to obtain your AVS? MyChart     ADDITIONAL PROVIDER NOTES:    71 YO MALE with history of BPH s/p TURP on 8/21/20.  Notes good stream, easier to urinate, no bladder spasms.  Some pink urine occasionally still.    OBJECTIVE: Pathology from 8/21/20 shows no evidence of cancer    ASSESSMENT AND PLAN:   Over half of today's 8-minute visit was spent counseling the patient regarding his voiding.  PAtient doing well s/p TURP.  He will f/u in 3 months with Erika" Eduardo BAXTER.       Phone call duration: 8 minutes      Roldan De La O MD

## 2020-09-14 NOTE — PROGRESS NOTES
"Frank Orellana is a 70 year old male who is being evaluated via a billable telephone visit.      The patient has been notified of following:     \"This telephone visit will be conducted via a call between you and your physician/provider. We have found that certain health care needs can be provided without the need for a physical exam.  This service lets us provide the care you need with a short phone conversation.  If a prescription is necessary we can send it directly to your pharmacy.  If lab work is needed we can place an order for that and you can then stop by our lab to have the test done at a later time.    Telephone visits are billed at different rates depending on your insurance coverage. During this emergency period, for some insurers they may be billed the same as an in-person visit.  Please reach out to your insurance provider with any questions.    If during the course of the call the physician/provider feels a telephone visit is not appropriate, you will not be charged for this service.\"    Patient has given verbal consent for Telephone visit?  Yes    What phone number would you like to be contacted at?     How would you like to obtain your AVS? Hanghart     ADDITIONAL PROVIDER NOTES:    69 YO MALE with history of BPH s/p TURP on 8/21/20.  Notes good stream, easier to urinate, no bladder spasms.  Some pink urine occasionally still.    OBJECTIVE: Pathology from 8/21/20 shows no evidence of cancer    ASSESSMENT AND PLAN:   Over half of today's 8-minute visit was spent counseling the patient regarding his voiding.  PAtient doing well s/p TURP.  He will f/u in 3 months with Erika BAXTER.       Phone call duration: 8 minutes        "

## 2020-09-15 NOTE — PATIENT INSTRUCTIONS
Follow up with Erika Clark PA-C, for virtual visit in 3 months.      It was a pleasure meeting with you today.  Thank you for allowing me and my team the privilege of caring for you today.  YOU are the reason we are here, and I truly hope we provided you with the excellent service you deserve.  Please let us know if there is anything else we can do for you so that we can be sure you are leaving completely satisfied with your care experience.

## 2020-12-09 ENCOUNTER — PRE VISIT (OUTPATIENT)
Dept: UROLOGY | Facility: CLINIC | Age: 70
End: 2020-12-09

## 2020-12-09 NOTE — TELEPHONE ENCOUNTER
Chief Complaint : Follow up - 3 months    Hx/Sx: BPH w/ LUTS (s/p TURP)    Records/Orders: Available    Pt Contacted: Called, converted appointment to virtual    At Rooming: [place], Patient to log on via [employer]    Iraj Ho, EMT

## 2020-12-15 ENCOUNTER — VIRTUAL VISIT (OUTPATIENT)
Dept: UROLOGY | Facility: CLINIC | Age: 70
End: 2020-12-15
Payer: COMMERCIAL

## 2020-12-15 DIAGNOSIS — R97.20 ELEVATED PROSTATE SPECIFIC ANTIGEN (PSA): Primary | ICD-10-CM

## 2020-12-15 PROCEDURE — 99213 OFFICE O/P EST LOW 20 MIN: CPT | Mod: 95 | Performed by: PHYSICIAN ASSISTANT

## 2020-12-15 NOTE — PROGRESS NOTES
"Frank Orellana is a 70 year old male who is being evaluated via a billable video visit.      Video Visit Technology for this patient: [email or web address] Video Visit- Patient was left in waiting room      The patient has been notified of following:     \"This video visit will be conducted via a call between you and your physician/provider. We have found that certain health care needs can be provided without the need for an in-person physical exam.  This service lets us provide the care you need with a video conversation.  If a prescription is necessary we can send it directly to your pharmacy.  If lab work is needed we can place an order for that and you can then stop by our lab to have the test done at a later time.    Video visits are billed at different rates depending on your insurance coverage.  Please reach out to your insurance provider with any questions.    If during the course of the call the physician/provider feels a video visit is not appropriate, you will not be charged for this service.\"    Patient has given verbal consent for Video visit? Yes  How would you like to obtain your AVS? MyChart  If you are dropped from the video visit, the video invite should be resent to: Send to e-mail at: [email or web address]  Will anyone else be joining your video visit? No    Video-Visit Details    Type of service:  Video Visit    Video Start Time: 10:22  Video End Time: 10:32    Originating Location (pt. Location): Home    Distant Location (provider location):  [facility] [place]     Platform used for Video Visit: [email or web address]    HPI: Mr. Frank Orellana is a 70 year old year old male presenting today for evaluation of chief complaint(s): No chief complaint on file.    Mr. Orellana has PMH significant for HTN and BPH with LUTS s/p TURP (8/21/20 - path: benign bph).  Also had elevated PSA, previously followed by Dr. Maravilla.     Patient since the procedure went really well.  Today has no symptoms.  No " frequency or nocturia or urgency.  With voiding, stream is much stronger and he feels he is emptying fully. Passed a few clots on 10/5/20 and 10/30/20 - that was the last time he had gross hematuria.  No dysuria.      REVIEW OF DIAGNOSTICS:  3/25/20 - PSA 5.83ng/dL  11/21/19 - PSA 8.72ng/dL  5/16/19 - PSA 6.03ng/dL  1/11/19 - PSA 7.17ng/dL    - No new medical problems  - Has been healthy  - No new medications.  He did discontinue the alfuzosin because he feels he doesn't need it anymore.       Over the past month, how often have you:     had a sensation of not emptying your bladder completely after you finished urinating? Not at all   had to urinate again less than two hours after you finished urinating? About half the time   found it difficult to postpone urination? Not at all   found you stopped and started again several times when you urinated? Not at all   had a weak urine stream? Less than 1 time in 5   had to push or strain to begin urinating? Not at all   got up to urinate from the time you went to bed at night until the time you got up in the morning? None   If you were to spend the rest of your life with your urinary condition just the way it is now, how would you feel about that? (highlight or underline) Delighted   AUA - Total Score (range: 0 - 35)          Current Outpatient Medications   Medication Sig Dispense Refill     alfuzosin ER (UROXATRAL) 10 MG 24 hr tablet Take 1 tablet (10 mg) by mouth daily 90 tablet 3     aspirin EC 81 MG EC tablet Take 1 tablet (81 mg) by mouth daily       diltiazem ER COATED BEADS (CARDIZEM CD/CARTIA XT) 120 MG 24 hr capsule Take 1 capsule (120 mg) by mouth daily 90 capsule 1     flecainide (TAMBOCOR) 50 MG tablet Take 1 tablet (50 mg) by mouth 2 times daily 180 tablet 3       ALLERGIES: Blood transfusion related (informational only) and Contrast dye      REVIEW OF SYSTEMS:  As above in HPI    GENERAL PHYSICAL EXAM:   Vitals: There were no vitals taken for this  visit.  There is no height or weight on file to calculate BMI.    GENERAL: Well groomed, well developed, well nourished male in NAD.  NEURO: Alert and oriented x 3.  PSYCH: Normal mood and affect, pleasant and agreeable during interview and exam.    LABS: The last test results for Mr. Frank Orellana were reviewed:  PSA -   Lab Results   Component Value Date    PSA 5.83 03/25/2020    PSA 8.72 11/21/2019    PSA 6.03 05/16/2019    PSA 7.17 01/11/2019    PSA 5.78 11/13/2018    PSA 4.65 10/06/2017    PSA 5.42 11/29/2016    PSA 4.95 10/13/2016    PSA 2.64 07/07/2010    PSA 1.71 01/11/2006     BMP -   Recent Labs   Lab Test 08/22/20  0619 08/21/20  1528 08/21/20  1027 11/28/18  0736 11/28/18  0736 10/11/18  0740    140 141   < >  --   --    POTASSIUM 4.4 4.0 3.6   < > 3.8 4.0   CHLORIDE 111* 111* 111*   < >  --   --    CO2 28 25 27   < >  --   --    BUN 13 10 11   < >  --   --    CR 0.92 0.77 0.82   < >  --   --    GLC 98 193* 107*   < >  --   --    VALENTINA 7.9* 8.4* 8.2*   < >  --   --    MAG  --  2.2  --   --  2.3 2.3    < > = values in this interval not displayed.       CBC -   Recent Labs   Lab Test 08/22/20  0619 08/21/20  1027 08/13/20  1441   WBC 9.1 6.5 5.7   HGB 11.9* 12.4* 13.2*    162 189       ASSESSMENT:   1) BPH s/p TURP  2) elevated PSA    PLAN:   - will obtain repeat PSA to get a new baseline.  Reassuring that pathology from the TURP showed only benign tissue  - Please schedule a lab appt for patient at the [facility]  - Return in 1 year for virtual vs in-person visit    VIDEO DURATION: 10 minutes    Erika Clark PA-C  Department of Urologic Surgery

## 2020-12-15 NOTE — PATIENT INSTRUCTIONS
PLAN:   - will obtain repeat PSA to get a new baseline.  Reassuring that pathology from the TURP showed only benign tissue  - Please schedule a lab appt for patient at the [place] location  - Return in 1 year for virtual vs in-person visit    SAHIL [facility]

## 2020-12-15 NOTE — PROGRESS NOTES
"Frank Orellana is a 70 year old male who is being evaluated via a billable video visit.      Video Visit Technology for this patient: [employer] Video Visit- Patient was left in waiting room      The patient has been notified of following:     \"This video visit will be conducted via a call between you and your physician/provider. We have found that certain health care needs can be provided without the need for an in-person physical exam.  This service lets us provide the care you need with a video conversation.  If a prescription is necessary we can send it directly to your pharmacy.  If lab work is needed we can place an order for that and you can then stop by our lab to have the test done at a later time.    Video visits are billed at different rates depending on your insurance coverage.  Please reach out to your insurance provider with any questions.    If during the course of the call the physician/provider feels a video visit is not appropriate, you will not be charged for this service.\"    Patient has given verbal consent for Video visit? Yes  How would you like to obtain your AVS? MyChart  If you are dropped from the video visit, the video invite should be resent to: Send to e-mail at: [email or web address]  Will anyone else be joining your video visit? No    Video-Visit Details    Type of service:  Video Visit    Video Start Time: 10:22  Video End Time: 10:32    Originating Location (pt. Location): Home    Distant Location (provider location):  [facility]     Platform used for Video Visit: Evelin          "

## 2020-12-15 NOTE — LETTER
"12/15/2020       RE: Frank Orellana  [address]  [facility] [address]     Dear Colleague,    Thank you for referring your patient, Frank Orellana, to the [facility] UROLOGY CLINIC [place] at [facility]. Please see a copy of my visit note below.    Frank Orellana is a 70 year old male who is being evaluated via a billable video visit.      Video Visit Technology for this patient: Evelin Video Visit- Patient was left in waiting room      The patient has been notified of following:     \"This video visit will be conducted via a call between you and your physician/provider. We have found that certain health care needs can be provided without the need for an in-person physical exam.  This service lets us provide the care you need with a video conversation.  If a prescription is necessary we can send it directly to your pharmacy.  If lab work is needed we can place an order for that and you can then stop by our lab to have the test done at a later time.    Video visits are billed at different rates depending on your insurance coverage.  Please reach out to your insurance provider with any questions.    If during the course of the call the physician/provider feels a video visit is not appropriate, you will not be charged for this service.\"    Patient has given verbal consent for Video visit? Yes  How would you like to obtain your AVS? MyChart  If you are dropped from the video visit, the video invite should be resent to: Send to e-mail at: [email or web address]  Will anyone else be joining your video visit? No    Video-Visit Details    Type of service:  Video Visit    Video Start Time: 10:22  Video End Time: 10:32    Originating Location (pt. Location): Home    Distant Location (provider location):  [facility] UROLOGY [facility]     Platform used for Video Visit: [employer]    HPI: Mr. Frank Orellana is a 70 year old year old male presenting " today for evaluation of chief complaint(s): No chief complaint on file.    Mr. Orellana has PMH significant for HTN and BPH with LUTS s/p TURP (8/21/20 - path: benign bph).  Also had elevated PSA, previously followed by Dr. Maravilla.     Patient since the procedure went really well.  Today has no symptoms.  No frequency or nocturia or urgency.  With voiding, stream is much stronger and he feels he is emptying fully. Passed a few clots on 10/5/20 and 10/30/20 - that was the last time he had gross hematuria.  No dysuria.      REVIEW OF DIAGNOSTICS:  3/25/20 - PSA 5.83ng/dL  11/21/19 - PSA 8.72ng/dL  5/16/19 - PSA 6.03ng/dL  1/11/19 - PSA 7.17ng/dL    - No new medical problems  - Has been healthy  - No new medications.  He did discontinue the alfuzosin because he feels he doesn't need it anymore.       Over the past month, how often have you:     had a sensation of not emptying your bladder completely after you finished urinating? Not at all   had to urinate again less than two hours after you finished urinating? About half the time   found it difficult to postpone urination? Not at all   found you stopped and started again several times when you urinated? Not at all   had a weak urine stream? Less than 1 time in 5   had to push or strain to begin urinating? Not at all   got up to urinate from the time you went to bed at night until the time you got up in the morning? None   If you were to spend the rest of your life with your urinary condition just the way it is now, how would you feel about that? (highlight or underline) Delighted   AUA - Total Score (range: 0 - 35)          Current Outpatient Medications   Medication Sig Dispense Refill     alfuzosin ER (UROXATRAL) 10 MG 24 hr tablet Take 1 tablet (10 mg) by mouth daily 90 tablet 3     aspirin EC 81 MG EC tablet Take 1 tablet (81 mg) by mouth daily       diltiazem ER COATED BEADS (CARDIZEM CD/CARTIA XT) 120 MG 24 hr capsule Take 1 capsule (120 mg) by mouth daily 90  capsule 1     flecainide (TAMBOCOR) 50 MG tablet Take 1 tablet (50 mg) by mouth 2 times daily 180 tablet 3       ALLERGIES: Blood transfusion related (informational only) and Contrast dye      REVIEW OF SYSTEMS:  As above in HPI    GENERAL PHYSICAL EXAM:   Vitals: There were no vitals taken for this visit.  There is no height or weight on file to calculate BMI.    GENERAL: Well groomed, well developed, well nourished male in NAD.  NEURO: Alert and oriented x 3.  PSYCH: Normal mood and affect, pleasant and agreeable during interview and exam.    LABS: The last test results for Mr. Frank Orellana were reviewed:  PSA -   Lab Results   Component Value Date    PSA 5.83 03/25/2020    PSA 8.72 11/21/2019    PSA 6.03 05/16/2019    PSA 7.17 01/11/2019    PSA 5.78 11/13/2018    PSA 4.65 10/06/2017    PSA 5.42 11/29/2016    PSA 4.95 10/13/2016    PSA 2.64 07/07/2010    PSA 1.71 01/11/2006     BMP -   Recent Labs   Lab Test 08/22/20  0619 08/21/20  1528 08/21/20  1027 11/28/18  0736 11/28/18  0736 10/11/18  0740    140 141   < >  --   --    POTASSIUM 4.4 4.0 3.6   < > 3.8 4.0   CHLORIDE 111* 111* 111*   < >  --   --    CO2 28 25 27   < >  --   --    BUN 13 10 11   < >  --   --    CR 0.92 0.77 0.82   < >  --   --    GLC 98 193* 107*   < >  --   --    VALENTINA 7.9* 8.4* 8.2*   < >  --   --    MAG  --  2.2  --   --  2.3 2.3    < > = values in this interval not displayed.       CBC -   Recent Labs   Lab Test 08/22/20 0619 08/21/20  1027 08/13/20  1441   WBC 9.1 6.5 5.7   HGB 11.9* 12.4* 13.2*    162 189       ASSESSMENT:   1) BPH s/p TURP  2) elevated PSA    PLAN:   - will obtain repeat PSA to get a new baseline.  Reassuring that pathology from the TURP showed only benign tissue  - Please schedule a lab appt for patient at the [place] location  - Return in 1 year for virtual vs in-person visit    VIDEO DURATION: 10 minutes    Erika Clark PA-C  Department of Urologic Surgery

## 2020-12-21 ENCOUNTER — TELEPHONE (OUTPATIENT)
Dept: UROLOGY | Facility: CLINIC | Age: 70
End: 2020-12-21

## 2020-12-25 DIAGNOSIS — I48.0 PAF (PAROXYSMAL ATRIAL FIBRILLATION) (H): ICD-10-CM

## 2020-12-28 RX ORDER — FLECAINIDE ACETATE 50 MG/1
TABLET ORAL
Qty: 180 TABLET | Refills: 0 | Status: SHIPPED | OUTPATIENT
Start: 2020-12-28 | End: 2021-01-06

## 2020-12-30 DIAGNOSIS — I48.0 PAF (PAROXYSMAL ATRIAL FIBRILLATION) (H): ICD-10-CM

## 2020-12-30 DIAGNOSIS — Z51.81 ENCOUNTER FOR MONITORING FLECAINIDE THERAPY: ICD-10-CM

## 2020-12-30 DIAGNOSIS — R97.20 ELEVATED PROSTATE SPECIFIC ANTIGEN (PSA): ICD-10-CM

## 2020-12-30 DIAGNOSIS — Z79.899 ENCOUNTER FOR MONITORING FLECAINIDE THERAPY: ICD-10-CM

## 2020-12-30 LAB
ANION GAP SERPL CALCULATED.3IONS-SCNC: 4 MMOL/L (ref 3–14)
BUN SERPL-MCNC: 12 MG/DL (ref 7–30)
CALCIUM SERPL-MCNC: 8.9 MG/DL (ref 8.5–10.1)
CHLORIDE SERPL-SCNC: 109 MMOL/L (ref 94–109)
CO2 SERPL-SCNC: 30 MMOL/L (ref 20–32)
CREAT SERPL-MCNC: 0.92 MG/DL (ref 0.66–1.25)
GFR SERPL CREATININE-BSD FRML MDRD: 82 ML/MIN/{1.73_M2}
GLUCOSE SERPL-MCNC: 91 MG/DL (ref 70–99)
POTASSIUM SERPL-SCNC: 4 MMOL/L (ref 3.4–5.3)
PSA SERPL-MCNC: 3.3 UG/L (ref 0–4)
SODIUM SERPL-SCNC: 143 MMOL/L (ref 133–144)

## 2020-12-30 PROCEDURE — 84153 ASSAY OF PSA TOTAL: CPT | Performed by: PATHOLOGY

## 2020-12-30 PROCEDURE — 80048 BASIC METABOLIC PNL TOTAL CA: CPT | Performed by: PATHOLOGY

## 2020-12-30 PROCEDURE — 36415 COLL VENOUS BLD VENIPUNCTURE: CPT | Performed by: PATHOLOGY

## 2021-01-06 ENCOUNTER — VIRTUAL VISIT (OUTPATIENT)
Dept: CARDIOLOGY | Facility: CLINIC | Age: 71
End: 2021-01-06
Attending: NURSE PRACTITIONER
Payer: COMMERCIAL

## 2021-01-06 DIAGNOSIS — I48.0 PAF (PAROXYSMAL ATRIAL FIBRILLATION) (H): ICD-10-CM

## 2021-01-06 DIAGNOSIS — I48.0 PAROXYSMAL ATRIAL FIBRILLATION (H): ICD-10-CM

## 2021-01-06 PROCEDURE — 99213 OFFICE O/P EST LOW 20 MIN: CPT | Mod: 95 | Performed by: NURSE PRACTITIONER

## 2021-01-06 RX ORDER — DILTIAZEM HYDROCHLORIDE 120 MG/1
120 CAPSULE, COATED, EXTENDED RELEASE ORAL DAILY
Qty: 90 CAPSULE | Refills: 3 | Status: SHIPPED | OUTPATIENT
Start: 2021-01-06 | End: 2021-12-15

## 2021-01-06 RX ORDER — ASPIRIN 81 MG/1
81 TABLET ORAL DAILY
Qty: 90 TABLET | Refills: 3 | Status: SHIPPED | OUTPATIENT
Start: 2021-01-06 | End: 2022-11-02

## 2021-01-06 RX ORDER — FLECAINIDE ACETATE 50 MG/1
50 TABLET ORAL 2 TIMES DAILY
Qty: 180 TABLET | Refills: 3 | Status: SHIPPED | OUTPATIENT
Start: 2021-01-06 | End: 2021-12-16

## 2021-01-06 NOTE — PROGRESS NOTES
"Electrophysiology Clinic Video Virtual Visit    Frank Orellana is a 70 year old male who is being evaluated via a billable video visit.      The patient has been notified of following:     \"This video visit will be conducted via a call between you and your physician/provider. We have found that certain health care needs can be provided without the need for an in-person physical exam.  This service lets us provide the care you need with a video conversation.  If a prescription is necessary we can send it directly to your pharmacy.  If lab work is needed we can place an order for that and you can then stop by our lab to have the test done at a later time.    If during the course of the call the physician/provider feels a video visit is not appropriate, you will not be charged for this service.\"     Physician/provider has received verbal consent for a Video Visit from the patient? Yes      HPI:   Ms. Orellana is a 70 year old [employer] male who has a past medical history significant for PAF (CHADSVASC 1) s/p DCCVs, chronic hepatitis C, hepatitis B, WINNIE (uses CPAP), tubular adenoma of colon, s/p TURP, and tobacco use.    At our initial visit in 12/2016 he had reported ~20 years of paroxysmal atrial fibrillation manifesting as chest pressure and palpitations. The first episode occurred during a picnic and converted spontaneously to sinus rhythm.  He took metoprolol for 30 days.  Approximately 10 years later he had a second episode in the setting of a meniscal tear and perhaps 2 or 3 more episodes in the intervening years until his palpitations became more frequent in the year leading up to his most recent presentation.  A 48-hour Holter monitor which showed variation between sinus rhythm, junctional rhythm, and A.fib (average HR 63, range ) with 1% PVCs and <1% supraventricular ectopic beats, with 33 runs of PAT vs. A.fib (longest 10 beats at 164 bpm). He then presented to the ED on 12/2/16 for " palpitations and was in A.fib with RVR~111 bpm, started on diltiazem and ASA 81 mg daily, and discharged. He had recurrent episode of AF end of 12/2017 that manifested as palpitations, SOB and fatigue. He felt the symptoms for about 10 days prior and was seen by Dr. Hussein on 1/6/18. At this time he was noted to be in symptomatic AF (rate controlled), and was scheduled to undergo JIM/DCCV, which was performed on 1/15/18. He was on Dabigatran for 4 weeks following the DCCV and his ASA was held. He last saw Dr. Felipe in EP clinic in 2/2018 and was doing well. More recently, he called reporting he was back in AF. He was started on Pradaxa within 1.5 days of his symptoms. He was then arranged for DCCV. He then had DCCV on 10/11/18. He had recurrence and required another DCCV on 11/28/18. He followed up in EP clinic on 12/27/18 and was feeling well without issues. He presented [facility] to [facility] with racing heart sensation associated with diaphoresis and mild chest discomfort and lightheadedness c/w prior AF symptoms. Found to be in AF. He states he has had a few other AF symptoms recently and feels they are growing in frequency/severity. His AF did spontaneously terminated when in ER. He was initiated on Sotalol during this admission by EP.     EP visit 4/24/19: He presents today for follow-up after having initiated sotalol and cardioverted in ER 1/18/19. He reports no recurrent sustained palpitations. He does have intermittent, infrequent, short-lived palpitations which are not bothersome (and corroborated with PAC's / PVC's on recent Zio). Recent Holter shows no atrial fibrillation but run of AT (13 seconds). He does report, and his wife agrees, new onset significant fatigue since initiating sotalol. Now taking regular naps and just not doing usual enjoyable activities as long as he is used to. EKG: VR 47 bpm,  ms,  ms, QTc 421 ms. Sotalol 80 mg BID, asa 81 mg. Given recurrences Sotalol was stopped and  "Flecainide was started.       EP Vist 7/24/19: After developing symptoms of a URI last week, felt his heart \"beat out of rhythm\" on Monday evening. Call ahead to clinic was scheduled for Cardioversion, however on his way to hospital, he went out of afib and improved. Since then denies any symptoms of lightheadedness and dizziness. Per discussion with patient, did not start on Pradaxa and is still on ASA today. Denies chest pain, SOB, lightheadedness and dizziness today.     EP Visit 1/20/20: He presents today for follow up. He reports feeling well. He denies any chest pain/pressures, dizziness, lightheadedness, worsening shortness of breath, leg/ankle swelling, PND, orthopnea, palpitations, or syncopal symptoms. Presenting 12 lead ECG shows SB IVCD Vent Rate 56 bpm,  ms,  ms, QTc 441 ms. Current cardiac medications include: Flecainide, Diltiazem, and ASA.     He is following up today. He had ER visit on 3/9/2020 with AFL that resolved without intervention. He reports feeling well. He has had a couple episodes of palpitations lasting seconds since, but nothing sustained. He denies any chest pain/pressures, dizziness, lightheadedness, worsening shortness of breath, leg/ankle swelling, PND, orthopnea, or syncopal symptoms.  Current cardiac medications include: Flecainide, Diltiazem, and ASA.     PAST MEDICAL HISTORY:  Past Medical History:   Diagnosis Date     Actinic keratosis 2009     Atrial fibrillation (H) 6/15/96    Afib - 2 or 3 extended occurrences since     Chronic hepatitis C (H)     Chronic Hepatitis C,  genotype 1b                Stage 0 Fibrosis     Dupuytren's contracture of both hands      Elevated PSA      Hepatitis B 1969    acute infection at age 19; IV drug use     WINNIE (obstructive sleep apnea)     AHI 34.2     Pain in both hands      Patient is Amish      Refusal of blood transfusions as patient is Amish      Right shoulder pain      Tinnitus 1 or 2 years ago    " both ears     Tubular adenoma of colon 1/17/17    ascending colon, 1/17/17       CURRENT MEDICATIONS:  Current Outpatient Medications   Medication Sig Dispense Refill     aspirin EC 81 MG EC tablet Take 1 tablet (81 mg) by mouth daily       diltiazem ER COATED BEADS (CARDIZEM CD/CARTIA XT) 120 MG 24 hr capsule Take 1 capsule (120 mg) by mouth daily 90 capsule 1     flecainide (TAMBOCOR) 50 MG tablet TAKE 1 TABLET TWICE A  tablet 0       PAST SURGICAL HISTORY:  Past Surgical History:   Procedure Laterality Date     ANESTHESIA CARDIOVERSION N/A 1/15/2018    Procedure: ANESTHESIA CARDIOVERSION;  Anesthesia Coverage Cardioversion With Transesophageal Echocardiogram @0930 ;  Surgeon: GENERIC ANESTHESIA PROVIDER;  Location: [facility] OR     ANESTHESIA CARDIOVERSION N/A 10/11/2018    Procedure: ANESTHESIA CARDIOVERSION;  Cardioversion;  Surgeon: GENERIC ANESTHESIA PROVIDER;  Location: [facility] OR     ANESTHESIA CARDIOVERSION N/A 11/28/2018    Procedure: Anesthesia Coverage Cardioversion @0830;  Surgeon: GENERIC ANESTHESIA PROVIDER;  Location: [facility] OR     ARTHROSCOPY KNEE      left knee     COLONOSCOPY  1/17/17    tubular adenoma ascending colon     CYSTOSCOPY, TRANSURETHRAL RESECTION (TUR) PROSTATE, COMBINED N/A 8/21/2020    Procedure: CYSTOSCOPY, WITH TRANSURETHRAL RESECTION PROSTATE;  Surgeon: Roldan De La O MD;  Location: [facility] OR     HERNIORRHAPHY INGUINAL Right 10/26/2017    Procedure: HERNIORRHAPHY INGUINAL;  Open Right Inguinal Hernia Repair with Mesh;  Surgeon: Suresh Raymond MD;  Location: [facility] OR     KNEE SURGERY  2006??    torn meniscus     RELEASE DUPUYTRENS CONTRACTURE Right 1/20/2017    Procedure: RELEASE DUPUYTRENS CONTRACTURE;  Surgeon: Lars Oleary MD;  Location: [facility] OR     RELEASE DUPUYTRENS CONTRACTURE Left 12/15/2017    Procedure: RELEASE DUPUYTRENS CONTRACTURE;  Left Ring and Middle Finger Subtotal Palmar Fasciectomy;  Surgeon: Lars Oleary MD;  Location: [facility] OR      TRANSESOPHAGEAL ECHOCARDIOGRAM INTRAOPERATIVE N/A 1/15/2018    Procedure: TRANSESOPHAGEAL ECHOCARDIOGRAM INTRAOPERATIVE;;  Surgeon: GENERIC ANESTHESIA PROVIDER;  Location: [facility] OR       ALLERGIES:     Allergies   Allergen Reactions     Blood Transfusion Related (Informational Only)      Pentecostalism.  Declines blood transfusions.     Contrast Dye Rash       FAMILY HISTORY:  Family History   Problem Relation Age of Onset     Arthritis Paternal Grandmother      Rheumatoid Arthritis Paternal Grandmother      Gastrointestinal Disease Father          age 77 after colon surgery     Alcoholism Father      Alcohol/Drug Mother          age 64     Alcoholism Mother      Heart Disease Brother      Cardiac Sudden Death Brother      Uterine Cancer Sister      Glaucoma No family hx of      Macular Degeneration No family hx of      Retinal detachment No family hx of        SOCIAL HISTORY:  Social History     Tobacco Use     Smoking status: Former Smoker     Packs/day: 0.50     Years: 10.00     Pack years: 5.00     Types: Cigarettes     Start date: 6/15/1964     Quit date: 1974     Years since quittin.5     Smokeless tobacco: Former User     Quit date: 1969   Substance Use Topics     Alcohol use: Yes     Alcohol/week: 3.0 - 4.0 standard drinks     Comment: 4 or 5 drinks/week, limit of 1     Drug use: No     Comment: --history of marijuana       ROS:  10 point ROS neg other than the symptoms noted above in the HPI.    Exam:  The rest of a comprehensive physical examination is deferred due to public health emergency video visit restrictions.  CONSITUTIONAL: no acute distress  HEENT: no icterus, no redness or discharge, neck supple  CV: no visible edema of visualized extremities. No JVD.   RESPIRATORY: respirations nonlabored, no cough  NEURO: AA&Ox3, speech fluent/appropriate, motor grossly nonfocal  PSYCH: cooperative, affect appropriate  DERM: no rashes on visualized face/neck/upper  "extremities    Labs:  Reviewed.     Testing/Procedures:    Echocardiogram 4/27/2017 showed LV EF 60-65%, mild RV dilation but normal RV function, mild AI, and mild dilation of the ascending aorta to 4.5 cm, indexed to 2.3 cm/m2.     MR angiography of the chest 12/27/2016 showed \"Normal caliber of thoracic aorta. The ascending aorta measures 3.9 cm, which is normal when indexed for age/gender/size.\"     Echocardiogram 10/14/16 showed LV EF 55-60%, mild RV dilation but normal RV function, mild to moderate AI, and dilation of the proximal ascending aorta to 45 mm, index 2.4 cm/m2.\"     Lexiscan NM stress test 1/8/18 showed \"Normal  myocardial SPECT study with a summed stress score of  zero. No ischemia or infarct identified. Normal LV function. No change  from prior study.\"     JIM (1/15/18):  Interpretation Summary  The left atrial appendage is normal. It is free of spontaneous echo contrast and thrombus.  Left ventricular function, chamber size, wall motion, and wall thickness are normal.The EF is 60-65%.  Mild aortic insufficiency is present.  Mild dilatation of the aorta is present.  Ascending aorta 4.2 cm.  No pericardial effusion is present.  There has been no change.     Ziopatch (3/21 - 4/4/2019):        Exercise EKG 5/2019    Interpretation Summary     Normal, low-risk exercise electrocardiogram.  The target heart rate was achieved. Normal heart rate and BP response to  exercise.  Normal resting ECG. No ECG evidence of ischemia with exercise.  There were occasional uniform PVCs elicited during exercise. QT prolongation  was unable to be assessed due to artifact.  No angina was elicited.  Functional capacity is average for age.         Assessment and Plan:   Ms. 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 He is following up today. " He had ER visit on 3/9/2020 with AFL that resolved without intervention. He reports feeling well. He has had a couple episodes of palpitations lasting seconds since, but nothing sustained. He denies any chest pain/pressures, dizziness, lightheadedness, worsening shortness of breath, leg/ankle swelling, PND, orthopnea, or syncopal symptoms.  Current cardiac medications include: Flecainide, Diltiazem, and ASA.      Paroxsymal Atrial Fibrillation:  We discussed in detail with the patient management/treatment options for Oneal includin. Stroke Prophylaxis:  CHADSVASC=+age  1, corresponding to a 1.3% annual stroke / systemic emolism event rate. He is on ASA only and only uses DOAC surrounding DCCVs.   2. Rate Control: Continue Diltiazem.   3. Rhythm Control: He has had several DCCVs, He failed Sotalol. He has now been on Flecainide now and doing well. Had one sustained occurrence over the last year, but feels symptoms are under good control with current management. Will continue Flecainide. Had stress test in  (due again in  unless symptoms arise)  4. Risk Factor Management: maintain good BP control and continued WINNIE treatment with CPAP.      Follow up in 1 year.      Video-Visit Details    Type of service:  Video Visit    Video Visit Duration: 12 minutes.     Originating Location (pt. Location): Home    Distant Location (provider location):  [facility] HEART [facility]     Mode of Communication:  Video Conference via [employer]    I have reviewed the note as documented above.  This accurately captures the substance of my virtual visit with the patient. The patient states understanding and is agreeable with the plan.     ANDREINA Small CNP  Electrophysiology Consult Service  Pager: [phone]        CC  SELF, REFERRED

## 2021-01-06 NOTE — PROGRESS NOTES
"Frank Orellana is a 70 year old male who is being evaluated via a billable video visit.      How would you like to obtain your AVS? MyChart  If the video visit is dropped, the invitation should be resent by:Pt connecting via My Chart  Will anyone else be joining your video visit? No      Objective    Vitals - Patient Reported  Weight (Patient Reported): 73 kg (161 lb)  Height (Patient Reported): 175.3 cm (5' 9\")  BMI (Based on Pt Reported Ht/Wt): 23.78  Pulse (Patient Reported): 62  Pain Score: No Pain (0)(No SOB)        "

## 2021-01-06 NOTE — LETTER
"1/6/2021      RE: Frank Orellana  [address]  [facility] [address]       Dear Colleague,    Thank you for the opportunity to participate in the care of your patient, Frank Orellana, at the [facility] [place] [facility]. Please see a copy of my visit note below.    Electrophysiology Clinic Video Virtual Visit    Frank Orellana is a 70 year old male who is being evaluated via a billable video visit.      The patient has been notified of following:     \"This video visit will be conducted via a call between you and your physician/provider. We have found that certain health care needs can be provided without the need for an in-person physical exam.  This service lets us provide the care you need with a video conversation.  If a prescription is necessary we can send it directly to your pharmacy.  If lab work is needed we can place an order for that and you can then stop by our lab to have the test done at a later time.    If during the course of the call the physician/provider feels a video visit is not appropriate, you will not be charged for this service.\"     Physician/provider has received verbal consent for a Video Visit from the patient? Yes      HPI:   Ms. Orellana is a 70 year old Mandaeism male who has a past medical history significant for PAF (CHADSVASC 1) s/p DCCVs, chronic hepatitis C, hepatitis B, WINNIE (uses CPAP), tubular adenoma of colon, s/p TURP, and tobacco use.    At our initial visit in 12/2016 he had reported ~20 years of paroxysmal atrial fibrillation manifesting as chest pressure and palpitations. The first episode occurred during a picnic and converted spontaneously to sinus rhythm.  He took metoprolol for 30 days.  Approximately 10 years later he had a second episode in the setting of a meniscal tear and perhaps 2 or 3 more episodes in the intervening years until his palpitations became more frequent in the year " leading up to his most recent presentation.  A 48-hour Holter monitor which showed variation between sinus rhythm, junctional rhythm, and A.fib (average HR 63, range ) with 1% PVCs and <1% supraventricular ectopic beats, with 33 runs of PAT vs. A.fib (longest 10 beats at 164 bpm). He then presented to the ED on 12/2/16 for palpitations and was in A.fib with RVR~111 bpm, started on diltiazem and ASA 81 mg daily, and discharged. He had recurrent episode of AF end of 12/2017 that manifested as palpitations, SOB and fatigue. He felt the symptoms for about 10 days prior and was seen by Dr. Hussein on 1/6/18. At this time he was noted to be in symptomatic AF (rate controlled), and was scheduled to undergo JIM/DCCV, which was performed on 1/15/18. He was on Dabigatran for 4 weeks following the DCCV and his ASA was held. He last saw Dr. Felipe in EP clinic in 2/2018 and was doing well. More recently, he called reporting he was back in AF. He was started on Pradaxa within 1.5 days of his symptoms. He was then arranged for DCCV. He then had DCCV on 10/11/18. He had recurrence and required another DCCV on 11/28/18. He followed up in EP clinic on 12/27/18 and was feeling well without issues. He presented 1/18 to [facility] with racing heart sensation associated with diaphoresis and mild chest discomfort and lightheadedness c/w prior AF symptoms. Found to be in AF. He states he has had a few other AF symptoms recently and feels they are growing in frequency/severity. His AF did spontaneously terminated when in ER. He was initiated on Sotalol during this admission by EP.     EP visit 4/24/19: He presents today for follow-up after having initiated sotalol and cardioverted in ER 1/18/19. He reports no recurrent sustained palpitations. He does have intermittent, infrequent, short-lived palpitations which are not bothersome (and corroborated with PAC's / PVC's on recent Zio). Recent Holter shows no atrial fibrillation but run of AT (13  "seconds). He does report, and his wife agrees, new onset significant fatigue since initiating sotalol. Now taking regular naps and just not doing usual enjoyable activities as long as he is used to. EKG: VR 47 bpm,  ms,  ms, QTc 421 ms. Sotalol 80 mg BID, asa 81 mg. Given recurrences Sotalol was stopped and Flecainide was started.       EP Vist 7/24/19: After developing symptoms of a URI last week, felt his heart \"beat out of rhythm\" on Monday evening. Call ahead to clinic was scheduled for Cardioversion, however on his way to hospital, he went out of afib and improved. Since then denies any symptoms of lightheadedness and dizziness. Per discussion with patient, did not start on Pradaxa and is still on ASA today. Denies chest pain, SOB, lightheadedness and dizziness today.     EP Visit 1/20/20: He presents today for follow up. He reports feeling well. He denies any chest pain/pressures, dizziness, lightheadedness, worsening shortness of breath, leg/ankle swelling, PND, orthopnea, palpitations, or syncopal symptoms. Presenting 12 lead ECG shows SB IVCD Vent Rate 56 bpm,  ms,  ms, QTc 441 ms. Current cardiac medications include: Flecainide, Diltiazem, and ASA.     He is following up today. He had ER visit on 3/9/2020 with AFL that resolved without intervention. He reports feeling well. He has had a couple episodes of palpitations lasting seconds since, but nothing sustained. He denies any chest pain/pressures, dizziness, lightheadedness, worsening shortness of breath, leg/ankle swelling, PND, orthopnea, or syncopal symptoms.  Current cardiac medications include: Flecainide, Diltiazem, and ASA.     PAST MEDICAL HISTORY:  Past Medical History:   Diagnosis Date     Actinic keratosis 2009     Atrial fibrillation (H) 6/15/96    Afib - 2 or 3 extended occurrences since     Chronic hepatitis C (H)     Chronic Hepatitis C,  genotype 1b                Stage 0 Fibrosis     Dupuytren's contracture of " both hands      Elevated PSA      Hepatitis B 1969    acute infection at age 19; IV drug use     WINNIE (obstructive sleep apnea)     AHI 34.2     Pain in both hands      Patient is Confucianism      Refusal of blood transfusions as patient is Confucianism      Right shoulder pain      Tinnitus 1 or 2 years ago    both ears     Tubular adenoma of colon 1/17/17    ascending colon, 1/17/17       CURRENT MEDICATIONS:  Current Outpatient Medications   Medication Sig Dispense Refill     aspirin EC 81 MG EC tablet Take 1 tablet (81 mg) by mouth daily       diltiazem ER COATED BEADS (CARDIZEM CD/CARTIA XT) 120 MG 24 hr capsule Take 1 capsule (120 mg) by mouth daily 90 capsule 1     flecainide (TAMBOCOR) 50 MG tablet TAKE 1 TABLET TWICE A  tablet 0       PAST SURGICAL HISTORY:  Past Surgical History:   Procedure Laterality Date     ANESTHESIA CARDIOVERSION N/A 1/15/2018    Procedure: ANESTHESIA CARDIOVERSION;  Anesthesia Coverage Cardioversion With Transesophageal Echocardiogram @0930 ;  Surgeon: GENERIC ANESTHESIA PROVIDER;  Location: [facility] OR     ANESTHESIA CARDIOVERSION N/A 10/11/2018    Procedure: ANESTHESIA CARDIOVERSION;  Cardioversion;  Surgeon: GENERIC ANESTHESIA PROVIDER;  Location: [facility] OR     ANESTHESIA CARDIOVERSION N/A 11/28/2018    Procedure: Anesthesia Coverage Cardioversion @0830;  Surgeon: GENERIC ANESTHESIA PROVIDER;  Location: [facility] OR     ARTHROSCOPY KNEE      left knee     COLONOSCOPY  1/17/17    tubular adenoma ascending colon     CYSTOSCOPY, TRANSURETHRAL RESECTION (TUR) PROSTATE, COMBINED N/A 8/21/2020    Procedure: CYSTOSCOPY, WITH TRANSURETHRAL RESECTION PROSTATE;  Surgeon: Roldan De La O MD;  Location: [facility] OR     HERNIORRHAPHY INGUINAL Right 10/26/2017    Procedure: HERNIORRHAPHY INGUINAL;  Open Right Inguinal Hernia Repair with Mesh;  Surgeon: Suresh Raymond MD;  Location: [facility] OR     KNEE SURGERY  2006??    torn meniscus     RELEASE DUPUYTRENS CONTRACTURE Right 1/20/2017     Procedure: RELEASE DUPUYTRENS CONTRACTURE;  Surgeon: Lars Oleary MD;  Location: [facility] OR     RELEASE DUPUYTRENS CONTRACTURE Left 12/15/2017    Procedure: RELEASE DUPUYTRENS CONTRACTURE;  Left Ring and Middle Finger Subtotal Palmar Fasciectomy;  Surgeon: Lars Oleary MD;  Location: [facility]     TRANSESOPHAGEAL ECHOCARDIOGRAM INTRAOPERATIVE N/A 1/15/2018    Procedure: TRANSESOPHAGEAL ECHOCARDIOGRAM INTRAOPERATIVE;;  Surgeon: GENERIC ANESTHESIA PROVIDER;  Location: [facility] OR       ALLERGIES:     Allergies   Allergen Reactions     Blood Transfusion Related (Informational Only)      Religion.  Declines blood transfusions.     Contrast Dye Rash       FAMILY HISTORY:  Family History   Problem Relation Age of Onset     Arthritis Paternal Grandmother      Rheumatoid Arthritis Paternal Grandmother      Gastrointestinal Disease Father          age 77 after colon surgery     Alcoholism Father      Alcohol/Drug Mother          age 64     Alcoholism Mother      Heart Disease Brother      Cardiac Sudden Death Brother      Uterine Cancer Sister      Glaucoma No family hx of      Macular Degeneration No family hx of      Retinal detachment No family hx of        SOCIAL HISTORY:  Social History     Tobacco Use     Smoking status: Former Smoker     Packs/day: 0.50     Years: 10.00     Pack years: 5.00     Types: Cigarettes     Start date: 6/15/1964     Quit date: 1974     Years since quittin.5     Smokeless tobacco: Former User     Quit date: 1969   Substance Use Topics     Alcohol use: Yes     Alcohol/week: 3.0 - 4.0 standard drinks     Comment: 4 or 5 drinks/week, limit of 1     Drug use: No     Comment: --history of marijuana       ROS:  10 point ROS neg other than the symptoms noted above in the HPI.    Exam:  The rest of a comprehensive physical examination is deferred due to public health emergency video visit restrictions.  CONSITUTIONAL: no acute distress  HEENT: no icterus, no  "redness or discharge, neck supple  CV: no visible edema of visualized extremities. No JVD.   RESPIRATORY: respirations nonlabored, no cough  NEURO: AA&Ox3, speech fluent/appropriate, motor grossly nonfocal  PSYCH: cooperative, affect appropriate  DERM: no rashes on visualized face/neck/upper extremities    Labs:  Reviewed.     Testing/Procedures:    Echocardiogram 4/27/2017 showed LV EF 60-65%, mild RV dilation but normal RV function, mild AI, and mild dilation of the ascending aorta to 4.5 cm, indexed to 2.3 cm/m2.     MR angiography of the chest 12/27/2016 showed \"Normal caliber of thoracic aorta. The ascending aorta measures 3.9 cm, which is normal when indexed for age/gender/size.\"     Echocardiogram 10/14/16 showed LV EF 55-60%, mild RV dilation but normal RV function, mild to moderate AI, and dilation of the proximal ascending aorta to 45 mm, index 2.4 cm/m2.\"     Lexiscan NM stress test 1/8/18 showed \"Normal  myocardial SPECT study with a summed stress score of  zero. No ischemia or infarct identified. Normal LV function. No change  from prior study.\"     JIM (1/15/18):  Interpretation Summary  The left atrial appendage is normal. It is free of spontaneous echo contrast and thrombus.  Left ventricular function, chamber size, wall motion, and wall thickness are normal.The EF is 60-65%.  Mild aortic insufficiency is present.  Mild dilatation of the aorta is present.  Ascending aorta 4.2 cm.  No pericardial effusion is present.  There has been no change.     Ziopatch (3/21 - 4/4/2019):        Exercise EKG 5/2019    Interpretation Summary     Normal, low-risk exercise electrocardiogram.  The target heart rate was achieved. Normal heart rate and BP response to  exercise.  Normal resting ECG. No ECG evidence of ischemia with exercise.  There were occasional uniform PVCs elicited during exercise. QT prolongation  was unable to be assessed due to artifact.  No angina was elicited.  Functional capacity is average for " age.         Assessment and Plan:   Ms. Orellana is a 70 year old Gnosticism male who has a past medical history significant for PAF (CHADSVASC 1) s/p DCCVs, chronic hepatitis C, hepatitis B, WINNIE (uses CPAP), tubular adenoma of colon, s/p TURP, and tobacco use. He is following up today. He had ER visit on 3/9/2020 with AFL that resolved without intervention. He reports feeling well. He has had a couple episodes of palpitations lasting seconds since, but nothing sustained. He denies any chest pain/pressures, dizziness, lightheadedness, worsening shortness of breath, leg/ankle swelling, PND, orthopnea, or syncopal symptoms.  Current cardiac medications include: Flecainide, Diltiazem, and ASA.      Paroxsymal Atrial Fibrillation:  We discussed in detail with the patient management/treatment options for Oneal includin. Stroke Prophylaxis:  CHADSVASC=+age  1, corresponding to a 1.3% annual stroke / systemic emolism event rate. He is on ASA only and only uses DOAC surrounding DCCVs.   2. Rate Control: Continue Diltiazem.   3. Rhythm Control: He has had several DCCVs, He failed Sotalol. He has now been on Flecainide now and doing well. Had one sustained occurrence over the last year, but feels symptoms are under good control with current management. Will continue Flecainide. Had stress test in 2019 (due again in  unless symptoms arise)  4. Risk Factor Management: maintain good BP control and continued WINNIE treatment with CPAP.      Follow up in 1 year.      Video-Visit Details    Type of service:  Video Visit    Video Visit Duration: 12 minutes.     Originating Location (pt. Location): Home    Distant Location (provider location):  [facility]     Mode of Communication:  Video Conference via [employer]    I have reviewed the note as documented above.  This accurately captures the substance of my virtual visit with the patient. The patient states understanding and is agreeable with  "the plan.     ANDREINA Small CNP  Electrophysiology Consult Service  Pager: [phone]        Frank Orellana is a 70 year old male who is being evaluated via a billable video visit.      How would you like to obtain your AVS? MyChart  If the video visit is dropped, the invitation should be resent by:Pt connecting via My Chart  Will anyone else be joining your video visit? No      Objective    Vitals - Patient Reported  Weight (Patient Reported): 73 kg (161 lb)  Height (Patient Reported): 175.3 cm (5' 9\")  BMI (Based on Pt Reported Ht/Wt): 23.78  Pulse (Patient Reported): 62  Pain Score: No Pain (0)(No SOB)      Please do not hesitate to contact me if you have any questions/concerns.     Sincerely,     ANDREINA Chisholm CNP    "

## 2021-03-03 ENCOUNTER — IMMUNIZATION (OUTPATIENT)
Dept: NURSING | Facility: CLINIC | Age: 71
End: 2021-03-03
Payer: COMMERCIAL

## 2021-03-03 PROCEDURE — 0001A PR COVID VAC PFIZER DIL RECON 30 MCG/0.3 ML IM: CPT

## 2021-03-03 PROCEDURE — 91300 PR COVID VAC PFIZER DIL RECON 30 MCG/0.3 ML IM: CPT

## 2021-03-24 ENCOUNTER — IMMUNIZATION (OUTPATIENT)
Dept: NURSING | Facility: CLINIC | Age: 71
End: 2021-03-24
Attending: INTERNAL MEDICINE
Payer: COMMERCIAL

## 2021-03-24 PROCEDURE — 91300 PR COVID VAC PFIZER DIL RECON 30 MCG/0.3 ML IM: CPT

## 2021-03-24 PROCEDURE — 0002A PR COVID VAC PFIZER DIL RECON 30 MCG/0.3 ML IM: CPT

## 2021-03-28 ENCOUNTER — HEALTH MAINTENANCE LETTER (OUTPATIENT)
Age: 71
End: 2021-03-28

## 2021-06-24 NOTE — NURSING NOTE
Chief Complaint   Patient presents with     Follow Up      EKG. Follow up PAF, 1 month s/p DCCV. Nondenominational on dabigatran post DCCV.      Vitals were taken and medications were reconciled. EKG was performed    ROX Pappas  1:08 PM     - EKG A Paced@ 61b/ min, QW, 2,3, AVF, QT/ QTC= 386/ 388.   - TROP = 11> 11  - CXR : Clear.   - COVID PCR Not Detected                         12.8   4.66  )-----------( 180      ( 23 Jun 2021 19:52 )             38.6   06-23    138  |  105  |  11  ----------------------------<  86  3.9   |  23  |  1.07    Ca    9.8      23 Jun 2021 19:52    TPro  6.6  /  Alb  3.6  /  TBili  1.0  /  DBili  x   /  AST  10  /  ALT  7   /  AlkPhos  71  06-23

## 2021-09-11 ENCOUNTER — HEALTH MAINTENANCE LETTER (OUTPATIENT)
Age: 71
End: 2021-09-11

## 2021-09-13 ENCOUNTER — ANCILLARY PROCEDURE (OUTPATIENT)
Dept: GENERAL RADIOLOGY | Facility: CLINIC | Age: 71
End: 2021-09-13
Attending: INTERNAL MEDICINE
Payer: COMMERCIAL

## 2021-09-13 ENCOUNTER — OFFICE VISIT (OUTPATIENT)
Dept: INTERNAL MEDICINE | Facility: CLINIC | Age: 71
End: 2021-09-13
Payer: COMMERCIAL

## 2021-09-13 VITALS
HEIGHT: 69 IN | RESPIRATION RATE: 16 BRPM | BODY MASS INDEX: 25.56 KG/M2 | WEIGHT: 172.6 LBS | HEART RATE: 64 BPM | DIASTOLIC BLOOD PRESSURE: 70 MMHG | SYSTOLIC BLOOD PRESSURE: 125 MMHG | OXYGEN SATURATION: 96 %

## 2021-09-13 DIAGNOSIS — M54.2 NECK PAIN: Primary | ICD-10-CM

## 2021-09-13 DIAGNOSIS — M54.2 NECK PAIN: ICD-10-CM

## 2021-09-13 PROCEDURE — 72070 X-RAY EXAM THORAC SPINE 2VWS: CPT | Performed by: STUDENT IN AN ORGANIZED HEALTH CARE EDUCATION/TRAINING PROGRAM

## 2021-09-13 PROCEDURE — 72040 X-RAY EXAM NECK SPINE 2-3 VW: CPT | Performed by: RADIOLOGY

## 2021-09-13 PROCEDURE — 99213 OFFICE O/P EST LOW 20 MIN: CPT | Performed by: INTERNAL MEDICINE

## 2021-09-13 RX ORDER — TIZANIDINE 2 MG/1
2-4 TABLET ORAL 2 TIMES DAILY PRN
Qty: 20 TABLET | Refills: 0 | Status: SHIPPED | OUTPATIENT
Start: 2021-09-13 | End: 2021-12-16

## 2021-09-13 ASSESSMENT — PAIN SCALES - GENERAL: PAINLEVEL: EXTREME PAIN (8)

## 2021-09-13 ASSESSMENT — MIFFLIN-ST. JEOR: SCORE: 1523.29

## 2021-09-13 NOTE — PROGRESS NOTES
History of Present Illness:  Mr. Orellana is a 71 year old male who presents for  Chief Complaint   Patient presents with     Neck Pain     O: the patient is unsure what led up to the pain in the neck and shoulder; P: not moving tends to make it better; Q: sharp pain; R: pain radiates into right shoulder, pain in neck resulting in stiffness; S: 9/10 pain last night, 2/10 in clinic with no movement; T: the patient reports that the pain started yesterday afternoon.      Shoulder Pain     Started yesterday, out of the blue, severe, bad overnight but uncomfortable getting into bed.  Improved slightly today.  Took 2 APAP x 2 times.  Radiates into R shoulder, sharp stabbing pain, no associated paresthesias, weakness, swallowing trouble, fevers, N/V, incontinence.   No previous injuries, no trauma.      Review of external notes as documented above                   A detailed Review of Systems was performed, verified and is negative except as documented in the HPI.  All health questionnaires were reviewed, verified and relevant information documented above.      Past Medical History:  Past Medical History:   Diagnosis Date     Actinic keratosis 2009     Atrial fibrillation (H) 6/15/96    Afib - 2 or 3 extended occurrences since     Chronic hepatitis C (H)     Chronic Hepatitis C,  genotype 1b                Stage 0 Fibrosis     Dupuytren's contracture of both hands      Elevated PSA      Hepatitis B 1969    acute infection at age 19; IV drug use     WINNIE (obstructive sleep apnea)     AHI 34.2     Pain in both hands      Patient is Church      Refusal of blood transfusions as patient is Church      Right shoulder pain      Tinnitus 1 or 2 years ago    both ears     Tubular adenoma of colon 1/17/17    ascending colon, 1/17/17       Active Meds:  Current Outpatient Medications   Medication     aspirin 81 MG EC tablet     diltiazem ER COATED BEADS (CARDIZEM CD/CARTIA XT) 120 MG 24 hr capsule     flecainide  "(TAMBOCOR) 50 MG tablet     No current facility-administered medications for this visit.        Allergies:  Reviewed, refer to EMR    Relevant Social History:  Social History     Tobacco Use     Smoking status: Former Smoker     Packs/day: 0.50     Years: 10.00     Pack years: 5.00     Types: Cigarettes     Start date: 6/15/1964     Quit date: 1974     Years since quittin.2     Smokeless tobacco: Former User     Quit date: 1969   Substance Use Topics     Alcohol use: Yes     Alcohol/week: 3.0 - 4.0 standard drinks     Comment: 4 or 5 drinks/week, limit of 1     Drug use: No     Comment: --history of marijuana        Physical Exam:  Vitals: /70 (BP Location: Right arm, Patient Position: Sitting, Cuff Size: Adult Regular)   Pulse 64   Resp 16   Ht 1.753 m (5' 9\")   Wt 78.3 kg (172 lb 9.6 oz)   SpO2 96%   BMI 25.49 kg/m    Constitutional: Alert, oriented, pleasant, no acute distress  Head: Normocephalic, atraumatic  Eyes: Extra-ocular movements intact, pupils equally round bilaterally, no scleral icterus  ENT: Oropharynx clear, moist mucus membranes, good dentition  Neck: Supple, no lymphadenopathy  Cardiovascular: Regular rate and rhythm, no murmurs, rubs or gallops, peripheral pulses full/symmetric  Respiratory: Good air movement bilaterally, lungs clear, no wheezes/rales/rhonchi  Musculoskeletal: No edema, normal muscle tone, normal gait, mild thoracic spinal tenderness, no deformities, pain with R neck rotation and side bending, tender along R trapezius, shoulder ROM intact, no R arm weakness,  Biceps/BR reflexes intact  Neurologic: Alert and oriented, cranial nerves 2-12 intact, grossly non-focal  Skin: No rashes/lesions  Psychiatric: normal mentation, affect and mood      Diagnostics:  Labs reviewed in Epic          Assessment and Plan:  Frank was seen today for neck pain and shoulder pain.    Diagnoses and all orders for this visit:    Neck pain  Likely muscular strain/spasm, less " likely radicular symptoms.  Given acute onset will obtain xray to r/o other significant pathology.  Otherwise advised heat, ROM exercises, nsaids, PT.  Advised to contact us if not improving in 1-2 weeks.  -     XR Cervical Spine 2-3 Views; Future  -     XR Thoracic Spine 2 Views; Future  -     Physical Therapy Referral; Future  -     tiZANidine (ZANAFLEX) 2 MG tablet; Take 1-2 tablets (2-4 mg) by mouth 2 times daily as needed for muscle spasms        Yani Burnett MD  Internal Medicine

## 2021-09-13 NOTE — PATIENT INSTRUCTIONS
Also try heat, massage, gentle range of motion exercises for neck.    Okay to use tylenol or ibuprofen/aleve for a week or two with food if needed.

## 2021-09-13 NOTE — NURSING NOTE
Frank Orellana is a 71 year old male patient that presents today in clinic for the following:    Chief Complaint   Patient presents with     Neck Pain     O: the patient is unsure what led up to the pain in the neck and shoulder; P: not moving tends to make it better; Q: sharp pain; R: pain radiates into right shoulder, pain in neck resulting in stiffness; S: 9/10 pain last night, 2/10 in clinic with no movement; T: the patient reports that the pain started yesterday afternoon.      Shoulder Pain     The patient's allergies and medications were reviewed as noted. A set of vitals were recorded as noted without incident. The patient does not have any other questions for the provider.    Lasha Galeas, EMT at 3:38 PM on 9/13/2021

## 2021-10-06 ENCOUNTER — NURSE TRIAGE (OUTPATIENT)
Dept: NURSING | Facility: CLINIC | Age: 71
End: 2021-10-06

## 2021-10-06 NOTE — TELEPHONE ENCOUNTER
.    Reason for Disposition    All other patients with chest pain (Exception: fleeting chest pain lasting a few seconds)    Additional Information    Negative: Severe difficulty breathing (e.g., struggling for each breath, speaks in single words)    Negative: Passed out (i.e., fainted, collapsed and was not responding)    Negative: Difficult to awaken or acting confused (e.g., disoriented, slurred speech)    Negative: Shock suspected (e.g., cold/pale/clammy skin, too weak to stand, low BP, rapid pulse)    Negative: Chest pain lasting longer than 5 minutes and ANY of the following:* Over 45 years old* Over 30 years old and at least one cardiac risk factor (e.g., diabetes, high blood pressure, high cholesterol, smoker, or strong family history of heart disease)* History of heart disease (i.e., angina, heart attack, heart failure, bypass surgery, takes nitroglycerin)* Pain is crushing, pressure-like, or heavy    Negative: Heart beating < 50 beats per minute OR > 140 beats per minute    Negative: Visible sweat on face or sweat dripping down face    Negative: Sounds like a life-threatening emergency to the triager    Negative: Followed an injury to chest    Negative: SEVERE chest pain    Negative: Pain also in shoulder(s) or arm(s) or jaw    Negative: Difficulty breathing    Negative: Cocaine use within last 3 days    Negative: Major surgery in the past month    Negative: Hip or leg fracture (broken bone) in past month (or had cast on leg or ankle in past month)    Negative: Illness requiring prolonged bedrest in past month (e.g., immobilization, long hospital stay)    Negative: Long-distance travel in past month (e.g., car, bus, train, plane; with trip lasting 6 or more hours)    Negative: History of prior 'blood clot' in leg or lungs (i.e., deep vein thrombosis, pulmonary embolism)    Negative: History of inherited increased risk of blood clots (e.g., Factor 5 Leiden, Anti-thrombin 3, Protein C or Protein S  "deficiency, Prothrombin mutation)    Negative: Heart beating irregularly or very rapidly    Negative: Chest pain lasting longer than 5 minutes and occurred in last 3 days (72 hours) (Exception: feels exactly the same as previously diagnosed heartburn and has accompanying sour taste in mouth)    Negative: Chest pain or 'angina' comes and goes and is happening more often (increasing in frequency) or getting worse (increasing in severity) (Exception: chest pains that last only a few seconds)    Negative: Dizziness or lightheadedness    Negative: Coughing up blood    Negative: Patient sounds very sick or weak to the triager    Negative: Cancer treatment in the past two months (or has cancer now)    Negative: Patient says chest pain feels exactly the same as previously diagnosed 'heartburn'  and  describes burning in chest and accompanying sour taste in mouth    Answer Assessment - Initial Assessment Questions  1. LOCATION: \"Where does it hurt?\"        Upper left chest  2. RADIATION: \"Does the pain go anywhere else?\" (e.g., into neck, jaw, arms, back)      no  3. ONSET: \"When did the chest pain begin?\" (Minutes, hours or days)       Yesterday   And experienced this a week ago for a day  4. PATTERN \"Does the pain come and go, or has it been constant since it started?\"  \"Does it get worse with exertion?\"       Constant    Does not increase with exercion  5. DURATION: \"How long does it last\" (e.g., seconds, minutes, hours)      constant  6. SEVERITY: \"How bad is the pain?\"  (e.g., Scale 1-10; mild, moderate, or severe)     - MILD (1-3): doesn't interfere with normal activities      - MODERATE (4-7): interferes with normal activities or awakens from sleep     - SEVERE (8-10): excruciating pain, unable to do any normal activities        4-7  7. CARDIAC RISK FACTORS: \"Do you have any history of heart problems or risk factors for heart disease?\" (e.g., angina, prior heart attack; diabetes, high blood pressure, high cholesterol, " "smoker, or strong family history of heart disease)      Hx of afib  But can tell not in afib  8. PULMONARY RISK FACTORS: \"Do you have any history of lung disease?\"  (e.g., blood clots in lung, asthma, emphysema, birth control pills)      Did not ask  9. CAUSE: \"What do you think is causing the chest pain?\"      unknown  10. OTHER SYMPTOMS: \"Do you have any other symptoms?\" (e.g., dizziness, nausea, vomiting, sweating, fever, difficulty breathing, cough)        No other symptoms  11. PREGNANCY: \"Is there any chance you are pregnant?\" \"When was your last menstrual period?\"        no    Protocols used: CHEST PAIN-A-OH    Pt c/o left upper chest pain last week  Lasted for a day no other symptoms   Started again yesterday  States that it hurts more when taking a deep breath.  Pain is constant . Denies lightheadedness,SOB,cough, N/V or weakness.denies wt gain or lower extremity swelling   Denies afib  His B/P was 152/82 and then he re-adjusted the cuff and it read 127/73  pulse is in the low 60's        Recommended  Rest and continue to monitor vitals   Will forward to cards team to try and get him in to clinic    If pain intensifies and develops symptoms call 911 or go to ED      "

## 2021-10-07 ENCOUNTER — THERAPY VISIT (OUTPATIENT)
Dept: PHYSICAL THERAPY | Facility: CLINIC | Age: 71
End: 2021-10-07
Attending: INTERNAL MEDICINE
Payer: COMMERCIAL

## 2021-10-07 DIAGNOSIS — M54.2 NECK PAIN: ICD-10-CM

## 2021-10-07 PROCEDURE — 97161 PT EVAL LOW COMPLEX 20 MIN: CPT | Mod: GP

## 2021-10-07 PROCEDURE — 97110 THERAPEUTIC EXERCISES: CPT | Mod: GP

## 2021-10-07 NOTE — PROGRESS NOTES
Physical Therapy Initial Evaluation  Subjective:  The history is provided by the patient. No  was used.   Patient Health History  Frank Orellana being seen for Neck/shoulder pain.     Problem began: 9/12/2021.   Problem occurred: Don't know   Pain is reported as 1/10 on pain scale.  General health as reported by patient is good.  Pertinent medical history includes: chest pain, history of fractures, heart problems, hepatitis, numbness/tingling, pain at night/rest, sleep disorder/apnea and smoking.     Medical allergies: other. Other medical allergies details: Contrast dye.   Surgeries include:  Orthopedic surgery.    Current medications:  Cardiac medication.    Current occupation is retired.   Primary job tasks include:  Computer work.                  Therapist Generated HPI Evaluation  Problem details: Pt presents with neck pain that was very severe in middle of September, this lasted for one day, but pt has had baseline neck pain for years. Normally pain in center and left sided, severe pain was R sided in to shoulder. L sided pain stays in neck. Presently 0/10, feels tight with movement. Pain will wake pt at night 4-5x, he is able to fall back asleep. Can be bothersome driving.       Xray 9/13 Impression:  Mild progression of multilevel cervical degenerative changes since  2015, most pronounced at C5-6.    Eval summary: No signs of radiculopathy at evaluation, no complaints of numbness/tingling. Signs and sxs consistent with normal age related changes in cervical spine and muscle tightness. .         Type of problem:  Cervical spine.    This is a chronic condition.  Condition occurred with:  Insidious onset.  Where condition occurred: for unknown reasons.  Patient reports pain:  Lower cervical spine and upper thoracic.  Pain is described as aching and sharp and is intermittent.    Since onset symptoms are gradually worsening.  Symptoms are exacerbated by certain positions  Relieved by:  changing positions at night.  Special tests included:  X-ray.  Previous treatment includes chiropractic (back in 1970s).                           Objective:  System              Cervical/Thoracic Evaluation    AROM:  AROM Cervical:    Flexion:            WNL  Extension:       WNL  Rotation:         Left: +pain 58 deg     Right: -pain, just tightness 58 deg  Side Bend:      Left: 50% limited. Tightness on opposite, pain on ispi     Right:  50% limited. Tightness on opposite, pain on ispi      Headaches: none  Cervical Myotomes:  normal (Shoulder ROM normal)                        Cervical Palpation:  normal                                                      General     ROS    Assessment/Plan:    Patient is a 71 year old male with cervical complaints.    Patient has the following significant findings with corresponding treatment plan.                Diagnosis 1:  Neck pain  Pain -  manual therapy, self management, education, directional preference exercise and home program  Decreased ROM/flexibility - manual therapy, therapeutic exercise and home program  Decreased strength - therapeutic exercise, therapeutic activities and home program  Impaired posture - neuro re-education and home program    Therapy Evaluation Codes:   Cumulative Therapy Evaluation is: Low complexity.    Previous and current functional limitations:  (See Goal Flow Sheet for this information)    Short term and Long term goals: (See Goal Flow Sheet for this information)     Communication ability:  Patient appears to be able to clearly communicate and understand verbal and written communication and follow directions correctly.  Treatment Explanation - The following has been discussed with the patient:   RX ordered/plan of care  Anticipated outcomes  Possible risks and side effects  This patient would benefit from PT intervention to resume normal activities.   Rehab potential is excellent.    Frequency:  1 X week, once daily  Duration:  for 8  weeks  Discharge Plan:  Achieve all LTG.  Independent in home treatment program.  Reach maximal therapeutic benefit.    Please refer to the daily flowsheet for treatment today, total treatment time and time spent performing 1:1 timed codes.

## 2021-10-07 NOTE — TELEPHONE ENCOUNTER
I notified Deepa VILLEGAS RN for EP regarding this message and I will route this message to the EP pool for further review.    Deangelo GRAMAJO

## 2021-10-08 ENCOUNTER — OFFICE VISIT (OUTPATIENT)
Dept: CARDIOLOGY | Facility: CLINIC | Age: 71
End: 2021-10-08
Attending: INTERNAL MEDICINE
Payer: COMMERCIAL

## 2021-10-08 VITALS
OXYGEN SATURATION: 97 % | DIASTOLIC BLOOD PRESSURE: 65 MMHG | HEIGHT: 69 IN | BODY MASS INDEX: 25.33 KG/M2 | HEART RATE: 63 BPM | SYSTOLIC BLOOD PRESSURE: 113 MMHG | WEIGHT: 171 LBS

## 2021-10-08 DIAGNOSIS — R07.9 CHEST PAIN, UNSPECIFIED TYPE: Primary | ICD-10-CM

## 2021-10-08 DIAGNOSIS — Z51.81 ENCOUNTER FOR MONITORING FLECAINIDE THERAPY: ICD-10-CM

## 2021-10-08 DIAGNOSIS — Z79.899 ENCOUNTER FOR MONITORING FLECAINIDE THERAPY: ICD-10-CM

## 2021-10-08 PROCEDURE — G0463 HOSPITAL OUTPT CLINIC VISIT: HCPCS | Mod: 25

## 2021-10-08 PROCEDURE — 99214 OFFICE O/P EST MOD 30 MIN: CPT | Mod: GC | Performed by: INTERNAL MEDICINE

## 2021-10-08 PROCEDURE — 93005 ELECTROCARDIOGRAM TRACING: CPT

## 2021-10-08 ASSESSMENT — ENCOUNTER SYMPTOMS
NECK PAIN: 1
HYPOTENSION: 0
LIGHT-HEADEDNESS: 0
PALPITATIONS: 0
SLEEP DISTURBANCES DUE TO BREATHING: 0
HYPERTENSION: 0
BACK PAIN: 0
ARTHRALGIAS: 0
MUSCLE CRAMPS: 0
LEG PAIN: 0
MUSCLE WEAKNESS: 0
JOINT SWELLING: 0
STIFFNESS: 0
MYALGIAS: 0
SYNCOPE: 0
ORTHOPNEA: 0
EXERCISE INTOLERANCE: 0

## 2021-10-08 ASSESSMENT — PAIN SCALES - GENERAL: PAINLEVEL: NO PAIN (0)

## 2021-10-08 ASSESSMENT — MIFFLIN-ST. JEOR: SCORE: 1516.03

## 2021-10-08 NOTE — NURSING NOTE
Chief Complaint   Patient presents with     Follow Up     ep pt called in with chest pain needed first available     Vitals were taken, medications reconciled, and EKG performed.    Alfredo Mir  1:56 PM

## 2021-10-08 NOTE — LETTER
10/8/2021      RE: Frank Orellana  [address]  [facility] [id number]       Dear Colleague,    Thank you for the opportunity to participate in the care of your patient, Frank Orellana, at the [facility] [place] at [facility]. Please see a copy of my visit note below.    Electrophysiology Clinic Note  HPI:     Ms. Orellana is a 70 year old [employer] male who has a past medical history significant for PAF (CHADSVASC 1) s/p DCCVs, chronic hepatitis C, hepatitis B, WINNIE (uses CPAP), tubular adenoma of colon, and tobacco use.     At our initial visit in 12/2016 he had reported ~20 years of paroxysmal atrial fibrillation manifesting as chest pressure and palpitations. The first episode occurred during a picnic and converted spontaneously to sinus rhythm.  He took metoprolol for 30 days.  Approximately 10 years later he had a second episode in the setting of a meniscal tear and perhaps 2 or 3 more episodes in the intervening years until his palpitations became more frequent in the year leading up to his most recent presentation.  A 48-hour Holter monitor which showed variation between sinus rhythm, junctional rhythm, and A.fib (average HR 63, range ) with 1% PVCs and <1% supraventricular ectopic beats, with 33 runs of PAT vs. A.fib (longest 10 beats at 164 bpm). He then presented to the ED on 12/2/16 for palpitations and was in A.fib with RVR~111 bpm, started on diltiazem and ASA 81 mg daily, and discharged. He had recurrent episode of AF end of 12/2017 that manifested as palpitations, SOB and fatigue. He felt the symptoms for about 10 days prior and was seen by Dr. Hussein on 1/6/18. At this time he was noted to be in symptomatic AF (rate controlled), and was scheduled to undergo JIM/DCCV, which was performed on 1/15/18. He was on Dabigatran for 4 weeks following the DCCV and his ASA was held. He last saw Dr. Felipe in EP clinic in  "2/2018 and was doing well. More recently, he called reporting he was back in AF. He was started on Pradaxa within 1.5 days of his symptoms. He was then arranged for DCCV. He then had DCCV on 10/11/18. He had recurrence and required another DCCV on 11/28/18. He followed up in EP clinic on 12/27/18 and was feeling well without issues. He presented 1/18 to [facility] with racing heart sensation associated with diaphoresis and mild chest discomfort and lightheadedness c/w prior AF symptoms. Found to be in AF. He states he has had a few other AF symptoms recently and feels they are growing in frequency/severity. His AF did spontaneously terminated when in ER. He was initiated on Sotalol during this admission by EP.     EP visit 4/24/19: He presents today for follow-up after having initiated sotalol and cardioverted in ER 1/18/19. He reports no recurrent sustained palpitations. He does have intermittent, infrequent, short-lived palpitations which are not bothersome (and corroborated with PAC's / PVC's on recent Zio). Recent Holter shows no atrial fibrillation but run of AT (13 seconds). He does report, and his wife agrees, new onset significant fatigue since initiating sotalol. Now taking regular naps and just not doing usual enjoyable activities as long as he is used to. EKG: VR 47 bpm,  ms,  ms, QTc 421 ms. Sotalol 80 mg BID, asa 81 mg. Given recurrences Sotalol was stopped and Flecainide was started.       EP Vist 7/24/19: After developing symptoms of a URI last week, felt his heart \"beat out of rhythm\" on Monday evening. Call ahead to clinic was scheduled for Cardioversion, however on his way to hospital, he went out of afib and improved. Since then denies any symptoms of lightheadedness and dizziness. Per discussion with patient, did not start on Pradaxa and is still on ASA today. Denies chest pain, SOB, lightheadedness and dizziness today.       EP clinic On 10/08/21:  He presents today for follow up. He is " complaining of atypical chest pain occurred started 2 weeks ago, pain is aching, mainly in the left side of the chest and occurred for 36 hours continuous, moderate in intensity, no lightheaded or diaphoresis accompaning the pain , no LOC, no lower extremity edema. He has been on his dofetilide taking it twice a day without any more episodes of ER visits or RVR, pt is on ASA no anticoagulation. Presenting 12 lead ECG shows Vent Rate 60 bpm,  ms,  ms, QTc 441 ms. Current cardiac medications include: Flecainide, Diltiazem, and ASA.         PAST MEDICAL HISTORY:  Past Medical History:   Diagnosis Date     Actinic keratosis 2009     Atrial fibrillation (H) 6/15/96    Afib - 2 or 3 extended occurrences since     Chronic hepatitis C (H)     Chronic Hepatitis C,  genotype 1b                Stage 0 Fibrosis     Dupuytren's contracture of both hands      Elevated PSA      Hepatitis B 1969    acute infection at age 19; IV drug use     WINNIE (obstructive sleep apnea)     AHI 34.2     Pain in both hands      Patient is Adventist      Refusal of blood transfusions as patient is Adventist      Right shoulder pain      Tinnitus 1 or 2 years ago    both ears     Tubular adenoma of colon 1/17/17    ascending colon, 1/17/17       CURRENT MEDICATIONS:  Current Outpatient Medications   Medication Sig Dispense Refill     aspirin 81 MG EC tablet Take 1 tablet (81 mg) by mouth daily 90 tablet 3     diltiazem ER COATED BEADS (CARDIZEM CD/CARTIA XT) 120 MG 24 hr capsule Take 1 capsule (120 mg) by mouth daily 90 capsule 3     flecainide (TAMBOCOR) 50 MG tablet Take 1 tablet (50 mg) by mouth 2 times daily 180 tablet 3     tiZANidine (ZANAFLEX) 2 MG tablet Take 1-2 tablets (2-4 mg) by mouth 2 times daily as needed for muscle spasms (Patient not taking: Reported on 10/8/2021) 20 tablet 0       PAST SURGICAL HISTORY:  Past Surgical History:   Procedure Laterality Date     ANESTHESIA CARDIOVERSION N/A 1/15/2018     Procedure: ANESTHESIA CARDIOVERSION;  Anesthesia Coverage Cardioversion With Transesophageal Echocardiogram @0930 ;  Surgeon: GENERIC ANESTHESIA PROVIDER;  Location: [facility]     ANESTHESIA CARDIOVERSION N/A 10/11/2018    Procedure: ANESTHESIA CARDIOVERSION;  Cardioversion;  Surgeon: GENERIC ANESTHESIA PROVIDER;  Location: [facility] OR     ANESTHESIA CARDIOVERSION N/A 2018    Procedure: Anesthesia Coverage Cardioversion @0830;  Surgeon: GENERIC ANESTHESIA PROVIDER;  Location: [facility] OR     ARTHROSCOPY KNEE      left knee     COLONOSCOPY  17    tubular adenoma ascending colon     CYSTOSCOPY, TRANSURETHRAL RESECTION (TUR) PROSTATE, COMBINED N/A 2020    Procedure: CYSTOSCOPY, WITH TRANSURETHRAL RESECTION PROSTATE;  Surgeon: Roldan De La O MD;  Location: [facility] OR     HERNIORRHAPHY INGUINAL Right 10/26/2017    Procedure: HERNIORRHAPHY INGUINAL;  Open Right Inguinal Hernia Repair with Mesh;  Surgeon: Suresh Raymond MD;  Location: [facility] OR     KNEE SURGERY  ??    torn meniscus     RELEASE DUPUYTRENS CONTRACTURE Right 2017    Procedure: RELEASE DUPUYTRENS CONTRACTURE;  Surgeon: Lars Oleary MD;  Location: [facility] OR     RELEASE DUPUYTRENS CONTRACTURE Left 12/15/2017    Procedure: RELEASE DUPUYTRENS CONTRACTURE;  Left Ring and Middle Finger Subtotal Palmar Fasciectomy;  Surgeon: Lars Oleary MD;  Location: [facility]     TRANSESOPHAGEAL ECHOCARDIOGRAM INTRAOPERATIVE N/A 1/15/2018    Procedure: TRANSESOPHAGEAL ECHOCARDIOGRAM INTRAOPERATIVE;;  Surgeon: GENERIC ANESTHESIA PROVIDER;  Location: [facility] OR       ALLERGIES:     Allergies   Allergen Reactions     Blood Transfusion Related (Informational Only)      Moravian.  Declines blood transfusions.     Contrast Dye Rash       FAMILY HISTORY:  Family History   Problem Relation Age of Onset     Arthritis Paternal Grandmother      Rheumatoid Arthritis Paternal Grandmother      Gastrointestinal Disease Father          age 77 after colon  "surgery     Alcoholism Father      Alcohol/Drug Mother          age 64     Alcoholism Mother      Heart Disease Brother      Cardiac Sudden Death Brother      Uterine Cancer Sister      Glaucoma No family hx of      Macular Degeneration No family hx of      Retinal detachment No family hx of        SOCIAL HISTORY:  Social History     Tobacco Use     Smoking status: Former Smoker     Packs/day: 0.50     Years: 10.00     Pack years: 5.00     Types: Cigarettes     Start date: 6/15/1964     Quit date: 1974     Years since quittin.3     Smokeless tobacco: Former User     Quit date: 1969   Substance Use Topics     Alcohol use: Yes     Alcohol/week: 3.0 - 4.0 standard drinks     Comment: 4 or 5 drinks/week, limit of 1     Drug use: No     Comment: --history of marijuana       ROS:   A comprehensive 10 point review of systems negative other than as mentioned in HPI.  Exam:  /65 (BP Location: Left arm, Patient Position: Sitting, Cuff Size: Adult Regular)   Pulse 63   Ht 1.753 m (5' 9\")   Wt 77.6 kg (171 lb)   SpO2 97%   BMI 25.25 kg/m    GENERAL APPEARANCE: alert and no distress  HEENT: no icterus, no xanthelasmas, normal pupil size and reaction, normal palate, mucosa moist, no central cyanosis  NECK: no adenopathy, no asymmetry, masses, or scars, thyroid normal to palpation and no bruits, JVP not elevated  RESPIRATORY: lungs clear to auscultation - no rales, rhonchi or wheezes, no use of accessory muscles, no retractions, respirations are unlabored, normal respiratory rate  CARDIOVASCULAR: regular rhythm, normal S1 with physiologic split S2, no S3 or S4 and no murmur, click or rub, precordium quiet with normal PMI.  ABDOMEN: soft, non tender, bowel sounds normal, non-distended  EXTREMITIES: peripheral pulses normal, no edema  NEURO: alert and oriented to person/place/time, normal speech, gait and affect  SKIN: no ecchymoses, no rashes  PSYCH: normal affect, cooperative    Labs:  Reviewed. " "    Testing/Procedures:    Echocardiogram 4/27/2017 showed LV EF 60-65%, mild RV dilation but normal RV function, mild AI, and mild dilation of the ascending aorta to 4.5 cm, indexed to 2.3 cm/m2.     MR angiography of the chest 12/27/2016 showed \"Normal caliber of thoracic aorta. The ascending aorta measures 3.9 cm, which is normal when indexed for age/gender/size.\"     Echocardiogram 10/14/16 showed LV EF 55-60%, mild RV dilation but normal RV function, mild to moderate AI, and dilation of the proximal ascending aorta to 45 mm, index 2.4 cm/m2.\"     Lexiscan NM stress test 1/8/18 showed \"Normal  myocardial SPECT study with a summed stress score of  zero. No ischemia or infarct identified. Normal LV function. No change  from prior study.\"     JIM (1/15/18):  Interpretation Summary  The left atrial appendage is normal. It is free of spontaneous echo contrast and thrombus.  Left ventricular function, chamber size, wall motion, and wall thickness are normal.The EF is 60-65%.  Mild aortic insufficiency is present.  Mild dilatation of the aorta is present.  Ascending aorta 4.2 cm.  No pericardial effusion is present.  There has been no change.     Ziopatch (3/21 - 4/4/2019):        Exercise EKG 5/2019    Interpretation Summary     Normal, low-risk exercise electrocardiogram.  The target heart rate was achieved. Normal heart rate and BP response to  exercise.  Normal resting ECG. No ECG evidence of ischemia with exercise.  There were occasional uniform PVCs elicited during exercise. QT prolongation  was unable to be assessed due to artifact.  No angina was elicited.  Functional capacity is average for age.      Echo 1/2019:  Interpretation Summary  Global and regional left ventricular function is normal with an EF of 55-60%.  No regional wall motion abnormalities are seen.  Right ventricular function, chamber size, wall motion, and thickness are  normal.  Mild AI.  Mildly dilated ascending aorta measuring 4.2 cm (2.1 " cm/m2.)     This study was compared with the study from 17: there has been no  significant change. Specifically, the ascending aorta is unchanged in size on  direct remeasurement of the images from the prior study.         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 He presents today for follow up. He is complaining of atypical chest pain occurred started 2 weeks ago and 2 days ago, pain is aching, mainly in the left side of the chest and occurred for 36 hours continuous moderate in intensity, no lightheaded or diaphoresis accompaning the pain , no LOC, no lower extremity edema. He has been on his dofetilide taking it twice a day without any more episodes of ER visits or RVR, pt is on ASA no anticoagulation. Presenting 12 lead ECG shows Vent Rate 60 bpm,  ms,  ms, QTc 441 ms. Current cardiac medications include: Flecainide, Diltiazem, and ASA.     Paroxsymal Atrial Fibrillation:  We discussed in detail with the patient management/treatment options for Oneal includin. Stroke Prophylaxis:  CHADSVASC=+age  1, corresponding to a 1.3% annual stroke / systemic emolism event rate. He is on ASA only and only uses DOAC surrounding DCCVs.   2. Rate Control: Continue Diltiazem.   3. Rhythm Control: He has had several DCCVs, He failed Sotalol. He has now been on Flecainide now and doing well, QRS is 104.  4. Chest discomfort consider a pharmacological nuclear stress test for evaluation of ischemia.  4. Risk Factor Management: maintain good BP control and continued WINNIE treatment with CPAP.      Follow up with suma in 1 year.    Discussed with Dr Felipe.    Brisa Stinson MD  EP Fellow    EP STAFF NOTE  Patient seen and examined and management plan personally reviewed with the patient. I agree with the note  above by the CV/EP fellow.  Jovanni Felipe MD [facility]  Cardiology - Electrophysiology

## 2021-10-08 NOTE — PROGRESS NOTES
Electrophysiology Clinic Note  HPI:     Ms. Orellana is a 70 year old Judaism male who has a past medical history significant for PAF (CHADSVASC 1) s/p DCCVs, chronic hepatitis C, hepatitis B, WINNIE (uses CPAP), tubular adenoma of colon, and tobacco use.     At our initial visit in 12/2016 he had reported ~20 years of paroxysmal atrial fibrillation manifesting as chest pressure and palpitations. The first episode occurred during a picnic and converted spontaneously to sinus rhythm.  He took metoprolol for 30 days.  Approximately 10 years later he had a second episode in the setting of a meniscal tear and perhaps 2 or 3 more episodes in the intervening years until his palpitations became more frequent in the year leading up to his most recent presentation.  A 48-hour Holter monitor which showed variation between sinus rhythm, junctional rhythm, and A.fib (average HR 63, range ) with 1% PVCs and <1% supraventricular ectopic beats, with 33 runs of PAT vs. A.fib (longest 10 beats at 164 bpm). He then presented to the ED on 12/2/16 for palpitations and was in A.fib with RVR~111 bpm, started on diltiazem and ASA 81 mg daily, and discharged. He had recurrent episode of AF end of 12/2017 that manifested as palpitations, SOB and fatigue. He felt the symptoms for about 10 days prior and was seen by Dr. Hussein on 1/6/18. At this time he was noted to be in symptomatic AF (rate controlled), and was scheduled to undergo JIM/DCCV, which was performed on 1/15/18. He was on Dabigatran for 4 weeks following the DCCV and his ASA was held. He last saw Dr. Felipe in EP clinic in 2/2018 and was doing well. More recently, he called reporting he was back in AF. He was started on Pradaxa within 1.5 days of his symptoms. He was then arranged for DCCV. He then had DCCV on 10/11/18. He had recurrence and required another DCCV on 11/28/18. He followed up in EP clinic on 12/27/18 and was feeling well without issues. He presented 1/18  "to [facility] with racing heart sensation associated with diaphoresis and mild chest discomfort and lightheadedness c/w prior AF symptoms. Found to be in AF. He states he has had a few other AF symptoms recently and feels they are growing in frequency/severity. His AF did spontaneously terminated when in ER. He was initiated on Sotalol during this admission by EP.     EP visit 4/24/19: He presents today for follow-up after having initiated sotalol and cardioverted in ER 1/18/19. He reports no recurrent sustained palpitations. He does have intermittent, infrequent, short-lived palpitations which are not bothersome (and corroborated with PAC's / PVC's on recent Zio). Recent Holter shows no atrial fibrillation but run of AT (13 seconds). He does report, and his wife agrees, new onset significant fatigue since initiating sotalol. Now taking regular naps and just not doing usual enjoyable activities as long as he is used to. EKG: VR 47 bpm,  ms,  ms, QTc 421 ms. Sotalol 80 mg BID, asa 81 mg. Given recurrences Sotalol was stopped and Flecainide was started.       EP Vist 7/24/19: After developing symptoms of a URI last week, felt his heart \"beat out of rhythm\" on Monday evening. Call ahead to clinic was scheduled for Cardioversion, however on his way to hospital, he went out of afib and improved. Since then denies any symptoms of lightheadedness and dizziness. Per discussion with patient, did not start on Pradaxa and is still on ASA today. Denies chest pain, SOB, lightheadedness and dizziness today.       EP clinic On 10/08/21:  He presents today for follow up. He is complaining of atypical chest pain occurred started 2 weeks ago, pain is aching, mainly in the left side of the chest and occurred for 36 hours continuous, moderate in intensity, no lightheaded or diaphoresis accompaning the pain , no LOC, no lower extremity edema. He has been on his dofetilide taking it twice a day without any more episodes of ER " visits or RVR, pt is on ASA no anticoagulation. Presenting 12 lead ECG shows Vent Rate 60 bpm,  ms,  ms, QTc 441 ms. Current cardiac medications include: Flecainide, Diltiazem, and ASA.         PAST MEDICAL HISTORY:  Past Medical History:   Diagnosis Date     Actinic keratosis 2009     Atrial fibrillation (H) 6/15/96    Afib - 2 or 3 extended occurrences since     Chronic hepatitis C (H)     Chronic Hepatitis C,  genotype 1b                Stage 0 Fibrosis     Dupuytren's contracture of both hands      Elevated PSA      Hepatitis B 1969    acute infection at age 19; IV drug use     WINNIE (obstructive sleep apnea)     AHI 34.2     Pain in both hands      Patient is Cheondoism      Refusal of blood transfusions as patient is Cheondoism      Right shoulder pain      Tinnitus 1 or 2 years ago    both ears     Tubular adenoma of colon 1/17/17    ascending colon, 1/17/17       CURRENT MEDICATIONS:  Current Outpatient Medications   Medication Sig Dispense Refill     aspirin 81 MG EC tablet Take 1 tablet (81 mg) by mouth daily 90 tablet 3     diltiazem ER COATED BEADS (CARDIZEM CD/CARTIA XT) 120 MG 24 hr capsule Take 1 capsule (120 mg) by mouth daily 90 capsule 3     flecainide (TAMBOCOR) 50 MG tablet Take 1 tablet (50 mg) by mouth 2 times daily 180 tablet 3     tiZANidine (ZANAFLEX) 2 MG tablet Take 1-2 tablets (2-4 mg) by mouth 2 times daily as needed for muscle spasms (Patient not taking: Reported on 10/8/2021) 20 tablet 0       PAST SURGICAL HISTORY:  Past Surgical History:   Procedure Laterality Date     ANESTHESIA CARDIOVERSION N/A 1/15/2018    Procedure: ANESTHESIA CARDIOVERSION;  Anesthesia Coverage Cardioversion With Transesophageal Echocardiogram @0930 ;  Surgeon: GENERIC ANESTHESIA PROVIDER;  Location: [facility]     ANESTHESIA CARDIOVERSION N/A 10/11/2018    Procedure: ANESTHESIA CARDIOVERSION;  Cardioversion;  Surgeon: GENERIC ANESTHESIA PROVIDER;  Location: [facility] OR     ANESTHESIA  CARDIOVERSION N/A 2018    Procedure: Anesthesia Coverage Cardioversion @0830;  Surgeon: GENERIC ANESTHESIA PROVIDER;  Location: [facility]     ARTHROSCOPY KNEE      left knee     COLONOSCOPY  17    tubular adenoma ascending colon     CYSTOSCOPY, TRANSURETHRAL RESECTION (TUR) PROSTATE, COMBINED N/A 2020    Procedure: CYSTOSCOPY, WITH TRANSURETHRAL RESECTION PROSTATE;  Surgeon: Roldan De La O MD;  Location: [facility]     HERNIORRHAPHY INGUINAL Right 10/26/2017    Procedure: HERNIORRHAPHY INGUINAL;  Open Right Inguinal Hernia Repair with Mesh;  Surgeon: Suresh Raymond MD;  [facility]     KNEE SURGERY  ??    torn meniscus     RELEASE DUPUYTRENS CONTRACTURE Right 2017    Procedure: RELEASE DUPUYTRENS CONTRACTURE;  Surgeon: Lars Oleary MD;  Location: [facility]     RELEASE DUPUYTRENS CONTRACTURE Left 12/15/2017    Procedure: RELEASE DUPUYTRENS CONTRACTURE;  Left Ring and Middle Finger Subtotal Palmar Fasciectomy;  Surgeon: Lars Oleary MD;  [facility]     TRANSESOPHAGEAL ECHOCARDIOGRAM INTRAOPERATIVE N/A 1/15/2018    Procedure: TRANSESOPHAGEAL ECHOCARDIOGRAM INTRAOPERATIVE;;  Surgeon: GENERIC ANESTHESIA PROVIDER;  Location: [facility]       ALLERGIES:     Allergies   Allergen Reactions     Blood Transfusion Related (Informational Only)      Yazidism.  Declines blood transfusions.     Contrast Dye Rash       FAMILY HISTORY:  Family History   Problem Relation Age of Onset     Arthritis Paternal Grandmother      Rheumatoid Arthritis Paternal Grandmother      Gastrointestinal Disease Father          age 77 after colon surgery     Alcoholism Father      Alcohol/Drug Mother          age 64     Alcoholism Mother      Heart Disease Brother      Cardiac Sudden Death Brother      Uterine Cancer Sister      Glaucoma No family hx of      Macular Degeneration No family hx of      Retinal detachment No family hx of        SOCIAL HISTORY:  Social History  "    Tobacco Use     Smoking status: Former Smoker     Packs/day: 0.50     Years: 10.00     Pack years: 5.00     Types: Cigarettes     Start date: 6/15/1964     Quit date: 1974     Years since quittin.3     Smokeless tobacco: Former User     Quit date: 1969   Substance Use Topics     Alcohol use: Yes     Alcohol/week: 3.0 - 4.0 standard drinks     Comment: 4 or 5 drinks/week, limit of 1     Drug use: No     Comment: --history of marijuana       ROS:   A comprehensive 10 point review of systems negative other than as mentioned in HPI.  Exam:  /65 (BP Location: Left arm, Patient Position: Sitting, Cuff Size: Adult Regular)   Pulse 63   Ht 1.753 m (5' 9\")   Wt 77.6 kg (171 lb)   SpO2 97%   BMI 25.25 kg/m    GENERAL APPEARANCE: alert and no distress  HEENT: no icterus, no xanthelasmas, normal pupil size and reaction, normal palate, mucosa moist, no central cyanosis  NECK: no adenopathy, no asymmetry, masses, or scars, thyroid normal to palpation and no bruits, JVP not elevated  RESPIRATORY: lungs clear to auscultation - no rales, rhonchi or wheezes, no use of accessory muscles, no retractions, respirations are unlabored, normal respiratory rate  CARDIOVASCULAR: regular rhythm, normal S1 with physiologic split S2, no S3 or S4 and no murmur, click or rub, precordium quiet with normal PMI.  ABDOMEN: soft, non tender, bowel sounds normal, non-distended  EXTREMITIES: peripheral pulses normal, no edema  NEURO: alert and oriented to person/place/time, normal speech, gait and affect  SKIN: no ecchymoses, no rashes  PSYCH: normal affect, cooperative    Labs:  Reviewed.     Testing/Procedures:    Echocardiogram 2017 showed LV EF 60-65%, mild RV dilation but normal RV function, mild AI, and mild dilation of the ascending aorta to 4.5 cm, indexed to 2.3 cm/m2.     MR angiography of the chest 2016 showed \"Normal caliber of thoracic aorta. The ascending aorta measures 3.9 cm, which is normal when " "indexed for age/gender/size.\"     Echocardiogram 10/14/16 showed LV EF 55-60%, mild RV dilation but normal RV function, mild to moderate AI, and dilation of the proximal ascending aorta to 45 mm, index 2.4 cm/m2.\"     Lexiscan NM stress test 1/8/18 showed \"Normal  myocardial SPECT study with a summed stress score of  zero. No ischemia or infarct identified. Normal LV function. No change  from prior study.\"     JIM (1/15/18):  Interpretation Summary  The left atrial appendage is normal. It is free of spontaneous echo contrast and thrombus.  Left ventricular function, chamber size, wall motion, and wall thickness are normal.The EF is 60-65%.  Mild aortic insufficiency is present.  Mild dilatation of the aorta is present.  Ascending aorta 4.2 cm.  No pericardial effusion is present.  There has been no change.     Ziopatch (3/21 - 4/4/2019):        Exercise EKG 5/2019    Interpretation Summary     Normal, low-risk exercise electrocardiogram.  The target heart rate was achieved. Normal heart rate and BP response to  exercise.  Normal resting ECG. No ECG evidence of ischemia with exercise.  There were occasional uniform PVCs elicited during exercise. QT prolongation  was unable to be assessed due to artifact.  No angina was elicited.  Functional capacity is average for age.      Echo 1/2019:  Interpretation Summary  Global and regional left ventricular function is normal with an EF of 55-60%.  No regional wall motion abnormalities are seen.  Right ventricular function, chamber size, wall motion, and thickness are  normal.  Mild AI.  Mildly dilated ascending aorta measuring 4.2 cm (2.1 cm/m2.)     This study was compared with the study from 4/27/17: there has been no  significant change. Specifically, the ascending aorta is unchanged in size on  direct remeasurement of the images from the prior study.         Assessment and Plan:   Ms. Orellana is a 70 year old [employer] male who has a past medical history " significant for PAF (CHADSVASC 1) s/p DCCVs, chronic hepatitis C, hepatitis B, WINNIE (uses CPAP), tubular adenoma of colon, and tobacco use. He presents today for follow up. He is complaining of atypical chest pain occurred started 2 weeks ago and 2 days ago, pain is aching, mainly in the left side of the chest and occurred for 36 hours continuous moderate in intensity, no lightheaded or diaphoresis accompaning the pain , no LOC, no lower extremity edema. He has been on his dofetilide taking it twice a day without any more episodes of ER visits or RVR, pt is on ASA no anticoagulation. Presenting 12 lead ECG shows Vent Rate 60 bpm,  ms,  ms, QTc 441 ms. Current cardiac medications include: Flecainide, Diltiazem, and ASA.     Paroxsymal Atrial Fibrillation:  We discussed in detail with the patient management/treatment options for A.fib includin. Stroke Prophylaxis:  CHADSVASC=+age  1, corresponding to a 1.3% annual stroke / systemic emolism event rate. He is on ASA only and only uses DOAC surrounding DCCVs.   2. Rate Control: Continue Diltiazem.   3. Rhythm Control: He has had several DCCVs, He failed Sotalol. He has now been on Flecainide now and doing well, QRS is 104.  4. Chest discomfort consider a pharmacological nuclear stress test for evaluation of ischemia.  4. Risk Factor Management: maintain good BP control and continued WINNIE treatment with CPAP.      Follow up with suma in 1 year.    Discussed with Dr Felipe.    Brisa Stinson MD  EP Fellow    EP STAFF NOTE  Patient seen and examined and management plan personally reviewed with the patient. I agree with the note above by the CV/EP fellow.  Jovanni Felipe MD [facility]  Cardiology - Electrophysiology

## 2021-10-08 NOTE — PATIENT INSTRUCTIONS
You were seen in the Electrophysiology Clinic today by: Dr Felipe    Plan:       Labs/Tests Needed:    Nuclear Stress test      Follow up visit:    1 year with Yamilka Skinner NP      Prep for vin-nuc stress test    Report to the  [facility] Waiting Room at the [facility]. The hospital is located at [address] [place] [address] on [place].    This test can take up to 3 hours.    NPO 3 hours prior, Water is ok and encouraged  NO alcohol, smoking, caffeine, or decaf products 12 hours prior  TAKE ALL medications as prescribed        Your Care Team:  EP Cardiology   Telephone Number     Nurse Line  Meggan Brar RN  [phone]     For scheduling appts or procedures:    Yelitza Wright   [phone]   For the Device Clinic (Pacemakers, ICDs, Loop Recorders)    During business hours: [phone]  After business hours:   [phone]- select option 4 and ask for job code [id number].     On-call cardiologist for after hours or on weekends: [phone], option #4, and ask to speak to the on-call cardiologist.     Cardiovascular Clinic:   [address] [place] [address]. [place] [address]      As always, Thank you for trusting us with your health care needs!

## 2021-10-11 LAB
ATRIAL RATE - MUSE: 60 BPM
DIASTOLIC BLOOD PRESSURE - MUSE: NORMAL MMHG
INTERPRETATION ECG - MUSE: NORMAL
P AXIS - MUSE: 73 DEGREES
PR INTERVAL - MUSE: 168 MS
QRS DURATION - MUSE: 104 MS
QT - MUSE: 420 MS
QTC - MUSE: 420 MS
R AXIS - MUSE: -22 DEGREES
SYSTOLIC BLOOD PRESSURE - MUSE: NORMAL MMHG
T AXIS - MUSE: 29 DEGREES
VENTRICULAR RATE- MUSE: 60 BPM

## 2021-10-14 ENCOUNTER — THERAPY VISIT (OUTPATIENT)
Dept: PHYSICAL THERAPY | Facility: CLINIC | Age: 71
End: 2021-10-14
Attending: INTERNAL MEDICINE
Payer: COMMERCIAL

## 2021-10-14 DIAGNOSIS — M54.2 NECK PAIN: ICD-10-CM

## 2021-10-14 PROCEDURE — 97110 THERAPEUTIC EXERCISES: CPT | Mod: GP

## 2021-10-14 PROCEDURE — 97140 MANUAL THERAPY 1/> REGIONS: CPT | Mod: GP

## 2021-10-21 ENCOUNTER — THERAPY VISIT (OUTPATIENT)
Dept: PHYSICAL THERAPY | Facility: CLINIC | Age: 71
End: 2021-10-21
Attending: INTERNAL MEDICINE
Payer: COMMERCIAL

## 2021-10-21 DIAGNOSIS — M54.2 NECK PAIN: ICD-10-CM

## 2021-10-21 PROCEDURE — 97110 THERAPEUTIC EXERCISES: CPT | Mod: GP

## 2021-11-24 ENCOUNTER — OFFICE VISIT (OUTPATIENT)
Dept: URGENT CARE | Facility: URGENT CARE | Age: 71
End: 2021-11-24
Payer: COMMERCIAL

## 2021-11-24 VITALS
OXYGEN SATURATION: 97 % | BODY MASS INDEX: 24.14 KG/M2 | HEART RATE: 56 BPM | WEIGHT: 163 LBS | HEIGHT: 69 IN | TEMPERATURE: 97.2 F | RESPIRATION RATE: 15 BRPM | SYSTOLIC BLOOD PRESSURE: 122 MMHG | DIASTOLIC BLOOD PRESSURE: 60 MMHG

## 2021-11-24 DIAGNOSIS — M54.50 ACUTE RIGHT-SIDED LOW BACK PAIN WITHOUT SCIATICA: Primary | ICD-10-CM

## 2021-11-24 PROCEDURE — 99213 OFFICE O/P EST LOW 20 MIN: CPT | Performed by: PHYSICIAN ASSISTANT

## 2021-11-24 RX ORDER — HYDROCODONE BITARTRATE AND ACETAMINOPHEN 5; 325 MG/1; MG/1
1 TABLET ORAL EVERY 6 HOURS PRN
Qty: 6 TABLET | Refills: 0 | Status: SHIPPED | OUTPATIENT
Start: 2021-11-24 | End: 2021-11-27

## 2021-11-24 ASSESSMENT — MIFFLIN-ST. JEOR: SCORE: 1479.74

## 2021-11-24 NOTE — PROGRESS NOTES
URGENT CARE VISIT:    SUBJECTIVE:   Frank Orellana is a 71 year old male who presents for evaluation of back pain.  Symptoms began today, have been onset acute and are stable.  Pain is located in the low back right region, without radiation and are moderately severe.  Pain is exacerbated by: bending.  Pain is relieved by: none. Associated symptoms include: none. Denies any fever, unintentional weight loss,bladder urgency, bladder incontinence and bowel incontinence. Recent injury: he fell backwards on generator when the garage door cord tore.   Personal hx of back pain is recurrent self limited episodes of low back pain in the past.     PMH:   Past Medical History:   Diagnosis Date     Actinic keratosis 2009     Atrial fibrillation (H) 6/15/96    Afib - 2 or 3 extended occurrences since     Chronic hepatitis C (H)     Chronic Hepatitis C,  genotype 1b                Stage 0 Fibrosis     Dupuytren's contracture of both hands      Elevated PSA      Hepatitis B 1969    acute infection at age 19; IV drug use     WINNIE (obstructive sleep apnea)     AHI 34.2     Pain in both hands      Patient is Orthodoxy      Refusal of blood transfusions as patient is Orthodoxy      Right shoulder pain      Tinnitus 1 or 2 years ago    both ears     Tubular adenoma of colon 1/17/17    ascending colon, 1/17/17     Allergies: Blood transfusion related (informational only) and Contrast dye  Medications:   Current Outpatient Medications   Medication Sig Dispense Refill     aspirin 81 MG EC tablet Take 1 tablet (81 mg) by mouth daily 90 tablet 3     diltiazem ER COATED BEADS (CARDIZEM CD/CARTIA XT) 120 MG 24 hr capsule Take 1 capsule (120 mg) by mouth daily 90 capsule 3     flecainide (TAMBOCOR) 50 MG tablet Take 1 tablet (50 mg) by mouth 2 times daily 180 tablet 3     HYDROcodone-acetaminophen (NORCO) 5-325 MG tablet Take 1 tablet by mouth every 6 hours as needed for severe pain 6 tablet 0     tiZANidine (ZANAFLEX) 2 MG  "tablet Take 1-2 tablets (2-4 mg) by mouth 2 times daily as needed for muscle spasms (Patient not taking: Reported on 10/8/2021) 20 tablet 0     Social History:   Social History     Tobacco Use     Smoking status: Former Smoker     Packs/day: 0.50     Years: 10.00     Pack years: 5.00     Types: Cigarettes     Start date: 6/15/1964     Quit date: 1974     Years since quittin.4     Smokeless tobacco: Former User     Quit date: 1969   Substance Use Topics     Alcohol use: Yes     Alcohol/week: 3.0 - 4.0 standard drinks     Comment: 4 or 5 drinks/week, limit of 1       ROS: ROS otherwise found to be negative except as noted above.     OBJECTIVE:  /60   Pulse 56   Temp 97.2  F (36.2  C) (Temporal)   Resp 15   Ht 1.753 m (5' 9\")   Wt 73.9 kg (163 lb)   SpO2 97%   BMI 24.07 kg/m    General: WDWN in NAD.   Cardiac: RRR without murmurs, rubs, or gallops.  Respiratory: LCTAB without adventitious sounds. Non-labored breathing.  Musculoskeletal: Ambulating without difficulty. Very slow to stand from sitting. Back symmetric, no curvature. ROM reduced due to pain. No CVA tenderness. Tender to palpation over right paralumbar spine.   Neurological: Normal strength and tone with no weakness or sensory deficit noted, reflexes normal.     ASSESSMENT:    ICD-10-CM    1. Acute right-sided low back pain without sciatica  M54.50 HYDROcodone-acetaminophen (NORCO) 5-325 MG tablet         PLAN:  Patient Instructions   Patient was educated on the natural course of injury.  Conservative measures discussed including rest, ice for 48 hours then heat after, lidocaine patches, and over-the-counter analgesics (Tylenol). Take medication as directed. Side effects discussed. Do not take this medication at the same time as Tylenol. See your primary care provider if symptoms worsen or do not improve in 7 days.  Seek emergency care if you develop severe pain, weakness, or changes in bowel/bladder habits.     Patient verbalized " understanding and is agreeable to plan. The patient was discharged ambulatory and in stable condition.    Komal Ryan PA-C ....................  11/24/2021   3:21 PM

## 2021-11-24 NOTE — PATIENT INSTRUCTIONS
Patient was educated on the natural course of injury.  Conservative measures discussed including rest, ice for 48 hours then heat after, lidocaine patches, and over-the-counter analgesics (Tylenol). See your primary care provider if symptoms worsen or do not improve in 7 days.  Seek emergency care if you develop severe pain, weakness, or changes in bowel/bladder habits.

## 2021-12-01 ASSESSMENT — ENCOUNTER SYMPTOMS
MUSCLE WEAKNESS: 0
EYE IRRITATION: 0
EXERCISE INTOLERANCE: 0
NECK PAIN: 1
JOINT SWELLING: 0
MUSCLE CRAMPS: 0
HYPERTENSION: 0
SYNCOPE: 0
ORTHOPNEA: 0
HOARSE VOICE: 0
SLEEP DISTURBANCES DUE TO BREATHING: 0
TASTE DISTURBANCE: 0
SMELL DISTURBANCE: 0
ARTHRALGIAS: 0
BACK PAIN: 1
SINUS PAIN: 0
LIGHT-HEADEDNESS: 0
TROUBLE SWALLOWING: 0
EYE WATERING: 0
HYPOTENSION: 0
LEG PAIN: 0
PALPITATIONS: 1
MYALGIAS: 1
NECK MASS: 0
SINUS CONGESTION: 0
EYE REDNESS: 0
SORE THROAT: 0
EYE PAIN: 0
STIFFNESS: 0

## 2021-12-13 ENCOUNTER — OFFICE VISIT (OUTPATIENT)
Dept: INTERNAL MEDICINE | Facility: CLINIC | Age: 71
End: 2021-12-13
Payer: COMMERCIAL

## 2021-12-13 ENCOUNTER — PRE VISIT (OUTPATIENT)
Dept: UROLOGY | Facility: CLINIC | Age: 71
End: 2021-12-13

## 2021-12-13 VITALS
RESPIRATION RATE: 16 BRPM | BODY MASS INDEX: 24.73 KG/M2 | HEIGHT: 69 IN | HEART RATE: 52 BPM | OXYGEN SATURATION: 99 % | SYSTOLIC BLOOD PRESSURE: 133 MMHG | WEIGHT: 167 LBS | DIASTOLIC BLOOD PRESSURE: 77 MMHG

## 2021-12-13 DIAGNOSIS — Z12.11 SCREENING FOR COLON CANCER: ICD-10-CM

## 2021-12-13 DIAGNOSIS — G89.29 CHRONIC RIGHT-SIDED LOW BACK PAIN WITHOUT SCIATICA: ICD-10-CM

## 2021-12-13 DIAGNOSIS — Z00.00 ENCOUNTER FOR MEDICARE ANNUAL WELLNESS EXAM: Primary | ICD-10-CM

## 2021-12-13 DIAGNOSIS — I48.0 PAF (PAROXYSMAL ATRIAL FIBRILLATION) (H): ICD-10-CM

## 2021-12-13 DIAGNOSIS — N40.0 BENIGN PROSTATIC HYPERPLASIA WITHOUT LOWER URINARY TRACT SYMPTOMS: ICD-10-CM

## 2021-12-13 DIAGNOSIS — M54.50 CHRONIC RIGHT-SIDED LOW BACK PAIN WITHOUT SCIATICA: ICD-10-CM

## 2021-12-13 DIAGNOSIS — I48.0 PAROXYSMAL ATRIAL FIBRILLATION (H): ICD-10-CM

## 2021-12-13 DIAGNOSIS — M72.0 DUPUYTREN'S CONTRACTURE OF BOTH HANDS: ICD-10-CM

## 2021-12-13 DIAGNOSIS — N50.811 TESTICULAR PAIN, RIGHT: ICD-10-CM

## 2021-12-13 DIAGNOSIS — R09.82 POST-NASAL DRAINAGE: ICD-10-CM

## 2021-12-13 PROCEDURE — 99213 OFFICE O/P EST LOW 20 MIN: CPT | Mod: 25 | Performed by: INTERNAL MEDICINE

## 2021-12-13 PROCEDURE — G0439 PPPS, SUBSEQ VISIT: HCPCS | Performed by: INTERNAL MEDICINE

## 2021-12-13 ASSESSMENT — MIFFLIN-ST. JEOR: SCORE: 1497.89

## 2021-12-13 ASSESSMENT — PAIN SCALES - GENERAL: PAINLEVEL: NO PAIN (0)

## 2021-12-13 NOTE — PROGRESS NOTES
History of Present Illness:  Mr. Orellana is a 71 year old male who presents for  Chief Complaint   Patient presents with     Physical     Discuss hand and back issues     History of Jehovah's Witmess, BPH s/p TURP 8/20, PAF s/p DCCV, on flecanaide, WINNIE on CPAP, chronic hep C genotype 1b s/p harvoni treatment with SVR.    He has recurrent dupytrens, worse on R hand, painful, cannot fully extend finger  He also reports hurting back a few weeks ago, he was in garage, fell back wards, was seen in UC, got vicodin, some pain still, not taking anything currently, not limiting him    ROS notes pain in R testicle intermittently over the years, no lumps/bumps/swelling, no pain with urination  No using CPAP currently, sleeps on side, bladder/prostate okay, reports rare afib 10 sec x 3  Active with shoveling, [place] track    No concerns about vision/hearing/memory    Routine Health Maintenence:  PSA:   PSA   Date Value Ref Range Status   12/30/2020 3.30 0 - 4 ug/L Final     Comment:     Assay Method:  Chemiluminescence using Siemens Vista analyzer      AAA Screening (65-75 yrs):  11/16  Lung Ca Screening (>20 pk age 50-80): quit in 1974  Colonoscopy (45-75 yrs):  10/2017, f/u 5 years      Review of external notes as documented above                   A detailed Review of Systems was performed, verified and is negative except as documented in the HPI.  All health questionnaires were reviewed, verified and relevant information documented above.      [facility] Review Of Systems     Question 12/1/2021  8:28 AM CST - Filed by Patient    I understand that completing this form is intended to provide my doctor and/or care team with helpful information for my upcoming clinic visit. It is not to notify my doctor and/or care team of medical matters requiring urgent attention. If I have an urgent medical matter, I should call 911 or my doctor's office. Acknowledge    GENERAL HEALTH SYMPTOMS No    SKIN SYMPTOMS No    HEAD, EARS, NOSE AND THROAT  SYMPTOMS Yes    EYE SYMPTOMS Yes    HEART SYMPTOMS Yes    LUNG SYMPTOMS No    INTESTINAL SYMPTOMS No    URINARY SYMPTOMS No    REPRODUCTIVE SYMPTOMS No    SKELETAL SYMPTOMS Yes    BLOOD SYMPTOMS No    NERVOUS SYSTEM SYMPTOMS No    MENTAL HEALTH SYMPTOMS No      [facility] [place] Hent     Question 12/1/2021  8:28 AM CST - Filed by Patient    Ear pain No    Ear discharge No    Hearing loss No    Ringing in your ears Yes    Nosebleeds No    Congestion No    Sinus pain No    Trouble swallowing No     Voice hoarseness No    Mouth sores No    Sore throat No    Tooth pain No    Gum tenderness No    Bleeding gums No    Change in taste No    Change in sense of smell No    Dry mouth No    Hearing aid used No    Neck lump No      [facility] [place] Eye Symptoms     Question 12/1/2021  8:28 AM CST - Filed by Patient    Eye pain No    Vision loss No    Dry eyes No    Watery eyes No    Eye bulging No    Double vision       Flashing of lights No    Spots No    Floaters Yes    Redness No    Crossed eyes No    Tunnel Vision No    Yellowing of eyes No    Eye irritation No      [facility] [place] Heart And Circulation Symptoms     Question 12/1/2021  8:28 AM CST - Filed by Patient    Chest pain or pressure No    Fast or irregular heartbeat Yes    Pain in legs with walking No    Trouble breathing while lying down No    Fingers or toes appear blue No    High blood pressure No    Low blood pressure No    Fainting No    Murmurs No    Pacemaker No    Varicose veins No    Edema or swelling No    Wake up at night with shortness of breath No    Light-headedness No    Exercise intolerance No      [facility] [place] Bones-Joints-Muscles Symptoms     Question 12/1/2021  8:28 AM CST - Filed by Patient    Back pain Yes    Muscle aches Yes    Neck pain Yes    Swollen joints No    Joint pain No    Bone pain No    Muscle cramps No    Muscle weakness No    Joint stiffness No    Bone fracture No       General Health And Wellness (Daily Health Habits )     Question  12/1/2021  8:31 AM CST - Filed by Patient    Do you have a special diet?  None    Do you have a snack between meals or at bedtime?  No    How many servings of fruit do you consume daily? (1 serving = half cup) 0 - 2 Servings    How many servings of vegetables do you consume daily? (1 servings = half cup)  0 - 2 Servings    How many servings of high fiber or whole grain foods do you consume daily? (1 serving = 1 slice of whole wheat bread, 1 cup of whole grain cereal, or   cup brown rice or oatmeal)  0 - 2 Servings    How many servings of fried or high-fat foods do you consume daily? (Examples: fried chicken, potato chips, doughnuts)  0 - 2 servings    How many sugar-sweetened (not diet) beverages do you consume daily?  0 - 2 Servings    Do you get at least 3 servings of foods that have calcium each day (dairy, green leafy vegetables, etc)?   Yes    Do you take a Vitamin D supplement?  No    On average, how many days per week do you engage in moderate to strenuous exercise like a brisk walk? 0    Do you have any problems taking medications regularly?  No    How often is stress a problem for you in handling such things as your health, finances, relationships, or work?  Never or rarely    How often do you get the social and emotional support you need?  Always    In a typical week, how many times do you talk on the phone or get together with friends or family? often    In a typical week, how many times do you attend events like [employer] services, club meetings, or other organizational meetings? often    Do you have sleep apnea, excessive snoring or daytime drowsiness? (select all that apply) Sleep apnea    In the past 7 days, how many days did you drink alcohol? 5    On days when you drank alcohol, how often did you have 5 or more alcoholic drinks on one occasion? Never    Do you ever drive after drinking, or ride with a  who has been drinking?  No    Have you used a smokeless tobacco product? Yes    Have  you smoked 100 cigarettes or more in your entire life? Yes    Do you always fasten your seat belt when you are in a car?  Yes    Annual wellness visits     In general, would you say your health is: Fair    How would you describe the condition of your mouth and teeth - including false teeth or dentures? Fair    In the past 7 days, did you need help from others to perform everyday activities such as eating, getting dressed, grooming, bathing, walking, or using the toilet?  No    In the past 7 days, did you need help from others to take care of things such as laundry and housekeeping, banking, shopping, using the telephone, food preparation, transportation, or taking your own medications?  No      [facility] Smoking Follow Up     Question 12/1/2021  8:31 AM CST - Filed by Patient    How often do you smoke cigarettes? Not at all            Past Medical History:  Past Medical History:   Diagnosis Date     Actinic keratosis 2009     Atrial fibrillation (H) 6/15/96    Afib - 2 or 3 extended occurrences since     Chronic hepatitis C (H)     Chronic Hepatitis C,  genotype 1b                Stage 0 Fibrosis     Dupuytren's contracture of both hands      Elevated PSA      Hepatitis B 1969    acute infection at age 19; IV drug use     WINNIE (obstructive sleep apnea)     AHI 34.2     Pain in both hands      Patient is [employer]      Refusal of blood transfusions as patient is [employer]      Right shoulder pain      Tinnitus 1 or 2 years ago    both ears     Tubular adenoma of colon 1/17/17    ascending colon, 1/17/17       Past Surgical History:  Past Surgical History:   Procedure Laterality Date     ANESTHESIA CARDIOVERSION N/A 1/15/2018    Procedure: ANESTHESIA CARDIOVERSION;  Anesthesia Coverage Cardioversion With Transesophageal Echocardiogram @0930 ;  Surgeon: GENERIC ANESTHESIA PROVIDER;  [facility]     ANESTHESIA CARDIOVERSION N/A 10/11/2018    Procedure: ANESTHESIA CARDIOVERSION;   Cardioversion;  Surgeon: GENERIC ANESTHESIA PROVIDER;  [facility]     ANESTHESIA CARDIOVERSION N/A 11/28/2018    Procedure: Anesthesia Coverage Cardioversion @0830;  Surgeon: GENERIC ANESTHESIA PROVIDER;  [facility]     ARTHROSCOPY KNEE      left knee     COLONOSCOPY  1/17/17    tubular adenoma ascending colon     CYSTOSCOPY, TRANSURETHRAL RESECTION (TUR) PROSTATE, COMBINED N/A 8/21/2020    Procedure: CYSTOSCOPY, WITH TRANSURETHRAL RESECTION PROSTATE;  Surgeon: Roldan De La O MD;  [facility]     HERNIORRHAPHY INGUINAL Right 10/26/2017    Procedure: HERNIORRHAPHY INGUINAL;  Open Right Inguinal Hernia Repair with Mesh;  Surgeon: Suresh Raymond MD;  [facility]     KNEE SURGERY  2006??    torn meniscus     RELEASE DUPUYTRENS CONTRACTURE Right 1/20/2017    Procedure: RELEASE DUPUYTRENS CONTRACTURE;  Surgeon: Lars Oleary MD;  Location[facility]     RELEASE DUPUYTRENS CONTRACTURE Left 12/15/2017    Procedure: RELEASE DUPUYTRENS CONTRACTURE;  Left Ring and Middle Finger Subtotal Palmar Fasciectomy;  Surgeon: Lars Oleary MD;  [facility]     TRANSESOPHAGEAL ECHOCARDIOGRAM INTRAOPERATIVE N/A 1/15/2018    Procedure: TRANSESOPHAGEAL ECHOCARDIOGRAM INTRAOPERATIVE;;  Surgeon: GENERIC ANESTHESIA PROVIDER;  Location: [facility]       Active Meds:  Current Outpatient Medications   Medication     aspirin 81 MG EC tablet     diltiazem ER COATED BEADS (CARDIZEM CD/CARTIA XT) 120 MG 24 hr capsule     flecainide (TAMBOCOR) 50 MG tablet     tiZANidine (ZANAFLEX) 2 MG tablet     No current facility-administered medications for this visit.        Allergies:  Blood transfusion related (informational only) and Contrast dye    Family History:  family history includes Alcohol/Drug in his mother; Alcoholism in his father and mother; Arthritis in his paternal grandmother; Cardiac Sudden Death in his brother; Gastrointestinal Disease in his father; Heart Disease in his brother; Rheumatoid  "Arthritis in his paternal grandmother; Uterine Cancer in his sister.    Social History:  Social History     Tobacco Use     Smoking status: Former Smoker     Packs/day: 0.50     Years: 10.00     Pack years: 5.00     Types: Cigarettes     Start date: 6/15/1964     Quit date: 1974     Years since quittin.4     Smokeless tobacco: Former User     Quit date: 1969   Substance Use Topics     Alcohol use: Yes     Alcohol/week: 3.0 - 4.0 standard drinks     Comment: 4 or 5 drinks/week, limit of 1     Drug use: No     Comment: --history of marijuana       Physical Exam:  Vitals: /77 (BP Location: Right arm, Patient Position: Sitting, Cuff Size: Adult Regular)   Pulse 52   Resp 16   Ht 1.753 m (5' 9\")   Wt 75.8 kg (167 lb)   SpO2 99%   BMI 24.66 kg/m    Constitutional: Alert, oriented, pleasant, no acute distress  Head: Normocephalic, atraumatic  Eyes: Extra-ocular movements intact, pupils equally round and reactive bilaterally, no scleral icterus  ENT: Oropharynx clear, moist mucus membranes  Neck: Supple, no lymphadenopathy  Cardiovascular: Regular rate and rhythm, no murmurs, rubs or gallops, peripheral pulses full/symmetric  Respiratory: Good air movement bilaterally, lungs clear, no wheezes/rales/rhonchi  GI: Abdomen soft, bowel sounds present, nondistended, nontender, no organomegaly or masses, no rebound/guarding  : Declined by patient  Musculoskeletal: No edema, normal muscle tone, normal gait, subjective tenderness R lower flank around L1-4 dermatomes, no focal masses/abnormalities, R sided 5th and 3rd digit palmar contractures noted  Neurologic: Alert and oriented, cranial nerves 2-12 intact, grossly non-focal, cognition intact  Skin: No rashes/lesions, round pigmented nevus R forehead  Psychiatric: normal mentation, affect and mood      Diagnostics:  Labs reviewed in Epic          Assessment and Plan:  Frank was seen today for physical.    Diagnoses and all orders for this " visit:    Encounter for Medicare annual wellness exam  Routine health care reviewed and updated as appropriate.    Testicular pain, right  I recommended physical examination, possibly US.  He declined today and requests a Urology referral for further evaluation.  -     Adult Urology Referral    Screening for colon cancer  1 Adenoma, rec f/u in 5 years, due 1/22.  -     Adult Gastro Ref - Procedure Only; Future    Post-nasal drainage  Advised trial of non-sedating antihistamine, flonase.    Dupuytren's contracture of both hands  -     Orthopedic  Referral; Future    Benign prostatic hyperplasia without lower urinary tract symptoms  Much improved after TURP.    Paroxysmal atrial fibrillation (H)  Stable, follows with Cardiology. Rhythm controlled with flecainide.  Not currently on AC but may be indicated once patient turns 74 years old given elevated CHADsVASC score.    Patient is Latter day  Patient provided me with additional literature.    Lumbago  C/w lumbar strain. Rec PT, stretching, heat, OTC APAP as needed.      Yani Burnett MD  Internal Medicine            Answers for HPI/ROS submitted by the patient on 12/1/2021  General Symptoms: No  Skin Symptoms: No  HENT Symptoms: Yes  EYE SYMPTOMS: Yes  HEART SYMPTOMS: Yes  LUNG SYMPTOMS: No  INTESTINAL SYMPTOMS: No  URINARY SYMPTOMS: No  REPRODUCTIVE SYMPTOMS: No  SKELETAL SYMPTOMS: Yes  BLOOD SYMPTOMS: No  NERVOUS SYSTEM SYMPTOMS: No  MENTAL HEALTH SYMPTOMS: No  Ear pain: No  Ear discharge: No  Hearing loss: No  Tinnitus: Yes  Nosebleeds: No  Congestion: No  Sinus pain: No  Trouble swallowing: No   Voice hoarseness: No  Mouth sores: No  Sore throat: No  Tooth pain: No  Gum tenderness: No  Bleeding gums: No  Change in taste: No  Change in sense of smell: No  Dry mouth: No  Hearing aid used: No  Neck lump: No  Eye pain: No  Vision loss: No  Dry eyes: No  Watery eyes: No  Eye bulging: No  Flashing of lights: No  Spots: No  Floaters: Yes  Redness:  No  Crossed eyes: No  Tunnel Vision: No  Yellowing of eyes: No  Eye irritation: No  Chest pain or pressure: No  Fast or irregular heartbeat: Yes  Pain in legs with walking: No  Trouble breathing while lying down: No  Fingers or toes appear blue: No  High blood pressure: No  Low blood pressure: No  Fainting: No  Murmurs: No  Pacemaker: No  Varicose veins: No  Edema or swelling: No  Wake up at night with shortness of breath: No  Light-headedness: No  Exercise intolerance: No  Back pain: Yes  Muscle aches: Yes  Neck pain: Yes  Swollen joints: No  Joint pain: No  Bone pain: No  Muscle cramps: No  Muscle weakness: No  Joint stiffness: No  Bone fracture: No

## 2021-12-13 NOTE — NURSING NOTE
Frank Orellana is a 71 year old male patient that presents today in clinic for the following:    Chief Complaint   Patient presents with     Physical     Discuss hand and back issues     The patient's allergies and medications were reviewed as noted. A set of vitals were recorded as noted without incident. The patient does not have any other questions for the provider.    Ekta Olivares, EMT at 7:54 AM on 12/13/2021

## 2021-12-13 NOTE — TELEPHONE ENCOUNTER
MEDICAL RECORDS REQUEST   [facility]  [facility] Clinic  [address] [place] [address]  PHONE: [phone]  Fax: [phone]        FUTURE VISIT INFORMATION                                                   Frank Hilario Esteban, : 1950 scheduled for future visit at [facility]    APPOINTMENT INFORMATION:    Date: 2021    Provider:  Karina Lopez PA    Reason for Visit/Diagnosis: testicular pain    REFERRAL INFORMATION:    Referring provider:  Yani Burnett MD    Specialty: N/A    Referring providers clinic[place]  [facility] [place] [facility]    Clinic contact number:  N/A    RECORDS REQUESTED FOR VISIT                                                     NOTES  STATUS/DETAILS   OFFICE NOTE from referring provider  yes, 2021 -- Yani Burnett MD in [facility] [place]   OFFICE NOTE from other specialist  no   DISCHARGE SUMMARY from hospital  no   DISCHARGE REPORT from the ER  no   OPERATIVE REPORT  no   MEDICATION LIST  yes   LABS     URINALYSIS (UA)  no   URINE CYTOLOGY  no     PRE-VISIT CHECKLIST      Record collection complete Yes   Appointment appropriately scheduled           (right time/right provider) Yes   Joint diagnostic appointment coordinated correctly          (ensure right order & amount of time) Yes   MyChart activation yes   Questionnaire complete If no, please explain pending

## 2021-12-13 NOTE — PATIENT INSTRUCTIONS
Patient Education   Personalized Prevention Plan  You are due for the preventive services outlined below.  Your care team is available to assist you in scheduling these services.  If you have already completed any of these items, please share that information with your care team to update in your medical record.  Health Maintenance Due   Topic Date Due     URINE DRUG SCREEN  Never done     Hepatitis B Vaccine (1 of 3 - Risk 3-dose series) Never done     FALL RISK ASSESSMENT  11/21/2020     Preventive Health Recommendations  See your health care provider every year to    Review health changes.     Discuss preventive care.      Review your medicines if your doctor has prescribed any.    Talk with your health care provider about whether you should have a test to screen for prostate cancer (PSA).    Every 3 years, have a diabetes test (fasting glucose). If you are at risk for diabetes, you should have this test more often.    Every 5 years, have a cholesterol test. Have this test more often if you are at risk for high cholesterol or heart disease.     Every 10 years, have a colonoscopy. Or, have a yearly FIT test (stool test). These exams will check for colon cancer.    Talk to with your health care provider about screening for Abdominal Aortic Aneurysm if you have a family history of AAA or have a history of smoking.    Shots:     Get a flu shot each year.     Get a tetanus shot every 10 years.     Talk to your doctor about your pneumonia vaccines. There are now two you should receive - Pneumovax (PPSV 23) and Prevnar (PCV 13).    Talk to your pharmacist about a shingles vaccine.     Talk to your doctor about the hepatitis B vaccine.    Nutrition:     Eat at least 5 servings of fruits and vegetables each day.     Eat whole-grain bread, whole-wheat pasta and brown rice instead of white grains and rice.     Get adequate Calcium and Vitamin D.     Lifestyle    Exercise for at least 150 minutes a week (30 minutes a day,  5 days a week). This will help you control your weight and prevent disease.     Limit alcohol to one drink per day.     No smoking.     Wear sunscreen to prevent skin cancer.     See your dentist every six months for an exam and cleaning.     See your eye doctor every 1 to 2 years to screen for conditions such as glaucoma, macular degeneration and cataracts.    Personalized Prevention Plan  You are due for the preventive services outlined below.  Your care team is available to assist you in scheduling these services.  If you have already completed any of these items, please share that information with your care team to update in your medical record.  Health Maintenance   Topic Date Due     URINE DRUG SCREEN  Never done     HEPATITIS B IMMUNIZATION (1 of 3 - Risk 3-dose series) Never done     FALL RISK ASSESSMENT  11/21/2020     MEDICARE ANNUAL WELLNESS VISIT  12/13/2022     LIPID  01/05/2024     ADVANCE CARE PLANNING  08/21/2025     COLORECTAL CANCER SCREENING  01/17/2027     DTAP/TDAP/TD IMMUNIZATION (3 - Td or Tdap) 05/02/2029     HEPATITIS C SCREENING  Completed     PHQ-2  Completed     INFLUENZA VACCINE  Completed     Pneumococcal Vaccine: 65+ Years  Completed     ZOSTER IMMUNIZATION  Completed     AORTIC ANEURYSM SCREENING (SYSTEM ASSIGNED)  Completed     COVID-19 Vaccine  Completed     IPV IMMUNIZATION  Aged Out     MENINGITIS IMMUNIZATION  Aged Out

## 2021-12-15 RX ORDER — DILTIAZEM HYDROCHLORIDE 120 MG/1
CAPSULE, COATED, EXTENDED RELEASE ORAL
Qty: 90 CAPSULE | Refills: 0 | Status: SHIPPED | OUTPATIENT
Start: 2021-12-15 | End: 2022-01-24

## 2021-12-15 NOTE — TELEPHONE ENCOUNTER
FLECAINIDE ACETATE TABS 50MG      Last Written Prescription Date:  1-6-21  Last Fill Quantity: 180,   # refills: 3  Last Office Visit : 10-8-21  Future Office visit:  none    Routing refill request to provider for review/approval because:  Drug not on protocol          DILTIAZEM ER (CD) CAPS 120MG      Last Written Prescription Date:  1-6-21  Last Fill Quantity: 90,   # refills: 3  overdue lab: ALT,  EVONNEI to clinic  RF 90 day

## 2021-12-16 ENCOUNTER — MYC MEDICAL ADVICE (OUTPATIENT)
Dept: CARDIOLOGY | Facility: CLINIC | Age: 71
End: 2021-12-16

## 2021-12-16 ENCOUNTER — MYC REFILL (OUTPATIENT)
Dept: CARDIOLOGY | Facility: CLINIC | Age: 71
End: 2021-12-16

## 2021-12-16 DIAGNOSIS — Z51.81 ENCOUNTER FOR MONITORING FLECAINIDE THERAPY: Primary | ICD-10-CM

## 2021-12-16 DIAGNOSIS — I48.0 PAF (PAROXYSMAL ATRIAL FIBRILLATION) (H): ICD-10-CM

## 2021-12-16 DIAGNOSIS — I48.0 PAROXYSMAL ATRIAL FIBRILLATION (H): ICD-10-CM

## 2021-12-16 DIAGNOSIS — Z79.899 ENCOUNTER FOR MONITORING FLECAINIDE THERAPY: Primary | ICD-10-CM

## 2021-12-16 RX ORDER — DILTIAZEM HYDROCHLORIDE 120 MG/1
120 CAPSULE, COATED, EXTENDED RELEASE ORAL DAILY
Qty: 90 CAPSULE | Refills: 0 | Status: CANCELLED | OUTPATIENT
Start: 2021-12-16

## 2021-12-16 RX ORDER — FLECAINIDE ACETATE 50 MG/1
TABLET ORAL
Qty: 180 TABLET | Refills: 3 | Status: SHIPPED | OUTPATIENT
Start: 2021-12-16 | End: 2022-03-14

## 2021-12-17 ENCOUNTER — LAB (OUTPATIENT)
Dept: LAB | Facility: CLINIC | Age: 71
End: 2021-12-17
Attending: INTERNAL MEDICINE
Payer: COMMERCIAL

## 2021-12-17 DIAGNOSIS — I48.0 PAROXYSMAL ATRIAL FIBRILLATION (H): ICD-10-CM

## 2021-12-17 DIAGNOSIS — Z79.899 ENCOUNTER FOR MONITORING FLECAINIDE THERAPY: ICD-10-CM

## 2021-12-17 DIAGNOSIS — Z51.81 ENCOUNTER FOR MONITORING FLECAINIDE THERAPY: ICD-10-CM

## 2021-12-17 LAB
ALBUMIN SERPL-MCNC: 3.9 G/DL (ref 3.4–5)
ALP SERPL-CCNC: 78 U/L (ref 40–150)
ALT SERPL W P-5'-P-CCNC: 16 U/L (ref 0–70)
ANION GAP SERPL CALCULATED.3IONS-SCNC: 5 MMOL/L (ref 3–14)
AST SERPL W P-5'-P-CCNC: 11 U/L (ref 0–45)
BILIRUB SERPL-MCNC: 0.9 MG/DL (ref 0.2–1.3)
BUN SERPL-MCNC: 13 MG/DL (ref 7–30)
CALCIUM SERPL-MCNC: 8.4 MG/DL (ref 8.5–10.1)
CHLORIDE BLD-SCNC: 109 MMOL/L (ref 94–109)
CO2 SERPL-SCNC: 30 MMOL/L (ref 20–32)
CREAT SERPL-MCNC: 0.87 MG/DL (ref 0.66–1.25)
GFR SERPL CREATININE-BSD FRML MDRD: 86 ML/MIN/1.73M2
GLUCOSE BLD-MCNC: 94 MG/DL (ref 70–99)
POTASSIUM BLD-SCNC: 3.4 MMOL/L (ref 3.4–5.3)
PROT SERPL-MCNC: 6.9 G/DL (ref 6.8–8.8)
SODIUM SERPL-SCNC: 144 MMOL/L (ref 133–144)

## 2021-12-17 PROCEDURE — 36415 COLL VENOUS BLD VENIPUNCTURE: CPT | Performed by: PATHOLOGY

## 2021-12-17 PROCEDURE — 80053 COMPREHEN METABOLIC PANEL: CPT | Performed by: PATHOLOGY

## 2021-12-23 NOTE — PROGRESS NOTES
Discharge Note    Progress reporting period is from last progress note on   to Oct 21, 2021.    Frank cancelled his last therapy appointment, per pt reported he was doing well and did not need further follow up.  Please see information below for last relevant information on current status.  Patient seen for 3 visits.    SUBJECTIVE  Subjective changes noted by patient:  Pt reports that hasn't had any episodes of increased neck pain or pain into shoulder. Notices his pain most when lying down on his side.   .  Current pain level is 0/10.     Previous pain level was  1/10.   Changes in function:  Yes (See Goal flowsheet attached for changes in current functional level)  Adverse reaction to treatment or activity: None    OBJECTIVE  Changes noted in objective findings: Same rotation motion as last week.      ASSESSMENT/PLAN      Updated problem list and treatment plan:   Decreased ROM/flexibility - HEP  Decreased strength - HEP  STG/LTGs have been met or progress has been made towards goals:  Yes, please see goal flowsheet for most current information  Assessment of Progress: current status is unknown.    Last current status: Pt is progressing well   Self Management Plans:  HEP  I have re-evaluated this patient and find that the nature, scope, duration and intensity of the therapy is appropriate for the medical condition of the patient.  Frank continues to require the following intervention to meet STG and LTG's:  HEP.    Recommendations:  Discharge with current home program.  Patient to follow up with MD as needed.    Please refer to the daily flowsheet for treatment today, total treatment time and time spent performing 1:1 timed codes.

## 2022-01-04 ENCOUNTER — TELEPHONE (OUTPATIENT)
Dept: GASTROENTEROLOGY | Facility: CLINIC | Age: 72
End: 2022-01-04
Payer: COMMERCIAL

## 2022-01-04 NOTE — TELEPHONE ENCOUNTER
Screening Questions  1. Are you active on mychart? Y    2. What insurance is in the chart? [employer]    2.  Ordering/Referring Provider: Logeais    3. BMI 24.4, If greater than 40 review exclusion criteria also will need EXTENDED PREP    4.  Respiratory Screening (If yes to any of the following Hospital setting only):     Do you use daily home oxygen? N  Do you have mod to severe Obstructive Sleep Apnea? Yes-does not use the cpap   Do you have Pulmonary Hypertension? N   Do you have UNCONTROLLED asthma? N    5. Have you had a heart or lung transplant (If yes, please review exclusion criteria) ? N    6. Are you currently on dialysis or have chronic kidney disease? N    7. Have you had a stroke or Transient ischemic attack (TIA) within 6 months? N    8. In the past 6 months, have you had any heart related issues including cardiomyopathy or heart attack? N                 If yes, did it require cardiac stenting or other implantable device?N      9. Do you have any implantable devices in your body (pacemaker, defib, LVAD)? N    10. Do you take nitroglycerin? If yes, how often? N    11. Are you currently taking any blood thinners?N    12. Are you a diabetic? N    13. (Females) Are you currently pregnant? NA  If yes, how many weeks?      15. Are you taking any prescription pain medications on a routine schedule? N If yes, MAC sedation and patient will need EXTENDED PREP.    16. Do you have any chemical dependencies such as alcohol, street drugs, or methadone? N If yes, MAC sedation.    17. Do you have any history of post-traumatic stress syndrome, severe anxiety or history of psychosis? N    18. Do you transfer independently? Yes    19.  Do you have any issues with constipation? N   If yes, pt will need EXTENDED PREP     20. Preferred Pharmacy for Pre Prescription [id number] - [place], [place] - [address]    Scheduling Details    Which Colonoscopy Prep was Sent?: Miralax  Type of  Procedure Scheduled: Colon  Surgeon: Rodney  Date of Procedure: 2/7/22  Location: [facility]  Caller (Please ask for phone number if not scheduled by patient): Frank      Sedation Type: CS  Conscious Sedation- Needs  for 6 hours after the procedure  MAC/General-Needs  for 24 hours after procedure    Pre-op Required at [facility], [place], [place] and OR for MAC sedation: NA  (if yes advise patient they will need a pre-op prior to procedure)      Is patient on blood thinners? -N (If yes- inform patient to follow up with PCP or provider for follow up instructions)     Informed patient they will need an adult  Yes  Cannot take any type of public or medical transportation alone    Pre-Procedure Covid test to be completed at [facility] or Externally: Patricia Madrid 2/3/22    Confirmed Nurse will call to complete assessment Yes    Additional comments:

## 2022-01-17 ENCOUNTER — TELEPHONE (OUTPATIENT)
Dept: GASTROENTEROLOGY | Facility: CLINIC | Age: 72
End: 2022-01-17
Payer: COMMERCIAL

## 2022-01-17 NOTE — TELEPHONE ENCOUNTER
Caller: Frank Orellana    Procedure: Colon    Date, Location, and Surgeon of Procedure Cancelled: 2/7/22 [facility]    Ordering Provider:Hortencia    Reason for cancel (please be detailed, any staff messages or encounters to note?): wants to wait until Covid 19 is not so bad he will call back to reschedule        Rescheduled:      If rescheduled:    Date:    Location:    Note any change or update to original order/sedation:

## 2022-01-24 ENCOUNTER — MYC REFILL (OUTPATIENT)
Dept: CARDIOLOGY | Facility: CLINIC | Age: 72
End: 2022-01-24
Payer: COMMERCIAL

## 2022-01-24 DIAGNOSIS — I48.0 PAF (PAROXYSMAL ATRIAL FIBRILLATION) (H): ICD-10-CM

## 2022-01-24 RX ORDER — DILTIAZEM HYDROCHLORIDE 120 MG/1
120 CAPSULE, COATED, EXTENDED RELEASE ORAL DAILY
Qty: 90 CAPSULE | Refills: 3 | Status: SHIPPED | OUTPATIENT
Start: 2022-01-24 | End: 2022-11-02

## 2022-01-24 NOTE — TELEPHONE ENCOUNTER
Last Clinic Visit: 10/8/2021  [facility]

## 2022-02-17 PROBLEM — Z71.89 ACP (ADVANCE CARE PLANNING): Chronic | Status: RESOLVED | Noted: 2017-01-26 | Resolved: 2020-08-21
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
## 2022-03-23 ENCOUNTER — E-VISIT (OUTPATIENT)
Dept: INTERNAL MEDICINE | Facility: CLINIC | Age: 72
End: 2022-03-23
Payer: COMMERCIAL

## 2022-03-23 DIAGNOSIS — B19.10 HEPATITIS B INFECTION WITHOUT DELTA AGENT WITHOUT HEPATIC COMA, UNSPECIFIED CHRONICITY: Primary | ICD-10-CM

## 2022-03-23 PROCEDURE — 99207 PR NON-BILLABLE SERV PER CHARTING: CPT | Performed by: INTERNAL MEDICINE

## 2022-04-06 ENCOUNTER — ALLIED HEALTH/NURSE VISIT (OUTPATIENT)
Dept: FAMILY MEDICINE | Facility: CLINIC | Age: 72
End: 2022-04-06
Payer: COMMERCIAL

## 2022-04-06 DIAGNOSIS — Z23 HIGH PRIORITY FOR 2019-NCOV VACCINE: Primary | ICD-10-CM

## 2022-04-06 PROCEDURE — 0054A COVID-19,PF,PFIZER (12+ YRS): CPT

## 2022-04-06 PROCEDURE — 91305 COVID-19,PF,PFIZER (12+ YRS): CPT

## 2022-04-06 NOTE — NURSING NOTE
Clinic Administered Medication Documentation          Injectable Medication Documentation    Patient was given pfizer booster'. Prior to medication administration, verified patients identity using patient s name and date of birth. Please see MAR and medication order for additional information. Patient instructed to remain in clinic for 15 minutes.      Was entire vial of medication used? Yes  Vial/Syringe: Multi dose vial    Was this medication supplied by the patient? No     Zenobia Venegas CMA

## 2022-04-10 NOTE — PLAN OF CARE
"      VS:   /56 (BP Location: Right arm)   Pulse 58   Temp 98.2  F (36.8  C) (Oral)   Resp 16   Ht 1.753 m (5' 9.02\")   Wt 78.7 kg (173 lb 8 oz)   SpO2 97%   BMI 25.61 kg/m    LS clear   Output:   Gomez d/lori, pt voiding frequently- see chart. Urine adriana colored.    Activity:   Up indpedendently, steady on feet.    Skin: intact   Pain:   Pt denies pain   Neuro/CMS:   A&Ox4/ CMS/neuros intact.    Dressing(s):   No dressings in place   Diet:   Regular diet, tolerating well   LDA:   PIV removed, gomez removed   Equipment:   PCDs   Plan:   Discharge home   Additional Info:   All discharge paperwork reviewed with pt and all questions answered, pt discharged unit around 1600.        " No

## 2022-09-01 ENCOUNTER — MYC MEDICAL ADVICE (OUTPATIENT)
Dept: CARDIOLOGY | Facility: CLINIC | Age: 72
End: 2022-09-01

## 2022-09-01 DIAGNOSIS — I48.0 PAF (PAROXYSMAL ATRIAL FIBRILLATION) (H): ICD-10-CM

## 2022-09-02 RX ORDER — FLECAINIDE ACETATE 50 MG/1
TABLET ORAL
Qty: 180 TABLET | Refills: 3 | Status: SHIPPED | OUTPATIENT
Start: 2022-09-02 | End: 2022-11-02

## 2022-09-06 ENCOUNTER — TELEPHONE (OUTPATIENT)
Dept: CARDIOLOGY | Facility: CLINIC | Age: 72
End: 2022-09-06

## 2022-09-06 NOTE — TELEPHONE ENCOUNTER
----- Message from Deepa Caballero RN sent at 9/2/2022  9:21 AM CDT -----  Please reach out to pt and assist with scheduling an appointment in October sometime with Yamilka as well as a Lexiscan stress test that was ordered last year.     Thanks  Deepa

## 2022-10-03 ENCOUNTER — HOSPITAL ENCOUNTER (OUTPATIENT)
Dept: NUCLEAR MEDICINE | Facility: CLINIC | Age: 72
Setting detail: NUCLEAR MEDICINE
Discharge: HOME | End: 2022-10-03
Attending: INTERNAL MEDICINE
Payer: COMMERCIAL

## 2022-10-03 ENCOUNTER — HOSPITAL ENCOUNTER (OUTPATIENT)
Dept: CARDIOLOGY | Facility: CLINIC | Age: 72
Discharge: HOME | End: 2022-10-03
Attending: INTERNAL MEDICINE
Payer: COMMERCIAL

## 2022-10-03 DIAGNOSIS — Z51.81 ENCOUNTER FOR MONITORING FLECAINIDE THERAPY: ICD-10-CM

## 2022-10-03 DIAGNOSIS — R07.9 CHEST PAIN, UNSPECIFIED TYPE: ICD-10-CM

## 2022-10-03 DIAGNOSIS — Z79.899 ENCOUNTER FOR MONITORING FLECAINIDE THERAPY: ICD-10-CM

## 2022-10-03 LAB
CV STRESS MAX HR HE: 82
RATE PRESSURE PRODUCT: 8692
STRESS ECHO BASELINE DIASTOLIC HE: 70
STRESS ECHO BASELINE HR: 53 BPM
STRESS ECHO BASELINE SYSTOLIC BP: 105
STRESS ECHO CALCULATED PERCENT HR: 56 %
STRESS ECHO LAST STRESS DIASTOLIC BP: 61
STRESS ECHO LAST STRESS SYSTOLIC BP: 106
STRESS ECHO TARGET HR: 147

## 2022-10-03 PROCEDURE — A9502 TC99M TETROFOSMIN: HCPCS | Performed by: INTERNAL MEDICINE

## 2022-10-03 PROCEDURE — 78452 HT MUSCLE IMAGE SPECT MULT: CPT | Mod: 26 | Performed by: INTERNAL MEDICINE

## 2022-10-03 PROCEDURE — 250N000011 HC RX IP 250 OP 636: Performed by: INTERNAL MEDICINE

## 2022-10-03 PROCEDURE — 93016 CV STRESS TEST SUPVJ ONLY: CPT | Performed by: STUDENT IN AN ORGANIZED HEALTH CARE EDUCATION/TRAINING PROGRAM

## 2022-10-03 PROCEDURE — 93018 CV STRESS TEST I&R ONLY: CPT | Performed by: INTERNAL MEDICINE

## 2022-10-03 PROCEDURE — 343N000001 HC RX 343: Performed by: INTERNAL MEDICINE

## 2022-10-03 PROCEDURE — 93017 CV STRESS TEST TRACING ONLY: CPT

## 2022-10-03 PROCEDURE — 78452 HT MUSCLE IMAGE SPECT MULT: CPT

## 2022-10-03 RX ORDER — ALBUTEROL SULFATE 90 UG/1
2 AEROSOL, METERED RESPIRATORY (INHALATION) EVERY 5 MIN PRN
Status: DISCONTINUED | OUTPATIENT
Start: 2022-10-03 | End: 2022-10-04 | Stop reason: HOSPADM

## 2022-10-03 RX ORDER — REGADENOSON 0.08 MG/ML
0.4 INJECTION, SOLUTION INTRAVENOUS ONCE
Status: COMPLETED | OUTPATIENT
Start: 2022-10-03 | End: 2022-10-03

## 2022-10-03 RX ORDER — ACYCLOVIR 200 MG/1
0-1 CAPSULE ORAL
Status: DISCONTINUED | OUTPATIENT
Start: 2022-10-03 | End: 2022-10-04 | Stop reason: HOSPADM

## 2022-10-03 RX ORDER — CAFFEINE CITRATE 20 MG/ML
60 SOLUTION INTRAVENOUS
Status: DISCONTINUED | OUTPATIENT
Start: 2022-10-03 | End: 2022-10-04 | Stop reason: HOSPADM

## 2022-10-03 RX ORDER — AMINOPHYLLINE 25 MG/ML
50-100 INJECTION, SOLUTION INTRAVENOUS
Status: DISCONTINUED | OUTPATIENT
Start: 2022-10-03 | End: 2022-10-04 | Stop reason: HOSPADM

## 2022-10-03 RX ADMIN — TETROFOSMIN 39 MCI.: 1.38 INJECTION, POWDER, LYOPHILIZED, FOR SOLUTION INTRAVENOUS at 09:41

## 2022-10-03 RX ADMIN — REGADENOSON 0.4 MG: 0.08 INJECTION, SOLUTION INTRAVENOUS at 09:36

## 2022-10-03 RX ADMIN — TETROFOSMIN 11 MCI.: 1.38 INJECTION, POWDER, LYOPHILIZED, FOR SOLUTION INTRAVENOUS at 08:45

## 2022-10-30 ENCOUNTER — HEALTH MAINTENANCE LETTER (OUTPATIENT)
Age: 72
End: 2022-10-30

## 2022-11-01 NOTE — PROGRESS NOTES
"Frank is a 72 year old who is being evaluated via a billable video visit.      How would you like to obtain your AVS? MyChart  If the video visit is dropped, the invitation should be resent by: Send to e-mail at: [email or web address]  Will anyone else be joining your video visit? No      Laina Chapman, Visit [place]/MA.          ELECTROPHYSIOLOGY VIRTUAL CLINIC VISIT  Mr. Frank Orellana is a 72 year old male who is being evaluated via a billable video visit.      The patient has been notified of following:     \"This video visit will be conducted via a call between you and your physician/provider. We have found that certain health care needs can be provided without the need for an in-person physical exam.  This service lets us provide the care you need with a video conversation.  If a prescription is necessary we can send it directly to your pharmacy.  If lab work is needed we can place an order for that and you can then stop by our lab to have the test done at a later time.    If during the course of the call the physician/provider feels a video visit is not appropriate, you will not be charged for this service.\"     Physician/provider has received verbal consent for a Video Visit from the patient? Yes    Assessment/Recommendations   Assessment/Plan:    Ms. Orellana is a 72 year old Hoahaoism male who has a past medical history significant for PAF (CHADSVASC 1) s/p DCCVs, chronic hepatitis C, hepatitis B, WINNIE (uses CPAP), tubular adenoma of colon, and tobacco use.    Paroxsymal Atrial Fibrillation:  We discussed in detail with the patient management/treatment options for A.fib includin. Stroke Prophylaxis:  CHADSVASC=+age  1, corresponding to a 1.3% annual stroke / systemic emolism event rate. He is on ASA only and only uses DOAC surrounding DCCVs.   2. Rate Control: Continue Diltiazem.   3. Rhythm Control: He has had several DCCVs, He failed Sotalol. He has now been on Flecainide now and doing " well.  4. Risk Factor Management: maintain good BP control and continued WINNIE treatment with CPAP.        Follow up 1 year or sooner if need arises.        History of Present Illness/Subjective    Mr. Frank Orellana is a 72 year old male who comes in today for EP follow-up of PAF.    Ms. Orellana is a 72 year old Alevism male who has a past medical history significant for PAF (CHADSVASC 1) s/p DCCVs, chronic hepatitis C, hepatitis B, WINNIE (uses CPAP), tubular adenoma of colon, and tobacco use.     At our initial visit in 12/2016 he had reported ~20 years of paroxysmal atrial fibrillation manifesting as chest pressure and palpitations. The first episode occurred during a picnic and converted spontaneously to sinus rhythm.  He took metoprolol for 30 days.  Approximately 10 years later he had a second episode in the setting of a meniscal tear and perhaps 2 or 3 more episodes in the intervening years until his palpitations became more frequent in the year leading up to his most recent presentation.  A 48-hour Holter monitor which showed variation between sinus rhythm, junctional rhythm, and A.fib (average HR 63, range ) with 1% PVCs and <1% supraventricular ectopic beats, with 33 runs of PAT vs. A.fib (longest 10 beats at 164 bpm). He then presented to the ED on 12/2/16 for palpitations and was in A.fib with RVR~111 bpm, started on diltiazem and ASA 81 mg daily, and discharged. He had recurrent episode of AF end of 12/2017 that manifested as palpitations, SOB and fatigue. He felt the symptoms for about 10 days prior and was seen by Dr. Hussein on 1/6/18. At this time he was noted to be in symptomatic AF (rate controlled), and was scheduled to undergo JIM/DCCV, which was performed on 1/15/18. He was on Dabigatran for 4 weeks following the DCCV and his ASA was held. He last saw Dr. Felipe in EP clinic in 2/2018 and was doing well. More recently, he called reporting he was back in AF. He was started on  "Pradaxa within 1.5 days of his symptoms. He was then arranged for DCCV. He then had DCCV on 10/11/18. He had recurrence and required another DCCV on 11/28/18. He followed up in EP clinic on 12/27/18 and was feeling well without issues. He presented [facility] with racing heart sensation associated with diaphoresis and mild chest discomfort and lightheadedness c/w prior AF symptoms. Found to be in AF. He states he has had a few other AF symptoms recently and feels they are growing in frequency/severity. His AF did spontaneously terminated when in ER. He was initiated on Sotalol during this admission by EP.     EP visit 4/24/19: He presents today for follow-up after having initiated sotalol and cardioverted in ER 1/18/19. He reports no recurrent sustained palpitations. He does have intermittent, infrequent, short-lived palpitations which are not bothersome (and corroborated with PAC's / PVC's on recent Zio). Recent Holter shows no atrial fibrillation but run of AT (13 seconds). He does report, and his wife agrees, new onset significant fatigue since initiating sotalol. Now taking regular naps and just not doing usual enjoyable activities as long as he is used to. EKG: VR 47 bpm,  ms,  ms, QTc 421 ms. Sotalol 80 mg BID, asa 81 mg. Given recurrences Sotalol was stopped and Flecainide was started.       EP Vist 7/24/19: After developing symptoms of a URI last week, felt his heart \"beat out of rhythm\" on Monday evening. Call ahead to clinic was scheduled for Cardioversion, however on his way to hospital, he went out of afib and improved. Since then denies any symptoms of lightheadedness and dizziness. Per discussion with patient, did not start on Pradaxa and is still on ASA today. Denies chest pain, SOB, lightheadedness and dizziness today.       EP clinic On 10/08/21:He presents today for follow up. He is complaining of atypical chest pain occurred started 2 weeks ago, pain is aching, mainly in the left " "side of the chest and occurred for 36 hours continuous, moderate in intensity, no lightheaded or diaphoresis accompaning the pain , no LOC, no lower extremity edema. He has been on his dofetilide taking it twice a day without any more episodes of ER visits or RVR, pt is on ASA no anticoagulation. Presenting 12 lead ECG shows Vent Rate 60 bpm,  ms,  ms, QTc 441 ms. Current cardiac medications include: Flecainide, Diltiazem, and ASA.     He reports feeling well. He has had some rare palpitations lasting seconds over the last year. No known sustained AF events. He denies chest discomfort, abdominal fullness/bloating or peripheral edema, shortness of breath, paroxysmal nocturnal dyspnea, orthopnea, lightheadedness, dizziness, pre-syncope, or syncope. Current cardiac medications include: Flecainide, Diltiazem, and ASA.         I have reviewed and updated the patient's Past Medical History, Social History, Family History and Medication List.     Cardiographics (Personally Reviewed) :   Echo 1/2019:  Interpretation Summary  Global and regional left ventricular function is normal with an EF of 55-60%.  No regional wall motion abnormalities are seen.  Right ventricular function, chamber size, wall motion, and thickness are  normal.  Mild AI.  Mildly dilated ascending aorta measuring 4.2 cm (2.1 cm/m2.)     This study was compared with the study from 4/27/17: there has been no  significant change. Specifically, the ascending aorta is unchanged in size on  direct remeasurement of the images from the prior study.    MR angiography of the chest 12/27/2016 showed \"Normal caliber of thoracic aorta. The ascending aorta measures 3.9 cm, which is normal when indexed for age/gender/size.\"     Lexiscan NM stress test 1/8/18 showed \"Normal  myocardial SPECT study with a summed stress score of  zero. No ischemia or infarct identified. Normal LV function. No change from prior study.\"    Lexiscan NM stress test 10/3/22:  The " nuclear stress test is negative for inducible myocardial ischemia or infarction. LVEDv 144ml. LVESv 44ml. LV EF 69%.        Exercise EKG 5/2019  Interpretation Summary  Normal, low-risk exercise electrocardiogram.  The target heart rate was achieved. Normal heart rate and BP response to  exercise.  Normal resting ECG. No ECG evidence of ischemia with exercise.  There were occasional uniform PVCs elicited during exercise. QT prolongation  was unable to be assessed due to artifact.  No angina was elicited.  Functional capacity is average for age.         Physical Examination   There were no vitals taken for this visit.  Wt Readings from Last 3 Encounters:   12/13/21 75.8 kg (167 lb)   11/24/21 73.9 kg (163 lb)   10/08/21 77.6 kg (171 lb)     The rest of a comprehensive physical examination is deferred due to public health emergency video visit restrictions.  CONSITUTIONAL: no acute distress  HEENT: no icterus, no redness or discharge, neck supple  CV: no visible edema of visualized extremities. No JVD.   RESPIRATORY: respirations nonlabored, no cough  NEURO: AA&Ox3, speech fluent/appropriate, motor grossly nonfocal  PSYCH: cooperative, affect appropriate  DERM: no rashes on visualized face/neck/upper extremities       Medications  Allergies   Current Outpatient Medications   Medication Sig Dispense Refill     aspirin 81 MG EC tablet Take 1 tablet (81 mg) by mouth daily 90 tablet 3     diltiazem ER COATED BEADS (CARDIZEM CD/CARTIA XT) 120 MG 24 hr capsule Take 1 capsule (120 mg) by mouth daily 90 capsule 3     flecainide (TAMBOCOR) 50 MG tablet TAKE 1 TABLET BY MOUTH  TWICE DAILY 180 tablet 3    Allergies   Allergen Reactions     Blood Transfusion Related (Informational Only)      [employer].  Declines blood transfusions.     Contrast Dye Rash         Lab Results (Personally Reviewed)    Chemistry/lipid CBC Cardiac Enzymes/BNP/TSH/INR   Lab Results   Component Value Date    BUN 13 12/17/2021     12/17/2021     CO2 30 12/17/2021     Creatinine   Date Value Ref Range Status   12/17/2021 0.87 0.66 - 1.25 mg/dL Final   12/30/2020 0.92 0.66 - 1.25 mg/dL Final       Lab Results   Component Value Date    CHOL 122 01/05/2019    HDL 50 01/05/2019    LDL 64 01/05/2019      Lab Results   Component Value Date    WBC 9.1 08/22/2020    HGB 11.9 (L) 08/22/2020    HCT 35.9 (L) 08/22/2020    MCV 96 08/22/2020     08/22/2020    Lab Results   Component Value Date    TSH 1.42 11/21/2019    INR 1.52 (H) 01/15/2018          Video-Visit Details    Type of service:  Video Visit    Video Start Time: 8:34 AM    Video End Time:8:43 AM    Originating Location (pt. Location): [place]    Distant Location (provider location):  [facility]     Platform used for Video Visit: [employer]an[employer]    I have reviewed the note as documented above.  This accurately captures the substance of my virtual visit with the patient. The patient states understanding and is agreeable with the plan.   Yamilka Skinner, ANDREINA CNP  Electrophysiology Consult Service  Pager: [phone]

## 2022-11-02 ENCOUNTER — VIRTUAL VISIT (OUTPATIENT)
Dept: CARDIOLOGY | Facility: CLINIC | Age: 72
End: 2022-11-02
Attending: NURSE PRACTITIONER
Payer: COMMERCIAL

## 2022-11-02 DIAGNOSIS — I48.0 PAF (PAROXYSMAL ATRIAL FIBRILLATION) (H): ICD-10-CM

## 2022-11-02 DIAGNOSIS — I48.0 PAROXYSMAL ATRIAL FIBRILLATION (H): ICD-10-CM

## 2022-11-02 PROCEDURE — G0463 HOSPITAL OUTPT CLINIC VISIT: HCPCS | Mod: PN,RTG | Performed by: NURSE PRACTITIONER

## 2022-11-02 PROCEDURE — 99213 OFFICE O/P EST LOW 20 MIN: CPT | Mod: GT | Performed by: NURSE PRACTITIONER

## 2022-11-02 RX ORDER — ASPIRIN 81 MG/1
81 TABLET ORAL DAILY
Qty: 90 TABLET | Refills: 3 | Status: SHIPPED | OUTPATIENT
Start: 2022-11-02

## 2022-11-02 RX ORDER — FLECAINIDE ACETATE 50 MG/1
50 TABLET ORAL 2 TIMES DAILY
Qty: 180 TABLET | Refills: 3 | Status: SHIPPED | OUTPATIENT
Start: 2022-11-02 | End: 2023-07-20

## 2022-11-02 RX ORDER — DILTIAZEM HYDROCHLORIDE 120 MG/1
120 CAPSULE, COATED, EXTENDED RELEASE ORAL DAILY
Qty: 90 CAPSULE | Refills: 3 | Status: SHIPPED | OUTPATIENT
Start: 2022-11-02 | End: 2023-07-20

## 2022-11-02 NOTE — NURSING NOTE
Chief Complaint   Patient presents with     Follow Up     Annual follow up, no updates per pt. Medications/allergies reviewed with pt, no changes. No pt reported vitals today per pt.      Patient denies any changes since echeck-in regarding medication and allergies and states all information entered during echeck-in remains accurate.    Laina Chapman, Visit Facilitator/MA.

## 2022-11-02 NOTE — LETTER
"2022      RE: Frank Orellana  [address]  [place] [facility] [address]       Dear Colleague,    Thank you for the opportunity to participate in the care of your patient, Frank Orellana, at the [facility] [place] [facility]. Please see a copy of my visit note below.        ELECTROPHYSIOLOGY VIRTUAL CLINIC VISIT  Mr. Frank Orellana is a 72 year old male who is being evaluated via a billable video visit.      The patient has been notified of following:     \"This video visit will be conducted via a call between you and your physician/provider. We have found that certain health care needs can be provided without the need for an in-person physical exam.  This service lets us provide the care you need with a video conversation.  If a prescription is necessary we can send it directly to your pharmacy.  If lab work is needed we can place an order for that and you can then stop by our lab to have the test done at a later time.    If during the course of the call the physician/provider feels a video visit is not appropriate, you will not be charged for this service.\"     Physician/provider has received verbal consent for a Video Visit from the patient? Yes    Assessment/Recommendations   Assessment/Plan:    Ms. Orellana is a 72 year old [employer]aritan male who has a past medical history significant for PAF (CHADSVASC 1) s/p DCCVs, chronic hepatitis C, hepatitis B, WINNIE (uses CPAP), tubular adenoma of colon, and tobacco use.    Paroxsymal Atrial Fibrillation:  We discussed in detail with the patient management/treatment options for A.fib includin. Stroke Prophylaxis:  CHADSVASC=+age  1, corresponding to a 1.3% annual stroke / systemic emolism event rate. He is on ASA only and only uses DOAC surrounding DCCVs.   2. Rate Control: Continue Diltiazem.   3. Rhythm Control: He has had several DCCVs, He failed Sotalol. He has now been on " Flecainide now and doing well.  4. Risk Factor Management: maintain good BP control and continued WINNIE treatment with CPAP.        Follow up 1 year or sooner if need arises.        History of Present Illness/Subjective    Mr. Frank Orellana is a 72 year old male who comes in today for EP follow-up of PAF.    Ms. Orellana is a 72 year old Voodoo male who has a past medical history significant for PAF (CHADSVASC 1) s/p DCCVs, chronic hepatitis C, hepatitis B, WINNIE (uses CPAP), tubular adenoma of colon, and tobacco use.     At our initial visit in 12/2016 he had reported ~20 years of paroxysmal atrial fibrillation manifesting as chest pressure and palpitations. The first episode occurred during a picnic and converted spontaneously to sinus rhythm.  He took metoprolol for 30 days.  Approximately 10 years later he had a second episode in the setting of a meniscal tear and perhaps 2 or 3 more episodes in the intervening years until his palpitations became more frequent in the year leading up to his most recent presentation.  A 48-hour Holter monitor which showed variation between sinus rhythm, junctional rhythm, and A.fib (average HR 63, range ) with 1% PVCs and <1% supraventricular ectopic beats, with 33 runs of PAT vs. A.fib (longest 10 beats at 164 bpm). He then presented to the ED on 12/2/16 for palpitations and was in A.fib with RVR~111 bpm, started on diltiazem and ASA 81 mg daily, and discharged. He had recurrent episode of AF end of 12/2017 that manifested as palpitations, SOB and fatigue. He felt the symptoms for about 10 days prior and was seen by Dr. Hussein on 1/6/18. At this time he was noted to be in symptomatic AF (rate controlled), and was scheduled to undergo JIM/DCCV, which was performed on 1/15/18. He was on Dabigatran for 4 weeks following the DCCV and his ASA was held. He last saw Dr. Feliep in EP clinic in 2/2018 and was doing well. More recently, he called reporting he was back in AF.  "He was started on Pradaxa within 1.5 days of his symptoms. He was then arranged for DCCV. He then had DCCV on 10/11/18. He had recurrence and required another DCCV on 11/28/18. He followed up in EP clinic on 12/27/18 and was feeling well without issues. He presented 1/18 to [facility] with racing heart sensation associated with diaphoresis and mild chest discomfort and lightheadedness c/w prior AF symptoms. Found to be in AF. He states he has had a few other AF symptoms recently and feels they are growing in frequency/severity. His AF did spontaneously terminated when in ER. He was initiated on Sotalol during this admission by EP.     EP visit 4/24/19: He presents today for follow-up after having initiated sotalol and cardioverted in ER 1/18/19. He reports no recurrent sustained palpitations. He does have intermittent, infrequent, short-lived palpitations which are not bothersome (and corroborated with PAC's / PVC's on recent Zio). Recent Holter shows no atrial fibrillation but run of AT (13 seconds). He does report, and his wife agrees, new onset significant fatigue since initiating sotalol. Now taking regular naps and just not doing usual enjoyable activities as long as he is used to. EKG: VR 47 bpm,  ms,  ms, QTc 421 ms. Sotalol 80 mg BID, asa 81 mg. Given recurrences Sotalol was stopped and Flecainide was started.       EP Vist 7/24/19: After developing symptoms of a URI last week, felt his heart \"beat out of rhythm\" on Monday evening. Call ahead to clinic was scheduled for Cardioversion, however on his way to hospital, he went out of afib and improved. Since then denies any symptoms of lightheadedness and dizziness. Per discussion with patient, did not start on Pradaxa and is still on ASA today. Denies chest pain, SOB, lightheadedness and dizziness today.       EP clinic On 10/08/21:He presents today for follow up. He is complaining of atypical chest pain occurred started 2 weeks ago, pain is aching, " "mainly in the left side of the chest and occurred for 36 hours continuous, moderate in intensity, no lightheaded or diaphoresis accompaning the pain , no LOC, no lower extremity edema. He has been on his dofetilide taking it twice a day without any more episodes of ER visits or RVR, pt is on ASA no anticoagulation. Presenting 12 lead ECG shows Vent Rate 60 bpm,  ms,  ms, QTc 441 ms. Current cardiac medications include: Flecainide, Diltiazem, and ASA.     He reports feeling well. He has had some rare palpitations lasting seconds over the last year. No known sustained AF events. He denies chest discomfort, abdominal fullness/bloating or peripheral edema, shortness of breath, paroxysmal nocturnal dyspnea, orthopnea, lightheadedness, dizziness, pre-syncope, or syncope. Current cardiac medications include: Flecainide, Diltiazem, and ASA.         I have reviewed and updated the patient's Past Medical History, Social History, Family History and Medication List.     Cardiographics (Personally Reviewed) :   Echo 1/2019:  Interpretation Summary  Global and regional left ventricular function is normal with an EF of 55-60%.  No regional wall motion abnormalities are seen.  Right ventricular function, chamber size, wall motion, and thickness are  normal.  Mild AI.  Mildly dilated ascending aorta measuring 4.2 cm (2.1 cm/m2.)     This study was compared with the study from 4/27/17: there has been no  significant change. Specifically, the ascending aorta is unchanged in size on  direct remeasurement of the images from the prior study.    MR angiography of the chest 12/27/2016 showed \"Normal caliber of thoracic aorta. The ascending aorta measures 3.9 cm, which is normal when indexed for age/gender/size.\"     Lexiscan NM stress test 1/8/18 showed \"Normal  myocardial SPECT study with a summed stress score of  zero. No ischemia or infarct identified. Normal LV function. No change from prior study.\"    Lexiscan NM stress " test 10/3/22:  The nuclear stress test is negative for inducible myocardial ischemia or infarction. LVEDv 144ml. LVESv 44ml. LV EF 69%.        Exercise EKG 5/2019  Interpretation Summary  Normal, low-risk exercise electrocardiogram.  The target heart rate was achieved. Normal heart rate and BP response to  exercise.  Normal resting ECG. No ECG evidence of ischemia with exercise.  There were occasional uniform PVCs elicited during exercise. QT prolongation  was unable to be assessed due to artifact.  No angina was elicited.  Functional capacity is average for age.         Physical Examination   There were no vitals taken for this visit.  Wt Readings from Last 3 Encounters:   12/13/21 75.8 kg (167 lb)   11/24/21 73.9 kg (163 lb)   10/08/21 77.6 kg (171 lb)     The rest of a comprehensive physical examination is deferred due to public [facility] emergency video visit restrictions.  CONSITUTIONAL: no acute distress  HEENT: no icterus, no redness or discharge, neck supple  CV: no visible edema of visualized extremities. No JVD.   RESPIRATORY: respirations nonlabored, no cough  NEURO: AA&Ox3, speech fluent/appropriate, motor grossly nonfocal  PSYCH: cooperative, affect appropriate  DERM: no rashes on visualized face/neck/upper extremities       Medications  Allergies   Current Outpatient Medications   Medication Sig Dispense Refill     aspirin 81 MG EC tablet Take 1 tablet (81 mg) by mouth daily 90 tablet 3     diltiazem ER COATED BEADS (CARDIZEM CD/CARTIA XT) 120 MG 24 hr capsule Take 1 capsule (120 mg) by mouth daily 90 capsule 3     flecainide (TAMBOCOR) 50 MG tablet TAKE 1 TABLET BY MOUTH  TWICE DAILY 180 tablet 3    Allergies   Allergen Reactions     Blood Transfusion Related (Informational Only)      [employer].  Declines blood transfusions.     Contrast Dye Rash         Lab Results (Personally Reviewed)    Chemistry/lipid CBC Cardiac Enzymes/BNP/TSH/INR   Lab Results   Component Value Date    BUN 13 12/17/2021      12/17/2021    CO2 30 12/17/2021     Creatinine   Date Value Ref Range Status   12/17/2021 0.87 0.66 - 1.25 mg/dL Final   12/30/2020 0.92 0.66 - 1.25 mg/dL Final       Lab Results   Component Value Date    CHOL 122 01/05/2019    HDL 50 01/05/2019    LDL 64 01/05/2019      Lab Results   Component Value Date    WBC 9.1 08/22/2020    HGB 11.9 (L) 08/22/2020    HCT 35.9 (L) 08/22/2020    MCV 96 08/22/2020     08/22/2020    Lab Results   Component Value Date    TSH 1.42 11/21/2019    INR 1.52 (H) 01/15/2018          Video-Visit Details    Type of service:  Video Visit    Video Start Time: 8:34 AM    Video End Time:8:43 AM    Originating Location (pt. Location): Home    Distant Location (provider location):  [facility]     Platform used for Video Visit: [employer]    I have reviewed the note as documented above.  This accurately captures the substance of my virtual visit with the patient. The patient states understanding and is agreeable with the plan.   ANDREINA Small CNP  Electrophysiology Consult Service  Pager: [phone]

## 2023-04-08 ENCOUNTER — HEALTH MAINTENANCE LETTER (OUTPATIENT)
Age: 73
End: 2023-04-08

## 2023-04-18 ENCOUNTER — HOSPITAL ENCOUNTER (OUTPATIENT)
Facility: CLINIC | Age: 73
Discharge: HOME | End: 2023-04-18
Admitting: NURSE PRACTITIONER
Payer: COMMERCIAL

## 2023-04-18 ENCOUNTER — TELEPHONE (OUTPATIENT)
Dept: CARDIOLOGY | Facility: CLINIC | Age: 73
End: 2023-04-18
Payer: COMMERCIAL

## 2023-04-18 DIAGNOSIS — I48.0 PAROXYSMAL ATRIAL FIBRILLATION (H): Primary | ICD-10-CM

## 2023-04-18 RX ORDER — LIDOCAINE 40 MG/G
CREAM TOPICAL
Status: CANCELLED | OUTPATIENT
Start: 2023-04-18

## 2023-04-18 RX ORDER — POTASSIUM CHLORIDE 1500 MG/1
20 TABLET, EXTENDED RELEASE ORAL
Status: CANCELLED | OUTPATIENT
Start: 2023-04-18

## 2023-04-18 RX ORDER — POTASSIUM CHLORIDE 1500 MG/1
40 TABLET, EXTENDED RELEASE ORAL
Status: CANCELLED | OUTPATIENT
Start: 2023-04-18

## 2023-04-18 RX ORDER — MAGNESIUM SULFATE HEPTAHYDRATE 40 MG/ML
2 INJECTION, SOLUTION INTRAVENOUS
Status: CANCELLED | OUTPATIENT
Start: 2023-04-18

## 2023-04-18 NOTE — TELEPHONE ENCOUNTER
Test claims requested for the following medications  90 days supply     Xarelto 20 daily   $141.00 copay       Eliquis 5mg BID   -$141.00 copay         Pradaxa 150mg BID   -Needs PA

## 2023-04-18 NOTE — PROGRESS NOTES
Patient went into AF this morning. Yamilka Skinner NP spoke to patient.    Date: 4/18/2023    Time of Call: 10:03 AM     Diagnosis:  Paroxysmal atrial fibrillation      [ TORB ] Ordering provider: Yamilka Skinner NP   Order:   DCCV today  Start AC - Eliquis 5mg BID - and take for 4 weeks post     Order received by: Meggan Brar RN      Follow-up/additional notes:   DCCV scheduled for tomorrow. Eliquis 5mg BID (see test claims) sent to [facility] pharmacy to start today.

## 2023-04-19 ENCOUNTER — APPOINTMENT (OUTPATIENT)
Dept: MEDSURG UNIT | Facility: CLINIC | Age: 73
End: 2023-04-19
Payer: COMMERCIAL

## 2023-04-19 PROCEDURE — 999N000054 HC STATISTIC EKG NON-CHARGEABLE

## 2023-04-19 PROCEDURE — 93005 ELECTROCARDIOGRAM TRACING: CPT

## 2023-04-24 ENCOUNTER — OFFICE VISIT (OUTPATIENT)
Dept: INTERNAL MEDICINE | Facility: CLINIC | Age: 73
End: 2023-04-24
Payer: COMMERCIAL

## 2023-04-24 ENCOUNTER — LAB (OUTPATIENT)
Dept: LAB | Facility: CLINIC | Age: 73
End: 2023-04-24
Payer: COMMERCIAL

## 2023-04-24 VITALS
WEIGHT: 171.4 LBS | BODY MASS INDEX: 25.39 KG/M2 | DIASTOLIC BLOOD PRESSURE: 75 MMHG | HEART RATE: 50 BPM | SYSTOLIC BLOOD PRESSURE: 129 MMHG | HEIGHT: 69 IN | TEMPERATURE: 98.4 F | OXYGEN SATURATION: 98 %

## 2023-04-24 DIAGNOSIS — Z00.00 ENCOUNTER FOR MEDICARE ANNUAL WELLNESS EXAM: Primary | ICD-10-CM

## 2023-04-24 DIAGNOSIS — G62.9 PERIPHERAL POLYNEUROPATHY: ICD-10-CM

## 2023-04-24 DIAGNOSIS — R79.9 ABNORMAL FINDING OF BLOOD CHEMISTRY, UNSPECIFIED: ICD-10-CM

## 2023-04-24 DIAGNOSIS — Z13.220 SCREENING FOR HYPERLIPIDEMIA: ICD-10-CM

## 2023-04-24 DIAGNOSIS — L98.9 SKIN LESION: ICD-10-CM

## 2023-04-24 DIAGNOSIS — M72.0 DUPUYTREN'S CONTRACTURE OF BOTH HANDS: ICD-10-CM

## 2023-04-24 DIAGNOSIS — R93.89 ABNORMAL FINDINGS ON DIAGNOSTIC IMAGING OF OTHER SPECIFIED BODY STRUCTURES: ICD-10-CM

## 2023-04-24 DIAGNOSIS — M75.41 ROTATOR CUFF IMPINGEMENT SYNDROME OF RIGHT SHOULDER: ICD-10-CM

## 2023-04-24 DIAGNOSIS — Z11.59 SCREENING FOR VIRAL DISEASE: ICD-10-CM

## 2023-04-24 DIAGNOSIS — Z12.11 SCREENING FOR COLON CANCER: ICD-10-CM

## 2023-04-24 DIAGNOSIS — H53.9 VISION CHANGES: ICD-10-CM

## 2023-04-24 DIAGNOSIS — H91.93 BILATERAL CHANGE IN HEARING: ICD-10-CM

## 2023-04-24 LAB
ALBUMIN SERPL BCG-MCNC: 4.4 G/DL (ref 3.5–5.2)
ALP SERPL-CCNC: 91 U/L (ref 40–129)
ALT SERPL W P-5'-P-CCNC: 40 U/L (ref 10–50)
ANION GAP SERPL CALCULATED.3IONS-SCNC: 6 MMOL/L (ref 7–15)
AST SERPL W P-5'-P-CCNC: 32 U/L (ref 10–50)
BILIRUB SERPL-MCNC: 0.7 MG/DL
BUN SERPL-MCNC: 11.8 MG/DL (ref 8–23)
CALCIUM SERPL-MCNC: 9.4 MG/DL (ref 8.8–10.2)
CHLORIDE SERPL-SCNC: 106 MMOL/L (ref 98–107)
CHOLEST SERPL-MCNC: 187 MG/DL
CREAT SERPL-MCNC: 0.87 MG/DL (ref 0.67–1.17)
DEPRECATED HCO3 PLAS-SCNC: 30 MMOL/L (ref 22–29)
ERYTHROCYTE [DISTWIDTH] IN BLOOD BY AUTOMATED COUNT: 12.3 % (ref 10–15)
GFR SERPL CREATININE-BSD FRML MDRD: >90 ML/MIN/1.73M2
GLUCOSE SERPL-MCNC: 100 MG/DL (ref 70–99)
HBA1C MFR BLD: 5.1 %
HCT VFR BLD AUTO: 41.4 % (ref 40–53)
HDLC SERPL-MCNC: 69 MG/DL
HGB BLD-MCNC: 14 G/DL (ref 13.3–17.7)
LDLC SERPL CALC-MCNC: 104 MG/DL
MCH RBC QN AUTO: 32 PG (ref 26.5–33)
MCHC RBC AUTO-ENTMCNC: 33.8 G/DL (ref 31.5–36.5)
MCV RBC AUTO: 95 FL (ref 78–100)
NONHDLC SERPL-MCNC: 118 MG/DL
PLATELET # BLD AUTO: 196 10E3/UL (ref 150–450)
POTASSIUM SERPL-SCNC: 4.5 MMOL/L (ref 3.4–5.3)
PROT SERPL-MCNC: 7.2 G/DL (ref 6.4–8.3)
RBC # BLD AUTO: 4.38 10E6/UL (ref 4.4–5.9)
SODIUM SERPL-SCNC: 142 MMOL/L (ref 136–145)
TOTAL PROTEIN SERUM FOR ELP: 6.8 G/DL (ref 6.4–8.3)
TRIGL SERPL-MCNC: 70 MG/DL
TSH SERPL DL<=0.005 MIU/L-ACNC: 1.09 UIU/ML (ref 0.3–4.2)
VIT B12 SERPL-MCNC: 399 PG/ML (ref 232–1245)
WBC # BLD AUTO: 5.9 10E3/UL (ref 4–11)

## 2023-04-24 PROCEDURE — 80061 LIPID PANEL: CPT | Performed by: PATHOLOGY

## 2023-04-24 PROCEDURE — 84165 PROTEIN E-PHORESIS SERUM: CPT | Mod: 26

## 2023-04-24 PROCEDURE — G0439 PPPS, SUBSEQ VISIT: HCPCS | Performed by: INTERNAL MEDICINE

## 2023-04-24 PROCEDURE — 84166 PROTEIN E-PHORESIS/URINE/CSF: CPT | Mod: 26

## 2023-04-24 PROCEDURE — 86335 IMMUNFIX E-PHORSIS/URINE/CSF: CPT | Performed by: PATHOLOGY

## 2023-04-24 PROCEDURE — 82607 VITAMIN B-12: CPT | Performed by: INTERNAL MEDICINE

## 2023-04-24 PROCEDURE — 86335 IMMUNFIX E-PHORSIS/URINE/CSF: CPT | Mod: 26

## 2023-04-24 PROCEDURE — 86334 IMMUNOFIX E-PHORESIS SERUM: CPT | Performed by: PATHOLOGY

## 2023-04-24 PROCEDURE — 36415 COLL VENOUS BLD VENIPUNCTURE: CPT | Performed by: PATHOLOGY

## 2023-04-24 PROCEDURE — 86334 IMMUNOFIX E-PHORESIS SERUM: CPT | Mod: 26

## 2023-04-24 PROCEDURE — 84165 PROTEIN E-PHORESIS SERUM: CPT | Mod: TC | Performed by: PATHOLOGY

## 2023-04-24 PROCEDURE — 84155 ASSAY OF PROTEIN SERUM: CPT | Performed by: INTERNAL MEDICINE

## 2023-04-24 PROCEDURE — 86803 HEPATITIS C AB TEST: CPT | Performed by: INTERNAL MEDICINE

## 2023-04-24 PROCEDURE — 99214 OFFICE O/P EST MOD 30 MIN: CPT | Mod: 25 | Performed by: INTERNAL MEDICINE

## 2023-04-24 PROCEDURE — 84166 PROTEIN E-PHORESIS/URINE/CSF: CPT | Performed by: PATHOLOGY

## 2023-04-24 PROCEDURE — 80053 COMPREHEN METABOLIC PANEL: CPT | Performed by: PATHOLOGY

## 2023-04-24 PROCEDURE — 83036 HEMOGLOBIN GLYCOSYLATED A1C: CPT | Performed by: INTERNAL MEDICINE

## 2023-04-24 PROCEDURE — 85027 COMPLETE CBC AUTOMATED: CPT | Performed by: PATHOLOGY

## 2023-04-24 PROCEDURE — 84443 ASSAY THYROID STIM HORMONE: CPT | Performed by: PATHOLOGY

## 2023-04-24 PROCEDURE — 87522 HEPATITIS C REVRS TRNSCRPJ: CPT | Performed by: INTERNAL MEDICINE

## 2023-04-24 NOTE — PROGRESS NOTES
Medicare Annual Wellness Questionnaire:  This 73 year old year old male presents for a Medicare Wellness Exam.    Providers/clinics involved in care:  Patient Care Team       Relationship Specialty Notifications Start End    Yani Burnett MD PCP - General Internal Medicine  10/6/21     Phone: [phone] Fax: [phone]         [address] [place] [address]    Artur Grullon MD MD Family Practice  10/11/16     Phone: [phone] Fax: [phone]         [address] [place] [address]    Jovanni Felipe MD MD Clinical Cardiac Electrophysiology  12/2/16     Phone: [phone] Fax: [phone]         [address] [place] [address]    Suresh Raymond MD MD Surgery  10/4/17     Phone: [phone] Fax: [phone]         [address] [place] [address]    Lars Oleary MD MD Hand Surgery  10/5/17     Phone: [phone] Fax: [phone]         [address] [place] [address]    Tray Hussein MD MD Interventional Cardiology  1/3/18     Meggan Brar, RN Specialty Care Coordinator Cardiology  3/6/19     Phone: [phone] Fax: [phone]         [address] [place] [address]    Roldan De La O MD MD Urology  7/30/20     Phone: [phone] Fax: [phone]         [address] [place] [address]    Aaliyah Damon, RN Registered Nurse Urology  7/30/20     Phone: [phone] Pager: [phone]        Vanessa Edwards MD Referring Physician Internal Medicine  7/30/20     Phone: [phone] Fax: [phone]         [facility] [address] [facility] [address]    Yani Burnett MD MD Internal Medicine  9/13/21     Phone: [phone] Fax: [phone] [address] [address]    Yani Burnett MD Assigned PCP   10/10/21     Phone: [phone] Fax: [phone] [address] [address]    Yani Burnett MD Referring Physician Internal  Medicine  12/13/21     Phone: [phone] Fax: [phone]         [address] [address]    Karina Lopez PA Physician Assistant Urology  12/13/21     Urology referral    Phone: [phone] Fax: [phone]         [address] [place] [address]    Yamilka Thomas APRN CNP Nurse Practitioner Cardiovascular Disease  11/1/22     Phone: [phone] Fax: [phone]         [address] [place] [address]    Yamilka Thomas APRN CNP Assigned Heart and Vascular Provider   11/12/22     Phone: [phone] Fax: [phone]         [address] [place] [address]          Fall Risk Assessment:    Have you fallen 2 or more times in the last year?  No    How many times were you injured due to a fall in the last year?  N/a    PHQ-2:    Over the last 2 weeks, how often have you been bothered by feeling down, depressed, or hopeless?     Not at all (0)     Over the last 2 weeks, how often have you had little interest or pleasure in doing things?     Not at all (0)   Social History:    What is your marital status?      Who lives in your household?  spouse    Does your home have loose rugs in the hallway:     No    Does your home have grab bars in the bathroom:    No    Does your home have handrails on the stairs?  Yes     Does your home have poorly lit areas?    No    Do you feel threatened or controlled by a partner, ex-partner or anyone in your life?   No    Has anyone hurt you physically, for example by pushing, hitting, slapping or kicking you   or forcing you to have sex?   No    Do you need help with the phone, transportation, shopping, preparing meals, housework, laundry, medications or managing money?   No    Sexual Health:    Are you sexually active?    No      Have you had any sexually transmitted infections in the last year?   No    Do you have any sexual concerns?    No      General Health Assessment:    Have you noticed any hearing  difficulties?   No    Do you wear hearing aids?   No    Have you seen a hearing professional such as an audiologist in the last 1 year?   No    Do you have vision difficulty?    No    Do you wear glasses or contacts?   Yes     Have you seen an eye doctor in the last 1 year?   No    How many servings of fruits and vegetables do you eat a day?  1-2 cups fruit, 1-2 cups vegetables    How often do you exercise in a week?  0    How long and what kind of exercise do you do?  N/a    Tobacco and Alcohol History:    Do you use tobacco/nicotine products?    No    If yes, please list the method of use and average weekly consumption?  N/a    Do you use any other drugs?   No         Do you drink alcohol?   Yes     If you drink alcohol, how many drinks per week?  5-6      Advance Directive:    Have you completed an Advance Directives document?  Yes     If yes, have you given a copy to the clinic?   Yes     Do you need information on Advance Directives?   No    Added by Melissa Hawkins, EMT at 1:03 PM on 4/24/2023.  Primary Care Clinic: [phone]

## 2023-04-24 NOTE — NURSING NOTE
"Frank Orellana is a 73 year old male patient that presents today in clinic for the following:    Chief Complaint   Patient presents with     Physical     Right shoulder pain.  Bilateral foot numbness.  dupuytren's contracture on both hands.  Worse on right one.     The patient's allergies and medications were reviewed as noted. A set of vitals were recorded as noted without incident: /75 (BP Location: Right arm, Patient Position: Sitting, Cuff Size: Adult Regular)   Pulse 50   Temp 98.4  F (36.9  C) (Oral)   Ht 1.753 m (5' 9\")   Wt 77.7 kg (171 lb 6.4 oz)   SpO2 98%   BMI 25.31 kg/m  . The patient does not have any other questions for the provider.    Paula Cordova, EMT at 12:21 PM on 4/24/2023.  Primary care clinic: [phone]  "

## 2023-04-24 NOTE — PROGRESS NOTES
History of Present Illness:  Mr. Orellana is a 73 year old male who presents for  Chief Complaint   Patient presents with     Physical     Right shoulder pain.  Bilateral foot numbness.  dupuytren's contracture on both hands.  Worse on right one.     Hx of afib s/p DCCV, chronic hep C genotype 1b s/p harvoni treatment with SVR, hep B, WINNIE on cpap, tobacco use, BPH s/p TURP 8/20. Worship.    Has paroxysmal afib, takes flecainide, was in afib but spontaneously converted so no DCCV recently    Has pain in shoulder, after lifting snowblower, R side, worse with movement, hard time sleeping, along top, able to do normal activities, hurts with lifting, no neck pain at present    R 3/5th and L 3rd digits dupytrens, painful on occasion  Reports numb feet, toes/balls feet, bilat, gradual onset, painful to walk barefeet, tingling, bilateral LE    ROS  Does note hearing, vision changes  Mole on forehead possibly larger/darker  No memory/cognitive changes    Cervical Xray 9/21:     Impression:  Mild progression of multilevel cervical degenerative changes since  2015, most pronounced at C5-6.     Routine Health Maintenence:  PSA:   PSA   Date Value Ref Range Status   12/30/2020 3.30 0 - 4 ug/L Final     Comment:     Assay Method:  Chemiluminescence using Siemens Vista analyzer      AAA Screening (65-75 yrs): neg 12/16  Lung Ca Screening (>20 pk age 50-80): quit >15 years ago  Colonoscopy (45-75 yrs): 1/17 ta x 1, HP x2      Review of external notes as documented above                   A detailed Review of Systems was performed, verified and is negative except as documented in the HPI.  All health questionnaires were reviewed, verified and relevant information documented above.    Patient Care Team       Relationship Specialty Notifications Start End    Yani Burnett MD PCP - General Internal Medicine  10/6/21     Phone: [phone] Fax: [phone] [address] [address]    Mitchel  Artur Cruz MD MD [facility] Practice  10/11/16     Phone: [phone] Fax: [phone]         [address] [place] [address]    Jovanni Felipe MD MD Clinical Cardiac Electrophysiology  12/2/16     Phone: [phone] Fax: [phone]         [address] [place] [address]    Suresh Raymond MD MD Surgery  10/4/17     Phone: [phone] Fax: [phone]         [address] [place] [address]    Lars Oleary MD MD [facility] Surgery  10/5/17     Phone: [phone] Fax: [phone]         [address] [place] [address]    Tray Hussein MD MD Interventional Cardiology  1/3/18     Meggan Brar, RN Specialty Care Coordinator Cardiology  3/6/19     Phone: [phone] Fax: [phone]         [address] [place] [address]    Roldan De La O MD MD Urology  7/30/20     Phone: [phone] Fax: [phone]         [address] [place] [address]    Aaliyah Damon, RN Registered Nurse Urology  7/30/20     Phone: [phone] Pager: [phone]        Vanessa Edwards MD Referring Physician Internal Medicine  7/30/20     Phone: [phone] Fax: [phone]         [facility] [address] [facility] [address]    Yani Burnett MD MD Internal Medicine  9/13/21     Phone: [phone] Fax: [phone]         [address] [place] [address]    Yani Burnett MD Assigned PCP   10/10/21     Phone: [phone] Fax: [phone]         [address] [place] [address]    Yani Burnett MD Referring Physician Internal Medicine  12/13/21     Phone: [phone] Fax: [phone]         [address] [facility] [address] [facility] [address]    Karina Lopez PA Physician Assistant Urology  12/13/21     Urology referral    Phone: [phone] Fax: [phone]         [address] [place] [address]    Yamilka Thomas APRN CNP Nurse Practitioner Cardiovascular Disease  11/1/22     Phone:  [phone] Fax: [phone] [address] [facility] [address]    Yamilka Thomas APRN CNP Assigned Heart and Vascular Provider   22     Phone: [phone] Fax: [phone] [address] [facility] [address]          Past Medical History:  Past Medical History:   Diagnosis Date     Actinic keratosis      Atrial fibrillation (H) 6/15/96    Afib - 2 or 3 extended occurrences since     Chronic hepatitis C (H)     Chronic Hepatitis C,  genotype 1b                Stage 0 Fibrosis     Dupuytren's contracture of both hands      Elevated PSA      Hepatitis B     acute infection at age 19; IV drug use     WINNIE (obstructive sleep apnea)     AHI 34.2     Pain in both hands      Patient is [employer]      Refusal of blood transfusions as patient is [employer]      Right shoulder pain      Tinnitus 1 or 2 years ago    both ears     Tubular adenoma of colon 17    ascending colon, 17       Active Meds:  Current Outpatient Medications   Medication     aspirin 81 MG EC tablet     diltiazem ER COATED BEADS (CARDIZEM CD/CARTIA XT) 120 MG 24 hr capsule     flecainide (TAMBOCOR) 50 MG tablet     No current facility-administered medications for this visit.        Allergies:  Reviewed, refer to EMR    Relevant Social History:  Social History     Tobacco Use     Smoking status: Former     Packs/day: 0.50     Years: 10.00     Pack years: 5.00     Types: Cigarettes     Start date: 6/15/1964     Quit date: 1974     Years since quittin.8     Smokeless tobacco: Former     Quit date: 1969   Substance Use Topics     Alcohol use: Yes     Alcohol/week: 3.0 - 4.0 standard drinks of alcohol     Comment: 4 or 5 drinks/week, limit of 1     Drug use: No     Comment: --history of marijuana        Physical Exam:  Vitals: /75 (BP Location: Right arm, Patient Position: Sitting, Cuff Size: Adult Regular)   Pulse 50   Temp 98.4  F (36.9  C) (Oral)   Ht 1.753 m  "(5' 9\")   Wt 77.7 kg (171 lb 6.4 oz)   SpO2 98%   BMI 25.31 kg/m    Constitutional: Alert, oriented, pleasant, no acute distress  Head: Normocephalic, atraumatic  Eyes: Extra-ocular movements intact, pupils equally round bilaterally, no scleral icterus  ENT: Oropharynx clear, moist mucus membranes, good dentition  Neck: Supple, no lymphadenopathy  Cardiovascular: Regular rate and rhythm, no murmurs, rubs or gallops, peripheral pulses full/symmetric  Respiratory: Good air movement bilaterally, lungs clear, no wheezes/rales/rhonchi  GI: Abdomen soft, bowel sounds present, nondistended, nontender, no organomegaly or masses, no rebound/guarding  Musculoskeletal: No edema, normal muscle tone, normal gait, R shoulder nontender, ROM intact, strength intact, pain with supination, +impingement testing  Neurologic: Alert and oriented, cranial nerves 2-12 intact, grossly non-focal  Skin: pedunculated fleshy lesion on back, dark brown pigmented lesion R forehead, waxy light brown stuck on lesion central forehead, thickened cords over palmar tendons as described in HPI  Psychiatric: normal mentation, affect and mood      Diagnostics:  Labs reviewed in Epic          Assessment and Plan:  Frank was seen today for physical.    Diagnoses and all orders for this visit:    Encounter for Medicare annual wellness exam  Routine health care reviewed and updated as appropriate.    Peripheral polyneuropathy  Suspect idiopathic or related to past hepatitis, though will complete limited work up below. Patient advised to monitor for progression.  -     Comprehensive metabolic panel (BMP + Alb, Alk Phos, ALT, AST, Total. Bili, TP); Future  -     TSH with free T4 reflex; Future  -     CBC with platelets; Future  -     Protein electrophoresis; Future  -     Protein Immunofixation Serum; Future  -     Protein electrophoresis random urine; Future  -     Protein immunofixation urine; Future  -     Hemoglobin A1c; Future  -     Vitamin B12; " Future    Screening for viral disease  -     Hepatitis C Screen Reflex to HCV RNA Quant and Genotype; Future    Bilateral change in hearing  -     Adult Audiology  Referral; Future    Vision changes  -     Adult Eye  Referral; Future    Rotator cuff impingement syndrome of right shoulder  If no improvement, advised I could do an injection.  -     Physical Therapy Referral; Future    Dupuytren's contracture of both hands  -     Orthopedic  Referral; Future    Screening for colon cancer  -     Colonoscopy Screening  Referral; Future    Screening for hyperlipidemia  -     Lipid panel reflex to direct LDL Fasting; Future    Abnormal finding of blood chemistry, unspecified  -     Hemoglobin A1c; Future    Abnormal findings on diagnostic imaging of other specified body structures  -     TSH with free T4 reflex; Future    Skin lesion  -     Adult Dermatology Referral; Future            Yani Burnett MD  Internal Medicine

## 2023-04-25 LAB
ALBUMIN SERPL ELPH-MCNC: 4.4 G/DL (ref 3.7–5.1)
ALPHA1 GLOB SERPL ELPH-MCNC: 0.3 G/DL (ref 0.2–0.4)
ALPHA2 GLOB SERPL ELPH-MCNC: 0.5 G/DL (ref 0.5–0.9)
B-GLOBULIN SERPL ELPH-MCNC: 0.7 G/DL (ref 0.6–1)
GAMMA GLOB SERPL ELPH-MCNC: 1 G/DL (ref 0.7–1.6)
HCV AB SERPL QL IA: REACTIVE
M PROTEIN SERPL ELPH-MCNC: 0 G/DL
PROT ELPH PNL UR ELPH: NORMAL
PROT PATTERN SERPL ELPH-IMP: NORMAL
PROT PATTERN SERPL IFE-IMP: NORMAL
PROT PATTERN UR ELPH-IMP: NORMAL

## 2023-04-27 LAB — HCV RNA SERPL NAA+PROBE-ACNC: NOT DETECTED IU/ML

## 2023-05-01 ENCOUNTER — TELEPHONE (OUTPATIENT)
Dept: GASTROENTEROLOGY | Facility: CLINIC | Age: 73
End: 2023-05-01

## 2023-05-01 ENCOUNTER — OFFICE VISIT (OUTPATIENT)
Dept: AUDIOLOGY | Facility: CLINIC | Age: 73
End: 2023-05-01
Attending: INTERNAL MEDICINE
Payer: COMMERCIAL

## 2023-05-01 DIAGNOSIS — H90.3 SENSORINEURAL HEARING LOSS (SNHL), BILATERAL: Primary | ICD-10-CM

## 2023-05-01 DIAGNOSIS — H91.93 BILATERAL CHANGE IN HEARING: ICD-10-CM

## 2023-05-01 PROCEDURE — 92550 TYMPANOMETRY & REFLEX THRESH: CPT | Performed by: AUDIOLOGIST

## 2023-05-01 PROCEDURE — 92557 COMPREHENSIVE HEARING TEST: CPT | Performed by: AUDIOLOGIST

## 2023-05-01 NOTE — TELEPHONE ENCOUNTER
Screening Questions  BLUE  KIND OF PREP RED  LOCATION [review exclusion criteria] GREEN  SEDATION TYPE        Y Are you active on mychart?       Hortencia Yani Ordering/Referring Provider?        [facility]/MEDICARE What type of coverage do you have?      N Have you had a positive covid test in the last 14 days?     25.31 1. BMI  [BMI 40+ - review exclusion criteria& smart-phrase document]    Y  2. Are you able to give consent for your medical care? [IF NO,RN REVIEW]          N  3. Are you taking any prescription pain medications on a routine schedule   (ex narcotics: oxycodone, roxicodone, oxycontin,  and percocet)? [RN Review]        N  3a. EXTENDED PREP What kind of prescription?     N 4. Do you have any chemical dependencies such as alcohol, street drugs, or methadone?        **If yes 3- 5 , please schedule with MAC sedation.**          IF YES TO ANY 6 - 10 - HOSPITAL SETTING ONLY.     N 6.   Do you need assistance transferring?     N 7.   Have you had a heart or lung transplant?    N 8.   Are you currently on dialysis?   N 9.   Do you use daily home oxygen?   N 10. Do you take nitroglycerin?   10a. N If yes, how often?     11. [FEMALES]  N Are you currently pregnant?    11a. N If yes, how many weeks? [ Greater than 12 weeks, OR NEEDED]    N 12. Do you have Pulmonary Hypertension? *NEED PAC APPT AT [facility] w/ MAC*     N 13. [review exclusion criteria]  Do you have any implantable devices in your body (pacemaker, defib, LVAD)?    AFIB14. In the past 6 months, have you had any heart related issues including cardiomyopathy or heart attack?     14a. N If yes, did it require cardiac stenting if so when?     N 15. Have you had a stroke or Transient ischemic attack (TIA - aka  mini stroke ) within 6 months?      Y  16. Do you have mod to severe Obstructive Sleep Apnea?  [Hospital only]    N 17. Do you have SEVERE AND UNCONTROLLED asthma? *NEED PAC APPT AT [facility] w/MAC*     18. Are you currently taking any blood thinners?     " 18a. No. Continue to 19.   18b. Yes/no Blood Thinner: No [CONTINUE TO #19]    N 19. Do you take the medication Phentermine?    19a. If yes, \"Hold for 7 days before procedure.  Please consult your prescribing provider if you have questions about holding this medication.\"     N  20. Do you have chronic kidney disease?      N  21. Do you have a diagnosis of diabetes?     N  22. On a regular basis do you go 3-5 days between bowel movements?     N 23. Preferred LOCAL Pharmacy for Pre Prescription    [ LIST ONLY ONE PHARMACY]        [facility] - [place], [place] - [phone]  [id number] - [place], [place] - [address]  [facility]IV[facility] - [place], [place] - [address]  [facility] - [place], [place] - [id number] - [place], [place] - [address] AT [facility] [address]  [facility] - [place], [place] - [address] [facility] [facility] [address]        - CLOSING REMINDERS -    Informed patient they will need an adult    Cannot take any type of public or medical transportation alone    Conscious Sedation- Needs  for 6 hours after the procedure       MAC/General-Needs  for 24 hours after procedure    Pre-Procedure Covid test to be completed [ESSC PCR Testing Required]    Confirmed Nurse will call to complete assessment       - SCHEDULING DETAILS -  Y Hospital Setting Required? If yes, what is the exclusion?: WINNIE/AFIB   JENNIFER  Surgeon    6/26  Date of Procedure  Lower Endoscopy [Colonoscopy]  Type of Procedure Scheduled  [facility] Location   MIRALAX GATORADE WITHOUT MAGNEISUM CITRATE Which Colonoscopy Prep was Sent?     MODERATE Sedation Type     N PAC / Pre-op Required                 "

## 2023-05-01 NOTE — PROGRESS NOTES
AUDIOLOGY REPORT    SUBJECTIVE: Frank Orellana is a 73 year old male who was seen in the Audiology Clinic at [facility] for audiologic evaluation, referred by Yani Burnett M.D. The patient has been seen previously in this clinic on 7/6/2017 for assessment, and results indicated normal hearing for the left ear and normal hearing with moderately severe conductive hearing loss at 4000 Hz only for the right ear. The patient was seen by Andrae Laughlin M.D. in Ear, Nose, and Throat at that time. Today, Frank reports his wife has noted decreased hearing. He notes increased bilateral tinnitus. Frank reports an episode of imbalance a couple of weeks ago that only lasted 1 day. He denies bilateral pain, bilateral drainage, or a history of ear surgeries.    OBJECTIVE:  Abuse Screening:  Do you feel unsafe at home or work/school? No  Do you feel threatened by someone? No  Does anyone try to keep you from having contact with others, or doing things outside of your home? No  Physical signs of abuse present? No     Fall Risk Screening:  Have you fallen two or more times in the past year? No  Have you fallen and had an injury in the past year? No    Otoscopic exam indicated ears are clear of cerumen bilaterally     Pure Tone Thresholds assessed using conventional audiometry with good reliability from 250-8000 Hz bilaterally using insert earphones and circumaural headphones     RIGHT: Normal hearing sloping to moderately severe rising to moderate sensorineural hearing loss     LEFT: Normal hearing sloping to moderately severe sensorineural hearing loss     Tympanogram:    RIGHT: Hypermobile     LEFT:  Hypermobile     Reflexes (reported by stimulus ear):    RIGHT: Ipsilateral is present at normal levels    RIGHT: Contralateral is present at normal levels    LEFT:   Ipsilateral is present at normal levels    LEFT:   Contralateral is present at normal levels    Speech Reception  Threshold:    RIGHT: 5 dB HL    LEFT:   5 dB HL    Word Recognition Score:     RIGHT: 100% at 65 dB HL using NU-6 recorded word list    LEFT:   100% at 65 dB HL using NU-6 recorded word list    ASSESSMENT: Bilateral sensorineural hearing loss. Compared to the patient's previous audiogram dated 7/6/2017, thresholds in the right ear have worsened by 20-25 dB at 3, 6, and 8 kHz and thresholds in the left ear have worsened by 10-30 dB from 2-8 kHz. Word recognition scores remain stable in both ears. Today s results were discussed with the patient in detail.     PLAN: The patient was counseled regarding hearing loss and impact on communication. The patient may consider a trial with amplification, if desired. Repeat audiologic evaluation is recommended if changes are noted. He will follow-up with his primary care physician if imbalance episodes continue. Please call this clinic with questions regarding these results or recommendations.      Ekta Hahn M.A.  Audiology Doctoral Extern  [place] #[id number]    I was present with the patient for the entire Audiology appointment, including all procedures/testing performed by the Deepthi student, and agree with the student s assessment and plan as documented.    Deepthi Ying, [facility]  Licensed Audiologist  [place] #[id number]

## 2023-05-05 ENCOUNTER — MYC MEDICAL ADVICE (OUTPATIENT)
Dept: INTERNAL MEDICINE | Facility: CLINIC | Age: 73
End: 2023-05-05
Payer: COMMERCIAL

## 2023-05-09 NOTE — TELEPHONE ENCOUNTER
DIAGNOSIS: Dupuytren's contracture of both hands [M72.0]    APPOINTMENT DATE: 05/17/2023   NOTES STATUS DETAILS   OFFICE NOTE from referring provider Internal Yani Burnett MD [facility] Internal Med   OFFICE NOTE from other specialist Internal 02/16/2018 - Lars Oleary MD [facility] Ortho   OPERATIVE REPORT Internal 12/15/2017 - LT Ring Finger and Middle Finger Subtotal Palamr Fascietectomy    01/20/2017 - Subtotal Palmar Facetectomy RT Ring Finger   MEDICATION LIST Internal    LABS     XRAYS (IMAGES & REPORTS) PACS Internal

## 2023-05-12 ENCOUNTER — THERAPY VISIT (OUTPATIENT)
Dept: PHYSICAL THERAPY | Facility: CLINIC | Age: 73
End: 2023-05-12
Attending: INTERNAL MEDICINE
Payer: COMMERCIAL

## 2023-05-12 DIAGNOSIS — M75.41 ROTATOR CUFF IMPINGEMENT SYNDROME OF RIGHT SHOULDER: ICD-10-CM

## 2023-05-12 PROCEDURE — 97110 THERAPEUTIC EXERCISES: CPT | Mod: GP

## 2023-05-12 NOTE — PROGRESS NOTES
[facility]    OUTPATIENT Physical Therapy ORTHOPEDIC EVALUATION  PLAN OF TREATMENT FOR OUTPATIENT REHABILITATION  (COMPLETE FOR INITIAL CLAIMS ONLY)  Patient's Last Name, First Name, M.I.  YOB: 1950  EstebanFrank  Sulaiman    Provider s Name:  [facility]   Medical Record No.  [id number]   Start of Care Date:  05/12/23   Onset Date:   12/26/22   Treatment Diagnosis:  R shoulder Medical Diagnosis:  Rotator cuff impingement syndrome of right shoulder       Goals:     05/12/23 0500   Body Part   Goals listed below are for R shoulder   Goal #1   Goal #1 sleeping   Previous Functional Level No restrictions   Performance Level 3/10 pain when sleeping on right side   Performance Level 2/10 pain when sleeping on right side   Rationale to establish restorative sleep pattern   Due Date 06/12/23   Performance level no pain when sleeping on right side   Rationale to establish restorative sleep pattern   Due Date 08/12/23         Therapy Frequency:  2x/month  Predicted Duration of Therapy Intervention:  3 months    Alejandra Mayfield, PT                 I CERTIFY THE NEED FOR THESE SERVICES FURNISHED UNDER        THIS PLAN OF TREATMENT AND WHILE UNDER MY CARE     (Physician attestation of this document indicates review and certification of the therapy plan).                     Certification Date From:  05/12/23   Certification Date To:  08/12/23    Referring Provider:  Yani Burnett    Initial Assessment        See Epic Evaluation SOC Date: 05/12/23

## 2023-05-12 NOTE — PROGRESS NOTES
Physical Therapy Initial Evaluation  5/12/2023     Precautions/Restrictions/MD instructions: eval and treat    Therapist Assessment: Frank Orellana is a 73 year old male patient presenting to Physical Therapy with right shoulder pain. Patient demonstrates limited shoulder AROM (flexion, abduction and IR+ext), RC weakness and pain with resisted testing and poor posture. These impairments limit their ability to reach unrestricted and lay on his side. Skilled PT services are necessary in order to reduce impairments and improve independent function.    Subjective:   Chief Complaint: Right shoulder  Associated symptoms: denies radicular symptoms  Onset date: 12/26/22  SAMIRA: Lifting a snowblower  Pain severity:  2/10 current  Location of pain: anterior shoulder  Quality of Pain: sharp   Better: rest  Worse:  Reaching forward, cross body, and behind back, laying on the right side (side sleeper)  Progression of Symptoms since onset:  Since onset, these symptoms are improving  Previous treatment: has included none  Answers for HPI/ROS submitted by the patient on 5/6/2023  Reason for Visit:: Right shoulder pain  When problem began:: 12/26/2022  How problem occurred:: Lifting snowblower  Number scale: 2/10  General health as reported by patient: good  Please check all that apply to your current or past medical history: history of fractures, heart problems, hepatitis, numbness/tingling, pain at night/rest, sleep disorder/apnea  Medical allergies: other  Other Allergies Detail: Contrast dye  Surgeries: orthopedic surgery, other  Other Surgery Detail: Hernia  Medications you are currently taking: cardiac medication  Occupation::   What are your primary job tasks: computer work, driving, prolonged sitting, prolonged standing, other  Other Tasks Detail: walking        Occupation: Retired, does volunteer work  Current exercise regimen/Recreational activities: will be biking when weather is nicer  Barriers to treatment:  none    Patient's Goal(s): Decrease pain with reaching and moving shoulder forwards    OBJECTIVE  Cervical Screen:   ROM: full cervical ROM in all planes, no reproduction of pain  Posture: thoracic kyphosis, rounded shoulders, forward head      Shoulder: *if reproductive of patient's primary complaint   AROM L AROM R PROM L PROM R MMT L MMT R   Flex 130 112*  120 4+/5 4/5*   Abd 150 95*  130 4+/5 4/5   IR     5/5 4+/5   ER 60 60*   4/5 4/5*   Ext/IR T7 T12*             Special tests:   L R   Impingement     Neer's  -   Hawkin's-Riki  -   Cross-over test     Painful Arc of Motion     Internal impingement test  -     Palpation: tenderness right pec musculature, lateral RC insertion    ASSESSMENT/PLAN  Patient is a 73 year old male with right side shoulder complaints.    Patient has the following significant findings with corresponding treatment plan.                Diagnosis 1:  Right shoulder pain    Pain -  hot/cold therapy, manual therapy, splint/taping/bracing/orthotics, self management, education, directional preference exercise and home program  Decreased ROM/flexibility - manual therapy, therapeutic exercise and home program  Decreased joint mobility - manual therapy, therapeutic exercise and home program  Decreased strength - therapeutic exercise, therapeutic activities and home program  Impaired posture - neuro re-education and home program    Therapy Evaluation Codes:   1) History comprised of:   Personal factors that impact the plan of care:      None.    Comorbidity factors that impact the plan of care are:      None.     Medications impacting care: None.  2) Examination of Body Systems comprised of:   Body structures and functions that impact the plan of care:      Shoulder.   Activity limitations that impact the plan of care are:      Bathing, Dressing, Lifting and Sleeping.  3) Clinical presentation characteristics are:   Stable/Uncomplicated.  4) Decision-Making    Low complexity using standardized  patient assessment instrument and/or measureable assessment of functional outcome.  Cumulative Therapy Evaluation is: Low complexity.    Previous and current functional limitations:  (See Goal Flow Sheet for this information)    Short term and Long term goals: (See Goal Flow Sheet for this information)     Communication ability:  Patient appears to be able to clearly communicate and understand verbal and written communication and follow directions correctly.  Treatment Explanation - The following has been discussed with the patient:   RX ordered/plan of care  Anticipated outcomes  Possible risks and side effects  This patient would benefit from PT intervention to resume normal activities.   Rehab potential is good.    Frequency:  2 X a month, once daily  Duration:  for 3 months  Discharge Plan:  Achieve all LTG.  Independent in home treatment program.  Reach maximal therapeutic benefit.    Please refer to the daily flowsheet for treatment today, total treatment time and time spent performing 1:1 timed codes.

## 2023-05-17 ENCOUNTER — TELEPHONE (OUTPATIENT)
Dept: ORTHOPEDICS | Facility: CLINIC | Age: 73
End: 2023-05-17

## 2023-05-17 ENCOUNTER — PRE VISIT (OUTPATIENT)
Dept: ORTHOPEDICS | Facility: CLINIC | Age: 73
End: 2023-05-17

## 2023-05-17 ENCOUNTER — OFFICE VISIT (OUTPATIENT)
Dept: ORTHOPEDICS | Facility: CLINIC | Age: 73
End: 2023-05-17
Attending: INTERNAL MEDICINE
Payer: COMMERCIAL

## 2023-05-17 DIAGNOSIS — M72.0 DUPUYTREN'S CONTRACTURE OF BOTH HANDS: ICD-10-CM

## 2023-05-17 PROCEDURE — 99204 OFFICE O/P NEW MOD 45 MIN: CPT | Mod: GC | Performed by: ORTHOPAEDIC SURGERY

## 2023-05-17 NOTE — LETTER
5/17/2023         RE: Frank Orellana  [address]  [facility] [address]        Dear Colleague,    Thank you for referring your patient, Frank Orellana, to the [facility] [place]. Please see a copy of my visit note below.    [facility]  ORTHOPAEDIC SURGERY CONSULT    DATE OF CONSULT: 5/17/2023 8:13 AM    REQUESTING PROVIDER: Yani Burnett MD, MD - [facility] Staff.    CC: Bilateral hand Dupuytren's contracture.  History of right ring finger subtotal palmar fasciectomy (1/20/2017, Dr. Oleary); left ring finger subtotal palmar fasciectomy (12/15/2017, Dr. Oleary)    HISTORY OF PRESENT ILLNESS:   Frank Orellana is a 73 year old right-hand dominant male who is 5 years status post the above-stated procedures for Dupuytren's contracture, here with new cords to bilateral hands.  Patient has a mid palmar cord to bilateral long fingers in addition to the right small finger.  Endorses pain with forced finger extension, as in a pushing motion; pain feels like a very intense stretch/tearing.  Feels he has some stiffness with finger extension but no significant limitations to range of motion.  No associated numbness, tingling.  No injections since above-stated surgical interventions bilaterally.    Denies recent injuries or additional surgeries from what stated above.  Patient is interested in surgical intervention.      PAST MEDICAL HISTORY:   Past Medical History:   Diagnosis Date    Actinic keratosis 2009    Atrial fibrillation (H) 6/15/96    Afib - 2 or 3 extended occurrences since    Chronic hepatitis C (H)     Chronic Hepatitis C,  genotype 1b                Stage 0 Fibrosis    Dupuytren's contracture of both hands     Elevated PSA     Hepatitis B 1969    acute infection at age 19; IV drug use    WINNIE (obstructive sleep apnea)     AHI 34.2    Pain in both hands     Patient is Ep[employer]op[employer]     Refusal of blood transfusions as patient is Episc[employer]alian      Right shoulder pain     Tinnitus 1 or 2 years ago    both ears    Tubular adenoma of colon 1/17/17    ascending colon, 1/17/17     [Patient denies any personal history of bleeding disorders, clotting disorders, or adverse reactions to anesthesia].    PAST SURGICAL HISTORY:    Past Surgical History:   Procedure Laterality Date    ANESTHESIA CARDIOVERSION N/A 1/15/2018    Procedure: ANESTHESIA CARDIOVERSION;  Anesthesia Coverage Cardioversion With Transesophageal Echocardiogram @0930 ;  Surgeon: GENERIC ANESTHESIA PROVIDER;  Location: [facility] OR    ANESTHESIA CARDIOVERSION N/A 10/11/2018    Procedure: ANESTHESIA CARDIOVERSION;  Cardioversion;  Surgeon: GENERIC ANESTHESIA PROVIDER;  Location: [facility]    ANESTHESIA CARDIOVERSION N/A 11/28/2018    Procedure: Anesthesia Coverage Cardioversion @0830;  Surgeon: GENERIC ANESTHESIA PROVIDER;  Location: [facility]    ARTHROSCOPY KNEE      left knee    COLONOSCOPY  1/17/17    tubular adenoma ascending colon    CYSTOSCOPY, TRANSURETHRAL RESECTION (TUR) PROSTATE, COMBINED N/A 8/21/2020    Procedure: CYSTOSCOPY, WITH TRANSURETHRAL RESECTION PROSTATE;  Surgeon: Roldan De La O MD;  Location: [facility] OR    HERNIORRHAPHY INGUINAL Right 10/26/2017    Procedure: HERNIORRHAPHY INGUINAL;  Open Right Inguinal Hernia Repair with Mesh;  Surgeon: Suresh Raymond MD;  Location: [facility]    KNEE SURGERY  2006??    torn meniscus    RELEASE DUPUYTRENS CONTRACTURE Right 1/20/2017    Procedure: RELEASE DUPUYTRENS CONTRACTURE;  Surgeon: Lars Oleary MD;  Location: [facility] OR    RELEASE DUPUYTRENS CONTRACTURE Left 12/15/2017    Procedure: RELEASE DUPUYTRENS CONTRACTURE;  Left Ring and Middle Finger Subtotal Palmar Fasciectomy;  Surgeon: Lars Oleary MD;  Location: [facility]    TRANSESOPHAGEAL ECHOCARDIOGRAM INTRAOPERATIVE N/A 1/15/2018    Procedure: TRANSESOPHAGEAL ECHOCARDIOGRAM INTRAOPERATIVE;;  Surgeon: GENERIC ANESTHESIA PROVIDER;  Location: [facility]       MEDICATIONS:   Prior to  Admission medications    Medication Sig Last Dose Taking? Auth Provider Long Term End Date   aspirin 81 MG EC tablet Take 1 tablet (81 mg) by mouth daily   Yamilka Thomas APRN CNP     diltiazem ER COATED BEADS (CARDIZEM CD/CARTIA XT) 120 MG 24 hr capsule Take 1 capsule (120 mg) by mouth daily   Yamilka Thomas APRN CNP Yes    flecainide (TAMBOCOR) 50 MG tablet Take 1 tablet (50 mg) by mouth 2 times daily   Yamilka Thomas APRN CNP Yes        ALLERGIES:   Blood transfusion related (informational only) and Contrast dye    SOCIAL HISTORY:   Social History     Socioeconomic History    Marital status:      Spouse name: Not on file    Number of children: Not on file    Years of education: Not on file    Highest education level: Not on file   Occupational History    Not on file   Tobacco Use    Smoking status: Former     Packs/day: 0.50     Years: 10.00     Pack years: 5.00     Types: Cigarettes     Start date: 6/15/1964     Quit date: 1974     Years since quittin.9    Smokeless tobacco: Former     Quit date: 1969   Vaping Use    Vaping status: Not on file   Substance and Sexual Activity    Alcohol use: Yes     Alcohol/week: 3.0 - 4.0 standard drinks of alcohol     Comment: 4 or 5 drinks/week, limit of 1    Drug use: No     Comment: --history of marijuana    Sexual activity: Yes     Partners: Female     Birth control/protection: None   Other Topics Concern     Service Not Asked    Blood Transfusions Not Asked    Caffeine Concern Not Asked    Occupational Exposure Not Asked    Hobby Hazards Not Asked    Sleep Concern Not Asked    Stress Concern Not Asked    Weight Concern Not Asked    Special Diet No     Comment: eats healthy    Back Care Not Asked    Exercise No    Bike Helmet Not Asked    Seat Belt Not Asked    Self-Exams Not Asked    Parent/sibling w/ CABG, MI or angioplasty before 65F 55M? 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[employer], followed by [employer]    Retired    Chemical manufacturing solvents    , no kids     Social Determinants of Health     Financial Resource Strain: Not on file   Food Insecurity: Not on file   Transportation Needs: Not on file   Physical Activity: Not on file   Stress: Not on file   Social Connections: Not on file   Intimate Partner Violence: Not on file   Housing Stability: Not on file     Living situation: Lives in [place], [place] with wife  Occupation: Retired, volunteer work  Tobacco: Denies  Alcohol: Socially  Illicit Drugs: Denies    FAMILY HISTORY:  Family History   Problem Relation Age of Onset    Arthritis Paternal Grandmother     Rheumatoid Arthritis Paternal Grandmother     Gastrointestinal Disease Father          age 77 after colon surgery    Alcoholism Father     Alcohol/Drug Mother          age 64    Alcoholism Mother     Heart Disease Brother     Cardiac Sudden Death Brother     Uterine Cancer Sister     Glaucoma No family hx of     Macular Degeneration No family hx of     Retinal detachment No family hx of        Patient denies known family history of bleeding, clotting, or anesthesia related complications.     REVIEW OF SYSTEMS:   10-point reviews of systems was negative except as noted above in the HPI.   Patient denies any new or recent: fevers, chills, rashes, headache, vision changes, hearing changes, sinus pain, sore throat, neck pain, swollen glands, cough, sob, chest pain/pressure, abdominal pain, nausea, vomiting, diarrhea, constipation, dysuria, hematuria, leg swelling, myalgias, numbness or tingling.    PHYSICAL EXAM:   There were no vitals filed for this visit.  General: Awake, alert, appropriate, following commands, NAD.  Neuro: EOM grossly intact  Skin: No rashes,  skin color normal.  HEENT: Normal.   Lungs: Breathing comfortably and nonlabored, no wheezes or stridor noted.  Heart/Cardiovascular: Regular pulse, no peripheral cyanosis.  Abdomen: Soft,  non-tender, non-distended.     Right Upper Extremity:   - No gross deformity, skin intact.  - Well-healed surgical incision over the ring finger with no porsha-incisional erythema or drainage  - Midpalmar cord extending to the palmar MCP joint of the long finger and extending to the middle phalanx of the small finger  - No significant tenderness to palpation over cords, forearm, wrist, hand, fingers  - MCP flexion contracture: 50 deg long finger, 35 deg small finger  - Able to fully extend all PIP/DIP joints  - Able to form full composite fist  - Motor intact distally FPL, EPL, IO.  - SILT median, ulnar, radial, axillary nerve distributions.  - Radial pulse palpable, fingers warm and well perfused.    Left Upper Extremity:   - No gross deformity, skin intact.  - Well-healed surgical incision over the ring finger with no porsha-incisional erythema or drainage  - Midpalmar cord extending to the palmar MCP joint of the long finger  - No significant tenderness to palpation over cords, forearm, wrist, hand, fingers  - Able to fully extend all MCP/PIP/DIP joints  - Able to form full composite fist  - Motor intact distally FPL, EPL, IO.  - SILT median, ulnar, radial, axillary nerve distributions.  - Radial pulse palpable, fingers warm and well perfused.    LABS:  Hemoglobin   Date Value Ref Range Status   04/24/2023 14.0 13.3 - 17.7 g/dL Final   08/22/2020 11.9 (L) 13.3 - 17.7 g/dL Final   08/21/2020 12.4 (L) 13.3 - 17.7 g/dL Final     WBC   Date Value Ref Range Status   08/22/2020 9.1 4.0 - 11.0 10e9/L Final     WBC Count   Date Value Ref Range Status   04/24/2023 5.9 4.0 - 11.0 10e3/uL Final     Platelet Count   Date Value Ref Range Status   04/24/2023 196 150 - 450 10e3/uL Final   08/22/2020 163 150 - 450 10e9/L Final     INR   Date Value Ref Range Status   01/15/2018 1.52 (H) 0.86 - 1.14 Final     Creatinine   Date Value Ref Range Status   04/24/2023 0.87 0.67 - 1.17 mg/dL Final   12/30/2020 0.92 0.66 - 1.25 mg/dL Final      Glucose   Date Value Ref Range Status   04/24/2023 100 (H) 70 - 99 mg/dL Final   12/17/2021 94 70 - 99 mg/dL Final   12/30/2020 91 70 - 99 mg/dL Final       IMAGING:  No new imaging    IMPRESSION:   1. Bilateral hand Dupuytren's contracture, right long/small fingers, left long finger  2. History of right ring finger subtotal palmar fasciectomy (1/20/2017, Dr. Oleary); left ring finger subtotal palmar fasciectomy (12/15/2017, Dr. Oleary)    Frank Orellana is a 73 year old right-hand dominant male 4 to 5 years status post bilateral ring finger subtotal palmar fasciectomies Dr. Oleary returns with bilateral Dupuytren's contracture to the right long/small fingers and left long finger with associated pain.  Patient has a mild MCP flexion contracture about the right long and small fingers; no flexion contractures to the left digits.    Counseled the patient on the risk, benefits, and alternatives of surgical intervention in the way of subtotal palmar fasciectomy.  This would be performed in a staged fashion to the right digits first then the left long finger at a later date.  Patient has trips planned this summer to [place] in [place]; recommended surgical intervention following return from these trips.    Patient knowledge understanding is interested in proceeding with surgical intervention    RECOMMENDATIONS:   - Plan for OR: Right long/small finger subtotal palmar fasciectomy; second stage would entail left long finger subtotal palmar fasciectomy at a later date  - Okay to continue activities as tolerated in the interim employing activity modifying behaviors  - Okay for over-the-counter pain medications as needed    - Follow-up: On date of surgery with Dr. Oleary the above    Seen and discussed with Dr. Anirudh Perez MD  PGY-4  Orthopaedic Surgery        Attestation signed by Lars Oleary MD at 5/17/2023 11:08 AM:  Patient was seen and examined with the resident.  I agree with the  assessment and plan of care.

## 2023-05-17 NOTE — NURSING NOTE
Teaching Flowsheet   Relevant Diagnosis: Right fingers   Teaching Topic: preop     Person(s) involved in teaching:   Patient and Wife     Motivation Level:  Asks Questions: Yes  Eager to Learn: Yes  Cooperative: Yes  Receptive (willing/able to accept information): Yes  Any cultural factors/Confucianist beliefs that may influence understanding or compliance? No       Patient and Family demonstrates understanding of the following:  Reason for the appointment, diagnosis and treatment plan: Yes  Knowledge of proper use of medications and conditions for which they are ordered (with special attention to potential side effects or drug interactions): Yes  Which situations necessitate calling provider and whom to contact: Yes       Teaching Concerns Addressed:        Proper use and care of meds. (medical equip, care aids, etc.): Yes  Nutritional needs and diet plan: Yes  Pain management techniques: Yes  Wound Care: Yes  How and/when to access community resources: Yes     Instructional Materials Used/Given: preop pkt     Time spent with patient: 15 minutes.

## 2023-05-17 NOTE — PROGRESS NOTES
[facility]  ORTHOPAEDIC SURGERY CONSULT    DATE OF CONSULT: 5/17/2023 8:13 AM    REQUESTING PROVIDER: Yani Burnett MD, MD - [facility] Staff.    CC: Bilateral hand Dupuytren's contracture.  History of right ring finger subtotal palmar fasciectomy (1/20/2017, Dr. Oleary); left ring finger subtotal palmar fasciectomy (12/15/2017, Dr. Oleary)    HISTORY OF PRESENT ILLNESS:   Frank Orellana is a 73 year old right-hand dominant male who is 5 years status post the above-stated procedures for Dupuytren's contracture, here with new cords to bilateral hands.  Patient has a mid palmar cord to bilateral long fingers in addition to the right small finger.  Endorses pain with forced finger extension, as in a pushing motion; pain feels like a very intense stretch/tearing.  Feels he has some stiffness with finger extension but no significant limitations to range of motion.  No associated numbness, tingling.  No injections since above-stated surgical interventions bilaterally.    Denies recent injuries or additional surgeries from what stated above.  Patient is interested in surgical intervention.      PAST MEDICAL HISTORY:   Past Medical History:   Diagnosis Date     Actinic keratosis 2009     Atrial fibrillation (H) 6/15/96    Afib - 2 or 3 extended occurrences since     Chronic hepatitis C (H)     Chronic Hepatitis C,  genotype 1b                Stage 0 Fibrosis     Dupuytren's contracture of both hands      Elevated PSA      Hepatitis B 1969    acute infection at age 19; IV drug use     WINNIE (obstructive sleep apnea)     AHI 34.2     Pain in both hands      Patient is Episcopal      Refusal of blood transfusions as patient is Episcopal      Right shoulder pain      Tinnitus 1 or 2 years ago    both ears     Tubular adenoma of colon 1/17/17    ascending colon, 1/17/17     [Patient denies any personal history of bleeding disorders, clotting disorders, or adverse reactions to anesthesia].    PAST  SURGICAL HISTORY:    Past Surgical History:   Procedure Laterality Date     ANESTHESIA CARDIOVERSION N/A 1/15/2018    Procedure: ANESTHESIA CARDIOVERSION;  Anesthesia Coverage Cardioversion With Transesophageal Echocardiogram @0930 ;  Surgeon: GENERIC ANESTHESIA PROVIDER;  [facility]     ANESTHESIA CARDIOVERSION N/A 10/11/2018    Procedure: ANESTHESIA CARDIOVERSION;  Cardioversion;  Surgeon: GENERIC ANESTHESIA PROVIDER;  [facility]     ANESTHESIA CARDIOVERSION N/A 11/28/2018    Procedure: Anesthesia Coverage Cardioversion @0830;  Surgeon: GENERIC ANESTHESIA PROVIDER;  [facility]     ARTHROSCOPY KNEE      left knee     COLONOSCOPY  1/17/17    tubular adenoma ascending colon     CYSTOSCOPY, TRANSURETHRAL RESECTION (TUR) PROSTATE, COMBINED N/A 8/21/2020    Procedure: CYSTOSCOPY, WITH TRANSURETHRAL RESECTION PROSTATE;  Surgeon: Roldan De La O MD;  [facility]     HERNIORRHAPHY INGUINAL Right 10/26/2017    Procedure: HERNIORRHAPHY INGUINAL;  Open Right Inguinal Hernia Repair with Mesh;  Surgeon: Suresh Raymond MD;  [facility]     KNEE SURGERY  2006??    torn meniscus     RELEASE DUPUYTRENS CONTRACTURE Right 1/20/2017    Procedure: RELEASE DUPUYTRENS CONTRACTURE;  Surgeon: Lars Oleary MD;  [facility]     RELEASE DUPUYTRENS CONTRACTURE Left 12/15/2017    Procedure: RELEASE DUPUYTRENS CONTRACTURE;  Left Ring and Middle Finger Subtotal Palmar Fasciectomy;  Surgeon: Lars Oleary MD;  [facility]     TRANSESOPHAGEAL ECHOCARDIOGRAM INTRAOPERATIVE N/A 1/15/2018    Procedure: TRANSESOPHAGEAL ECHOCARDIOGRAM INTRAOPERATIVE;;  Surgeon: GENERIC ANESTHESIA PROVIDER;  [facility]       MEDICATIONS:   Prior to Admission medications    Medication Sig Last Dose Taking? Auth Provider Long Term End Date   aspirin 81 MG EC tablet Take 1 tablet (81 mg) by mouth daily   Yamilka Thomas, ANDREINA YOU     diltiazem ER COATED BEADS (CARDIZEM CD/CARTIA XT) 120 MG 24  hr capsule Take 1 capsule (120 mg) by mouth daily   Yamilka Thomas APRN CNP Yes    flecainide (TAMBOCOR) 50 MG tablet Take 1 tablet (50 mg) by mouth 2 times daily   Yamilka Thomas APRN CNP Yes        ALLERGIES:   Blood transfusion related (informational only) and Contrast dye    SOCIAL HISTORY:   Social History     Socioeconomic History     Marital status:      Spouse name: Not on file     Number of children: Not on file     Years of education: Not on file     Highest education level: Not on file   Occupational History     Not on file   Tobacco Use     Smoking status: Former     Packs/day: 0.50     Years: 10.00     Pack years: 5.00     Types: Cigarettes     Start date: 6/15/1964     Quit date: 1974     Years since quittin.9     Smokeless tobacco: Former     Quit date: 1969   Vaping Use     Vaping status: Not on file   Substance and Sexual Activity     Alcohol use: Yes     Alcohol/week: 3.0 - 4.0 standard drinks of alcohol     Comment: 4 or 5 drinks/week, limit of 1     Drug use: No     Comment: --history of marijuana     Sexual activity: Yes     Partners: Female     Birth control/protection: None   Other Topics Concern      Service Not Asked     Blood Transfusions Not Asked     Caffeine Concern Not Asked     Occupational Exposure Not Asked     Hobby Hazards Not Asked     Sleep Concern Not Asked     Stress Concern Not Asked     Weight Concern Not Asked     Special Diet No     Comment: eats healthy     Back Care Not Asked     Exercise No     Bike Helmet Not Asked     Seat Belt Not Asked     Self-Exams Not Asked     Parent/sibling w/ CABG, MI or angioplasty before 65F 55M? 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, followed by [employer]    Retired    Chemical manufacturing solvents    , no kids     Social Determinants of Health     Financial Resource Strain: Not on file   Food Insecurity: Not on file   Transportation  Needs: Not on file   Physical Activity: Not on file   Stress: Not on file   Social Connections: Not on file   Intimate Partner Violence: Not on file   Housing Stability: Not on file     Living situation: Lives in [place], [place] with wife  Occupation: Retired, volunteer work  Tobacco: Denies  Alcohol: Socially  Illicit Drugs: Denies    FAMILY HISTORY:  Family History   Problem Relation Age of Onset     Arthritis Paternal Grandmother      Rheumatoid Arthritis Paternal Grandmother      Gastrointestinal Disease Father          age 77 after colon surgery     Alcoholism Father      Alcohol/Drug Mother          age 64     Alcoholism Mother      Heart Disease Brother      Cardiac Sudden Death Brother      Uterine Cancer Sister      Glaucoma No family hx of      Macular Degeneration No family hx of      Retinal detachment No family hx of        Patient denies known family history of bleeding, clotting, or anesthesia related complications.     REVIEW OF SYSTEMS:   10-point reviews of systems was negative except as noted above in the HPI.   Patient denies any new or recent: fevers, chills, rashes, headache, vision changes, hearing changes, sinus pain, sore throat, neck pain, swollen glands, cough, sob, chest pain/pressure, abdominal pain, nausea, vomiting, diarrhea, constipation, dysuria, hematuria, leg swelling, myalgias, numbness or tingling.    PHYSICAL EXAM:   There were no vitals filed for this visit.  General: Awake, alert, appropriate, following commands, NAD.  Neuro: EOM grossly intact  Skin: No rashes,  skin color normal.  HEENT: Normal.   Lungs: Breathing comfortably and nonlabored, no wheezes or stridor noted.  Heart/Cardiovascular: Regular pulse, no peripheral cyanosis.  Abdomen: Soft, non-tender, non-distended.     Right Upper Extremity:   - No gross deformity, skin intact.  - Well-healed surgical incision over the ring finger with no porsha-incisional erythema or drainage  - Midpalmar cord extending  to the palmar MCP joint of the long finger and extending to the middle phalanx of the small finger  - No significant tenderness to palpation over cords, forearm, wrist, hand, fingers  - MCP flexion contracture: 50 deg long finger, 35 deg small finger  - Able to fully extend all PIP/DIP joints  - Able to form full composite fist  - Motor intact distally FPL, EPL, IO.  - SILT median, ulnar, radial, axillary nerve distributions.  - Radial pulse palpable, fingers warm and well perfused.    Left Upper Extremity:   - No gross deformity, skin intact.  - Well-healed surgical incision over the ring finger with no porsha-incisional erythema or drainage  - Midpalmar cord extending to the palmar MCP joint of the long finger  - No significant tenderness to palpation over cords, forearm, wrist, hand, fingers  - Able to fully extend all MCP/PIP/DIP joints  - Able to form full composite fist  - Motor intact distally FPL, EPL, IO.  - SILT median, ulnar, radial, axillary nerve distributions.  - Radial pulse palpable, fingers warm and well perfused.    LABS:  Hemoglobin   Date Value Ref Range Status   04/24/2023 14.0 13.3 - 17.7 g/dL Final   08/22/2020 11.9 (L) 13.3 - 17.7 g/dL Final   08/21/2020 12.4 (L) 13.3 - 17.7 g/dL Final     WBC   Date Value Ref Range Status   08/22/2020 9.1 4.0 - 11.0 10e9/L Final     WBC Count   Date Value Ref Range Status   04/24/2023 5.9 4.0 - 11.0 10e3/uL Final     Platelet Count   Date Value Ref Range Status   04/24/2023 196 150 - 450 10e3/uL Final   08/22/2020 163 150 - 450 10e9/L Final     INR   Date Value Ref Range Status   01/15/2018 1.52 (H) 0.86 - 1.14 Final     Creatinine   Date Value Ref Range Status   04/24/2023 0.87 0.67 - 1.17 mg/dL Final   12/30/2020 0.92 0.66 - 1.25 mg/dL Final     Glucose   Date Value Ref Range Status   04/24/2023 100 (H) 70 - 99 mg/dL Final   12/17/2021 94 70 - 99 mg/dL Final   12/30/2020 91 70 - 99 mg/dL Final       IMAGING:  No new imaging    IMPRESSION:   1. Bilateral hand  Dupuytren's contracture, right long/small fingers, left long finger  2. History of right ring finger subtotal palmar fasciectomy (1/20/2017, Dr. Oleary); left ring finger subtotal palmar fasciectomy (12/15/2017, Dr. Oleary)    Frank Orellana is a 73 year old right-hand dominant male 4 to 5 years status post bilateral ring finger subtotal palmar fasciectomies Dr. Oleary returns with bilateral Dupuytren's contracture to the right long/small fingers and left long finger with associated pain.  Patient has a mild MCP flexion contracture about the right long and small fingers; no flexion contractures to the left digits.    Counseled the patient on the risk, benefits, and alternatives of surgical intervention in the way of subtotal palmar fasciectomy.  This would be performed in a staged fashion to the right digits first then the left long finger at a later date.  Patient has trips planned this summer to [place] in [place]; recommended surgical intervention following return from these trips.    Patient knowledge understanding is interested in proceeding with surgical intervention    RECOMMENDATIONS:   - Plan for OR: Right long/small finger subtotal palmar fasciectomy; second stage would entail left long finger subtotal palmar fasciectomy at a later date  - Okay to continue activities as tolerated in the interim employing activity modifying behaviors  - Okay for over-the-counter pain medications as needed    - Follow-up: On date of surgery with Dr. Oleary the above    Seen and discussed with Dr. Anirudh Perez MD  PGY-4  Orthopaedic Surgery

## 2023-05-17 NOTE — TELEPHONE ENCOUNTER
Patient is scheduled for surgery with Dr. Oleary    Spoke with: patient    Date of Surgery: 08/09/23    [facility]    Informed patient they will need an adult  yes    H&P: Scheduled with: PCP    Additional imaging/appointments: pop made    Surgery packet: recieved     Additional comments: N/A

## 2023-05-17 NOTE — NURSING NOTE
Reason For Visit:   Chief Complaint   Patient presents with     Consult     Dupuytren's contracture both hands       Primary MD: Yani Burnett  Ref. MD: Hortencia    Age: 73 year old    ?  No      There were no vitals taken for this visit.      Pain Assessment  Patient Currently in Pain: Yes  0-10 Pain Scale: 5  Primary Pain Location: Hand (both hands)  Pain Descriptors: Intermittent, Sharp    Hand Dominance Evaluation  Hand Dominance: Right          QuickDASH Assessment      5/15/2023     9:01 PM   QuickDASH Main   1. Open a tight or new jar Moderate difficulty   2. Do heavy household chores (e.g., wash walls, floors) Severe difficulty   3. Carry a shopping bag or briefcase Mild difficulty   4. Wash your back Moderate difficulty   5. Use a knife to cut food No difficulty   6. Recreational activities in which you take some force or impact through your arm, shoulder or hand (e.g., golf, hammering, tennis, etc.) Moderate difficulty   7. During the past week, to what extent has your arm, shoulder or hand problem interfered with your normal social activities with family, friends, neighbours or groups Moderately   8. During the past week, were you limited in your work or other regular daily activities as a result of your arm, shoulder or hand problem Very limited   9. Arm, shoulder or hand pain Severe   10.Tingling (pins and needles) in your arm,shoulder or hand None   11. During the past week, how much difficulty have you had sleeping because of the pain in your arm, shoulder or hand Mild difficulty   Quickdash Ability Score 43.18          Current Outpatient Medications   Medication Sig Dispense Refill     aspirin 81 MG EC tablet Take 1 tablet (81 mg) by mouth daily 90 tablet 3     diltiazem ER COATED BEADS (CARDIZEM CD/CARTIA XT) 120 MG 24 hr capsule Take 1 capsule (120 mg) by mouth daily 90 capsule 3     flecainide (TAMBOCOR) 50 MG tablet Take 1 tablet (50 mg) by mouth 2 times daily 180 tablet 3        Allergies   Allergen Reactions     Blood Transfusion Related (Informational Only)      Jew.  Declines blood transfusions.     Contrast Dye Rash       KWABENA BRAVO, ATC

## 2023-06-08 ENCOUNTER — PREP FOR PROCEDURE (OUTPATIENT)
Dept: ORTHOPEDICS | Facility: CLINIC | Age: 73
End: 2023-06-08
Payer: COMMERCIAL

## 2023-06-08 NOTE — TELEPHONE ENCOUNTER
FUTURE VISIT INFORMATION      FUTURE VISIT INFORMATION:    Date: 8/7/23    Time: 8:30am    Location: [facility]  REFERRAL INFORMATION:    Referring provider:  Yani Burnett MD    Referring providers clinic:  [facility]    Reason for visit/diagnosis  Comprehensive eye exam, glaucoma suspect     RECORDS REQUESTED FROM:       Clinic name Comments Records Status Imaging Status   [facility] Ov/referral 4/24/23 EPIC

## 2023-06-12 ENCOUNTER — TELEPHONE (OUTPATIENT)
Dept: GASTROENTEROLOGY | Facility: CLINIC | Age: 73
End: 2023-06-12
Payer: COMMERCIAL

## 2023-06-12 NOTE — TELEPHONE ENCOUNTER
Attempted to contact patient regarding upcoming Colonoscopy  procedure on 6/26/23 for pre assessment questions. No answer.     Left message to return call to [phone] #[phone]    Brandy Sorto RN  Endoscopy Procedure Pre Assessment RN

## 2023-06-12 NOTE — TELEPHONE ENCOUNTER
Patient scheduled for Colonoscopy  on 6/26/23.     Discuss Covid policy.     Pre op exam needed? N/A    Arrival time: 0915. Procedure time 1015    Facility location: [facility]; [address], [address], [place] [address]    Sedation type: Conscious sedation     NSAIDs? No    Anticoagulations? No    Electronic implanted devices? No    Diabetic? No    Indication for procedure: screening     Bowel prep recommendation: Miralax prep without magnesium citrate    Prep instructions sent via "[employer] - Updated with new bowel prep times.     Pre visit planning completed.    Brandy Gorman RN  Endoscopy Procedure Pre Assessment RN

## 2023-06-13 NOTE — TELEPHONE ENCOUNTER
Patient returned call.     Pre assessment questions completed for upcoming Colonoscopy procedure scheduled on 6/26/23    COVID policy reviewed.     Pre-op exam? N/A    Reviewed procedural arrival time 0915, procedure time 1015 and facility location [facility]; [address], [place] [address]    Designated  policy reviewed. Instructed to have someone stay 6 hours post procedure.     NSAIDs? Yes.  ASA 81 mg - no indication to hold    Anticoagulation/blood thinners? No    Electronic implanted devices? No    Diabetic? No    HOLD (if applicable)  Oral diabetic medications: N/A  Diabetic injectables: N/A  Insulin: N/A    Patient stated they have already reviewed bowel prep instructions and had no questions. NPO instructions reviewed.    Patient verbalized understanding and had no questions at this time. Patient was instructed to call if any questions or concerns arise.       Antonia Larios RN  Endoscopy Procedure Pre Assessment RN

## 2023-06-26 ENCOUNTER — HOSPITAL ENCOUNTER (OUTPATIENT)
Facility: CLINIC | Age: 73
Discharge: HOME | End: 2023-06-26
Attending: INTERNAL MEDICINE | Admitting: INTERNAL MEDICINE
Payer: COMMERCIAL

## 2023-06-26 VITALS
DIASTOLIC BLOOD PRESSURE: 67 MMHG | OXYGEN SATURATION: 98 % | HEART RATE: 47 BPM | RESPIRATION RATE: 16 BRPM | SYSTOLIC BLOOD PRESSURE: 126 MMHG

## 2023-06-26 LAB — COLONOSCOPY: NORMAL

## 2023-06-26 PROCEDURE — 45380 COLONOSCOPY AND BIOPSY: CPT | Performed by: INTERNAL MEDICINE

## 2023-06-26 PROCEDURE — 99153 MOD SED SAME PHYS/QHP EA: CPT | Mod: PT | Performed by: INTERNAL MEDICINE

## 2023-06-26 PROCEDURE — 45382 COLONOSCOPY W/CONTROL BLEED: CPT | Mod: PT | Performed by: INTERNAL MEDICINE

## 2023-06-26 PROCEDURE — 258N000003 HC RX IP 258 OP 636: Performed by: INTERNAL MEDICINE

## 2023-06-26 PROCEDURE — 88305 TISSUE EXAM BY PATHOLOGIST: CPT | Mod: 26 | Performed by: PATHOLOGY

## 2023-06-26 PROCEDURE — G0500 MOD SEDAT ENDO SERVICE >5YRS: HCPCS | Mod: PT | Performed by: INTERNAL MEDICINE

## 2023-06-26 PROCEDURE — 250N000011 HC RX IP 250 OP 636: Performed by: INTERNAL MEDICINE

## 2023-06-26 PROCEDURE — 45385 COLONOSCOPY W/LESION REMOVAL: CPT | Mod: PT,XU | Performed by: INTERNAL MEDICINE

## 2023-06-26 PROCEDURE — 88305 TISSUE EXAM BY PATHOLOGIST: CPT | Mod: TC | Performed by: INTERNAL MEDICINE

## 2023-06-26 PROCEDURE — 250N000009 HC RX 250: Performed by: INTERNAL MEDICINE

## 2023-06-26 RX ORDER — SODIUM CHLORIDE 9 MG/ML
INJECTION, SOLUTION INTRAVENOUS CONTINUOUS PRN
Status: DISCONTINUED | OUTPATIENT
Start: 2023-06-26 | End: 2023-06-26 | Stop reason: HOSPADM

## 2023-06-26 RX ORDER — FENTANYL CITRATE 50 UG/ML
INJECTION, SOLUTION INTRAMUSCULAR; INTRAVENOUS PRN
Status: DISCONTINUED | OUTPATIENT
Start: 2023-06-26 | End: 2023-06-26 | Stop reason: HOSPADM

## 2023-06-26 ASSESSMENT — ACTIVITIES OF DAILY LIVING (ADL): ADLS_ACUITY_SCORE: 35

## 2023-06-26 NOTE — H&P
ENDOSCOPY PRE-SEDATION H&P FOR OUTPATIENT PROCEDURES    Frank Orellana  [id number]  1950    Procedure:  Colonoscopy    Pre-procedure diagnosis: Hx polyps    Past medical history:   Past Medical History:   Diagnosis Date     Actinic keratosis 2009     Atrial fibrillation (H) 6/15/96    Afib - 2 or 3 extended occurrences since     Chronic hepatitis C (H)     Chronic Hepatitis C,  genotype 1b                Stage 0 Fibrosis     Dupuytren's contracture of both hands      Elevated PSA      Hepatitis B 1969    acute infection at age 19; IV drug use     WINNIE (obstructive sleep apnea)     AHI 34.2     Pain in both hands      Patient is Anabaptism      Refusal of blood transfusions as patient is Anabaptism      Right shoulder pain      Tinnitus 1 or 2 years ago    both ears     Tubular adenoma of colon 1/17/17    ascending colon, 1/17/17     Patient Active Problem List   Diagnosis     History of actinic keratoses     Telangiectasia     SK (seborrheic keratosis)     Patient is Anabaptism     Refusal of blood transfusions as patient is Anabaptism     Paroxysmal atrial fibrillation (H)     Chronic left shoulder pain     WINNIE (obstructive sleep apnea) positional     Atrial fibrillation (H)     Tubular adenoma of colon     Bilateral sensorineural hearing loss     Elevated PSA     BPH with urinary obstruction     BPH (benign prostatic hyperplasia)     Neck pain     Dupuytren's contracture of both hands       Past surgical history:   Past Surgical History:   Procedure Laterality Date     ANESTHESIA CARDIOVERSION N/A 1/15/2018    Procedure: ANESTHESIA CARDIOVERSION;  Anesthesia Coverage Cardioversion With Transesophageal Echocardiogram @0930 ;  Surgeon: GENERIC ANESTHESIA PROVIDER;  [facility]     ANESTHESIA CARDIOVERSION N/A 10/11/2018    Procedure: ANESTHESIA CARDIOVERSION;  Cardioversion;  Surgeon: GENERIC ANESTHESIA PROVIDER;  [facility]     ANESTHESIA CARDIOVERSION N/A 11/28/2018     Procedure: Anesthesia Coverage Cardioversion @0830;  Surgeon: GENERIC ANESTHESIA PROVIDER;  [facility]     ARTHROSCOPY KNEE      left knee     COLONOSCOPY  1/17/17    tubular adenoma ascending colon     CYSTOSCOPY, TRANSURETHRAL RESECTION (TUR) PROSTATE, COMBINED N/A 8/21/2020    Procedure: CYSTOSCOPY, WITH TRANSURETHRAL RESECTION PROSTATE;  Surgeon: Roldan De La O MD;  [facility]     HERNIORRHAPHY INGUINAL Right 10/26/2017    Procedure: HERNIORRHAPHY INGUINAL;  Open Right Inguinal Hernia Repair with Mesh;  Surgeon: Suresh Raymond MD;  [facility]     KNEE SURGERY  2006??    torn meniscus     RELEASE DUPUYTRENS CONTRACTURE Right 1/20/2017    Procedure: RELEASE DUPUYTRENS CONTRACTURE;  Surgeon: Lars Oleary MD;  [facility]     RELEASE DUPUYTRENS CONTRACTURE Left 12/15/2017    Procedure: RELEASE DUPUYTRENS CONTRACTURE;  Left Ring and Middle Finger Subtotal Palmar Fasciectomy;  Surgeon: Lars Oleary MD;  Location:  OR     TRANSESOPHAGEAL ECHOCARDIOGRAM INTRAOPERATIVE N/A 1/15/2018    Procedure: TRANSESOPHAGEAL ECHOCARDIOGRAM INTRAOPERATIVE;;  Surgeon: GENERIC ANESTHESIA PROVIDER;  [facility] OR       No current facility-administered medications for this encounter.       Allergies   Allergen Reactions     Blood Transfusion Related (Informational Only)      Roman Catholic.  Declines blood transfusions.     Contrast Dye Rash       History of Anesthesia/Sedation Problems: no    Physical Exam:    Mental status: alert  Heart: Normal  Lung: Normal  Assessment of patient's airway: Normal  Other as pertinent for procedure: None       Lab Results   Component Value Date    WBC 5.9 04/24/2023    WBC 9.1 08/22/2020     Lab Results   Component Value Date    RBC 4.38 04/24/2023    RBC 3.74 08/22/2020     Lab Results   Component Value Date    HGB 14.0 04/24/2023    HGB 11.9 08/22/2020     Lab Results   Component Value Date    HCT 41.4 04/24/2023    HCT 35.9 08/22/2020      Lab Results   Component Value Date    MCV 95 04/24/2023    MCV 96 08/22/2020     Lab Results   Component Value Date    MCH 32.0 04/24/2023    MCH 31.8 08/22/2020     Lab Results   Component Value Date    MCHC 33.8 04/24/2023    MCHC 33.1 08/22/2020     Lab Results   Component Value Date    RDW 12.3 04/24/2023    RDW 12.7 08/22/2020     Lab Results   Component Value Date     04/24/2023     08/22/2020     INR   Date Value Ref Range Status   01/15/2018 1.52 (H) 0.86 - 1.14 Final        ASA Score: See Provation note    Mallampati score:  I - Faucial pillars, soft palate, and uvula are visible    Assessment/Plan:     The patient is an appropriate candidate to receive sedation.    Informed consent was discussed with the patient/family, including the risks, benefits, potential complications and any alternative options associated with sedation.    Patient assessment completed just prior to sedation and while under constant observation by the provider. Condition determined to be adequate for proceeding with sedation.    The specific risks for the procedure were discussed with the patient at the time of informed consent and include but are not limited to perforation which could require surgery, missing significant neoplasm or lesion, hemorrhage and adverse sedative complication.    He reiterated that he would not want a transfusions under any conditions. We discussed the fact that if he had a major hemorrhage, which is very unlikely, the inability to give him transfusions could lead to a life-threatening situation. He wanted to proceed with the procedure.       Husesin Stevens MD

## 2023-06-26 NOTE — OR NURSING
Patient underwent colonoscopy w/ polypectomy x 2  & clips x  under conscious sedation. Tolerated procedure. Specimens sent to lab. Report given to endo recovery RN.

## 2023-06-27 LAB
PATH REPORT.COMMENTS IMP SPEC: NORMAL
PATH REPORT.COMMENTS IMP SPEC: NORMAL
PATH REPORT.FINAL DX SPEC: NORMAL
PATH REPORT.GROSS SPEC: NORMAL
PATH REPORT.MICROSCOPIC SPEC OTHER STN: NORMAL
PATH REPORT.RELEVANT HX SPEC: NORMAL
PHOTO IMAGE: NORMAL

## 2023-07-02 ENCOUNTER — MYC MEDICAL ADVICE (OUTPATIENT)
Dept: INTERNAL MEDICINE | Facility: CLINIC | Age: 73
End: 2023-07-02
Payer: COMMERCIAL

## 2023-07-16 DIAGNOSIS — I48.0 PAF (PAROXYSMAL ATRIAL FIBRILLATION) (H): ICD-10-CM

## 2023-07-20 NOTE — TELEPHONE ENCOUNTER
Flecainide Acetate 50 MG Oral Tablet  Last Written Prescription Date:  11/2/2022  Last Fill Quantity: 180,   # refills: 3  Last Office Visit :  11/2/2022  Future Office visit:   11/3/2023  Routing refill request to provider for review/approval because:  Med needs reviewed by the Provider      Anti Arrhythmic Agents Protocol Failed 07/16/2023 04:31 AM   Protocol Details  Medication needs approval from authorizing provider    Recent (6 mo) or future (30 days) visit with authorizing provider's specialty          DilTIAZem CD CAP 120MG/24HR  Last Written Prescription Date:  11/2/2022  Last Fill Quantity: 90,   # refills: 3  Last Office Visit :  11/2/2022  Future Office visit:   11/3/2023  Routing refill request to provider for review/approval because:  Refer clinic to for review       Brenda Carty RN  Central Triage Red Flags/Med Refills

## 2023-07-25 ENCOUNTER — TELEPHONE (OUTPATIENT)
Dept: CARDIOLOGY | Facility: CLINIC | Age: 73
End: 2023-07-25
Payer: COMMERCIAL

## 2023-07-27 ENCOUNTER — TELEPHONE (OUTPATIENT)
Dept: INTERNAL MEDICINE | Facility: CLINIC | Age: 73
End: 2023-07-27
Payer: COMMERCIAL

## 2023-07-27 ENCOUNTER — NURSE TRIAGE (OUTPATIENT)
Dept: NURSING | Facility: CLINIC | Age: 73
End: 2023-07-27
Payer: COMMERCIAL

## 2023-07-27 ENCOUNTER — MYC MEDICAL ADVICE (OUTPATIENT)
Dept: INTERNAL MEDICINE | Facility: CLINIC | Age: 73
End: 2023-07-27
Payer: COMMERCIAL

## 2023-07-27 RX ORDER — FLECAINIDE ACETATE 50 MG/1
50 TABLET ORAL 2 TIMES DAILY
Qty: 180 TABLET | Refills: 1 | Status: SHIPPED | OUTPATIENT
Start: 2023-07-27 | End: 2023-10-17

## 2023-07-27 RX ORDER — DILTIAZEM HYDROCHLORIDE 120 MG/1
120 CAPSULE, COATED, EXTENDED RELEASE ORAL DAILY
Qty: 90 CAPSULE | Refills: 1 | Status: SHIPPED | OUTPATIENT
Start: 2023-07-27 | End: 2023-10-17

## 2023-07-27 NOTE — TELEPHONE ENCOUNTER
COVID Positive/Requesting COVID treatment    Patient is positive for COVID and requesting treatment options.  -patient gave verbal consent to communicate with Larissa Jones    Date of positive COVID test (PCR or at home)? 7/27/23 home  Current COVID symptoms: fever or chills, cough, and muscle or body aches  Date COVID symptoms began: 7/25/23 (day 2)  -High risk: age  -PCP is Yani Burnett MD  with [facility] [place]    Message should be routed to clinic RN pool. Best phone number to use for call back: [phone]      Reason for Disposition   [1] HIGH RISK for severe COVID complications (e.g., weak immune system, age > 64 years, obesity with BMI of 30 or higher, pregnant, chronic lung disease or other chronic medical condition) AND [2] COVID symptoms (e.g., cough, fever)  (Exceptions: Already seen by PCP and no new or worsening symptoms.)    Additional Information   Negative: [1] Diagnosed or suspected COVID-19 AND [2] symptoms lasting 3 or more weeks   Negative: [1] COVID-19 exposure AND [2] no symptoms   Negative: COVID-19 vaccine reaction suspected (e.g., fever, headache, muscle aches) occurring 1 to 3 days after getting vaccine   Negative: COVID-19 vaccine, questions about   Negative: [1] Lives with someone known to have influenza (flu test positive) AND [2] flu-like symptoms (e.g., cough, runny nose, sore throat, SOB; with or without fever)   Negative: [1] Adult with possible COVID-19 symptoms AND [2] triager concerned about severity of symptoms or other causes   Negative: COVID-19 and breastfeeding, questions about   Negative: SEVERE difficulty breathing (e.g., struggling for each breath, speaks in single words)   Negative: Difficult to awaken or acting confused (e.g., disoriented, slurred speech)   Negative: SEVERE or constant chest pain or pressure  (Exception: Mild central chest pain, present only when coughing.)   Negative: Chest pain or pressure  (Exception: MILD  central chest pain, present only when coughing)   Negative: Patient sounds very sick or weak to the triager   Negative: Bluish (or gray) lips or face now   Negative: Shock suspected (e.g., cold/pale/clammy skin, too weak to stand, low BP, rapid pulse)   Negative: Sounds like a life-threatening emergency to the triager   Negative: MODERATE difficulty breathing (e.g., speaks in phrases, SOB even at rest, pulse 100-120)   Negative: MILD difficulty breathing (e.g., minimal/no SOB at rest, SOB with walking, pulse <100)   Negative: Headache and stiff neck (can't touch chin to chest)   Negative: Oxygen level (e.g., pulse oximetry) 90 percent or lower   Negative: [1] Fever > 101 F (38.3 C) AND [2] over 60 years of age   Negative: [1] Fever > 100.0 F (37.8 C) AND [2] bedridden (e.g., nursing home patient, CVA, chronic illness, recovering from surgery)   Negative: Fever > 103 F (39.4 C)    Protocols used: Coronavirus (COVID-19) Diagnosed or [id number]

## 2023-07-28 ENCOUNTER — VIRTUAL VISIT (OUTPATIENT)
Dept: INTERNAL MEDICINE | Facility: CLINIC | Age: 73
End: 2023-07-28
Payer: COMMERCIAL

## 2023-07-28 DIAGNOSIS — U07.1 INFECTION DUE TO 2019 NOVEL CORONAVIRUS: Primary | ICD-10-CM

## 2023-07-28 PROCEDURE — 99214 OFFICE O/P EST MOD 30 MIN: CPT | Mod: VID | Performed by: PEDIATRICS

## 2023-07-28 NOTE — NURSING NOTE
Is the patient currently in the state of [place]? YES - at home.    Visit mode:VIDEO    If the visit is dropped, the patient can be reconnected by: VIDEO VISIT: Text to cell phone:   Telephone Information:   Mobile [phone]       Will anyone else be joining the visit? Yes: How would they like to receive their invite Text to cell phone: [phone]  (If patient encounters technical issues they should call [phone])    How would you like to obtain your AVS? MyChart    Are changes needed to the allergy or medication list? No    Rooming Documentation:  Pt's wife will be joining in video visit with them.    Pt consented to Care Everywhere.    Reason for visit: MIMI العراقي, VVF

## 2023-07-28 NOTE — PROGRESS NOTES
"Virtual Visit Details    Type of service:  Video Visit   Video Start Time: 0900  Video End Time:9:22 AM    Originating Location (pt. Location): Home    Distant Location (provider location):  Off-site  Platform used for Video Visit: AmWell    Dear patient. Thank you for visiting with me. I want you to feel respected, understood, and empowered. \"Respect\" is valuing you as much as I would a close family member. \"Empowerment\" happens when you are fully informed, and can make the best possible decision for you.  Please ask me questions!  Challenge anything that is not clear.    Medical records are primarily used as memory aids for me and my colleagues. Things to know about my documentation style:  - The 'problem list' includes current symptoms or diagnoses, and some problems that are resolved but may return. I use the past medical history for problems not expected to return.  - I use single quotation marks for things that you or I said, when I want to clarify who was speaking.  - I use double quotation marks when copying a term from elsewhere in your records. Italics (besides here) may also denote a quotation.  If you have questions or concerns, please contact me; I will reply as soon as time allows.    Subjective     Frank Orellana is a 73 year old man, with concerns including:  Chief Complaint   Patient presents with    RECHECK     PCP: Yani Burnett   Visit type: problem-oriented      COVID from cruise (19th through 26th). Test was yesterday. First symptoms was Monday. Coughing for a while, but no longer last night. Sweating all night.   Last night slept a little better.   No fever now. Has been resting.      Objective     There were no vitals taken for this visit.    Physical Exam  Constitutional:       Appearance: Normal appearance.   HENT:      Head: Normocephalic.      Nose: Nose normal.      Mouth/Throat:      Comments: Deep/roughened voice, no cough during visit  Eyes:      General: No scleral icterus.        " Right eye: No discharge.         Left eye: No discharge.      Extraocular Movements: Extraocular movements intact.   Pulmonary:      Effort: Pulmonary effort is normal. No respiratory distress.      Breath sounds: No stridor. No wheezing.   Skin:     Comments: No apparent abnormalities in exposed skin   Neurological:      Mental Status: He is alert.   Psychiatric:         Attention and Perception: Attention normal.         Mood and Affect: Mood normal.         Speech: Speech normal.         Behavior: Behavior normal.         Thought Content: Thought content normal.         Judgment: Judgment normal.                Assessment & Plan     COVID, doing well.  Discussed paxlovid options, rationale/pros/cons. Would need to stop flecainide or consult with cardiology about their plan.  Patient elected to forego paxlovid given timeline of improvement thus far.      Talked about precautions for upcoming convention, i.e., masks.    Will likely need immunization like everybody else this laura        About this visit:  Time note (e4, 30'): The total time (on the date of service) for this service was 30 minutes, including discussion/face-to-face, chart review, interpretation not otherwise reported, documentation, and updating of the computerized record.

## 2023-07-28 NOTE — PATIENT INSTRUCTIONS
Thank you for visiting the Primary Care Center today at the [facility]! The following is some information about our clinic:     Primary Care Center Frequently-Asked Questions    (1) How do I schedule appointments at the [facility]?     Primary Care--to schedule or make changes to an existing appointment, please call our primary care line at [phone].    Labs--to schedule a lab appointment at the [facility] you can use [employer] or call [phone]. If you have a [place] location that is closer to home, you can reach out to that location for scheduling options.     Imaging--if you need to schedule a CT, X-ray, MRI, ultrasound, or other imaging study you can call [phone] to schedule at the [facility] or any other [facility] imaging location.     Referrals--if a referral to another specialty was ordered you can expect a phone call from their scheduling team. If you have not heard from them in a week, please call us or send us a [employer] message to check the status or get a scheduling number. Please note that this only applies to internal [facility] referrals. If the referral is external you would need to contact their office for scheduling.     (2) I have a question about my visit, who do I contact?     You can call us at the primary care line at [phone] to ask questions about your visit. You can also send a secure message through [employer], which is reviewed by clinic staff. Please note that [employer] messages have a twenty-four to forty-eight business hour turnaround time and should not be used for urgent concerns.    (3) How will I get the results of my tests?    If you are signed up for [employer] all tests will be released to you within twenty-four hours of resulting. Please allow three to five days for your doctor to review your results and place a note interpreting the results. If you do not have [employer] you will receive your  results through mail seven to ten business days following the return of the tests. Please note that if there should be any urgent or concerning results that your doctor or their registered nurse will reach out to you the same day as the tests come back. If you have follow up questions about your results or would like to discuss the results in detail please schedule a follow up with your provider either in person or virtually.     (4) How do I get refills of my prescriptions?     You should always first contact your pharmacy for refills of your medications. If submitting a refill request on [employer], please be sure to submit the request only once--repeat requests can cause delays in refill. If you are requesting a NEW medication or a medication related to new symptoms you will need to schedule an appointment with a provider prior to approval. Please note: Routine medication refills have up to one to three business day turnaround whereas controlled substances refills have up to five to seven business day turnaround.    (5) I have new symptoms, what do I do?     If you are having an immediate medical emergency, you should dial 911 for assistance.   For anything urgent that needs to be seen within a few hours to one day you should visit a local urgent care for assistance.  For non-urgent symptoms that need to be seen within a few days to a week you can schedule with an available provider in primary care by going to [employer] or calling [phone].   If you are not sure how serious your symptoms are or you would like to receive medical advice you can always call [phone] to speak with a triage nurse.

## 2023-07-31 ENCOUNTER — OFFICE VISIT (OUTPATIENT)
Dept: FAMILY MEDICINE | Facility: CLINIC | Age: 73
End: 2023-07-31
Payer: COMMERCIAL

## 2023-07-31 VITALS
RESPIRATION RATE: 16 BRPM | HEIGHT: 69 IN | WEIGHT: 168.5 LBS | DIASTOLIC BLOOD PRESSURE: 73 MMHG | HEART RATE: 63 BPM | OXYGEN SATURATION: 98 % | SYSTOLIC BLOOD PRESSURE: 127 MMHG | BODY MASS INDEX: 24.96 KG/M2 | TEMPERATURE: 97.3 F

## 2023-07-31 DIAGNOSIS — Z01.818 PREOP GENERAL PHYSICAL EXAM: ICD-10-CM

## 2023-07-31 DIAGNOSIS — U07.1 INFECTION DUE TO 2019 NOVEL CORONAVIRUS: ICD-10-CM

## 2023-07-31 DIAGNOSIS — I48.0 PAROXYSMAL ATRIAL FIBRILLATION (H): ICD-10-CM

## 2023-07-31 DIAGNOSIS — G47.33 OSA (OBSTRUCTIVE SLEEP APNEA): ICD-10-CM

## 2023-07-31 DIAGNOSIS — M72.0 DUPUYTREN'S CONTRACTURE OF BOTH HANDS: ICD-10-CM

## 2023-07-31 PROBLEM — M54.2 NECK PAIN: Status: RESOLVED | Noted: 2021-10-07 | Resolved: 2023-07-31

## 2023-07-31 PROBLEM — N40.0 BPH (BENIGN PROSTATIC HYPERPLASIA): Status: RESOLVED | Noted: 2020-08-21 | Resolved: 2023-07-31

## 2023-07-31 PROCEDURE — 99214 OFFICE O/P EST MOD 30 MIN: CPT | Performed by: INTERNAL MEDICINE

## 2023-07-31 PROCEDURE — 93000 ELECTROCARDIOGRAM COMPLETE: CPT | Performed by: INTERNAL MEDICINE

## 2023-07-31 ASSESSMENT — PAIN SCALES - GENERAL: PAINLEVEL: NO PAIN (0)

## 2023-07-31 NOTE — PROGRESS NOTES
[address]  [address]  [place] [address]  Phone: [phone]  Fax: [phone]  Primary Provider: Yani Burnett  Pre-op Performing Provider: PHUONG AGGARWAL      PREOPERATIVE EVALUATION:  Today's date: 7/31/2023    Frank Orellana is a 73 year old male who presents for a preoperative evaluation.      7/31/2023     9:36 AM   Additional Questions   Roomed by Laura Robin   Accompanied by Self       Surgical Information:  Surgery/Procedure: RELEASE, DUPUYTREN CONTRACTURE, RIGHT SMALL FINGER AND RIGHT MIDDLE FINGER   Surgery Location: [facility]  Surgeon: Lars Oleary MD   Surgery Date:  8/9/2023   Time of Surgery: 11:30 AM   Where patient plans to recover: At home with family  Fax number for surgical facility: Note does not need to be faxed, will be available electronically in Epic.    Assessment & Plan     The proposed surgical procedure is considered LOW risk.    (Z01.818) Preop general physical exam  (M72.0) Dupuytren's contracture of both hands  Comment:   Plan:   - Repeat EKG today (no longer has one on file within last 90 days)  - Unfortunately, started having COVID symptoms 7/24/23; since this is an elective procedure, would tend to recommend deferring for 8 weeks after COVID infection but ultimately defer to surgery and anesthesia (will send Dr. Oleary an Epic message).    (U07.1) Infection due to 2019 novel coronavirus  Comment: Symptoms started 7/24/23, tested positive 7/27/23.  Symptoms nearly resolved now.  Plan:   - Noted, continue supportive care, symptoms almost resolved  - Should get second bivalent COVID booster once symptoms resolved    (I48.0) Paroxysmal atrial fibrillation (H)  Comment: Takes ASA 81 mg, diltiazem, flecainide.  Plan:   - EKG 12-lead complete w/read - Clinics- reviewed, sinus bradycardia; stable.  - Reviewed EKG 4/19/23- sinus bradycardia (rate 46 bpm)  - Continue  cardiac medications without change          (G47.33) WINNIE (obstructive sleep apnea) positional  Comment: Positional only, sleeps on side.  Plan:   - Noted     (Z53.1) Refusal of blood transfusions as patient is Taoism  Comment: Noted  Plan: Noted          - No identified additional risk factors other than previously addressed    Antiplatelet or Anticoagulation Medication Instructions:   - aspirin: Bleeding risk is low for this procedure and daily aspirin may be continued without modification.     Additional Medication Instructions:   - Calcium Channel Blockers: May be continued on the day of surgery.   - Flecainide antiarrhythmic: May be continued on the day of surgery    RECOMMENDATION:  APPROVAL GIVEN to proceed with proposed procedure with caveat that given his current COVID infection, since this is an elective surgery I would recommend waiting 8 weeks from infection to ensure he returns to his cardiopulmonary baseline prior to anesthesia (ultimately defer to surgeon and anesthesiologist based on type of anesthesia selected).      Subjective       HPI related to upcoming procedure:     Plan for repair of right hand.    Did have an episode of afib on 7/15/23- lasted 6 hours.  Started at 10:15 pm, ended at 4:15 am.  That was the only episode he has noticed in the last 6 months, at the time was nervous feeling anxious about his trip.  He just returned from his cruise to [place].  Unfortunately, acquired COVID on the trip (symptoms started 7/24, 7/27 tested positive), had virtual visit 7/28- declined paxlovid because symptoms were mild and would need to stop flecainide due to drug drug interactions- feeling better now.  Has perhaps a mild cough (cough once today) but no dyspnea, chest pain/pressure.        7/27/2023     2:00 PM   Preop Questions   1. Have you ever had a heart attack or stroke? No   2. Have you ever had surgery on your heart or blood vessels, such as a stent placement, a coronary artery  bypass, or surgery on an artery in your head, neck, heart, or legs? No   3. Do you have chest pain with activity? No   4. Do you have a history of  heart failure? No   5. Do you currently have a cold, bronchitis or symptoms of other infection? YES - recent COVID 7/27, recovering.   6. Do you have a cough, shortness of breath, or wheezing? YES - getting better   7. Do you or anyone in your family have previous history of blood clots? No   8. Do you or does anyone in your family have a serious bleeding problem such as prolonged bleeding following surgeries or cuts? No   9. Have you ever had problems with anemia or been told to take iron pills? No   10. Have you had any abnormal blood loss such as black, tarry or bloody stools? No   11. Have you ever had a blood transfusion? No   12. Are you willing to have a blood transfusion if it is medically needed before, during, or after your surgery? NO - Jewish   13. Have you or any of your relatives ever had problems with anesthesia? No   14. Do you have sleep apnea, excessive snoring or daytime drowsiness? YES - controlled with position change   14a. Do you have a CPAP machine? Yes   15. Do you have any artifical heart valves or other implanted medical devices like a pacemaker, defibrillator, or continuous glucose monitor? No   16. Do you have artificial joints? No   17. Are you allergic to latex? No       Health Care Directive:  Patient has a Health Care Directive on file      Preoperative Review of :   reviewed - no record of controlled substances prescribed.      Status of Chronic Conditions:  A-FIB - Patient has a longstanding history of chronic A-fib currently on rhythm control. Current treatment regimen includes Aspirin for stroke prevention and denies significant symptoms of lightheadedness, palpitations or dyspnea.     Review of Systems  Constitutional, neuro, ENT, endocrine, pulmonary, cardiac, gastrointestinal, genitourinary, musculoskeletal,  integument and psychiatric systems are negative, except as otherwise noted.    Patient Active Problem List    Diagnosis Date Noted    Dupuytren's contracture of both hands 05/17/2023     Priority: Medium     Added automatically from request for surgery [id number]      BPH with urinary obstruction 07/29/2020     Priority: Medium     Added automatically from request for surgery [id number]      Bilateral sensorineural hearing loss 05/05/2017     Priority: Medium    Elevated PSA      Priority: Medium    Tubular adenoma of colon 01/17/2017     Priority: Medium     ascending colon, 1/17/17      WINNIE (obstructive sleep apnea) positional      Priority: Medium     PSG at [facility] 1/8/2017AHI 32.3 wt 180     5/30/2019 [facility] Sleep Study done mostly lateral (189.0 lbs) - AHI 7.9, RDI 17.1, Supine AHI 33.7, REM AHI 9.8, Low O2 79.8%, Time Spent ?88% 0.9 minutes / Time Spent ?89% 1.4 minutes.      Patient is Nondenominational      Priority: Medium    Refusal of blood transfusions as patient is Nondenominational      Priority: Medium    Paroxysmal atrial fibrillation (H)      Priority: Medium     paroxysmal on 3-4 occasions      History of actinic keratoses 03/15/2013     Priority: Medium    Telangiectasia 03/15/2013     Priority: Medium    SK (seborrheic keratosis) 03/15/2013     Priority: Medium    Atrial fibrillation (H) 06/15/1996     Priority: Medium     Afib - 2 or 3 extended occurrences since        Past Medical History:   Diagnosis Date    Actinic keratosis 2009    Atrial fibrillation (H) 6/15/96    Afib - 2 or 3 extended occurrences since    Chronic hepatitis C (H)     Chronic Hepatitis C,  genotype 1b                Stage 0 Fibrosis    Dupuytren's contracture of both hands     Elevated PSA     Hepatitis B 1969    acute infection at age 19; IV drug use    WINNIE (obstructive sleep apnea)     AHI 34.2    Pain in both hands     Patient is Nondenominational     Refusal of blood transfusions as patient is Nondenominational      Right shoulder pain     Tinnitus 1 or 2 years ago    both ears    Tubular adenoma of colon 1/17/17    ascending colon, 1/17/17     Past Surgical History:   Procedure Laterality Date    ANESTHESIA CARDIOVERSION N/A 1/15/2018    Procedure: ANESTHESIA CARDIOVERSION;  Anesthesia Coverage Cardioversion With Transesophageal Echocardiogram @0930 ;  Surgeon: GENERIC ANESTHESIA PROVIDER;  [facility]: [facility]    ANESTHESIA CARDIOVERSION N/A 10/11/2018    Procedure: ANESTHESIA CARDIOVERSION;  Cardioversion;  Surgeon: GENERIC ANESTHESIA PROVIDER;  [facility]    ANESTHESIA CARDIOVERSION N/A 11/28/2018    Procedure: Anesthesia Coverage Cardioversion @0830;  Surgeon: GENERIC ANESTHESIA PROVIDER;  [facility]    ARTHROSCOPY KNEE      left knee    COLONOSCOPY  1/17/17    tubular adenoma ascending colon    COLONOSCOPY N/A 6/26/2023    Procedure: COLONOSCOPY, WITH POLYPECTOMY AND BIOPSY;  Surgeon: Hussein Stevens MD;  Location: [facility]    CYSTOSCOPY, TRANSURETHRAL RESECTION (TUR) PROSTATE, COMBINED N/A 8/21/2020    Procedure: CYSTOSCOPY, WITH TRANSURETHRAL RESECTION PROSTATE;  Surgeon: Roldan De La O MD;  Location[facility]    HERNIORRHAPHY INGUINAL Right 10/26/2017    Procedure: HERNIORRHAPHY INGUINAL;  Open Right Inguinal Hernia Repair with Mesh;  Surgeon: Suresh Raymond MD;  [facility]    KNEE SURGERY  2006??    torn meniscus    RELEASE DUPUYTRENS CONTRACTURE Right 1/20/2017    Procedure: RELEASE DUPUYTRENS CONTRACTURE;  Surgeon: Lars Oleary MD;  Location: [facility]    RELEASE DUPUYTRENS CONTRACTURE Left 12/15/2017    Procedure: RELEASE DUPUYTRENS CONTRACTURE;  Left Ring and Middle Finger Subtotal Palmar Fasciectomy;  Surgeon: Lars Oleary MD;  [facility]    TRANSESOPHAGEAL ECHOCARDIOGRAM INTRAOPERATIVE N/A 1/15/2018    Procedure: TRANSESOPHAGEAL ECHOCARDIOGRAM INTRAOPERATIVE;;  Surgeon: GENERIC ANESTHESIA PROVIDER;  Location: [facility] OR     Current Outpatient Medications  "  Medication Sig Dispense Refill    aspirin 81 MG EC tablet Take 1 tablet (81 mg) by mouth daily 90 tablet 3    diltiazem ER COATED BEADS (CARDIZEM CD/CARTIA XT) 120 MG 24 hr capsule Take 1 capsule (120 mg) by mouth daily 90 capsule 1    flecainide (TAMBOCOR) 50 MG tablet Take 1 tablet (50 mg) by mouth 2 times daily 180 tablet 1       Allergies   Allergen Reactions    Blood Transfusion Related (Informational Only)      Zoroastrian.  Declines blood transfusions.    Shellfish-Derived Products Swelling    Contrast Dye Rash    Iodinated Contrast Media Rash        Social History     Tobacco Use    Smoking status: Former     Packs/day: 0.50     Years: 10.00     Pack years: 5.00     Types: Cigarettes     Start date: 6/15/1964     Quit date: 1974     Years since quittin.1    Smokeless tobacco: Former     Quit date: 1969   Substance Use Topics    Alcohol use: Yes     Alcohol/week: 3.0 - 4.0 standard drinks of alcohol     Comment: 4 or 5 drinks/week, limit of 1     Family History   Problem Relation Age of Onset    Arthritis Paternal Grandmother     Rheumatoid Arthritis Paternal Grandmother     Gastrointestinal Disease Father          age 77 after colon surgery    Alcoholism Father     Alcohol/Drug Mother          age 64    Alcoholism Mother     Heart Disease Brother     Cardiac Sudden Death Brother     Uterine Cancer Sister     Glaucoma No family hx of     Macular Degeneration No family hx of     Retinal detachment No family hx of      History   Drug Use No     Comment: --history of marijuana         Objective     /73 (BP Location: Right arm, Patient Position: Sitting, Cuff Size: Adult Regular)   Pulse 63   Temp 97.3  F (36.3  C) (Tympanic)   Resp 16   Ht 1.753 m (5' 9\")   Wt 76.4 kg (168 lb 8 oz)   SpO2 98%   BMI 24.88 kg/m      Physical Exam    GENERAL APPEARANCE: healthy, alert and no distress     EYES: EOMI,  PERRL     HENT: ear canals and TM's normal and nose and mouth without " ulcers or lesions     NECK: no adenopathy, no asymmetry, masses, or scars and thyroid normal to palpation     RESP: lungs clear to auscultation - no rales, rhonchi or wheezes     CV: regular rates and rhythm, normal S1 S2, no S3 or S4 and no murmur, click or rub     ABDOMEN:  soft, nontender, no HSM or masses and bowel sounds normal     MS: extremities normal- no gross deformities noted, no evidence of inflammation in joints, FROM in all extremities.     SKIN: no suspicious lesions or rashes     NEURO: Normal strength and tone, sensory exam grossly normal, mentation intact and speech normal     PSYCH: mentation appears normal. and affect normal/bright     LYMPHATICS: No cervical adenopathy    Recent Labs   Lab Test 04/24/23  1330 12/17/21  0727   HGB 14.0  --      --     144   POTASSIUM 4.5 3.4   CR 0.87 0.87   A1C 5.1  --         Diagnostics:  No labs were ordered during this visit.   EKG required for significant arrhythmia and not completed in the last 90 days.     Revised Cardiac Risk Index (RCRI):  The patient has the following serious cardiovascular risks for perioperative complications:   - No serious cardiac risks = 0 points     RCRI Interpretation: 0 points: Class I (very low risk - 0.4% complication rate)         Signed Electronically by: Heaven Seals MD  Copy of this evaluation report is provided to requesting physician.

## 2023-07-31 NOTE — H&P (VIEW-ONLY)
[address]  [address]  [place] [address]  Phone: [phone]  Fax: [phone]  Primary Provider: Yani Burnett  Pre-op Performing Provider: PHUONG AGGARWAL      PREOPERATIVE EVALUATION:  Today's date: 7/31/2023    Frank Orellana is a 73 year old male who presents for a preoperative evaluation.      7/31/2023     9:36 AM   Additional Questions   Roomed by Laura Robin   Accompanied by Self       Surgical Information:  Surgery/Procedure: RELEASE, DUPUYTREN CONTRACTURE, RIGHT SMALL FINGER AND RIGHT MIDDLE FINGER   Surgery Location: [facility]  Surgeon: Lars Oleary MD   Surgery Date:  8/9/2023   Time of Surgery: 11:30 AM   Where patient plans to recover: At home with family  Fax number for surgical facility: Note does not need to be faxed, will be available electronically in Epic.    Assessment & Plan     The proposed surgical procedure is considered LOW risk.    (Z01.818) Preop general physical exam  (M72.0) Dupuytren's contracture of both hands  Comment:   Plan:   - Repeat EKG today (no longer has one on file within last 90 days)  - Unfortunately, started having COVID symptoms 7/24/23; since this is an elective procedure, would tend to recommend deferring for 8 weeks after COVID infection but ultimately defer to surgery and anesthesia (will send Dr. Oleary an Epic message).    (U07.1) Infection due to 2019 novel coronavirus  Comment: Symptoms started 7/24/23, tested positive 7/27/23.  Symptoms nearly resolved now.  Plan:   - Noted, continue supportive care, symptoms almost resolved  - Should get second bivalent COVID booster once symptoms resolved    (I48.0) Paroxysmal atrial fibrillation (H)  Comment: Takes ASA 81 mg, diltiazem, flecainide.  Plan:   - EKG 12-lead complete w/read - Clinics- reviewed, sinus bradycardia; stable.  - Reviewed EKG 4/19/23- sinus bradycardia (rate 46 bpm)  - Continue  cardiac medications without change          (G47.33) WINNIE (obstructive sleep apnea) positional  Comment: Positional only, sleeps on side.  Plan:   - Noted     (Z53.1) Refusal of blood transfusions as patient is Protestant  Comment: Noted  Plan: Noted          - No identified additional risk factors other than previously addressed    Antiplatelet or Anticoagulation Medication Instructions:   - aspirin: Bleeding risk is low for this procedure and daily aspirin may be continued without modification.     Additional Medication Instructions:   - Calcium Channel Blockers: May be continued on the day of surgery.   - Flecainide antiarrhythmic: May be continued on the day of surgery    RECOMMENDATION:  APPROVAL GIVEN to proceed with proposed procedure with caveat that given his current COVID infection, since this is an elective surgery I would recommend waiting 8 weeks from infection to ensure he returns to his cardiopulmonary baseline prior to anesthesia (ultimately defer to surgeon and anesthesiologist based on type of anesthesia selected).      Subjective       HPI related to upcoming procedure:     Plan for repair of right hand.    Did have an episode of afib on 7/15/23- lasted 6 hours.  Started at 10:15 pm, ended at 4:15 am.  That was the only episode he has noticed in the last 6 months, at the time was nervous feeling anxious about his trip.  He just returned from his cruise to [place].  Unfortunately, acquired COVID on the trip (symptoms started 7/24, 7/27 tested positive), had virtual visit 7/28- declined paxlovid because symptoms were mild and would need to stop flecainide due to drug drug interactions- feeling better now.  Has perhaps a mild cough (cough once today) but no dyspnea, chest pain/pressure.        7/27/2023     2:00 PM   Preop Questions   1. Have you ever had a heart attack or stroke? No   2. Have you ever had surgery on your heart or blood vessels, such as a stent placement, a coronary artery  bypass, or surgery on an artery in your head, neck, heart, or legs? No   3. Do you have chest pain with activity? No   4. Do you have a history of  heart failure? No   5. Do you currently have a cold, bronchitis or symptoms of other infection? YES - recent COVID 7/27, recovering.   6. Do you have a cough, shortness of breath, or wheezing? YES - getting better   7. Do you or anyone in your family have previous history of blood clots? No   8. Do you or does anyone in your family have a serious bleeding problem such as prolonged bleeding following surgeries or cuts? No   9. Have you ever had problems with anemia or been told to take iron pills? No   10. Have you had any abnormal blood loss such as black, tarry or bloody stools? No   11. Have you ever had a blood transfusion? No   12. Are you willing to have a blood transfusion if it is medically needed before, during, or after your surgery? NO - Rastafari   13. Have you or any of your relatives ever had problems with anesthesia? No   14. Do you have sleep apnea, excessive snoring or daytime drowsiness? YES - controlled with position change   14a. Do you have a CPAP machine? Yes   15. Do you have any artifical heart valves or other implanted medical devices like a pacemaker, defibrillator, or continuous glucose monitor? No   16. Do you have artificial joints? No   17. Are you allergic to latex? No       Health Care Directive:  Patient has a Health Care Directive on file      Preoperative Review of :   reviewed - no record of controlled substances prescribed.      Status of Chronic Conditions:  A-FIB - Patient has a longstanding history of chronic A-fib currently on rhythm control. Current treatment regimen includes Aspirin for stroke prevention and denies significant symptoms of lightheadedness, palpitations or dyspnea.     Review of Systems  Constitutional, neuro, ENT, endocrine, pulmonary, cardiac, gastrointestinal, genitourinary, musculoskeletal,  integument and psychiatric systems are negative, except as otherwise noted.    Patient Active Problem List    Diagnosis Date Noted    Dupuytren's contracture of both hands 05/17/2023     Priority: Medium     Added automatically from request for surgery [id number]      BPH with urinary obstruction 07/29/2020     Priority: Medium     Added automatically from request for surgery [id number]      Bilateral sensorineural hearing loss 05/05/2017     Priority: Medium    Elevated PSA      Priority: Medium    Tubular adenoma of colon 01/17/2017     Priority: Medium     ascending colon, 1/17/17      WINNIE (obstructive sleep apnea) positional      Priority: Medium     PSG at [facility] 1/8/2017AHI 32.3 wt 180     5/30/2019 [facility] Study done mostly lateral (189.0 lbs) - AHI 7.9, RDI 17.1, Supine AHI 33.7, REM AHI 9.8, Low O2 79.8%, Time Spent ?88% 0.9 minutes / Time Spent ?89% 1.4 minutes.      Patient is Cheondoism      Priority: Medium    Refusal of blood transfusions as patient is Cheondoism      Priority: Medium    Paroxysmal atrial fibrillation (H)      Priority: Medium     paroxysmal on 3-4 occasions      History of actinic keratoses 03/15/2013     Priority: Medium    Telangiectasia 03/15/2013     Priority: Medium    SK (seborrheic keratosis) 03/15/2013     Priority: Medium    Atrial fibrillation (H) 06/15/1996     Priority: Medium     Afib - 2 or 3 extended occurrences since        Past Medical History:   Diagnosis Date    Actinic keratosis 2009    Atrial fibrillation (H) 6/15/96    Afib - 2 or 3 extended occurrences since    Chronic hepatitis C (H)     Chronic Hepatitis C,  genotype 1b                Stage 0 Fibrosis    Dupuytren's contracture of both hands     Elevated PSA     Hepatitis B 1969    acute infection at age 19; IV drug use    WINNIE (obstructive sleep apnea)     AHI 34.2    Pain in both hands     Patient is Cheondoism     Refusal of blood transfusions as patient is Cheondoism      Right shoulder pain     Tinnitus 1 or 2 years ago    both ears    Tubular adenoma of colon 1/17/17    ascending colon, 1/17/17     Past Surgical History:   Procedure Laterality Date    ANESTHESIA CARDIOVERSION N/A 1/15/2018    Procedure: ANESTHESIA CARDIOVERSION;  Anesthesia Coverage Cardioversion With Transesophageal Echocardiogram @0930 ;  Surgeon: GENERIC ANESTHESIA PROVIDER;  Location: [facility]    ANESTHESIA CARDIOVERSION N/A 10/11/2018    Procedure: ANESTHESIA CARDIOVERSION;  Cardioversion;  Surgeon: GENERIC ANESTHESIA PROVIDER;  Location: [facility]    ANESTHESIA CARDIOVERSION N/A 11/28/2018    Procedure: Anesthesia Coverage Cardioversion @0830;  Surgeon: GENERIC ANESTHESIA PROVIDER;  Location: [facility]    ARTHROSCOPY KNEE      left knee    COLONOSCOPY  1/17/17    tubular adenoma ascending colon    COLONOSCOPY N/A 6/26/2023    Procedure: COLONOSCOPY, WITH POLYPECTOMY AND BIOPSY;  Surgeon: Hussein Stevens MD;  Location: [facility]    CYSTOSCOPY, TRANSURETHRAL RESECTION (TUR) PROSTATE, COMBINED N/A 8/21/2020    Procedure: CYSTOSCOPY, WITH TRANSURETHRAL RESECTION PROSTATE;  Surgeon: Roldan De La O MD;  Location: [facility]    HERNIORRHAPHY INGUINAL Right 10/26/2017    Procedure: HERNIORRHAPHY INGUINAL;  Open Right Inguinal Hernia Repair with Mesh;  Surgeon: Suresh Raymond MD;  [facility]    KNEE SURGERY  2006??    torn meniscus    RELEASE DUPUYTRENS CONTRACTURE Right 1/20/2017    Procedure: RELEASE DUPUYTRENS CONTRACTURE;  Surgeon: Lars Oleary MD;  Location: [facility]    RELEASE DUPUYTRENS CONTRACTURE Left 12/15/2017    Procedure: RELEASE DUPUYTRENS CONTRACTURE;  Left Ring and Middle Finger Subtotal Palmar Fasciectomy;  Surgeon: Lars Oleary MD;  [facility]    TRANSESOPHAGEAL ECHOCARDIOGRAM INTRAOPERATIVE N/A 1/15/2018    Procedure: TRANSESOPHAGEAL ECHOCARDIOGRAM INTRAOPERATIVE;;  Surgeon: GENERIC ANESTHESIA PROVIDER;  Location: [facility]     Current Outpatient Medications  "  Medication Sig Dispense Refill    aspirin 81 MG EC tablet Take 1 tablet (81 mg) by mouth daily 90 tablet 3    diltiazem ER COATED BEADS (CARDIZEM CD/CARTIA XT) 120 MG 24 hr capsule Take 1 capsule (120 mg) by mouth daily 90 capsule 1    flecainide (TAMBOCOR) 50 MG tablet Take 1 tablet (50 mg) by mouth 2 times daily 180 tablet 1       Allergies   Allergen Reactions    Blood Transfusion Related (Informational Only)      Druze.  Declines blood transfusions.    Shellfish-Derived Products Swelling    Contrast Dye Rash    Iodinated Contrast Media Rash        Social History     Tobacco Use    Smoking status: Former     Packs/day: 0.50     Years: 10.00     Pack years: 5.00     Types: Cigarettes     Start date: 6/15/1964     Quit date: 1974     Years since quittin.1    Smokeless tobacco: Former     Quit date: 1969   Substance Use Topics    Alcohol use: Yes     Alcohol/week: 3.0 - 4.0 standard drinks of alcohol     Comment: 4 or 5 drinks/week, limit of 1     Family History   Problem Relation Age of Onset    Arthritis Paternal Grandmother     Rheumatoid Arthritis Paternal Grandmother     Gastrointestinal Disease Father          age 77 after colon surgery    Alcoholism Father     Alcohol/Drug Mother          age 64    Alcoholism Mother     Heart Disease Brother     Cardiac Sudden Death Brother     Uterine Cancer Sister     Glaucoma No family hx of     Macular Degeneration No family hx of     Retinal detachment No family hx of      History   Drug Use No     Comment: --history of marijuana         Objective     /73 (BP Location: Right arm, Patient Position: Sitting, Cuff Size: Adult Regular)   Pulse 63   Temp 97.3  F (36.3  C) (Tympanic)   Resp 16   Ht 1.753 m (5' 9\")   Wt 76.4 kg (168 lb 8 oz)   SpO2 98%   BMI 24.88 kg/m      Physical Exam    GENERAL APPEARANCE: healthy, alert and no distress     EYES: EOMI,  PERRL     HENT: ear canals and TM's normal and nose and mouth without " ulcers or lesions     NECK: no adenopathy, no asymmetry, masses, or scars and thyroid normal to palpation     RESP: lungs clear to auscultation - no rales, rhonchi or wheezes     CV: regular rates and rhythm, normal S1 S2, no S3 or S4 and no murmur, click or rub     ABDOMEN:  soft, nontender, no HSM or masses and bowel sounds normal     MS: extremities normal- no gross deformities noted, no evidence of inflammation in joints, FROM in all extremities.     SKIN: no suspicious lesions or rashes     NEURO: Normal strength and tone, sensory exam grossly normal, mentation intact and speech normal     PSYCH: mentation appears normal. and affect normal/bright     LYMPHATICS: No cervical adenopathy    Recent Labs   Lab Test 04/24/23  1330 12/17/21  0727   HGB 14.0  --      --     144   POTASSIUM 4.5 3.4   CR 0.87 0.87   A1C 5.1  --         Diagnostics:  No labs were ordered during this visit.   EKG required for significant arrhythmia and not completed in the last 90 days.     Revised Cardiac Risk Index (RCRI):  The patient has the following serious cardiovascular risks for perioperative complications:   - No serious cardiac risks = 0 points     RCRI Interpretation: 0 points: Class I (very low risk - 0.4% complication rate)         Signed Electronically by: Heaven Seals MD  Copy of this evaluation report is provided to requesting physician.

## 2023-07-31 NOTE — Clinical Note
Dr. Oleary- I saw this patient for a preop today, is scheduled for surgery to relieve Dupuytren's contracture with you on 8/9/23.  He was diagnosed with COVID (symptoms started 7/24).  Typically, for elective surgery I advise waiting 8 weeks after COVID infection BUT I defer to you and anesthesia based on the type of anesthesia required.  He will no longer be contagious after 8/3/23.  Please let me know if you have questions.  -Yisel

## 2023-08-03 ENCOUNTER — ANESTHESIA EVENT (OUTPATIENT)
Dept: SURGERY | Facility: AMBULATORY SURGERY CENTER | Age: 73
End: 2023-08-03
Payer: COMMERCIAL

## 2023-08-07 ENCOUNTER — PRE VISIT (OUTPATIENT)
Dept: OPHTHALMOLOGY | Facility: CLINIC | Age: 73
End: 2023-08-07

## 2023-08-07 ENCOUNTER — OFFICE VISIT (OUTPATIENT)
Dept: OPHTHALMOLOGY | Facility: CLINIC | Age: 73
End: 2023-08-07
Payer: COMMERCIAL

## 2023-08-07 DIAGNOSIS — H25.13 NUCLEAR SCLEROTIC CATARACT OF BOTH EYES: Primary | ICD-10-CM

## 2023-08-07 DIAGNOSIS — H43.813 PVD (POSTERIOR VITREOUS DETACHMENT), BILATERAL: ICD-10-CM

## 2023-08-07 DIAGNOSIS — H40.003 GLAUCOMA SUSPECT OF BOTH EYES: ICD-10-CM

## 2023-08-07 DIAGNOSIS — H52.00 HYPERMETROPIA, UNSPECIFIED LATERALITY: ICD-10-CM

## 2023-08-07 DIAGNOSIS — H52.4 PRESBYOPIA OF BOTH EYES: ICD-10-CM

## 2023-08-07 DIAGNOSIS — H53.9 VISION CHANGES: ICD-10-CM

## 2023-08-07 PROCEDURE — 92015 DETERMINE REFRACTIVE STATE: CPT | Performed by: OPHTHALMOLOGY

## 2023-08-07 PROCEDURE — 92004 COMPRE OPH EXAM NEW PT 1/>: CPT | Performed by: OPHTHALMOLOGY

## 2023-08-07 PROCEDURE — 92133 CPTRZD OPH DX IMG PST SGM ON: CPT | Performed by: OPHTHALMOLOGY

## 2023-08-07 PROCEDURE — 92083 EXTENDED VISUAL FIELD XM: CPT | Performed by: OPHTHALMOLOGY

## 2023-08-07 ASSESSMENT — TONOMETRY
OS_IOP_MMHG: 11
OD_IOP_MMHG: 13
IOP_METHOD: ICARE

## 2023-08-07 ASSESSMENT — CONF VISUAL FIELD
METHOD: COUNTING FINGERS
OD_SUPERIOR_NASAL_RESTRICTION: 0
OS_INFERIOR_NASAL_RESTRICTION: 0
OD_SUPERIOR_TEMPORAL_RESTRICTION: 0
OS_INFERIOR_TEMPORAL_RESTRICTION: 0
OS_NORMAL: 1
OD_INFERIOR_TEMPORAL_RESTRICTION: 0
OD_NORMAL: 1
OD_INFERIOR_NASAL_RESTRICTION: 0
OS_SUPERIOR_TEMPORAL_RESTRICTION: 0
OS_SUPERIOR_NASAL_RESTRICTION: 0

## 2023-08-07 ASSESSMENT — REFRACTION_MANIFEST
OS_SPHERE: +2.75
OD_CYLINDER: +0.75
OS_ADD: +2.50
OD_SPHERE: +1.75
OD_AXIS: 035
OS_CYLINDER: +0.50
OS_AXIS: 174
OD_ADD: +2.50

## 2023-08-07 ASSESSMENT — VISUAL ACUITY
OS_CC: 20/20
METHOD: SNELLEN - LINEAR
OD_CC+: -2
OD_CC: 20/20
CORRECTION_TYPE: GLASSES
OS_CC+: -1

## 2023-08-07 ASSESSMENT — REFRACTION_WEARINGRX
OS_CYLINDER: SPHERE
OD_ADD: +2.50
SPECS_TYPE: PAL
OS_ADD: +2.50
OS_SPHERE: +2.25
OD_AXIS: 035
OD_SPHERE: +2.00
OD_CYLINDER: +0.25

## 2023-08-07 ASSESSMENT — PACHYMETRY
OD_CT(UM): 599
OS_CT(UM): 585

## 2023-08-07 ASSESSMENT — SLIT LAMP EXAM - LIDS
COMMENTS: NORMAL
COMMENTS: NORMAL

## 2023-08-07 ASSESSMENT — CUP TO DISC RATIO
OD_RATIO: 0.55
OS_RATIO: 0.4

## 2023-08-07 ASSESSMENT — EXTERNAL EXAM - RIGHT EYE: OD_EXAM: NORMAL

## 2023-08-07 ASSESSMENT — EXTERNAL EXAM - LEFT EYE: OS_EXAM: NORMAL

## 2023-08-07 NOTE — PROGRESS NOTES
"CC:   Chief Complaints and History of Present Illnesses   Patient presents with    Glaucoma suspect       HPI  Frank Orellana is a 73 year old male here for above.    HPI       Glaucoma    In both eyes.             Comments    Patient did not know he was a glaucoma suspect. Patient aware of cataracts. Patient states \"edges of things do not seem to line up all the time in distance\". Patient's glasses are 11 years old. Patient not currently on any drops.   BARRETT Dowd August 7, 2023 7:57 AM           Last edited by Emily Bailey on 8/7/2023  7:57 AM.           PMH:    Past Medical History:   Diagnosis Date    Actinic keratosis 2009    Atrial fibrillation (H) 6/15/96    Afib - 2 or 3 extended occurrences since    Chronic hepatitis C (H)     Chronic Hepatitis C,  genotype 1b                Stage 0 Fibrosis    Dupuytren's contracture of both hands     Elevated PSA     Hepatitis B 1969    acute infection at age 19; IV drug use    WINNIE (obstructive sleep apnea)     AHI 34.2    Pain in both hands     Patient is Roman Catholic     Refusal of blood transfusions as patient is Roman Catholic     Right shoulder pain     Tinnitus 1 or 2 years ago    both ears    Tubular adenoma of colon 1/17/17    ascending colon, 1/17/17     POH:  Glasses for hypermetropia, glaucoma suspect based on cup-to-disc asymmetry, cataracts, no eye surgery, no eye trauma  Oc Meds:  none  FH:  Denies any glaucoma, age related macular degeneration, or other known eye diseases       Assessment & Plan       1. Nuclear sclerotic cataract of both eyes - Both Eyes    2. Glaucoma suspect of both eyes    3. Vision changes - Both Eyes    4. Presbyopia of both eyes    5. Hypermetropia, unspecified laterality - Both Eyes    6. PVD (posterior vitreous detachment), bilateral - Both Eyes      (H25.13) Nuclear sclerotic cataract of both eyes - Both Eyes  Comment: mild, not visually significant   Plan: follow      (H40.003) Glaucoma suspect of both eyes - Both " Eyes  (primary encounter diagnosis)    Comment:  based on mild cup-to-disc asymmetry , Large disc right eye > left eye, peripupillary transillumination defects --  K pachy:   599/585   Tmax:  18/18     HVF: scat non-specific both eyes      CDR:  0.55/0.4  HRT/OCT:   normal both eyes     FHX of Glc:  No        Gonio:  opn     Intolerant to:   none Asthma/COPD: no, WINNIE   Steroid Use:  no     Kidney Stones:       Sulfa Allergy:  no   IOP targets: low 20s  Today's IOP: at goal    Plan:  Low risk, monitor with annual intraocular pressure, dilated fundus exam.  OCT Nerve fiber layer /visual field as needed     Posterior vitreous detachment - stable both eyes, no retinal tears.  Discussed symptoms of retinal tear/detachment and the need to be seen urgently should they occur     Hypermetropia/presbyopia- small changes, manifest refraction done and prescription for glasses given           Patient disposition:   Return in about 1 year (around 8/7/2024) for Comprehensive Exam. Call for sooner appointment as needed.      Complete documentation of historical and exam elements from today's encounter can be found in the full encounter summary report (not reduplicated in this progress note). I personally obtained the chief complaint(s) and history of present illness.  I have confirmed and edited as necessary the CC, HPI, PMH/PSH, social history, FMH, ROS, and exam/neuro findings as obtained by the technician or others. I have examined this patient myself and I personally viewed the image(s) and studies listed above and the documentation reflects my findings and interpretation.  I formulated and edited as necessary the assessment and plan and discussed the findings and management plan with the patient and family.     Lucinda Mccain MD

## 2023-08-07 NOTE — NURSING NOTE
"Chief Complaints and History of Present Illnesses   Patient presents with    Glaucoma     Chief Complaint(s) and History of Present Illness(es)       Glaucoma              Laterality: both eyes              Comments    Patient did not know he was a glaucoma suspect. Patient aware of cataracts. Patient states \"edges of things do not seem to line up all the time in distance\". Patient's glasses are 11 years old. Patient not currently on any drops.   BARRETT Dowd August 7, 2023 7:57 AM                   "

## 2023-08-09 ENCOUNTER — HOSPITAL ENCOUNTER (OUTPATIENT)
Facility: AMBULATORY SURGERY CENTER | Age: 73
Discharge: HOME | End: 2023-08-09
Attending: ORTHOPAEDIC SURGERY
Payer: COMMERCIAL

## 2023-08-09 ENCOUNTER — ANESTHESIA (OUTPATIENT)
Dept: SURGERY | Facility: AMBULATORY SURGERY CENTER | Age: 73
End: 2023-08-09
Payer: COMMERCIAL

## 2023-08-09 VITALS
OXYGEN SATURATION: 94 % | HEART RATE: 50 BPM | SYSTOLIC BLOOD PRESSURE: 117 MMHG | HEIGHT: 69 IN | RESPIRATION RATE: 16 BRPM | BODY MASS INDEX: 23.99 KG/M2 | WEIGHT: 162 LBS | DIASTOLIC BLOOD PRESSURE: 69 MMHG | TEMPERATURE: 96.9 F

## 2023-08-09 DIAGNOSIS — M72.0 DUPUYTREN'S CONTRACTURE OF BOTH HANDS: ICD-10-CM

## 2023-08-09 PROCEDURE — 88304 TISSUE EXAM BY PATHOLOGIST: CPT | Mod: TC | Performed by: ORTHOPAEDIC SURGERY

## 2023-08-09 PROCEDURE — 26121 RELEASE PALM CONTRACTURE: CPT | Mod: GC | Performed by: ORTHOPAEDIC SURGERY

## 2023-08-09 PROCEDURE — 26121 RELEASE PALM CONTRACTURE: CPT | Mod: RT

## 2023-08-09 PROCEDURE — 88304 TISSUE EXAM BY PATHOLOGIST: CPT | Mod: 26 | Performed by: PATHOLOGY

## 2023-08-09 RX ORDER — SODIUM CHLORIDE, SODIUM LACTATE, POTASSIUM CHLORIDE, CALCIUM CHLORIDE 600; 310; 30; 20 MG/100ML; MG/100ML; MG/100ML; MG/100ML
INJECTION, SOLUTION INTRAVENOUS CONTINUOUS
Status: DISCONTINUED | OUTPATIENT
Start: 2023-08-09 | End: 2023-08-11 | Stop reason: HOSPADM

## 2023-08-09 RX ORDER — METOPROLOL TARTRATE 1 MG/ML
1-2 INJECTION, SOLUTION INTRAVENOUS EVERY 5 MIN PRN
Status: DISCONTINUED | OUTPATIENT
Start: 2023-08-09 | End: 2023-08-11 | Stop reason: HOSPADM

## 2023-08-09 RX ORDER — FENTANYL CITRATE 50 UG/ML
25 INJECTION, SOLUTION INTRAMUSCULAR; INTRAVENOUS
Status: DISCONTINUED | OUTPATIENT
Start: 2023-08-09 | End: 2023-08-11 | Stop reason: HOSPADM

## 2023-08-09 RX ORDER — OXYCODONE HYDROCHLORIDE 5 MG/1
5 TABLET ORAL EVERY 6 HOURS PRN
Qty: 5 TABLET | Refills: 0 | Status: SHIPPED | OUTPATIENT
Start: 2023-08-09 | End: 2023-08-12

## 2023-08-09 RX ORDER — CEFAZOLIN SODIUM 2 G/50ML
2 SOLUTION INTRAVENOUS SEE ADMIN INSTRUCTIONS
Status: DISCONTINUED | OUTPATIENT
Start: 2023-08-09 | End: 2023-08-11 | Stop reason: HOSPADM

## 2023-08-09 RX ORDER — NALOXONE HYDROCHLORIDE 0.4 MG/ML
0.4 INJECTION, SOLUTION INTRAMUSCULAR; INTRAVENOUS; SUBCUTANEOUS
Status: DISCONTINUED | OUTPATIENT
Start: 2023-08-09 | End: 2023-08-11 | Stop reason: HOSPADM

## 2023-08-09 RX ORDER — ONDANSETRON 4 MG/1
4 TABLET, ORALLY DISINTEGRATING ORAL EVERY 30 MIN PRN
Status: DISCONTINUED | OUTPATIENT
Start: 2023-08-09 | End: 2023-08-11 | Stop reason: HOSPADM

## 2023-08-09 RX ORDER — HYDROMORPHONE HYDROCHLORIDE 1 MG/ML
0.4 INJECTION, SOLUTION INTRAMUSCULAR; INTRAVENOUS; SUBCUTANEOUS EVERY 5 MIN PRN
Status: DISCONTINUED | OUTPATIENT
Start: 2023-08-09 | End: 2023-08-11 | Stop reason: HOSPADM

## 2023-08-09 RX ORDER — CEFAZOLIN SODIUM 2 G/50ML
2 SOLUTION INTRAVENOUS
Status: COMPLETED | OUTPATIENT
Start: 2023-08-09 | End: 2023-08-09

## 2023-08-09 RX ORDER — ONDANSETRON 2 MG/ML
4 INJECTION INTRAMUSCULAR; INTRAVENOUS EVERY 30 MIN PRN
Status: DISCONTINUED | OUTPATIENT
Start: 2023-08-09 | End: 2023-08-11 | Stop reason: HOSPADM

## 2023-08-09 RX ORDER — PROPOFOL 10 MG/ML
INJECTION, EMULSION INTRAVENOUS CONTINUOUS PRN
Status: DISCONTINUED | OUTPATIENT
Start: 2023-08-09 | End: 2023-08-09

## 2023-08-09 RX ORDER — FENTANYL CITRATE 50 UG/ML
50 INJECTION, SOLUTION INTRAMUSCULAR; INTRAVENOUS EVERY 5 MIN PRN
Status: DISCONTINUED | OUTPATIENT
Start: 2023-08-09 | End: 2023-08-11 | Stop reason: HOSPADM

## 2023-08-09 RX ORDER — LIDOCAINE 40 MG/G
CREAM TOPICAL
Status: DISCONTINUED | OUTPATIENT
Start: 2023-08-09 | End: 2023-08-11 | Stop reason: HOSPADM

## 2023-08-09 RX ORDER — FENTANYL CITRATE 50 UG/ML
25-50 INJECTION, SOLUTION INTRAMUSCULAR; INTRAVENOUS
Status: DISCONTINUED | OUTPATIENT
Start: 2023-08-09 | End: 2023-08-11 | Stop reason: HOSPADM

## 2023-08-09 RX ORDER — ACETAMINOPHEN 325 MG/1
975 TABLET ORAL ONCE
Status: COMPLETED | OUTPATIENT
Start: 2023-08-09 | End: 2023-08-09

## 2023-08-09 RX ORDER — OXYCODONE HYDROCHLORIDE 5 MG/1
10 TABLET ORAL EVERY 4 HOURS PRN
Status: DISCONTINUED | OUTPATIENT
Start: 2023-08-09 | End: 2023-08-11 | Stop reason: HOSPADM

## 2023-08-09 RX ORDER — HYDRALAZINE HYDROCHLORIDE 20 MG/ML
2.5-5 INJECTION INTRAMUSCULAR; INTRAVENOUS EVERY 10 MIN PRN
Status: DISCONTINUED | OUTPATIENT
Start: 2023-08-09 | End: 2023-08-11 | Stop reason: HOSPADM

## 2023-08-09 RX ORDER — FLUMAZENIL 0.1 MG/ML
0.2 INJECTION, SOLUTION INTRAVENOUS
Status: DISCONTINUED | OUTPATIENT
Start: 2023-08-09 | End: 2023-08-11 | Stop reason: HOSPADM

## 2023-08-09 RX ORDER — NALOXONE HYDROCHLORIDE 0.4 MG/ML
0.2 INJECTION, SOLUTION INTRAMUSCULAR; INTRAVENOUS; SUBCUTANEOUS
Status: DISCONTINUED | OUTPATIENT
Start: 2023-08-09 | End: 2023-08-11 | Stop reason: HOSPADM

## 2023-08-09 RX ORDER — FENTANYL CITRATE 50 UG/ML
25 INJECTION, SOLUTION INTRAMUSCULAR; INTRAVENOUS EVERY 5 MIN PRN
Status: DISCONTINUED | OUTPATIENT
Start: 2023-08-09 | End: 2023-08-11 | Stop reason: HOSPADM

## 2023-08-09 RX ORDER — HYDROMORPHONE HYDROCHLORIDE 1 MG/ML
0.2 INJECTION, SOLUTION INTRAMUSCULAR; INTRAVENOUS; SUBCUTANEOUS EVERY 5 MIN PRN
Status: DISCONTINUED | OUTPATIENT
Start: 2023-08-09 | End: 2023-08-11 | Stop reason: HOSPADM

## 2023-08-09 RX ORDER — BUPIVACAINE HYDROCHLORIDE 5 MG/ML
INJECTION, SOLUTION EPIDURAL; INTRACAUDAL
Status: COMPLETED | OUTPATIENT
Start: 2023-08-09 | End: 2023-08-09

## 2023-08-09 RX ORDER — OXYCODONE HYDROCHLORIDE 5 MG/1
5 TABLET ORAL EVERY 4 HOURS PRN
Status: DISCONTINUED | OUTPATIENT
Start: 2023-08-09 | End: 2023-08-11 | Stop reason: HOSPADM

## 2023-08-09 RX ADMIN — FENTANYL CITRATE 50 MCG: 50 INJECTION, SOLUTION INTRAMUSCULAR; INTRAVENOUS at 13:11

## 2023-08-09 RX ADMIN — SODIUM CHLORIDE, SODIUM LACTATE, POTASSIUM CHLORIDE, CALCIUM CHLORIDE: 600; 310; 30; 20 INJECTION, SOLUTION INTRAVENOUS at 14:00

## 2023-08-09 RX ADMIN — ACETAMINOPHEN 975 MG: 325 TABLET ORAL at 12:07

## 2023-08-09 RX ADMIN — PROPOFOL 150 MCG/KG/MIN: 10 INJECTION, EMULSION INTRAVENOUS at 14:06

## 2023-08-09 RX ADMIN — BUPIVACAINE HYDROCHLORIDE 20 ML: 5 INJECTION, SOLUTION EPIDURAL; INTRACAUDAL at 13:17

## 2023-08-09 RX ADMIN — CEFAZOLIN SODIUM 2 G: 2 SOLUTION INTRAVENOUS at 14:00

## 2023-08-09 ASSESSMENT — ENCOUNTER SYMPTOMS: DYSRHYTHMIAS: 1

## 2023-08-09 NOTE — DISCHARGE INSTRUCTIONS
Procedure Performed: Fasciectomy right hand Dupuytren's contractures  Attending Surgeon: Lars Oleary MD  Date: 8/9/2023    DIAGNOSIS  1. Dupuytren's contracture of both hands        MEDICATIONS   Resume all home medications as directed unless otherwise instructed during this hospitalization. If there is any question, double check with your primary care provider.  Start new discharge medications as directed.    Take 1 tablet of 650 mg Tylenol (acetaminophen) Arthritis Strength (extended release) and 1 tablet of Aleve (naproxen) 220 mg in the morning with breakfast and in the evening with dinner.     For breakthrough pain use narcotic pain medication as prescribed.    Do not drive or operate machinery while taking narcotic pain medications.   If you are taking other Tylenol containing medicines at home, be sure NOT to exceed 4 gram's (4000 milligrams) of Tylenol per day.   If you are taking pain medications, be sure to take Colace (docusate sodium) as well to prevent constipation. If constipated, try adding another cathartic or enema.  If nausea and vomiting, call the hospital or seek medical attention.    ACTIVITY   Weight bearing: Non-weight bearing to arm.    DIET  Resume same diet prior to your hospital admission.    WOUND   Leave dressing on until you are seen in clinic for your follow up visit.   Watch for signs and symptoms of infection of your wounds including; pain, redness, swelling, drainage or fever.  If you notice any of these symptoms please call or seek medical attention.    Keep wound clean, dry, and intact.  Do not submerge wounds in water until they are healed. No baths, soaking, swimming, or prolonged water exposure for 4 weeks after surgery.    RETURN   Follow-up with Orthopedic Clinic as directed.     Future Appointments   Date Time Provider Department Center   8/16/2023  9:00 AM Antonia Alexander OT [facility]   8/30/2023  8:20 AM Lars Oleary MD [facility]   10/20/2023  7:00  Gordon Ritter MD [facility]   11/8/2023 11:15 AM Yamilka Thomas APRN CNP [facility]       Call the [facility] at [phone] during business hours for any symptoms such as:    * Fevers with Temperature greater than 101.5 degrees.   * Pus drainage from wound site.   * Severe pain, not controlled by medication.   * Persistent nausea, vomiting and inablility to tolerate fluids.    If you are receiving care in [place], you may call the Orthopedic clinic at [phone].    FOR URGENT PROBLEMS ONLY, after hours or on weekends call the hospital  at [phone] and ask to speak with the orthopedic resident on call.    You may also be seen at the [facility] that runs 6 days per week from 8a-5p Monday-Friday and 8a-12p on Saturday at [address] [place] [id number]. You can call the Walk-In Clinic at [phone].  [facility]  Home Care Following Anesthesia  For 24 hours after surgery:  Get plenty of rest.  A responsible adult must stay with you for at least 24 hours after you leave the surgery center.  Do not drive or use heavy equipment.  If you have weakness or tingling, don't drive or use heavy equipment until this feeling goes away.   Do not drink alcohol.   Avoid strenuous or risky activities.  Ask for help when climbing stairs.  You may feel lightheaded.  IF so, sit for a few minutes before standing.  Have someone help you get up.   If you have nausea (feel sick to your stomach): Drink only clear liquids such as apple juice, ginger ale, broth or 7-Up.  Rest may also help.  Be sure to drink enough fluids.  Move to a regular diet as you feel able.   You may have a slight fever.  Call the doctor if your fever is over 100 F (37.7 C) (taken under the tongue) or lasts longer than 24 hours.  You may have a dry mouth, a sore throat, muscle aches or trouble sleeping. These should go away after 24  "hours.  Do not make important or legal decisions.   It is recommended to avoid smoking.        Today you received a Marcaine or bupivacaine block to numb the nerves near your surgery site.  This is a block using local anesthetic or \"numbing\" medication injected around the nerves to anesthetize or \"numb\" the area supplied by those nerves.  This block is injected into the muscle layer near your surgical site.  The medication may numb the location where you had surgery for 6-18 hours, but may last up to 24 hours.  If your surgical site is an arm or leg you should be careful with your affected limb, since it is possible to injure your limb without being aware of it due to the numbing.  Until full feeling returns, you should guard against bumping or hitting your limb, and avoid extreme hot or cold temperatures on the skin.  As the block wears off, the feeling will return as a tingling or prickly sensation near your surgical site.  You will experience more discomfort from your incision as the feeling returns.  You may want to take a pain pill (a narcotic or Tylenol if this was prescribed by your surgeon) when you start to experience mild pain before the pain beccomes more severe.  If your pain medications do not control your pain you should notifiy your surgeon.    Tips for taking pain medications  To get the best pain relief possible, remember these points:  Take pain medications as directed, before pain becomes severe.  Pain medication can upset your stomach: taking it with food may help.  Constipation is a common side effect of pain medication. Drink plenty of  fluids.  Eat foods high in fiber. Take a stool softener if recommended by your doctor or pharmacist.  Do not drink alcohol, drive or operate machinery while taking pain medications.  Ask about other ways to control pain, such as with heat, ice or relaxation.    Tylenol/Acetaminophen Consumption    If you feel your pain relief is insufficient, you may take " Tylenol/Acetaminophen in addition to your narcotic pain medication.   Be careful not to exceed 4,000 mg of Tylenol/Acetaminophen in a 24 hour period from all sources.  If you are taking extra strength Tylenol/acetaminophen (500 mg), the maximum dose is 8 tablets in 24 hours.  If you are taking regular strength acetaminophen (325 mg), the maximum dose is 12 tablets in 24 hours.    Call a doctor for any of the following:  Signs of infection (fever, growing tenderness at the surgery site, a large amount of drainage or bleeding, severe pain, foul-smelling drainage, redness, swelling).  It has been over 8 to 10 hours since surgery and you are still not able to urinate (pass water).  Headache for over 24 hours.  Numbness, tingling or weakness the day after surgery (if you had spinal anesthesia).  Signs of Covid-19 infection (temperature over 100 degrees, shortness of breath, cough, loss of taste/smell, generalized body aches, persistent headache, chills, sore throat, nausea/vomiting/diarrhea)  Your doctor is:  Dr. Lars Oleary, Orthopaedics: [phone]                    Or dial [phone] and ask for the resident on call for:  Orthopaedics  For emergency care, call the:  [place] [facility]:  [phone] (TTY for hearing impaired: [phone])

## 2023-08-09 NOTE — ANESTHESIA POSTPROCEDURE EVALUATION
Patient: Frank Orellana    Procedure: Procedure(s):  RELEASE, DUPUYTREN CONTRACTURE, RIGHT SMALL FINGER AND RIGHT MIDDLE FINGER       Anesthesia Type:  General    Note:  Disposition: Outpatient   Postop Pain Control: Uneventful            Sign Out: Well controlled pain   PONV: No   Neuro/Psych: Uneventful            Sign Out: Acceptable/Baseline neuro status   Airway/Respiratory: Uneventful            Sign Out: Acceptable/Baseline resp. status   CV/Hemodynamics: Uneventful            Sign Out: Acceptable CV status   Other NRE: NONE   DID A NON-ROUTINE EVENT OCCUR? No           Last vitals:  Vitals Value Taken Time   /69 08/09/23 1600   Temp 36.1  C (96.9  F) 08/09/23 1600   Pulse 50 08/09/23 1528   Resp 16 08/09/23 1600   SpO2 94 % 08/09/23 1600       Electronically Signed By: Orestes Argueta MD  August 9, 2023  4:46 PM

## 2023-08-09 NOTE — ANESTHESIA PROCEDURE NOTES
Brachial plexus Procedure Note    Pre-Procedure   Staff -        Anesthesiologist:  Indio Guzman MD       Performed By: anesthesiologist       Location: pre-op       Procedure Start/Stop Times: 8/9/2023 1:13 PM and 8/9/2023 1:17 PM       Pre-Anesthestic Checklist: patient identified, IV checked, site marked, risks and benefits discussed, informed consent, monitors and equipment checked, pre-op evaluation, at physician/surgeon's request and post-op pain management  Timeout:       Correct Patient: Yes        Correct Procedure: Yes        Correct Site: Yes        Correct Position: Yes        Correct Laterality: Yes        Site Marked: Yes  Procedure Documentation  Procedure: Brachial plexus       Diagnosis: DUPUYTREN CONTRACTURE RIGHT HAND       Laterality: right       Patient Position: sitting       Skin prep: Chloraprep (supraclavicular approach).       Needle Type: insulated       Needle Gauge: 21.        Needle Length (millimeters): 110        Ultrasound guided       1. Ultrasound was used to identify targeted nerve, plexus, vascular marker, or fascial plane and place a needle adjacent to it in real-time.       2. Ultrasound was used to visualize the spread of anesthetic in close proximity to the above referenced structure.       3. A permanent image is entered into the patient's record.       4. The visualized anatomic structures appeared normal.    Assessment/Narrative         The placement was negative for: blood aspirated, painful injection and site bleeding       Paresthesias: No.       Bolus given via needle..        Secured via.        Insertion/Infusion Method: Single Shot       Complications: none       Injection made incrementally with aspirations every 5 mL.    Medication(s) Administered   Bupivacaine 0.5% PF (Infiltration) - Infiltration   20 mL - 8/9/2023 1:17:00 PM  Medication Administration Time: 8/9/2023 1:13 PM      FOR [facility] ([facility]) ONLY:   Pain Team Contact information: please page  "the Pain Team Via [facility]. Search \"Pain\". During daytime hours, please page the attending first. At night please page the resident first.      "

## 2023-08-09 NOTE — ANESTHESIA PREPROCEDURE EVALUATION
Anesthesia Pre-Procedure Evaluation    Patient: Frank Orellana   MRN: [id number] : 1950        Procedure : Procedure(s):  RELEASE, DUPUYTREN CONTRACTURE, RIGHT SMALL FINGER AND RIGHT MIDDLE FINGER          Past Medical History:   Diagnosis Date    Actinic keratosis     Atrial fibrillation (H) 6/15/96    Afib - 2 or 3 extended occurrences since    Chronic hepatitis C (H)     Chronic Hepatitis C,  genotype 1b                Stage 0 Fibrosis    Dupuytren's contracture of both hands     Elevated PSA     Hepatitis B 1969    acute infection at age 19; IV drug use    WINNIE (obstructive sleep apnea)     AHI 34.2    Pain in both hands     Patient is Hindu     Refusal of blood transfusions as patient is Hindu     Right shoulder pain     Tinnitus 1 or 2 years ago    both ears    Tubular adenoma of colon 17    ascending colon, 17      Past Surgical History:   Procedure Laterality Date    ANESTHESIA CARDIOVERSION N/A 1/15/2018    Procedure: ANESTHESIA CARDIOVERSION;  Anesthesia Coverage Cardioversion With Transesophageal Echocardiogram @0930 ;  Surgeon: GENERIC ANESTHESIA PROVIDER;  Location: [facility] OR    ANESTHESIA CARDIOVERSION N/A 10/11/2018    Procedure: ANESTHESIA CARDIOVERSION;  Cardioversion;  Surgeon: GENERIC ANESTHESIA PROVIDER;  Location: [facility] OR    ANESTHESIA CARDIOVERSION N/A 2018    Procedure: Anesthesia Coverage Cardioversion @0830;  Surgeon: GENERIC ANESTHESIA PROVIDER;  Location: [facility] OR    ARTHROSCOPY KNEE      left knee    COLONOSCOPY  16    tubular adenoma ascending colon    COLONOSCOPY N/A 2023    Procedure: COLONOSCOPY, WITH POLYPECTOMY AND BIOPSY;  Surgeon: Hussein Stevens MD;  Location: [facility] GI    CYSTOSCOPY, TRANSURETHRAL RESECTION (TUR) PROSTATE, COMBINED N/A 2020    Procedure: CYSTOSCOPY, WITH TRANSURETHRAL RESECTION PROSTATE;  Surgeon: Roldan De La O MD;  Location: [facility]    HERNIORRHAPHY INGUINAL Right 10/26/2017    Procedure:  HERNIORRHAPHY INGUINAL;  Open Right Inguinal Hernia Repair with Mesh;  Surgeon: Suresh Raymond MD;  Location: [facility] OR    KNEE SURGERY  2006??    torn meniscus    RELEASE DUPUYTRENS CONTRACTURE Right 2017    Procedure: RELEASE DUPUYTRENS CONTRACTURE;  Surgeon: Lars Oleary MD;  Location: [facility] OR    RELEASE DUPUYTRENS CONTRACTURE Left 12/15/2017    Procedure: RELEASE DUPUYTRENS CONTRACTURE;  Left Ring and Middle Finger Subtotal Palmar Fasciectomy;  Surgeon: Lars Oleary MD;  Location: [facility] OR    TRANSESOPHAGEAL ECHOCARDIOGRAM INTRAOPERATIVE N/A 1/15/2018    Procedure: TRANSESOPHAGEAL ECHOCARDIOGRAM INTRAOPERATIVE;;  Surgeon: GENERIC ANESTHESIA PROVIDER;  Location: [facility] OR      Allergies   Allergen Reactions    Blood Transfusion Related (Informational Only)      Anabaptist.  Declines blood transfusions.    Shellfish-Derived Products Swelling    Contrast Dye Rash    Iodinated Contrast Media Rash      Social History     Tobacco Use    Smoking status: Former     Packs/day: 0.50     Years: 10.00     Pack years: 5.00     Types: Cigarettes     Start date: 6/15/1964     Quit date: 1974     Years since quittin.1    Smokeless tobacco: Former     Quit date: 1969   Substance Use Topics    Alcohol use: Yes     Alcohol/week: 3.0 - 4.0 standard drinks of alcohol     Comment: 4 or 5 drinks/week, limit of 1      Wt Readings from Last 1 Encounters:   23 73.5 kg (162 lb)        Anesthesia Evaluation        History of anesthetic complications       ROS/MED HX  ENT/Pulmonary:     (+) sleep apnea,                                      Neurologic:       Cardiovascular:     (+)  - -   -  - -                        dysrhythmias, a-fib,             METS/Exercise Tolerance:     Hematologic: Comments: Anabaptist      Musculoskeletal:       GI/Hepatic:     (+)           hepatitis  liver disease,       Renal/Genitourinary:       Endo:       Psychiatric/Substance Use:       Infectious  Disease:       Malignancy:       Other:            Physical Exam    Airway        Mallampati: II   TM distance: > 3 FB   Neck ROM: full   Mouth opening: > 3 cm    Respiratory Devices and Support         Dental       (+) Multiple crowns, permanant bridges      Cardiovascular          Rhythm and rate: bradycardia     Pulmonary   pulmonary exam normal                OUTSIDE LABS:  CBC:   Lab Results   Component Value Date    WBC 5.9 04/24/2023    WBC 9.1 08/22/2020    HGB 14.0 04/24/2023    HGB 11.9 (L) 08/22/2020    HCT 41.4 04/24/2023    HCT 35.9 (L) 08/22/2020     04/24/2023     08/22/2020     BMP:   Lab Results   Component Value Date     04/24/2023     12/17/2021    POTASSIUM 4.5 04/24/2023    POTASSIUM 3.4 12/17/2021    CHLORIDE 106 04/24/2023    CHLORIDE 109 12/17/2021    CO2 30 (H) 04/24/2023    CO2 30 12/17/2021    BUN 11.8 04/24/2023    BUN 13 12/17/2021    CR 0.87 04/24/2023    CR 0.87 12/17/2021     (H) 04/24/2023    GLC 94 12/17/2021     COAGS:   Lab Results   Component Value Date    PTT 29 06/14/2006    INR 1.52 (H) 01/15/2018     POC:   Lab Results   Component Value Date    BGM 85 08/21/2020     HEPATIC:   Lab Results   Component Value Date    ALBUMIN 4.4 04/24/2023    PROTTOTAL 7.2 04/24/2023    ALT 40 04/24/2023    AST 32 04/24/2023    ALKPHOS 91 04/24/2023    BILITOTAL 0.7 04/24/2023     OTHER:   Lab Results   Component Value Date    LACT 1.1 01/04/2019    A1C 5.1 04/24/2023    VALENTINA 9.4 04/24/2023    MAG 2.2 08/21/2020    LIPASE 135 01/04/2019    TSH 1.09 04/24/2023    SED 5 07/23/2014       Anesthesia Plan    ASA Status:  2    NPO Status:  NPO Appropriate    Anesthesia Type: General.     - Airway: Native airway              Consents    Anesthesia Plan(s) and associated risks, benefits, and realistic alternatives discussed. Questions answered and patient/representative(s) expressed understanding.     - Discussed: Risks, Benefits and Alternatives for the PROCEDURE were  discussed     - Discussed with:  Patient      - Extended Intubation/Ventilatory Support Discussed: No.      - Patient is DNR/DNI Status: No     Use of blood products discussed: No .     Postoperative Care       PONV prophylaxis: Ondansetron (or other 5HT-3), Background Propofol Infusion     Comments:                Indio Guzman MD

## 2023-08-09 NOTE — OP NOTE
OPERATIVE REPORT    PATIENT NAME: Frank Orellana  MRN: [id number]    DATE: 8/9/2023    PREOPERATIVE DIAGNOSIS:   1. Right small finger Dupuytren's contracture MCP joint.  2. Right ring finger Dupuytren's contracture     POSTOPERATIVE DIAGNOSIS:       OPERATION:   1. Right small finger palmar fasciectomy. 26123  2. Right ring finger palmar fasciectomy. 26125      SURGEON: Lars Oleary MD    ASSISTANT(S): Jude Moseley MD PGY 4    STAFF: Circulator: Karina Vega RN; Michelle Alan RN  Relief Circulator: Yani Santiago RN  Scrub Person: Antonia Willis; Ruthy Chapman MA    ANESTHESIA: Regional block and IV sedation    IMPLANTS: * No implants in log *    SPECIMEN:   ID Type Source Tests Collected by Time Destination   1 : Dupuytren Tissue Hand, Right SURGICAL PATHOLOGY EXAM Lars Oleary MD 8/9/2023  2:25 PM        ESTIMATED BLOOD LOSS: < 5 mL.    FLUIDS: See anesthesia records.     URINE OUTPUT:  Not applicable.  Cunningham was not placed.    TOURNIQUET TIME:  45 minutes.    COMPLICATION: None. None.    INDICATIONS: Frank Orellana is a 73 year old male who developed Dupuytren's disease of the right hand affecting the small finger and the middle finger.  These were limiting his ability to perform activities of daily living.  After a thorough evaluation and discussion of treatment options, the patient was indicated for operative intervention.  I again had a thorough discussion today with him with regard to the intended surgical procedure. We reviewed the risks and benefits associated with conservative management and surgical intervention.  The risks of surgery include but are not limited to infection, bleeding, nerve injury, permanent numbness, contracture recurrence, post-operative stiffness, surgical scar, CRPS, anesthetic complications, etc.  We also discussed that there is a possibility that the surgery will not be successful and will require repeat surgery. We reviewed post-operative pain management  and rehabilitation.  All questions answered.  The patient fully understands and is agreeable to proceeding with the surgical procedure. An informed consent was signed.     DESCRIPTION OF PROCEDURE: The patient was taken to the operating room and placed in supine position on the operating table.  IV sedation was administered by the Department of Anesthesia followed by administration of antibiotics..  A high arm tourniquet was applied to the right upper extremity, which was then prepped and draped in the usual sterile fashion.  A timeout was performed with all OR staff.  The patient name, MRN, operative extremity, procedure, allergies, antibiotics, DVT prophylaxis/SCDs, and fire precautions/plan were reviewed, and all were in agreement. The extremity was exsanguinated with an Esmarch bandage and the tourniquet was inflated to 250 mmHg.    We started with the middle finger cord.  A zigzag Robert incision was made in the mid palm over the cord.  The soft tissues were dissected down to the cord, this was isolated.  Neurovascular bundles were carefully protected.  The cord was removed completely.  This relieved any contracture noted at the MP joint.  We then moved to the small finger.  Is similar but longer a zigzag incision and a Robert type fashion was made from the mid palm out to the MP crease.  The soft tissues were dissected down to and around the cord and the nodule overlying the MP joint.  Neurovascular bundles were again carefully protected.  The entire cord and nodule was removed.  I was able to fully extend his digits.  Both wounds were irrigated copiously.  The tourniquet was let down.  Pressure was held over the wound.  Small areas of hemorrhage were controlled with bipolar electrocautery.  The wounds were closed with 5-0 nylon suture.  He was placed in a well-padded volar ulnar gutter splint.  Blood loss 5 cc.  No complications and Frank was taken to the recovery room in stable condition.    The patient was  aroused from anesthesia and taken to the recovery room in stable condition.      All counts were correct at the end of the case.       There were no complications.    Postoperatively, he can go into a soft bandage and begin range of motion activities and exercises with the right hand.  Splint as needed.      Lars Oleary MD

## 2023-08-09 NOTE — ANESTHESIA CARE TRANSFER NOTE
Patient: Frank Orellana    Procedure: Procedure(s):  RELEASE, DUPUYTREN CONTRACTURE, RIGHT SMALL FINGER AND RIGHT MIDDLE FINGER       Diagnosis: Dupuytren's contracture of both hands [M72.0]  Diagnosis Additional Information: No value filed.    Anesthesia Type:   General     Note:    Oropharynx: oropharynx clear of all foreign objects and spontaneously breathing  Level of Consciousness: awake  Oxygen Supplementation: room air    Independent Airway: airway patency satisfactory and stable  Dentition: dentition unchanged  Vital Signs Stable: post-procedure vital signs reviewed and stable  Report to RN Given: handoff report given  Patient transferred to: Phase II    Handoff Report: Identifed the Patient, Identified the Reponsible Provider, Reviewed the pertinent medical history, Discussed the surgical course, Reviewed Intra-OP anesthesia mangement and issues during anesthesia, Set expectations for post-procedure period and Allowed opportunity for questions and acknowledgement of understanding      Vitals:  Vitals Value Taken Time   /62 08/09/23 1528   Temp 36.2  C (97.1  F) 08/09/23 1528   Pulse 50 08/09/23 1528   Resp 16 08/09/23 1528   SpO2 97 % 08/09/23 1528       Electronically Signed By: OSWALDO EMERSON  August 9, 2023  3:30 PM

## 2023-08-09 NOTE — PROGRESS NOTES
Patient received right side Supraclavicular nerve block without Exparel.  Fentanyl 50mcg and Versed 1mg given. Tolerated procedure well.

## 2023-08-16 ENCOUNTER — THERAPY VISIT (OUTPATIENT)
Dept: OCCUPATIONAL THERAPY | Facility: CLINIC | Age: 73
End: 2023-08-16
Payer: COMMERCIAL

## 2023-08-16 DIAGNOSIS — Z47.89 AFTERCARE FOLLOWING SURGERY OF THE MUSCULOSKELETAL SYSTEM: ICD-10-CM

## 2023-08-16 DIAGNOSIS — M79.644 PAIN OF FINGER OF RIGHT HAND: Primary | ICD-10-CM

## 2023-08-16 PROCEDURE — 97110 THERAPEUTIC EXERCISES: CPT | Mod: GO | Performed by: OCCUPATIONAL THERAPIST

## 2023-08-16 PROCEDURE — 97165 OT EVAL LOW COMPLEX 30 MIN: CPT | Mod: GO | Performed by: OCCUPATIONAL THERAPIST

## 2023-08-16 PROCEDURE — 97760 ORTHOTIC MGMT&TRAING 1ST ENC: CPT | Mod: GO | Performed by: OCCUPATIONAL THERAPIST

## 2023-08-16 NOTE — PROGRESS NOTES
OCCUPATIONAL THERAPY EVALUATION  Type of Visit: Evaluation    See electronic medical record for Abuse and Falls Screening details.    Subjective   Presenting condition or subjective complaint:  Right small finger Dupuytren's contracture MCP joint; right ring finger Dupuytren's contracture  Procedure: Right small finger palmar fasciectomy; right ring finger palmar fasciectomy  Date of surgery: 08/09/23  Post: 1w 0d    Relevant medical history:  Past Medical History:   Diagnosis Date    Actinic keratosis 2009    Atrial fibrillation (H) 6/15/96    Afib - 2 or 3 extended occurrences since    Chronic hepatitis C (H)     Chronic Hepatitis C,  genotype 1b                Stage 0 Fibrosis    Dupuytren's contracture of both hands     Elevated PSA     Hepatitis B 1969    acute infection at age 19; IV drug use    WINNIE (obstructive sleep apnea)     AHI 34.2    Pain in both hands     Patient is Sabianist     Refusal of blood transfusions as patient is Sabianist     Right shoulder pain     Tinnitus 1 or 2 years ago    both ears    Tubular adenoma of colon 1/17/17    ascending colon, 1/17/17     Dates & types of surgery:  Past Surgical History:   Procedure Laterality Date    ANESTHESIA CARDIOVERSION N/A 1/15/2018    Procedure: ANESTHESIA CARDIOVERSION;  Anesthesia Coverage Cardioversion With Transesophageal Echocardiogram @0930 ;  Surgeon: GENERIC ANESTHESIA PROVIDER;  [facility]    ANESTHESIA CARDIOVERSION N/A 10/11/2018    Procedure: ANESTHESIA CARDIOVERSION;  Cardioversion;  Surgeon: GENERIC ANESTHESIA PROVIDER;  [facility]    ANESTHESIA CARDIOVERSION N/A 11/28/2018    Procedure: Anesthesia Coverage Cardioversion @0830;  Surgeon: GENERIC ANESTHESIA PROVIDER;  [facility]    ARTHROSCOPY KNEE      left knee    COLONOSCOPY  1/17/17    tubular adenoma ascending colon    COLONOSCOPY N/A 6/26/2023    Procedure: COLONOSCOPY, WITH POLYPECTOMY AND BIOPSY;  Surgeon: Hussein Stevens MD;  Location:  GI     CYSTOSCOPY, TRANSURETHRAL RESECTION (TUR) PROSTATE, COMBINED N/A 8/21/2020    Procedure: CYSTOSCOPY, WITH TRANSURETHRAL RESECTION PROSTATE;  Surgeon: Roldan De La O MD;  [facility]    HERNIORRHAPHY INGUINAL Right 10/26/2017    Procedure: HERNIORRHAPHY INGUINAL;  Open Right Inguinal Hernia Repair with Mesh;  Surgeon: Suresh Raymond MD;  [facility]    KNEE SURGERY  2006??    torn meniscus    RELEASE DUPUYTRENS CONTRACTURE Right 1/20/2017    Procedure: RELEASE DUPUYTRENS CONTRACTURE;  Surgeon: Lars Oleary MD;  [facility]    RELEASE DUPUYTRENS CONTRACTURE Left 12/15/2017    Procedure: RELEASE DUPUYTRENS CONTRACTURE;  Left Ring and Middle Finger Subtotal Palmar Fasciectomy;  Surgeon: Lars Oleary MD;  [facility]    RELEASE DUPUYTRENS CONTRACTURE Right 8/9/2023    Procedure: RELEASE, DUPUYTREN CONTRACTURE, RIGHT SMALL FINGER AND RIGHT MIDDLE FINGER;  Surgeon: Lars Oleary MD;  [facility]    TRANSESOPHAGEAL ECHOCARDIOGRAM INTRAOPERATIVE N/A 1/15/2018    Procedure: TRANSESOPHAGEAL ECHOCARDIOGRAM INTRAOPERATIVE;;  Surgeon: GENERIC ANESTHESIA PROVIDER;  [facility]        Prior diagnostic imaging/testing results: N/A  Prior therapy history for the same diagnosis, illness or injury: Patient had a Dupuytren's contracture release of the left hand in December 2017    Prior level of function:  Transfers: Independent  Ambulation: Independent  ADL: Independent  IADL: Independent    Living environment: Patient is independent at home and there are no concerns about accessibility and mobility in the home.    Employment: Retired   Hobbies/Interests: Volunteer work    Patient goals for therapy: Restore right hand mobility and functional use of the hand.       Objective   Right hand dominant  Patient reports symptoms of pain, stiffness/loss of motion, weakness/loss of strength, edema, numbness, and tingling     Pain Level (Scale 0-10)   8/15/2023   At  "Rest 1/10   With Use NT     Pain Description  Date 8/15/2023   Location Left palm, ring and small fingers   Pain Quality \"Twinge\"   Frequency intermittent     Pain is worst  Day/night   Exacerbated by  Motion   Relieved by Rest   Progression Improved since surgery     Edema  Mild throughout the palm and ring and small fingers. Sutures intact. Moderate ecchymosis throughout the palm.    Wound/Scar  Healing appropriately. No erythema, drainage, or signs of infection.    Sensation   Patient reports tingling/numbness of the small finger.    ROM  Hand 8/15/2023   AROM(PROM) Right   Ring MP -30/50   PIP HE/85   DIP -10/50       Small MP 0/80   PIP -10/90   DIP 0/70   8/16/23- Able to touch index and middle fingers to palm.    Strength  Contraindicated at this time.      Assessment & Plan   CLINICAL IMPRESSIONS  Medical Diagnosis: Right small finger Dupuytren's contracture MCP joint; right ring finger Dupuytren's contracture    Treatment Diagnosis: Right hand pain; right hand stiffness    Impression/Assessment: Pt is a 73 year old male presenting to Occupational Therapy due to the above condition.  The following significant findings have been identified: Impaired ROM, Impaired strength, Pain, and Post-surgical precautions.  These identified deficits interfere with their ability to perform self care tasks, recreational activities, household chores, driving ,  yard work, and meal planning and preparation as compared to previous level of function.   Patient's limitations or Problem List includes: Pain, Decreased ROM/motion, Increased edema, Sensory disturbance, Decreased , and Decreased pinch of the right hand, ring finger, and small finger which interferes with the patient's ability to perform Self Care Tasks (dressing, eating, bathing, hygiene/toileting), Recreational Activities, Household Chores, and Driving  as compared to previous level of function.    Clinical Decision Making (Complexity):  Assessment of " Occupational Performance: 5 or more Performance Deficits  Occupational Performance Limitations: bathing/showering, toileting, dressing, feeding, hygiene and grooming, driving and community mobility, home establishment and management, meal preparation and cleanup, shopping, volunteer activities, and leisure activities  Clinical Decision Making (Complexity): Low complexity    PLAN OF CARE  Treatment Interventions:  Modalities:  US and Paraffin  Therapeutic Exercise:  AROM, AAROM, PROM, Tendon Gliding, Blocking, Reverse Blocking, Extensor Tracking, Isotonics, and Isometrics  Manual Techniques:  Scar mobilization and Myofascial release  Orthotic Fabrication:  Hand-based extension pan    Long Term Goals   OT Goal 1  Goal Identifier: ADL  Goal Description: Patient able to hold utensils in right hand for feeding.  Rationale: In order to maximize safety and independence with performance of self-care activities  Target Date: 09/13/23  OT Goal 2  Goal Identifier: IADL  Goal Description: Patient able to chop vegetables using right hand with 2/10 pain or less  Rationale: In order to maximize safety and independence with performance of self-care activities  Target Date: 10/25/23      Frequency of Treatment: 1x/week for 2 weeks  Duration of Treatment: tapering to 2x/month for 2 months     Recommended Referrals to Other Professionals:  N/A  Education Assessment: Learner/Method: Patient;Demonstration;Pictures/Video     Risks and benefits of evaluation/treatment have been explained.   Patient/Family/caregiver agrees with Plan of Care.     Evaluation Time:    OT Eval, Low Complexity Minutes (53518): 25    Signing Clinician: OLLIE Parks [facility]                                                                                   OUTPATIENT OCCUPATIONAL THERAPY      PLAN OF TREATMENT FOR OUTPATIENT REHABILITATION   Patient's Last Name, First Name, M.Frank Lee YOB: 1950    Provider's Name   [facility]   Medical Record No.  [id number]     Onset Date: 08/16/23 Start of Care Date: 08/16/23     Medical Diagnosis:  Right small finger Dupuytren's contracture MCP joint; right ring finger Dupuytren's contracture      OT Treatment Diagnosis:  Right hand pain; right hand stiffness Plan of Treatment  Frequency/Duration:1x/week for 2 weeks/tapering to 2x/month for 2 months    Certification date from 08/16/23   To 10/25/23        See note for plan of treatment details and functional goals     Antonia Alexander OT                         I CERTIFY THE NEED FOR THESE SERVICES FURNISHED UNDER        THIS PLAN OF TREATMENT AND WHILE UNDER MY CARE     (Physician attestation of this document indicates review and certification of the therapy plan).                Referring Provider:  Lars Oleary MD      Initial Assessment  See Epic Evaluation- 08/16/23

## 2023-08-28 ENCOUNTER — MYC MEDICAL ADVICE (OUTPATIENT)
Dept: OPHTHALMOLOGY | Facility: CLINIC | Age: 73
End: 2023-08-28
Payer: COMMERCIAL

## 2023-08-28 NOTE — TELEPHONE ENCOUNTER
Responded to patient my chart request for glasses Rx. Directed patient on how to access on my chart but also put a copy if the mail of glasses Rx for patient.   Rosanna To, COT COT 9:38 AM August 28, 2023

## 2023-08-30 ENCOUNTER — THERAPY VISIT (OUTPATIENT)
Dept: OCCUPATIONAL THERAPY | Facility: CLINIC | Age: 73
End: 2023-08-30
Payer: COMMERCIAL

## 2023-08-30 ENCOUNTER — OFFICE VISIT (OUTPATIENT)
Dept: ORTHOPEDICS | Facility: CLINIC | Age: 73
End: 2023-08-30
Payer: COMMERCIAL

## 2023-08-30 DIAGNOSIS — M72.0 DUPUYTREN'S CONTRACTURE OF BOTH HANDS: Primary | ICD-10-CM

## 2023-08-30 DIAGNOSIS — Z47.89 AFTERCARE FOLLOWING SURGERY OF THE MUSCULOSKELETAL SYSTEM: ICD-10-CM

## 2023-08-30 DIAGNOSIS — M79.644 PAIN OF FINGER OF RIGHT HAND: Primary | ICD-10-CM

## 2023-08-30 PROCEDURE — 99024 POSTOP FOLLOW-UP VISIT: CPT | Performed by: ORTHOPAEDIC SURGERY

## 2023-08-30 PROCEDURE — 97110 THERAPEUTIC EXERCISES: CPT | Mod: GO | Performed by: OCCUPATIONAL THERAPIST

## 2023-08-30 NOTE — LETTER
8/30/2023         RE: Frank Orellana  [address]  [facility] [address]        Dear Colleague,    Thank you for referring your patient, Frank Orellana, to the [facility] [place]. Please see a copy of my visit note below.    Frank is here for follow-up of his Dupuytren's subtotal palmar fasciectomy, he is about 3 weeks out now.  Doing well, seen with his wife this morning.  A little bit of numbness the radial side of the small finger but otherwise no issues.  No fevers, no chills.  Pain has been minimal.    The past medical history was reviewed and documented in the chart.  This includes medications, surgeries, social history as well as review of systems.    Physical examination of the right hand demonstrates nicely healing incisions.  Some maceration at the end of the small finger incision but no signs of any infection.  Flexion is a little bit limited but he has full extension now.  Bilaterally, no motor, no sensory deficits are noted.  No significant atrophy.  There is brisk capillary refill in all digits and a palpable radial pulse.  No overlying skin changes noted.    Impression: Status post subtotal palmar fasciectomy right hand    Plan: He is doing well, we can get the sutures out today, Steri-Stripped the wound.  He has had 1 visit with therapy, he will continue with therapy, increasing activities as tolerated.  Overall he is doing great, I can see him back in a month or as needed.          Lars Oleary MD

## 2023-08-30 NOTE — PROGRESS NOTES
"SOAP note objective information for 8/30/2023.  Please refer to the daily flowsheet for treatment today, total treatment time and time spent performing 1:1 timed codes.     Presenting condition or subjective complaint:  Right small finger Dupuytren's contracture MCP joint; right ring finger Dupuytren's contracture  Procedure: Right small finger palmar fasciectomy; right ring finger palmar fasciectomy  Date of surgery: 08/09/23  Post: 1w 0d    Pain Level (Scale 0-10)   8/15/2023   At Rest 1/10   With Use NT     Pain Description  Date 8/15/2023   Location Left palm, ring and small fingers   Pain Quality \"Twinge\"   Frequency intermittent     Pain is worst  Day/night   Exacerbated by  Motion   Relieved by Rest   Progression Improved since surgery     Edema  Mild throughout the palm and ring and small fingers.    Wound/Scar  Healing appropriately. No erythema, drainage, or signs of infection. Sutures removed.    Sensation   Patient reports tingling/numbness of the small finger.    ROM  Hand 8/15/2023 8/30/2023   AROM(PROM) Right Right   Ring MP -30/50 0/75   PIP HE/85 HE/83   DIP -10/50 0/45        Small MP 0/80 0/80   PIP -10/90 0/87   DIP 0/70 0/65   8/30/23- Able to make a loose fist      "

## 2023-08-30 NOTE — PROGRESS NOTES
Frank is here for follow-up of his Dupuytren's subtotal palmar fasciectomy, he is about 3 weeks out now.  Doing well, seen with his wife this morning.  A little bit of numbness the radial side of the small finger but otherwise no issues.  No fevers, no chills.  Pain has been minimal.    The past medical history was reviewed and documented in the chart.  This includes medications, surgeries, social history as well as review of systems.    Physical examination of the right hand demonstrates nicely healing incisions.  Some maceration at the end of the small finger incision but no signs of any infection.  Flexion is a little bit limited but he has full extension now.  Bilaterally, no motor, no sensory deficits are noted.  No significant atrophy.  There is brisk capillary refill in all digits and a palpable radial pulse.  No overlying skin changes noted.    Impression: Status post subtotal palmar fasciectomy right hand    Plan: He is doing well, we can get the sutures out today, Steri-Stripped the wound.  He has had 1 visit with therapy, he will continue with therapy, increasing activities as tolerated.  Overall he is doing great, I can see him back in a month or as needed.   English

## 2023-08-30 NOTE — NURSING NOTE
Reason For Visit:   Chief Complaint   Patient presents with    RECHECK     Follow up on right small and middle finger Dupuytren's contracture release DOS 8/9/23       Primary MD: Yani Burnett  Ref. MD: sunil    Age: 73 year old    ?  No      There were no vitals taken for this visit.      Pain Assessment  Patient Currently in Pain: Yes  0-10 Pain Scale: 2  Primary Pain Location: Finger (Comment which one) (right small and middle)  Pain Descriptors: Aching  Alleviating Factors: Rest    Hand Dominance Evaluation  Hand Dominance: Right          QuickDASH Assessment      8/24/2023     5:39 PM   QuickDASH Main   1. Open a tight or new jar Severe difficulty   2. Do heavy household chores (e.g., wash walls, floors) Moderate difficulty   3. Carry a shopping bag or briefcase Moderate difficulty   4. Wash your back Moderate difficulty   5. Use a knife to cut food Moderate difficulty   6. Recreational activities in which you take some force or impact through your arm, shoulder or hand (e.g., golf, hammering, tennis, etc.) Unable to answer   7. During the past week, to what extent has your arm, shoulder or hand problem interfered with your normal social activities with family, friends, neighbours or groups Moderately   8. During the past week, were you limited in your work or other regular daily activities as a result of your arm, shoulder or hand problem Moderately limited   9. Arm, shoulder or hand pain Mild   10.Tingling (pins and needles) in your arm,shoulder or hand Mild   11. During the past week, how much difficulty have you had sleeping because of the pain in your arm, shoulder or hand No difficulty   Quickdash Ability Score 36.36          Current Outpatient Medications   Medication Sig Dispense Refill    aspirin 81 MG EC tablet Take 1 tablet (81 mg) by mouth daily 90 tablet 3    diltiazem ER COATED BEADS (CARDIZEM CD/CARTIA XT) 120 MG 24 hr capsule Take 1 capsule (120 mg) by mouth daily 90 capsule 1     flecainide (TAMBOCOR) 50 MG tablet Take 1 tablet (50 mg) by mouth 2 times daily 180 tablet 1       Allergies   Allergen Reactions    Blood Transfusion Related (Informational Only)      Mormonism.  Declines blood transfusions.    Shellfish-Derived Products Swelling    Contrast Dye Rash    Iodinated Contrast Media Rash       Abbey Knox, ATC

## 2023-09-08 ENCOUNTER — THERAPY VISIT (OUTPATIENT)
Dept: OCCUPATIONAL THERAPY | Facility: CLINIC | Age: 73
End: 2023-09-08
Payer: COMMERCIAL

## 2023-09-08 DIAGNOSIS — Z47.89 AFTERCARE FOLLOWING SURGERY OF THE MUSCULOSKELETAL SYSTEM: ICD-10-CM

## 2023-09-08 DIAGNOSIS — M79.644 PAIN OF FINGER OF RIGHT HAND: Primary | ICD-10-CM

## 2023-09-08 PROCEDURE — 97140 MANUAL THERAPY 1/> REGIONS: CPT | Mod: GO | Performed by: OCCUPATIONAL THERAPIST

## 2023-09-08 NOTE — PROGRESS NOTES
"SOAP note objective information for 9/8/2023.  Please refer to the daily flowsheet for treatment today, total treatment time and time spent performing 1:1 timed codes.     Presenting condition or subjective complaint:  Right small finger Dupuytren's contracture MCP joint; right ring finger Dupuytren's contracture  Procedure: Right small finger palmar fasciectomy; right ring finger palmar fasciectomy  Date of surgery: 08/09/23  Post: 4w 2d    Pain Level (Scale 0-10)   8/15/2023 9/8/2023   At Rest 1/10 0/10   With Use NT 1/10 at worst     Pain Description  Date 8/15/2023   Location Left palm, ring and small fingers   Pain Quality \"Twinge\"   Frequency intermittent     Pain is worst  Day/night   Exacerbated by  Motion   Relieved by Rest   Progression Improved since surgery     Edema  Mild throughout the palm and ring and small fingers.    Wound/Scar  Scar is well-healed. Mildly adhered, non-tender to palpation.    Sensation   Patient reports tingling/numbness of the small finger.    ROM  Hand 8/15/2023 8/30/2023   AROM(PROM) Right Right   Ring MP -30/50 0/75   PIP HE/85 HE/83   DIP -10/50 0/45        Small MP 0/80 0/80   PIP -10/90 0/87   DIP 0/70 0/65   8/30/23- Able to make a loose fist  9/8/23- Able to fully extend fingers and make a full composite fist.    Strength   (Measured in pounds)  Pain Report: - none  + mild    ++ moderate    +++ severe    9/8/2023 9/8/2023   Trials Left Right   1 84 75       "

## 2023-09-18 DIAGNOSIS — I48.0 PAF (PAROXYSMAL ATRIAL FIBRILLATION) (H): Primary | ICD-10-CM

## 2023-09-18 RX ORDER — POTASSIUM CHLORIDE 1500 MG/1
20 TABLET, EXTENDED RELEASE ORAL
Status: CANCELLED | OUTPATIENT
Start: 2023-09-18

## 2023-09-18 RX ORDER — LIDOCAINE 40 MG/G
CREAM TOPICAL
Status: CANCELLED | OUTPATIENT
Start: 2023-09-18

## 2023-09-18 RX ORDER — MAGNESIUM SULFATE HEPTAHYDRATE 40 MG/ML
2 INJECTION, SOLUTION INTRAVENOUS
Status: CANCELLED | OUTPATIENT
Start: 2023-09-18

## 2023-09-18 RX ORDER — POTASSIUM CHLORIDE 1500 MG/1
40 TABLET, EXTENDED RELEASE ORAL
Status: CANCELLED | OUTPATIENT
Start: 2023-09-18

## 2023-09-19 ENCOUNTER — HOSPITAL ENCOUNTER (OUTPATIENT)
Facility: CLINIC | Age: 73
Discharge: HOME | End: 2023-09-19
Attending: ORTHOPAEDIC SURGERY | Admitting: ORTHOPAEDIC SURGERY
Payer: COMMERCIAL

## 2023-09-19 ENCOUNTER — HOSPITAL ENCOUNTER (OUTPATIENT)
Dept: CARDIOLOGY | Facility: CLINIC | Age: 73
Discharge: HOME | End: 2023-09-19
Attending: NURSE PRACTITIONER
Payer: COMMERCIAL

## 2023-09-19 ENCOUNTER — APPOINTMENT (OUTPATIENT)
Dept: MEDSURG UNIT | Facility: CLINIC | Age: 73
End: 2023-09-19
Attending: INTERNAL MEDICINE
Payer: COMMERCIAL

## 2023-09-19 ENCOUNTER — ANESTHESIA EVENT (OUTPATIENT)
Dept: SURGERY | Facility: CLINIC | Age: 73
End: 2023-09-19
Payer: COMMERCIAL

## 2023-09-19 ENCOUNTER — ANESTHESIA (OUTPATIENT)
Dept: SURGERY | Facility: CLINIC | Age: 73
End: 2023-09-19
Payer: COMMERCIAL

## 2023-09-19 ENCOUNTER — LAB (OUTPATIENT)
Dept: LAB | Facility: CLINIC | Age: 73
End: 2023-09-19
Attending: INTERNAL MEDICINE
Payer: COMMERCIAL

## 2023-09-19 VITALS
OXYGEN SATURATION: 98 % | DIASTOLIC BLOOD PRESSURE: 82 MMHG | RESPIRATION RATE: 16 BRPM | SYSTOLIC BLOOD PRESSURE: 133 MMHG | HEART RATE: 68 BPM

## 2023-09-19 DIAGNOSIS — I48.0 PAF (PAROXYSMAL ATRIAL FIBRILLATION) (H): ICD-10-CM

## 2023-09-19 LAB
MAGNESIUM SERPL-MCNC: 2.2 MG/DL (ref 1.7–2.3)
POTASSIUM SERPL-SCNC: 4 MMOL/L (ref 3.4–5.3)

## 2023-09-19 PROCEDURE — 93005 ELECTROCARDIOGRAM TRACING: CPT

## 2023-09-19 PROCEDURE — 250N000009 HC RX 250: Performed by: NURSE ANESTHETIST, CERTIFIED REGISTERED

## 2023-09-19 PROCEDURE — 258N000003 HC RX IP 258 OP 636: Performed by: NURSE ANESTHETIST, CERTIFIED REGISTERED

## 2023-09-19 PROCEDURE — 93010 ELECTROCARDIOGRAM REPORT: CPT | Performed by: INTERNAL MEDICINE

## 2023-09-19 PROCEDURE — 999N000054 HC STATISTIC EKG NON-CHARGEABLE

## 2023-09-19 PROCEDURE — 92960 CARDIOVERSION ELECTRIC EXT: CPT | Performed by: NURSE PRACTITIONER

## 2023-09-19 PROCEDURE — 83735 ASSAY OF MAGNESIUM: CPT | Performed by: NURSE PRACTITIONER

## 2023-09-19 PROCEDURE — 84132 ASSAY OF SERUM POTASSIUM: CPT | Performed by: NURSE PRACTITIONER

## 2023-09-19 PROCEDURE — 92960 CARDIOVERSION ELECTRIC EXT: CPT

## 2023-09-19 PROCEDURE — 370N000017 HC ANESTHESIA TECHNICAL FEE, PER MIN

## 2023-09-19 RX ORDER — LIDOCAINE 40 MG/G
CREAM TOPICAL
Status: DISCONTINUED | OUTPATIENT
Start: 2023-09-19 | End: 2023-09-20 | Stop reason: HOSPADM

## 2023-09-19 RX ORDER — SODIUM CHLORIDE 9 MG/ML
INJECTION, SOLUTION INTRAVENOUS CONTINUOUS PRN
Status: DISCONTINUED | OUTPATIENT
Start: 2023-09-19 | End: 2023-09-19

## 2023-09-19 RX ORDER — MAGNESIUM SULFATE HEPTAHYDRATE 40 MG/ML
2 INJECTION, SOLUTION INTRAVENOUS
Status: DISCONTINUED | OUTPATIENT
Start: 2023-09-19 | End: 2023-09-20 | Stop reason: HOSPADM

## 2023-09-19 RX ORDER — POTASSIUM CHLORIDE 750 MG/1
20 TABLET, EXTENDED RELEASE ORAL
Status: DISCONTINUED | OUTPATIENT
Start: 2023-09-19 | End: 2023-09-20 | Stop reason: HOSPADM

## 2023-09-19 RX ORDER — POTASSIUM CHLORIDE 750 MG/1
40 TABLET, EXTENDED RELEASE ORAL
Status: DISCONTINUED | OUTPATIENT
Start: 2023-09-19 | End: 2023-09-20 | Stop reason: HOSPADM

## 2023-09-19 RX ADMIN — METHOHEXITAL SODIUM 40 MG: 500 INJECTION, POWDER, LYOPHILIZED, FOR SOLUTION INTRAMUSCULAR; INTRAVENOUS; RECTAL at 09:01

## 2023-09-19 RX ADMIN — SODIUM CHLORIDE: 9 INJECTION, SOLUTION INTRAVENOUS at 08:58

## 2023-09-19 ASSESSMENT — ACTIVITIES OF DAILY LIVING (ADL)
ADLS_ACUITY_SCORE: 35

## 2023-09-19 NOTE — ANESTHESIA CARE TRANSFER NOTE
Patient: Frank Orellana    Procedure: Procedure(s):  Anesthesia cardioversion@0900       Diagnosis: Paroxysmal atrial fibrillation (H) [I48.0]  Diagnosis Additional Information: No value filed.    Anesthesia Type:   No value filed.     Note:    Oropharynx: oropharynx clear of all foreign objects and spontaneously breathing  Level of Consciousness: awake  Oxygen Supplementation: nasal cannula  Level of Supplemental Oxygen (L/min / FiO2): 4  Independent Airway: airway patency satisfactory and stable  Dentition: dentition unchanged  Vital Signs Stable: post-procedure vital signs reviewed and stable  Report to RN Given: handoff report given  Patient transferred to: Cardiac Special Care  Comments: Patient remained in echo lab        Vitals:  Vitals Value Taken Time    84    Temp     Pulse 63    Resp 12    SpO2 100        Electronically Signed By: Marilia Hurt CRNA, APRN [facility]  September 19, 2023  9:05 AM

## 2023-09-19 NOTE — DISCHARGE INSTRUCTIONS
GOING HOME AFTER YOUR CARDIOVERSION    FOR NEXT 24 HOURS:  An adult should stay with you.  Relax and take it easy.  DO NOT make any important legal decisions.  DO NOT drive or operate machines at home or at work.  DO NOT consume alcohol.   Resume your regular diet and drink plenty of fluids.  If you have any redness/skin sorness where the patches were placed, you may use aloe vera gel or 1% hydrocortisone cream to the skin (sold at drug stores)  Take Tylenol (Acetaminophen) per your provider's recommendations as needed to help relieve local pain.     CALL YOUR HEALTHCARE PROVIDER IF:  You develop nausea or vomiting  You develop hives or a rash or any unexplained itching  Have irregular heartbeat or fast pulse  Feel faint, dizzy, or lightheaded  Have chest pain with increased activity  Have bleeding issues from blood-thinning medicines    CALL 911 RIGHT AWAY IF YOU HAVE:  Pain in your chest, arm, shoulder, neck, or upper back  Shortness of breath  Loss of vision, speech, or strength or coordination in any body part  Weakness in your arm or leg  Uncontrolled bleeding  Feel unstable in any way     FOLLOW UP APPOINTMENT:    Follow up as scheduled with EP/Cardiology Provider in 4 weeks    ADDITIONAL INFORMATION:  Cardiovascular Clinic:   [address] [place] [address]. [place] [address]  Your Care Team:  EP Cardiology   Telephone Number     Meggan Arenas RN [phone]    After business hours: [phone], select option 4, ask for EP Fellow on call to be paged.     For scheduling appts or procedures:    Yelitza Wright   [phone]   For the Device Clinic (Pacemakers and ICD's)   RN's :   Gregoria William  During business hours: [phone]     After business hours:   [phone]- select option 4 and ask for job code [id number].       As always, Thank you for trusting us with your health care needs!

## 2023-09-19 NOTE — PROCEDURES
[facility]    Procedure: Cardioversion    Date/Time: 9/19/2023 8:46 AM    Performed by: Yamilka Thomas APRN CNP  Authorized by: Yamilka Thomas APRN CNP      UNIVERSAL PROTOCOL   Site Marked: NA  Prior Images Obtained and Reviewed:  NA  Required items: Required blood products, implants, devices and special equipment available    Patient identity confirmed:  Verbally with patient and arm band  Patient was reevaluated immediately before administering moderate or deep sedation or anesthesia  Confirmation Checklist:  Patient's identity using two indicators, relevant allergies, procedure was appropriate and matched the consent or emergent situation and correct equipment/implants were available  Time out: Immediately prior to the procedure a time out was called    Universal Protocol: the Joint Commission Universal Protocol was followed       ANESTHESIA    Anesthesia was administered and monitored by anesthesiology.  See anesthesia documentation for details.    PROCEDURE DETAILS  Pre-procedure rhythm: atrial fibrillation  Patient position: patient was placed in a supine position  Chest area: chest area exposed  Number of attempts: 1    Details of Attempts:  150J synchronized biphasic shock delivered with successful conversion to sinus   Post-procedure rhythm: normal sinus rhythm  Complications: no complications      PROCEDURE    Patient Tolerance:  Patient tolerated the procedure well with no immediate complications      ANDREINA Small CNP  Electrophysiology Consult Service  Pager: Text Page

## 2023-09-19 NOTE — PROGRESS NOTES
Pt arrived in ECHO department for scheduled Cardioversion.   Procedure explained, questions answered and consent signed. Discharge instructions discussed with patient and given written copy.  Anesthesia gave pt 40 mg IV brevital for sedation and pt was DCCV at 150 Joules to a SR. Post EKG completed and placed in medical records.   Pt denied chest pain or any other discomfort after procedure and was D/C home after awake and VSS. Escorted out to front lobby by staff in w/c to meet pt's ride home.  .

## 2023-09-20 LAB
ATRIAL RATE - MUSE: 57 BPM
ATRIAL RATE - MUSE: NORMAL BPM
DIASTOLIC BLOOD PRESSURE - MUSE: NORMAL MMHG
DIASTOLIC BLOOD PRESSURE - MUSE: NORMAL MMHG
INTERPRETATION ECG - MUSE: NORMAL
INTERPRETATION ECG - MUSE: NORMAL
P AXIS - MUSE: 75 DEGREES
P AXIS - MUSE: NORMAL DEGREES
PR INTERVAL - MUSE: 174 MS
PR INTERVAL - MUSE: NORMAL MS
QRS DURATION - MUSE: 110 MS
QRS DURATION - MUSE: 122 MS
QT - MUSE: 372 MS
QT - MUSE: 430 MS
QTC - MUSE: 418 MS
QTC - MUSE: 455 MS
R AXIS - MUSE: -14 DEGREES
R AXIS - MUSE: -17 DEGREES
SYSTOLIC BLOOD PRESSURE - MUSE: NORMAL MMHG
SYSTOLIC BLOOD PRESSURE - MUSE: NORMAL MMHG
T AXIS - MUSE: 14 DEGREES
T AXIS - MUSE: 45 DEGREES
VENTRICULAR RATE- MUSE: 57 BPM
VENTRICULAR RATE- MUSE: 90 BPM

## 2023-09-20 NOTE — ANESTHESIA PREPROCEDURE EVALUATION
Anesthesia Pre-Procedure Evaluation    Patient: Frank Orellana   MRN: [id number] : 1950        Procedure : Procedure(s):  Anesthesia cardioversion@0900          Past Medical History:   Diagnosis Date    Actinic keratosis     Atrial fibrillation (H) 6/15/96    Afib - 2 or 3 extended occurrences since    Chronic hepatitis C (H)     Chronic Hepatitis C,  genotype 1b                Stage 0 Fibrosis    Dupuytren's contracture of both hands     Elevated PSA     Hepatitis B     acute infection at age 19; IV drug use    WINNIE (obstructive sleep apnea)     AHI 34.2    Pain in both hands     Patient is Adventism     Refusal of blood transfusions as patient is Adventism     Right shoulder pain     Tinnitus 1 or 2 years ago    both ears    Tubular adenoma of colon 17    ascending colon, 17      Past Surgical History:   Procedure Laterality Date    ANESTHESIA CARDIOVERSION N/A 1/15/2018    Procedure: ANESTHESIA CARDIOVERSION;  Anesthesia Coverage Cardioversion With Transesophageal Echocardiogram @0930 ;  Surgeon: GENERIC ANESTHESIA PROVIDER;  [facility]    ANESTHESIA CARDIOVERSION N/A 10/11/2018    Procedure: ANESTHESIA CARDIOVERSION;  Cardioversion;  Surgeon: GENERIC ANESTHESIA PROVIDER;  [facility]    ANESTHESIA CARDIOVERSION N/A 2018    Procedure: Anesthesia Coverage Cardioversion @0830;  Surgeon: GENERIC ANESTHESIA PROVIDER;  [facility]    ANESTHESIA CARDIOVERSION N/A 2023    Procedure: Anesthesia cardioversion@0900;  Surgeon: GENERIC ANESTHESIA PROVIDER;  [facility]    ARTHROSCOPY KNEE      left knee    COLONOSCOPY  17    tubular adenoma ascending colon    COLONOSCOPY N/A 2023    Procedure: COLONOSCOPY, WITH POLYPECTOMY AND BIOPSY;  Surgeon: Hussein Stevens MD;  [facility]    CYSTOSCOPY, TRANSURETHRAL RESECTION (TUR) PROSTATE, COMBINED N/A 2020    Procedure: CYSTOSCOPY, WITH TRANSURETHRAL RESECTION PROSTATE;  Surgeon: Jairon  Roldan Cunningham MD;  [facility]    HERNIORRHAPHY INGUINAL Right 10/26/2017    Procedure: HERNIORRHAPHY INGUINAL;  Open Right Inguinal Hernia Repair with Mesh;  Surgeon: Suresh Raymond MD;  [facility]    KNEE SURGERY  2006??    torn meniscus    RELEASE DUPUYTRENS CONTRACTURE Right 2017    Procedure: RELEASE DUPUYTRENS CONTRACTURE;  Surgeon: Lars Oleary MD;  [facility]    RELEASE DUPUYTRENS CONTRACTURE Left 12/15/2017    Procedure: RELEASE DUPUYTRENS CONTRACTURE;  Left Ring and Middle Finger Subtotal Palmar Fasciectomy;  Surgeon: Lars Oleary MD;  [facility]    RELEASE DUPUYTRENS CONTRACTURE Right 2023    Procedure: RELEASE, DUPUYTREN CONTRACTURE, RIGHT SMALL FINGER AND RIGHT MIDDLE FINGER;  Surgeon: Lars Oleary MD;  [facility]    TRANSESOPHAGEAL ECHOCARDIOGRAM INTRAOPERATIVE N/A 1/15/2018    Procedure: TRANSESOPHAGEAL ECHOCARDIOGRAM INTRAOPERATIVE;;  Surgeon: GENERIC ANESTHESIA PROVIDER;  [facility]      Allergies   Allergen Reactions    Blood Transfusion Related (Informational Only)      Catholic.  Declines blood transfusions.    Shellfish-Derived Products Swelling    Contrast Dye Rash    Iodinated Contrast Media Rash      Social History     Tobacco Use    Smoking status: Former     Packs/day: 0.50     Years: 10.00     Pack years: 5.00     Types: Cigarettes     Start date: 6/15/1964     Quit date: 1974     Years since quittin.2    Smokeless tobacco: Former     Quit date: 1969   Substance Use Topics    Alcohol use: Yes     Alcohol/week: 3.0 - 4.0 standard drinks of alcohol     Comment: 4 or 5 drinks/week, limit of 1      Wt Readings from Last 1 Encounters:   23 73.5 kg (162 lb)              OUTSIDE LABS:  CBC:   Lab Results   Component Value Date    WBC 5.9 2023    WBC 9.1 2020    HGB 14.0 2023    HGB 11.9 (L) 2020    HCT 41.4 2023    HCT 35.9 (L) 2020      04/24/2023     08/22/2020     BMP:   Lab Results   Component Value Date     04/24/2023     12/17/2021    POTASSIUM 4.0 09/19/2023    POTASSIUM 4.5 04/24/2023    CHLORIDE 106 04/24/2023    CHLORIDE 109 12/17/2021    CO2 30 (H) 04/24/2023    CO2 30 12/17/2021    BUN 11.8 04/24/2023    BUN 13 12/17/2021    CR 0.87 04/24/2023    CR 0.87 12/17/2021     (H) 04/24/2023    GLC 94 12/17/2021     COAGS:   Lab Results   Component Value Date    PTT 29 06/14/2006    INR 1.52 (H) 01/15/2018     POC:   Lab Results   Component Value Date    BGM 85 08/21/2020     HEPATIC:   Lab Results   Component Value Date    ALBUMIN 4.4 04/24/2023    PROTTOTAL 7.2 04/24/2023    ALT 40 04/24/2023    AST 32 04/24/2023    ALKPHOS 91 04/24/2023    BILITOTAL 0.7 04/24/2023     OTHER:   Lab Results   Component Value Date    LACT 1.1 01/04/2019    A1C 5.1 04/24/2023    VALENTINA 9.4 04/24/2023    MAG 2.2 09/19/2023    LIPASE 135 01/04/2019    TSH 1.09 04/24/2023    SED 5 07/23/2014       Anesthesia Plan    ASA Status:  3       Anesthesia Type: General.     - Airway: Native airway      Maintenance: TIVA.        Consents            Postoperative Care            Comments:                Kerline Hung MD

## 2023-09-20 NOTE — ANESTHESIA POSTPROCEDURE EVALUATION
Patient: Frank Orellana    Procedure: Procedure(s):  Anesthesia cardioversion@0900       Anesthesia Type:  General    Note:  Disposition: Outpatient   Postop Pain Control: Uneventful            Sign Out: Well controlled pain   PONV:    Neuro/Psych: Uneventful            Sign Out: Acceptable/Baseline neuro status   Airway/Respiratory: Uneventful            Sign Out: Acceptable/Baseline resp. status   CV/Hemodynamics: Uneventful            Sign Out: Acceptable CV status; No obvious hypovolemia; No obvious fluid overload   Other NRE:    DID A NON-ROUTINE EVENT OCCUR?            Last vitals:  There were no vitals filed for this visit.    Electronically Signed By: Kerline Hung MD  September 20, 2023  10:26 AM

## 2023-10-11 ENCOUNTER — PATIENT OUTREACH (OUTPATIENT)
Dept: GASTROENTEROLOGY | Facility: CLINIC | Age: 73
End: 2023-10-11

## 2023-10-12 ENCOUNTER — TELEPHONE (OUTPATIENT)
Dept: CARDIOLOGY | Facility: CLINIC | Age: 73
End: 2023-10-12
Payer: COMMERCIAL

## 2023-10-12 NOTE — TELEPHONE ENCOUNTER
Patient called to say that he is back in Atrial fibrillation. He thinks it happened earlier this morning around 4am. His heart rate during the phone call was 96 bpm and his BP was 119/72. He reported of mild fatigue and lightheadedness which is similar to symptoms he had in the past. He denied any chest pain. Of note, patient had a successful DCCV on 9/18/23. He confirmed that he is taking his prescribed medications (Apixaban, Flecainide, and Diltiazem).     Since patient was hemodynamically stable with mild symptoms, I advised him to stay home, stay hydrated, and to seek urgent/emergent help if his symptoms worsen/ become intolerable or SBP <100 or HR > 120 bpm. I also advised him to expect further instructions from his EP care team later today. I will route this to patient's EP team (Dr. Felipe and Yamilka Skinner NP). I advised patient to reach again if he doesn't hear from the EP team by afternoon (~2pm) today.    Delisa Santiago MD, PhD  Cardiology Fellow.   Pager: [phone]

## 2023-10-12 NOTE — TELEPHONE ENCOUNTER
Called and spoke to patient who reports he is back in NSR. The AF episode lasted 4 hours. He sees Yamilka Skinner NP 11/8/23. He will reach out with any future episodes.

## 2023-10-16 DIAGNOSIS — I48.0 PAF (PAROXYSMAL ATRIAL FIBRILLATION) (H): ICD-10-CM

## 2023-10-17 RX ORDER — FLECAINIDE ACETATE 50 MG/1
50 TABLET ORAL 2 TIMES DAILY
Qty: 200 TABLET | Refills: 2 | Status: SHIPPED | OUTPATIENT
Start: 2023-10-17 | End: 2023-10-17

## 2023-10-17 RX ORDER — DILTIAZEM HYDROCHLORIDE 120 MG/1
120 CAPSULE, COATED, EXTENDED RELEASE ORAL DAILY
Qty: 90 CAPSULE | Refills: 0 | Status: SHIPPED | OUTPATIENT
Start: 2023-10-17 | End: 2023-11-08

## 2023-10-17 NOTE — TELEPHONE ENCOUNTER
diltiazem ER COATED BEADS (CARDIZEM CD/CARTIA XT) 120 MG 24 hr capsule 90 capsule 1 7/27/2023     Refill protocol passed      flecainide (TAMBOCOR) 50 MG tablet 180 tablet 1 7/27/2023   Not on protocol.    Last Office Visit: 11/2/22  Future Office visit:11/8/23      Routing refill request to provider for review/approval because:  Drug not on the cardiology refill protocol     (Flecainide refilled by mistake, [facility] called and cancelled the refill for flecainide. )    Hazel Ames RN

## 2023-10-18 RX ORDER — FLECAINIDE ACETATE 50 MG/1
50 TABLET ORAL 2 TIMES DAILY
Qty: 180 TABLET | Refills: 0 | Status: SHIPPED | OUTPATIENT
Start: 2023-10-18 | End: 2023-11-08

## 2023-10-20 ENCOUNTER — OFFICE VISIT (OUTPATIENT)
Dept: DERMATOLOGY | Facility: CLINIC | Age: 73
End: 2023-10-20
Attending: INTERNAL MEDICINE
Payer: COMMERCIAL

## 2023-10-20 DIAGNOSIS — D18.01 CHERRY ANGIOMA: ICD-10-CM

## 2023-10-20 DIAGNOSIS — L82.1 SEBORRHEIC KERATOSES: ICD-10-CM

## 2023-10-20 DIAGNOSIS — D22.9 MULTIPLE MELANOCYTIC NEVI: ICD-10-CM

## 2023-10-20 DIAGNOSIS — L57.0 ACTINIC KERATOSIS: Primary | ICD-10-CM

## 2023-10-20 DIAGNOSIS — L81.4 SOLAR LENTIGO: ICD-10-CM

## 2023-10-20 DIAGNOSIS — L82.0 SEBORRHEIC KERATOSES, INFLAMED: ICD-10-CM

## 2023-10-20 PROCEDURE — 99213 OFFICE O/P EST LOW 20 MIN: CPT | Mod: 24 | Performed by: DERMATOLOGY

## 2023-10-20 PROCEDURE — 17110 DESTRUCTION B9 LES UP TO 14: CPT | Mod: 79 | Performed by: DERMATOLOGY

## 2023-10-20 PROCEDURE — 17000 DESTRUCT PREMALG LESION: CPT | Mod: 79 | Performed by: DERMATOLOGY

## 2023-10-20 ASSESSMENT — PAIN SCALES - GENERAL: PAINLEVEL: NO PAIN (0)

## 2023-10-20 NOTE — LETTER
10/20/2023       RE: Frank Orellana  [address]  [facility] [address]     Dear Colleague,    Thank you for referring your patient, Frank Orellana, to the [facility] DERMAT[facility] [place] at [facility]. Please see a copy of my visit note below.    [facility] Dermatology Note  Encounter Date: Oct 20, 2023  Office Visit     Dermatology Problem List:  Actinic keratoses  - AK, L nasal bridge, s/p bx 3/15/13  - LN2 10/20/23   2. Inflamed seborrheic keratoses, R upper forehead  - LN2 10/20/23     ____________________________________________    Assessment & Plan:   1. Actinic keratosis, L cheek.  Discussed the premalignant nature of this papule. Recommended treatment with LN2.  - Cryotherapy performed today (see procedure note(s) below).     2. Benign findings including seborrheic keratoses, nevi, solar lentigines, cherry angiomas.  Reassured patient regarding the benign nature of these findings.  - Monitor: Patient to continue monitoring at home and will contact the clinic for any changes.  - Sun protection: Counseled SPF30+ sunscreen, UPF clothing, sun avoidance    3. Inflamed seborrheic keratosis, R upper forehead.   Reassured patient regarding the benign nature of this finding. Given its prominence, offered treatment with LN2. Pt agrees.    - Cryotherapy performed today (see procedure note(s) below).       Procedures Performed:   - Cryotherapy procedure note, location(s): L cheek, R upper forehead After verbal consent and discussion of risks and benefits including, but not limited to, dyspigmentation/scar, blister, and pain, 1 actinic keratosis and 1 inflamed seborrheic keratosis was(were) treated with 1-2 mm freeze border for 1-2 cycles with liquid nitrogen. Post cryotherapy instructions were provided.    Follow-up: prn for new or changing lesions    Staff: Dr. Nasra Murillo MD PGY-3  Dermatology  Resident    Staff Physician Comments:   I saw and evaluated the patient with the resident and I edited the assessment and plan as documented in the note. I was present for the entire minor procedure and examination.    Gordon Hammonds MD   of Dermatology  Department of Dermatology  [facility] [employer]        ____________________________________________    CC: Derm Problem (Frank is here today for a mole of concern on his forehead. He is also concerned about moles on his face and back.)    HPI:  Mr. Frank Orellana is a(n) 73 year old male who presents today as a new patient for skin check.     Pt notes a lesion of concern on the R upper forehead. It has been present for several years and has grown and darkened. Not painful or bleeding.     Also had a lesion of concern on the back. This has decreased in size according to the patient's wife.     Denies personal hx of skin cancer or family hx of skin cancer.     Does not wear sunscreen.     Patient is otherwise feeling well, without additional skin concerns.    Labs Reviewed:    Dermpath 3/15/13 with AK of L nasal bridge.     Physical Exam:  Vitals: There were no vitals taken for this visit.  SKIN: Waist-up skin, which includes the head/face, neck, both arms, chest, back, abdomen, digits and/or nails was examined.  - Multiple regular brown macules and papules on the trunk and extremities  - several bright red dome shaped papules on the trunk and extremities  - on the R upper forehead, forehead elsewhere, cheeks, and back there are several waxy tan to brown stuck on appearing papules  - gritty pink papule on the L cheek  - No other lesions of concern on areas examined.     Medications:  Current Outpatient Medications   Medication    apixaban ANTICOAGULANT (ELIQUIS) 5 MG tablet    aspirin 81 MG EC tablet    diltiazem ER COATED BEADS (CARDIZEM CD/CARTIA XT) 120 MG 24 hr capsule    flecainide (TAMBOCOR) 50 MG tablet     No current  facility-administered medications for this visit.      Past Medical History:   Patient Active Problem List   Diagnosis    History of actinic keratoses    Telangiectasia    SK (seborrheic keratosis)    Patient is Scientologist    Refusal of blood transfusions as patient is Scientologist    Paroxysmal atrial fibrillation (H)    WINNIE (obstructive sleep apnea) positional    Atrial fibrillation (H)    Tubular adenoma of colon    Bilateral sensorineural hearing loss    Elevated PSA    BPH with urinary obstruction    Dupuytren's contracture of both hands     Past Medical History:   Diagnosis Date    Actinic keratosis 2009    Atrial fibrillation (H) 6/15/96    Afib - 2 or 3 extended occurrences since    Chronic hepatitis C (H)     Chronic Hepatitis C,  genotype 1b                Stage 0 Fibrosis    Dupuytren's contracture of both hands     Elevated PSA     Hepatitis B 1969    acute infection at age 19; IV drug use    WINNIE (obstructive sleep apnea)     AHI 34.2    Pain in both hands     Patient is Scientologist     Refusal of blood transfusions as patient is Scientologist     Right shoulder pain     Tinnitus 1 or 2 years ago    both ears    Tubular adenoma of colon 1/17/17    ascending colon, 1/17/17       CC Yani Burnett MD  [address] [place] [phone] on close of this encounter.

## 2023-10-20 NOTE — PATIENT INSTRUCTIONS
Cryotherapy    What is it?  Use of a very cold liquid, such as liquid nitrogen, to freeze and destroy abnormal skin cells that need to be removed    What should I expect?  Tenderness and redness  A small blister that might grow and fill with dark purple blood. There may be crusting.  More than one treatment may be needed if the lesions do not go away.    How do I care for the treated area?  Gently wash the area with your hands when bathing.  Use a thin layer of Vaseline to help with healing. You may use a Band-Aid.   The area should heal within 7-10 days and may leave behind a pink or lighter color.   Do not use an antibiotic or Neosporin ointment.   You may take acetaminophen (Tylenol) for pain.     Call your doctor if you have:  Severe pain  Signs of infection (warmth, redness, cloudy yellow drainage, and or a bad smell)  Questions or concerns    Who should I call with questions?      [facility]: [phone]      [facility]: [phone]      For urgent needs outside of business hours call the [facility] at [phone] and ask for the dermatology resident on call        Checking for Skin Cancer  You can help find cancer early by checking your skin each month. There are 3 main kinds of skin cancer: melanoma, basal cell carcinoma, and squamous cell carcinoma. Doing monthly skin checks is the best way to find new marks, sores, or skin changes. Follow these instructions for checking your skin.   The ABCDEs of checking moles for melanoma   Check your moles or growths for signs of melanoma using ABCDE:   Asymmetry: The sides of the mole or growth don t match.  Border: The edges are ragged, notched, or blurred.  Color: The color within the mole or growth varies. It could be black, brown, tan, white, or shades of red, gray, or blue.  Diameter: The mole or growth is larger than   inch or 6 mm (size of a pencil eraser).  Evolving: The size, shape,  texture, or color of the mole or growth is changing.     ABCDE's of moles on light skin.        ABCDE's of moles on dark skin may be harder to identify.     Checking for other types of skin cancer  Basal cell carcinoma or squamous cell carcinoma cause symptoms like:     A spot or mole that looks different from all other marks on your skin  Changes in how an area feels, such as itching, tenderness, or pain  Changes in the skin's surface, such as oozing, bleeding, or scaliness  A sore that doesn't heal  New swelling, redness, or spread of color beyond the border of a mole    Who s at risk?  Anyone of any skin color can get skin cancer. But you're at greater risk if you have:   Fair skin that freckles easily and burns instead of tanning  Light-colored or red hair  Light-colored eyes  Many moles or abnormal moles on your skin  A long history of unprotected exposure to sunlight or tanning beds  A history of many blistering sunburns as a child or teen  A family history of skin cancer  Been exposed to radiation or chemicals  A weakened immune system  Been exposed to arsenic  If you've had skin cancer in the past, you're at high risk of having it again.   How to check your skin  Do your monthly skin checkups in front of a full-length mirror. Use a room with good lighting so it's easier to see. Use a hand mirror to look at hard-to-see places like your buttocks and back. You can also have a trusted friend or family member help you with these checks. Check every part of your body, including your:   Head (ears, face, neck, and scalp)  Torso (front, back, sides, and under breasts)  Arms (tops, undersides, and armpits)  Hands (palms, backs, and fingers, including under the nails)  Lower back, buttocks, and genitals  Legs (front, back, and sides)  Feet (tops, soles, toes, including under the nails, and between toes)  Watch for new spots on your skin or a spot that's changing in color, shape, size.   If you have a lot of moles,  take digital photos of them each month. Make sure to take photos both up close and from a distance. These can help you see if any moles change over time.   Know your skin  Most skin changes aren't cancer. But if you see any changes in your skin, call your healthcare provider right away. Only they can tell you if a change is a problem. If you have skin cancer, seeing your provider can be the first step to getting the treatment that could save your life.   dateIITians last reviewed this educational content on 10/1/2021    9561-7597 [employer]. All rights reserved. This information is not intended as a substitute for professional medical care. Always follow your healthcare professional's instructions.

## 2023-10-20 NOTE — PROGRESS NOTES
[facility] Dermatology Note  Encounter Date: Oct 20, 2023  Office Visit     Dermatology Problem List:  Actinic keratoses  - AK, L nasal bridge, s/p bx 3/15/13  - LN2 10/20/23   2. Inflamed seborrheic keratoses, R upper forehead  - LN2 10/20/23     ____________________________________________    Assessment & Plan:   1. Actinic keratosis, L cheek.  Discussed the premalignant nature of this papule. Recommended treatment with LN2.  - Cryotherapy performed today (see procedure note(s) below).     2. Benign findings including seborrheic keratoses, nevi, solar lentigines, cherry angiomas.  Reassured patient regarding the benign nature of these findings.  - Monitor: Patient to continue monitoring at home and will contact the clinic for any changes.  - Sun protection: Counseled SPF30+ sunscreen, UPF clothing, sun avoidance    3. Inflamed seborrheic keratosis, R upper forehead.   Reassured patient regarding the benign nature of this finding. Given its prominence, offered treatment with LN2. Pt agrees.    - Cryotherapy performed today (see procedure note(s) below).       Procedures Performed:   - Cryotherapy procedure note, location(s): L cheek, R upper forehead After verbal consent and discussion of risks and benefits including, but not limited to, dyspigmentation/scar, blister, and pain, 1 actinic keratosis and 1 inflamed seborrheic keratosis was(were) treated with 1-2 mm freeze border for 1-2 cycles with liquid nitrogen. Post cryotherapy instructions were provided.    Follow-up: prn for new or changing lesions    Staff: Dr. Nasra Murillo MD PGY-3  Dermatology Resident    Staff Physician Comments:   I saw and evaluated the patient with the resident and I edited the assessment and plan as documented in the note. I was present for the entire minor procedure and examination.    Gordon Hammonds MD   of Dermatology  Department of Dermatology  [facility]  [employer]        ____________________________________________    CC: Derm Problem (Frank is here today for a mole of concern on his forehead. He is also concerned about moles on his face and back.)    HPI:  Mr. Frank Orellana is a(n) 73 year old male who presents today as a new patient for skin check.     Pt notes a lesion of concern on the R upper forehead. It has been present for several years and has grown and darkened. Not painful or bleeding.     Also had a lesion of concern on the back. This has decreased in size according to the patient's wife.     Denies personal hx of skin cancer or family hx of skin cancer.     Does not wear sunscreen.     Patient is otherwise feeling well, without additional skin concerns.    Labs Reviewed:    Dermpath 3/15/13 with AK of L nasal bridge.     Physical Exam:  Vitals: There were no vitals taken for this visit.  SKIN: Waist-up skin, which includes the head/face, neck, both arms, chest, back, abdomen, digits and/or nails was examined.  - Multiple regular brown macules and papules on the trunk and extremities  - several bright red dome shaped papules on the trunk and extremities  - on the R upper forehead, forehead elsewhere, cheeks, and back there are several waxy tan to brown stuck on appearing papules  - gritty pink papule on the L cheek  - No other lesions of concern on areas examined.     Medications:  Current Outpatient Medications   Medication    apixaban ANTICOAGULANT (ELIQUIS) 5 MG tablet    aspirin 81 MG EC tablet    diltiazem ER COATED BEADS (CARDIZEM CD/CARTIA XT) 120 MG 24 hr capsule    flecainide (TAMBOCOR) 50 MG tablet     No current facility-administered medications for this visit.      Past Medical History:   Patient Active Problem List   Diagnosis    History of actinic keratoses    Telangiectasia    SK (seborrheic keratosis)    Patient is Latter-day    Refusal of blood transfusions as patient is Latter-day    Paroxysmal atrial fibrillation  (H)    WINNIE (obstructive sleep apnea) positional    Atrial fibrillation (H)    Tubular adenoma of colon    Bilateral sensorineural hearing loss    Elevated PSA    BPH with urinary obstruction    Dupuytren's contracture of both hands     Past Medical History:   Diagnosis Date    Actinic keratosis 2009    Atrial fibrillation (H) 6/15/96    Afib - 2 or 3 extended occurrences since    Chronic hepatitis C (H)     Chronic Hepatitis C,  genotype 1b                Stage 0 Fibrosis    Dupuytren's contracture of both hands     Elevated PSA     Hepatitis B 1969    acute infection at age 19; IV drug use    WINNIE (obstructive sleep apnea)     AHI 34.2    Pain in both hands     Patient is Yazidi     Refusal of blood transfusions as patient is Yazidi     Right shoulder pain     Tinnitus 1 or 2 years ago    both ears    Tubular adenoma of colon 1/17/17    ascending colon, 1/17/17       CC Yani Burnett MD  [address] [place] [phone] on close of this encounter.

## 2023-10-20 NOTE — NURSING NOTE
Dermatology Rooming Note    Frank DIXIE Orellana's goals for this visit include:   Chief Complaint   Patient presents with    Derm Problem     Frank is here today for a mole of concern on his forehead. He is also concerned about moles on his face and back.     Melvin SINCLAIR CMA

## 2023-11-08 ENCOUNTER — OFFICE VISIT (OUTPATIENT)
Dept: CARDIOLOGY | Facility: CLINIC | Age: 73
End: 2023-11-08
Attending: NURSE PRACTITIONER
Payer: COMMERCIAL

## 2023-11-08 VITALS
OXYGEN SATURATION: 97 % | HEIGHT: 70 IN | WEIGHT: 172.1 LBS | DIASTOLIC BLOOD PRESSURE: 78 MMHG | SYSTOLIC BLOOD PRESSURE: 131 MMHG | HEART RATE: 54 BPM | BODY MASS INDEX: 24.64 KG/M2

## 2023-11-08 DIAGNOSIS — I48.0 PAF (PAROXYSMAL ATRIAL FIBRILLATION) (H): Primary | ICD-10-CM

## 2023-11-08 LAB
ATRIAL RATE - MUSE: 49 BPM
DIASTOLIC BLOOD PRESSURE - MUSE: NORMAL MMHG
INTERPRETATION ECG - MUSE: NORMAL
P AXIS - MUSE: 52 DEGREES
PR INTERVAL - MUSE: 172 MS
QRS DURATION - MUSE: 114 MS
QT - MUSE: 448 MS
QTC - MUSE: 404 MS
R AXIS - MUSE: -7 DEGREES
SYSTOLIC BLOOD PRESSURE - MUSE: NORMAL MMHG
T AXIS - MUSE: 47 DEGREES
VENTRICULAR RATE- MUSE: 49 BPM

## 2023-11-08 PROCEDURE — 93005 ELECTROCARDIOGRAM TRACING: CPT

## 2023-11-08 PROCEDURE — G0463 HOSPITAL OUTPT CLINIC VISIT: HCPCS | Performed by: NURSE PRACTITIONER

## 2023-11-08 PROCEDURE — 93010 ELECTROCARDIOGRAM REPORT: CPT | Performed by: INTERNAL MEDICINE

## 2023-11-08 PROCEDURE — 99213 OFFICE O/P EST LOW 20 MIN: CPT | Performed by: NURSE PRACTITIONER

## 2023-11-08 RX ORDER — FLECAINIDE ACETATE 50 MG/1
50 TABLET ORAL 2 TIMES DAILY
Qty: 180 TABLET | Refills: 0 | Status: SHIPPED | OUTPATIENT
Start: 2023-11-08

## 2023-11-08 RX ORDER — DILTIAZEM HYDROCHLORIDE 120 MG/1
120 CAPSULE, COATED, EXTENDED RELEASE ORAL DAILY
Qty: 90 CAPSULE | Refills: 0 | Status: SHIPPED | OUTPATIENT
Start: 2023-11-08 | End: 2024-09-23

## 2023-11-08 ASSESSMENT — PAIN SCALES - GENERAL: PAINLEVEL: NO PAIN (0)

## 2023-11-08 NOTE — LETTER
2023      RE: Frank Orellana  [address]  [facility] [id number]       Dear Colleague,    Thank you for the opportunity to participate in the care of your patient, Frank Orellana, at the [facility] [place] at [facility]. Please see a copy of my visit note below.          ELECTROPHYSIOLOGY CLINIC VISIT    Assessment/Recommendations   Assessment/Plan:    Ms. Orellana is a 73 year old Hindu male who has a past medical history significant for PAF (CHADSVASC 1) s/p DCCVs, chronic hepatitis C, hepatitis B, WINNIE (uses CPAP), tubular adenoma of colon, and tobacco use.    Paroxsymal Atrial Fibrillation:  We discussed in detail with the patient management/treatment options for Oneal includin. Stroke Prophylaxis:  CHADSVASC=+age  1, corresponding to a 1.3% annual stroke / systemic emolism event rate. He is on ASA only and only uses DOAC surrounding DCCVs.  Discussed in 2024 he will be 74 and a CHADSVASC of 2 where we would recommend starting daily oral anticoagulation. He states understanding but elects to only take it surrounding DCCVs at this time.   2. Rate Control: Continue Diltiazem.   3. Rhythm Control: He has had several DCCVs, He failed Sotalol. He has now been on Flecainide. He had several self limiting recurrences over the year and required a DCCV 23. Offered to increase Flecainide dose or alternative AAT vs ablation. He states he is not needing to escalate therapies at this time. He will let us know if AF increases to a point he wants to change options.  4. Risk Factor Management: maintain good BP control and continued WINNIE treatment with CPAP.        Follow up 1 year or sooner if need arises.        History of Present Illness/Subjective    Mr. Frank Orellana is a 73 year old male who comes in today for EP follow-up of PAF.    Ms. Orellana is a 73 year old Hindu male who has a past medical history significant  for PAF (CHADSVASC 1) s/p DCCVs, chronic hepatitis C, hepatitis B, WINNIE (uses CPAP), tubular adenoma of colon, and tobacco use.     At our initial visit in 12/2016 he had reported ~20 years of paroxysmal atrial fibrillation manifesting as chest pressure and palpitations. The first episode occurred during a picnic and converted spontaneously to sinus rhythm.  He took metoprolol for 30 days.  Approximately 10 years later he had a second episode in the setting of a meniscal tear and perhaps 2 or 3 more episodes in the intervening years until his palpitations became more frequent in the year leading up to his most recent presentation.  A 48-hour Holter monitor which showed variation between sinus rhythm, junctional rhythm, and A.fib (average HR 63, range ) with 1% PVCs and <1% supraventricular ectopic beats, with 33 runs of PAT vs. A.fib (longest 10 beats at 164 bpm). He then presented to the ED on 12/2/16 for palpitations and was in A.fib with RVR~111 bpm, started on diltiazem and ASA 81 mg daily, and discharged. He had recurrent episode of AF end of 12/2017 that manifested as palpitations, SOB and fatigue. He felt the symptoms for about 10 days prior and was seen by Dr. Hussein on 1/6/18. At this time he was noted to be in symptomatic AF (rate controlled), and was scheduled to undergo JIM/DCCV, which was performed on 1/15/18. He was on Dabigatran for 4 weeks following the DCCV and his ASA was held. He last saw Dr. Felipe in EP clinic in 2/2018 and was doing well. More recently, he called reporting he was back in AF. He was started on Pradaxa within 1.5 days of his symptoms. He was then arranged for DCCV. He then had DCCV on 10/11/18. He had recurrence and required another DCCV on 11/28/18. He followed up in EP clinic on 12/27/18 and was feeling well without issues. He presented 1/18 to [facility] with racing heart sensation associated with diaphoresis and mild chest discomfort and lightheadedness c/w prior AF symptoms.  "Found to be in AF. He states he has had a few other AF symptoms recently and feels they are growing in frequency/severity. His AF did spontaneously terminated when in ER. He was initiated on Sotalol during this admission by EP.     EP visit 4/24/19: He presents today for follow-up after having initiated sotalol and cardioverted in ER 1/18/19. He reports no recurrent sustained palpitations. He does have intermittent, infrequent, short-lived palpitations which are not bothersome (and corroborated with PAC's / PVC's on recent Zio). Recent Holter shows no atrial fibrillation but run of AT (13 seconds). He does report, and his wife agrees, new onset significant fatigue since initiating sotalol. Now taking regular naps and just not doing usual enjoyable activities as long as he is used to. EKG: VR 47 bpm,  ms,  ms, QTc 421 ms. Sotalol 80 mg BID, asa 81 mg. Given recurrences Sotalol was stopped and Flecainide was started.       EP Vist 7/24/19: After developing symptoms of a URI last week, felt his heart \"beat out of rhythm\" on Monday evening. Call ahead to clinic was scheduled for Cardioversion, however on his way to hospital, he went out of afib and improved. Since then denies any symptoms of lightheadedness and dizziness. Per discussion with patient, did not start on Pradaxa and is still on ASA today. Denies chest pain, SOB, lightheadedness and dizziness today.       EP clinic On 10/08/21:He presents today for follow up. He is complaining of atypical chest pain occurred started 2 weeks ago, pain is aching, mainly in the left side of the chest and occurred for 36 hours continuous, moderate in intensity, no lightheaded or diaphoresis accompaning the pain , no LOC, no lower extremity edema. He has been on his dofetilide taking it twice a day without any more episodes of ER visits or RVR, pt is on ASA no anticoagulation. Presenting 12 lead ECG shows Vent Rate 60 bpm,  ms,  ms, QTc 441 ms. Current " "cardiac medications include: Flecainide, Diltiazem, and ASA.     EP Visit 11/2/22: He reports feeling well. He has had some rare palpitations lasting seconds over the last year. No known sustained AF events. He denies chest discomfort, abdominal fullness/bloating or peripheral edema, shortness of breath, paroxysmal nocturnal dyspnea, orthopnea, lightheadedness, dizziness, pre-syncope, or syncope. Current cardiac medications include: Flecainide, Diltiazem, and ASA.     He presents today for follow up. He had recurrence of AF on: 4/18/23, 7/16/23, 9/19/23, 10/12/23. All self converted except 9/19/23 received DCCV. He reports feeling well. He denies chest discomfort, palpitations, abdominal fullness/bloating or peripheral edema, shortness of breath, paroxysmal nocturnal dyspnea, orthopnea, lightheadedness, dizziness, pre-syncope, or syncope. Presenting 12 lead ECG shows SB Vent Rate 49 bpm,  ms,  ms, QTc 404 ms. Current cardiac medications include: Flecainide, Diltiazem, and ASA.   .     I have reviewed and updated the patient's Past Medical History, Social History, Family History and Medication List.     Cardiographics (Personally Reviewed) :   Echo 1/2019:  Interpretation Summary  Global and regional left ventricular function is normal with an EF of 55-60%.  No regional wall motion abnormalities are seen.  Right ventricular function, chamber size, wall motion, and thickness are  normal.  Mild AI.  Mildly dilated ascending aorta measuring 4.2 cm (2.1 cm/m2.)     This study was compared with the study from 4/27/17: there has been no  significant change. Specifically, the ascending aorta is unchanged in size on  direct remeasurement of the images from the prior study.    MR angiography of the chest 12/27/2016 showed \"Normal caliber of thoracic aorta. The ascending aorta measures 3.9 cm, which is normal when indexed for age/gender/size.\"     Lexiscan NM stress test 1/8/18 showed \"Normal  myocardial SPECT " "study with a summed stress score of  zero. No ischemia or infarct identified. Normal LV function. No change from prior study.\"    Lexiscan NM stress test 10/3/22:  The nuclear stress test is negative for inducible myocardial ischemia or infarction. LVEDv 144ml. LVESv 44ml. LV EF 69%.        Exercise EKG 5/2019  Interpretation Summary  Normal, low-risk exercise electrocardiogram.  The target heart rate was achieved. Normal heart rate and BP response to  exercise.  Normal resting ECG. No ECG evidence of ischemia with exercise.  There were occasional uniform PVCs elicited during exercise. QT prolongation  was unable to be assessed due to artifact.  No angina was elicited.  Functional capacity is average for age.         Physical Examination   /78 (BP Location: Right arm, Patient Position: Chair, Cuff Size: Adult Regular)   Pulse 54   Ht 1.785 m (5' 10.28\")   Wt 78.1 kg (172 lb 1.6 oz)   SpO2 97%   BMI 24.50 kg/m    Wt Readings from Last 3 Encounters:   08/09/23 73.5 kg (162 lb)   07/31/23 76.4 kg (168 lb 8 oz)   04/24/23 77.7 kg (171 lb 6.4 oz)     The rest of a comprehensive physical examination is deferred due to public health emergency video visit restrictions.  CONSITUTIONAL: no acute distress  HEENT: no icterus, no redness or discharge, neck supple  CV: no visible edema of visualized extremities. No JVD.   RESPIRATORY: respirations nonlabored, no cough  NEURO: AA&Ox3, speech fluent/appropriate, motor grossly nonfocal  PSYCH: cooperative, affect appropriate  DERM: no rashes on visualized face/neck/upper extremities       Medications  Allergies   Current Outpatient Medications   Medication Sig Dispense Refill    apixaban ANTICOAGULANT (ELIQUIS) 5 MG tablet Take 1 tablet (5 mg) by mouth 2 times daily 60 tablet 0    aspirin 81 MG EC tablet Take 1 tablet (81 mg) by mouth daily 90 tablet 3    diltiazem ER COATED BEADS (CARDIZEM CD/CARTIA XT) 120 MG 24 hr capsule Take 1 capsule (120 mg) by mouth daily 90 " capsule 0    flecainide (TAMBOCOR) 50 MG tablet Take 1 tablet (50 mg) by mouth 2 times daily 180 tablet 0    Allergies   Allergen Reactions    Blood Transfusion Related (Informational Only)      Tenriism.  Declines blood transfusions.    Shellfish-Derived Products Swelling    Contrast Dye Rash    Iodinated Contrast Media Rash         Lab Results (Personally Reviewed)    Chemistry/lipid CBC Cardiac Enzymes/BNP/TSH/INR   Lab Results   Component Value Date    BUN 11.8 04/24/2023     04/24/2023    CO2 30 (H) 04/24/2023     Creatinine   Date Value Ref Range Status   04/24/2023 0.87 0.67 - 1.17 mg/dL Final   12/30/2020 0.92 0.66 - 1.25 mg/dL Final       Lab Results   Component Value Date    CHOL 187 04/24/2023    HDL 69 04/24/2023     (H) 04/24/2023      Lab Results   Component Value Date    WBC 5.9 04/24/2023    HGB 14.0 04/24/2023    HCT 41.4 04/24/2023    MCV 95 04/24/2023     04/24/2023    Lab Results   Component Value Date    TSH 1.09 04/24/2023    INR 1.52 (H) 01/15/2018        The patient states understanding and is agreeable with the plan.   ANDREINA Small CNP  Electrophysiology Consult Service  Pager: [phone]

## 2023-11-08 NOTE — NURSING NOTE
Chief Complaint   Patient presents with    Follow Up     1 yr follow-up paroxysmal atrial fibrillation - on flecainide. Recurrences within past year: 4/18/23, 7/16/23, 9/19/23, 10/12/23. All self converted except 9/19/23 received DCCV.       Vitals were taken, medications reconciled and EKG performed.    Bhavna Bragg, Facilitator  11:11 AM

## 2023-11-08 NOTE — PROGRESS NOTES
ELECTROPHYSIOLOGY CLINIC VISIT    Assessment/Recommendations   Assessment/Plan:    Ms. Orellana is a 73 year old Jew male who has a past medical history significant for PAF (CHADSVASC 1) s/p DCCVs, chronic hepatitis C, hepatitis B, WINNIE (uses CPAP), tubular adenoma of colon, and tobacco use.    Paroxsymal Atrial Fibrillation:  We discussed in detail with the patient management/treatment options for Oneal includin. Stroke Prophylaxis:  CHADSVASC=+age  1, corresponding to a 1.3% annual stroke / systemic emolism event rate. He is on ASA only and only uses DOAC surrounding DCCVs.  Discussed in 2024 he will be 74 and a CHADSVASC of 2 where we would recommend starting daily oral anticoagulation. He states understanding but elects to only take it surrounding DCCVs at this time.   2. Rate Control: Continue Diltiazem.   3. Rhythm Control: He has had several DCCVs, He failed Sotalol. He has now been on Flecainide. He had several self limiting recurrences over the year and required a DCCV 23. Offered to increase Flecainide dose or alternative AAT vs ablation. He states he is not needing to escalate therapies at this time. He will let us know if AF increases to a point he wants to change options.  4. Risk Factor Management: maintain good BP control and continued WINNIE treatment with CPAP.        Follow up 1 year or sooner if need arises.        History of Present Illness/Subjective    Mr. Frank Orellana is a 73 year old male who comes in today for EP follow-up of PAF.    Ms. Orellana is a 73 year old Jew male who has a past medical history significant for PAF (CHADSVASC 1) s/p DCCVs, chronic hepatitis C, hepatitis B, WINNIE (uses CPAP), tubular adenoma of colon, and tobacco use.     At our initial visit in 2016 he had reported ~20 years of paroxysmal atrial fibrillation manifesting as chest pressure and palpitations. The first episode occurred during a picnic and converted spontaneously  to sinus rhythm.  He took metoprolol for 30 days.  Approximately 10 years later he had a second episode in the setting of a meniscal tear and perhaps 2 or 3 more episodes in the intervening years until his palpitations became more frequent in the year leading up to his most recent presentation.  A 48-hour Holter monitor which showed variation between sinus rhythm, junctional rhythm, and A.fib (average HR 63, range ) with 1% PVCs and <1% supraventricular ectopic beats, with 33 runs of PAT vs. A.fib (longest 10 beats at 164 bpm). He then presented to the ED on 12/2/16 for palpitations and was in A.fib with RVR~111 bpm, started on diltiazem and ASA 81 mg daily, and discharged. He had recurrent episode of AF end of 12/2017 that manifested as palpitations, SOB and fatigue. He felt the symptoms for about 10 days prior and was seen by Dr. Hussein on 1/6/18. At this time he was noted to be in symptomatic AF (rate controlled), and was scheduled to undergo JIM/DCCV, which was performed on 1/15/18. He was on Dabigatran for 4 weeks following the DCCV and his ASA was held. He last saw Dr. Felipe in EP clinic in 2/2018 and was doing well. More recently, he called reporting he was back in AF. He was started on Pradaxa within 1.5 days of his symptoms. He was then arranged for DCCV. He then had DCCV on 10/11/18. He had recurrence and required another DCCV on 11/28/18. He followed up in EP clinic on 12/27/18 and was feeling well without issues. He presented 1/18 to [facility] with racing heart sensation associated with diaphoresis and mild chest discomfort and lightheadedness c/w prior AF symptoms. Found to be in AF. He states he has had a few other AF symptoms recently and feels they are growing in frequency/severity. His AF did spontaneously terminated when in ER. He was initiated on Sotalol during this admission by EP.     EP visit 4/24/19: He presents today for follow-up after having initiated sotalol and cardioverted in ER  "1/18/19. He reports no recurrent sustained palpitations. He does have intermittent, infrequent, short-lived palpitations which are not bothersome (and corroborated with PAC's / PVC's on recent Zio). Recent Holter shows no atrial fibrillation but run of AT (13 seconds). He does report, and his wife agrees, new onset significant fatigue since initiating sotalol. Now taking regular naps and just not doing usual enjoyable activities as long as he is used to. EKG: VR 47 bpm,  ms,  ms, QTc 421 ms. Sotalol 80 mg BID, asa 81 mg. Given recurrences Sotalol was stopped and Flecainide was started.       EP Vist 7/24/19: After developing symptoms of a URI last week, felt his heart \"beat out of rhythm\" on Monday evening. Call ahead to clinic was scheduled for Cardioversion, however on his way to hospital, he went out of afib and improved. Since then denies any symptoms of lightheadedness and dizziness. Per discussion with patient, did not start on Pradaxa and is still on ASA today. Denies chest pain, SOB, lightheadedness and dizziness today.       EP clinic On 10/08/21:He presents today for follow up. He is complaining of atypical chest pain occurred started 2 weeks ago, pain is aching, mainly in the left side of the chest and occurred for 36 hours continuous, moderate in intensity, no lightheaded or diaphoresis accompaning the pain , no LOC, no lower extremity edema. He has been on his dofetilide taking it twice a day without any more episodes of ER visits or RVR, pt is on ASA no anticoagulation. Presenting 12 lead ECG shows Vent Rate 60 bpm,  ms,  ms, QTc 441 ms. Current cardiac medications include: Flecainide, Diltiazem, and ASA.     EP Visit 11/2/22: He reports feeling well. He has had some rare palpitations lasting seconds over the last year. No known sustained AF events. He denies chest discomfort, abdominal fullness/bloating or peripheral edema, shortness of breath, paroxysmal nocturnal dyspnea, " "orthopnea, lightheadedness, dizziness, pre-syncope, or syncope. Current cardiac medications include: Flecainide, Diltiazem, and ASA.     He presents today for follow up. He had recurrence of AF on: 4/18/23, 7/16/23, 9/19/23, 10/12/23. All self converted except 9/19/23 received DCCV. He reports feeling well. He denies chest discomfort, palpitations, abdominal fullness/bloating or peripheral edema, shortness of breath, paroxysmal nocturnal dyspnea, orthopnea, lightheadedness, dizziness, pre-syncope, or syncope. Presenting 12 lead ECG shows SB Vent Rate 49 bpm,  ms,  ms, QTc 404 ms. Current cardiac medications include: Flecainide, Diltiazem, and ASA.   .     I have reviewed and updated the patient's Past Medical History, Social History, Family History and Medication List.     Cardiographics (Personally Reviewed) :   Echo 1/2019:  Interpretation Summary  Global and regional left ventricular function is normal with an EF of 55-60%.  No regional wall motion abnormalities are seen.  Right ventricular function, chamber size, wall motion, and thickness are  normal.  Mild AI.  Mildly dilated ascending aorta measuring 4.2 cm (2.1 cm/m2.)     This study was compared with the study from 4/27/17: there has been no  significant change. Specifically, the ascending aorta is unchanged in size on  direct remeasurement of the images from the prior study.    MR angiography of the chest 12/27/2016 showed \"Normal caliber of thoracic aorta. The ascending aorta measures 3.9 cm, which is normal when indexed for age/gender/size.\"     Lexiscan NM stress test 1/8/18 showed \"Normal  myocardial SPECT study with a summed stress score of  zero. No ischemia or infarct identified. Normal LV function. No change from prior study.\"    Lexiscan NM stress test 10/3/22:  The nuclear stress test is negative for inducible myocardial ischemia or infarction. LVEDv 144ml. LVESv 44ml. LV EF 69%.        Exercise EKG 5/2019  Interpretation " "Summary  Normal, low-risk exercise electrocardiogram.  The target heart rate was achieved. Normal heart rate and BP response to  exercise.  Normal resting ECG. No ECG evidence of ischemia with exercise.  There were occasional uniform PVCs elicited during exercise. QT prolongation  was unable to be assessed due to artifact.  No angina was elicited.  Functional capacity is average for age.         Physical Examination   /78 (BP Location: Right arm, Patient Position: Chair, Cuff Size: Adult Regular)   Pulse 54   Ht 1.785 m (5' 10.28\")   Wt 78.1 kg (172 lb 1.6 oz)   SpO2 97%   BMI 24.50 kg/m    Wt Readings from Last 3 Encounters:   08/09/23 73.5 kg (162 lb)   07/31/23 76.4 kg (168 lb 8 oz)   04/24/23 77.7 kg (171 lb 6.4 oz)     The rest of a comprehensive physical examination is deferred due to public health emergency video visit restrictions.  CONSITUTIONAL: no acute distress  HEENT: no icterus, no redness or discharge, neck supple  CV: no visible edema of visualized extremities. No JVD.   RESPIRATORY: respirations nonlabored, no cough  NEURO: AA&Ox3, speech fluent/appropriate, motor grossly nonfocal  PSYCH: cooperative, affect appropriate  DERM: no rashes on visualized face/neck/upper extremities       Medications  Allergies   Current Outpatient Medications   Medication Sig Dispense Refill    apixaban ANTICOAGULANT (ELIQUIS) 5 MG tablet Take 1 tablet (5 mg) by mouth 2 times daily 60 tablet 0    aspirin 81 MG EC tablet Take 1 tablet (81 mg) by mouth daily 90 tablet 3    diltiazem ER COATED BEADS (CARDIZEM CD/CARTIA XT) 120 MG 24 hr capsule Take 1 capsule (120 mg) by mouth daily 90 capsule 0    flecainide (TAMBOCOR) 50 MG tablet Take 1 tablet (50 mg) by mouth 2 times daily 180 tablet 0    Allergies   Allergen Reactions    Blood Transfusion Related (Informational Only)      Mormon.  Declines blood transfusions.    Shellfish-Derived Products Swelling    Contrast Dye Rash    Iodinated Contrast Media " Rash         Lab Results (Personally Reviewed)    Chemistry/lipid CBC Cardiac Enzymes/BNP/TSH/INR   Lab Results   Component Value Date    BUN 11.8 04/24/2023     04/24/2023    CO2 30 (H) 04/24/2023     Creatinine   Date Value Ref Range Status   04/24/2023 0.87 0.67 - 1.17 mg/dL Final   12/30/2020 0.92 0.66 - 1.25 mg/dL Final       Lab Results   Component Value Date    CHOL 187 04/24/2023    HDL 69 04/24/2023     (H) 04/24/2023      Lab Results   Component Value Date    WBC 5.9 04/24/2023    HGB 14.0 04/24/2023    HCT 41.4 04/24/2023    MCV 95 04/24/2023     04/24/2023    Lab Results   Component Value Date    TSH 1.09 04/24/2023    INR 1.52 (H) 01/15/2018        The patient states understanding and is agreeable with the plan.   ANDREINA Small CNP  Electrophysiology Consult Service  Pager: [phone]

## 2023-11-30 NOTE — NURSING NOTE
Chief Complaint   Patient presents with     Physical     Patient is here for annual physical     Cynthia Verdugo CMA 11:35 AM on 11/13/2018.     none

## 2024-01-03 ENCOUNTER — APPOINTMENT (OUTPATIENT)
Dept: GENERAL RADIOLOGY | Facility: CLINIC | Age: 74
End: 2024-01-03
Attending: EMERGENCY MEDICINE
Payer: COMMERCIAL

## 2024-01-03 ENCOUNTER — HOSPITAL ENCOUNTER (EMERGENCY)
Facility: CLINIC | Age: 74
Discharge: HOME | End: 2024-01-03
Attending: EMERGENCY MEDICINE | Admitting: EMERGENCY MEDICINE
Payer: COMMERCIAL

## 2024-01-03 ENCOUNTER — APPOINTMENT (OUTPATIENT)
Dept: NUCLEAR MEDICINE | Facility: CLINIC | Age: 74
End: 2024-01-03
Attending: EMERGENCY MEDICINE
Payer: COMMERCIAL

## 2024-01-03 VITALS
HEIGHT: 69 IN | BODY MASS INDEX: 24.44 KG/M2 | TEMPERATURE: 97.4 F | OXYGEN SATURATION: 98 % | DIASTOLIC BLOOD PRESSURE: 78 MMHG | WEIGHT: 165 LBS | HEART RATE: 64 BPM | SYSTOLIC BLOOD PRESSURE: 131 MMHG | RESPIRATION RATE: 16 BRPM

## 2024-01-03 DIAGNOSIS — R07.9 LEFT-SIDED CHEST PAIN: ICD-10-CM

## 2024-01-03 LAB
ALBUMIN SERPL BCG-MCNC: 4.2 G/DL (ref 3.5–5.2)
ALP SERPL-CCNC: 80 U/L (ref 40–150)
ALT SERPL W P-5'-P-CCNC: 36 U/L (ref 0–70)
ANION GAP SERPL CALCULATED.3IONS-SCNC: 9 MMOL/L (ref 7–15)
AST SERPL W P-5'-P-CCNC: 31 U/L (ref 0–45)
ATRIAL RATE - MUSE: 51 BPM
ATRIAL RATE - MUSE: 57 BPM
BASOPHILS # BLD AUTO: 0.1 10E3/UL (ref 0–0.2)
BASOPHILS NFR BLD AUTO: 1 %
BILIRUB SERPL-MCNC: 1.1 MG/DL
BUN SERPL-MCNC: 12.6 MG/DL (ref 8–23)
CALCIUM SERPL-MCNC: 9.3 MG/DL (ref 8.8–10.2)
CHLORIDE SERPL-SCNC: 105 MMOL/L (ref 98–107)
CREAT SERPL-MCNC: 0.9 MG/DL (ref 0.67–1.17)
D DIMER PPP FEU-MCNC: 0.52 UG/ML FEU (ref 0–0.5)
DEPRECATED HCO3 PLAS-SCNC: 26 MMOL/L (ref 22–29)
DIASTOLIC BLOOD PRESSURE - MUSE: NORMAL MMHG
DIASTOLIC BLOOD PRESSURE - MUSE: NORMAL MMHG
EGFRCR SERPLBLD CKD-EPI 2021: 90 ML/MIN/1.73M2
EOSINOPHIL # BLD AUTO: 0.2 10E3/UL (ref 0–0.7)
EOSINOPHIL NFR BLD AUTO: 3 %
ERYTHROCYTE [DISTWIDTH] IN BLOOD BY AUTOMATED COUNT: 12.8 % (ref 10–15)
GLUCOSE SERPL-MCNC: 89 MG/DL (ref 70–99)
HCT VFR BLD AUTO: 39.6 % (ref 40–53)
HGB BLD-MCNC: 13.7 G/DL (ref 13.3–17.7)
IMM GRANULOCYTES # BLD: 0 10E3/UL
IMM GRANULOCYTES NFR BLD: 0 %
INTERPRETATION ECG - MUSE: NORMAL
INTERPRETATION ECG - MUSE: NORMAL
LYMPHOCYTES # BLD AUTO: 1 10E3/UL (ref 0.8–5.3)
LYMPHOCYTES NFR BLD AUTO: 18 %
MCH RBC QN AUTO: 32.2 PG (ref 26.5–33)
MCHC RBC AUTO-ENTMCNC: 34.6 G/DL (ref 31.5–36.5)
MCV RBC AUTO: 93 FL (ref 78–100)
MONOCYTES # BLD AUTO: 0.6 10E3/UL (ref 0–1.3)
MONOCYTES NFR BLD AUTO: 10 %
NEUTROPHILS # BLD AUTO: 3.8 10E3/UL (ref 1.6–8.3)
NEUTROPHILS NFR BLD AUTO: 68 %
NRBC # BLD AUTO: 0 10E3/UL
NRBC BLD AUTO-RTO: 0 /100
P AXIS - MUSE: 75 DEGREES
P AXIS - MUSE: 83 DEGREES
PLATELET # BLD AUTO: 173 10E3/UL (ref 150–450)
POTASSIUM SERPL-SCNC: 4.1 MMOL/L (ref 3.4–5.3)
PR INTERVAL - MUSE: 172 MS
PR INTERVAL - MUSE: 176 MS
PROT SERPL-MCNC: 6.7 G/DL (ref 6.4–8.3)
QRS DURATION - MUSE: 108 MS
QRS DURATION - MUSE: 118 MS
QT - MUSE: 464 MS
QT - MUSE: 484 MS
QTC - MUSE: 446 MS
QTC - MUSE: 451 MS
R AXIS - MUSE: 0 DEGREES
R AXIS - MUSE: 63 DEGREES
RBC # BLD AUTO: 4.26 10E6/UL (ref 4.4–5.9)
SODIUM SERPL-SCNC: 140 MMOL/L (ref 135–145)
SYSTOLIC BLOOD PRESSURE - MUSE: NORMAL MMHG
SYSTOLIC BLOOD PRESSURE - MUSE: NORMAL MMHG
T AXIS - MUSE: 16 DEGREES
T AXIS - MUSE: 69 DEGREES
TROPONIN T SERPL HS-MCNC: 9 NG/L
TROPONIN T SERPL HS-MCNC: 9 NG/L
VENTRICULAR RATE- MUSE: 51 BPM
VENTRICULAR RATE- MUSE: 57 BPM
WBC # BLD AUTO: 5.6 10E3/UL (ref 4–11)

## 2024-01-03 PROCEDURE — 99285 EMERGENCY DEPT VISIT HI MDM: CPT | Performed by: EMERGENCY MEDICINE

## 2024-01-03 PROCEDURE — 78582 LUNG VENTILAT&PERFUS IMAGING: CPT

## 2024-01-03 PROCEDURE — A9540 TC99M MAA: HCPCS | Performed by: EMERGENCY MEDICINE

## 2024-01-03 PROCEDURE — 78582 LUNG VENTILAT&PERFUS IMAGING: CPT | Mod: 26 | Performed by: RADIOLOGY

## 2024-01-03 PROCEDURE — A9567 TECHNETIUM TC-99M AEROSOL: HCPCS | Performed by: EMERGENCY MEDICINE

## 2024-01-03 PROCEDURE — 84484 ASSAY OF TROPONIN QUANT: CPT | Performed by: EMERGENCY MEDICINE

## 2024-01-03 PROCEDURE — 36415 COLL VENOUS BLD VENIPUNCTURE: CPT | Performed by: EMERGENCY MEDICINE

## 2024-01-03 PROCEDURE — 250N000013 HC RX MED GY IP 250 OP 250 PS 637: Performed by: EMERGENCY MEDICINE

## 2024-01-03 PROCEDURE — 93005 ELECTROCARDIOGRAM TRACING: CPT | Performed by: EMERGENCY MEDICINE

## 2024-01-03 PROCEDURE — 80053 COMPREHEN METABOLIC PANEL: CPT | Performed by: EMERGENCY MEDICINE

## 2024-01-03 PROCEDURE — 272N000035 NM LUNG SCAN VENTILATION AND PERFUSION

## 2024-01-03 PROCEDURE — 99285 EMERGENCY DEPT VISIT HI MDM: CPT | Mod: 25 | Performed by: EMERGENCY MEDICINE

## 2024-01-03 PROCEDURE — 93010 ELECTROCARDIOGRAM REPORT: CPT | Performed by: EMERGENCY MEDICINE

## 2024-01-03 PROCEDURE — 85025 COMPLETE CBC W/AUTO DIFF WBC: CPT | Performed by: EMERGENCY MEDICINE

## 2024-01-03 PROCEDURE — 343N000001 HC RX 343: Performed by: EMERGENCY MEDICINE

## 2024-01-03 PROCEDURE — 71046 X-RAY EXAM CHEST 2 VIEWS: CPT | Mod: 26 | Performed by: RADIOLOGY

## 2024-01-03 PROCEDURE — 85379 FIBRIN DEGRADATION QUANT: CPT | Performed by: EMERGENCY MEDICINE

## 2024-01-03 PROCEDURE — 71046 X-RAY EXAM CHEST 2 VIEWS: CPT

## 2024-01-03 RX ORDER — ASPIRIN 325 MG
325 TABLET ORAL ONCE
Status: COMPLETED | OUTPATIENT
Start: 2024-01-03 | End: 2024-01-03

## 2024-01-03 RX ADMIN — KIT FOR THE PREPARATION OF TECHNETIUM TC 99M PENTETATE 2 MILLICURIE: 20 INJECTION, POWDER, LYOPHILIZED, FOR SOLUTION INTRAVENOUS; RESPIRATORY (INHALATION) at 14:17

## 2024-01-03 RX ADMIN — KIT FOR THE PREPARATION OF TECHNETIUM TC 99M ALBUMIN AGGREGATED 7.05 MILLICURIE: 2.5 INJECTION, POWDER, FOR SOLUTION INTRAVENOUS at 14:17

## 2024-01-03 RX ADMIN — ASPIRIN 325 MG ORAL TABLET 325 MG: 325 PILL ORAL at 10:34

## 2024-01-03 ASSESSMENT — ACTIVITIES OF DAILY LIVING (ADL)
ADLS_ACUITY_SCORE: 35

## 2024-01-03 NOTE — ED TRIAGE NOTES
"Triage Assessment & Note:    /78   Pulse 60   Temp 97.4  F (36.3  C) (Oral)   Resp 18   Ht 1.753 m (5' 9\")   Wt 74.8 kg (165 lb)   SpO2 97%   BMI 24.37 kg/m      Patient presents with: PT c/o CP that is located in the left upper chest and does not radiate. PT reports no SOB or diaphoresis.     Home Treatments/Remedies: None    Febrile / Afebrile? Afebrile     Duration of C/o:  2 hours    Michael Mensah RN  January 3, 2024       Triage Assessment (Adult)       Row Name 01/03/24 0955          Triage Assessment    Airway WDL WDL        Respiratory WDL    Respiratory WDL WDL        Cardiac WDL    Cardiac WDL chest pain;X        Chest Pain Assessment    Chest Pain Location anterior chest, left     Character aching                     "

## 2024-01-03 NOTE — DISCHARGE INSTRUCTIONS
As discussed, you can follow-up with your primary care doctor or cardiologist to set up an outpatient stress test as needed.  Return to the emergency department for any new or worsening symptoms or concerns.

## 2024-01-03 NOTE — ED NOTES
Patient on portable tele monitoring, HR down in 40s. Dr. Kim notified. Additional EKG ordered. Patient is asymptomatic, states he feels better.

## 2024-01-03 NOTE — ED PROVIDER NOTES
"    [facility] ([facility])    1/03/24       ED PROVIDER NOTE  VTA      History     Chief Complaint   Patient presents with    Chest Pain     HPI  Frank Orellana is a 73 year old male with a past medical history significant for PAF s/p DCCVs (last 9/19/23) on Eliquis, chronic hepatitis C, hepatitis B, BPH, WINNIE and tobacco use who presents to the Emergency Department for evaluation of chest pain.  He states that 815 he was going about his normal day getting ready.  He noticed that he began having a left upper chest pain which felt like a \"sharp jab\" every time he had a heartbeat or he took a breath in.  It has dulled somewhat since he has arrived to the emergency department.  He has never felt any pain like this before.      Previously when he had atrial fibrillation, he would feel dyspneic.  He does not feel dyspneic today.  He denies any nausea, radiating pain, numbness, tingling, lightheadedness, syncope, diaphoresis.  He has been in his normal state of health.  He exercises regularly taking long walks.  Over the past few weeks he has not had any abnormal symptoms while taking those walks.  He always has a little bit of lower extremity edema, nothing new today.  Denies any recent hospitalizations, surgeries, long car rides, trauma, fevers, chills.    Patient states that he is not currently taking anticoagulation.  His last episode of atrial fibrillation was a few months ago, he spontaneously converted.  He states that he is currently only taking flecainide, diltiazem, baby aspirin.  He has not had any aspirin yet today.      Past Medical History  Past Medical History:   Diagnosis Date    Actinic keratosis 2009    Atrial fibrillation (H) 6/15/96    Afib - 2 or 3 extended occurrences since    Chronic hepatitis C (H)     Chronic Hepatitis C,  genotype 1b                Stage 0 Fibrosis    Dupuytren's contracture of both hands     Elevated PSA     Hepatitis B 1969    acute infection at age " 19; IV drug use    WINNIE (obstructive sleep apnea)     AHI 34.2    Pain in both hands     Patient is [employer]     Refusal of blood transfusions as patient is [employer]     Right shoulder pain     Tinnitus 1 or 2 years ago    both ears    Tubular adenoma of colon 1/17/17    ascending colon, 1/17/17     Past Surgical History:   Procedure Laterality Date    ANESTHESIA CARDIOVERSION N/A 1/15/2018    Procedure: ANESTHESIA CARDIOVERSION;  Anesthesia Coverage Cardioversion With Transesophageal Echocardiogram @0930 ;  Surgeon: GENERIC ANESTHESIA PROVIDER;  Location: [facility] OR    ANESTHESIA CARDIOVERSION N/A 10/11/2018    Procedure: ANESTHESIA CARDIOVERSION;  Cardioversion;  Surgeon: GENERIC ANESTHESIA PROVIDER;  Location: [facility]    ANESTHESIA CARDIOVERSION N/A 11/28/2018    Procedure: Anesthesia Coverage Cardioversion @0830;  Surgeon: GENERIC ANESTHESIA PROVIDER;  Location: [facility]    ANESTHESIA CARDIOVERSION N/A 9/19/2023    Procedure: Anesthesia cardioversion@0900;  Surgeon: GENERIC ANESTHESIA PROVIDER;  Location: [facility] OR    ARTHROSCOPY KNEE      left knee    COLONOSCOPY  1/17/17    tubular adenoma ascending colon    COLONOSCOPY N/A 6/26/2023    Procedure: COLONOSCOPY, WITH POLYPECTOMY AND BIOPSY;  Surgeon: Hussein Stevens MD;  Location: [facility] GI    CYSTOSCOPY, TRANSURETHRAL RESECTION (TUR) PROSTATE, COMBINED N/A 8/21/2020    Procedure: CYSTOSCOPY, WITH TRANSURETHRAL RESECTION PROSTATE;  Surgeon: Roldan De La O MD;  Location: [facility] OR    HERNIORRHAPHY INGUINAL Right 10/26/2017    Procedure: HERNIORRHAPHY INGUINAL;  Open Right Inguinal Hernia Repair with Mesh;  Surgeon: Suresh Raymond MD;  [facility]    KNEE SURGERY  2006??    torn meniscus    RELEASE DUPUYTRENS CONTRACTURE Right 1/20/2017    Procedure: RELEASE DUPUYTRENS CONTRACTURE;  Surgeon: Lars Oleary MD;  Location: [facility] OR    RELEASE DUPUYTRENS CONTRACTURE Left 12/15/2017    Procedure: RELEASE DUPUYTRENS CONTRACTURE;  Left  Ring and Middle Finger Subtotal Palmar Fasciectomy;  Surgeon: Lars Oleary MD;  [facility]    RELEASE DUPUYTRENS CONTRACTURE Right 2023    Procedure: RELEASE, DUPUYTREN CONTRACTURE, RIGHT SMALL FINGER AND RIGHT MIDDLE FINGER;  Surgeon: Lars Oleary MD;  [facility]    TRANSESOPHAGEAL ECHOCARDIOGRAM INTRAOPERATIVE N/A 1/15/2018    Procedure: TRANSESOPHAGEAL ECHOCARDIOGRAM INTRAOPERATIVE;;  Surgeon: GENERIC ANESTHESIA PROVIDER;  Location: [facility] OR     apixaban ANTICOAGULANT (ELIQUIS) 5 MG tablet  aspirin 81 MG EC tablet  diltiazem ER COATED BEADS (CARDIZEM CD/CARTIA XT) 120 MG 24 hr capsule  flecainide (TAMBOCOR) 50 MG tablet      Allergies   Allergen Reactions    Blood Transfusion Related (Informational Only)      Sikh.  Declines blood transfusions.    Shellfish-Derived Products Swelling    Contrast Dye Rash    Iodinated Contrast Media Rash     Family History  Family History   Problem Relation Age of Onset    Alcohol/Drug Mother          age 64    Alcoholism Mother     Gastrointestinal Disease Father          age 77 after colon surgery    Alcoholism Father     Arthritis Paternal Grandmother     Rheumatoid Arthritis Paternal Grandmother     Heart Disease Brother     Cardiac Sudden Death Brother     Uterine Cancer Sister     Glaucoma No family hx of     Macular Degeneration No family hx of     Retinal detachment No family hx of     Melanoma No family hx of     Skin Cancer No family hx of      Social History   Social History     Tobacco Use    Smoking status: Former     Packs/day: 0.50     Years: 10.00     Additional pack years: 0.00     Total pack years: 5.00     Types: Cigarettes     Start date: 6/15/1964     Quit date: 1974     Years since quittin.5    Smokeless tobacco: Former     Quit date: 1969   Vaping Use    Vaping Use: Never used   Substance Use Topics    Alcohol use: Yes     Alcohol/week: 3.0 - 4.0 standard drinks of alcohol     Comment: 4 or 5  "drinks/week, limit of 1    Drug use: No     Comment: 1974--history of marijuana      Past medical history, past surgical history, medications, allergies, family history, and social history were reviewed with the patient. No additional pertinent items.      A medically appropriate review of systems was performed with pertinent positives and negatives noted in the HPI, and all other systems negative.    Physical Exam   BP: 131/78  Pulse: 60  Temp: 97.4  F (36.3  C)  Resp: 18  Height: 175.3 cm (5' 9\")  Weight: 74.8 kg (165 lb)  SpO2: 97 %  Physical Exam  General: No acute distress.  HENT: Normocephalic and atraumatic.   Eyes: EOMI. Conjunctivae normal.   Cardiovascular: Normal rate and regular rhythm.  Normal heart sounds. No murmur heard.  No reproducible tenderness over the left chest wall.  Pulmonary: No respiratory distress. Normal breath sounds.   Abdominal: There is no distension. Abdomen is soft. There is no mass. There is no abdominal tenderness.   Musculoskeletal: Trace lower extremity edema.  No calf tenderness.  Negative Homans' sign.  Moving all extremities spontaneously.    Skin: Warm and dry  Neurological: No focal deficit present.   Mood and Affect: Mood normal.     ED Course, Procedures, & Data      Procedures         No results found for any visits on 01/03/24.  Medications - No data to display  Labs Ordered and Resulted from Time of ED Arrival to Time of ED Departure - No data to display  No orders to display          Critical care was not performed.     Medical Decision Making  The patient's presentation was of moderate complexity (an acute illness with systemic symptoms).  The patient's evaluation involved:  review of external note(s) from 3+ sources (see separate area of note for details)  review of 3+ test result(s) ordered prior to this encounter (see separate area of note for details)  ordering and/or review of 3+ test(s) in this encounter (see separate area of note for details)  independent " "interpretation of testing performed by another health professional (see separate area of note for details)  discussion of management or test interpretation with another health professional (see separate area of note for details)     The patient's management necessitated low risk treatment    Assessment & Plan    Patient arrives emergency department for chief complaint of left-sided chest pain that began this morning.  It is not exertional, nonpositional, persistent since 8:15 AM this morning.  He states that it feels pleuritic in nature.  Patient has history of paroxysmal atrial fibrillation, however denies any anticoagulation.  His chart does say that he is on Eliquis.    Differential diagnosis includes but is not limited to ACS, pneumothorax, costochondritis, pulmonary embolism, aortic dissection, AAA.  Last Lexiscan stress test was October 2022.  Negative for inducible ischemic EKG changes.  No arrhythmias.      ECG with sinus bradycardia, heart rate of 57.  No ischemic changes compared to previous.  Patient was on the monitor, heart rate did decrease to the low 50s.  During this time patient was asymptomatic.  Upon review of previous ECGs, she chronically has sinus bradycardia.  Troponin is 9.  2-hour delta troponin is also 9.  Other blood work was unremarkable except for elevated D-dimer 0.52.  Patient does have a contrast allergy, stating that he \"broke out in a rash that did not improve with steroids or Benadryl.\"  He also notes facial swelling with fish years ago.  He tolerated VQ scan in the past, which we will obtain today.    VQ scan negative for pulmonary embolism.  Patient states that he is currently chest pain-free.  We discussed that he has a good relationship with his primary care clinic and cardiologist.  He will follow-up with them regarding outpatient stress test as needed.  Again it was reiterated that he has not been having any symptoms with exertion, I suspect this is noncardiac etiology of his " pain.  He was discharged with strict return precautions.      I have reviewed the nursing notes. I have reviewed the findings, diagnosis, plan and need for follow up with the patient.    New Prescriptions    No medications on file       Final diagnoses:   None       [facility] EMERGENCY DEPARTMENT  1/3/2024     Karina Kim MD  01/03/24 [id number]

## 2024-03-26 ENCOUNTER — ANESTHESIA EVENT (OUTPATIENT)
Dept: SURGERY | Facility: CLINIC | Age: 74
End: 2024-03-26
Payer: COMMERCIAL

## 2024-03-26 DIAGNOSIS — I48.0 PAF (PAROXYSMAL ATRIAL FIBRILLATION) (H): Primary | ICD-10-CM

## 2024-03-26 RX ORDER — LIDOCAINE 40 MG/G
CREAM TOPICAL
Status: CANCELLED | OUTPATIENT
Start: 2024-03-26

## 2024-03-26 RX ORDER — POTASSIUM CHLORIDE 1500 MG/1
40 TABLET, EXTENDED RELEASE ORAL
Status: CANCELLED | OUTPATIENT
Start: 2024-03-26

## 2024-03-26 RX ORDER — POTASSIUM CHLORIDE 1500 MG/1
20 TABLET, EXTENDED RELEASE ORAL
Status: CANCELLED | OUTPATIENT
Start: 2024-03-26

## 2024-03-26 RX ORDER — MAGNESIUM SULFATE HEPTAHYDRATE 40 MG/ML
2 INJECTION, SOLUTION INTRAVENOUS
Status: CANCELLED | OUTPATIENT
Start: 2024-03-26

## 2024-03-26 ASSESSMENT — ENCOUNTER SYMPTOMS: DYSRHYTHMIAS: 1

## 2024-03-27 ENCOUNTER — APPOINTMENT (OUTPATIENT)
Dept: LAB | Facility: CLINIC | Age: 74
End: 2024-03-27
Attending: INTERNAL MEDICINE
Payer: COMMERCIAL

## 2024-03-27 ENCOUNTER — HOSPITAL ENCOUNTER (OUTPATIENT)
Facility: CLINIC | Age: 74
Discharge: HOME | End: 2024-03-27
Attending: INTERNAL MEDICINE | Admitting: INTERNAL MEDICINE
Payer: COMMERCIAL

## 2024-03-27 ENCOUNTER — HOSPITAL ENCOUNTER (OUTPATIENT)
Dept: CARDIOLOGY | Facility: CLINIC | Age: 74
Discharge: HOME | End: 2024-03-27
Attending: NURSE PRACTITIONER
Payer: COMMERCIAL

## 2024-03-27 ENCOUNTER — APPOINTMENT (OUTPATIENT)
Dept: MEDSURG UNIT | Facility: CLINIC | Age: 74
End: 2024-03-27
Attending: INTERNAL MEDICINE
Payer: COMMERCIAL

## 2024-03-27 ENCOUNTER — ANESTHESIA (OUTPATIENT)
Dept: SURGERY | Facility: CLINIC | Age: 74
End: 2024-03-27
Payer: COMMERCIAL

## 2024-03-27 VITALS
TEMPERATURE: 97.5 F | HEART RATE: 57 BPM | SYSTOLIC BLOOD PRESSURE: 122 MMHG | OXYGEN SATURATION: 100 % | DIASTOLIC BLOOD PRESSURE: 68 MMHG | RESPIRATION RATE: 16 BRPM

## 2024-03-27 VITALS
RESPIRATION RATE: 15 BRPM | OXYGEN SATURATION: 96 % | DIASTOLIC BLOOD PRESSURE: 72 MMHG | HEART RATE: 55 BPM | SYSTOLIC BLOOD PRESSURE: 102 MMHG

## 2024-03-27 DIAGNOSIS — I48.0 PAF (PAROXYSMAL ATRIAL FIBRILLATION) (H): ICD-10-CM

## 2024-03-27 LAB
MAGNESIUM SERPL-MCNC: 2.2 MG/DL (ref 1.7–2.3)
POTASSIUM SERPL-SCNC: 4 MMOL/L (ref 3.4–5.3)

## 2024-03-27 PROCEDURE — 92960 CARDIOVERSION ELECTRIC EXT: CPT | Performed by: ANESTHESIOLOGY

## 2024-03-27 PROCEDURE — 93005 ELECTROCARDIOGRAM TRACING: CPT

## 2024-03-27 PROCEDURE — 92960 CARDIOVERSION ELECTRIC EXT: CPT

## 2024-03-27 PROCEDURE — 84132 ASSAY OF SERUM POTASSIUM: CPT | Performed by: NURSE PRACTITIONER

## 2024-03-27 PROCEDURE — 93010 ELECTROCARDIOGRAM REPORT: CPT | Mod: 76 | Performed by: INTERNAL MEDICINE

## 2024-03-27 PROCEDURE — 999N000054 HC STATISTIC EKG NON-CHARGEABLE

## 2024-03-27 PROCEDURE — 99100 ANES PT EXTEME AGE<1 YR&>70: CPT

## 2024-03-27 PROCEDURE — 99100 ANES PT EXTEME AGE<1 YR&>70: CPT | Performed by: ANESTHESIOLOGY

## 2024-03-27 PROCEDURE — 83735 ASSAY OF MAGNESIUM: CPT | Performed by: NURSE PRACTITIONER

## 2024-03-27 PROCEDURE — 370N000017 HC ANESTHESIA TECHNICAL FEE, PER MIN

## 2024-03-27 PROCEDURE — 250N000011 HC RX IP 250 OP 636

## 2024-03-27 PROCEDURE — 258N000003 HC RX IP 258 OP 636

## 2024-03-27 PROCEDURE — 999N000132 HC STATISTIC PP CARE STAGE 1

## 2024-03-27 PROCEDURE — 92960 CARDIOVERSION ELECTRIC EXT: CPT | Mod: XE

## 2024-03-27 RX ORDER — SODIUM CHLORIDE 9 MG/ML
INJECTION, SOLUTION INTRAVENOUS CONTINUOUS PRN
Status: DISCONTINUED | OUTPATIENT
Start: 2024-03-27 | End: 2024-03-27

## 2024-03-27 RX ORDER — POTASSIUM CHLORIDE 750 MG/1
20 TABLET, EXTENDED RELEASE ORAL
Status: DISCONTINUED | OUTPATIENT
Start: 2024-03-27 | End: 2024-03-28 | Stop reason: HOSPADM

## 2024-03-27 RX ORDER — LIDOCAINE 40 MG/G
CREAM TOPICAL
Status: DISCONTINUED | OUTPATIENT
Start: 2024-03-27 | End: 2024-03-28 | Stop reason: HOSPADM

## 2024-03-27 RX ORDER — POTASSIUM CHLORIDE 750 MG/1
40 TABLET, EXTENDED RELEASE ORAL
Status: DISCONTINUED | OUTPATIENT
Start: 2024-03-27 | End: 2024-03-28 | Stop reason: HOSPADM

## 2024-03-27 RX ORDER — PROPOFOL 10 MG/ML
INJECTION, EMULSION INTRAVENOUS PRN
Status: DISCONTINUED | OUTPATIENT
Start: 2024-03-27 | End: 2024-03-27

## 2024-03-27 RX ORDER — MAGNESIUM SULFATE HEPTAHYDRATE 40 MG/ML
2 INJECTION, SOLUTION INTRAVENOUS
Status: DISCONTINUED | OUTPATIENT
Start: 2024-03-27 | End: 2024-03-28 | Stop reason: HOSPADM

## 2024-03-27 RX ADMIN — PROPOFOL 60 MG: 10 INJECTION, EMULSION INTRAVENOUS at 11:45

## 2024-03-27 RX ADMIN — SODIUM CHLORIDE: 9 INJECTION, SOLUTION INTRAVENOUS at 11:44

## 2024-03-27 ASSESSMENT — ACTIVITIES OF DAILY LIVING (ADL)
ADLS_ACUITY_SCORE: 36

## 2024-03-27 NOTE — PROGRESS NOTES
Pt discharged after eating, drinking and ambulating without issue. Pt wife brought pt home. PIV intact upon removal.

## 2024-03-27 NOTE — PROCEDURES
[facility]    Procedure: Cardioversion    Date/Time: 3/27/2024 12:33 PM    Performed by: Peri Willis PA-C  Authorized by: Yamilka Thomas APRN CNP      UNIVERSAL PROTOCOL   Site Marked: NA  Prior Images Obtained and Reviewed:  NA  Required items: Required blood products, implants, devices and special equipment available    Patient identity confirmed:  Verbally with patient  Patient was reevaluated immediately before administering moderate or deep sedation or anesthesia  Confirmation Checklist:  Patient's identity using two indicators, procedure was appropriate and matched the consent or emergent situation, correct equipment/implants were available and relevant allergies  Time out: Immediately prior to the procedure a time out was called    Universal Protocol: the [facility] Universal Protocol was followed       ANESTHESIA    Anesthesia was administered and monitored by anesthesiology.  See anesthesia documentation for details.    PROCEDURE DETAILS  Pre-procedure rhythm: atrial fibrillation  Patient position: patient was placed in a supine position  Chest area: chest area exposed  Electrodes: pads  Electrodes placed: anterior-posterior  Number of attempts: 1    Details of Attempts:  One, 150 J biphasic synchronized shock delivered with conversion to sinus.   Post-procedure rhythm: normal sinus rhythm      PROCEDURE    Patient Tolerance:  Patient tolerated the procedure well with no immediate complications    Anticoagulation: eliquis started within 24 hours of A fib onset, no missed doses since.       Peri Willis PA-C  [facility]  Electrophysiology Consult Service  Pager: [phone]

## 2024-03-27 NOTE — H&P
Electrophysiology Pre-Procedure History and Physical    Frank Orellana MRN# [id number]   Age: 74 year old YOB: 1950      Date of Procedure: 3/27/24 [facility]      Date of Exam 3/27/2024 Facility (Same day)       HPI:  Ms. Orellana is a 74 year old Zoroastrian male who has a past medical history significant for PAF (CHADSVASC 1) s/p DCCVs, chronic hepatitis C, hepatitis B, WINNIE (uses CPAP), tubular adenoma of colon, and tobacco use. Patient with history of A fib, he has had several DCCVs, He failed Sotalol. He has now been on Flecainide. He had several self limiting recurrences over the year and required a DCCV 9/19/23. Was seen in follow up 11/8/23, offered to increase Flecainide dose or alternative AAT vs ablation. He states he is not needing to escalate therapies at this time. He will let us know if AF increases to a point he wants to change options. Recently wrote in 3/25 that he was back in  Afib, he started eliquis again within 24 hours of A fib onset. Cardioversion discussed and he wished to pursue, he presents today for DCCV.          Active problem list:     Patient Active Problem List    Diagnosis Date Noted    Dupuytren's contracture of both hands 05/17/2023     Priority: Medium     Added automatically from request for surgery [id number]      BPH with urinary obstruction 07/29/2020     Priority: Medium     Added automatically from request for surgery [id number]      Bilateral sensorineural hearing loss 05/05/2017     Priority: Medium    Elevated PSA      Priority: Medium    Tubular adenoma of colon 01/17/2017     Priority: Medium     ascending colon, 1/17/17      WINNIE (obstructive sleep apnea) positional      Priority: Medium     PSG at [facility] 1/8/2017AHI 32.3 wt 180     5/30/2019 [facility] [facility] Study done mostly lateral (189.0 lbs) - AHI 7.9, RDI 17.1, Supine AHI 33.7, REM AHI 9.8, Low O2 79.8%, Time Spent ?88% 0.9 minutes / Time Spent  ?89% 1.4 minutes.      Patient is Hindu      Priority: Medium    Refusal of blood transfusions as patient is Hindu      Priority: Medium    Paroxysmal atrial fibrillation (H)      Priority: Medium     paroxysmal on 3-4 occasions      History of actinic keratoses 03/15/2013     Priority: Medium    Telangiectasia 03/15/2013     Priority: Medium    SK (seborrheic keratosis) 03/15/2013     Priority: Medium    Atrial fibrillation (H) 06/15/1996     Priority: Medium     Afib - 2 or 3 extended occurrences since              Medications (include herbals and vitamins):      Current Outpatient Medications   Medication Sig    apixaban ANTICOAGULANT (ELIQUIS) 5 MG tablet Take 1 tablet (5 mg) by mouth 2 times daily    aspirin 81 MG EC tablet Take 1 tablet (81 mg) by mouth daily    diltiazem ER COATED BEADS (CARDIZEM CD/CARTIA XT) 120 MG 24 hr capsule Take 1 capsule (120 mg) by mouth daily    flecainide (TAMBOCOR) 50 MG tablet Take 1 tablet (50 mg) by mouth 2 times daily     No current facility-administered medications for this encounter.           Medication List         Notice    Cannot display patient medications because the patient has not yet been checked in.              Allergies:      Allergies   Allergen Reactions    Blood Transfusion Related (Informational Only)      Nondenominational.  Declines blood transfusions.    Shellfish-Derived Products Swelling    Contrast Dye Rash    Iodinated Contrast Media Rash             Social History:     Social History     Tobacco Use    Smoking status: Former     Packs/day: 0.50     Years: 10.00     Additional pack years: 0.00     Total pack years: 5.00     Types: Cigarettes     Start date: 6/15/1964     Quit date: 1974     Years since quittin.7    Smokeless tobacco: Former     Quit date: 1969   Substance Use Topics    Alcohol use: Yes     Alcohol/week: 3.0 - 4.0 standard drinks of alcohol     Comment: 4 or 5 drinks/week, limit of 1             Physical Exam:   All vitals have been reviewed    Airway assessment:   Patient is able to open mouth wide  Patient is able to stick out tongue}      ENT:   normocephalic, without obvious abnormality     Neck:   supple, symmetrical, trachea midline     Lungs:   No increased work of breathing, good air exchange, clear to auscultation bilaterally, no crackles or wheezing     Cardiovascular:   normal S1 and S2 and no edema             Lab / Radiology Results:     Lab Results   Component Value Date    WBC 5.6 01/03/2024    WBC 9.1 08/22/2020    RBC 4.26 01/03/2024    RBC 3.74 08/22/2020    HGB 13.7 01/03/2024    HGB 11.9 08/22/2020    HCT 39.6 01/03/2024    HCT 35.9 08/22/2020    MCV 93 01/03/2024    MCV 96 08/22/2020    RDW 12.8 01/03/2024    RDW 12.7 08/22/2020     01/03/2024     08/22/2020      Lab Results   Component Value Date    WBC 5.6 01/03/2024    WBC 9.1 08/22/2020     Lab Results   Component Value Date     01/03/2024     08/22/2020     Lab Results   Component Value Date    HGB 13.7 01/03/2024    HGB 11.9 08/22/2020    HCT 39.6 01/03/2024    HCT 35.9 08/22/2020     Lab Results   Component Value Date     01/03/2024     12/30/2020    CO2 26 01/03/2024    CO2 30 12/17/2021    CO2 30 12/30/2020    BUN 12.6 01/03/2024    BUN 13 12/17/2021    BUN 12 12/30/2020     Lab Results   Component Value Date     01/03/2024     12/30/2020    CO2 26 01/03/2024    CO2 30 12/17/2021    CO2 30 12/30/2020    BUN 12.6 01/03/2024    BUN 13 12/17/2021    BUN 12 12/30/2020     Lab Results   Component Value Date    TSH 1.09 04/24/2023    TSH 1.42 11/21/2019            Plan:   Patient's active problems diagnostically and therapeutically optimized for the planned procedure. Patient here for cardioversion. Procedure explained in detail to patient including indications, risks, and benefits. Patient states understanding and wishes to procced.       Peri Willis PA-C  [facility]  [facility]  Electrophysiology Consult Service  Pager: [phone]

## 2024-03-27 NOTE — DISCHARGE INSTRUCTIONS
GOING HOME AFTER YOUR CARDIOVERSION    FOR NEXT 24 HOURS:  An adult should stay with you.  Relax and take it easy.  DO NOT make any important legal decisions.  DO NOT drive or operate machines at home or at work.  DO NOT consume alcohol.   Resume your regular diet and drink plenty of fluids.  If you have any redness/skin sorness where the patches were placed, you may use aloe vera gel or 1% hydrocortisone cream to the skin (sold at drug stores)  Take Tylenol (Acetaminophen) per your provider's recommendations as needed to help relieve local pain.     CALL YOUR HEALTHCARE PROVIDER IF:  You develop nausea or vomiting  You develop hives or a rash or any unexplained itching  Have irregular heartbeat or fast pulse  Feel faint, dizzy, or lightheaded  Have chest pain with increased activity  Have bleeding issues from blood-thinning medicines    CALL 911 RIGHT AWAY IF YOU HAVE:  Pain in your chest, arm, shoulder, neck, or upper back  Shortness of breath  Loss of vision, speech, or strength or coordination in any body part  Weakness in your arm or leg  Uncontrolled bleeding  Feel unstable in any way     FOLLOW UP APPOINTMENT:    Follow up as scheduled with EP/Cardiology Provider in 4 weeks    ADDITIONAL INFORMATION:  Cardiovascular Clinic:   [address] [place] [address]. [place] [address]  Your Care Team:  EP Cardiology   Telephone Number     Meggan Arenas RN [phone]    After business hours: [phone], select option 4, ask for EP Fellow on call to be paged.     For scheduling appts or procedures:    Yelitza Wright   [phone]   For the Device Clinic (Pacemakers and ICD's)   RN's :   Gregoria William  During business hours: [phone]     After business hours:   [phone]- select option 4 and ask for job code [phone].       As always, Thank you for trusting us with your health care needs!

## 2024-03-27 NOTE — PROGRESS NOTES
Pt arrived to 2A from echo after cardioversion. Pt eating and drinking without issue. VSS, denies pain. Wife at bedside. Discharge instructions completed by RN in previous care area.

## 2024-03-27 NOTE — ANESTHESIA PREPROCEDURE EVALUATION
Anesthesia Pre-Procedure Evaluation    Patient: Frank Orellana   MRN: [id number] : 1950        Procedure : Procedure(s):  Anesthesia cardioversion @1100          Past Medical History:   Diagnosis Date    Actinic keratosis 2009    Atrial fibrillation (H) 06/15/1996    Afib - 2 or 3 extended occurrences since    Dupuytren's contracture of both hands     Elevated PSA     Hepatitis B 1969    acute infection at age 19; IV drug use    Hepatitis C virus infection, unspecified chronicity     treated, cleared    WINNIE (obstructive sleep apnea)     AHI 34.2    Pain in both hands     Patient is Bahai     Refusal of blood transfusions as patient is Bahai     Right shoulder pain     Tinnitus 1 or 2 years ago    both ears    Tubular adenoma of colon 2017    ascending colon, 16      Past Surgical History:   Procedure Laterality Date    ANESTHESIA CARDIOVERSION N/A 1/15/2018    Procedure: ANESTHESIA CARDIOVERSION;  Anesthesia Coverage Cardioversion With Transesophageal Echocardiogram @0930 ;  Surgeon: GENERIC ANESTHESIA PROVIDER;  [facility]    ANESTHESIA CARDIOVERSION N/A 10/11/2018    Procedure: ANESTHESIA CARDIOVERSION;  Cardioversion;  Surgeon: GENERIC ANESTHESIA PROVIDER;  [facility]    ANESTHESIA CARDIOVERSION N/A 2018    Procedure: Anesthesia Coverage Cardioversion @0830;  Surgeon: GENERIC ANESTHESIA PROVIDER;  [facility]    ANESTHESIA CARDIOVERSION N/A 2023    Procedure: Anesthesia cardioversion@0900;  Surgeon: GENERIC ANESTHESIA PROVIDER;  [facility]    ARTHROSCOPY KNEE      left knee    COLONOSCOPY  16    tubular adenoma ascending colon    COLONOSCOPY N/A 2023    Procedure: COLONOSCOPY, WITH POLYPECTOMY AND BIOPSY;  Surgeon: Hussein Stevens MD;  [facility]    CYSTOSCOPY, TRANSURETHRAL RESECTION (TUR) PROSTATE, COMBINED N/A 2020    Procedure: CYSTOSCOPY, WITH TRANSURETHRAL RESECTION PROSTATE;  Surgeon: Roldan De La O  MD Octavio;  [facility]    HERNIORRHAPHY INGUINAL Right 10/26/2017    Procedure: HERNIORRHAPHY INGUINAL;  Open Right Inguinal Hernia Repair with Mesh;  Surgeon: Suresh Raymond MD;  [facility]    KNEE SURGERY  ??    torn meniscus    RELEASE DUPUYTRENS CONTRACTURE Right 2017    Procedure: RELEASE DUPUYTRENS CONTRACTURE;  Surgeon: Lars Oleary MD;  [facility]    RELEASE DUPUYTRENS CONTRACTURE Left 12/15/2017    Procedure: RELEASE DUPUYTRENS CONTRACTURE;  Left Ring and Middle Finger Subtotal Palmar Fasciectomy;  Surgeon: Lars Oleary MD;  [facility]    RELEASE DUPUYTRENS CONTRACTURE Right 2023    Procedure: RELEASE, DUPUYTREN CONTRACTURE, RIGHT SMALL FINGER AND RIGHT MIDDLE FINGER;  Surgeon: Lars Oleary MD;  [facility]    TRANSESOPHAGEAL ECHOCARDIOGRAM INTRAOPERATIVE N/A 1/15/2018    Procedure: TRANSESOPHAGEAL ECHOCARDIOGRAM INTRAOPERATIVE;;  Surgeon: GENERIC ANESTHESIA PROVIDER;  [facility]      Allergies   Allergen Reactions    Blood Transfusion Related (Informational Only)      Confucianism.  Declines blood transfusions.    Shellfish-Derived Products Swelling    Contrast Dye Rash    Iodinated Contrast Media Rash      Social History     Tobacco Use    Smoking status: Former     Packs/day: 0.50     Years: 10.00     Additional pack years: 0.00     Total pack years: 5.00     Types: Cigarettes     Start date: 6/15/1964     Quit date: 1974     Years since quittin.7    Smokeless tobacco: Former     Quit date: 1969   Substance Use Topics    Alcohol use: Yes     Alcohol/week: 3.0 - 4.0 standard drinks of alcohol     Comment: 4 or 5 drinks/week, limit of 1      Wt Readings from Last 1 Encounters:   24 74.8 kg (165 lb)        Anesthesia Evaluation        History of anesthetic complications       ROS/MED HX  ENT/Pulmonary:     (+) sleep apnea,                                       Neurologic:       Cardiovascular:     (+)   - -   -  - -                        dysrhythmias, a-fib,             METS/Exercise Tolerance:     Hematologic: Comments: Pentecostal      Musculoskeletal:       GI/Hepatic:     (+)           hepatitis type B and C, liver disease,       Renal/Genitourinary:       Endo:       Psychiatric/Substance Use:       Infectious Disease:       Malignancy:       Other:            Physical Exam    Airway        Mallampati: II   TM distance: > 3 FB   Neck ROM: full   Mouth opening: > 3 cm    Respiratory Devices and Support         Dental       (+) Multiple crowns, permanant bridges      Cardiovascular          Rhythm and rate: irregular     Pulmonary   pulmonary exam normal                OUTSIDE LABS:  CBC:   Lab Results   Component Value Date    WBC 5.6 01/03/2024    WBC 5.9 04/24/2023    HGB 13.7 01/03/2024    HGB 14.0 04/24/2023    HCT 39.6 (L) 01/03/2024    HCT 41.4 04/24/2023     01/03/2024     04/24/2023     BMP:   Lab Results   Component Value Date     01/03/2024     04/24/2023    POTASSIUM 4.1 01/03/2024    POTASSIUM 4.0 09/19/2023    CHLORIDE 105 01/03/2024    CHLORIDE 106 04/24/2023    CO2 26 01/03/2024    CO2 30 (H) 04/24/2023    BUN 12.6 01/03/2024    BUN 11.8 04/24/2023    CR 0.90 01/03/2024    CR 0.87 04/24/2023    GLC 89 01/03/2024     (H) 04/24/2023     COAGS:   Lab Results   Component Value Date    PTT 29 06/14/2006    INR 1.52 (H) 01/15/2018     POC:   Lab Results   Component Value Date    BGM 85 08/21/2020     HEPATIC:   Lab Results   Component Value Date    ALBUMIN 4.2 01/03/2024    PROTTOTAL 6.7 01/03/2024    ALT 36 01/03/2024    AST 31 01/03/2024    ALKPHOS 80 01/03/2024    BILITOTAL 1.1 01/03/2024     OTHER:   Lab Results   Component Value Date    LACT 1.1 01/04/2019    A1C 5.1 04/24/2023    VALENTINA 9.3 01/03/2024    MAG 2.2 09/19/2023    LIPASE 135 01/04/2019    TSH 1.09 04/24/2023    SED 5 07/23/2014       Anesthesia Plan    ASA Status:  3    NPO Status:  NPO Appropriate     Anesthesia Type: General.     - Airway: Native airway   Induction: Intravenous, Propofol.           Consents    Anesthesia Plan(s) and associated risks, benefits, and realistic alternatives discussed. Questions answered and patient/representative(s) expressed understanding.     - Discussed:     - Discussed with:  Patient            Postoperative Care    Pain management: IV analgesics.   PONV prophylaxis: Dexamethasone or Solumedrol, Ondansetron (or other 5HT-3)     Comments:               Rick Torres MD    I have reviewed the pertinent notes and labs in the chart from the past 30 days and (re)examined the patient.  Any updates or changes from those notes are reflected in this note.

## 2024-03-27 NOTE — ANESTHESIA CARE TRANSFER NOTE
Patient: Frank Orellana    Procedure: Procedure(s):  Anesthesia cardioversion @1100       Diagnosis: Paroxysmal atrial fibrillation (H) [I48.0]  Diagnosis Additional Information: No value filed.    Anesthesia Type:   General     Note:    Oropharynx: oropharynx clear of all foreign objects  Level of Consciousness: drowsy  Oxygen Supplementation: nasal cannula  Level of Supplemental Oxygen (L/min / FiO2): 2  Independent Airway: airway patency satisfactory and stable  Dentition: dentition unchanged  Vital Signs Stable: post-procedure vital signs reviewed and stable  Report to RN Given: handoff report given  Patient transferred to: [facility]          Vitals:  Vitals Value Taken Time   /75    Temp     Pulse 53    Resp     SpO2 99        Electronically Signed By: ANDREINA Elise CRNA  March 27, 2024  11:50 AM

## 2024-03-27 NOTE — ANESTHESIA POSTPROCEDURE EVALUATION
Patient: Frank Orellana    Procedure: Procedure(s):  Anesthesia cardioversion @1100       Anesthesia Type:  General    Note:  Disposition: Outpatient   Postop Pain Control: Uneventful            Sign Out: Well controlled pain   PONV:    Neuro/Psych: Uneventful            Sign Out: Acceptable/Baseline neuro status   Airway/Respiratory: Uneventful            Sign Out: Acceptable/Baseline resp. status   CV/Hemodynamics: Uneventful            Sign Out: Acceptable CV status; No obvious hypovolemia; No obvious fluid overload   Other NRE: NONE   DID A NON-ROUTINE EVENT OCCUR? No           Last vitals:  Vitals:    03/27/24 1240   BP: 122/68   Pulse: 57   Resp: 16   Temp: 36.4  C (97.5  F)   SpO2: 100%       Electronically Signed By: Rick Torres MD  March 27, 2024  1:48 PM

## 2024-03-27 NOTE — SEDATION DOCUMENTATION
Pt arrived in ECHO department for scheduled DCCV.   Procedure explained, questions answered and consent signed. Discharge instructions discussed with patient.  Anesthesia gave pt 60 mg IV propofol for sedation and pt was DCCV at 150 Joules to a SR. Post EKG completed.  Pt denied pain after procedure and was transferred to 2A for further recovery after awake and VSS.    Report given to CROW Hwang RN.     Nora Villafana RN

## 2024-03-28 LAB
ATRIAL RATE - MUSE: 65 BPM
ATRIAL RATE - MUSE: NORMAL BPM
DIASTOLIC BLOOD PRESSURE - MUSE: NORMAL MMHG
DIASTOLIC BLOOD PRESSURE - MUSE: NORMAL MMHG
INTERPRETATION ECG - MUSE: NORMAL
INTERPRETATION ECG - MUSE: NORMAL
P AXIS - MUSE: 49 DEGREES
P AXIS - MUSE: NORMAL DEGREES
PR INTERVAL - MUSE: 154 MS
PR INTERVAL - MUSE: NORMAL MS
QRS DURATION - MUSE: 112 MS
QRS DURATION - MUSE: 114 MS
QT - MUSE: 364 MS
QT - MUSE: 452 MS
QTC - MUSE: 470 MS
QTC - MUSE: 476 MS
R AXIS - MUSE: -3 DEGREES
R AXIS - MUSE: 16 DEGREES
SYSTOLIC BLOOD PRESSURE - MUSE: NORMAL MMHG
SYSTOLIC BLOOD PRESSURE - MUSE: NORMAL MMHG
T AXIS - MUSE: -3 DEGREES
T AXIS - MUSE: 255 DEGREES
VENTRICULAR RATE- MUSE: 103 BPM
VENTRICULAR RATE- MUSE: 65 BPM

## 2024-05-01 ENCOUNTER — OFFICE VISIT (OUTPATIENT)
Dept: CARDIOLOGY | Facility: CLINIC | Age: 74
End: 2024-05-01
Attending: NURSE PRACTITIONER
Payer: COMMERCIAL

## 2024-05-01 VITALS
SYSTOLIC BLOOD PRESSURE: 126 MMHG | OXYGEN SATURATION: 96 % | BODY MASS INDEX: 25.68 KG/M2 | HEART RATE: 55 BPM | DIASTOLIC BLOOD PRESSURE: 73 MMHG | WEIGHT: 173.9 LBS

## 2024-05-01 DIAGNOSIS — I48.0 PAF (PAROXYSMAL ATRIAL FIBRILLATION) (H): ICD-10-CM

## 2024-05-01 DIAGNOSIS — I48.0 PAROXYSMAL ATRIAL FIBRILLATION (H): Primary | ICD-10-CM

## 2024-05-01 PROCEDURE — 99213 OFFICE O/P EST LOW 20 MIN: CPT | Performed by: NURSE PRACTITIONER

## 2024-05-01 PROCEDURE — G0463 HOSPITAL OUTPT CLINIC VISIT: HCPCS | Performed by: NURSE PRACTITIONER

## 2024-05-01 PROCEDURE — 93010 ELECTROCARDIOGRAM REPORT: CPT | Performed by: INTERNAL MEDICINE

## 2024-05-01 PROCEDURE — 93005 ELECTROCARDIOGRAM TRACING: CPT

## 2024-05-01 ASSESSMENT — PAIN SCALES - GENERAL: PAINLEVEL: NO PAIN (0)

## 2024-05-01 NOTE — LETTER
2024      RE: Frank Orellana  [address]  [facility] [address]       Dear Colleague,    Thank you for the opportunity to participate in the care of your patient, Frank Orellana, at the [facility] [place] [facility]. Please see a copy of my visit note below.          ELECTROPHYSIOLOGY CLINIC VISIT    Assessment/Recommendations   Assessment/Plan:    Ms. Orellana is a 74 year old Catholic male who has a past medical history significant for PAF (CHADSVASC 1) s/p DCCVs, chronic hepatitis C, hepatitis B, WINNIE (uses CPAP), tubular adenoma of colon, and tobacco use.    Paroxsymal Atrial Fibrillation:  We discussed in detail with the patient management/treatment options for Oneal includin. Stroke Prophylaxis:  CHADSVASC=+age  1, corresponding to a 1.3% annual stroke / systemic emolism event rate. He is on ASA only and only uses DOAC surrounding DCCVs.  Discussed in 2024 he will be 74 and a CHADSVASC of 2 where we would recommend starting daily oral anticoagulation. He states understanding but elects to only take it surrounding DCCVs at this time.   2. Rate Control: Continue Diltiazem.   3. Rhythm Control: He has had several DCCVs, He failed Sotalol. He has now been on Flecainide. He had several self limiting recurrences over the year and required a DCCV 23 and then in 3/27/24. Offered to increase Flecainide dose or alternative AAT vs ablation. He states he is not needing to escalate therapies at this time. He will let us know if AF increases to a point he wants to change options.  4. Risk Factor Management: maintain good BP control and continued WINNIE treatment with CPAP.        Follow up 1 year or sooner if need arises.        History of Present Illness/Subjective    Mr. Frank Orellana is a 74 year old male who comes in today for EP follow-up of PAF.    Ms. Orellana is a 74 year old Catholic male who has a past medical  history significant for PAF (CHADSVASC 1) s/p DCCVs, chronic hepatitis C, hepatitis B, WINNIE (uses CPAP), tubular adenoma of colon, and tobacco use.     At our initial visit in 12/2016 he had reported ~20 years of paroxysmal atrial fibrillation manifesting as chest pressure and palpitations. The first episode occurred during a picnic and converted spontaneously to sinus rhythm.  He took metoprolol for 30 days.  Approximately 10 years later he had a second episode in the setting of a meniscal tear and perhaps 2 or 3 more episodes in the intervening years until his palpitations became more frequent in the year leading up to his most recent presentation.  A 48-hour Holter monitor which showed variation between sinus rhythm, junctional rhythm, and A.fib (average HR 63, range ) with 1% PVCs and <1% supraventricular ectopic beats, with 33 runs of PAT vs. A.fib (longest 10 beats at 164 bpm). He then presented to the ED on 12/2/16 for palpitations and was in A.fib with RVR~111 bpm, started on diltiazem and ASA 81 mg daily, and discharged. He had recurrent episode of AF end of 12/2017 that manifested as palpitations, SOB and fatigue. He felt the symptoms for about 10 days prior and was seen by Dr. Hussein on 1/6/18. At this time he was noted to be in symptomatic AF (rate controlled), and was scheduled to undergo JIM/DCCV, which was performed on 1/15/18. He was on Dabigatran for 4 weeks following the DCCV and his ASA was held. He last saw Dr. Felipe in EP clinic in 2/2018 and was doing well. More recently, he called reporting he was back in AF. He was started on Pradaxa within 1.5 days of his symptoms. He was then arranged for DCCV. He then had DCCV on 10/11/18. He had recurrence and required another DCCV on 11/28/18. He followed up in EP clinic on 12/27/18 and was feeling well without issues. He presented [facility] with racing heart sensation associated with diaphoresis and mild chest discomfort and lightheadedness c/w  "prior AF symptoms. Found to be in AF. He states he has had a few other AF symptoms recently and feels they are growing in frequency/severity. His AF did spontaneously terminated when in ER. He was initiated on Sotalol during this admission by EP.     EP visit 4/24/19: He presents today for follow-up after having initiated sotalol and cardioverted in ER 1/18/19. He reports no recurrent sustained palpitations. He does have intermittent, infrequent, short-lived palpitations which are not bothersome (and corroborated with PAC's / PVC's on recent Zio). Recent Holter shows no atrial fibrillation but run of AT (13 seconds). He does report, and his wife agrees, new onset significant fatigue since initiating sotalol. Now taking regular naps and just not doing usual enjoyable activities as long as he is used to. EKG: VR 47 bpm,  ms,  ms, QTc 421 ms. Sotalol 80 mg BID, asa 81 mg. Given recurrences Sotalol was stopped and Flecainide was started.       EP Vist 7/24/19: After developing symptoms of a URI last week, felt his heart \"beat out of rhythm\" on Monday evening. Call ahead to clinic was scheduled for Cardioversion, however on his way to hospital, he went out of afib and improved. Since then denies any symptoms of lightheadedness and dizziness. Per discussion with patient, did not start on Pradaxa and is still on ASA today. Denies chest pain, SOB, lightheadedness and dizziness today.       EP clinic On 10/08/21:He presents today for follow up. He is complaining of atypical chest pain occurred started 2 weeks ago, pain is aching, mainly in the left side of the chest and occurred for 36 hours continuous, moderate in intensity, no lightheaded or diaphoresis accompaning the pain , no LOC, no lower extremity edema. He has been on his dofetilide taking it twice a day without any more episodes of ER visits or RVR, pt is on ASA no anticoagulation. Presenting 12 lead ECG shows Vent Rate 60 bpm,  ms,  ms, " QTc 441 ms. Current cardiac medications include: Flecainide, Diltiazem, and ASA.     EP Visit 11/2/22: He reports feeling well. He has had some rare palpitations lasting seconds over the last year. No known sustained AF events. He denies chest discomfort, abdominal fullness/bloating or peripheral edema, shortness of breath, paroxysmal nocturnal dyspnea, orthopnea, lightheadedness, dizziness, pre-syncope, or syncope. Current cardiac medications include: Flecainide, Diltiazem, and ASA.     EP Visit 11/28/23: He presents today for follow up. He had recurrence of AF on: 4/18/23, 7/16/23, 9/19/23, 10/12/23. All self converted except 9/19/23 received DCCV. He reports feeling well. He denies chest discomfort, palpitations, abdominal fullness/bloating or peripheral edema, shortness of breath, paroxysmal nocturnal dyspnea, orthopnea, lightheadedness, dizziness, pre-syncope, or syncope. Presenting 12 lead ECG shows SB Vent Rate 49 bpm,  ms,  ms, QTc 404 ms. Current cardiac medications include: Flecainide, Diltiazem, and ASA.     He presents today for follow up. He called in reporting AF and had a DCCV on 3/27/24. He reports feeling well. He denies chest discomfort, palpitations, abdominal fullness/bloating or peripheral edema, shortness of breath, paroxysmal nocturnal dyspnea, orthopnea, lightheadedness, dizziness, pre-syncope, or syncope. Presenting 12 lead ECG shows SB iRBBB Vent Rate 52 bpm,  ms,  ms, QTc 411 ms. Current cardiac medications include: Flecainide, Diltiazem, and ASA. .     I have reviewed and updated the patient's Past Medical History, Social History, Family History and Medication List.     Cardiographics (Personally Reviewed) :   Echo 1/2019:  Interpretation Summary  Global and regional left ventricular function is normal with an EF of 55-60%.  No regional wall motion abnormalities are seen.  Right ventricular function, chamber size, wall motion, and thickness are  normal.  Mild  "AI.  Mildly dilated ascending aorta measuring 4.2 cm (2.1 cm/m2.)     This study was compared with the study from 4/27/17: there has been no  significant change. Specifically, the ascending aorta is unchanged in size on  direct remeasurement of the images from the prior study.    MR angiography of the chest 12/27/2016 showed \"Normal caliber of thoracic aorta. The ascending aorta measures 3.9 cm, which is normal when indexed for age/gender/size.\"     Lexiscan NM stress test 1/8/18 showed \"Normal  myocardial SPECT study with a summed stress score of  zero. No ischemia or infarct identified. Normal LV function. No change from prior study.\"    Lexiscan NM stress test 10/3/22:  The nuclear stress test is negative for inducible myocardial ischemia or infarction. LVEDv 144ml. LVESv 44ml. LV EF 69%.        Exercise EKG 5/2019  Interpretation Summary  Normal, low-risk exercise electrocardiogram.  The target heart rate was achieved. Normal heart rate and BP response to  exercise.  Normal resting ECG. No ECG evidence of ischemia with exercise.  There were occasional uniform PVCs elicited during exercise. QT prolongation  was unable to be assessed due to artifact.  No angina was elicited.  Functional capacity is average for age.         Physical Examination   /73 (BP Location: Right arm, Patient Position: Sitting, Cuff Size: Adult Large)   Pulse 55   Wt 78.9 kg (173 lb 14.4 oz)   SpO2 96%   BMI 25.68 kg/m    Wt Readings from Last 3 Encounters:   01/03/24 74.8 kg (165 lb)   11/08/23 78.1 kg (172 lb 1.6 oz)   08/09/23 73.5 kg (162 lb)     The rest of a comprehensive physical examination is deferred due to public health emergency video visit restrictions.  CONSITUTIONAL: no acute distress  HEENT: no icterus, no redness or discharge, neck supple  CV: no visible edema of visualized extremities. No JVD.   RESPIRATORY: respirations nonlabored, no cough  NEURO: AA&Ox3, speech fluent/appropriate, motor grossly nonfocal  PSYCH: " cooperative, affect appropriate  DERM: no rashes on visualized face/neck/upper extremities       Medications  Allergies   Current Outpatient Medications   Medication Sig Dispense Refill    apixaban ANTICOAGULANT (ELIQUIS) 5 MG tablet Take 1 tablet (5 mg) by mouth 2 times daily 180 tablet 3    aspirin 81 MG EC tablet Take 1 tablet (81 mg) by mouth daily 90 tablet 3    diltiazem ER COATED BEADS (CARDIZEM CD/CARTIA XT) 120 MG 24 hr capsule Take 1 capsule (120 mg) by mouth daily 90 capsule 0    flecainide (TAMBOCOR) 50 MG tablet Take 1 tablet (50 mg) by mouth 2 times daily 180 tablet 0    Allergies   Allergen Reactions    Blood Transfusion Related (Informational Only)      Yazidism.  Declines blood transfusions.    Shellfish-Derived Products Swelling    Contrast Dye Rash    Iodinated Contrast Media Rash         Lab Results (Personally Reviewed)    Chemistry/lipid CBC Cardiac Enzymes/BNP/TSH/INR   Lab Results   Component Value Date    BUN 12.6 01/03/2024     01/03/2024    CO2 26 01/03/2024     Creatinine   Date Value Ref Range Status   01/03/2024 0.90 0.67 - 1.17 mg/dL Final   12/30/2020 0.92 0.66 - 1.25 mg/dL Final       Lab Results   Component Value Date    CHOL 187 04/24/2023    HDL 69 04/24/2023     (H) 04/24/2023      Lab Results   Component Value Date    WBC 5.6 01/03/2024    HGB 13.7 01/03/2024    HCT 39.6 (L) 01/03/2024    MCV 93 01/03/2024     01/03/2024    Lab Results   Component Value Date    TSH 1.09 04/24/2023    INR 1.52 (H) 01/15/2018        The patient states understanding and is agreeable with the plan.   Yamilka Skinner, ANDREINA CNP  Electrophysiology Consult Service  Securely message with [email or web address]   Text page via [employer] Paging/Speech [employer]

## 2024-05-01 NOTE — PROGRESS NOTES
ELECTROPHYSIOLOGY CLINIC VISIT    Assessment/Recommendations   Assessment/Plan:    Ms. Orellana is a 74 year old Christianity male who has a past medical history significant for PAF (CHADSVASC 1) s/p DCCVs, chronic hepatitis C, hepatitis B, WINNIE (uses CPAP), tubular adenoma of colon, and tobacco use.    Paroxsymal Atrial Fibrillation:  We discussed in detail with the patient management/treatment options for Oneal includin. Stroke Prophylaxis:  CHADSVASC=+age  1, corresponding to a 1.3% annual stroke / systemic emolism event rate. He is on ASA only and only uses DOAC surrounding DCCVs.  Discussed in 2024 he will be 74 and a CHADSVASC of 2 where we would recommend starting daily oral anticoagulation. He states understanding but elects to only take it surrounding DCCVs at this time.   2. Rate Control: Continue Diltiazem.   3. Rhythm Control: He has had several DCCVs, He failed Sotalol. He has now been on Flecainide. He had several self limiting recurrences over the year and required a DCCV 23 and then in 3/27/24. Offered to increase Flecainide dose or alternative AAT vs ablation. He states he is not needing to escalate therapies at this time. He will let us know if AF increases to a point he wants to change options.  4. Risk Factor Management: maintain good BP control and continued WINNIE treatment with CPAP.        Follow up 1 year or sooner if need arises.        History of Present Illness/Subjective    Mr. Frank Orellana is a 74 year old male who comes in today for EP follow-up of PAF.    Ms. Orellana is a 74 year old Christianity male who has a past medical history significant for PAF (CHADSVASC 1) s/p DCCVs, chronic hepatitis C, hepatitis B, WINNIE (uses CPAP), tubular adenoma of colon, and tobacco use.     At our initial visit in 2016 he had reported ~20 years of paroxysmal atrial fibrillation manifesting as chest pressure and palpitations. The first episode occurred during a picnic and  converted spontaneously to sinus rhythm.  He took metoprolol for 30 days.  Approximately 10 years later he had a second episode in the setting of a meniscal tear and perhaps 2 or 3 more episodes in the intervening years until his palpitations became more frequent in the year leading up to his most recent presentation.  A 48-hour Holter monitor which showed variation between sinus rhythm, junctional rhythm, and A.fib (average HR 63, range ) with 1% PVCs and <1% supraventricular ectopic beats, with 33 runs of PAT vs. A.fib (longest 10 beats at 164 bpm). He then presented to the ED on 12/2/16 for palpitations and was in A.fib with RVR~111 bpm, started on diltiazem and ASA 81 mg daily, and discharged. He had recurrent episode of AF end of 12/2017 that manifested as palpitations, SOB and fatigue. He felt the symptoms for about 10 days prior and was seen by Dr. Hussein on 1/6/18. At this time he was noted to be in symptomatic AF (rate controlled), and was scheduled to undergo IJM/DCCV, which was performed on 1/15/18. He was on Dabigatran for 4 weeks following the DCCV and his ASA was held. He last saw Dr. Felipe in EP clinic in 2/2018 and was doing well. More recently, he called reporting he was back in AF. He was started on Pradaxa within 1.5 days of his symptoms. He was then arranged for DCCV. He then had DCCV on 10/11/18. He had recurrence and required another DCCV on 11/28/18. He followed up in EP clinic on 12/27/18 and was feeling well without issues. He presented 1/[facility] to [facility] with racing heart sensation associated with diaphoresis and mild chest discomfort and lightheadedness c/w prior AF symptoms. Found to be in AF. He states he has had a few other AF symptoms recently and feels they are growing in frequency/severity. His AF did spontaneously terminated when in ER. He was initiated on Sotalol during this admission by EP.     EP visit 4/24/19: He presents today for follow-up after having initiated sotalol and  "cardioverted in ER 1/18/19. He reports no recurrent sustained palpitations. He does have intermittent, infrequent, short-lived palpitations which are not bothersome (and corroborated with PAC's / PVC's on recent Zio). Recent Holter shows no atrial fibrillation but run of AT (13 seconds). He does report, and his wife agrees, new onset significant fatigue since initiating sotalol. Now taking regular naps and just not doing usual enjoyable activities as long as he is used to. EKG: VR 47 bpm,  ms,  ms, QTc 421 ms. Sotalol 80 mg BID, asa 81 mg. Given recurrences Sotalol was stopped and Flecainide was started.       EP Vist 7/24/19: After developing symptoms of a URI last week, felt his heart \"beat out of rhythm\" on Monday evening. Call ahead to clinic was scheduled for Cardioversion, however on his way to hospital, he went out of afib and improved. Since then denies any symptoms of lightheadedness and dizziness. Per discussion with patient, did not start on Pradaxa and is still on ASA today. Denies chest pain, SOB, lightheadedness and dizziness today.       EP clinic On 10/08/21:He presents today for follow up. He is complaining of atypical chest pain occurred started 2 weeks ago, pain is aching, mainly in the left side of the chest and occurred for 36 hours continuous, moderate in intensity, no lightheaded or diaphoresis accompaning the pain , no LOC, no lower extremity edema. He has been on his dofetilide taking it twice a day without any more episodes of ER visits or RVR, pt is on ASA no anticoagulation. Presenting 12 lead ECG shows Vent Rate 60 bpm,  ms,  ms, QTc 441 ms. Current cardiac medications include: Flecainide, Diltiazem, and ASA.     EP Visit 11/2/22: He reports feeling well. He has had some rare palpitations lasting seconds over the last year. No known sustained AF events. He denies chest discomfort, abdominal fullness/bloating or peripheral edema, shortness of breath, paroxysmal " "nocturnal dyspnea, orthopnea, lightheadedness, dizziness, pre-syncope, or syncope. Current cardiac medications include: Flecainide, Diltiazem, and ASA.     EP Visit 11/28/23: He presents today for follow up. He had recurrence of AF on: 4/18/23, 7/16/23, 9/19/23, 10/12/23. All self converted except 9/19/23 received DCCV. He reports feeling well. He denies chest discomfort, palpitations, abdominal fullness/bloating or peripheral edema, shortness of breath, paroxysmal nocturnal dyspnea, orthopnea, lightheadedness, dizziness, pre-syncope, or syncope. Presenting 12 lead ECG shows SB Vent Rate 49 bpm,  ms,  ms, QTc 404 ms. Current cardiac medications include: Flecainide, Diltiazem, and ASA.     He presents today for follow up. He called in reporting AF and had a DCCV on 3/27/24. He reports feeling well. He denies chest discomfort, palpitations, abdominal fullness/bloating or peripheral edema, shortness of breath, paroxysmal nocturnal dyspnea, orthopnea, lightheadedness, dizziness, pre-syncope, or syncope. Presenting 12 lead ECG shows SB iRBBB Vent Rate 52 bpm,  ms,  ms, QTc 411 ms. Current cardiac medications include: Flecainide, Diltiazem, and ASA. .     I have reviewed and updated the patient's Past Medical History, Social History, Family History and Medication List.     Cardiographics (Personally Reviewed) :   Echo 1/2019:  Interpretation Summary  Global and regional left ventricular function is normal with an EF of 55-60%.  No regional wall motion abnormalities are seen.  Right ventricular function, chamber size, wall motion, and thickness are  normal.  Mild AI.  Mildly dilated ascending aorta measuring 4.2 cm (2.1 cm/m2.)     This study was compared with the study from 4/27/17: there has been no  significant change. Specifically, the ascending aorta is unchanged in size on  direct remeasurement of the images from the prior study.    MR angiography of the chest 12/27/2016 showed \"Normal " "caliber of thoracic aorta. The ascending aorta measures 3.9 cm, which is normal when indexed for age/gender/size.\"     Lexiscan NM stress test 1/8/18 showed \"Normal  myocardial SPECT study with a summed stress score of  zero. No ischemia or infarct identified. Normal LV function. No change from prior study.\"    Lexiscan NM stress test 10/3/22:  The nuclear stress test is negative for inducible myocardial ischemia or infarction. LVEDv 144ml. LVESv 44ml. LV EF 69%.        Exercise EKG 5/2019  Interpretation Summary  Normal, low-risk exercise electrocardiogram.  The target heart rate was achieved. Normal heart rate and BP response to  exercise.  Normal resting ECG. No ECG evidence of ischemia with exercise.  There were occasional uniform PVCs elicited during exercise. QT prolongation  was unable to be assessed due to artifact.  No angina was elicited.  Functional capacity is average for age.         Physical Examination   /73 (BP Location: Right arm, Patient Position: Sitting, Cuff Size: Adult Large)   Pulse 55   Wt 78.9 kg (173 lb 14.4 oz)   SpO2 96%   BMI 25.68 kg/m    Wt Readings from Last 3 Encounters:   01/03/24 74.8 kg (165 lb)   11/08/23 78.1 kg (172 lb 1.6 oz)   08/09/23 73.5 kg (162 lb)     The rest of a comprehensive physical examination is deferred due to public health emergency video visit restrictions.  CONSITUTIONAL: no acute distress  HEENT: no icterus, no redness or discharge, neck supple  CV: no visible edema of visualized extremities. No JVD.   RESPIRATORY: respirations nonlabored, no cough  NEURO: AA&Ox3, speech fluent/appropriate, motor grossly nonfocal  PSYCH: cooperative, affect appropriate  DERM: no rashes on visualized face/neck/upper extremities       Medications  Allergies   Current Outpatient Medications   Medication Sig Dispense Refill    apixaban ANTICOAGULANT (ELIQUIS) 5 MG tablet Take 1 tablet (5 mg) by mouth 2 times daily 180 tablet 3    aspirin 81 MG EC tablet Take 1 tablet " (81 mg) by mouth daily 90 tablet 3    diltiazem ER COATED BEADS (CARDIZEM CD/CARTIA XT) 120 MG 24 hr capsule Take 1 capsule (120 mg) by mouth daily 90 capsule 0    flecainide (TAMBOCOR) 50 MG tablet Take 1 tablet (50 mg) by mouth 2 times daily 180 tablet 0    Allergies   Allergen Reactions    Blood Transfusion Related (Informational Only)      Temple.  Declines blood transfusions.    Shellfish-Derived Products Swelling    Contrast Dye Rash    Iodinated Contrast Media Rash         Lab Results (Personally Reviewed)    Chemistry/lipid CBC Cardiac Enzymes/BNP/TSH/INR   Lab Results   Component Value Date    BUN 12.6 01/03/2024     01/03/2024    CO2 26 01/03/2024     Creatinine   Date Value Ref Range Status   01/03/2024 0.90 0.67 - 1.17 mg/dL Final   12/30/2020 0.92 0.66 - 1.25 mg/dL Final       Lab Results   Component Value Date    CHOL 187 04/24/2023    HDL 69 04/24/2023     (H) 04/24/2023      Lab Results   Component Value Date    WBC 5.6 01/03/2024    HGB 13.7 01/03/2024    HCT 39.6 (L) 01/03/2024    MCV 93 01/03/2024     01/03/2024    Lab Results   Component Value Date    TSH 1.09 04/24/2023    INR 1.52 (H) 01/15/2018        The patient states understanding and is agreeable with the plan.   ANDREINA Small CNP  Electrophysiology Consult Service  Securely message with [employer]   Text page via [facility] Paging/Directory

## 2024-05-01 NOTE — NURSING NOTE
Chief Complaint   Patient presents with    Follow Up     1 mo follow-up braxton MENDOZA 3/27/24- on flec       Vitals were taken, medications reviewed.    Shannan Trivedi, EMT   8:46 AM

## 2024-05-03 LAB
ATRIAL RATE - MUSE: 52 BPM
DIASTOLIC BLOOD PRESSURE - MUSE: NORMAL MMHG
INTERPRETATION ECG - MUSE: NORMAL
P AXIS - MUSE: 92 DEGREES
PR INTERVAL - MUSE: 174 MS
QRS DURATION - MUSE: 118 MS
QT - MUSE: 442 MS
QTC - MUSE: 411 MS
R AXIS - MUSE: -2 DEGREES
SYSTOLIC BLOOD PRESSURE - MUSE: NORMAL MMHG
T AXIS - MUSE: 64 DEGREES
VENTRICULAR RATE- MUSE: 52 BPM

## 2024-06-09 ENCOUNTER — HEALTH MAINTENANCE LETTER (OUTPATIENT)
Age: 74
End: 2024-06-09

## 2024-07-03 ENCOUNTER — ALLIED HEALTH/NURSE VISIT (OUTPATIENT)
Dept: FAMILY MEDICINE | Facility: CLINIC | Age: 74
End: 2024-07-03
Payer: COMMERCIAL

## 2024-07-03 ENCOUNTER — OFFICE VISIT (OUTPATIENT)
Dept: URGENT CARE | Facility: URGENT CARE | Age: 74
End: 2024-07-03
Payer: COMMERCIAL

## 2024-07-03 VITALS
SYSTOLIC BLOOD PRESSURE: 121 MMHG | HEART RATE: 52 BPM | TEMPERATURE: 97.2 F | RESPIRATION RATE: 16 BRPM | DIASTOLIC BLOOD PRESSURE: 70 MMHG | OXYGEN SATURATION: 96 %

## 2024-07-03 VITALS
DIASTOLIC BLOOD PRESSURE: 68 MMHG | RESPIRATION RATE: 16 BRPM | TEMPERATURE: 98 F | HEART RATE: 49 BPM | WEIGHT: 169 LBS | OXYGEN SATURATION: 97 % | SYSTOLIC BLOOD PRESSURE: 113 MMHG | HEIGHT: 69 IN | BODY MASS INDEX: 25.03 KG/M2

## 2024-07-03 DIAGNOSIS — R42 VERTIGO: Primary | ICD-10-CM

## 2024-07-03 DIAGNOSIS — R42 DIZZINESS: Primary | ICD-10-CM

## 2024-07-03 PROCEDURE — 99214 OFFICE O/P EST MOD 30 MIN: CPT | Performed by: INTERNAL MEDICINE

## 2024-07-03 PROCEDURE — 99207 PR NO CHARGE NURSE ONLY: CPT

## 2024-07-03 RX ORDER — MECLIZINE HYDROCHLORIDE 25 MG/1
25 TABLET ORAL 3 TIMES DAILY PRN
Qty: 30 TABLET | Refills: 0 | Status: SHIPPED | OUTPATIENT
Start: 2024-07-03

## 2024-07-03 ASSESSMENT — ENCOUNTER SYMPTOMS
SPEECH DIFFICULTY: 0
FATIGUE: 0
NAUSEA: 0
SHORTNESS OF BREATH: 0
CHILLS: 0
PALPITATIONS: 0
DIAPHORESIS: 0
ACTIVITY CHANGE: 0
SINUS PRESSURE: 0
NUMBNESS: 0
TREMORS: 0
FACIAL ASYMMETRY: 0
WEAKNESS: 0
FEVER: 0
HEADACHES: 0
NECK PAIN: 0
CONFUSION: 0
DECREASED CONCENTRATION: 0
VOMITING: 0
APPETITE CHANGE: 0

## 2024-07-03 ASSESSMENT — PAIN SCALES - GENERAL: PAINLEVEL: NO PAIN (0)

## 2024-07-03 NOTE — PROGRESS NOTES
ASSESSMENT AND PLAN:      ICD-10-CM    1. Vertigo  R42 meclizine (ANTIVERT) 25 MG tablet        Reassuring by history & exam not arrhythmia, CVA    Differential Diagnosis:   heart arrhythmia, ischemia or stroke/TIA, vertigo    Serious Comorbid Conditions:  History of A-fib which appears to be controlled and on blood thinner      PLAN:      Patient Instructions       Fluids  Meclizine 25 up to 3 x day as needed for dizziness.    No driving or operating machinery, or climbing ladders  Careful with head movement    symptoms should improve over next few weeks    For more severe symptoms, would refer for EPLY maneuver    See Otolaryngology if not improved   May follow up by Evisit with primary.      Jennifer Montes De Oca MD  [facility] URGENT [facility]    Subjective     Frank Orellana is a 74 year old who presents for Patient presents with:  Dizziness: This morning woke up and was dizzy, whole room was spinning  Has improved since waking up but still unsteady     an established patient of [facility].    Dizziness    Onset of symptoms was  Course of illness is waxing and waning.      Of symptoms described as room spinning.  First episode  Felt wobbly on feet yesterday.  Off balance. Feels like he has drinking  This morning, ceiling was moving, stumbled his way to bathroom  symptoms improved from this morning  Doesn't fel safe driving.  Concern for stroke    Treatment measures tried include: nothing  Relief from treatment: none  Significant past medical history: a-fib    Review of Systems   Constitutional:  Negative for activity change, appetite change, chills, diaphoresis, fatigue and fever.   HENT:  Negative for congestion, ear discharge, ear pain, hearing loss and sinus pressure. Tinnitus: chronic.   Eyes:  Negative for visual disturbance.   Respiratory:  Negative for shortness of breath.    Cardiovascular:  Negative for chest pain, palpitations and peripheral edema (no change,  very mild with sock lines).  "  Gastrointestinal:  Negative for nausea and vomiting.   Musculoskeletal:  Negative for neck pain.   Neurological:  Negative for tremors, syncope, facial asymmetry, speech difficulty, weakness (focal weakness), numbness and headaches.   Psychiatric/Behavioral:  Negative for confusion and decreased concentration.          Patient Active Problem List   Diagnosis    History of actinic keratoses    Telangiectasia    SK (seborrheic keratosis)    Patient is Islam    Refusal of blood transfusions as patient is Islam    Paroxysmal atrial fibrillation (H)    WINNIE (obstructive sleep apnea) positional    Atrial fibrillation (H)    Tubular adenoma of colon    Bilateral sensorineural hearing loss    Elevated PSA    BPH with urinary obstruction    Dupuytren's contracture of both hands           Objective    /68   Pulse (!) 49   Temp 98  F (36.7  C) (Oral)   Resp 16   Ht 1.753 m (5' 9\")   Wt 76.7 kg (169 lb)   SpO2 97%   BMI 24.96 kg/m    Physical Exam  Vitals reviewed.   Constitutional:       Appearance: Normal appearance. He is not ill-appearing.   HENT:      Right Ear: Tympanic membrane normal.      Left Ear: Tympanic membrane normal.      Ears:      Comments: Finger rubbing normal for hearing     Nose: Nose normal.      Mouth/Throat:      Mouth: Mucous membranes are moist.      Pharynx: Oropharynx is clear.   Eyes:      Extraocular Movements: Extraocular movements intact.      Pupils: Pupils are equal, round, and reactive to light.   Neck:      Vascular: No carotid bruit.   Cardiovascular:      Rate and Rhythm: Normal rate and regular rhythm.      Pulses: Normal pulses.      Heart sounds: Normal heart sounds.   Pulmonary:      Effort: Pulmonary effort is normal.      Breath sounds: Normal breath sounds.   Musculoskeletal:      Right lower leg: No edema.      Left lower leg: No edema.   Neurological:      General: No focal deficit present.      Mental Status: He is alert and oriented to " person, place, and time.      Cranial Nerves: No cranial nerve deficit.      Sensory: No sensory deficit.      Motor: No weakness.      Coordination: Coordination normal (finger nose & foot shin).      Gait: Gait normal.      Deep Tendon Reflexes: Reflexes normal.   Psychiatric:         Mood and Affect: Mood normal.         Behavior: Behavior normal.         Thought Content: Thought content normal.         Judgment: Judgment normal.

## 2024-07-03 NOTE — PATIENT INSTRUCTIONS
Fluids  Meclizine 25 up to 3 x day as needed for dizziness.    No driving or operating machinery, or climbing ladders  Careful with head movement    symptoms should improve over next few weeks    For more severe symptoms, would refer for EPLY maneuver    See Otolaryngology if not improved   May follow up by Evgamalit with primary.

## 2024-08-14 ENCOUNTER — VIRTUAL VISIT (OUTPATIENT)
Dept: FAMILY MEDICINE | Facility: CLINIC | Age: 74
End: 2024-08-14
Payer: COMMERCIAL

## 2024-08-14 ENCOUNTER — TELEPHONE (OUTPATIENT)
Dept: INTERNAL MEDICINE | Facility: CLINIC | Age: 74
End: 2024-08-14

## 2024-08-14 DIAGNOSIS — U07.1 INFECTION DUE TO 2019 NOVEL CORONAVIRUS: Primary | ICD-10-CM

## 2024-08-14 PROCEDURE — G2211 COMPLEX E/M VISIT ADD ON: HCPCS | Performed by: FAMILY MEDICINE

## 2024-08-14 PROCEDURE — 99213 OFFICE O/P EST LOW 20 MIN: CPT | Mod: 95 | Performed by: FAMILY MEDICINE

## 2024-08-14 NOTE — TELEPHONE ENCOUNTER
Call received from patient:  Tested positive today for COVID-19  No chest pain nor difficulty breathing  Interested in Paxlovid treatment    Eliquis on patient's medication list.  Explained to patient writer cannot go through RN protocol for Paxlovid treatment and writer can help schedule virtual clinician visit.    Patient stated he is no longer taking Eliquis.  Writer informed patient writer cannot remove Eliquis from medication list without clinician input/visit.    Patient verbalized understanding and in agreement with plan.    Video visit scheduled with Dr. Nettles at 0900.  Explained to patient a clinic staff member will call first to check patient in for the visit.    KIMI VivasN, RN-[facility]

## 2024-08-14 NOTE — PROGRESS NOTES
Frank is a 74 year old who is being evaluated via a billable video visit.    How would you like to obtain your AVS? MyChart  If the video visit is dropped, the invitation should be resent by: Text to cell phone: [phone]  Will anyone else be joining your video visit? No      Assessment & Plan     Infection due to 2019 novel coronavirus  Day 3 with mild symptoms.  His age is his primary risk factor for severe COVID but his AFib medications interact with Paxlovid and he would need to hold flecainide and possibly diltiazem so I feel the risk of initiating Paxlovid outweighs the benefit in his case.  He'll continue to treat symptomatically and monitor for any worsening symptoms.  Discussed anticipated course.  Patient instructed to contact clinic if symptoms persist, worsen, or do not resolve as anticipated.          Subjective   Frank is a 74 year old, presenting for the following health issues:  Covid Concern (COVID Treatment)      8/14/2024     8:26 AM   Additional Questions   Roomed by Shadia Crook     Video Start Time:  9:01 AM    HPI       COVID-19 Symptom Review  How many days ago did these symptoms start? 8/11/24    Are any of the following symptoms significant for you?  New or worsening difficulty breathing? No  Worsening cough? Yes, it's a dry cough.   Fever or chills? No  Headache: YES  Sore throat: YES  Chest pain: No  Diarrhea: No  Body aches? No    What treatments has patient tried? Acetaminophen   Does patient live in a nursing home, group home, or shelter? No  Does patient have a way to get food/medications during quarantined? Yes, I have a friend or family member who can help me.        Had COVID in July 2023 and that was worse.  Current symptoms are mild.  No chest pain.        Objective           Vitals:  No vitals were obtained today due to virtual visit.    Physical Exam   GENERAL: alert and no distress  EYES: Eyes grossly normal to inspection.  No discharge or erythema, or obvious  scleral/conjunctival abnormalities.  RESP: No audible wheeze, cough, or visible cyanosis.    SKIN: Visible skin clear. No significant rash, abnormal pigmentation or lesions.  NEURO: Cranial nerves grossly intact.  Mentation and speech appropriate for age.  PSYCH: Appropriate affect, tone, and pace of words        Video-Visit Details    Type of service:  Video Visit   Video End Time:9:11 AM  Originating Location (pt. Location): Home    Distant Location (provider location):  Off-site  Platform used for Video Visit: Evelin  Signed Electronically by: Brenda Nettles MD

## 2024-09-23 ENCOUNTER — MYC REFILL (OUTPATIENT)
Dept: CARDIOLOGY | Facility: CLINIC | Age: 74
End: 2024-09-23
Payer: COMMERCIAL

## 2024-09-23 DIAGNOSIS — I48.0 PAF (PAROXYSMAL ATRIAL FIBRILLATION) (H): ICD-10-CM

## 2024-09-28 NOTE — TELEPHONE ENCOUNTER
diltiazem ER COATED BEADS (CARDIZEM CD/CARTIA XT) 120 MG 24 hr capsule       Last Written Prescription Date:  11/8/23  Last Fill Quantity: 90,   # refills: 0  Last Office Visit : 5/1/24  Future Office visit:  0    Routing refill request to provider for review/approval because:  Compliant  should have been out 2/2024

## 2024-10-02 RX ORDER — DILTIAZEM HYDROCHLORIDE 120 MG/1
120 CAPSULE, COATED, EXTENDED RELEASE ORAL DAILY
Qty: 90 CAPSULE | Refills: 2 | Status: SHIPPED | OUTPATIENT
Start: 2024-10-02

## 2024-10-21 ENCOUNTER — MYC REFILL (OUTPATIENT)
Dept: CARDIOLOGY | Facility: CLINIC | Age: 74
End: 2024-10-21

## 2024-10-21 ENCOUNTER — OFFICE VISIT (OUTPATIENT)
Dept: OPHTHALMOLOGY | Facility: CLINIC | Age: 74
End: 2024-10-21
Payer: COMMERCIAL

## 2024-10-21 DIAGNOSIS — H43.813 PVD (POSTERIOR VITREOUS DETACHMENT), BILATERAL: ICD-10-CM

## 2024-10-21 DIAGNOSIS — I48.0 PAF (PAROXYSMAL ATRIAL FIBRILLATION) (H): ICD-10-CM

## 2024-10-21 DIAGNOSIS — H52.00 HYPERMETROPIA, UNSPECIFIED LATERALITY: ICD-10-CM

## 2024-10-21 DIAGNOSIS — H40.003 GLAUCOMA SUSPECT OF BOTH EYES: ICD-10-CM

## 2024-10-21 DIAGNOSIS — H25.13 NUCLEAR SCLEROTIC CATARACT OF BOTH EYES: Primary | ICD-10-CM

## 2024-10-21 PROCEDURE — 92133 CPTRZD OPH DX IMG PST SGM ON: CPT | Performed by: OPHTHALMOLOGY

## 2024-10-21 PROCEDURE — 92014 COMPRE OPH EXAM EST PT 1/>: CPT | Performed by: OPHTHALMOLOGY

## 2024-10-21 PROCEDURE — 92015 DETERMINE REFRACTIVE STATE: CPT | Performed by: OPHTHALMOLOGY

## 2024-10-21 ASSESSMENT — CONF VISUAL FIELD
OD_SUPERIOR_TEMPORAL_RESTRICTION: 0
OD_INFERIOR_TEMPORAL_RESTRICTION: 0
OD_SUPERIOR_NASAL_RESTRICTION: 0
OS_SUPERIOR_TEMPORAL_RESTRICTION: 0
OD_INFERIOR_NASAL_RESTRICTION: 0
OD_NORMAL: 1
OS_SUPERIOR_NASAL_RESTRICTION: 0
OS_INFERIOR_TEMPORAL_RESTRICTION: 0
METHOD: COUNTING FINGERS
OS_INFERIOR_NASAL_RESTRICTION: 0
OS_NORMAL: 1

## 2024-10-21 ASSESSMENT — PACHYMETRY
OD_CT(UM): 599
OS_CT(UM): 585

## 2024-10-21 ASSESSMENT — REFRACTION_WEARINGRX
OS_CYLINDER: SPHERE
OD_CYLINDER: +0.25
OD_AXIS: 035
OD_SPHERE: +2.00
OS_ADD: +2.50
OS_SPHERE: +2.25
OD_ADD: +2.50
SPECS_TYPE: PAL

## 2024-10-21 ASSESSMENT — VISUAL ACUITY
OS_CC+: -2
OD_CC: 20/30
METHOD: SNELLEN - LINEAR
CORRECTION_TYPE: GLASSES
OS_CC: 20/25

## 2024-10-21 ASSESSMENT — CUP TO DISC RATIO
OD_RATIO: 0.55
OS_RATIO: 0.4

## 2024-10-21 ASSESSMENT — TONOMETRY
OS_IOP_MMHG: 16
OD_IOP_MMHG: 16
IOP_METHOD: APPLANATION

## 2024-10-21 ASSESSMENT — SLIT LAMP EXAM - LIDS
COMMENTS: NORMAL
COMMENTS: NORMAL

## 2024-10-21 ASSESSMENT — REFRACTION_MANIFEST
OD_AXIS: 027
OD_ADD: +2.50
OS_SPHERE: +2.50
OS_ADD: +2.50
OD_CYLINDER: +1.50
OS_CYLINDER: SPHERE
OD_SPHERE: +1.50

## 2024-10-21 ASSESSMENT — EXTERNAL EXAM - LEFT EYE: OS_EXAM: NORMAL

## 2024-10-21 ASSESSMENT — EXTERNAL EXAM - RIGHT EYE: OD_EXAM: NORMAL

## 2024-10-21 NOTE — NURSING NOTE
Chief Complaints and History of Present Illnesses   Patient presents with    COMPREHENSIVE EYE EXAM     Chief Complaint(s) and History of Present Illness(es)       COMPREHENSIVE EYE EXAM              Laterality: both eyes    Associated symptoms: floaters (longstanding, no changes per pt).  Negative for dryness, tearing and flashes    Treatments tried: no treatments    Pain scale: 0/10              Comments    Pt notes gls working well for him, they are about 12 years old, here for yearly check, no concerns today.     Gina RODRIGUEZ October 21, 2024 8:02 AM

## 2024-10-21 NOTE — PROGRESS NOTES
CC:   Chief Complaints and History of Present Illnesses   Patient presents with    Glaucoma suspect       HPI  Frank Orellana is a 74 year old male here for comprehensive eye exam with history of glaucoma suspicion.  HPI       COMPREHENSIVE EYE EXAM    In both eyes.  Associated symptoms include floaters (longstanding, no changes per pt).  Negative for dryness, tearing and flashes.  Treatments tried include no treatments.  Pain was noted as 0/10.             Comments    Pt notes gls working well for him, they are about 12 years old, here for yearly check, no concerns today.     Gina Lao COT October 21, 2024 8:02 AM              Last edited by Emily Lao on 10/21/2024  8:02 AM.           PMH:    Past Medical History:   Diagnosis Date    Actinic keratosis 2009    Atrial fibrillation (H) 06/15/1996    Afib - 2 or 3 extended occurrences since    Dupuytren's contracture of both hands     Elevated PSA     Hepatitis B 1969    acute infection at age 19; IV drug use    Hepatitis C virus infection, unspecified chronicity     treated, cleared    WINNIE (obstructive sleep apnea)     AHI 34.2    Pain in both hands     Patient is Sabianist     Refusal of blood transfusions as patient is Sabianist     Right shoulder pain     Tinnitus 1 or 2 years ago    both ears    Tubular adenoma of colon 01/17/2017    ascending colon, 1/17/17     POH:  Glasses for hypermetropia, glaucoma suspect based on cup-to-disc asymmetry, cataracts, no eye surgery, no eye trauma  Oc Meds:  none  FH:  Denies any glaucoma, age related macular degeneration, or other known eye diseases       Assessment & Plan       1. Nuclear sclerotic cataract of both eyes - Both Eyes    2. Glaucoma suspect of both eyes - Both Eyes    3. Hypermetropia, unspecified laterality - Both Eyes    4. PVD (posterior vitreous detachment), bilateral - Both Eyes      (H25.13) Nuclear sclerotic cataract of both eyes - Both Eyes (primary encounter diagnosis)  Comment:  mildly visually significant   Plan: update glasses as needed, follow      (H40.003) Glaucoma suspect of both eyes - Both Eyes    Comment:  based on mild cup-to-disc asymmetry , Large disc right eye > left eye, peripupillary transillumination defects --  K pachy:   599/585   Tmax:  18/18     HVF: scat non-specific both eyes      CDR:  0.55/0.4  HRT/OCT:   normal both eyes , 2024    FHX of Glc:  No        Gonio:  opn     Intolerant to:   none Asthma/COPD: no, WINNIE   Steroid Use:  no     Kidney Stones:       Sulfa Allergy:  no   IOP targets: low 20s  Today's IOP: at goal    Plan:  Low risk, monitor with annual intraocular pressure, dilated fundus exam.  OCT Nerve fiber layer /visual field as needed     Hypermetropia/presbyopia- small changes, manifest refraction done and prescription for glasses given     PVD (posterior vitreous detachment), both eyes   Comment: No vitreous hemorrhage/pigment, retinal tears, or detachment seen on exam today.  Plan:  Natural history of posterior vitreous detachment as well as retinal tear/detachment symptoms discussed with patient.  Told to call for prompt dilated exam if these symptoms occur.      Patient disposition:   Return in about 1 year (around 10/21/2025) for Comprehensive Exam, OCT nerve (RNFL) OU. Call for sooner appointment as needed.    Complete documentation of historical and exam elements from today's encounter can be found in the full encounter summary report (not reduplicated in this progress note). I personally obtained the chief complaint(s) and history of present illness.  I have confirmed and edited as necessary the CC, HPI, PMH/PSH, social history, FMH, ROS, and exam/neuro findings as obtained by the technician or others. I have examined this patient myself and I personally viewed the image(s) and studies listed above and the documentation reflects my findings and interpretation.  I formulated and edited as necessary the assessment and plan and discussed the findings  and management plan with the patient and family.     Lucinda Mccain MD

## 2024-11-04 RX ORDER — FLECAINIDE ACETATE 50 MG/1
50 TABLET ORAL 2 TIMES DAILY
Qty: 180 TABLET | Refills: 1 | Status: SHIPPED | OUTPATIENT
Start: 2024-11-04

## 2024-11-11 ENCOUNTER — NURSE TRIAGE (OUTPATIENT)
Dept: INTERNAL MEDICINE | Facility: CLINIC | Age: 74
End: 2024-11-11
Payer: COMMERCIAL

## 2024-11-11 NOTE — TELEPHONE ENCOUNTER
Nurse Triage SBAR    Is this a 2nd Level Triage? NO    Situation: neck pain    Background: States this started on 11/8 and has not gotten any better.    Assessment: States he cannot move his neck or lay in bed without causing pain, however he is still able to touch his chin to his chest. Denies any fever, swelling, SOB or chest pain. Denies any injury. States he has not been able to sleep in his bed as laying flat causes him pain as well. States he has a history of neck pain, but nothing this severe. Rates 8/10 pain.    Protocol Recommended Disposition:   Go To Office Now    Recommendation: Advised he should be seen today. No appointments available with PCP so instructed patient to go to Urgent Care. He verbalized understanding and agrees with the plan.         Does the patient meet one of the following criteria for ADS visit consideration? 16+ years old, with an [facility] PCP     TIP  Providers, please consider if this condition is appropriate for management at one of our Acute and Diagnostic Services sites.     If patient is a good candidate, please use dotphrase <dot>triageresponse and select Refer to ADS to document.      Reason for Disposition   SEVERE pain (e.g., excruciating, unable to do any normal activities)    Additional Information   Negative: Difficulty breathing or unusual sweating (e.g., sweating without exertion)   Negative: Chest pain lasting longer than 5 minutes   Negative: Stiff neck (can't touch chin to chest) and has headache   Negative: Stiff neck (can't touch chin to chest) and fever   Negative: Weakness of an arm or hand   Negative: Problems with bowel or bladder control   Negative: Patient sounds very sick or weak to the triager   Negative: Shock suspected (e.g., cold/pale/clammy skin, too weak to stand, low BP, rapid pulse)   Negative: Similar pain previously and it was from 'heart attack'   Negative: Similar pain previously from 'angina' and not relieved by nitroglycerin   Negative: Difficult  "to awaken or acting confused (e.g., disoriented, slurred speech)   Negative: Sounds like a life-threatening emergency to the triager   Negative: Followed an injury to neck (e.g., MVA, sports, impact or collision)   Negative: Chest pain   Negative: Lymph node in the neck is swollen or painful to the touch   Negative: Sore throat is main symptom    Answer Assessment - Initial Assessment Questions  1. ONSET: \"When did the pain begin?\"       11/8 started, has gotten worse  2. LOCATION: \"Where does it hurt?\"       Left side and back  3. PATTERN \"Does the pain come and go, or has it been constant since it started?\"       Intermittent, comes on with movement   4. SEVERITY: \"How bad is the pain?\"  (Scale 1-10; or mild, moderate, severe)    - NO PAIN (0): no pain or only slight stiffness     - MILD (1-3): doesn't interfere with normal activities     - MODERATE (4-7): interferes with normal activities or awakens from sleep     - SEVERE (8-10):  excruciating pain, unable to do any normal activities       8/10  5. RADIATION: \"Does the pain go anywhere else, shoot into your arms?\"      Sometimes radiates to shoulder  6. CORD SYMPTOMS: \"Any weakness or numbness of the arms or legs?\"      Denies   7. CAUSE: \"What do you think is causing the neck pain?\"      Prior neck pain, but nothing this bad  8. NECK OVERUSE: \"Any recent activities that involved turning or twisting the neck?\"      Denies   9. OTHER SYMPTOMS: \"Do you have any other symptoms?\" (e.g., headache, fever, chest pain, difficulty breathing, neck swelling)      Denies   10. PREGNANCY: \"Is there any chance you are pregnant?\" \"When was your last menstrual period?\"        N/A    Protocols used: Neck Pain or [id number]    "

## 2024-11-13 ENCOUNTER — TELEPHONE (OUTPATIENT)
Dept: INTERNAL MEDICINE | Facility: CLINIC | Age: 74
End: 2024-11-13
Payer: COMMERCIAL

## 2024-11-13 NOTE — TELEPHONE ENCOUNTER
"[facility] RN called and spoke with the patient to follow up on the patient's schedule request comment of, \"Comments:  Neck pain. Can't get up from prone position\"     The patient stated to the RN that he spent most of the weekend in a recliner chair due to neck pain. The patient also clarified that he has been/is able to get up but \"not without screaming\" due to the pain. The patient was amenable to scheduling an appointment with Dr. Tao on 11/14/2024 at 3:00 PM for further evaluation and treatment of the neck pain.   "

## 2024-11-14 ENCOUNTER — OFFICE VISIT (OUTPATIENT)
Dept: INTERNAL MEDICINE | Facility: CLINIC | Age: 74
End: 2024-11-14
Payer: COMMERCIAL

## 2024-11-14 VITALS
SYSTOLIC BLOOD PRESSURE: 121 MMHG | BODY MASS INDEX: 25.31 KG/M2 | WEIGHT: 170.9 LBS | DIASTOLIC BLOOD PRESSURE: 72 MMHG | HEIGHT: 69 IN | HEART RATE: 50 BPM | RESPIRATION RATE: 16 BRPM | OXYGEN SATURATION: 98 %

## 2024-11-14 DIAGNOSIS — M62.838 MUSCLE SPASMS OF NECK: ICD-10-CM

## 2024-11-14 DIAGNOSIS — M54.2 CERVICALGIA: Primary | ICD-10-CM

## 2024-11-14 RX ORDER — CYCLOBENZAPRINE HCL 10 MG
10 TABLET ORAL
Qty: 14 TABLET | Refills: 0 | Status: SHIPPED | OUTPATIENT
Start: 2024-11-14

## 2024-11-14 RX ORDER — MELOXICAM 7.5 MG/1
7.5 TABLET ORAL DAILY
Qty: 14 TABLET | Refills: 0 | Status: SHIPPED | OUTPATIENT
Start: 2024-11-14

## 2024-11-14 NOTE — ASSESSMENT & PLAN NOTE
- May take meloxicam 7.5mg daily for 2 weeks.   - May take flexeril 10mg at bedtime as needed for at least 1 week.   - May use over the counter topical pain medications.   - May use heating pads for 15 minutes at a time.   - If recurrence in the next few months, may suggest an Xray and visit with physical therapy.

## 2024-11-14 NOTE — PROGRESS NOTES
"  Assessment & Plan   Problem List Items Addressed This Visit       Cervicalgia - Primary     - May take meloxicam 7.5mg daily for 2 weeks.   - May take flexeril 10mg at bedtime as needed for at least 1 week.   - May use over the counter topical pain medications.   - May use heating pads for 15 minutes at a time.   - If recurrence in the next few months, may suggest an Xray and visit with physical therapy.          Relevant Medications    meloxicam (MOBIC) 7.5 MG tablet    cyclobenzaprine (FLEXERIL) 10 MG tablet     Other Visit Diagnoses       Muscle spasms of neck        Relevant Medications    meloxicam (MOBIC) 7.5 MG tablet    cyclobenzaprine (FLEXERIL) 10 MG tablet                  BMI  Estimated body mass index is 25.23 kg/m  as calculated from the following:    Height as of this encounter: 1.753 m (5' 9.02\").    Weight as of this encounter: 77.5 kg (170 lb 14.4 oz).           Return if symptoms worsen or fail to improve.      Subjective   Frank is a 74 year old, presenting for the following health issues:  Follow Up (Sever Neck pain started on Friday but feeling slightly better )      11/14/2024     2:47 PM   Additional Questions   Roomed by      History of Present Illness       Reason for visit:  Neck pain  Symptom onset:  3-7 days ago  Symptoms include:  Pain in neck and shoulder  Symptom intensity:  Severe  Symptom progression:  Improving  Had these symptoms before:  Yes  Has tried/received treatment for these symptoms:  No  What makes it worse:  Getting up  What makes it better:  No   He is taking medications regularly.       Mentions having worsened left neck pain on Friday, still has some pains. Hard to roll over and worse neck stiffness. Was hard for him to get up for bed at time. Mentions on Friday morning he was feeling pains, then in the afternoon he was trying to store a utility trailer which he tried putting away. Has had pains every few years but not this bad. He was screaming with pain on Friday " "night. Has not have pains, numbness or tingling to arms or legs.     He did take 2 tylenol extra strength for every 6 hours for about 1.5 days which didn't seemed to help. Tried a heating pad on Saturday which did not help.         Review of Systems  Constitutional, neuro, ENT, endocrine, pulmonary, cardiac, gastrointestinal, genitourinary, musculoskeletal, integument and psychiatric systems are negative, except as otherwise noted.      Objective    /72 (BP Location: Right arm, Patient Position: Sitting, Cuff Size: Adult Large)   Pulse 50   Resp 16   Ht 1.753 m (5' 9.02\")   Wt 77.5 kg (170 lb 14.4 oz)   SpO2 98%   BMI 25.23 kg/m    Body mass index is 25.23 kg/m .  Physical Exam   GENERAL: alert and no distress  NECK: no adenopathy, no asymmetry, masses, or scars. Rotates head to only about 45 degrees. Able to flex and extend neck without difficulty.   MS: no gross musculoskeletal defects noted, no edema. Muscle tightness along upper posterior back and along base of posterior neck.   SKIN: no suspicious lesions or rashes  NEURO: Normal strength and tone, mentation intact and speech normal  PSYCH: mentation appears normal, affect normal/bright            Signed Electronically by: Grady Tao MD    "

## 2024-11-14 NOTE — PATIENT INSTRUCTIONS
- May take meloxicam 7.5mg daily for 2 weeks.   - May take flexeril 10mg at bedtime as needed for at least 1 week.   - May use over the counter topical pain medications.   - May use heating pads for 15 minutes at a time.     If recurrence in the next few months, may suggest an Xray and visit with physical therapy.

## 2024-12-27 ENCOUNTER — HOSPITAL ENCOUNTER (EMERGENCY)
Facility: CLINIC | Age: 74
Discharge: HOME | End: 2024-12-27
Attending: EMERGENCY MEDICINE | Admitting: EMERGENCY MEDICINE
Payer: COMMERCIAL

## 2024-12-27 ENCOUNTER — MYC MEDICAL ADVICE (OUTPATIENT)
Dept: INTERNAL MEDICINE | Facility: CLINIC | Age: 74
End: 2024-12-27

## 2024-12-27 ENCOUNTER — LAB (OUTPATIENT)
Dept: LAB | Facility: CLINIC | Age: 74
End: 2024-12-27
Payer: COMMERCIAL

## 2024-12-27 ENCOUNTER — ANCILLARY PROCEDURE (OUTPATIENT)
Dept: GENERAL RADIOLOGY | Facility: CLINIC | Age: 74
End: 2024-12-27
Attending: INTERNAL MEDICINE
Payer: COMMERCIAL

## 2024-12-27 ENCOUNTER — TELEPHONE (OUTPATIENT)
Dept: INTERNAL MEDICINE | Facility: CLINIC | Age: 74
End: 2024-12-27

## 2024-12-27 VITALS
WEIGHT: 165 LBS | RESPIRATION RATE: 15 BRPM | TEMPERATURE: 97.5 F | SYSTOLIC BLOOD PRESSURE: 118 MMHG | BODY MASS INDEX: 24.37 KG/M2 | HEART RATE: 57 BPM | DIASTOLIC BLOOD PRESSURE: 75 MMHG | OXYGEN SATURATION: 100 %

## 2024-12-27 DIAGNOSIS — I48.0 PAF (PAROXYSMAL ATRIAL FIBRILLATION) (H): ICD-10-CM

## 2024-12-27 DIAGNOSIS — M54.2 CERVICALGIA: ICD-10-CM

## 2024-12-27 DIAGNOSIS — I48.91 ATRIAL FIBRILLATION WITH RVR (H): ICD-10-CM

## 2024-12-27 LAB
ALBUMIN SERPL BCG-MCNC: 4.5 G/DL (ref 3.5–5.2)
ALP SERPL-CCNC: 100 U/L (ref 40–150)
ALT SERPL W P-5'-P-CCNC: 73 U/L (ref 0–70)
ANION GAP SERPL CALCULATED.3IONS-SCNC: 9 MMOL/L (ref 7–15)
AST SERPL W P-5'-P-CCNC: 56 U/L (ref 0–45)
ATRIAL RATE - MUSE: 114 BPM
ATRIAL RATE - MUSE: 226 BPM
ATRIAL RATE - MUSE: 61 BPM
ATRIAL RATE - MUSE: NORMAL BPM
BILIRUB SERPL-MCNC: 1.1 MG/DL
BUN SERPL-MCNC: 12.4 MG/DL (ref 8–23)
CALCIUM SERPL-MCNC: 9.2 MG/DL (ref 8.8–10.4)
CHLORIDE SERPL-SCNC: 106 MMOL/L (ref 98–107)
CHOLEST SERPL-MCNC: 174 MG/DL
CREAT SERPL-MCNC: 0.92 MG/DL (ref 0.67–1.17)
DIASTOLIC BLOOD PRESSURE - MUSE: NORMAL MMHG
EGFRCR SERPLBLD CKD-EPI 2021: 87 ML/MIN/1.73M2
ERYTHROCYTE [DISTWIDTH] IN BLOOD BY AUTOMATED COUNT: 12.7 % (ref 10–15)
FASTING STATUS PATIENT QL REPORTED: YES
FASTING STATUS PATIENT QL REPORTED: YES
GLUCOSE SERPL-MCNC: 96 MG/DL (ref 70–99)
HCO3 SERPL-SCNC: 28 MMOL/L (ref 22–29)
HCT VFR BLD AUTO: 42.9 % (ref 40–53)
HDLC SERPL-MCNC: 70 MG/DL
HGB BLD-MCNC: 14.7 G/DL (ref 13.3–17.7)
INTERPRETATION ECG - MUSE: NORMAL
LDLC SERPL CALC-MCNC: 92 MG/DL
MAGNESIUM SERPL-MCNC: 2.2 MG/DL (ref 1.7–2.3)
MCH RBC QN AUTO: 31.8 PG (ref 26.5–33)
MCHC RBC AUTO-ENTMCNC: 34.3 G/DL (ref 31.5–36.5)
MCV RBC AUTO: 93 FL (ref 78–100)
NONHDLC SERPL-MCNC: 104 MG/DL
P AXIS - MUSE: -23 DEGREES
P AXIS - MUSE: -82 DEGREES
P AXIS - MUSE: 84 DEGREES
P AXIS - MUSE: NORMAL DEGREES
PLATELET # BLD AUTO: 204 10E3/UL (ref 150–450)
POTASSIUM SERPL-SCNC: 3.9 MMOL/L (ref 3.4–5.3)
PR INTERVAL - MUSE: 138 MS
PR INTERVAL - MUSE: 168 MS
PR INTERVAL - MUSE: NORMAL MS
PR INTERVAL - MUSE: NORMAL MS
PROT SERPL-MCNC: 7.4 G/DL (ref 6.4–8.3)
QRS DURATION - MUSE: 104 MS
QRS DURATION - MUSE: 106 MS
QRS DURATION - MUSE: 120 MS
QRS DURATION - MUSE: 98 MS
QT - MUSE: 324 MS
QT - MUSE: 340 MS
QT - MUSE: 364 MS
QT - MUSE: 430 MS
QTC - MUSE: 432 MS
QTC - MUSE: 446 MS
QTC - MUSE: 466 MS
QTC - MUSE: 481 MS
R AXIS - MUSE: -14 DEGREES
R AXIS - MUSE: -16 DEGREES
R AXIS - MUSE: -16 DEGREES
R AXIS - MUSE: 43 DEGREES
RBC # BLD AUTO: 4.62 10E6/UL (ref 4.4–5.9)
SODIUM SERPL-SCNC: 143 MMOL/L (ref 135–145)
SYSTOLIC BLOOD PRESSURE - MUSE: NORMAL MMHG
T AXIS - MUSE: 15 DEGREES
T AXIS - MUSE: 39 DEGREES
T AXIS - MUSE: 51 DEGREES
T AXIS - MUSE: 52 DEGREES
TRIGL SERPL-MCNC: 58 MG/DL
TSH SERPL DL<=0.005 MIU/L-ACNC: 0.96 UIU/ML (ref 0.3–4.2)
VENTRICULAR RATE- MUSE: 105 BPM
VENTRICULAR RATE- MUSE: 113 BPM
VENTRICULAR RATE- MUSE: 114 BPM
VENTRICULAR RATE- MUSE: 61 BPM
WBC # BLD AUTO: 7.9 10E3/UL (ref 4–11)

## 2024-12-27 PROCEDURE — 84443 ASSAY THYROID STIM HORMONE: CPT | Performed by: PATHOLOGY

## 2024-12-27 PROCEDURE — 99285 EMERGENCY DEPT VISIT HI MDM: CPT | Mod: 25 | Performed by: EMERGENCY MEDICINE

## 2024-12-27 PROCEDURE — 80061 LIPID PANEL: CPT | Performed by: PATHOLOGY

## 2024-12-27 PROCEDURE — 93010 ELECTROCARDIOGRAM REPORT: CPT | Mod: 59 | Performed by: EMERGENCY MEDICINE

## 2024-12-27 PROCEDURE — 72040 X-RAY EXAM NECK SPINE 2-3 VW: CPT | Performed by: RADIOLOGY

## 2024-12-27 PROCEDURE — 258N000003 HC RX IP 258 OP 636: Performed by: EMERGENCY MEDICINE

## 2024-12-27 PROCEDURE — 93005 ELECTROCARDIOGRAM TRACING: CPT | Performed by: EMERGENCY MEDICINE

## 2024-12-27 PROCEDURE — 83735 ASSAY OF MAGNESIUM: CPT | Performed by: PATHOLOGY

## 2024-12-27 PROCEDURE — 96360 HYDRATION IV INFUSION INIT: CPT | Performed by: EMERGENCY MEDICINE

## 2024-12-27 PROCEDURE — 85027 COMPLETE CBC AUTOMATED: CPT | Performed by: PATHOLOGY

## 2024-12-27 PROCEDURE — 250N000009 HC RX 250: Performed by: EMERGENCY MEDICINE

## 2024-12-27 PROCEDURE — 36415 COLL VENOUS BLD VENIPUNCTURE: CPT | Performed by: PATHOLOGY

## 2024-12-27 PROCEDURE — 92960 CARDIOVERSION ELECTRIC EXT: CPT | Performed by: EMERGENCY MEDICINE

## 2024-12-27 PROCEDURE — 80053 COMPREHEN METABOLIC PANEL: CPT | Performed by: PATHOLOGY

## 2024-12-27 PROCEDURE — 99291 CRITICAL CARE FIRST HOUR: CPT | Mod: 25 | Performed by: EMERGENCY MEDICINE

## 2024-12-27 RX ORDER — ETOMIDATE 2 MG/ML
INJECTION INTRAVENOUS
Status: COMPLETED
Start: 2024-12-27 | End: 2024-12-27

## 2024-12-27 RX ADMIN — SODIUM CHLORIDE 1000 ML: 9 INJECTION, SOLUTION INTRAVENOUS at 14:32

## 2024-12-27 RX ADMIN — ETOMIDATE 10 MG: 2 INJECTION, SOLUTION INTRAVENOUS at 15:02

## 2024-12-27 ASSESSMENT — ACTIVITIES OF DAILY LIVING (ADL)
ADLS_ACUITY_SCORE: 47

## 2024-12-27 ASSESSMENT — COLUMBIA-SUICIDE SEVERITY RATING SCALE - C-SSRS
6. HAVE YOU EVER DONE ANYTHING, STARTED TO DO ANYTHING, OR PREPARED TO DO ANYTHING TO END YOUR LIFE?: NO
1. IN THE PAST MONTH, HAVE YOU WISHED YOU WERE DEAD OR WISHED YOU COULD GO TO SLEEP AND NOT WAKE UP?: NO
2. HAVE YOU ACTUALLY HAD ANY THOUGHTS OF KILLING YOURSELF IN THE PAST MONTH?: NO

## 2024-12-27 NOTE — ED TRIAGE NOTES
Arrives ambulatory with irregular heart rate. States he noticed at 1:28am that he was in afib. Not on thinners but has it as a PRN eliquis when he feels he is in afib.     Triage Assessment (Adult)       Row Name 12/27/24 1400          Triage Assessment    Airway WDL WDL        Respiratory WDL    Respiratory WDL WDL        Skin Circulation/Temperature WDL    Skin Circulation/Temperature WDL WDL        Cardiac WDL    Cardiac WDL X;rhythm     Pulse Rate & Regularity tachycardic        Peripheral/Neurovascular WDL    Peripheral Neurovascular WDL WDL        Cognitive/Neuro/Behavioral WDL    Cognitive/Neuro/Behavioral WDL WDL                      Home

## 2024-12-27 NOTE — CONFIDENTIAL NOTE
Update: Looks like her went to ER.  ---------------------------------------------------    Patient presented to clinic today with new episode of afib since 1 am. He resumed eliquis.    He is inquiring about repeat cardioversion.  Has had several in the past.  Per Dr. English, patient may go to ER if having worsening symptoms or we can arrange as outpatient.    Are you able to help patient with DCCV for next week?      Thanks,  Yani Burnett MD  Internal Medicine

## 2024-12-27 NOTE — ED PROVIDER NOTES
ED Provider Note  [facility]      History     Chief Complaint   Patient presents with    Irregular Heart Beat     HPI  Frank Orellana is a 74 year old male with a history of paroxysmal atrial for fibrillation who presents to the emergency department with an irregular heart rate.  Patient reports he noticed that at 1:30 a.m. he was in A-fib.  He denies use of anticoagulants, but has PRN Eliquis for when he feels like he is in A-fib.***    Past Medical History  Past Medical History:   Diagnosis Date    Actinic keratosis 2009    Atrial fibrillation (H) 06/15/1996    Afib - 2 or 3 extended occurrences since    Dupuytren's contracture of both hands     Elevated PSA     Hepatitis B 1969    acute infection at age 19; IV drug use    Hepatitis C virus infection, unspecified chronicity     treated, cleared    WINNIE (obstructive sleep apnea)     AHI 34.2    Pain in both hands     Patient is [employer]     Refusal of blood transfusions as patient is [employer]     Right shoulder pain     Tinnitus 1 or 2 years ago    both ears    Tubular adenoma of colon 01/17/2017    ascending colon, 1/17/17     Past Surgical History:   Procedure Laterality Date    ANESTHESIA CARDIOVERSION N/A 1/15/2018    Procedure: ANESTHESIA CARDIOVERSION;  Anesthesia Coverage Cardioversion With Transesophageal Echocardiogram @0930 ;  Surgeon: GENERIC ANESTHESIA PROVIDER;  [facility]: [facility]    ANESTHESIA CARDIOVERSION N/A 10/11/2018    Procedure: ANESTHESIA CARDIOVERSION;  Cardioversion;  Surgeon: GENERIC ANESTHESIA PROVIDER;  [facility]    ANESTHESIA CARDIOVERSION N/A 11/28/2018    Procedure: Anesthesia Coverage Cardioversion @0830;  Surgeon: GENERIC ANESTHESIA PROVIDER;  [facility]    ANESTHESIA CARDIOVERSION N/A 9/19/2023    Procedure: Anesthesia cardioversion@0900;  Surgeon: GENERIC ANESTHESIA PROVIDER;  [facility]    ANESTHESIA CARDIOVERSION N/A 3/27/2024    Procedure: Anesthesia cardioversion @1100;   Surgeon: GENERIC ANESTHESIA PROVIDER;  [facility]    ARTHROSCOPY KNEE      left knee    COLONOSCOPY  1/17/17    tubular adenoma ascending colon    COLONOSCOPY N/A 6/26/2023    Procedure: COLONOSCOPY, WITH POLYPECTOMY AND BIOPSY;  Surgeon: Hussein Stevens MD;  [facility]: [facility]    CYSTOSCOPY, TRANSURETHRAL RESECTION (TUR) PROSTATE, COMBINED N/A 8/21/2020    Procedure: CYSTOSCOPY, WITH TRANSURETHRAL RESECTION PROSTATE;  Surgeon: Roldan De La O MD;  [facility]    HERNIORRHAPHY INGUINAL Right 10/26/2017    Procedure: HERNIORRHAPHY INGUINAL;  Open Right Inguinal Hernia Repair with Mesh;  Surgeon: Suresh Raymond MD;  [facility]    KNEE SURGERY  2006??    torn meniscus    RELEASE DUPUYTRENS CONTRACTURE Right 1/20/2017    Procedure: RELEASE DUPUYTRENS CONTRACTURE;  Surgeon: Lars Oleary MD;  Location: [facility] OR    RELEASE DUPUYTRENS CONTRACTURE Left 12/15/2017    Procedure: RELEASE DUPUYTRENS CONTRACTURE;  Left Ring and Middle Finger Subtotal Palmar Fasciectomy;  Surgeon: Lars Oleary MD;  [facility]    RELEASE DUPUYTRENS CONTRACTURE Right 8/9/2023    Procedure: RELEASE, DUPUYTREN CONTRACTURE, RIGHT SMALL FINGER AND RIGHT MIDDLE FINGER;  Surgeon: Lars Oleary MD;  [facility]    TRANSESOPHAGEAL ECHOCARDIOGRAM INTRAOPERATIVE N/A 1/15/2018    Procedure: TRANSESOPHAGEAL ECHOCARDIOGRAM INTRAOPERATIVE;;  Surgeon: GENERIC ANESTHESIA PROVIDER;  Location: [facility]     aspirin 81 MG EC tablet  cyclobenzaprine (FLEXERIL) 5 MG tablet  diltiazem ER COATED BEADS (CARDIZEM CD/CARTIA XT) 120 MG 24 hr capsule  flecainide (TAMBOCOR) 50 MG tablet  meloxicam (MOBIC) 7.5 MG tablet      Allergies   Allergen Reactions    Blood Transfusion Related (Informational Only)      Voodoo.  Declines blood transfusions.    Shellfish-Derived Products Swelling    Contrast Dye Rash    Iodinated Contrast Media Rash     Family History  Family History   Problem Relation Age of  Onset    Alcohol/Drug Mother          age 64    Alcoholism Mother     Gastrointestinal Disease Father          age 77 after colon surgery    Alcoholism Father     Arthritis Paternal Grandmother     Rheumatoid Arthritis Paternal Grandmother     Heart Disease Brother     Cardiac Sudden Death Brother     Uterine Cancer Sister     Glaucoma No family hx of     Macular Degeneration No family hx of     Retinal detachment No family hx of     Melanoma No family hx of     Skin Cancer No family hx of      Social History   Social History     Tobacco Use    Smoking status: Former     Current packs/day: 0.00     Average packs/day: 0.5 packs/day for 10.0 years (5.0 ttl pk-yrs)     Types: Cigarettes     Start date: 6/15/1964     Quit date: 1974     Years since quittin.5    Smokeless tobacco: Former     Quit date: 1969   Vaping Use    Vaping status: Never Used   Substance Use Topics    Alcohol use: Yes     Alcohol/week: 3.0 - 4.0 standard drinks of alcohol     Comment: 4 or 5 drinks/week, limit of 1    Drug use: No     Comment: --history of marijuana      A medically appropriate review of systems was performed with pertinent positives and negatives noted in the HPI, and all other systems negative.    Physical Exam   BP: 124/77  Pulse: 110  Temp: 97.5  F (36.4  C)  Resp: 20  SpO2: 97 %  Physical Exam  Physical Exam   Constitutional: Pt is oriented to person, place, and time.Pt appears well-developed and well-nourished.   HENT:   Head: Normocephalic and atraumatic.   Eyes: Conjunctivae are normal. Pupils are equal, round, and reactive to light.   Neck: Normal range of motion. Neck supple.   Cardiovascular: Normal rate, regular rhythm, normal heart sounds and intact distal pulses.    Pulmonary/Chest: Effort normal and breath sounds normal. No respiratory distress. Pt has no wheezes. Pt has no rales  Abdominal: Soft. Bowel sounds are normal. Pt exhibits no distension and no mass. No tenderness. Pt has no rebound  "and no guarding.   Musculoskeletal: Normal range of motion. Pt exhibits no edema.   Neurological: Pt is alert and oriented to person, place, and time. Normal reflexes.   Skin: Skin is warm and dry. No rash noted.   Psychiatric: Pt has a normal mood and affect. Behavior is normal. Judgment and thought content normal.  ***    ED Course, Procedures, & Data      Procedures       {ED Course Selections (Optional):368882}  {ED Sepsis CMS Documentation (Optional):532796::\" \"}       Results for orders placed or performed during the hospital encounter of 12/27/24   EKG 12 lead     Status: None (Preliminary result)   Result Value Ref Range    Systolic Blood Pressure  mmHg    Diastolic Blood Pressure  mmHg    Ventricular Rate 113 BPM    Atrial Rate 226 BPM    DC Interval  ms    QRS Duration 98 ms     ms    QTc 466 ms    P Axis -82 degrees    R AXIS 43 degrees    T Axis 52 degrees    Interpretation ECG       Atrial flutter with 2:1 A-V conduction  Possible Inferior infarct , age undetermined  Abnormal ECG     Results for orders placed or performed in visit on 12/27/24   XR Cervical Spine 2/3 Views     Status: None    Narrative    XR CERVICAL SPINE 2/3 VIEWS 12/27/2024 1:28 PM    History: recurrent pain in neck, L>R, evaluate for profession of DJD;  Cervicalgia   ICD-10: Cervicalgia    Comparison: Cervical spine radiographs 9/13/2021.    Findings: AP, lateral, and odontoid views of the cervical spine were  obtained. Normal cervical spine alignment. Moderate loss of disc  height at C3-4 and C5-6. Mild loss of disc height at C4-5 and C6-7.  Additional multilevel degenerative changes including endplate  osteophytosis, uncinate hypertrophy, and facet hypertrophy. No  prevertebral edema. No mass in the visualized lung apices. Bilateral  carotid atherosclerotic plaque. Slight rotational asymmetry of C1 on  C2, similar to 9/13/2021.      Impression    Impression:  Multilevel cervical degenerative changes, similar to 9/13/2021, " most  pronounced at C3-4 and C5-6.    GAVIOTA LUNDBERG MD         SYSTEM ID:  [id number]   Results for orders placed or performed in visit on 12/27/24   Lipid panel reflex to direct LDL Fasting     Status: None   Result Value Ref Range    Cholesterol 174 <200 mg/dL    Triglycerides 58 <150 mg/dL    Direct Measure HDL 70 >=40 mg/dL    LDL Cholesterol Calculated 92 <100 mg/dL    Non HDL Cholesterol 104 <130 mg/dL    Patient Fasting > 8hrs? Yes     Narrative    Cholesterol  Desirable: < 200 mg/dL  Borderline High: 200 - 239 mg/dL  High: >= 240 mg/dL    Triglycerides  Normal: < 150 mg/dL  Borderline High: 150 - 199 mg/dL  High: 200-499 mg/dL  Very High: >= 500 mg/dL    Direct Measure HDL  Female: >= 50 mg/dL   Male: >= 40 mg/dL    LDL Cholesterol  Desirable: < 100 mg/dL  Above Desirable: 100 - 129 mg/dL   Borderline High: 130 - 159 mg/dL   High:  160 - 189 mg/dL   Very High: >= 190 mg/dL    Non HDL Cholesterol  Desirable: < 130 mg/dL  Above Desirable: 130 - 159 mg/dL  Borderline High: 160 - 189 mg/dL  High: 190 - 219 mg/dL  Very High: >= 220 mg/dL   CBC with platelets     Status: Normal   Result Value Ref Range    WBC Count 7.9 4.0 - 11.0 10e3/uL    RBC Count 4.62 4.40 - 5.90 10e6/uL    Hemoglobin 14.7 13.3 - 17.7 g/dL    Hematocrit 42.9 40.0 - 53.0 %    MCV 93 78 - 100 fL    MCH 31.8 26.5 - 33.0 pg    MCHC 34.3 31.5 - 36.5 g/dL    RDW 12.7 10.0 - 15.0 %    Platelet Count 204 150 - 450 10e3/uL   TSH with free T4 reflex     Status: Normal   Result Value Ref Range    TSH 0.96 0.30 - 4.20 uIU/mL   Magnesium     Status: Normal   Result Value Ref Range    Magnesium 2.2 1.7 - 2.3 mg/dL   Comprehensive metabolic panel (BMP + Alb, Alk Phos, ALT, AST, Total. Bili, TP)     Status: Abnormal   Result Value Ref Range    Sodium 143 135 - 145 mmol/L    Potassium 3.9 3.4 - 5.3 mmol/L    Carbon Dioxide (CO2) 28 22 - 29 mmol/L    Anion Gap 9 7 - 15 mmol/L    Urea Nitrogen 12.4 8.0 - 23.0 mg/dL    Creatinine 0.92 0.67 - 1.17 mg/dL    GFR  Estimate 87 >60 mL/min/1.73m2    Calcium 9.2 8.8 - 10.4 mg/dL    Chloride 106 98 - 107 mmol/L    Glucose 96 70 - 99 mg/dL    Alkaline Phosphatase 100 40 - 150 U/L    AST 56 (H) 0 - 45 U/L    ALT 73 (H) 0 - 70 U/L    Protein Total 7.4 6.4 - 8.3 g/dL    Albumin 4.5 3.5 - 5.2 g/dL    Bilirubin Total 1.1 <=1.2 mg/dL    Patient Fasting > 8hrs? Yes      Medications   etomidate (AMIDATE) 2 MG/ML injection (has no administration in time range)     Labs Ordered and Resulted from Time of ED Arrival to Time of ED Departure - No data to display  No orders to display          {Critical Care Performed?:053078}    Assessment & Plan    ***    I have reviewed the nursing notes. I have reviewed the findings, diagnosis, plan and need for follow up with the patient.    New Prescriptions    No medications on file       Final diagnoses:   None   I, Brandy Evans, am serving as a trained medical scribe to document services personally performed by Benigno Figueroa MD, based on the provider's statements to me.     IBenigno MD, was physically present and have reviewed and verified the accuracy of this note documented by Brandy Evans.     Benigno Figueroa MD  [facility] EMERGENCY DEPARTMENT  12/27/2024   degrees    R AXIS -16 degrees    T Axis 39 degrees    Interpretation ECG       Sinus rhythm  Non-specific intra-ventricular conduction delay  Borderline ECG  Unconfirmed report - interpretation of this ECG is computer generated - see medical record for final interpretation  Confirmed by - EMERGENCY ROOM, PHYSICIAN ([id number]),  BENJAMIN DUNLAP ([id number]) on 12/27/2024 8:05:50 PM     EKG 12-lead, tracing only     Status: None   Result Value Ref Range    Systolic Blood Pressure  mmHg    Diastolic Blood Pressure  mmHg    Ventricular Rate 114 BPM    Atrial Rate 114 BPM    MS Interval 138 ms    QRS Duration 104 ms     ms    QTc 446 ms    P Axis -23 degrees    R AXIS -16 degrees    T Axis 51 degrees    Interpretation ECG       Sinus tachycardia with Premature atrial complexes with Aberrant conduction  Inferior infarct , age undetermined  Abnormal ECG  Unconfirmed report - interpretation of this ECG is computer generated - see medical record for final interpretation  Confirmed by - EMERGENCY ROOM, PHYSICIAN ([id number]),  BENJAMIN DUNLAP ([id number]) on 12/27/2024 8:04:57 PM     Results for orders placed or performed in visit on 12/27/24   XR Cervical Spine 2/3 Views     Status: None    Narrative    XR CERVICAL SPINE 2/3 VIEWS 12/27/2024 1:28 PM    History: recurrent pain in neck, L>R, evaluate for profession of DJD;  Cervicalgia   ICD-10: Cervicalgia    Comparison: Cervical spine radiographs 9/13/2021.    Findings: AP, lateral, and odontoid views of the cervical spine were  obtained. Normal cervical spine alignment. Moderate loss of disc  height at C3-4 and C5-6. Mild loss of disc height at C4-5 and C6-7.  Additional multilevel degenerative changes including endplate  osteophytosis, uncinate hypertrophy, and facet hypertrophy. No  prevertebral edema. No mass in the visualized lung apices. Bilateral  carotid atherosclerotic plaque. Slight rotational asymmetry of C1 on  C2, similar to 9/13/2021.      Impression     Impression:  Multilevel cervical degenerative changes, similar to 9/13/2021, most  pronounced at C3-4 and C5-6.    GAVIOTA LUNDBERG MD         SYSTEM ID:  [id number]   Results for orders placed or performed in visit on 12/27/24   Lipid panel reflex to direct LDL Fasting     Status: None   Result Value Ref Range    Cholesterol 174 <200 mg/dL    Triglycerides 58 <150 mg/dL    Direct Measure HDL 70 >=40 mg/dL    LDL Cholesterol Calculated 92 <100 mg/dL    Non HDL Cholesterol 104 <130 mg/dL    Patient Fasting > 8hrs? Yes     Narrative    Cholesterol  Desirable: < 200 mg/dL  Borderline High: 200 - 239 mg/dL  High: >= 240 mg/dL    Triglycerides  Normal: < 150 mg/dL  Borderline High: 150 - 199 mg/dL  High: 200-499 mg/dL  Very High: >= 500 mg/dL    Direct Measure HDL  Female: >= 50 mg/dL   Male: >= 40 mg/dL    LDL Cholesterol  Desirable: < 100 mg/dL  Above Desirable: 100 - 129 mg/dL   Borderline High: 130 - 159 mg/dL   High:  160 - 189 mg/dL   Very High: >= 190 mg/dL    Non HDL Cholesterol  Desirable: < 130 mg/dL  Above Desirable: 130 - 159 mg/dL  Borderline High: 160 - 189 mg/dL  High: 190 - 219 mg/dL  Very High: >= 220 mg/dL   CBC with platelets     Status: Normal   Result Value Ref Range    WBC Count 7.9 4.0 - 11.0 10e3/uL    RBC Count 4.62 4.40 - 5.90 10e6/uL    Hemoglobin 14.7 13.3 - 17.7 g/dL    Hematocrit 42.9 40.0 - 53.0 %    MCV 93 78 - 100 fL    MCH 31.8 26.5 - 33.0 pg    MCHC 34.3 31.5 - 36.5 g/dL    RDW 12.7 10.0 - 15.0 %    Platelet Count 204 150 - 450 10e3/uL   TSH with free T4 reflex     Status: Normal   Result Value Ref Range    TSH 0.96 0.30 - 4.20 uIU/mL   Magnesium     Status: Normal   Result Value Ref Range    Magnesium 2.2 1.7 - 2.3 mg/dL   Comprehensive metabolic panel (BMP + Alb, Alk Phos, ALT, AST, Total. Bili, TP)     Status: Abnormal   Result Value Ref Range    Sodium 143 135 - 145 mmol/L    Potassium 3.9 3.4 - 5.3 mmol/L    Carbon Dioxide (CO2) 28 22 - 29 mmol/L    Anion Gap 9 7 - 15 mmol/L    Urea  Nitrogen 12.4 8.0 - 23.0 mg/dL    Creatinine 0.92 0.67 - 1.17 mg/dL    GFR Estimate 87 >60 mL/min/1.73m2    Calcium 9.2 8.8 - 10.4 mg/dL    Chloride 106 98 - 107 mmol/L    Glucose 96 70 - 99 mg/dL    Alkaline Phosphatase 100 40 - 150 U/L    AST 56 (H) 0 - 45 U/L    ALT 73 (H) 0 - 70 U/L    Protein Total 7.4 6.4 - 8.3 g/dL    Albumin 4.5 3.5 - 5.2 g/dL    Bilirubin Total 1.1 <=1.2 mg/dL    Patient Fasting > 8hrs? Yes      Medications   etomidate (AMIDATE) 2 MG/ML injection (10 mg  $Given 12/27/24 1502)   sodium chloride 0.9% BOLUS 1,000 mL (0 mLs Intravenous Stopped 12/27/24 1536)     Labs Ordered and Resulted from Time of ED Arrival to Time of ED Departure - No data to display  -Cardioversion External    (Results Pending)          Critical Care Addendum  My initial assessment, based on my review of nursing observations, review of vital signs, focused history, physical exam, review of cardiac rhythm monitor, and 12 lead ECG analysis, established a high suspicion that Frank Orellana has a critical arrhythmia, which requires immediate intervention, and therefore he is critically ill.     After the initial assessment, the care team initiated multiple lab tests, initiated IV fluid administration, and performed sedation and cardioversion  to provide stabilization care. Due to the critical nature of this patient, I reassessed nursing observations, vital signs, physical exam, review of cardiac rhythm monitor, and 12 lead ECG analysis multiple times prior to his disposition.     Time also spent performing documentation, discussion with family to obtain medical information for decision making, reviewing test results, discussion with consultants, and coordination of care.     Critical care time (excluding teaching time and procedures): 35 minutes.       Assessment & Plan    Frank Orellana is a 74 year old male with a history of paroxysmal atrial for fibrillation who presents to the emergency department with an irregular heart  rate. Patient is tachycardic on arrival with irregularly irregular rhythm, and otherwise stable with other vital signs within normal limits. He is nontoxic appearing overall. EKG showed atrial fibrillation with rapid ventricular rate. We discussed options and patient wanted to pursue sedation and electric cardioversion, understanding possible risk of stroke as patient has not been anticoagulated. Patient expressed understanding and consented, subsequently underwent sedation with etomidate (0.15mg/kg, 10mg given) and cardioversion with 200J synchronized cardioversion with successful conversion to normal sinus rhythm. Patient tolerated this well. Neurologic evaluation and exam normal following procedure. Patient monitored in the department and had screening labs drawn as above. He had no recurrence of arrhythmia and was discharged with outpatient follow up and return precautions.     I have reviewed the nursing notes. I have reviewed the findings, diagnosis, plan and need for follow up with the patient.    Discharge Medication List as of 12/27/2024  3:59 PM          Final diagnoses:   Atrial fibrillation with RVR (H)   I, Brandy Evans, am serving as a trained medical scribe to document services personally performed by Benigno Figueroa MD, based on the provider's statements to me.     Benigno BOB MD, was physically present and have reviewed and verified the accuracy of this note documented by Brandy Evans.     Benigno Figueroa MD  [facility] EMERGENCY DEPARTMENT  12/27/2024     Benigno Figueroa MD  01/03/25 0250

## 2025-01-12 DIAGNOSIS — I48.0 PAF (PAROXYSMAL ATRIAL FIBRILLATION) (H): ICD-10-CM

## 2025-01-16 NOTE — TELEPHONE ENCOUNTER
"Last Written Prescription:    flecainide (TAMBOCOR) 50 MG tablet 180 tablet 1 11/4/2024     ----------------------  Last Visit Date: 5/1/24  Future Visit Date: 1/29/25  ----------------------        Refill decision: Refill pended and routed to the provider for review/determination due to the following criteria not met:     NOT ON CARDIOLOGY REFILL PROTOCOL      Request from pharmacy:  Requested Prescriptions   Pending Prescriptions Disp Refills    flecainide (TAMBOCOR) 50 MG tablet [Pharmacy Med Name: Flecainide Acetate 50 MG Oral Tablet] 200 tablet 2     Sig: TAKE 1 TABLET BY MOUTH TWICE  DAILY       Anti Arrhythmic Agents Protocol Failed - 1/16/2025  3:49 PM        Failed - Medication needs approval from authorizing provider     Please forward this request to the authorizing provider for approval.           Passed - Lipid Panel on file in past year     Recent Labs   Lab Test 12/27/24  1321   CHOL 174   TRIG 58   HDL 70   LDL 92   NHDL 104               Passed - CBC on file in past year     Recent Labs   Lab Test 12/27/24  1321   WBC 7.9   RBC 4.62   HGB 14.7   HCT 42.9                    Passed - ALT on file in past year     Recent Labs   Lab Test 12/27/24  1321   ALT 73*             Passed - Serum Creatinine on file in past year     Recent Labs   Lab Test 12/27/24  1321   CR 0.92                 Passed - Serum Sodium on file in past year     Recent Labs   Lab Test 12/27/24  1321                 Passed - Serum Potassium on file in past year     Recent Labs   Lab Test 12/27/24  1321   POTASSIUM 3.9             Passed - Patient is 18 years of age or older        Passed - Recent (6 mo) or future (30 days) visit with authorizing provider's specialty     Patient had office visit in the last 6 months or has a visit in the next 30 days with authorizing provider.  See \"Patient Info\" tab in inbasket, or \"Choose Columns\" in Meds & Orders section of the refill encounter.                 Hazel Ames RN  P " [facility] Triage & Med Refill Team

## 2025-01-20 ENCOUNTER — ANCILLARY PROCEDURE (OUTPATIENT)
Dept: MRI IMAGING | Facility: CLINIC | Age: 75
End: 2025-01-20
Attending: INTERNAL MEDICINE
Payer: COMMERCIAL

## 2025-01-20 DIAGNOSIS — M54.2 CERVICALGIA: ICD-10-CM

## 2025-01-20 PROCEDURE — 72141 MRI NECK SPINE W/O DYE: CPT | Performed by: RADIOLOGY

## 2025-01-20 RX ORDER — FLECAINIDE ACETATE 50 MG/1
50 TABLET ORAL 2 TIMES DAILY
Qty: 180 TABLET | Refills: 0 | Status: SHIPPED | OUTPATIENT
Start: 2025-01-20

## 2025-01-21 ENCOUNTER — MYC MEDICAL ADVICE (OUTPATIENT)
Dept: INTERNAL MEDICINE | Facility: CLINIC | Age: 75
End: 2025-01-21
Payer: COMMERCIAL

## 2025-01-21 DIAGNOSIS — M47.812 CERVICAL SPONDYLOSIS WITHOUT MYELOPATHY: Primary | ICD-10-CM

## 2025-01-22 ENCOUNTER — PATIENT OUTREACH (OUTPATIENT)
Dept: CARE COORDINATION | Facility: CLINIC | Age: 75
End: 2025-01-22
Payer: COMMERCIAL

## 2025-01-27 NOTE — PROGRESS NOTES
ELECTROPHYSIOLOGY CLINIC VISIT    Assessment/Recommendations   Assessment/Plan:    Ms. Orellana is a 75 year old Bahai male who has a past medical history significant for PAF (CHADSVASC 2) s/p DCCVs, chronic hepatitis C, hepatitis B, WINNIE (uses CPAP), tubular adenoma of colon, and tobacco use.     Paroxsymal Atrial Fibrillation:  We discussed in detail with the patient management/treatment options for Oneal includin. Stroke Prophylaxis:  CHADSVASC=++age  1, corresponding to a 2.2% annual stroke / systemic embolism event rate. He is on ASA only and only uses DOAC surrounding DCCVs. Due to his Orthodox beliefs, he would like to minimize risk of bleeding/need for blood transfusion. Discussed that we recommend daily oral AC with CHADSVASC of 2. He states understanding, but elects to only take it surrounding DCCVs at this time.   2. Rate Control: Continue Diltiazem.   3. Rhythm Control: He has had several DCCVs, He failed Sotalol. He has now been on Flecainide 50 mg BID. He had several self limiting recurrences over the year and required a DCCV 23, 3/27/24, and 24. Offered to increase Flecainide dose or alternative AAT vs ablation. He states he is not needing to escalate therapies at this time. He will let us know if AF increases to a point he wants to change options. QRS stable on Flecainide.   4. Risk Factor Management: maintain good BP control and continued WINNIE treatment with CPAP.       Dilated Ascending Aorta  Echo completed in 2019 with mildly dilated ascending aorta measuring 4.2 cm. Will repeat echo for routine monitoring.     Follow up 1 year or sooner if need arises.        History of Present Illness/Subjective    Mr. Frank Orellana is a 75 year old male who comes in today for EP follow-up of PAF.    Mr. Orellana is a 75 year old Bahai male who has a past medical history significant for PAF (CHADSVASC 2) s/p DCCVs, chronic hepatitis C, hepatitis B, WINNIE (uses CPAP),  tubular adenoma of colon, and tobacco use.      At our initial visit in 12/2016 he had reported ~20 years of paroxysmal atrial fibrillation manifesting as chest pressure and palpitations. The first episode occurred during a picnic and converted spontaneously to sinus rhythm.  He took metoprolol for 30 days.  Approximately 10 years later he had a second episode in the setting of a meniscal tear and perhaps 2 or 3 more episodes in the intervening years until his palpitations became more frequent in the year leading up to his most recent presentation.  A 48-hour Holter monitor which showed variation between sinus rhythm, junctional rhythm, and A.fib (average HR 63, range ) with 1% PVCs and <1% supraventricular ectopic beats, with 33 runs of PAT vs. A.fib (longest 10 beats at 164 bpm). He then presented to the ED on 12/2/16 for palpitations and was in A.fib with RVR~111 bpm, started on diltiazem and ASA 81 mg daily, and discharged. He had recurrent episode of AF end of 12/2017 that manifested as palpitations, SOB and fatigue. He felt the symptoms for about 10 days prior and was seen by Dr. Hussein on 1/6/18. At this time he was noted to be in symptomatic AF (rate controlled), and was scheduled to undergo JIM/DCCV, which was performed on 1/15/18. He was on Dabigatran for 4 weeks following the DCCV and his ASA was held. He last saw Dr. Felipe in EP clinic in 2/2018 and was doing well. More recently, he called reporting he was back in AF. He was started on Pradaxa within 1.5 days of his symptoms. He was then arranged for DCCV. He then had DCCV on 10/11/18. He had recurrence and required another DCCV on 11/28/18. He followed up in EP clinic on 12/27/18 and was feeling well without issues. He presented 1/18 to [facility] with racing heart sensation associated with diaphoresis and mild chest discomfort and lightheadedness c/w prior AF symptoms. Found to be in AF. He states he has had a few other AF symptoms recently and feels  "they are growing in frequency/severity. His AF did spontaneously terminated when in ER. He was initiated on Sotalol during this admission by EP.     EP visit 4/24/19: He presents today for follow-up after having initiated sotalol and cardioverted in ER 1/18/19. He reports no recurrent sustained palpitations. He does have intermittent, infrequent, short-lived palpitations which are not bothersome (and corroborated with PAC's / PVC's on recent Zio). Recent Holter shows no atrial fibrillation but run of AT (13 seconds). He does report, and his wife agrees, new onset significant fatigue since initiating sotalol. Now taking regular naps and just not doing usual enjoyable activities as long as he is used to. EKG: VR 47 bpm,  ms,  ms, QTc 421 ms. Sotalol 80 mg BID, asa 81 mg. Given recurrences Sotalol was stopped and Flecainide was started.      EP Vist 7/24/19: After developing symptoms of a URI last week, felt his heart \"beat out of rhythm\" on Monday evening. Call ahead to clinic was scheduled for Cardioversion, however on his way to hospital, he went out of afib and improved. Since then denies any symptoms of lightheadedness and dizziness. Per discussion with patient, did not start on Pradaxa and is still on ASA today. Denies chest pain, SOB, lightheadedness and dizziness today.      EP clinic On 10/08/21:He presents today for follow up. He is complaining of atypical chest pain occurred started 2 weeks ago, pain is aching, mainly in the left side of the chest and occurred for 36 hours continuous, moderate in intensity, no lightheaded or diaphoresis accompaning the pain , no LOC, no lower extremity edema. He has been on his dofetilide taking it twice a day without any more episodes of ER visits or RVR, pt is on ASA no anticoagulation. Presenting 12 lead ECG shows Vent Rate 60 bpm,  ms,  ms, QTc 441 ms. Current cardiac medications include: Flecainide, Diltiazem, and ASA.      EP Visit 11/2/22: He " reports feeling well. He has had some rare palpitations lasting seconds over the last year. No known sustained AF events. He denies chest discomfort, abdominal fullness/bloating or peripheral edema, shortness of breath, paroxysmal nocturnal dyspnea, orthopnea, lightheadedness, dizziness, pre-syncope, or syncope. Current cardiac medications include: Flecainide, Diltiazem, and ASA.      EP Visit 11/28/23: He presents today for follow up. He had recurrence of AF on: 4/18/23, 7/16/23, 9/19/23, 10/12/23. All self converted except 9/19/23 received DCCV. He reports feeling well. He denies chest discomfort, palpitations, abdominal fullness/bloating or peripheral edema, shortness of breath, paroxysmal nocturnal dyspnea, orthopnea, lightheadedness, dizziness, pre-syncope, or syncope. Presenting 12 lead ECG shows SB Vent Rate 49 bpm,  ms,  ms, QTc 404 ms. Current cardiac medications include: Flecainide, Diltiazem, and ASA.      EP Visit 5/1/24: He presents today for follow up. He called in reporting AF and had a DCCV on 3/27/24. He reports feeling well. He denies chest discomfort, palpitations, abdominal fullness/bloating or peripheral edema, shortness of breath, paroxysmal nocturnal dyspnea, orthopnea, lightheadedness, dizziness, pre-syncope, or syncope. Presenting 12 lead ECG shows SB iRBBB Vent Rate 52 bpm,  ms,  ms, QTc 411 ms. Current cardiac medications include: Flecainide, Diltiazem, and ASA.    He recently presented to the ER on 12/27/24 for AF with RVR and underwent DCCV 12/27/24. He was on flecainide 50 mg BID at the time. He also stated he uses his Eliquis PRN when he feels he is in A-fib.     He presents today in follow up. He reports feeling well. He denies recurrence of A-fib since DCCV. He denies chest discomfort, palpitations, abdominal fullness/bloating or peripheral edema, shortness of breath, paroxysmal nocturnal dyspnea, orthopnea, lightheadedness, dizziness, pre-syncope, or  syncope. Presenting 12 lead ECG shows SB Vent Rate 52 bpm,  ms,  ms, QTc 448 ms. Current cardiac medications include: Flecainide 50 mg BID, Diltiazem 120 mg daily, ASA 81 mg.       I have reviewed and updated the patient's Past Medical History, Social History, Family History and Medication List.     Cardiographics (Personally Reviewed) :   1/5/2019 Echo:   Interpretation Summary  Global and regional left ventricular function is normal with an EF of 55-60%.  No regional wall motion abnormalities are seen.  Right ventricular function, chamber size, wall motion, and thickness are  normal.  Mild AI.  Mildly dilated ascending aorta measuring 4.2 cm (2.1 cm/m2.)     This study was compared with the study from 4/27/17: there has been no  significant change. Specifically, the ascending aorta is unchanged in size on  direct remeasurement of the images from the prior study.    10/3/22 NM Stress Test:   The nuclear stress test is negative for inducible myocardial ischemia or infarction. LVEDv 144ml. LVESv 44ml. LV EF 69%.        Physical Examination   There were no vitals taken for this visit.  Wt Readings from Last 3 Encounters:   12/27/24 74.8 kg (165 lb)   12/27/24 76.1 kg (167 lb 12.8 oz)   11/14/24 77.5 kg (170 lb 14.4 oz)     General Appearance:   Alert, well-appearing and in no acute distress.   HEENT: Atraumatic, normocephalic.    Chest/Lungs:   Respirations unlabored.  Lungs are clear to auscultation.   Cardiovascular:   Regular rate and rhythm.  S1/S2. No murmur.    Abdomen:  Soft, nontender, nondistended.   Extremities: No cyanosis or clubbing. No edema.     Musculoskeletal: Moves all extremities.     Skin: Warm, dry, intact.    Neurologic: Mood and affect are appropriate.  Alert and oriented to person, place, time, and situation.          Medications  Allergies   Current Outpatient Medications   Medication Sig Dispense Refill    aspirin 81 MG EC tablet Take 1 tablet (81 mg) by mouth daily 90 tablet 3     cyclobenzaprine (FLEXERIL) 5 MG tablet Take 1-2 tablets (5-10 mg) by mouth 2 times daily as needed for muscle spasms. 20 tablet 0    diltiazem ER COATED BEADS (CARDIZEM CD/CARTIA XT) 120 MG 24 hr capsule Take 1 capsule (120 mg) by mouth daily. 90 capsule 3    flecainide (TAMBOCOR) 50 MG tablet TAKE 1 TABLET BY MOUTH TWICE  DAILY 180 tablet 0    meloxicam (MOBIC) 7.5 MG tablet Take 1 tablet (7.5 mg) by mouth daily. (Patient not taking: Reported on 12/27/2024) 14 tablet 0    Allergies   Allergen Reactions    Blood Transfusion Related (Informational Only)      Anabaptism.  Declines blood transfusions.    Shellfish-Derived Products Swelling    Contrast Dye Rash    Iodinated Contrast Media Rash         Lab Results (Personally Reviewed)    Chemistry/lipid CBC Cardiac Enzymes/BNP/TSH/INR   Lab Results   Component Value Date    BUN 12.4 12/27/2024     12/27/2024    CO2 28 12/27/2024     Creatinine   Date Value Ref Range Status   12/27/2024 0.92 0.67 - 1.17 mg/dL Final   12/30/2020 0.92 0.66 - 1.25 mg/dL Final       Lab Results   Component Value Date    CHOL 174 12/27/2024    HDL 70 12/27/2024    LDL 92 12/27/2024      Lab Results   Component Value Date    WBC 7.9 12/27/2024    HGB 14.7 12/27/2024    HCT 42.9 12/27/2024    MCV 93 12/27/2024     12/27/2024    Lab Results   Component Value Date    TSH 0.96 12/27/2024    INR 1.52 (H) 01/15/2018        The patient states understanding and is agreeable with the plan.     Helen Osuna PA-C?[facility]  Electrophysiology Consult Service  Pager #[phone]    I spent a total of 20 minutes face to face with Frank Orellana during today's office visit. I have spent an additional 15 minutes today on chart review and documentation.

## 2025-01-29 ENCOUNTER — LAB (OUTPATIENT)
Dept: LAB | Facility: CLINIC | Age: 75
End: 2025-01-29
Payer: COMMERCIAL

## 2025-01-29 ENCOUNTER — OFFICE VISIT (OUTPATIENT)
Dept: CARDIOLOGY | Facility: CLINIC | Age: 75
End: 2025-01-29
Attending: NURSE PRACTITIONER
Payer: COMMERCIAL

## 2025-01-29 VITALS
OXYGEN SATURATION: 96 % | HEART RATE: 54 BPM | SYSTOLIC BLOOD PRESSURE: 127 MMHG | BODY MASS INDEX: 25 KG/M2 | DIASTOLIC BLOOD PRESSURE: 76 MMHG | WEIGHT: 169.3 LBS

## 2025-01-29 DIAGNOSIS — I71.21 ANEURYSM OF ASCENDING AORTA WITHOUT RUPTURE: ICD-10-CM

## 2025-01-29 DIAGNOSIS — I48.0 PAF (PAROXYSMAL ATRIAL FIBRILLATION) (H): ICD-10-CM

## 2025-01-29 DIAGNOSIS — I48.0 PAROXYSMAL ATRIAL FIBRILLATION (H): Primary | ICD-10-CM

## 2025-01-29 LAB
ALBUMIN SERPL BCG-MCNC: 4.3 G/DL (ref 3.5–5.2)
ALP SERPL-CCNC: 89 U/L (ref 40–150)
ALT SERPL W P-5'-P-CCNC: 56 U/L (ref 0–70)
AST SERPL W P-5'-P-CCNC: 48 U/L (ref 0–45)
BILIRUB DIRECT SERPL-MCNC: 0.33 MG/DL (ref 0–0.3)
BILIRUB SERPL-MCNC: 1 MG/DL
PROT SERPL-MCNC: 7.1 G/DL (ref 6.4–8.3)

## 2025-01-29 PROCEDURE — 93005 ELECTROCARDIOGRAM TRACING: CPT

## 2025-01-29 PROCEDURE — 36415 COLL VENOUS BLD VENIPUNCTURE: CPT | Performed by: PATHOLOGY

## 2025-01-29 PROCEDURE — G0463 HOSPITAL OUTPT CLINIC VISIT: HCPCS | Performed by: NURSE PRACTITIONER

## 2025-01-29 PROCEDURE — 80076 HEPATIC FUNCTION PANEL: CPT | Performed by: PATHOLOGY

## 2025-01-29 RX ORDER — DILTIAZEM HYDROCHLORIDE 120 MG/1
120 CAPSULE, COATED, EXTENDED RELEASE ORAL DAILY
Qty: 90 CAPSULE | Refills: 3 | Status: SHIPPED | OUTPATIENT
Start: 2025-01-29

## 2025-01-29 ASSESSMENT — PAIN SCALES - GENERAL: PAINLEVEL_OUTOF10: NO PAIN (0)

## 2025-01-29 NOTE — PATIENT INSTRUCTIONS
You were seen in the Electrophysiology Clinic today by: SAHIL Yanez and Yamilka Skinner NP    Plan:   Medication Changes: None    Labs/Tests Needed: Echocardiogram     Follow up Visit: 1 year, or sooner as needed.     Further Instructions: We will follow up with the results of your echocardiogram once it is completed.       If you have further questions, please utilize [employer] to contact us.     Your Care Team:    EP Cardiology   Telephone Number     Nurse Line  Rudy Hubbard RN    [phone]     For scheduling appointments:   [facility]   [phone]   For procedure scheduling:    Yelitza Wright     [phone]   For the Device Clinic (Pacemakers, ICDs, Loop Recorders)    During business hours: [phone]  After business hours:   [phone]- select option 4 and ask for job code [id number].       On-call cardiologist for after hours or on weekends:   [phone], option #4, and ask to speak to the on-call cardiologist.     Cardiovascular Clinic:   [address] [place] [address]. [place] [address]      As always, Thank you for trusting us with your health care needs!

## 2025-01-29 NOTE — NURSING NOTE
Chief Complaint   Patient presents with    Follow Up     1 mo follow-up DCCV - on flec.         Vitals were taken, medications reconciled and EKG performed.    Ariel Olguin, EMT    10:55 AM

## 2025-01-29 NOTE — LETTER
2025      RE: Frank Orellana  [address]  [facility] [address]       Dear Colleague,    Thank you for the opportunity to participate in the care of your patient, Frank Orellana, at the [facility] [place] [facility]. Please see a copy of my visit note below.        ELECTROPHYSIOLOGY CLINIC VISIT    Assessment/Recommendations   Assessment/Plan:    Ms. Orellana is a 75 year old [place] male who has a past medical history significant for PAF (CHADSVASC 2) s/p DCCVs, chronic hepatitis C, hepatitis B, WINNIE (uses CPAP), tubular adenoma of colon, and tobacco use.     Paroxsymal Atrial Fibrillation:  We discussed in detail with the patient management/treatment options for Oneal includin. Stroke Prophylaxis:  CHADSVASC=++age  2, corresponding to a 2.2% annual stroke / systemic embolism event rate. He is on ASA only and only uses DOAC surrounding DCCVs. Due to his Orthodox beliefs, he would like to minimize risk of bleeding/need for blood transfusion. Discussed that we recommend daily oral AC with CHADSVASC of 2. He states understanding, but elects to only take it surrounding DCCVs at this time.   2. Rate Control: Continue Diltiazem.   3. Rhythm Control: He has had several DCCVs, He failed Sotalol. He has now been on Flecainide 50 mg BID. He had several self limiting recurrences over the year and required a DCCV 23, 3/27/24, and 24. Offered to increase Flecainide dose or alternative AAT vs ablation. He states he is not needing to escalate therapies at this time. He will let us know if AF increases to a point he wants to change options. QRS stable on Flecainide.   4. Risk Factor Management: maintain good BP control and continued WINNIE treatment with CPAP.       Dilated Ascending Aorta  Echo completed in 2019 with mildly dilated ascending aorta measuring 4.2 cm. Will repeat echo for routine monitoring.     Follow up 1 year or  sooner if need arises.        History of Present Illness/Subjective    Mr. Frank Orellana is a 75 year old male who comes in today for EP follow-up of PAF.    Mr. Orellana is a 75 year old [employer] male who has a past medical history significant for PAF (CHADSVASC 2) s/p DCCVs, chronic hepatitis C, hepatitis B, WINNIE (uses CPAP), tubular adenoma of colon, and tobacco use.      At our initial visit in 12/2016 he had reported ~20 years of paroxysmal atrial fibrillation manifesting as chest pressure and palpitations. The first episode occurred during a picnic and converted spontaneously to sinus rhythm.  He took metoprolol for 30 days.  Approximately 10 years later he had a second episode in the setting of a meniscal tear and perhaps 2 or 3 more episodes in the intervening years until his palpitations became more frequent in the year leading up to his most recent presentation.  A 48-hour Holter monitor which showed variation between sinus rhythm, junctional rhythm, and A.fib (average HR 63, range ) with 1% PVCs and <1% supraventricular ectopic beats, with 33 runs of PAT vs. A.fib (longest 10 beats at 164 bpm). He then presented to the ED on 12/2/16 for palpitations and was in A.fib with RVR~111 bpm, started on diltiazem and ASA 81 mg daily, and discharged. He had recurrent episode of AF end of 12/2017 that manifested as palpitations, SOB and fatigue. He felt the symptoms for about 10 days prior and was seen by Dr. Hussein on 1/6/18. At this time he was noted to be in symptomatic AF (rate controlled), and was scheduled to undergo JIM/DCCV, which was performed on 1/15/18. He was on Dabigatran for 4 weeks following the DCCV and his ASA was held. He last saw Dr. Felipe in EP clinic in 2/2018 and was doing well. More recently, he called reporting he was back in AF. He was started on Pradaxa within 1.5 days of his symptoms. He was then arranged for DCCV. He then had DCCV on 10/11/18. He had recurrence and required  "another [facility] on 11/28/18. He followed up in EP clinic on 12/27/18 and was feeling well without issues. He presented 1/[facility] to [facility] with racing heart sensation associated with diaphoresis and mild chest discomfort and lightheadedness c/w prior AF symptoms. Found to be in AF. He states he has had a few other AF symptoms recently and feels they are growing in frequency/severity. His AF did spontaneously terminated when in ER. He was initiated on Sotalol during this admission by EP.     EP visit 4/24/19: He presents today for follow-up after having initiated sotalol and cardioverted in ER 1/18/19. He reports no recurrent sustained palpitations. He does have intermittent, infrequent, short-lived palpitations which are not bothersome (and corroborated with PAC's / PVC's on recent Zio). Recent Holter shows no atrial fibrillation but run of AT (13 seconds). He does report, and his wife agrees, new onset significant fatigue since initiating sotalol. Now taking regular naps and just not doing usual enjoyable activities as long as he is used to. EKG: VR 47 bpm,  ms,  ms, QTc 421 ms. Sotalol 80 mg BID, asa 81 mg. Given recurrences Sotalol was stopped and Flecainide was started.      EP Vist 7/24/19: After developing symptoms of a URI last week, felt his heart \"beat out of rhythm\" on Monday evening. Call ahead to clinic was scheduled for Cardioversion, however on his way to hospital, he went out of afib and improved. Since then denies any symptoms of lightheadedness and dizziness. Per discussion with patient, did not start on Pradaxa and is still on ASA today. Denies chest pain, SOB, lightheadedness and dizziness today.      EP clinic On 10/08/21:He presents today for follow up. He is complaining of atypical chest pain occurred started 2 weeks ago, pain is aching, mainly in the left side of the chest and occurred for 36 hours continuous, moderate in intensity, no lightheaded or diaphoresis accompaning the pain , no " LOC, no lower extremity edema. He has been on his dofetilide taking it twice a day without any more episodes of ER visits or RVR, pt is on ASA no anticoagulation. Presenting 12 lead ECG shows Vent Rate 60 bpm,  ms,  ms, QTc 441 ms. Current cardiac medications include: Flecainide, Diltiazem, and ASA.      EP Visit 11/2/22: He reports feeling well. He has had some rare palpitations lasting seconds over the last year. No known sustained AF events. He denies chest discomfort, abdominal fullness/bloating or peripheral edema, shortness of breath, paroxysmal nocturnal dyspnea, orthopnea, lightheadedness, dizziness, pre-syncope, or syncope. Current cardiac medications include: Flecainide, Diltiazem, and ASA.      EP Visit 11/28/23: He presents today for follow up. He had recurrence of AF on: 4/18/23, 7/16/23, 9/19/23, 10/12/23. All self converted except 9/19/23 received DCCV. He reports feeling well. He denies chest discomfort, palpitations, abdominal fullness/bloating or peripheral edema, shortness of breath, paroxysmal nocturnal dyspnea, orthopnea, lightheadedness, dizziness, pre-syncope, or syncope. Presenting 12 lead ECG shows SB Vent Rate 49 bpm,  ms,  ms, QTc 404 ms. Current cardiac medications include: Flecainide, Diltiazem, and ASA.      EP Visit 5/1/24: He presents today for follow up. He called in reporting AF and had a DCCV on 3/27/24. He reports feeling well. He denies chest discomfort, palpitations, abdominal fullness/bloating or peripheral edema, shortness of breath, paroxysmal nocturnal dyspnea, orthopnea, lightheadedness, dizziness, pre-syncope, or syncope. Presenting 12 lead ECG shows SB iRBBB Vent Rate 52 bpm,  ms,  ms, QTc 411 ms. Current cardiac medications include: Flecainide, Diltiazem, and ASA.    He recently presented to the ER on 12/27/24 for AF with RVR and underwent DCCV 12/27/24. He was on flecainide 50 mg BID at the time. He also stated he uses his Eliquis  PRN when he feels he is in A-fib.     He presents today in follow up. He reports feeling well. He denies recurrence of A-fib since [facility]. He denies chest discomfort, palpitations, abdominal fullness/bloating or peripheral edema, shortness of breath, paroxysmal nocturnal dyspnea, orthopnea, lightheadedness, dizziness, pre-syncope, or syncope. Presenting 12 lead ECG shows SB Vent Rate 52 bpm,  ms,  ms, QTc 448 ms. Current cardiac medications include: Flecainide 50 mg BID, Diltiazem 120 mg daily, ASA 81 mg.       I have reviewed and updated the patient's Past Medical History, Social History, Family History and Medication List.     Cardiographics (Personally Reviewed) :   1/5/2019 Echo:   Interpretation Summary  Global and regional left ventricular function is normal with an EF of 55-60%.  No regional wall motion abnormalities are seen.  Right ventricular function, chamber size, wall motion, and thickness are  normal.  Mild AI.  Mildly dilated ascending aorta measuring 4.2 cm (2.1 cm/m2.)     This study was compared with the study from 4/27/17: there has been no  significant change. Specifically, the ascending aorta is unchanged in size on  direct remeasurement of the images from the prior study.    10/3/22 NM Stress Test:   The nuclear stress test is negative for inducible myocardial ischemia or infarction. LVEDv 144ml. LVESv 44ml. LV EF 69%.        Physical Examination   There were no vitals taken for this visit.  Wt Readings from Last 3 Encounters:   12/27/24 74.8 kg (165 lb)   12/27/24 76.1 kg (167 lb 12.8 oz)   11/14/24 77.5 kg (170 lb 14.4 oz)     General Appearance:   Alert, well-appearing and in no acute distress.   HEENT: Atraumatic, normocephalic.    Chest/Lungs:   Respirations unlabored.  Lungs are clear to auscultation.   Cardiovascular:   Regular rate and rhythm.  S1/S2. No murmur.    Abdomen:  Soft, nontender, nondistended.   Extremities: No cyanosis or clubbing. No edema.     Musculoskeletal:  Moves all extremities.     Skin: Warm, dry, intact.    Neurologic: Mood and affect are appropriate.  Alert and oriented to person, place, time, and situation.          Medications  Allergies   Current Outpatient Medications   Medication Sig Dispense Refill     aspirin 81 MG EC tablet Take 1 tablet (81 mg) by mouth daily 90 tablet 3     cyclobenzaprine (FLEXERIL) 5 MG tablet Take 1-2 tablets (5-10 mg) by mouth 2 times daily as needed for muscle spasms. 20 tablet 0     diltiazem ER COATED BEADS (CARDIZEM CD/CARTIA XT) 120 MG 24 hr capsule Take 1 capsule (120 mg) by mouth daily. 90 capsule 3     flecainide (TAMBOCOR) 50 MG tablet TAKE 1 TABLET BY MOUTH TWICE  DAILY 180 tablet 0     meloxicam (MOBIC) 7.5 MG tablet Take 1 tablet (7.5 mg) by mouth daily. (Patient not taking: Reported on 12/27/2024) 14 tablet 0    Allergies   Allergen Reactions     Blood Transfusion Related (Informational Only)      Baptism.  Declines blood transfusions.     Shellfish-Derived Products Swelling     Contrast Dye Rash     Iodinated Contrast Media Rash         Lab Results (Personally Reviewed)    Chemistry/lipid CBC Cardiac Enzymes/BNP/TSH/INR   Lab Results   Component Value Date    BUN 12.4 12/27/2024     12/27/2024    CO2 28 12/27/2024     Creatinine   Date Value Ref Range Status   12/27/2024 0.92 0.67 - 1.17 mg/dL Final   12/30/2020 0.92 0.66 - 1.25 mg/dL Final       Lab Results   Component Value Date    CHOL 174 12/27/2024    HDL 70 12/27/2024    LDL 92 12/27/2024      Lab Results   Component Value Date    WBC 7.9 12/27/2024    HGB 14.7 12/27/2024    HCT 42.9 12/27/2024    MCV 93 12/27/2024     12/27/2024    Lab Results   Component Value Date    TSH 0.96 12/27/2024    INR 1.52 (H) 01/15/2018        The patient states understanding and is agreeable with the plan.     Helen Osuna PA-C?Yamilka Skinner, ANDREINA CNP  [facility]  Electrophysiology Consult Service  Pager #[phone]    I spent a total  of 20 minutes face to face with Frank Orellana during today's office visit. I have spent an additional 15 minutes today on chart review and documentation.                   Please do not hesitate to contact me if you have any questions/concerns.     Sincerely,     ANDREINA Chisholm CNP

## 2025-01-30 LAB
ATRIAL RATE - MUSE: 52 BPM
DIASTOLIC BLOOD PRESSURE - MUSE: NORMAL MMHG
INTERPRETATION ECG - MUSE: NORMAL
P AXIS - MUSE: 89 DEGREES
PR INTERVAL - MUSE: 158 MS
QRS DURATION - MUSE: 124 MS
QT - MUSE: 482 MS
QTC - MUSE: 448 MS
R AXIS - MUSE: -2 DEGREES
SYSTOLIC BLOOD PRESSURE - MUSE: NORMAL MMHG
T AXIS - MUSE: 46 DEGREES
VENTRICULAR RATE- MUSE: 52 BPM

## 2025-02-03 ENCOUNTER — ANCILLARY PROCEDURE (OUTPATIENT)
Dept: CARDIOLOGY | Facility: CLINIC | Age: 75
End: 2025-02-03
Payer: COMMERCIAL

## 2025-02-03 DIAGNOSIS — I71.21 ANEURYSM OF ASCENDING AORTA WITHOUT RUPTURE: ICD-10-CM

## 2025-02-03 LAB — LVEF ECHO: NORMAL

## 2025-02-03 PROCEDURE — 93306 TTE W/DOPPLER COMPLETE: CPT | Performed by: STUDENT IN AN ORGANIZED HEALTH CARE EDUCATION/TRAINING PROGRAM

## 2025-02-17 ENCOUNTER — ANCILLARY PROCEDURE (OUTPATIENT)
Dept: GENERAL RADIOLOGY | Facility: CLINIC | Age: 75
End: 2025-02-17
Attending: NURSE PRACTITIONER
Payer: COMMERCIAL

## 2025-02-17 ENCOUNTER — OFFICE VISIT (OUTPATIENT)
Dept: PHYSICAL MEDICINE AND REHAB | Facility: CLINIC | Age: 75
End: 2025-02-17
Payer: COMMERCIAL

## 2025-02-17 VITALS — HEART RATE: 53 BPM | OXYGEN SATURATION: 98 % | SYSTOLIC BLOOD PRESSURE: 142 MMHG | DIASTOLIC BLOOD PRESSURE: 63 MMHG

## 2025-02-17 DIAGNOSIS — M54.9 UPPER BACK PAIN: ICD-10-CM

## 2025-02-17 DIAGNOSIS — G89.29 CHRONIC MIDLINE THORACIC BACK PAIN: ICD-10-CM

## 2025-02-17 DIAGNOSIS — M48.02 NEUROFORAMINAL STENOSIS OF CERVICAL SPINE: ICD-10-CM

## 2025-02-17 DIAGNOSIS — M54.6 CHRONIC MIDLINE THORACIC BACK PAIN: ICD-10-CM

## 2025-02-17 DIAGNOSIS — M54.2 NECK PAIN: Primary | ICD-10-CM

## 2025-02-17 DIAGNOSIS — M47.812 CERVICAL SPONDYLOSIS WITHOUT MYELOPATHY: ICD-10-CM

## 2025-02-17 DIAGNOSIS — G89.29 CHRONIC BILATERAL LOW BACK PAIN WITHOUT SCIATICA: ICD-10-CM

## 2025-02-17 DIAGNOSIS — M54.50 CHRONIC BILATERAL LOW BACK PAIN WITHOUT SCIATICA: ICD-10-CM

## 2025-02-17 DIAGNOSIS — M54.12 CERVICAL RADICULOPATHY: ICD-10-CM

## 2025-02-17 DIAGNOSIS — R20.0 NUMBNESS: ICD-10-CM

## 2025-02-17 PROCEDURE — 72070 X-RAY EXAM THORAC SPINE 2VWS: CPT | Performed by: STUDENT IN AN ORGANIZED HEALTH CARE EDUCATION/TRAINING PROGRAM

## 2025-02-17 PROCEDURE — 99205 OFFICE O/P NEW HI 60 MIN: CPT | Performed by: NURSE PRACTITIONER

## 2025-02-17 PROCEDURE — 72100 X-RAY EXAM L-S SPINE 2/3 VWS: CPT | Performed by: STUDENT IN AN ORGANIZED HEALTH CARE EDUCATION/TRAINING PROGRAM

## 2025-02-17 RX ORDER — LIDOCAINE 40 MG/G
CREAM TOPICAL
OUTPATIENT
Start: 2025-02-17

## 2025-02-17 NOTE — LETTER
"2/17/2025       RE: Frank Orellana  [address]  [facility] [address]     Dear Colleague,    Thank you for referring your patient, Frank Orellana, to the [facility] PHYS[facility]INE [facility] CLIN[facility] [place] at [facility]. Please see a copy of my visit note below.    Today's Date: 2/17/2025     Patient seen at the request of Yani Burnett for an opinion and evaluation of Cervical spondylosis without myelopathy .      HISTORY OF PRESENT ILLNESS:  Frank Orellana is a 75 year old male who presents with a chief complaint of neck and back pain.      He was seen today in the clinic, accompanied by his wife. He describes longstanding neck and back pain \"off and on\" for 50 years.  He did physical therapy for neck pain in 2021.  Today, he reports increased neck pain which began a few months ago without any known cause.    He says the neck pain is constant and daily, and sometimes can be quite severe.  In particular the pain can bother him at nighttime. He describes episodes screaming with pain at night & difficulty rolling over due to pain.      In the last week his mid and lower back also began bothering him more.    He says his arms generally feel weaker.  He noticed this when he was at [employer] trying to lift the corner of a piece of drywall.  He has not otherwise noticed any weakness and is not dropping things.    He endorses chronic longstanding numbness in the bilateral thumbs and bilateral index fingers.  He also has numbness in the balls of both feet and bilateral toes, which has been present for a couple years.      Today, he describes  -Acute on chronic daily constant neck pain, varies side to side  -Acute on chronic intermittent midline mid thoracic pain  -Acute on chronic intermittent nonradiating low back pain  -Chronic UE numbness: bilateral thumbs and bilateral index fingers  -Chronic LE numbness: balls of both feet and bilateral " toes    Aggravating factors include: Lying down, standing, sitting, walking, coughing/sneezing  Relieving factors include: None    Today, he rates the pain 1/10.  At its worst over the last week the pain was 6-7/10.   Patient states sleep is affected most nights.    He had a fall last mechanical week, but no other recent falls.    He denies balance problems, no new difficulty with fine motor tasks such as manipulating buttons or zippers, falls, weakness, bowel or bladder incontinence, saddle anesthesia, unintentional weight loss, fevers/chills, or night sweats.         PRIOR INJURIES/TREATMENT:   Ice/Heat: has tried both without relief  Brace: none  Physical Therapy:   PT for neck pain in 2021     - Current Pain Medications -   none  Aspirin 81 mg    - Prior/Trialed Pain Medications -   Cyclobenzaprine -   5-10 mg didn't help  Naproxen  Meloxicam - didn't help    Prior Procedures:  Date    Procedure   Improvement (%)  Shoulder steroid injection             Prior Related Surgery: none     Other (acupuncture, OMT, CMM, TENS, DME, etc.):   + Chiropractor    Specialists Seen - (with most recent, available notes and clinic visits reviewed)   1. Orthopedics-Dupuytren's contracture both hands, left glenohumeral joint OA    IMAGING - reviewed   MR CERVICAL SPINE W/O CONTRAST  1/20/2025 6:47 AM      HISTORY:  Neck pain; Neck pain, no complicating feature; Chronic neck  pain duration >= 3 months; Worsening or not improving; Adequate  conservative therapy; No known/automatically detected potential  contraindications to MRI; Cervicalgia        COMPARISON:  X-ray 12/27/2024     TECHNIQUE: Sagittal T1-weighted, sagittal STIR, sagittal T2-weighted,  sagittal diffusion weighted, axial and sagittal gradient echo, and  axial T2-weighted images of the cervical spine were obtained without  intravenous contrast.     FINDINGS:  Trace 2 mm retrolisthesis of C3 on C4, trace 1 mm retrolisthesis of C4  on C5 and anterolisthesis of C7 on T1.  Normal marrow signal.  Multilevel disc height loss most significantly at C3-4 and C5-6.  Prominence of the central canal at the level of C6-7. No abnormal cord  signal. Diffusion-weighted images are negative for focal abnormality.     The findings on a level by level basis are as follows:     C2-3: No spinal canal or neural foraminal stenosis.     C3-4: Asymmetric left disc osteophyte complex. Bilateral uncinate  spurring and facet hypertrophy. Mild-to-moderate left and mild right  neural foraminal narrowing. No significant spinal canal stenosis.     C4-5: Disc bulge. Bilateral uncinate spurring and facet hypertrophy.  Moderate to severe bilateral neural foraminal narrowing. Mild spinal  canal stenosis.     C5-6: Disc bulge. Bilateral uncinate spurring and facet hypertrophy.  Moderate to severe bilateral neural foraminal narrowing. No  significant spinal canal stenosis.     C6-7: Left asymmetric disc osteophyte complex. Bilateral facet  hypertrophy. Severe left and moderate right neural foraminal  narrowing. No significant spinal canal stenosis.     C7-T1: No spinal canal or neural foraminal stenosis.                                                                      IMPRESSION:  1. Multilevel cervical spondylosis most significant at C4-5 and C5-6  with moderate to severe bilateral neural foraminal narrowing as well  as at C6-7 with severe left and moderate right neural foraminal  narrowing. Mild spinal canal stenosis at the level of C4-5.  2. Prominence of the central canal at the level of C6-7. No abnormal  cord signal.         XR CERVICAL SPINE 2/3 VIEWS 12/27/2024 1:28 PM     History: recurrent pain in neck, L>R, evaluate for profession of DJD;  Cervicalgia   ICD-10: Cervicalgia     Comparison: Cervical spine radiographs 9/13/2021.     Findings: AP, lateral, and odontoid views of the cervical spine were  obtained. Normal cervical spine alignment. Moderate loss of disc  height at C3-4 and C5-6. Mild loss of  disc height at C4-5 and C6-7.  Additional multilevel degenerative changes including endplate  osteophytosis, uncinate hypertrophy, and facet hypertrophy. No  prevertebral edema. No mass in the visualized lung apices. Bilateral  carotid atherosclerotic plaque. Slight rotational asymmetry of C1 on  C2, similar to 9/13/2021.                                                                      Impression:  Multilevel cervical degenerative changes, similar to 9/13/2021, most  pronounced at C3-4 and C5-6.      XR THORACIC SPINE 2 VIEWS, 9/13/2021 4:49 PM     Indication: acute R sided neck/trap pain, r/o DJD or trauma; Neck pain     Comparison: Same day cervical spine radiographs, chest radiograph  1/18/2019     Findings: AP and lateral radiographs of the thoracic spine were  obtained. Mildly increased kyphotic curvature of the thoracic spine.  No acute osseous abnormalities. Mild multilevel endplate degenerative  changes and endplate osteophytosis, grossly unchanged since prior  exam.     Visualized lungs are clear. Multiple chronic appearing posterior right  upper rib fractures.                                                                      Impression:   1. Mild multilevel endplate degenerative changes of the thoracic  spine. No acute osseous anomalies.  2. See same-day cervical spine radiograph for additional detail.       Review Of Systems:  I am responding to those symptoms which are directly relevant to the specific indication for my consultation. I recommend that the patient follow up with their primary or referring provider to pursue any other symptoms which may be of concern.       Medical History:  He  has a past medical history of Actinic keratosis (2009), Atrial fibrillation (H) (06/15/1996), Dupuytren's contracture of both hands, Elevated PSA, Hepatitis B (1969), Hepatitis C virus infection, unspecified chronicity, WINNIE (obstructive sleep apnea), Pain in both hands, Patient is [facility], Refusal  of blood transfusions as patient is Faith, Right shoulder pain, Tinnitus (1 or 2 years ago), and Tubular adenoma of colon (01/17/2017).     He  has a past surgical history that includes Arthroscopy knee; colonoscopy (1/17/17); Release dupuytrens contracture (Right, 1/20/2017); knee surgery (2006??); Herniorrhaphy inguinal (Right, 10/26/2017); Release dupuytrens contracture (Left, 12/15/2017); Anesthesia cardioversion (N/A, 1/15/2018); Transesophageal echocardiogram intraoperative (N/A, 1/15/2018); Anesthesia cardioversion (N/A, 10/11/2018); Anesthesia cardioversion (N/A, 11/28/2018); Cystoscopy, transurethral resection (TUR) prostate, combined (N/A, 8/21/2020); Colonoscopy (N/A, 6/26/2023); Release dupuytrens contracture (Right, 8/9/2023); Anesthesia cardioversion (N/A, 9/19/2023); and Anesthesia cardioversion (N/A, 3/27/2024).    Family History  His family history includes Alcohol/Drug in his mother; Alcoholism in his father and mother; Arthritis in his paternal grandmother; Cardiac Sudden Death in his brother; Gastrointestinal Disease in his father; Heart Disease in his brother; Rheumatoid Arthritis in his paternal grandmother; Uterine Cancer in his sister.     Social History:  Work: retired for 10 years, previously did factory work with heavy lifting (remotely), janitorial work, most recently worked in [employer] for [employer]  Current living situation: Lives with spouse. Performs ADLs/IADLs independently.  He  reports that he quit smoking about 50 years ago. His smoking use included cigarettes. He started smoking about 60 years ago. He has a 5 pack-year smoking history. He quit smokeless tobacco use about 55 years ago. He reports current alcohol use of about 3.0 - 4.0 standard drinks of alcohol per week. He reports that he does not use drugs.        Current Medications:   He has a current medication list which includes the following prescription(s): aspirin, cyclobenzaprine, diltiazem er  coated beads, flecainide, and meloxicam.     Allergies:    -- Blood Transfusion Related (Informational Only)     --  Tenriism.  Declines blood transfusions.   -- Shellfish-Derived Products -- Swelling   -- Contrast Dye -- Rash   -- Iodinated Contrast Media -- Rash    PHYSICAL EXAMINATION:  BP (!) 142/63 (BP Location: Right arm, Patient Position: Sitting, Cuff Size: Adult Regular)   Pulse 53   SpO2 98%    --CONSTITUTIONAL: Vital signs as above. No acute distress. The patient is well nourished and well groomed.  --PSYCHIATRIC: The patient is awake, alert, oriented to person, place, time and answering questions appropriately with clear speech. Appropriate mood and affect   --HEENT: Sclera are non-injected. Extraocular muscles are intact. Moist oral mucosa.  --SKIN: observable skin is clean, dry, intact without rashes.  --RESPIRATORY: Normal rhythm and effort. No abnormal accessory muscle breathing patterns noted.   --GROSS MOTOR: Easily arises from a seated position. Gait is non-antalgic.  Tandem gait normal.  --CERVICAL /THORACIC SPINE: Inspection reveals no evidence of deformity. Range of motion is limited in cervical flexion, extension, lateral rotation & all elicit pain. No tenderness to palpation cervical or thoracic spine.  Spurling maneuver negative bilaterally.  --UPPER EXTREMITY MOTOR TESTING:  Wrist flexion left 5/5, right 5/5  Wrist extension left 5/5, right 5/5  Biceps left 5/5, right 5/5   Triceps left 5/5, right 5/5   Shoulder abduction left 5/5, right 5/5   left 5/5, right 5/5  Finger abduction left 5/5, right 5/5  --LOWER EXTREMITY MOTOR TESTING:  Plantar flexion left 5/5, right 5/5   Dorsiflexion left 5/5, right 5/5   Great toe MTP extension left 5/5, right 5/5  Knee flexion left 5/5, right 5/5  Knee extension left 5/5, right 5/5   Hip flexion left 5/5, right 5/5  --MUSCULOSKELETAL: Lumbar spine inspection reveals no evidence of deformity. Range of motion is limited in lumbar flexion,  extension, lateral rotation & elicits pain. No tenderness to palpation lumbar spine. Straight leg raise is pos bilat. Sciatic notch non-tender. Facet loading maneuvers are pos bilat.  --SACROILIAC JOINT: No pain with palpation of SI joints. Rosalinda neg.   --HIPS: Full range of motion bilaterally. No pain with logrolling. No pain with palpation of the greater trochanters.   --NEUROLOGIC: CN III-XII are grossly intact.   2/4 symmetric biceps & brachioradialis reflexes bilaterally. Sensation to upper extremities is intact except bilat thumbs and bilat index fingers which are numb.  Negative Smith's bilaterally.    2/4 patellar and achilles reflexes bilaterally.  Sensation to light touch is intact in the bilateral L4, L5, and S1 dermatomes. Babinski is negative.   --VASCULAR:  Warm upper and lower limbs bilaterally. There is no pitting edema of the bilateral lower extremities. 2/4 radial pulses bilaterally.           ASSESSMENT:  Frank Orellana is a pleasant 75 year old male who presents with   -Acute on chronic daily constant neck pain, varies side to side  -Acute on chronic intermittent midline mid thoracic pain  -Acute on chronic intermittent nonradiating low back pain  -Chronic UE numbness: bilateral thumbs and bilateral index fingers  -Chronic LE numbness: balls of both feet and bilateral toes  -Subjective weakness of unclear chronicity    MRI Cerivical spine shows:  -Multilevel spondylolisthesis  -Multilevel cervical spondylosis, most notably at C4-5 and C5-6 where there is bilateral moderate/severe neuroforaminal stenosis.  At C56-7 there is severe left neuroforaminal stenosis and moderate right neuroforaminal stenosis    Differential includes cervical DDD, cervical radiculopathy, cervical facet mediated pain      Complicating comorbities include:  # Paroxysmal A-fib, not on AC, s/p multiple DCCV  # Dupuytren's contraction of both hands  # History of right shoulder pain    Note: pt is [place]        PLAN:  -MRI of the cervical spine reviewed in detail with the patient and his wife today  -Add PT to refresh the home exercise program  -Given severe degree of pain he requests to proceed with interventional procedure for pain.  We discussed both C7-T1 IL MAREN and MBB/RFA.  Add C7-T1 ILESI.   -No medication changes made today   -Add x-rays of the thoracic and lumbar spine which can be done after today's visit  -consider cervical flex/ex XR  -RTC for procedure with Dr. Garcia    Ready to learn, no apparent learning barriers.  Education provided on treatment plan according to patient's preferred learning style.  Patient verbalizes understanding.   __________________________________  Amanda Carrion NP  Physical Medicine & Rehabilitation        63 minutes spent by me on the date of the encounter doing chart review, history and exam, documentation and further activities per the note         Again, thank you for allowing me to participate in the care of your patient.      Sincerely,    ANDREINA Jorgensen CNP

## 2025-02-17 NOTE — PROGRESS NOTES
"Today's Date: 2/17/2025     Patient seen at the request of Yani Burnett for an opinion and evaluation of Cervical spondylosis without myelopathy .      HISTORY OF PRESENT ILLNESS:  Frank Orellana is a 75 year old male who presents with a chief complaint of neck and back pain.      He was seen today in the clinic, accompanied by his wife. He describes longstanding neck and back pain \"off and on\" for 50 years.  He did physical therapy for neck pain in 2021.  Today, he reports increased neck pain which began a few months ago without any known cause.    He says the neck pain is constant and daily, and sometimes can be quite severe.  In particular the pain can bother him at nighttime. He describes episodes screaming with pain at night & difficulty rolling over due to pain.      In the last week his mid and lower back also began bothering him more.    He says his arms generally feel weaker.  He noticed this when he was at [employer] trying to lift the corner of a piece of drywall.  He has not otherwise noticed any weakness and is not dropping things.    He endorses chronic longstanding numbness in the bilateral thumbs and bilateral index fingers.  He also has numbness in the balls of both feet and bilateral toes, which has been present for a couple years.      Today, he describes  -Acute on chronic daily constant neck pain, varies side to side  -Acute on chronic intermittent midline mid thoracic pain  -Acute on chronic intermittent nonradiating low back pain  -Chronic UE numbness: bilateral thumbs and bilateral index fingers  -Chronic LE numbness: balls of both feet and bilateral toes    Aggravating factors include: Lying down, standing, sitting, walking, coughing/sneezing  Relieving factors include: None    Today, he rates the pain 1/10.  At its worst over the last week the pain was 6-7/10.   Patient states sleep is affected most nights.    He had a fall last mechanical week, but no other recent falls.    He denies balance " problems, no new difficulty with fine motor tasks such as manipulating buttons or zippers, falls, weakness, bowel or bladder incontinence, saddle anesthesia, unintentional weight loss, fevers/chills, or night sweats.         PRIOR INJURIES/TREATMENT:   Ice/Heat: has tried both without relief  Brace: none  Physical Therapy:   PT for neck pain in 2021     - Current Pain Medications -   none  Aspirin 81 mg    - Prior/Trialed Pain Medications -   Cyclobenzaprine -   5-10 mg didn't help  Naproxen  Meloxicam - didn't help    Prior Procedures:  Date    Procedure   Improvement (%)  Shoulder steroid injection             Prior Related Surgery: none     Other (acupuncture, OMT, CMM, TENS, DME, etc.):   + Chiropractor    Specialists Seen - (with most recent, available notes and clinic visits reviewed)   1. Orthopedics-Dupuytren's contracture both hands, left glenohumeral joint OA    IMAGING - reviewed   MR CERVICAL SPINE W/O CONTRAST  1/20/2025 6:47 AM      HISTORY:  Neck pain; Neck pain, no complicating feature; Chronic neck  pain duration >= 3 months; Worsening or not improving; Adequate  conservative therapy; No known/automatically detected potential  contraindications to MRI; Cervicalgia        COMPARISON:  X-ray 12/27/2024     TECHNIQUE: Sagittal T1-weighted, sagittal STIR, sagittal T2-weighted,  sagittal diffusion weighted, axial and sagittal gradient echo, and  axial T2-weighted images of the cervical spine were obtained without  intravenous contrast.     FINDINGS:  Trace 2 mm retrolisthesis of C3 on C4, trace 1 mm retrolisthesis of C4  on C5 and anterolisthesis of C7 on T1. Normal marrow signal.  Multilevel disc height loss most significantly at C3-4 and C5-6.  Prominence of the central canal at the level of C6-7. No abnormal cord  signal. Diffusion-weighted images are negative for focal abnormality.     The findings on a level by level basis are as follows:     C2-3: No spinal canal or neural foraminal stenosis.      C3-4: Asymmetric left disc osteophyte complex. Bilateral uncinate  spurring and facet hypertrophy. Mild-to-moderate left and mild right  neural foraminal narrowing. No significant spinal canal stenosis.     C4-5: Disc bulge. Bilateral uncinate spurring and facet hypertrophy.  Moderate to severe bilateral neural foraminal narrowing. Mild spinal  canal stenosis.     C5-6: Disc bulge. Bilateral uncinate spurring and facet hypertrophy.  Moderate to severe bilateral neural foraminal narrowing. No  significant spinal canal stenosis.     C6-7: Left asymmetric disc osteophyte complex. Bilateral facet  hypertrophy. Severe left and moderate right neural foraminal  narrowing. No significant spinal canal stenosis.     C7-T1: No spinal canal or neural foraminal stenosis.                                                                      IMPRESSION:  1. Multilevel cervical spondylosis most significant at C4-5 and C5-6  with moderate to severe bilateral neural foraminal narrowing as well  as at C6-7 with severe left and moderate right neural foraminal  narrowing. Mild spinal canal stenosis at the level of C4-5.  2. Prominence of the central canal at the level of C6-7. No abnormal  cord signal.         XR CERVICAL SPINE 2/3 VIEWS 12/27/2024 1:28 PM     History: recurrent pain in neck, L>R, evaluate for profession of DJD;  Cervicalgia   ICD-10: Cervicalgia     Comparison: Cervical spine radiographs 9/13/2021.     Findings: AP, lateral, and odontoid views of the cervical spine were  obtained. Normal cervical spine alignment. Moderate loss of disc  height at C3-4 and C5-6. Mild loss of disc height at C4-5 and C6-7.  Additional multilevel degenerative changes including endplate  osteophytosis, uncinate hypertrophy, and facet hypertrophy. No  prevertebral edema. No mass in the visualized lung apices. Bilateral  carotid atherosclerotic plaque. Slight rotational asymmetry of C1 on  C2, similar to 9/13/2021.                                                                       Impression:  Multilevel cervical degenerative changes, similar to 9/13/2021, most  pronounced at C3-4 and C5-6.      XR THORACIC SPINE 2 VIEWS, 9/13/2021 4:49 PM     Indication: acute R sided neck/trap pain, r/o DJD or trauma; Neck pain     Comparison: Same day cervical spine radiographs, chest radiograph  1/18/2019     Findings: AP and lateral radiographs of the thoracic spine were  obtained. Mildly increased kyphotic curvature of the thoracic spine.  No acute osseous abnormalities. Mild multilevel endplate degenerative  changes and endplate osteophytosis, grossly unchanged since prior  exam.     Visualized lungs are clear. Multiple chronic appearing posterior right  upper rib fractures.                                                                      Impression:   1. Mild multilevel endplate degenerative changes of the thoracic  spine. No acute osseous anomalies.  2. See same-day cervical spine radiograph for additional detail.       Review Of Systems:  I am responding to those symptoms which are directly relevant to the specific indication for my consultation. I recommend that the patient follow up with their primary or referring provider to pursue any other symptoms which may be of concern.       Medical History:  He  has a past medical history of Actinic keratosis (2009), Atrial fibrillation (H) (06/15/1996), Dupuytren's contracture of both hands, Elevated PSA, Hepatitis B (1969), Hepatitis C virus infection, unspecified chronicity, WINNIE (obstructive sleep apnea), Pain in both hands, Patient is Hoahaoism, Refusal of blood transfusions as patient is Hoahaoism, Right shoulder pain, Tinnitus (1 or 2 years ago), and Tubular adenoma of colon (01/17/2017).     He  has a past surgical history that includes Arthroscopy knee; colonoscopy (1/17/17); Release dupuytrens contracture (Right, 1/20/2017); knee surgery (2006??); Herniorrhaphy inguinal (Right,  10/26/2017); Release dupuytrens contracture (Left, 12/15/2017); Anesthesia cardioversion (N/A, 1/15/2018); Transesophageal echocardiogram intraoperative (N/A, 1/15/2018); Anesthesia cardioversion (N/A, 10/11/2018); Anesthesia cardioversion (N/A, 11/28/2018); Cystoscopy, transurethral resection (TUR) prostate, combined (N/A, 8/21/2020); Colonoscopy (N/A, 6/26/2023); Release dupuytrens contracture (Right, 8/9/2023); Anesthesia cardioversion (N/A, 9/19/2023); and Anesthesia cardioversion (N/A, 3/27/2024).    Family History  His family history includes Alcohol/Drug in his mother; Alcoholism in his father and mother; Arthritis in his paternal grandmother; Cardiac Sudden Death in his brother; Gastrointestinal Disease in his father; Heart Disease in his brother; Rheumatoid Arthritis in his paternal grandmother; Uterine Cancer in his sister.     Social History:  Work: retired for 10 years, previously did factory work with heavy lifting (remotely), [employer] work, most recently worked in IT for [employer]  Current living situation: Lives with spouse. Performs ADLs/IADLs independently.  He  reports that he quit smoking about 50 years ago. His smoking use included cigarettes. He started smoking about 60 years ago. He has a 5 pack-year smoking history. He quit smokeless tobacco use about 55 years ago. He reports current alcohol use of about 3.0 - 4.0 standard drinks of alcohol per week. He reports that he does not use drugs.        Current Medications:   He has a current medication list which includes the following prescription(s): aspirin, cyclobenzaprine, diltiazem er coated beads, flecainide, and meloxicam.     Allergies:    -- Blood Transfusion Related (Informational Only)     --  Samaritan.  Declines blood transfusions.   -- Shellfish-Derived Products -- Swelling   -- Contrast Dye -- Rash   -- Iodinated Contrast Media -- Rash    PHYSICAL EXAMINATION:  BP (!) 142/63 (BP Location: Right arm, Patient  Position: Sitting, Cuff Size: Adult Regular)   Pulse 53   SpO2 98%    --CONSTITUTIONAL: Vital signs as above. No acute distress. The patient is well nourished and well groomed.  --PSYCHIATRIC: The patient is awake, alert, oriented to person, place, time and answering questions appropriately with clear speech. Appropriate mood and affect   --HEENT: Sclera are non-injected. Extraocular muscles are intact. Moist oral mucosa.  --SKIN: observable skin is clean, dry, intact without rashes.  --RESPIRATORY: Normal rhythm and effort. No abnormal accessory muscle breathing patterns noted.   --GROSS MOTOR: Easily arises from a seated position. Gait is non-antalgic.  Tandem gait normal.  --CERVICAL /THORACIC SPINE: Inspection reveals no evidence of deformity. Range of motion is limited in cervical flexion, extension, lateral rotation & all elicit pain. No tenderness to palpation cervical or thoracic spine.  Spurling maneuver negative bilaterally.  --UPPER EXTREMITY MOTOR TESTING:  Wrist flexion left 5/5, right 5/5  Wrist extension left 5/5, right 5/5  Biceps left 5/5, right 5/5   Triceps left 5/5, right 5/5   Shoulder abduction left 5/5, right 5/5   left 5/5, right 5/5  Finger abduction left 5/5, right 5/5  --LOWER EXTREMITY MOTOR TESTING:  Plantar flexion left 5/5, right 5/5   Dorsiflexion left 5/5, right 5/5   Great toe MTP extension left 5/5, right 5/5  Knee flexion left 5/5, right 5/5  Knee extension left 5/5, right 5/5   Hip flexion left 5/5, right 5/5  --MUSCULOSKELETAL: Lumbar spine inspection reveals no evidence of deformity. Range of motion is limited in lumbar flexion, extension, lateral rotation & elicits pain. No tenderness to palpation lumbar spine. Straight leg raise is pos bilat. Sciatic notch non-tender. Facet loading maneuvers are pos bilat.  --SACROILIAC JOINT: No pain with palpation of SI joints. Rosalinda neg.   --HIPS: Full range of motion bilaterally. No pain with logrolling. No pain with palpation of  the greater trochanters.   --NEUROLOGIC: CN III-XII are grossly intact.   2/4 symmetric biceps & brachioradialis reflexes bilaterally. Sensation to upper extremities is intact except bilat thumbs and bilat index fingers which are numb.  Negative Smith's bilaterally.    2/4 patellar and achilles reflexes bilaterally.  Sensation to light touch is intact in the bilateral L4, L5, and S1 dermatomes. Babinski is negative.   --VASCULAR:  Warm upper and lower limbs bilaterally. There is no pitting edema of the bilateral lower extremities. 2/4 radial pulses bilaterally.           ASSESSMENT:  Frank Orellana is a pleasant 75 year old male who presents with   -Acute on chronic daily constant neck pain, varies side to side  -Acute on chronic intermittent midline mid thoracic pain  -Acute on chronic intermittent nonradiating low back pain  -Chronic UE numbness: bilateral thumbs and bilateral index fingers  -Chronic LE numbness: balls of both feet and bilateral toes  -Subjective weakness of unclear chronicity    MRI Cerivical spine shows:  -Multilevel spondylolisthesis  -Multilevel cervical spondylosis, most notably at C4-5 and C5-6 where there is bilateral moderate/severe neuroforaminal stenosis.  At C56-7 there is severe left neuroforaminal stenosis and moderate right neuroforaminal stenosis    Differential includes cervical DDD, cervical radiculopathy, cervical facet mediated pain      Complicating comorbities include:  # Paroxysmal A-fib, not on AC, s/p multiple DCCV  # Dupuytren's contraction of both hands  # History of right shoulder pain    Note: pt is [place]       PLAN:  -MRI of the cervical spine reviewed in detail with the patient and his wife today  -Add PT to refresh the home exercise program  -Given severe degree of pain he requests to proceed with interventional procedure for pain.  We discussed both C7-T1 IL MAREN and MBB/RFA.  Add C7-T1 ILESI.   -No medication changes made today   -Add x-rays of the  thoracic and lumbar spine which can be done after today's visit  -consider cervical flex/ex XR  -RTC for procedure with Dr. Garcia    Ready to learn, no apparent learning barriers.  Education provided on treatment plan according to patient's preferred learning style.  Patient verbalizes understanding.   __________________________________  Amanda Carrion NP  Physical Medicine & Rehabilitation        63 minutes spent by me on the date of the encounter doing chart review, history and exam, documentation and further activities per the note

## 2025-02-17 NOTE — PATIENT INSTRUCTIONS
It was a pleasure seeing you in the clinic today.  As discussed, we made the following updates to your plan of care:    An XR order was added today.  I will send you a message about the x-ray result through [employer]      An order for physical therapy was added today. Physical therapy schedulers should call you in the next few days to schedule an appointment. You may also call [phone] to arrange an appointment at any of the [facility] outpatient physical therapy locations.  It will be very important for you to do the physical therapy exercises on a regular basis to decrease your pain and prevent future flares of pain.     I added an order for epidural steroid injection with Dr Garcia. You will receive a call to help schedule the appointment. I included additional information about the injection below.  Please let me know if you have any questions or concerns.    Let's plan to follow up 2 weeks after the steroid injection to see how you are doing and talk about next steps.       Thank you,  Amanda Carrion NP  Physical Medicine and Rehabilitation, Medical Spine  PM&R clinic Phone: [phone]  PM&R clinic Fax: [phone]          Preparing for Spine Injection Therapy              Why Do I Need Spine Injection Therapy?  Your Health Care Provider is recommending spine injection therapy to  help relieve your back and neck pain. This will be in addition to other therapies such as medications and physical therapy. The purpose of these injections is to reduce the amount of inflammation (swelling, pain, heat, redness, loss of body function) around the nerves thus reducing the amount of pain.     The medications you will receive with the injections will include:   Anesthetic - medication to numb the painful area.    Steroid - medication that prevents or reduces swelling and pain (anti-inflammatory).   To reduce your discomfort during the injection procedure, you will receive a numbing medication injection prior  to the placement of the needles. You will be lying on your stomach during the injection procedure. We will use a low-dose x-ray (fluoroscopy) to help guide needle placement.  You must have a  for this procedure. We will need to reschedule your injection if you do not have a  with you.      What are the different types of injections and procedure?  Below, is a brief description of the different types of injections we use to deliver pain medication as close as possible to the nerves in the painful area:    Epidural injection: (picture on the right) Epidural injections place 2 medications in your epidural space.  This is the space alongside your spinal canal (not inside of it). Nerves from your spinal cord pass through this space. The medications will bathe those nerves.       Facet joint injection: These injections place 2 medications into the joints of your neck or spine.   SI joint injection: (picture on the left) deliver pain medications into the Sacroiliac joint that connects the hip bones.   Hip joint injection: deliver pain medication to the joint that connect your hip and femur bones (the femur is the bone in the center of the leg that extends from the knee with the hip).  You will be lying on your side with your affected side up. For example, if we are injecting your left hip, then you will be lying on your right side.   Medial Branch Block injections: This is a test to see if your pain is coming from a specific nerve. This injection is similar to a facet joint injection but contains only the numbing medication.  You will keep a pain score diary for the rest of the day and the following morning after receiving the injection.    Radiofrequency ablation: This is a procedure that uses radio waves and numbing medication to block the nerves that feel pain at the joint. The pain relief effects can last for a long time, but are not permanent. The procedure is similar to the Medial Branch Block but  requires additional testing to ensure that the needle is near the nerve before numbing and ablating it.  Doctors often order sedation for this procedure.  Please see the sections on sedation below.      What Should I Expect if I Receive Sedation? THIS IS ORDERED BY THE PHYSICIAN  This is conscious sedation.  The medications we give to you will help you relax and reduce your anxiety.  You will still be awake for the procedure so we can ask you questions and hear your answers.     What Are My Responsibilities With Sedation?  You must stop eating and drinking 8 (eight) hours before your procedure. You can have clear fluids (water, coffee/tea without milk) up to 2 hours prior to the injections. Nothing by mouth for 2 hours prior to injection.  This includes gum, mints, or chew.  Take your morning medications with a small sip of water.    You must have a  who will check-in with you, and stay in the building while the procedure is underway.  If you do not have a  your sedation will be cancelled.  We will monitor you for at least 30 minutes after the procedure before being discharged home.      What Are the Risks and Complications For This Procedure?  Risks and complication are rare, but can still occur.  You should understand, discuss, and accept these risks before agreeing to the procedure. They include, but are not limited to:  infection  nerve damage   paralysis  injection failure or a need for further injections or additional procedures  continued or worsening of symptoms/pain,   medication reaction,  dural leak (into the hole covering around the spinal cord. This may cause a a spinal headache)  leak of the medication into the spinal canal, nerves, or blood vessel.  death       What do I need to do before the procedure?   If you do not follow these instructions your procedure may be cancelled.  Tell us if you are on major blood thinners such as Coumadin, Xarelto , Plavix, Eliquis , Pradaxa , or others.     Contact the doctor who prescribed your blood thinner to ask for permission to stop taking it before you have the injection.    Schedule your pain injection procedure after your doctor gave their permission.   We will notify you when to stop and re-start your blood thinner.  Tell us if you have any allergies to contrast dye.  If you do, we may give additional medications to take before the procedure.  Tell us if you are pregnant, or possibly pregnant.  If so, you cannot receive steroid medications or be exposed to fluoroscopic X-rays.  Tell us if you have been sick during the 10 days before the procedure. This includes:   colds   gastrointestinal illness or discomfort  dental sores,   skin infection, or any other type of infection.  Tell us if you have taken antibiotics during the 10 days prior to the procedure.  Do not drink alcohol the night before or on the day of the procedure.  You must shower the night before and on the day of your procedure.  Wear comfortable, clean clothing.  If you have an outside MRI (Magnetic Resonance Imaging photo), please bring it with you.    What Will Happen After the Procedure?  If you did not receive sedation we will monitor you for 15 minutes after the procedure. If you received sedation, we will monitor you for at least 30 minutes after the procedure     How soon can I expect pain relief?  You have received 2 types of medications with your injection:    Anesthetic - numbing medication which only acts for a few hours    Steroid which may take 3-14 days to be effective.   You can expect to feel your normal pain after the anesthetic wears off, until the steroid becomes effective.    How should I care for myself at home?  Get plenty of rest and avoid twisting, bending movements, heavy lifting, or strenuous activity for the first 24 hours. This will help the steroid be more effective. Medial Branch Block injections will have different discharge instructions.  Those will be discussed  at that injection appointment.  Resume your pain medications  Apply ice packs (on for 20 minutes at a time), every 2-3 hours to your injection area for the first 2-3 days to help with pain control.    Avoid heat (pads or water bottles), which can cause the veins to open up, making the steroid less effective. You can use heat after 48 hours.  Take showers only for the first 48 hours.  No baths, hot tubs, swimming, or soaking for 48 hours to reduce the risk of infection.     When should I call the doctor?  Call us if you have any of the following:   Fever more than 100.5 degrees Fahrenheit   Signs of infection   Severe headache   Severe back pain   Increased numbness or weakness in your legs or arms   Loss of bladder or bowel control  Nausea   Other concerns    What is the contact information?  During business hours Monday-Friday 8a-5p call [phone].   After business hours, on weekends and holidays call [phone] and ask for the PM&R doctor on call

## 2025-02-18 ENCOUNTER — TELEPHONE (OUTPATIENT)
Dept: PHYSICAL MEDICINE AND REHAB | Facility: CLINIC | Age: 75
End: 2025-02-18
Payer: COMMERCIAL

## 2025-02-18 PROBLEM — M54.2 NECK PAIN: Status: ACTIVE | Noted: 2025-02-17

## 2025-02-18 PROBLEM — M54.12 CERVICAL RADICULOPATHY: Status: ACTIVE | Noted: 2025-02-17

## 2025-02-18 PROBLEM — R20.0 NUMBNESS: Status: ACTIVE | Noted: 2025-02-17

## 2025-02-18 PROBLEM — M48.02 NEUROFORAMINAL STENOSIS OF CERVICAL SPINE: Status: ACTIVE | Noted: 2025-02-17

## 2025-02-18 PROBLEM — M54.9 UPPER BACK PAIN: Status: ACTIVE | Noted: 2025-02-17

## 2025-02-18 NOTE — TELEPHONE ENCOUNTER
Called patient to schedule procedure with Dr. Garcia    Date of Procedure: 3/10/25    Arrival time given: Yes: Arrival Time 8:15am       Procedure Location: [facility]     Verified Location with Patient:  Yes  Address provided to the patient     Pre-op H&P Required:  No: Local anesthesia        Post-Op/Follow Up Appt:  Yes: 3/24/25 @7am       Informed patient they will need a  to drive them home:  Yes    Patients : Spouse     Patient is aware that pre-op RN from the procedure center will call 2-3 days prior to scheduled procedure to confirm arrival time and review any instructions:  Yes       Additional Comments: N/A        Kelli Toure MA on 2/18/2025 at 11:09 AM      P: [phone]*

## 2025-03-03 ENCOUNTER — TELEPHONE (OUTPATIENT)
Dept: PHYSICAL MEDICINE AND REHAB | Facility: CLINIC | Age: 75
End: 2025-03-03
Payer: COMMERCIAL

## 2025-03-03 NOTE — LETTER
3/3/2025       RE: Frank Orellana  [address]  [facility] [address]     Dear Yamilka Skinner APRN CNP,    Our mutual patient, Frank Orellana 1950 is requesting a procedure with Dr. Pacheco Garcia: C7-T1 interlaminar epidural steroid injection. Because he is on Aspirin 81 mg EC tablets daily, we will need clearance to stop this medication prior to the procedure, with the typical Aspirin hold time of 6 days prior to this procedure. Restarting, typically 24-hours after the injection.      We will need to re-schedule the patient if we do not have clearance for the Aspirin to be held at this time.     Thank you,     Cynthia Ramon, KIMIN RN  RN Care Coordinator for Physical Medicine and Rehabilitation  [facility]  Clinics and Surgery [address] [address]  Office: [phone]  Fax: [phone]    [facility] PM&R Spine Program      Please Fax: ATTN: PM&R Nursing           FAX: [phone]                      Phone: [phone]        ___ This patient MAY discontinue the anticoagulation/antiplatelet medication, as described above.           How many days prior____           Okay to resume their anticoagulant medication____hours after the injection    ___ This patient MAY NOT discontinue their anticoagulation/antiplatelet medication.      ___ This patient DOES require bridging with other medications.      ___ This patient DOES NOT require bridging with other medications.           _______________________________________________   ___/___/___  SIGNATURE         DATE

## 2025-03-04 NOTE — TELEPHONE ENCOUNTER
Anticoagulation Clearance letter sent to patient's Cardiologist and updated patient that this was being requested, he will awat a My Chart with that information and the procedure information that will be sent along with that as well.      Anticoagulation letter:     Dear ANDREINA Small CNP,    Our mutual patient, Frank Orellana 1950 is requesting a procedure with Dr. Pacheco Garcia: C7-T1 interlaminar epidural steroid injection. Because he is on Aspirin 81 mg EC tablets daily, we will need clearance to stop this medication prior to the procedure, with the typical Aspirin hold time of 6 days prior to this procedure. Restarting, typically 24-hours after the injection.      We will need to re-schedule the patient if we do not have clearance for the Aspirin to be held at this time.     Thank you,     KIMI LazarN RN  RN Care Coordinator for Physical Medicine and Rehabilitation  [facility]  Clinics and Surgery [address] [address]  Office: [phone]  Fax: [phone]    [facility] PM&R Spine Program      Please Fax: ATTN: PM&R Nursing           FAX: [phone]                      Phone: [phone]        ___ This patient MAY discontinue the anticoagulation/antiplatelet medication, as described above.           How many days prior____           Okay to resume their anticoagulant medication____hours after the injection    ___ This patient MAY NOT discontinue their anticoagulation/antiplatelet medication.      ___ This patient DOES require bridging with other medications.      ___ This patient DOES NOT require bridging with other medications.           _______________________________________________   ___/___/___  SIGNATURE         DATE   
Anticoagulation clearance received from patient's cardiologist to hold Aspirin:     ----- Message -----  From: Yamilka Thomsa, ANDREINA CNP  Sent: 3/3/2025   8:44 PM CST  To: Cynthia Ramon RN; Cardiology Ep     Hello,   Thanks for message. He is OK to hold ASA for 6 days prior.   Thanks,  LVW      My Chart message will be sent to patient as requested when speaking with patient yesterday.to relay the okay to hold his aspirin and his procedure information as well.     Cynthia Ramon, KIMIN RN  RN Care Coordinator for Physical Medicine and Rehabilitation  [facility] Surgery [address] [address]  Office: [phone]  Fax: [phone]    
patient refused

## 2025-03-10 ENCOUNTER — TELEPHONE (OUTPATIENT)
Dept: PHYSICAL MEDICINE AND REHAB | Facility: CLINIC | Age: 75
End: 2025-03-10
Payer: COMMERCIAL

## 2025-03-10 DIAGNOSIS — M54.12 CERVICAL RADICULOPATHY: Primary | ICD-10-CM

## 2025-03-10 RX ORDER — PREDNISONE 50 MG/1
TABLET ORAL
Qty: 3 TABLET | Refills: 0 | Status: CANCELLED | OUTPATIENT
Start: 2025-03-10

## 2025-03-10 NOTE — TELEPHONE ENCOUNTER
Attempted to call patient to review his preferred pharmacy and the Iodine contrast allergy pretreatment plan:    ----- Message from Amanda Carrion sent at 3/10/2025 12:41 PM CDT -----  Kevin Marie,     I would like to add the contrast allergy premedication orders for Frank. Can you please call him to go over how to do to the premedication regimen?     When you speak with him can you confirm his preferred pharmacy and let me know & then I'll enter the orders?    Thanks,  Amanda      For Iodine contrast allergy pretreatment:     Prednisone 50 mg, #3 tablets   o Take 1 tablet by mouth 13 hours prior to procedure   o 1 tablet by mouth 7 hours prior to procedure   o 1 tablet by mouth 1 hour prior to procedure       With Prednisone regimen, they must also take   Benadryl/Diphenhydramine 50 mg po 1 hour prior to the injection, once.    No answer, left patient a voicemail that this RN will re-attempt reaching him again tomorrow.     Cynthia Ramon, KIMIN RN  RN Care Coordinator for Physical Medicine and Rehabilitation  [facility] Surgery [address] [address]  Office: [phone]  Fax: [phone]

## 2025-03-11 NOTE — TELEPHONE ENCOUNTER
Called patient and reviewed his Iodine contrast allergy pretreatment plan:     ----- Message from Amanda Carrion sent at 3/10/2025 12:41 PM CDT -----  Kevin Marie,      I would like to add the contrast allergy premedication orders for Frank. Can you please call him to go over how to do to the premedication regimen?      When you speak with him can you confirm his preferred pharmacy and let me know & then I'll enter the orders?     Thanks,  Amanda        For Iodine contrast allergy pretreatment:      Prednisone 50 mg, #3 tablets   o Take 1 tablet by mouth 13 hours prior to procedure   o 1 tablet by mouth 7 hours prior to procedure   o 1 tablet by mouth 1 hour prior to procedure       With Prednisone regimen, they must also take   Benadryl/Diphenhydramine 50 mg po 1 hour prior to the injection, once.    Patient verbalized understanding and states he would like to have the Prednisone sent to his [facility]. He also reviewed his previous reactions to IV Contrast he had years ago (he is unsure if is was Iodine based or not). He had received IV Contrast with an MRI years ago (unsure of the date while discussing this) that he had all over body itching and rash/hives he developed he believes about 7 days later and went to the ED to have treated, which he noted that they had given him IV Diphenhydramine to try to treat the reaction and it did not help at the time, he did not recall how the allergic response was able to be resolved in the end. He also had a reaction to an appetizer while at a conference in [place] in 2009, he is not sure what the food was but it was suspected possibly a seafood reaction, that caused his entire face to swell. He states he has never had airway swelling or breathing issues with the previous reactions.     Advised this update will be sent to Amanda Carrion NP and a My Chart message will be sent to review the Iodine contrast allergy pretreatment plan for him  to refer back to as needed.     No further question or concerns at this time.     Routed to Amanda Carrion NP & Dr. Garcia to advise of patient's previous allergic reactions and the preferred pharmacy for the Prednisone.     KIMI LazarN RN  RN Care Coordinator for Physical Medicine and Rehabilitation  [facility] Surgery [address] [address]  Office: [phone]  Fax: [phone]

## 2025-03-13 RX ORDER — PREDNISONE 50 MG/1
TABLET ORAL
Qty: 3 TABLET | Refills: 0 | Status: SHIPPED | OUTPATIENT
Start: 2025-03-13

## 2025-03-13 NOTE — TELEPHONE ENCOUNTER
[facility] Call Center    Phone Message    May a detailed message be left on voicemail: yes     Reason for Call: Other: Patient is becoming concerned that this medication is not going to be ready by Sunday because he has not heard back from anyone. Patient would like a call back to talk about this.       Action Taken: Message routed to:  [facility] ([facility]): PMR    Travel Screening: Not Applicable     Date of Service:

## 2025-03-13 NOTE — TELEPHONE ENCOUNTER
Message was sent to Amanda Carrion NP to check on the status of the Prednisone prescription.     Amanda Carrion NP advised she had been looking into something regarding his allergy, so had not sent it in yet, but that she will be sending in the prescription for the Prednisone later this afternoon.     Called patient back to update him, he verbalized understanding and appreciated the update. He will plan to  the prescription later today.     KIMI LazarN RN  RN Care Coordinator for Physical Medicine and Rehabilitation  [facility] Surgery [address] [address]  Office: [phone]  Fax: [phone]

## 2025-03-17 ENCOUNTER — ANCILLARY PROCEDURE (OUTPATIENT)
Dept: RADIOLOGY | Facility: AMBULATORY SURGERY CENTER | Age: 75
End: 2025-03-17
Attending: PHYSICAL MEDICINE & REHABILITATION
Payer: COMMERCIAL

## 2025-03-17 ENCOUNTER — HOSPITAL ENCOUNTER (OUTPATIENT)
Facility: AMBULATORY SURGERY CENTER | Age: 75
Discharge: HOME | End: 2025-03-17
Attending: PHYSICAL MEDICINE & REHABILITATION | Admitting: PHYSICAL MEDICINE & REHABILITATION
Payer: COMMERCIAL

## 2025-03-17 VITALS
TEMPERATURE: 96 F | WEIGHT: 169 LBS | OXYGEN SATURATION: 97 % | RESPIRATION RATE: 14 BRPM | SYSTOLIC BLOOD PRESSURE: 136 MMHG | DIASTOLIC BLOOD PRESSURE: 71 MMHG | HEART RATE: 56 BPM | HEIGHT: 69 IN | BODY MASS INDEX: 25.03 KG/M2

## 2025-03-17 DIAGNOSIS — M54.12 CERVICAL RADICULOPATHY: ICD-10-CM

## 2025-03-17 DIAGNOSIS — M54.9 UPPER BACK PAIN: ICD-10-CM

## 2025-03-17 DIAGNOSIS — M54.2 NECK PAIN: ICD-10-CM

## 2025-03-17 DIAGNOSIS — M48.02 NEUROFORAMINAL STENOSIS OF CERVICAL SPINE: ICD-10-CM

## 2025-03-17 DIAGNOSIS — R20.0 NUMBNESS: ICD-10-CM

## 2025-03-17 PROCEDURE — 62321 NJX INTERLAMINAR CRV/THRC: CPT | Performed by: PHYSICAL MEDICINE & REHABILITATION

## 2025-03-17 PROCEDURE — 62321 NJX INTERLAMINAR CRV/THRC: CPT

## 2025-03-17 RX ORDER — LIDOCAINE 40 MG/G
CREAM TOPICAL
Status: DISCONTINUED | OUTPATIENT
Start: 2025-03-17 | End: 2025-03-18 | Stop reason: HOSPADM

## 2025-03-17 RX ORDER — LIDOCAINE HYDROCHLORIDE 10 MG/ML
INJECTION, SOLUTION EPIDURAL; INFILTRATION; INTRACAUDAL; PERINEURAL DAILY PRN
Status: DISCONTINUED | OUTPATIENT
Start: 2025-03-17 | End: 2025-03-17 | Stop reason: HOSPADM

## 2025-03-17 RX ORDER — IOPAMIDOL 408 MG/ML
INJECTION, SOLUTION INTRATHECAL DAILY PRN
Status: DISCONTINUED | OUTPATIENT
Start: 2025-03-17 | End: 2025-03-17 | Stop reason: HOSPADM

## 2025-03-17 RX ORDER — DEXAMETHASONE SODIUM PHOSPHATE 10 MG/ML
INJECTION, SOLUTION INTRA-ARTICULAR; INTRALESIONAL; INTRAMUSCULAR; INTRAVENOUS; SOFT TISSUE DAILY PRN
Status: DISCONTINUED | OUTPATIENT
Start: 2025-03-17 | End: 2025-03-17 | Stop reason: HOSPADM

## 2025-03-17 NOTE — DISCHARGE INSTRUCTIONS

## 2025-03-17 NOTE — OP NOTE
PROCEDURE NOTE: Cervical Interlaminar Epidural Steroid Injection    PROCEDURE DATE: 3/17/2025    PATIENT NAME: Frank Orellana  YOB: 1950    ATTENDING PHYSICIAN: Pacheco Garcia MD    PREOPERATIVE DIAGNOSIS: Cervical radicular pain  POSTOPERATIVE DIAGNOSIS: Same    PROCEDURE PERFORMED: Interlaminar Epidural Steroid Injection at the C7-T1 level, with a Left paramedian approach under fluoroscopic guidance    FLUOROSCOPY WAS USED.    INDICATIONS FOR PROCEDURE:   Frank Orellana is a 75 year old male with a clinical picture consistent with the above-mentioned diagnosis, resulting in radicular pain to the bilateral upper extremities.    01/20/2025 MRI Cervical spine w/o  IMPRESSION:  1. Multilevel cervical spondylosis most significant at C4-5 and C5-6  with moderate to severe bilateral neural foraminal narrowing as well  as at C6-7 with severe left and moderate right neural foraminal  narrowing. Mild spinal canal stenosis at the level of C4-5.  2. Prominence of the central canal at the level of C6-7. No abnormal  cord signal.     PROCEDURE AND FINDINGS:   Frank Orellana was greeted in the pre-procedure holding area. The risk, benefits and alternatives to the procedure were again reviewed with the patient and written informed consent was placed in the chart. An IV line was not placed.  A 500 mL bag of NS was not connected to the patient. He was taken to the procedure room and positioned prone on the fluoroscopy table.  Routine monitors were applied including blood pressure cuff, and pulse oximetry. Prior to the procedure a time out was completed, verifying correct patient, procedure, site, positioning, and implants and/or special equipment.     An AP fluoroscpic  film was taken to identify the C7 and T1 vertebral bodies, as well as the C7-T1 interspace. The skin was prepped with chlorhexidine and draped in the usual sterile fashion. The skin and subcutaneous tissue overlying the C7-T1 interspace was  anesthetized using a 27-guage 1-1/4 inch needle with 1% preservative-free lidocaine for a total volume of 3mL. Then, a 20 cm Tuohy needle was advanced utilizing AP, contralateral oblique (45*), and lateral fluoroscopic views into the C7-T1 interlaminar space. Once the needle tip was engaged in the ligamentum flavum, a loss of resistance syringe was attached and loss of resistance to saline.     After negative aspiration, 1mls of Isovue-M contrast was injected under AP view with live fluoroscopy and confirmed adequate spread along the epidural space. There was no evidence of intravascular uptake or intrathecal spread on imaging. Contralateral oblique/lateral view(s) were also taken confirming adequate epidural spread.     At this point, after negative aspiration, a total 4mL volume of treatment injectate, consisting of 1mL of 10mg/mL dexamethasone, 3mL of PFNS was injected easily.  The needle was then flushed with 0.3 ml of PFNS, restyletted  and removed. The needle insertion site was dressed appropriately.     Frank was taken to the recovery room where he was monitored for a brief period of time. He tolerated the procedure well and was discharged home in stable condition with post procedural instructions.     [facility]     COMPLICATIONS: None    COMMENTS: None

## 2025-03-29 ENCOUNTER — MYC REFILL (OUTPATIENT)
Dept: CARDIOLOGY | Facility: CLINIC | Age: 75
End: 2025-03-29
Payer: COMMERCIAL

## 2025-03-29 DIAGNOSIS — I48.0 PAF (PAROXYSMAL ATRIAL FIBRILLATION) (H): ICD-10-CM

## 2025-04-02 RX ORDER — FLECAINIDE ACETATE 50 MG/1
50 TABLET ORAL 2 TIMES DAILY
Qty: 180 TABLET | Refills: 2 | Status: SHIPPED | OUTPATIENT
Start: 2025-04-02

## 2025-05-19 ENCOUNTER — OFFICE VISIT (OUTPATIENT)
Dept: PHYSICAL MEDICINE AND REHAB | Facility: CLINIC | Age: 75
End: 2025-05-19
Payer: COMMERCIAL

## 2025-05-19 VITALS
OXYGEN SATURATION: 98 % | WEIGHT: 167 LBS | SYSTOLIC BLOOD PRESSURE: 124 MMHG | DIASTOLIC BLOOD PRESSURE: 63 MMHG | HEART RATE: 63 BPM | BODY MASS INDEX: 24.66 KG/M2

## 2025-05-19 DIAGNOSIS — M54.50 CHRONIC BILATERAL LOW BACK PAIN WITHOUT SCIATICA: ICD-10-CM

## 2025-05-19 DIAGNOSIS — R20.0 NUMBNESS: ICD-10-CM

## 2025-05-19 DIAGNOSIS — M54.6 CHRONIC MIDLINE THORACIC BACK PAIN: ICD-10-CM

## 2025-05-19 DIAGNOSIS — G44.86 CERVICOGENIC HEADACHE: ICD-10-CM

## 2025-05-19 DIAGNOSIS — M54.9 UPPER BACK PAIN: ICD-10-CM

## 2025-05-19 DIAGNOSIS — G89.29 CHRONIC BILATERAL LOW BACK PAIN WITHOUT SCIATICA: ICD-10-CM

## 2025-05-19 DIAGNOSIS — M47.812 CERVICAL SPONDYLOSIS WITHOUT MYELOPATHY: ICD-10-CM

## 2025-05-19 DIAGNOSIS — G89.29 CHRONIC MIDLINE THORACIC BACK PAIN: ICD-10-CM

## 2025-05-19 DIAGNOSIS — M54.12 CERVICAL RADICULOPATHY: ICD-10-CM

## 2025-05-19 DIAGNOSIS — M48.02 NEUROFORAMINAL STENOSIS OF CERVICAL SPINE: ICD-10-CM

## 2025-05-19 DIAGNOSIS — M54.2 NECK PAIN: Primary | ICD-10-CM

## 2025-05-19 PROCEDURE — 99215 OFFICE O/P EST HI 40 MIN: CPT | Performed by: NURSE PRACTITIONER

## 2025-05-19 PROCEDURE — 3078F DIAST BP <80 MM HG: CPT | Performed by: NURSE PRACTITIONER

## 2025-05-19 PROCEDURE — 3074F SYST BP LT 130 MM HG: CPT | Performed by: NURSE PRACTITIONER

## 2025-05-19 RX ORDER — GABAPENTIN 100 MG/1
CAPSULE ORAL
Qty: 90 CAPSULE | Refills: 3 | Status: SHIPPED | OUTPATIENT
Start: 2025-05-19

## 2025-05-19 NOTE — PROGRESS NOTES
"PM&R Follow-Up Visit -     Date of Initial Visit: 2/17/2025  LOV: 4/4/2025  TD: 5/19/2025     Recall: Frank Orellana is a 75 year old male who has been seen in the spine clinic regarding neck and back pain.    INTERVAL HISTORY:  Patient was last seen in clinic 4/4/2025. At that visit, the plan was to trial gabapentin and begin physical therapy.    He was seen today in the clinic, accompanied by his wife.    At the last visit he described:  -Chronic neck pain (varies side to side)  -Newer occipital headaches  -Chronic UE numbness: bilateral thumbs and bilateral index fingers    After the last visit he started 300 mg of gabapentin at bedtime.  He feels he is tolerating the medication well and that has been helpful.  He does note that when he wakes up the first couple steps seem unsteady, but he does not have any ongoing issue with balance, sedation, or other symptoms which persist beyond that.    He reports ongoing daily neck pain.  He also has significant pain around the left scapular area.  Since last time, unfortunately the occipital headaches have been persistent, occurring daily.  He has neck pain with sleeping on the right side, which is worse with moving.  He finds it hard to rate the pain.        RECALL HISTORY OF PRESENT ILLNESS: 2/17/2025     Frank Orellana is a 75 year old male who presents with a chief complaint of neck and back pain.      He was seen today in the clinic, accompanied by his wife. He describes longstanding neck and back pain \"off and on\" for 50 years.  He did physical therapy for neck pain in 2021.  Today, he reports increased neck pain which began a few months ago without any known cause.    He says the neck pain is constant and daily, and sometimes can be quite severe.  In particular the pain can bother him at nighttime. He describes episodes screaming with pain at night & difficulty rolling over due to pain.      In the last week his mid and lower back also began bothering him more.    He " "says his arms generally feel weaker.  He noticed this when he was at [employer] trying to lift the corner of a piece of drywall.  He has not otherwise noticed any weakness and is not dropping things.    He endorses chronic longstanding numbness in the bilateral thumbs and bilateral index fingers.  He also has numbness in the balls of both feet and bilateral toes, which has been present for a couple years.      Today, he describes  -Acute on chronic daily constant neck pain, varies side to side  -Acute on chronic intermittent midline mid thoracic pain  -Acute on chronic intermittent nonradiating low back pain  -Chronic UE numbness: bilateral thumbs and bilateral index fingers  -Chronic LE numbness: balls of both feet and bilateral toes    Aggravating factors include: Lying down, standing, sitting, walking, coughing/sneezing  Relieving factors include: None    Today, he rates the pain 1/10.  At its worst over the last week the pain was 6-7/10.   Patient states sleep is affected most nights.    He had a fall last mechanical week, but no other recent falls.    He denies balance problems, no new difficulty with fine motor tasks such as manipulating buttons or zippers, falls, weakness, bowel or bladder incontinence, saddle anesthesia, unintentional weight loss, fevers/chills, or night sweats.       4/4/2025:  Patient was last seen in clinic 3/17/2025 for C7-T1 IL MAREN with Dr Garcia.     He was seen today for follow up, accompanied by his wife.  He reports 30-40% improvement in pain after the ILESI.    He had previously described:  -Acute on chronic daily constant neck pain, varies side to side  -Chronic UE numbness: bilateral thumbs and bilateral index fingers    Today, he clarifies that the neck pain he experiences is intermittent.  Some movements tend to aggravate the pain.  Pain is especially noticeable at night if he is turning in bed.  Before the ILESI he was waking up \"screaming with pain\" and says that since the " injection that is no longer the case.  He does feel that the overall impact of the ILESI was mild.  He has ongoing neck pain, which is the most bothersome for him.  The pain on the left side of his neck is the worst.  He endorses ongoing unchanged numbness affecting the bilateral thumbs and index fingers.  He has also been noticing some headaches at the occipital area, which is new for him.  He says that he did not hear from physical therapy scheduling after the last visit.  He is not currently using any medications for pain but would like to consider options.        PRIOR INJURIES/TREATMENT:   Ice/Heat: has tried both without relief  Brace: none  Physical Therapy:   Has an order for PT but has not started it yet  PT for neck pain in 2021     - Current Pain Medications -   Gabapentin 300 mg at bedtime  *Aspirin 81 mg    - Prior/Trialed Pain Medications -   Cyclobenzaprine -   5-10 mg didn't help  Naproxen  Meloxicam - didn't help    Prior Procedures:  Date    Procedure   Improvement (%)  3/17/2025 C7-T1 IL MAREN  30-40%  Shoulder steroid injection             Prior Related Surgery: none     Other (acupuncture, OMT, CMM, TENS, DME, etc.):   + Chiropractor    Specialists Seen - (with most recent, available notes and clinic visits reviewed)   1. Orthopedics-Dupuytren's contracture both hands, left glenohumeral joint OA      IMAGING - reviewed   2 views lumbar spine radiographs 2/17/2025   Findings:     Standing  AP and lateral  views of the lumbar spine were obtained.     5  lumbar type vertebral bodies are assumed for the purpose of this  dictation.     There is no acute osseous abnormality.       There is mild multilevel degenerative changes of the lumbar spine.  Disc spaces are relatively preserved. There is also lower lumbar  predominant facet arthropathy. Normal AP alignment is maintained.     The visualized bowel gas pattern is non-obstructive. Aortoiliac  calcifications. Pelvic phleboliths.                                                Impression:  1.  No acute osseous abnormality.  2.  Mild multilevel degenerative changes.      3 views thoracic spine radiographs 2/17/2025   Findings:     Standing  AP, lateral and swimmer views of the thoracic spine were  obtained.     12 rib bearing vertebral bodies are identified.     There is no acute osseous abnormality.       Multilevel degenerative changes of the mid/lower thoracic spine with  endplate spurring spurring.     The visualized lungs are clear. Cardiomediastinal silhouette is within  normal limits.                                                       Impression:  1.  No acute osseous abnormality.  2.  Multilevel degenerative changes of the thoracic spine, not  significantly changed since 9/13/2021.        MR CERVICAL SPINE W/O CONTRAST  1/20/2025    FINDINGS:  Trace 2 mm retrolisthesis of C3 on C4, trace 1 mm retrolisthesis of C4  on C5 and anterolisthesis of C7 on T1. Normal marrow signal.  Multilevel disc height loss most significantly at C3-4 and C5-6.  Prominence of the central canal at the level of C6-7. No abnormal cord  signal. Diffusion-weighted images are negative for focal abnormality.     The findings on a level by level basis are as follows:     C2-3: No spinal canal or neural foraminal stenosis.     C3-4: Asymmetric left disc osteophyte complex. Bilateral uncinate  spurring and facet hypertrophy. Mild-to-moderate left and mild right  neural foraminal narrowing. No significant spinal canal stenosis.     C4-5: Disc bulge. Bilateral uncinate spurring and facet hypertrophy.  Moderate to severe bilateral neural foraminal narrowing. Mild spinal  canal stenosis.     C5-6: Disc bulge. Bilateral uncinate spurring and facet hypertrophy.  Moderate to severe bilateral neural foraminal narrowing. No  significant spinal canal stenosis.     C6-7: Left asymmetric disc osteophyte complex. Bilateral facet  hypertrophy. Severe left and moderate right neural foraminal  narrowing.  No significant spinal canal stenosis.     C7-T1: No spinal canal or neural foraminal stenosis.                                      IMPRESSION:  1. Multilevel cervical spondylosis most significant at C4-5 and C5-6  with moderate to severe bilateral neural foraminal narrowing as well  as at C6-7 with severe left and moderate right neural foraminal  narrowing. Mild spinal canal stenosis at the level of C4-5.  2. Prominence of the central canal at the level of C6-7. No abnormal  cord signal.         XR CERVICAL SPINE 2/3 VIEWS 12/27/2024 1:28 PM     History: recurrent pain in neck, L>R, evaluate for profession of DJD;  Cervicalgia   ICD-10: Cervicalgia     Comparison: Cervical spine radiographs 9/13/2021.     Findings: AP, lateral, and odontoid views of the cervical spine were  obtained. Normal cervical spine alignment. Moderate loss of disc  height at C3-4 and C5-6. Mild loss of disc height at C4-5 and C6-7.  Additional multilevel degenerative changes including endplate  osteophytosis, uncinate hypertrophy, and facet hypertrophy. No  prevertebral edema. No mass in the visualized lung apices. Bilateral  carotid atherosclerotic plaque. Slight rotational asymmetry of C1 on  C2, similar to 9/13/2021.                                                                      Impression:  Multilevel cervical degenerative changes, similar to 9/13/2021, most  pronounced at C3-4 and C5-6.      XR THORACIC SPINE 2 VIEWS, 9/13/2021 4:49 PM     Indication: acute R sided neck/trap pain, r/o DJD or trauma; Neck pain     Comparison: Same day cervical spine radiographs, chest radiograph  1/18/2019     Findings: AP and lateral radiographs of the thoracic spine were  obtained. Mildly increased kyphotic curvature of the thoracic spine.  No acute osseous abnormalities. Mild multilevel endplate degenerative  changes and endplate osteophytosis, grossly unchanged since prior  exam.     Visualized lungs are clear. Multiple chronic appearing  posterior right  upper rib fractures.                                                                      Impression:   1. Mild multilevel endplate degenerative changes of the thoracic  spine. No acute osseous anomalies.  2. See same-day cervical spine radiograph for additional detail.       Review Of Systems:  I am responding to those symptoms which are directly relevant to the specific indication for my consultation. I recommend that the patient follow up with their primary or referring provider to pursue any other symptoms which may be of concern.       Medical History:  He  has a past medical history of Actinic keratosis (2009), Atrial fibrillation (H) (06/15/1996), Dupuytren's contracture of both hands, Elevated PSA, Hepatitis B (1969), Hepatitis C virus infection, unspecified chronicity, WINNIE (obstructive sleep apnea), Pain in both hands, Patient is Protestant, Refusal of blood transfusions as patient is Protestant, Right shoulder pain, Tinnitus (1 or 2 years ago), and Tubular adenoma of colon (01/17/2017).     He  has a past surgical history that includes Arthroscopy knee; colonoscopy (1/17/17); Release dupuytrens contracture (Right, 1/20/2017); knee surgery (2006??); Herniorrhaphy inguinal (Right, 10/26/2017); Release dupuytrens contracture (Left, 12/15/2017); Anesthesia cardioversion (N/A, 1/15/2018); Transesophageal echocardiogram intraoperative (N/A, 1/15/2018); Anesthesia cardioversion (N/A, 10/11/2018); Anesthesia cardioversion (N/A, 11/28/2018); Cystoscopy, transurethral resection (TUR) prostate, combined (N/A, 8/21/2020); Colonoscopy (N/A, 6/26/2023); Release dupuytrens contracture (Right, 8/9/2023); Anesthesia cardioversion (N/A, 9/19/2023); and Anesthesia cardioversion (N/A, 3/27/2024).    Family History  His family history includes Alcohol/Drug in his mother; Alcoholism in his father and mother; Arthritis in his paternal grandmother; Cardiac Sudden Death in his brother;  Gastrointestinal Disease in his father; Heart Disease in his brother; Rheumatoid Arthritis in his paternal grandmother; Uterine Cancer in his sister.     Social History:  Work: retired for 10 years, previously did factory work with heavy lifting (remotely), janitorial work, most recently worked in IT for [employer]  Current living situation: Lives with spouse. Performs ADLs/IADLs independently.  He  reports that he quit smoking about 50 years ago. His smoking use included cigarettes. He started smoking about 60 years ago. He has a 5 pack-year smoking history. He quit smokeless tobacco use about 55 years ago. He reports current alcohol use of about 3.0 - 4.0 standard drinks of alcohol per week. He reports that he does not use drugs.        Current Medications:   He has a current medication list which includes the following prescription(s): aspirin, cyclobenzaprine, diltiazem er coated beads, flecainide, and meloxicam.     Allergies:    -- Blood Transfusion Related (Informational Only)     --  Muslim.  Declines blood transfusions.   -- Shellfish-Derived Products -- Swelling   -- Contrast Dye -- Rash   -- Iodinated Contrast Media -- Rash    PHYSICAL EXAMINATION:  /63 (BP Location: Right arm, Patient Position: Sitting, Cuff Size: Adult Regular)   Pulse 63   Wt 75.8 kg (167 lb)   SpO2 98%   BMI 24.66 kg/m     --CONSTITUTIONAL: Vital signs as above. No acute distress. The patient is well nourished and well groomed.  --PSYCHIATRIC: The patient is awake, alert, oriented to person, place, time and answering questions appropriately with clear speech. Appropriate mood and affect   --HEENT: Sclera are non-injected. Extraocular muscles are intact. Moist oral mucosa.  --SKIN: observable skin is clean, dry, intact without rashes.  --RESPIRATORY: Normal rhythm and effort. No abnormal accessory muscle breathing patterns noted.   --GROSS MOTOR: Easily arises from a seated position. Gait is  non-antalgic.Toe walking and heel walking are normal.  Tandem gait is normal.  --CERVICAL SPINE: Inspection reveals no evidence of deformity. Range of motion is not limited in cervical flexion, extension, lateral rotation but all elicit pain. No tenderness to palpation cervical spine.  Spurling maneuver negative bilaterally.  Significant upper trapezius fullness L>R  --SHOULDERS: Full range of motion bilaterally: abduction, flexion, cross chest movement internal/external rotation.  --UPPER EXTREMITY MOTOR TESTING:  Wrist flexion left 5/5, right 5/5  Wrist extension left 5/5, right 5/5  Biceps left 5/5, right 5/5   Triceps left 5/5, right 5/5   Shoulder abduction left 5/5, right 5/5   left 5/5, right 5/5  Finger abduction left 5/5, right 5/5  --NEUROLOGIC: CN III-XII are grossly intact.   2/4 symmetric biceps, brachioradialis, triceps reflexes bilaterally. Sensation to upper extremities is intact.  Negative Smith's bilaterally.    --VASCULAR: Warm upper limbs bilaterally.          ASSESSMENT:  Frank Orellana is a pleasant 75 year old male who presents today in regards to:   -Acute on chronic daily constant neck pain, varies side to side  -Chronic UE numbness: bilateral thumbs and bilateral index fingers    He has also been seen regarding:  -Acute on chronic intermittent midline mid thoracic pain  -Acute on chronic intermittent nonradiating low back pain  -Chronic LE numbness: balls of both feet and bilateral toes  -Subjective weakness of unclear chronicity    MRI Cerivical spine shows:  -Multilevel spondylolisthesis  -Multilevel cervical spondylosis, most notably at C4-5 and C5-6 where there is bilateral moderate/severe neuroforaminal stenosis.  At C56-7 there is severe left neuroforaminal stenosis and moderate right neuroforaminal stenosis    Differential includes cervical DDD, cervical radiculopathy, cervical facet mediated pain, cervicogenic headache, left intra-articular shoulder pathology      Complicating  comorbities include:  # Paroxysmal A-fib, not on AC, s/p multiple DCCV  # Dupuytren's contraction of both hands  # History of right shoulder pain    Note: pt is Jainism       PLAN:  - X-rays of the thoracic and lumbar spine reviewed in detail with the patient today  -Continue gabapentin 300 mg.  Add 100 mg of gabapentin which he can initially start in the morning and after 5 days also add 100 mg in the afternoon  - Encouraged him to begin physical therapy and home exercise stretching and strengthening program  - We also talked about the option of trigger point injections in the clinic or dry needling with PT.  He prefers to hold off for now  - We discussed cervical facet steroid injections and medial branch block/radiofrequency ablation. He declines at this time  - We did discuss the option of a surgical consultation given the severe neuroforaminal stenosis with ongoing neck and left scapular area pain which may be related to C7 radiculopathy  -encouraged him to follow up with his PCP in regards to Aortoiliac  Calcifications seen on X ray lumbar spine  - RTC in 6 to 8 weeks per his preference to assess the gabapentin and impact of PT/HEP        Ready to learn, no apparent learning barriers.  Education provided on treatment plan according to patient's preferred learning style.  Patient verbalizes understanding.   __________________________________  Amanda Carrion NP  Physical Medicine & Rehabilitation        46 minutes spent by me on the date of the encounter doing chart review, history and exam, documentation and further activities per the note

## 2025-05-19 NOTE — PATIENT INSTRUCTIONS
It was a pleasure seeing you in the clinic today.  As discussed, we made the following updates to your plan of care:    You may continue gabapentin 300 mg at bedtime.  You may also try adding 100 mg of gabapentin in the morning for 5 days.  After that time you can take 100 mg of gabapentin in the morning and in the afternoon while continuing 300 mg of gabapentin at bedtime.  The most common side effect is sedation. Do not drive a motor vehicle until after you are familiar with how the medication may impact your attention and reaction.  Note that it may take several weeks to notice the benefits of this medication.   Do not abruptly stopped this medication prior to consulting with a physician.       An order for physical therapy was added today. Physical therapy schedulers should call you in the next few days to schedule an appointment. You may also call [phone] to arrange an appointment at any of the [facility] outpatient physical therapy locations.  It will be very important for you to do the physical therapy exercises on a regular basis to decrease your pain and prevent future flares of pain.     We also talked about other options including:  -Trigger point injections in the clinic  -Dry needling for physical therapy  -Cervical facet steroid injections  -Cervical facet medial branch blocks/radiofrequency ablation procedures    You indicated you would like to take some time to think about those options.    Let's plan to follow-up in 6 to 8 weeks, or sooner if needed         Thank you,  Amanda Carrion NP  Physical Medicine and Rehabilitation, Medical Spine  PM&R clinic Phone: [phone]  PM&R clinic Fax: [phone]

## 2025-05-19 NOTE — LETTER
"5/19/2025       RE: Frank Orellana  [address]  [facility] [address]     Dear Colleague,    Thank you for referring your patient, Frank Orellana, to the [facility] PHYSICAL MEDICINE AND REHABILITATION CLINIC [place] [facility]. Please see a copy of my visit note below.    PM&R Follow-Up Visit -     Date of Initial Visit: 2/17/2025  LOV: 4/4/2025  TD: 5/19/2025     Recall: Frank Orellana is a 75 year old male who has been seen in the spine clinic regarding neck and back pain.    INTERVAL HISTORY:  Patient was last seen in clinic 4/4/2025. At that visit, the plan was to trial gabapentin and begin physical therapy.    He was seen today in the clinic, accompanied by his wife.    At the last visit he described:  -Chronic neck pain (varies side to side)  -Newer occipital headaches  -Chronic UE numbness: bilateral thumbs and bilateral index fingers    After the last visit he started 300 mg of gabapentin at bedtime.  He feels he is tolerating the medication well and that has been helpful.  He does note that when he wakes up the first couple steps seem unsteady, but he does not have any ongoing issue with balance, sedation, or other symptoms which persist beyond that.    He reports ongoing daily neck pain.  He also has significant pain around the left scapular area.  Since last time, unfortunately the occipital headaches have been persistent, occurring daily.  He has neck pain with sleeping on the right side, which is worse with moving.  He finds it hard to rate the pain.        RECALL HISTORY OF PRESENT ILLNESS: 2/17/2025     Frank Orellana is a 75 year old male who presents with a chief complaint of neck and back pain.      He was seen today in the clinic, accompanied by his wife. He describes longstanding neck and back pain \"off and on\" for 50 years.  He did physical therapy for neck pain in 2021.  Today, he reports increased neck pain which began a few " months ago without any known cause.    He says the neck pain is constant and daily, and sometimes can be quite severe.  In particular the pain can bother him at nighttime. He describes episodes screaming with pain at night & difficulty rolling over due to pain.      In the last week his mid and lower back also began bothering him more.    He says his arms generally feel weaker.  He noticed this when he was at [employer] trying to lift the corner of a piece of drywall.  He has not otherwise noticed any weakness and is not dropping things.    He endorses chronic longstanding numbness in the bilateral thumbs and bilateral index fingers.  He also has numbness in the balls of both feet and bilateral toes, which has been present for a couple years.      Today, he describes  -Acute on chronic daily constant neck pain, varies side to side  -Acute on chronic intermittent midline mid thoracic pain  -Acute on chronic intermittent nonradiating low back pain  -Chronic UE numbness: bilateral thumbs and bilateral index fingers  -Chronic LE numbness: balls of both feet and bilateral toes    Aggravating factors include: Lying down, standing, sitting, walking, coughing/sneezing  Relieving factors include: None    Today, he rates the pain 1/10.  At its worst over the last week the pain was 6-7/10.   Patient states sleep is affected most nights.    He had a fall last mechanical week, but no other recent falls.    He denies balance problems, no new difficulty with fine motor tasks such as manipulating buttons or zippers, falls, weakness, bowel or bladder incontinence, saddle anesthesia, unintentional weight loss, fevers/chills, or night sweats.       4/4/2025:  Patient was last seen in clinic 3/17/2025 for C7-T1 IL MAREN with Dr Garcia.     He was seen today for follow up, accompanied by his wife.  He reports 30-40% improvement in pain after the ILESI.    He had previously described:  -Acute on chronic daily constant neck pain, varies side  "to side  -Chronic UE numbness: bilateral thumbs and bilateral index fingers    Today, he clarifies that the neck pain he experiences is intermittent.  Some movements tend to aggravate the pain.  Pain is especially noticeable at night if he is turning in bed.  Before the ILESI he was waking up \"screaming with pain\" and says that since the injection that is no longer the case.  He does feel that the overall impact of the ILESI was mild.  He has ongoing neck pain, which is the most bothersome for him.  The pain on the left side of his neck is the worst.  He endorses ongoing unchanged numbness affecting the bilateral thumbs and index fingers.  He has also been noticing some headaches at the occipital area, which is new for him.  He says that he did not hear from physical therapy scheduling after the last visit.  He is not currently using any medications for pain but would like to consider options.        PRIOR INJURIES/TREATMENT:   Ice/Heat: has tried both without relief  Brace: none  Physical Therapy:   Has an order for PT but has not started it yet  PT for neck pain in 2021     - Current Pain Medications -   Gabapentin 300 mg at bedtime  *Aspirin 81 mg    - Prior/Trialed Pain Medications -   Cyclobenzaprine -   5-10 mg didn't help  Naproxen  Meloxicam - didn't help    Prior Procedures:  Date    Procedure   Improvement (%)  3/17/2025 C7-T1 IL MAREN  30-40%  Shoulder steroid injection             Prior Related Surgery: none     Other (acupuncture, OMT, CMM, TENS, DME, etc.):   + Chiropractor    Specialists Seen - (with most recent, available notes and clinic visits reviewed)   1. Orthopedics-Dupuytren's contracture both hands, left glenohumeral joint OA      IMAGING - reviewed   2 views lumbar spine radiographs 2/17/2025   Findings:     Standing  AP and lateral  views of the lumbar spine were obtained.     5  lumbar type vertebral bodies are assumed for the purpose of this  dictation.     There is no acute osseous " abnormality.       There is mild multilevel degenerative changes of the lumbar spine.  Disc spaces are relatively preserved. There is also lower lumbar  predominant facet arthropathy. Normal AP alignment is maintained.     The visualized bowel gas pattern is non-obstructive. Aortoiliac  calcifications. Pelvic phleboliths.                                               Impression:  1.  No acute osseous abnormality.  2.  Mild multilevel degenerative changes.      3 views thoracic spine radiographs 2/17/2025   Findings:     Standing  AP, lateral and swimmer views of the thoracic spine were  obtained.     12 rib bearing vertebral bodies are identified.     There is no acute osseous abnormality.       Multilevel degenerative changes of the mid/lower thoracic spine with  endplate spurring spurring.     The visualized lungs are clear. Cardiomediastinal silhouette is within  normal limits.                                                       Impression:  1.  No acute osseous abnormality.  2.  Multilevel degenerative changes of the thoracic spine, not  significantly changed since 9/13/2021.        MR CERVICAL SPINE W/O CONTRAST  1/20/2025    FINDINGS:  Trace 2 mm retrolisthesis of C3 on C4, trace 1 mm retrolisthesis of C4  on C5 and anterolisthesis of C7 on T1. Normal marrow signal.  Multilevel disc height loss most significantly at C3-4 and C5-6.  Prominence of the central canal at the level of C6-7. No abnormal cord  signal. Diffusion-weighted images are negative for focal abnormality.     The findings on a level by level basis are as follows:     C2-3: No spinal canal or neural foraminal stenosis.     C3-4: Asymmetric left disc osteophyte complex. Bilateral uncinate  spurring and facet hypertrophy. Mild-to-moderate left and mild right  neural foraminal narrowing. No significant spinal canal stenosis.     C4-5: Disc bulge. Bilateral uncinate spurring and facet hypertrophy.  Moderate to severe bilateral neural foraminal  narrowing. Mild spinal  canal stenosis.     C5-6: Disc bulge. Bilateral uncinate spurring and facet hypertrophy.  Moderate to severe bilateral neural foraminal narrowing. No  significant spinal canal stenosis.     C6-7: Left asymmetric disc osteophyte complex. Bilateral facet  hypertrophy. Severe left and moderate right neural foraminal  narrowing. No significant spinal canal stenosis.     C7-T1: No spinal canal or neural foraminal stenosis.                                      IMPRESSION:  1. Multilevel cervical spondylosis most significant at C4-5 and C5-6  with moderate to severe bilateral neural foraminal narrowing as well  as at C6-7 with severe left and moderate right neural foraminal  narrowing. Mild spinal canal stenosis at the level of C4-5.  2. Prominence of the central canal at the level of C6-7. No abnormal  cord signal.         XR CERVICAL SPINE 2/3 VIEWS 12/27/2024 1:28 PM     History: recurrent pain in neck, L>R, evaluate for profession of DJD;  Cervicalgia   ICD-10: Cervicalgia     Comparison: Cervical spine radiographs 9/13/2021.     Findings: AP, lateral, and odontoid views of the cervical spine were  obtained. Normal cervical spine alignment. Moderate loss of disc  height at C3-4 and C5-6. Mild loss of disc height at C4-5 and C6-7.  Additional multilevel degenerative changes including endplate  osteophytosis, uncinate hypertrophy, and facet hypertrophy. No  prevertebral edema. No mass in the visualized lung apices. Bilateral  carotid atherosclerotic plaque. Slight rotational asymmetry of C1 on  C2, similar to 9/13/2021.                                                                      Impression:  Multilevel cervical degenerative changes, similar to 9/13/2021, most  pronounced at C3-4 and C5-6.      XR THORACIC SPINE 2 VIEWS, 9/13/2021 4:49 PM     Indication: acute R sided neck/trap pain, r/o DJD or trauma; Neck pain     Comparison: Same day cervical spine radiographs, chest  radiograph  1/18/2019     Findings: AP and lateral radiographs of the thoracic spine were  obtained. Mildly increased kyphotic curvature of the thoracic spine.  No acute osseous abnormalities. Mild multilevel endplate degenerative  changes and endplate osteophytosis, grossly unchanged since prior  exam.     Visualized lungs are clear. Multiple chronic appearing posterior right  upper rib fractures.                                                                      Impression:   1. Mild multilevel endplate degenerative changes of the thoracic  spine. No acute osseous anomalies.  2. See same-day cervical spine radiograph for additional detail.       Review Of Systems:  I am responding to those symptoms which are directly relevant to the specific indication for my consultation. I recommend that the patient follow up with their primary or referring provider to pursue any other symptoms which may be of concern.       Medical History:  He  has a past medical history of Actinic keratosis (2009), Atrial fibrillation (H) (06/15/1996), Dupuytren's contracture of both hands, Elevated PSA, Hepatitis B (1969), Hepatitis C virus infection, unspecified chronicity, WINNIE (obstructive sleep apnea), Pain in both hands, Patient is Shinto, Refusal of blood transfusions as patient is Shinto, Right shoulder pain, Tinnitus (1 or 2 years ago), and Tubular adenoma of colon (01/17/2017).     He  has a past surgical history that includes Arthroscopy knee; colonoscopy (1/17/17); Release dupuytrens contracture (Right, 1/20/2017); knee surgery (2006??); Herniorrhaphy inguinal (Right, 10/26/2017); Release dupuytrens contracture (Left, 12/15/2017); Anesthesia cardioversion (N/A, 1/15/2018); Transesophageal echocardiogram intraoperative (N/A, 1/15/2018); Anesthesia cardioversion (N/A, 10/11/2018); Anesthesia cardioversion (N/A, 11/28/2018); Cystoscopy, transurethral resection (TUR) prostate, combined (N/A, 8/21/2020);  Colonoscopy (N/A, 6/26/2023); Release dupuytrens contracture (Right, 8/9/2023); Anesthesia cardioversion (N/A, 9/19/2023); and Anesthesia cardioversion (N/A, 3/27/2024).    Family History  His family history includes Alcohol/Drug in his mother; Alcoholism in his father and mother; Arthritis in his paternal grandmother; Cardiac Sudden Death in his brother; Gastrointestinal Disease in his father; Heart Disease in his brother; Rheumatoid Arthritis in his paternal grandmother; Uterine Cancer in his sister.     Social History:  Work: retired for 10 years, previously did factory work with heavy lifting (remotely), [employer] work, most recently worked in IT for [employer]  Current living situation: Lives with spouse. Performs ADLs/IADLs independently.  He  reports that he quit smoking about 50 years ago. His smoking use included cigarettes. He started smoking about 60 years ago. He has a 5 pack-year smoking history. He quit smokeless tobacco use about 55 years ago. He reports current alcohol use of about 3.0 - 4.0 standard drinks of alcohol per week. He reports that he does not use drugs.        Current Medications:   He has a current medication list which includes the following prescription(s): aspirin, cyclobenzaprine, diltiazem er coated beads, flecainide, and meloxicam.     Allergies:    -- Blood Transfusion Related (Informational Only)     --  Voodoo.  Declines blood transfusions.   -- Shellfish-Derived Products -- Swelling   -- Contrast Dye -- Rash   -- Iodinated Contrast Media -- Rash    PHYSICAL EXAMINATION:  /63 (BP Location: Right arm, Patient Position: Sitting, Cuff Size: Adult Regular)   Pulse 63   Wt 75.8 kg (167 lb)   SpO2 98%   BMI 24.66 kg/m     --CONSTITUTIONAL: Vital signs as above. No acute distress. The patient is well nourished and well groomed.  --PSYCHIATRIC: The patient is awake, alert, oriented to person, place, time and answering questions appropriately with  clear speech. Appropriate mood and affect   --HEENT: Sclera are non-injected. Extraocular muscles are intact. Moist oral mucosa.  --SKIN: observable skin is clean, dry, intact without rashes.  --RESPIRATORY: Normal rhythm and effort. No abnormal accessory muscle breathing patterns noted.   --GROSS MOTOR: Easily arises from a seated position. Gait is non-antalgic.Toe walking and heel walking are normal.  Tandem gait is normal.  --CERVICAL SPINE: Inspection reveals no evidence of deformity. Range of motion is not limited in cervical flexion, extension, lateral rotation but all elicit pain. No tenderness to palpation cervical spine.  Spurling maneuver negative bilaterally.  Significant upper trapezius fullness L>R  --SHOULDERS: Full range of motion bilaterally: abduction, flexion, cross chest movement internal/external rotation.  --UPPER EXTREMITY MOTOR TESTING:  Wrist flexion left 5/5, right 5/5  Wrist extension left 5/5, right 5/5  Biceps left 5/5, right 5/5   Triceps left 5/5, right 5/5   Shoulder abduction left 5/5, right 5/5   left 5/5, right 5/5  Finger abduction left 5/5, right 5/5  --NEUROLOGIC: CN III-XII are grossly intact.   2/4 symmetric biceps, brachioradialis, triceps reflexes bilaterally. Sensation to upper extremities is intact.  Negative Smith's bilaterally.    --VASCULAR: Warm upper limbs bilaterally.          ASSESSMENT:  Frank Orellana is a pleasant 75 year old male who presents today in regards to:   -Acute on chronic daily constant neck pain, varies side to side  -Chronic UE numbness: bilateral thumbs and bilateral index fingers    He has also been seen regarding:  -Acute on chronic intermittent midline mid thoracic pain  -Acute on chronic intermittent nonradiating low back pain  -Chronic LE numbness: balls of both feet and bilateral toes  -Subjective weakness of unclear chronicity    MRI Cerivical spine shows:  -Multilevel spondylolisthesis  -Multilevel cervical spondylosis, most notably at  C4-5 and C5-6 where there is bilateral moderate/severe neuroforaminal stenosis.  At C56-7 there is severe left neuroforaminal stenosis and moderate right neuroforaminal stenosis    Differential includes cervical DDD, cervical radiculopathy, cervical facet mediated pain, cervicogenic headache, left intra-articular shoulder pathology      Complicating comorbities include:  # Paroxysmal A-fib, not on AC, s/p multiple DCCV  # Dupuytren's contraction of both hands  # History of right shoulder pain    Note: pt is [employer]       PLAN:  - X-rays of the thoracic and lumbar spine reviewed in detail with the patient today  -Continue gabapentin 300 mg.  Add 100 mg of gabapentin which he can initially start in the morning and after 5 days also add 100 mg in the afternoon  - Encouraged him to begin physical therapy and home exercise stretching and strengthening program  - We also talked about the option of trigger point injections in the clinic or dry needling with PT.  He prefers to hold off for now  - We discussed cervical facet steroid injections and medial branch block/radiofrequency ablation. He declines at this time  - We did discuss the option of a surgical consultation given the severe neuroforaminal stenosis with ongoing neck and left scapular area pain which may be related to C7 radiculopathy  -encouraged him to follow up with his PCP in regards to Aortoiliac  Calcifications seen on X ray lumbar spine  - RTC in 6 to 8 weeks per his preference to assess the gabapentin and impact of PT/HEP        Ready to learn, no apparent learning barriers.  Education provided on treatment plan according to patient's preferred learning style.  Patient verbalizes understanding.   __________________________________  Amanda Carrion NP  Physical Medicine & Rehabilitation        46 minutes spent by me on the date of the encounter doing chart review, history and exam, documentation and further activities per the note       Again,  thank you for allowing me to participate in the care of your patient.      Sincerely,    ANDREINA Jorgensen CNP

## 2025-05-29 DIAGNOSIS — M54.12 CERVICAL RADICULOPATHY: ICD-10-CM

## 2025-05-29 DIAGNOSIS — M54.9 UPPER BACK PAIN: ICD-10-CM

## 2025-05-29 DIAGNOSIS — R20.0 NUMBNESS: ICD-10-CM

## 2025-05-29 DIAGNOSIS — M54.2 NECK PAIN: ICD-10-CM

## 2025-05-29 RX ORDER — GABAPENTIN 300 MG/1
300 CAPSULE ORAL AT BEDTIME
Qty: 30 CAPSULE | Refills: 3 | Status: SHIPPED | OUTPATIENT
Start: 2025-05-29

## 2025-05-29 NOTE — TELEPHONE ENCOUNTER
----- Message -----  From: Amanda Carrion APRN CNP  Sent: 5/29/2025   1:24 PM CDT  To: Cynthia Ramon RN    Done ~  Amanda Naqvi

## 2025-05-29 NOTE — TELEPHONE ENCOUNTER
Patient is requesting his Gabapentin 300 mg prescription be sent to his [facility]. Last one was sent to [facility] and when he requested the RX transferred to [facility] they sent a medication request instead, they could not transfer the [facility] RX to [facility].     Medication: gabapentin (NEURONTIN) 300 MG capsule   Directions: Take 1 capsule (300 mg) by mouth at bedtime.      Last ordered: 4/4/25   Qty: 30  RF: 3  Last OV: 5/19/25  Next OV: 6/30/25    Routed to Amanda Carrion NP to send the RX to [facility] for patient instead.     KIMI LazarN RN  RN Care Coordinator for Physical Medicine and Rehabilitation  [facility] - [address] [address]  Main clinic office: [phone]  Main clinic fax: [phone]

## 2025-06-12 ENCOUNTER — NURSE TRIAGE (OUTPATIENT)
Dept: INTERNAL MEDICINE | Facility: CLINIC | Age: 75
End: 2025-06-12
Payer: COMMERCIAL

## 2025-06-12 ENCOUNTER — HOSPITAL ENCOUNTER (EMERGENCY)
Facility: CLINIC | Age: 75
End: 2025-06-12
Attending: EMERGENCY MEDICINE
Payer: COMMERCIAL

## 2025-06-12 VITALS
RESPIRATION RATE: 18 BRPM | OXYGEN SATURATION: 96 % | HEART RATE: 67 BPM | DIASTOLIC BLOOD PRESSURE: 82 MMHG | TEMPERATURE: 97.6 F | SYSTOLIC BLOOD PRESSURE: 141 MMHG

## 2025-06-12 DIAGNOSIS — R07.89 ATYPICAL CHEST PAIN: ICD-10-CM

## 2025-06-12 LAB
ANION GAP SERPL CALCULATED.3IONS-SCNC: 9 MMOL/L (ref 7–15)
ATRIAL RATE - MUSE: 62 BPM
BASOPHILS # BLD AUTO: 0.1 10E3/UL (ref 0–0.2)
BASOPHILS NFR BLD AUTO: 1 %
BUN SERPL-MCNC: 15.5 MG/DL (ref 8–23)
CALCIUM SERPL-MCNC: 9.1 MG/DL (ref 8.8–10.4)
CHLORIDE SERPL-SCNC: 105 MMOL/L (ref 98–107)
CREAT SERPL-MCNC: 1.06 MG/DL (ref 0.67–1.17)
DIASTOLIC BLOOD PRESSURE - MUSE: NORMAL MMHG
EGFRCR SERPLBLD CKD-EPI 2021: 73 ML/MIN/1.73M2
EOSINOPHIL # BLD AUTO: 0.2 10E3/UL (ref 0–0.7)
EOSINOPHIL NFR BLD AUTO: 2 %
ERYTHROCYTE [DISTWIDTH] IN BLOOD BY AUTOMATED COUNT: 12.8 % (ref 10–15)
GLUCOSE SERPL-MCNC: 103 MG/DL (ref 70–99)
HCO3 SERPL-SCNC: 27 MMOL/L (ref 22–29)
HCT VFR BLD AUTO: 39.5 % (ref 40–53)
HGB BLD-MCNC: 13.4 G/DL (ref 13.3–17.7)
IMM GRANULOCYTES # BLD: 0 10E3/UL
IMM GRANULOCYTES NFR BLD: 0 %
INTERPRETATION ECG - MUSE: NORMAL
LYMPHOCYTES # BLD AUTO: 1.1 10E3/UL (ref 0.8–5.3)
LYMPHOCYTES NFR BLD AUTO: 17 %
MAGNESIUM SERPL-MCNC: 2.2 MG/DL (ref 1.7–2.3)
MCH RBC QN AUTO: 32.1 PG (ref 26.5–33)
MCHC RBC AUTO-ENTMCNC: 33.9 G/DL (ref 31.5–36.5)
MCV RBC AUTO: 95 FL (ref 78–100)
MONOCYTES # BLD AUTO: 0.7 10E3/UL (ref 0–1.3)
MONOCYTES NFR BLD AUTO: 11 %
NEUTROPHILS # BLD AUTO: 4.5 10E3/UL (ref 1.6–8.3)
NEUTROPHILS NFR BLD AUTO: 69 %
NRBC # BLD AUTO: 0 10E3/UL
NRBC BLD AUTO-RTO: 0 /100
P AXIS - MUSE: 82 DEGREES
PLATELET # BLD AUTO: 192 10E3/UL (ref 150–450)
POTASSIUM SERPL-SCNC: 4.2 MMOL/L (ref 3.4–5.3)
PR INTERVAL - MUSE: 182 MS
QRS DURATION - MUSE: 106 MS
QT - MUSE: 434 MS
QTC - MUSE: 440 MS
R AXIS - MUSE: -4 DEGREES
RBC # BLD AUTO: 4.18 10E6/UL (ref 4.4–5.9)
SODIUM SERPL-SCNC: 141 MMOL/L (ref 135–145)
SYSTOLIC BLOOD PRESSURE - MUSE: NORMAL MMHG
T AXIS - MUSE: 53 DEGREES
TROPONIN T SERPL HS-MCNC: 7 NG/L
TROPONIN T SERPL HS-MCNC: 8 NG/L
VENTRICULAR RATE- MUSE: 62 BPM
WBC # BLD AUTO: 6.6 10E3/UL (ref 4–11)

## 2025-06-12 PROCEDURE — 99284 EMERGENCY DEPT VISIT MOD MDM: CPT | Performed by: EMERGENCY MEDICINE

## 2025-06-12 PROCEDURE — 84484 ASSAY OF TROPONIN QUANT: CPT | Performed by: EMERGENCY MEDICINE

## 2025-06-12 PROCEDURE — 93005 ELECTROCARDIOGRAM TRACING: CPT | Performed by: EMERGENCY MEDICINE

## 2025-06-12 PROCEDURE — 80048 BASIC METABOLIC PNL TOTAL CA: CPT | Performed by: EMERGENCY MEDICINE

## 2025-06-12 PROCEDURE — 36415 COLL VENOUS BLD VENIPUNCTURE: CPT | Performed by: EMERGENCY MEDICINE

## 2025-06-12 PROCEDURE — 83735 ASSAY OF MAGNESIUM: CPT | Performed by: EMERGENCY MEDICINE

## 2025-06-12 PROCEDURE — 250N000013 HC RX MED GY IP 250 OP 250 PS 637: Performed by: EMERGENCY MEDICINE

## 2025-06-12 PROCEDURE — 85004 AUTOMATED DIFF WBC COUNT: CPT | Performed by: EMERGENCY MEDICINE

## 2025-06-12 RX ORDER — ASPIRIN 81 MG/1
324 TABLET, CHEWABLE ORAL ONCE
Status: COMPLETED | OUTPATIENT
Start: 2025-06-12 | End: 2025-06-12

## 2025-06-12 RX ADMIN — ASPIRIN 81 MG CHEWABLE TABLET 324 MG: 81 TABLET CHEWABLE at 16:31

## 2025-06-12 ASSESSMENT — ACTIVITIES OF DAILY LIVING (ADL)
ADLS_ACUITY_SCORE: 47

## 2025-06-12 ASSESSMENT — COLUMBIA-SUICIDE SEVERITY RATING SCALE - C-SSRS
2. HAVE YOU ACTUALLY HAD ANY THOUGHTS OF KILLING YOURSELF IN THE PAST MONTH?: NO
6. HAVE YOU EVER DONE ANYTHING, STARTED TO DO ANYTHING, OR PREPARED TO DO ANYTHING TO END YOUR LIFE?: NO
1. IN THE PAST MONTH, HAVE YOU WISHED YOU WERE DEAD OR WISHED YOU COULD GO TO SLEEP AND NOT WAKE UP?: NO

## 2025-06-12 NOTE — ED TRIAGE NOTES
Arrives ambulatory with chest tightness. States it started yesterday and has been intermittent throughout the day.     Triage Assessment (Adult)       Row Name 06/12/25 1611          Triage Assessment    Airway WDL WDL        Respiratory WDL    Respiratory WDL WDL        Skin Circulation/Temperature WDL    Skin Circulation/Temperature WDL WDL        Cardiac WDL    Cardiac WDL X;chest pain        Chest Pain Assessment    Chest Pain Location midsternal     Chest Pain Intervention 12-lead ECG obtained        Peripheral/Neurovascular WDL    Peripheral Neurovascular WDL WDL        Cognitive/Neuro/Behavioral WDL    Cognitive/Neuro/Behavioral WDL WDL

## 2025-06-12 NOTE — ED PROVIDER NOTES
ED Provider Note  [facility]      History     Chief Complaint   Patient presents with    Chest Pain     The history is provided by the patient, medical records and the spouse.     Frank Orellana is a 75 year old male with history of paroxysmal Afib who presents to the ED with chest pain. He reports the onset of tightness across the chest with some lightheadedness yesterday evening while sitting at his computer. It was mild, only a 1-2/10 at that time. It has been intermittent throughout the day today, but he notes it is worse with inspiration and increases as he breathes deeper. He reports taking his BP today and it was 160/80 which is high for him. He states he is normally in the 120s/70s. He does report having history of Afib which he can usually tell when he goes into as it is associated with chest pressure. This does not feel similar to that. He denies any cough, fever, chills, nausea, vomiting, or dizziness.     Past Medical History  Past Medical History:   Diagnosis Date    Actinic keratosis 2009    Atrial fibrillation (H) 06/15/1996    Afib - 2 or 3 extended occurrences since    Dupuytren's contracture of both hands     Elevated PSA     Hepatitis B 1969    acute infection at age 19; IV drug use    Hepatitis C virus infection, unspecified chronicity     treated, cleared    WINNIE (obstructive sleep apnea)     AHI 34.2    Pain in both hands     Patient is Hindu     Refusal of blood transfusions as patient is Hindu     Right shoulder pain     Tinnitus 1 or 2 years ago    both ears    Tubular adenoma of colon 01/17/2017    ascending colon, 1/17/17     Past Surgical History:   Procedure Laterality Date    ANESTHESIA CARDIOVERSION N/A 1/15/2018    Procedure: ANESTHESIA CARDIOVERSION;  Anesthesia Coverage Cardioversion With Transesophageal Echocardiogram @0930 ;  Surgeon: GENERIC ANESTHESIA PROVIDER;  Location: [facility] OR    ANESTHESIA CARDIOVERSION N/A 10/11/2018     Procedure: ANESTHESIA CARDIOVERSION;  Cardioversion;  Surgeon: GENERIC ANESTHESIA PROVIDER;  Location: [facility] OR    ANESTHESIA CARDIOVERSION N/A 11/28/2018    Procedure: Anesthesia Coverage Cardioversion @0830;  Surgeon: GENERIC ANESTHESIA PROVIDER;  [facility]: [facility]    ANESTHESIA CARDIOVERSION N/A 9/19/2023    Procedure: Anesthesia cardioversion@0900;  Surgeon: GENERIC ANESTHESIA PROVIDER;  [facility]: [facility]    ANESTHESIA CARDIOVERSION N/A 3/27/2024    Procedure: Anesthesia cardioversion @1100;  Surgeon: GENERIC ANESTHESIA PROVIDER;  Location: [facility]    ARTHROSCOPY KNEE      left knee    COLONOSCOPY  1/17/17    tubular adenoma ascending colon    COLONOSCOPY N/A 6/26/2023    Procedure: COLONOSCOPY, WITH POLYPECTOMY AND BIOPSY;  Surgeon: Hussein Stevens MD;  Location: [facility] GI    CYSTOSCOPY, TRANSURETHRAL RESECTION (TUR) PROSTATE, COMBINED N/A 8/21/2020    Procedure: CYSTOSCOPY, WITH TRANSURETHRAL RESECTION PROSTATE;  Surgeon: Roldan De La O MD;  Location: [facility] OR    HERNIORRHAPHY INGUINAL Right 10/26/2017    Procedure: HERNIORRHAPHY INGUINAL;  Open Right Inguinal Hernia Repair with Mesh;  Surgeon: Suresh Raymond MD;  [facility]    INJECT EPIDURAL CERVICAL N/A 3/17/2025    Procedure: C7-T1 interlaminar epidural steroid injection;  Surgeon: Pacheco Garcia MD;  [facility]    KNEE SURGERY  2006??    torn meniscus    RELEASE DUPUYTRENS CONTRACTURE Right 1/20/2017    Procedure: RELEASE DUPUYTRENS CONTRACTURE;  Surgeon: Lars Oleary MD;  Location: [facility] OR    RELEASE DUPUYTRENS CONTRACTURE Left 12/15/2017    Procedure: RELEASE DUPUYTRENS CONTRACTURE;  Left Ring and Middle Finger Subtotal Palmar Fasciectomy;  Surgeon: Lars Oleary MD;  [facility]    RELEASE DUPUYTRENS CONTRACTURE Right 8/9/2023    Procedure: RELEASE, DUPUYTREN CONTRACTURE, RIGHT SMALL FINGER AND RIGHT MIDDLE FINGER;  Surgeon: Lars Oleary MD;  Location: [facility] [place] [place] OR    TRANSESOPHAGEAL ECHOCARDIOGRAM  INTRAOPERATIVE N/A 1/15/2018    Procedure: TRANSESOPHAGEAL ECHOCARDIOGRAM INTRAOPERATIVE;;  Surgeon: GENERIC ANESTHESIA PROVIDER;  Location: [facility] OR     aspirin 81 MG EC tablet  diltiazem ER COATED BEADS (CARDIZEM CD/CARTIA XT) 120 MG 24 hr capsule  flecainide (TAMBOCOR) 50 MG tablet  gabapentin (NEURONTIN) 100 MG capsule  gabapentin (NEURONTIN) 300 MG capsule  predniSONE (DELTASONE) 50 MG tablet      Allergies   Allergen Reactions    Blood Transfusion Related (Informational Only)      Latter day.  Declines blood transfusions.    Shellfish-Derived Products Swelling    Contrast Dye Rash    Iodinated Contrast Media Rash     Family History  Family History   Problem Relation Age of Onset    Alcohol/Drug Mother          age 64    Alcoholism Mother     Gastrointestinal Disease Father          age 77 after colon surgery    Alcoholism Father     Arthritis Paternal Grandmother     Rheumatoid Arthritis Paternal Grandmother     Heart Disease Brother     Cardiac Sudden Death Brother     Uterine Cancer Sister     Glaucoma No family hx of     Macular Degeneration No family hx of     Retinal detachment No family hx of     Melanoma No family hx of     Skin Cancer No family hx of      Social History   Social History     Tobacco Use    Smoking status: Former     Current packs/day: 0.00     Average packs/day: 0.5 packs/day for 10.0 years (5.0 ttl pk-yrs)     Types: Cigarettes     Start date: 6/15/1964     Quit date: 1974     Years since quittin.9    Smokeless tobacco: Former     Quit date: 1969   Vaping Use    Vaping status: Never Used   Substance Use Topics    Alcohol use: Yes     Alcohol/week: 3.0 - 4.0 standard drinks of alcohol     Comment: 4 or 5 drinks/week, limit of 1    Drug use: No     Comment: --history of marijuana      A complete review of systems was performed with pertinent positives and negatives noted in the HPI, and all other systems negative.    Physical Exam      Physical Exam  Vitals  "and nursing note reviewed.   Constitutional:       General: He is not in acute distress.     Appearance: Normal appearance. He is not toxic-appearing.   HENT:      Head: Atraumatic.   Eyes:      General: No scleral icterus.     Conjunctiva/sclera: Conjunctivae normal.   Cardiovascular:      Rate and Rhythm: Normal rate.      Heart sounds: Normal heart sounds.   Pulmonary:      Effort: Pulmonary effort is normal. No respiratory distress.      Breath sounds: Normal breath sounds.   Abdominal:      Palpations: Abdomen is soft.      Tenderness: There is no abdominal tenderness.   Musculoskeletal:         General: No deformity.      Cervical back: Neck supple.   Skin:     General: Skin is warm.   Neurological:      Mental Status: He is alert.       ***    ED Course, Procedures, & Data      Procedures       {ED Course Selections (Optional):557250}  {ED Sepsis CMS Documentation (Optional):973214::\" \"}       Results for orders placed or performed during the hospital encounter of 06/12/25   [place] Draw     Status: None ()    Narrative    The following orders were created for panel order [place] Draw.  Procedure                               Abnormality         Status                     ---------                               -----------         ------                     Extra Blue Top Tube[[id number]]                                                        Extra Red Top Tube[[id number]]                                                         Extra Green Top (Lithiu...[[id number]]                                                 Extra Purple Top Tube[[id number]]                                                        Please view results for these tests on the individual orders.   EKG 12 lead     Status: None (Preliminary result)   Result Value Ref Range    Systolic Blood Pressure  mmHg    Diastolic Blood Pressure  mmHg    Ventricular Rate 62 BPM    Atrial Rate 62 BPM    CT Interval 182 ms    QRS Duration 106 ms     ms "    QTc 440 ms    P Axis 82 degrees    R AXIS -4 degrees    T Axis 53 degrees    Interpretation ECG Sinus rhythm  Normal ECG        Medications - No data to display  Labs Ordered and Resulted from Time of ED Arrival to Time of ED Departure - No data to display  No orders to display          {Critical Care Performed?:300212}    Assessment & Plan    ***    I have reviewed the nursing notes. I have reviewed the findings, diagnosis, plan and need for follow up with the patient.    New Prescriptions    No medications on file       Final diagnoses:   None   I, Zenobia Yung, am serving as a trained medical scribe to document services personally performed by Anshu Steven MD, based on the provider's statements to me.     I, Anshu Steven MD, was physically present and have reviewed and verified the accuracy of this note documented by Zenobia Yung.      Anshu Steven MD  [facility] EMERGENCY DEPARTMENT  6/12/2025   Axis 82 degrees    R AXIS -4 degrees    T Axis 53 degrees    Interpretation ECG       Sinus rhythm  Normal ECG  Unconfirmed report - interpretation of this ECG is computer generated - see medical record for final interpretation  Confirmed by - EMERGENCY ROOM, PHYSICIAN ([id number]),  ISRA HIGUERA ([id number]) on 6/13/2025 7:14:29 AM     CBC with platelets differential     Status: Abnormal    Narrative    The following orders were created for panel order CBC with platelets differential.  Procedure                               Abnormality         Status                     ---------                               -----------         ------                     CBC with platelets and ...[[id number]]  Abnormal            Final result                 Please view results for these tests on the individual orders.     Medications   aspirin (ASA) chewable tablet 324 mg (324 mg Oral $Given 6/12/25 6410)     Labs Ordered and Resulted from Time of ED Arrival to Time of ED Departure   BASIC METABOLIC PANEL - Abnormal       Result Value    Sodium 141      Potassium 4.2      Chloride 105      Carbon Dioxide (CO2) 27      Anion Gap 9      Urea Nitrogen 15.5      Creatinine 1.06      GFR Estimate 73      Calcium 9.1      Glucose 103 (*)    CBC WITH PLATELETS AND DIFFERENTIAL - Abnormal    WBC Count 6.6      RBC Count 4.18 (*)     Hemoglobin 13.4      Hematocrit 39.5 (*)     MCV 95      MCH 32.1      MCHC 33.9      RDW 12.8      Platelet Count 192      % Neutrophils 69      % Lymphocytes 17      % Monocytes 11      % Eosinophils 2      % Basophils 1      % Immature Granulocytes 0      NRBCs per 100 WBC 0      Absolute Neutrophils 4.5      Absolute Lymphocytes 1.1      Absolute Monocytes 0.7      Absolute Eosinophils 0.2      Absolute Basophils 0.1      Absolute Immature Granulocytes 0.0      Absolute NRBCs 0.0     TROPONIN T, HIGH SENSITIVITY - Normal    Troponin T, High Sensitivity 7     MAGNESIUM - Normal    Magnesium 2.2      TROPONIN T, HIGH SENSITIVITY - Normal    Troponin T, High Sensitivity 8       No orders to display          Critical care was not performed.     Medical Decision Making  The patient's presentation was of moderate complexity (an acute illness with systemic symptoms).    The patient's evaluation involved:  ordering and/or review of 3+ test(s) in this encounter (see separate area of note for details)    The patient's management necessitated moderate risk (prescription drug management including medications given in the ED).    Assessment & Plan    This is a 75-year-old male who presents to the ED with chest tightness through triage that started yesterday and has been intermittent throughout the day.  He has not had symptoms like this in the past.  He does have a history of paroxysmal atrial fibrillation but has not had any palpitations or heart fluttering.  Cardiac workup ordered.    EKG shows normal sinus rhythm unchanged from previous no acute ST-T wave changes.  Labs are completely unremarkable including repeat troponin that is negative.  The patient has been asymptomatic during his entire ED course.  He will be discharged to home with follow-up with his primary care provider in the next 5 to 7 days.    I have reviewed the nursing notes. I have reviewed the findings, diagnosis, plan and need for follow up with the patient.    Discharge Medication List as of 6/12/2025 10:38 PM          Final diagnoses:   Atypical chest pain   IZenobia, am serving as a trained medical scribe to document services personally performed by Anshu Steven MD, based on the provider's statements to me.     IAnshu MD, was physically present and have reviewed and verified the accuracy of this note documented by Zenobia Yung.      Anshu Steven MD  [facility] EMERGENCY DEPARTMENT  6/12/2025     Anshu Steven MD  06/14/25 [id number]

## 2025-06-12 NOTE — TELEPHONE ENCOUNTER
Nurse Triage SBAR    Is this a 2nd Level Triage? YES, LICENSED PRACTITIONER REVIEW IS REQUIRED    Situation:   Patient calling with concerns of chest pains/tightness    Background:   Sx started last evening     Assessment:   Middle of upper chest - tightness - constant  Worse with deep breaths   2-3/10  BP - 160/na    Protocol Recommended Disposition:   Call  Now    Recommendation:   Due to sx recommend patient call 911- patient declined and states he Only lives 5 minutes from hospital - will have wife drive him to [facility].      Reason for Disposition   Chest pain lasting longer than 5 minutes and ANY of the following:         Pain is crushing, pressure-like, or heavy         Over 44 years old          Over 30 years old and one cardiac risk factor (e.g diabetes, high blood pressure, high cholesterol, smoker, or family history of heart disease)         History of heart disease (e.g. angina, heart attack, heart failure, bypass surgery, takes nitroglycerin)    Additional Information   Negative: SEVERE difficulty breathing (e.g., struggling for each breath, speaks in single words)   Negative: Difficult to awaken or acting confused (e.g., disoriented, slurred speech)   Negative: Shock suspected (e.g., cold/pale/clammy skin, too weak to stand, low BP, rapid pulse)   Negative: Passed out (i.e., lost consciousness, collapsed and was not responding)    Protocols used: Chest Pain-A-[place]    KIMI LancasterN, RN  [facility]

## 2025-06-13 LAB
ATRIAL RATE - MUSE: 62 BPM
DIASTOLIC BLOOD PRESSURE - MUSE: NORMAL MMHG
INTERPRETATION ECG - MUSE: NORMAL
P AXIS - MUSE: 82 DEGREES
PR INTERVAL - MUSE: 182 MS
QRS DURATION - MUSE: 106 MS
QT - MUSE: 434 MS
QTC - MUSE: 440 MS
R AXIS - MUSE: -4 DEGREES
SYSTOLIC BLOOD PRESSURE - MUSE: NORMAL MMHG
T AXIS - MUSE: 53 DEGREES
VENTRICULAR RATE- MUSE: 62 BPM

## 2025-06-13 ASSESSMENT — ACTIVITIES OF DAILY LIVING (ADL)
ADLS_ACUITY_SCORE: 47

## 2025-06-13 NOTE — DISCHARGE INSTRUCTIONS
Take acetaminophen or ibuprofen as needed for chest pain.  Follow-up with your primary care provider in the next 2 to 3 days.  Return to the emergency department if your symptoms worsen.

## 2025-06-23 ENCOUNTER — THERAPY VISIT (OUTPATIENT)
Dept: PHYSICAL THERAPY | Facility: CLINIC | Age: 75
End: 2025-06-23
Payer: COMMERCIAL

## 2025-06-23 DIAGNOSIS — M54.2 NECK PAIN: Primary | ICD-10-CM

## 2025-06-23 PROCEDURE — 97110 THERAPEUTIC EXERCISES: CPT | Mod: GP | Performed by: PHYSICAL THERAPIST

## 2025-06-23 PROCEDURE — 97112 NEUROMUSCULAR REEDUCATION: CPT | Mod: GP | Performed by: PHYSICAL THERAPIST

## 2025-06-25 ENCOUNTER — PATIENT OUTREACH (OUTPATIENT)
Dept: CARE COORDINATION | Facility: CLINIC | Age: 75
End: 2025-06-25
Payer: COMMERCIAL

## 2025-06-30 ENCOUNTER — OFFICE VISIT (OUTPATIENT)
Dept: PHYSICAL MEDICINE AND REHAB | Facility: CLINIC | Age: 75
End: 2025-06-30
Payer: COMMERCIAL

## 2025-06-30 ENCOUNTER — THERAPY VISIT (OUTPATIENT)
Dept: PHYSICAL THERAPY | Facility: CLINIC | Age: 75
End: 2025-06-30
Payer: COMMERCIAL

## 2025-06-30 VITALS — HEART RATE: 64 BPM | OXYGEN SATURATION: 96 % | DIASTOLIC BLOOD PRESSURE: 63 MMHG | SYSTOLIC BLOOD PRESSURE: 111 MMHG

## 2025-06-30 DIAGNOSIS — M54.9 UPPER BACK PAIN: ICD-10-CM

## 2025-06-30 DIAGNOSIS — M48.02 NEUROFORAMINAL STENOSIS OF CERVICAL SPINE: ICD-10-CM

## 2025-06-30 DIAGNOSIS — M54.12 CERVICAL RADICULOPATHY: Primary | ICD-10-CM

## 2025-06-30 DIAGNOSIS — M54.2 NECK PAIN: ICD-10-CM

## 2025-06-30 DIAGNOSIS — R20.0 NUMBNESS: ICD-10-CM

## 2025-06-30 DIAGNOSIS — M54.2 NECK PAIN: Primary | ICD-10-CM

## 2025-06-30 DIAGNOSIS — M47.812 CERVICAL SPONDYLOSIS WITHOUT MYELOPATHY: ICD-10-CM

## 2025-06-30 DIAGNOSIS — G44.86 CERVICOGENIC HEADACHE: ICD-10-CM

## 2025-06-30 PROCEDURE — 3078F DIAST BP <80 MM HG: CPT | Performed by: NURSE PRACTITIONER

## 2025-06-30 PROCEDURE — 99215 OFFICE O/P EST HI 40 MIN: CPT | Performed by: NURSE PRACTITIONER

## 2025-06-30 PROCEDURE — 3074F SYST BP LT 130 MM HG: CPT | Performed by: NURSE PRACTITIONER

## 2025-06-30 PROCEDURE — 97110 THERAPEUTIC EXERCISES: CPT | Mod: GP | Performed by: PHYSICAL THERAPIST

## 2025-06-30 RX ORDER — PREGABALIN 50 MG/1
50 CAPSULE ORAL AT BEDTIME
Qty: 30 CAPSULE | Refills: 3 | Status: SHIPPED | OUTPATIENT
Start: 2025-06-30

## 2025-06-30 NOTE — LETTER
"6/30/2025       RE: Frank Orellana  [address]  [facility] [address]     Dear Colleague,    Thank you for referring your patient, Frank Orellana, to the [facility] [place] [facility]. Please see a copy of my visit note below.    PM&R Follow-Up Visit -     Date of Initial Visit: 2/17/2025  LOV: 5/19/2025  TD: 6/30/2025       Recall: Frank Orellana is a 75 year old male who has been seen in the spine clinic regarding neck and back pain       INTERVAL HISTORY:  Patient was last seen in clinic 6/30/2025. At that visit, the plan was to continue gabapentin, & begin PT and home exercise program.    He is seen today in the clinic, accompanied by his wife.    Previously he has described:  -Chronic neck pain (varies side to side)  -Newer occipital headaches  -Chronic UE numbness: bilateral thumbs and bilateral index fingers    Today, he confirms the symptoms as previously described.  However, he reports pain at the base of his neck and upper back is the most bothersome for him.  He has some pain radiating towards the left shoulder as well.  This area of pain is much more symptomatic for him at this point compared to the upper neck pain and occipital pain/headaches.    Since last time, he has been using 100 mg of gabapentin in the morning and 300 mg at night.  However, he is noticing some issues with balance, drowsiness, and forgetfulness.    Since last time, he started PT 5/23/25 and has done 2 sessions thus far.  He has another appointment for PT later today.          RECALL HISTORY OF PRESENT ILLNESS: 2/17/2025     Frank Orellana is a 75 year old male who presents with a chief complaint of neck and back pain.      He was seen today in the clinic, accompanied by his wife. He describes longstanding neck and back pain \"off and on\" for 50 years.  He did physical therapy for neck pain in 2021.  Today, he reports " increased neck pain which began a few months ago without any known cause.    He says the neck pain is constant and daily, and sometimes can be quite severe.  In particular the pain can bother him at nighttime. He describes episodes screaming with pain at night & difficulty rolling over due to pain.      In the last week his mid and lower back also began bothering him more.    He says his arms generally feel weaker.  He noticed this when he was at [employer]'s trying to lift the corner of a piece of drywall.  He has not otherwise noticed any weakness and is not dropping things.    He endorses chronic longstanding numbness in the bilateral thumbs and bilateral index fingers.  He also has numbness in the balls of both feet and bilateral toes, which has been present for a couple years.      Today, he describes  -Acute on chronic daily constant neck pain, varies side to side  -Acute on chronic intermittent midline mid thoracic pain  -Acute on chronic intermittent nonradiating low back pain  -Chronic UE numbness: bilateral thumbs and bilateral index fingers  -Chronic LE numbness: balls of both feet and bilateral toes    Aggravating factors include: Lying down, standing, sitting, walking, coughing/sneezing  Relieving factors include: None    Today, he rates the pain 1/10.  At its worst over the last week the pain was 6-7/10.   Patient states sleep is affected most nights.    He had a fall last mechanical week, but no other recent falls.    He denies balance problems, no new difficulty with fine motor tasks such as manipulating buttons or zippers, falls, weakness, bowel or bladder incontinence, saddle anesthesia, unintentional weight loss, fevers/chills, or night sweats.       4/4/2025:  Patient was last seen in clinic 3/17/2025 for C7-T1 IL MAREN with Dr Garcia.     He was seen today for follow up, accompanied by his wife.  He reports 30-40% improvement in pain after the ILESI.    He had previously described:  -Acute on chronic  "daily constant neck pain, varies side to side  -Chronic UE numbness: bilateral thumbs and bilateral index fingers    Today, he clarifies that the neck pain he experiences is intermittent.  Some movements tend to aggravate the pain.  Pain is especially noticeable at night if he is turning in bed.  Before the ILESI he was waking up \"screaming with pain\" and says that since the injection that is no longer the case.  He does feel that the overall impact of the ILESI was mild.  He has ongoing neck pain, which is the most bothersome for him.  The pain on the left side of his neck is the worst.  He endorses ongoing unchanged numbness affecting the bilateral thumbs and index fingers.  He has also been noticing some headaches at the occipital area, which is new for him.  He says that he did not hear from physical therapy scheduling after the last visit.  He is not currently using any medications for pain but would like to consider options.      5/19/2025:  Patient was last seen in clinic 4/4/2025. At that visit, the plan was to trial gabapentin and begin physical therapy.    He was seen today in the clinic, accompanied by his wife.    At the last visit he described:  -Chronic neck pain (varies side to side)  -Newer occipital headaches  -Chronic UE numbness: bilateral thumbs and bilateral index fingers    After the last visit he started 300 mg of gabapentin at bedtime.  He feels he is tolerating the medication well and that has been helpful.  He does note that when he wakes up the first couple steps seem unsteady, but he does not have any ongoing issue with balance, sedation, or other symptoms which persist beyond that.    He reports ongoing daily neck pain.  He also has significant pain around the left scapular area.  Since last time, unfortunately the occipital headaches have been persistent, occurring daily.  He has neck pain with sleeping on the right side, which is worse with moving.  He finds it hard to rate the " pain.        PRIOR INJURIES/TREATMENT:   Ice/Heat: has tried both without relief  Brace: none  Physical Therapy:   Current PT since 5/23/2025  PT for neck pain in 2021     - Current Pain Medications -   Gabapentin 300 mg at bedtime  *Aspirin 81 mg    - Prior/Trialed Pain Medications -   Cyclobenzaprine -   5-10 mg didn't help  Naproxen  Meloxicam - didn't help    Prior Procedures:  Date    Procedure   Improvement (%)  3/17/2025 C7-T1 IL MAREN  30-40%  Shoulder steroid injection             Prior Related Surgery: none     Other (acupuncture, OMT, CMM, TENS, DME, etc.):   + Chiropractor    Specialists Seen - (with most recent, available notes and clinic visits reviewed)   1. Orthopedics-Dupuytren's contracture both hands, left glenohumeral joint OA      IMAGING - reviewed   2 views lumbar spine radiographs 2/17/2025   Findings:  Standing  AP and lateral  views of the lumbar spine were obtained.     5  lumbar type vertebral bodies are assumed for the purpose of this  dictation.     There is no acute osseous abnormality.       There is mild multilevel degenerative changes of the lumbar spine.  Disc spaces are relatively preserved. There is also lower lumbar  predominant facet arthropathy. Normal AP alignment is maintained.     The visualized bowel gas pattern is non-obstructive. Aortoiliac  calcifications. Pelvic phleboliths.                                               Impression:  1.  No acute osseous abnormality.  2.  Mild multilevel degenerative changes.      3 views thoracic spine radiographs 2/17/2025   Findings:     Standing  AP, lateral and swimmer views of the thoracic spine were  obtained.     12 rib bearing vertebral bodies are identified.     There is no acute osseous abnormality.       Multilevel degenerative changes of the mid/lower thoracic spine with  endplate spurring spurring.     The visualized lungs are clear. Cardiomediastinal silhouette is within  normal limits.                                                        Impression:  1.  No acute osseous abnormality.  2.  Multilevel degenerative changes of the thoracic spine, not  significantly changed since 9/13/2021.        MR CERVICAL SPINE W/O CONTRAST  1/20/2025    FINDINGS:  Trace 2 mm retrolisthesis of C3 on C4, trace 1 mm retrolisthesis of C4  on C5 and anterolisthesis of C7 on T1. Normal marrow signal.  Multilevel disc height loss most significantly at C3-4 and C5-6.  Prominence of the central canal at the level of C6-7. No abnormal cord  signal. Diffusion-weighted images are negative for focal abnormality.     The findings on a level by level basis are as follows:     C2-3: No spinal canal or neural foraminal stenosis.     C3-4: Asymmetric left disc osteophyte complex. Bilateral uncinate  spurring and facet hypertrophy. Mild-to-moderate left and mild right  neural foraminal narrowing. No significant spinal canal stenosis.     C4-5: Disc bulge. Bilateral uncinate spurring and facet hypertrophy.  Moderate to severe bilateral neural foraminal narrowing. Mild spinal  canal stenosis.     C5-6: Disc bulge. Bilateral uncinate spurring and facet hypertrophy.  Moderate to severe bilateral neural foraminal narrowing. No  significant spinal canal stenosis.     C6-7: Left asymmetric disc osteophyte complex. Bilateral facet  hypertrophy. Severe left and moderate right neural foraminal  narrowing. No significant spinal canal stenosis.     C7-T1: No spinal canal or neural foraminal stenosis.                                      IMPRESSION:  1. Multilevel cervical spondylosis most significant at C4-5 and C5-6  with moderate to severe bilateral neural foraminal narrowing as well  as at C6-7 with severe left and moderate right neural foraminal  narrowing. Mild spinal canal stenosis at the level of C4-5.  2. Prominence of the central canal at the level of C6-7. No abnormal  cord signal.         XR CERVICAL SPINE 2/3 VIEWS 12/27/2024 1:28 PM     History: recurrent pain in  neck, L>R, evaluate for profession of DJD;  Cervicalgia   ICD-10: Cervicalgia     Comparison: Cervical spine radiographs 9/13/2021.     Findings: AP, lateral, and odontoid views of the cervical spine were  obtained. Normal cervical spine alignment. Moderate loss of disc  height at C3-4 and C5-6. Mild loss of disc height at C4-5 and C6-7.  Additional multilevel degenerative changes including endplate  osteophytosis, uncinate hypertrophy, and facet hypertrophy. No  prevertebral edema. No mass in the visualized lung apices. Bilateral  carotid atherosclerotic plaque. Slight rotational asymmetry of C1 on  C2, similar to 9/13/2021.                                                                      Impression:  Multilevel cervical degenerative changes, similar to 9/13/2021, most  pronounced at C3-4 and C5-6.      XR THORACIC SPINE 2 VIEWS, 9/13/2021 4:49 PM     Indication: acute R sided neck/trap pain, r/o DJD or trauma; Neck pain     Comparison: Same day cervical spine radiographs, chest radiograph  1/18/2019     Findings: AP and lateral radiographs of the thoracic spine were  obtained. Mildly increased kyphotic curvature of the thoracic spine.  No acute osseous abnormalities. Mild multilevel endplate degenerative  changes and endplate osteophytosis, grossly unchanged since prior  exam.     Visualized lungs are clear. Multiple chronic appearing posterior right  upper rib fractures.                                                                      Impression:   1. Mild multilevel endplate degenerative changes of the thoracic  spine. No acute osseous anomalies.  2. See same-day cervical spine radiograph for additional detail.       Review Of Systems:  I am responding to those symptoms which are directly relevant to the specific indication for my consultation. I recommend that the patient follow up with their primary or referring provider to pursue any other symptoms which may be of concern.       Medical History:  He   has a past medical history of Actinic keratosis (2009), Atrial fibrillation (H) (06/15/1996), Dupuytren's contracture of both hands, Elevated PSA, Hepatitis B (1969), Hepatitis C virus infection, unspecified chronicity, WINNIE (obstructive sleep apnea), Pain in both hands, Patient is Anglican, Refusal of blood transfusions as patient is Anglican, Right shoulder pain, Tinnitus (1 or 2 years ago), and Tubular adenoma of colon (01/17/2017).     He  has a past surgical history that includes Arthroscopy knee; colonoscopy (1/17/17); Release dupuytrens contracture (Right, 1/20/2017); knee surgery (2006??); Herniorrhaphy inguinal (Right, 10/26/2017); Release dupuytrens contracture (Left, 12/15/2017); Anesthesia cardioversion (N/A, 1/15/2018); Transesophageal echocardiogram intraoperative (N/A, 1/15/2018); Anesthesia cardioversion (N/A, 10/11/2018); Anesthesia cardioversion (N/A, 11/28/2018); Cystoscopy, transurethral resection (TUR) prostate, combined (N/A, 8/21/2020); Colonoscopy (N/A, 6/26/2023); Release dupuytrens contracture (Right, 8/9/2023); Anesthesia cardioversion (N/A, 9/19/2023); and Anesthesia cardioversion (N/A, 3/27/2024).    Family History  His family history includes Alcohol/Drug in his mother; Alcoholism in his father and mother; Arthritis in his paternal grandmother; Cardiac Sudden Death in his brother; Gastrointestinal Disease in his father; Heart Disease in his brother; Rheumatoid Arthritis in his paternal grandmother; Uterine Cancer in his sister.       Social History:  Work: retired for 10 years, previously did factory work with heavy lifting (remotely), janitorial work, most recently worked in IT for [employer]  Current living situation: Lives with spouse. Performs ADLs/IADLs independently.  He reports that he quit smoking about 50 years ago. His smoking use included cigarettes. He started smoking about 60 years ago. He has a 5 pack-year smoking history. He quit smokeless  tobacco use about 55 years ago. He reports current alcohol use of about 3.0 - 4.0 standard drinks of alcohol per week. He reports that he does not use drugs.        Current Medications:   He has a current medication list which includes the following prescription(s): aspirin, cyclobenzaprine, diltiazem er coated beads, flecainide, and meloxicam.     Allergies:    -- Blood Transfusion Related (Informational Only)     --  Protestant.  Declines blood transfusions.   -- Shellfish-Derived Products -- Swelling   -- Contrast Dye -- Rash   -- Iodinated Contrast Media -- Rash      PHYSICAL EXAMINATION:  /63 (BP Location: Right arm, Patient Position: Sitting, Cuff Size: Adult Regular)   Pulse 64   SpO2 96%    --CONSTITUTIONAL: Vital signs as above. No acute distress. The patient is well nourished and well groomed.  --PSYCHIATRIC: The patient is awake, alert, oriented to person, place, time and answering questions appropriately with clear speech. Appropriate mood and affect   --HEENT: Sclera are non-injected. Extraocular muscles are intact. Moist oral mucosa.  --SKIN: observable skin is clean, dry, intact without rashes.  --RESPIRATORY: Normal rhythm and effort. No abnormal accessory muscle breathing patterns noted.   --GROSS MOTOR: Easily arises from a seated position. Gait is non-antalgic.  --CERVICAL SPINE: Inspection reveals no evidence of deformity. Range of motion is not limited in cervical flexion, extension, lateral rotation but all elicit pain. No tenderness to palpation cervical spine.  Spurling maneuver pos L, neg R.  Significant upper trapezius fullness L>R  --UPPER EXTREMITY MOTOR TESTING:  Wrist flexion left 5/5, right 5/5  Wrist extension left 5/5, right 5/5  Biceps left 5/5, right 5/5   Triceps left 5/5, right 5/5   Shoulder abduction left 5/5, right 5/5   left 5/5, right 5/5  Finger abduction left 5/5, right 5/5  --NEUROLOGIC: CN III-XII are grossly intact.   2/4 symmetric biceps &  brachioradialis reflexes bilaterally. Sensation to upper extremities is intact.  Negative Smith's bilaterally.    --VASCULAR: Warm upper limbs bilaterally.          ASSESSMENT:  Frank Orellana is a pleasant 75 year old male who has been seen in the medical spine clinic in regards to:   -Acute on chronic daily constant neck pain, varies side to side  -Chronic UE numbness: bilateral thumbs and bilateral index fingers  -occipital headaches    He has also been seen regarding:  -Acute on chronic intermittent midline mid thoracic pain  -Acute on chronic intermittent nonradiating low back pain  -Chronic LE numbness: balls of both feet and bilateral toes  -Subjective weakness of unclear chronicity    MRI Cerivical spine shows:  -Multilevel spondylolisthesis  -Multilevel cervical spondylosis, most notably at C4-5 and C5-6 where there is bilateral moderate/severe neuroforaminal stenosis.  At C56-7 there is severe left neuroforaminal stenosis and moderate right neuroforaminal stenosis    Differential includes cervical DDD, cervical radiculopathy, cervical facet mediated pain, cervicogenic headache, left intra-articular shoulder pathology      Complicating comorbities include:  # Paroxysmal A-fib, not on AC, s/p multiple DCCV  # Dupuytren's contraction of both hands  # History of right shoulder pain    Note: pt is Hoahaoism       PLAN:  -MRI cervical spine reviewed with patient today - multilevel significant neuroforaminal stenosis (C4-5 and C5-6 mod/sev bilat, C6-7 severe left side)  -Stop gabapentin. Will trial 50 mg lyrica at bedtime only. Consider titrating at a future visit if tolerable for him  -Continue physical therapy and home exercise program  -he is interested in surgical consult given ongoing pain despite medical management thus far which has included epidural steroid injection, PT/home exercise program, and medications. He prefers to investigate an outside neurosurgeon who may be in network with his  insurance plan. He plans to contact our clinic with preferred provider and we will add referral for him   -RTC 6 weeks      Ready to learn, no apparent learning barriers.  Education provided on treatment plan according to patient's preferred learning style.  Patient verbalizes understanding.   __________________________________  Amanda Carrion NP  Physical Medicine & Rehabilitation        46 minutes spent by me on the date of the encounter doing chart review, history and exam, documentation and further activities per the note       Again, thank you for allowing me to participate in the care of your patient.      Sincerely,    ANDREINA Jorgensen CNP

## 2025-06-30 NOTE — NURSING NOTE
Chief Complaint   Patient presents with    RECHECK     Return med spine     Marc Clemens     4 = No assist / stand by assistance

## 2025-06-30 NOTE — PATIENT INSTRUCTIONS
It was a pleasure seeing you in the clinic today.  As discussed, we made the following updates to your plan of care:     You can transition from gabapentin to Lyrica. Stop Gabapentin. Start 50 mg of lyrica at bedtime only.   The most common side effect is sedation. Do not drive a motor vehicle until after you are familiar with how the medication may impact your attention and reaction.  Note that it may take several weeks to notice the benefits of this medication.   Do not abruptly stopped this medication prior to consulting with a physician.     Keep up the good work with physical therapy and the home exercise program.     Please let us know (calling the clinic or with a [employer] message) to let us know which spine surgeon you would like to see. We will fax a referral for you.       Thank you,  Amanda Carrion NP  Physical Medicine and Rehabilitation, Medical Spine  PM&R clinic Phone: [phone]  PM&R clinic Fax: [phone]

## 2025-06-30 NOTE — PROGRESS NOTES
"PM&R Follow-Up Visit -     Date of Initial Visit: 2/17/2025  LOV: 5/19/2025  TD: 6/30/2025       Recall: Frank Orellana is a 75 year old male who has been seen in the spine clinic regarding neck and back pain       INTERVAL HISTORY:  Patient was last seen in clinic 6/30/2025. At that visit, the plan was to continue gabapentin, & begin PT and home exercise program.    He is seen today in the clinic, accompanied by his wife.    Previously he has described:  -Chronic neck pain (varies side to side)  -Newer occipital headaches  -Chronic UE numbness: bilateral thumbs and bilateral index fingers    Today, he confirms the symptoms as previously described.  However, he reports pain at the base of his neck and upper back is the most bothersome for him.  He has some pain radiating towards the left shoulder as well.  This area of pain is much more symptomatic for him at this point compared to the upper neck pain and occipital pain/headaches.    Since last time, he has been using 100 mg of gabapentin in the morning and 300 mg at night.  However, he is noticing some issues with balance, drowsiness, and forgetfulness.    Since last time, he started PT 5/23/25 and has done 2 sessions thus far.  He has another appointment for PT later today.          RECALL HISTORY OF PRESENT ILLNESS: 2/17/2025     Frank Orellana is a 75 year old male who presents with a chief complaint of neck and back pain.      He was seen today in the clinic, accompanied by his wife. He describes longstanding neck and back pain \"off and on\" for 50 years.  He did physical therapy for neck pain in 2021.  Today, he reports increased neck pain which began a few months ago without any known cause.    He says the neck pain is constant and daily, and sometimes can be quite severe.  In particular the pain can bother him at nighttime. He describes episodes screaming with pain at night & difficulty rolling over due to pain.      In the last week his mid and lower back also " "began bothering him more.    He says his arms generally feel weaker.  He noticed this when he was at [employer] trying to lift the corner of a piece of drywall.  He has not otherwise noticed any weakness and is not dropping things.    He endorses chronic longstanding numbness in the bilateral thumbs and bilateral index fingers.  He also has numbness in the balls of both feet and bilateral toes, which has been present for a couple years.      Today, he describes  -Acute on chronic daily constant neck pain, varies side to side  -Acute on chronic intermittent midline mid thoracic pain  -Acute on chronic intermittent nonradiating low back pain  -Chronic UE numbness: bilateral thumbs and bilateral index fingers  -Chronic LE numbness: balls of both feet and bilateral toes    Aggravating factors include: Lying down, standing, sitting, walking, coughing/sneezing  Relieving factors include: None    Today, he rates the pain 1/10.  At its worst over the last week the pain was 6-7/10.   Patient states sleep is affected most nights.    He had a fall last mechanical week, but no other recent falls.    He denies balance problems, no new difficulty with fine motor tasks such as manipulating buttons or zippers, falls, weakness, bowel or bladder incontinence, saddle anesthesia, unintentional weight loss, fevers/chills, or night sweats.       4/4/2025:  Patient was last seen in clinic 3/17/2025 for C7-T1 IL MAREN with Dr Garcia.     He was seen today for follow up, accompanied by his wife.  He reports 30-40% improvement in pain after the ILESI.    He had previously described:  -Acute on chronic daily constant neck pain, varies side to side  -Chronic UE numbness: bilateral thumbs and bilateral index fingers    Today, he clarifies that the neck pain he experiences is intermittent.  Some movements tend to aggravate the pain.  Pain is especially noticeable at night if he is turning in bed.  Before the ILESI he was waking up \"screaming with " "pain\" and says that since the injection that is no longer the case.  He does feel that the overall impact of the ILESI was mild.  He has ongoing neck pain, which is the most bothersome for him.  The pain on the left side of his neck is the worst.  He endorses ongoing unchanged numbness affecting the bilateral thumbs and index fingers.  He has also been noticing some headaches at the occipital area, which is new for him.  He says that he did not hear from physical therapy scheduling after the last visit.  He is not currently using any medications for pain but would like to consider options.      5/19/2025:  Patient was last seen in clinic 4/4/2025. At that visit, the plan was to trial gabapentin and begin physical therapy.    He was seen today in the clinic, accompanied by his wife.    At the last visit he described:  -Chronic neck pain (varies side to side)  -Newer occipital headaches  -Chronic UE numbness: bilateral thumbs and bilateral index fingers    After the last visit he started 300 mg of gabapentin at bedtime.  He feels he is tolerating the medication well and that has been helpful.  He does note that when he wakes up the first couple steps seem unsteady, but he does not have any ongoing issue with balance, sedation, or other symptoms which persist beyond that.    He reports ongoing daily neck pain.  He also has significant pain around the left scapular area.  Since last time, unfortunately the occipital headaches have been persistent, occurring daily.  He has neck pain with sleeping on the right side, which is worse with moving.  He finds it hard to rate the pain.        PRIOR INJURIES/TREATMENT:   Ice/Heat: has tried both without relief  Brace: none  Physical Therapy:   Current PT since 5/23/2025  PT for neck pain in 2021     - Current Pain Medications -   Gabapentin 300 mg at bedtime  *Aspirin 81 mg    - Prior/Trialed Pain Medications -   Cyclobenzaprine -   5-10 mg didn't help  Naproxen  Meloxicam - " didn't help    Prior Procedures:  Date    Procedure   Improvement (%)  3/17/2025 C7-T1 IL MAREN  30-40%  Shoulder steroid injection             Prior Related Surgery: none     Other (acupuncture, OMT, CMM, TENS, DME, etc.):   + Chiropractor    Specialists Seen - (with most recent, available notes and clinic visits reviewed)   1. Orthopedics-Dupuytren's contracture both hands, left glenohumeral joint OA      IMAGING - reviewed   2 views lumbar spine radiographs 2/17/2025   Findings:  Standing  AP and lateral  views of the lumbar spine were obtained.     5  lumbar type vertebral bodies are assumed for the purpose of this  dictation.     There is no acute osseous abnormality.       There is mild multilevel degenerative changes of the lumbar spine.  Disc spaces are relatively preserved. There is also lower lumbar  predominant facet arthropathy. Normal AP alignment is maintained.     The visualized bowel gas pattern is non-obstructive. Aortoiliac  calcifications. Pelvic phleboliths.                                               Impression:  1.  No acute osseous abnormality.  2.  Mild multilevel degenerative changes.      3 views thoracic spine radiographs 2/17/2025   Findings:     Standing  AP, lateral and swimmer views of the thoracic spine were  obtained.     12 rib bearing vertebral bodies are identified.     There is no acute osseous abnormality.       Multilevel degenerative changes of the mid/lower thoracic spine with  endplate spurring spurring.     The visualized lungs are clear. Cardiomediastinal silhouette is within  normal limits.                                                       Impression:  1.  No acute osseous abnormality.  2.  Multilevel degenerative changes of the thoracic spine, not  significantly changed since 9/13/2021.        MR CERVICAL SPINE W/O CONTRAST  1/20/2025    FINDINGS:  Trace 2 mm retrolisthesis of C3 on C4, trace 1 mm retrolisthesis of C4  on C5 and anterolisthesis of C7 on T1. Normal  marrow signal.  Multilevel disc height loss most significantly at C3-4 and C5-6.  Prominence of the central canal at the level of C6-7. No abnormal cord  signal. Diffusion-weighted images are negative for focal abnormality.     The findings on a level by level basis are as follows:     C2-3: No spinal canal or neural foraminal stenosis.     C3-4: Asymmetric left disc osteophyte complex. Bilateral uncinate  spurring and facet hypertrophy. Mild-to-moderate left and mild right  neural foraminal narrowing. No significant spinal canal stenosis.     C4-5: Disc bulge. Bilateral uncinate spurring and facet hypertrophy.  Moderate to severe bilateral neural foraminal narrowing. Mild spinal  canal stenosis.     C5-6: Disc bulge. Bilateral uncinate spurring and facet hypertrophy.  Moderate to severe bilateral neural foraminal narrowing. No  significant spinal canal stenosis.     C6-7: Left asymmetric disc osteophyte complex. Bilateral facet  hypertrophy. Severe left and moderate right neural foraminal  narrowing. No significant spinal canal stenosis.     C7-T1: No spinal canal or neural foraminal stenosis.                                      IMPRESSION:  1. Multilevel cervical spondylosis most significant at C4-5 and C5-6  with moderate to severe bilateral neural foraminal narrowing as well  as at C6-7 with severe left and moderate right neural foraminal  narrowing. Mild spinal canal stenosis at the level of C4-5.  2. Prominence of the central canal at the level of C6-7. No abnormal  cord signal.         XR CERVICAL SPINE 2/3 VIEWS 12/27/2024 1:28 PM     History: recurrent pain in neck, L>R, evaluate for profession of DJD;  Cervicalgia   ICD-10: Cervicalgia     Comparison: Cervical spine radiographs 9/13/2021.     Findings: AP, lateral, and odontoid views of the cervical spine were  obtained. Normal cervical spine alignment. Moderate loss of disc  height at C3-4 and C5-6. Mild loss of disc height at C4-5 and C6-7.  Additional  multilevel degenerative changes including endplate  osteophytosis, uncinate hypertrophy, and facet hypertrophy. No  prevertebral edema. No mass in the visualized lung apices. Bilateral  carotid atherosclerotic plaque. Slight rotational asymmetry of C1 on  C2, similar to 9/13/2021.                                                                      Impression:  Multilevel cervical degenerative changes, similar to 9/13/2021, most  pronounced at C3-4 and C5-6.      XR THORACIC SPINE 2 VIEWS, 9/13/2021 4:49 PM     Indication: acute R sided neck/trap pain, r/o DJD or trauma; Neck pain     Comparison: Same day cervical spine radiographs, chest radiograph  1/18/2019     Findings: AP and lateral radiographs of the thoracic spine were  obtained. Mildly increased kyphotic curvature of the thoracic spine.  No acute osseous abnormalities. Mild multilevel endplate degenerative  changes and endplate osteophytosis, grossly unchanged since prior  exam.     Visualized lungs are clear. Multiple chronic appearing posterior right  upper rib fractures.                                                                      Impression:   1. Mild multilevel endplate degenerative changes of the thoracic  spine. No acute osseous anomalies.  2. See same-day cervical spine radiograph for additional detail.       Review Of Systems:  I am responding to those symptoms which are directly relevant to the specific indication for my consultation. I recommend that the patient follow up with their primary or referring provider to pursue any other symptoms which may be of concern.       Medical History:  He  has a past medical history of Actinic keratosis (2009), Atrial fibrillation (H) (06/15/1996), Dupuytren's contracture of both hands, Elevated PSA, Hepatitis B (1969), Hepatitis C virus infection, unspecified chronicity, WINNIE (obstructive sleep apnea), Pain in both hands, Patient is [place], Refusal of blood transfusions as patient is  Tenriism, Right shoulder pain, Tinnitus (1 or 2 years ago), and Tubular adenoma of colon (01/17/2017).     He  has a past surgical history that includes Arthroscopy knee; colonoscopy (1/17/17); Release dupuytrens contracture (Right, 1/20/2017); knee surgery (2006??); Herniorrhaphy inguinal (Right, 10/26/2017); Release dupuytrens contracture (Left, 12/15/2017); Anesthesia cardioversion (N/A, 1/15/2018); Transesophageal echocardiogram intraoperative (N/A, 1/15/2018); Anesthesia cardioversion (N/A, 10/11/2018); Anesthesia cardioversion (N/A, 11/28/2018); Cystoscopy, transurethral resection (TUR) prostate, combined (N/A, 8/21/2020); Colonoscopy (N/A, 6/26/2023); Release dupuytrens contracture (Right, 8/9/2023); Anesthesia cardioversion (N/A, 9/19/2023); and Anesthesia cardioversion (N/A, 3/27/2024).    Family History  His family history includes Alcohol/Drug in his mother; Alcoholism in his father and mother; Arthritis in his paternal grandmother; Cardiac Sudden Death in his brother; Gastrointestinal Disease in his father; Heart Disease in his brother; Rheumatoid Arthritis in his paternal grandmother; Uterine Cancer in his sister.       Social History:  Work: retired for 10 years, previously did factory work with heavy lifting (remotely), janitorial work, most recently worked in IT for [employer]  Current living situation: Lives with spouse. Performs ADLs/IADLs independently.  He reports that he quit smoking about 50 years ago. His smoking use included cigarettes. He started smoking about 60 years ago. He has a 5 pack-year smoking history. He quit smokeless tobacco use about 55 years ago. He reports current alcohol use of about 3.0 - 4.0 standard drinks of alcohol per week. He reports that he does not use drugs.        Current Medications:   He has a current medication list which includes the following prescription(s): aspirin, cyclobenzaprine, diltiazem er coated beads, flecainide, and  meloxicam.     Allergies:    -- Blood Transfusion Related (Informational Only)     --  Confucianist.  Declines blood transfusions.   -- Shellfish-Derived Products -- Swelling   -- Contrast Dye -- Rash   -- Iodinated Contrast Media -- Rash      PHYSICAL EXAMINATION:  /63 (BP Location: Right arm, Patient Position: Sitting, Cuff Size: Adult Regular)   Pulse 64   SpO2 96%    --CONSTITUTIONAL: Vital signs as above. No acute distress. The patient is well nourished and well groomed.  --PSYCHIATRIC: The patient is awake, alert, oriented to person, place, time and answering questions appropriately with clear speech. Appropriate mood and affect   --HEENT: Sclera are non-injected. Extraocular muscles are intact. Moist oral mucosa.  --SKIN: observable skin is clean, dry, intact without rashes.  --RESPIRATORY: Normal rhythm and effort. No abnormal accessory muscle breathing patterns noted.   --GROSS MOTOR: Easily arises from a seated position. Gait is non-antalgic.  --CERVICAL SPINE: Inspection reveals no evidence of deformity. Range of motion is not limited in cervical flexion, extension, lateral rotation but all elicit pain. No tenderness to palpation cervical spine.  Spurling maneuver pos L, neg R.  Significant upper trapezius fullness L>R  --UPPER EXTREMITY MOTOR TESTING:  Wrist flexion left 5/5, right 5/5  Wrist extension left 5/5, right 5/5  Biceps left 5/5, right 5/5   Triceps left 5/5, right 5/5   Shoulder abduction left 5/5, right 5/5   left 5/5, right 5/5  Finger abduction left 5/5, right 5/5  --NEUROLOGIC: CN III-XII are grossly intact.   2/4 symmetric biceps & brachioradialis reflexes bilaterally. Sensation to upper extremities is intact.  Negative Smith's bilaterally.    --VASCULAR: Warm upper limbs bilaterally.          ASSESSMENT:  Frank Orellana is a pleasant 75 year old male who has been seen in the medical spine clinic in regards to:   -Acute on chronic daily constant neck pain, varies side to  side  -Chronic UE numbness: bilateral thumbs and bilateral index fingers  -occipital headaches    He has also been seen regarding:  -Acute on chronic intermittent midline mid thoracic pain  -Acute on chronic intermittent nonradiating low back pain  -Chronic LE numbness: balls of both feet and bilateral toes  -Subjective weakness of unclear chronicity    MRI Cerivical spine shows:  -Multilevel spondylolisthesis  -Multilevel cervical spondylosis, most notably at C4-5 and C5-6 where there is bilateral moderate/severe neuroforaminal stenosis.  At C56-7 there is severe left neuroforaminal stenosis and moderate right neuroforaminal stenosis    Differential includes cervical DDD, cervical radiculopathy, cervical facet mediated pain, cervicogenic headache, left intra-articular shoulder pathology      Complicating comorbities include:  # Paroxysmal A-fib, not on AC, s/p multiple DCCV  # Dupuytren's contraction of both hands  # History of right shoulder pain    Note: pt is [place]       PLAN:  -MRI cervical spine reviewed with patient today - multilevel significant neuroforaminal stenosis (C4-5 and C5-6 mod/sev bilat, C6-7 severe left side)  -Stop gabapentin. Will trial 50 mg lyrica at bedtime only. Consider titrating at a future visit if tolerable for him  -Continue physical therapy and home exercise program  -he is interested in surgical consult given ongoing pain despite medical management thus far which has included epidural steroid injection, PT/home exercise program, and medications. He prefers to investigate an outside neurosurgeon who may be in network with his insurance plan. He plans to contact our clinic with preferred provider and we will add referral for him   -RTC 6 weeks      Ready to learn, no apparent learning barriers.  Education provided on treatment plan according to patient's preferred learning style.  Patient verbalizes understanding.   __________________________________  Amanda Carrion  NP  Physical Medicine & Rehabilitation        46 minutes spent by me on the date of the encounter doing chart review, history and exam, documentation and further activities per the note

## 2025-07-10 ENCOUNTER — TELEPHONE (OUTPATIENT)
Dept: PHYSICAL MEDICINE AND REHAB | Facility: CLINIC | Age: 75
End: 2025-07-10
Payer: COMMERCIAL

## 2025-07-14 ENCOUNTER — LAB (OUTPATIENT)
Dept: LAB | Facility: CLINIC | Age: 75
End: 2025-07-14
Payer: COMMERCIAL

## 2025-07-14 ENCOUNTER — OFFICE VISIT (OUTPATIENT)
Dept: INTERNAL MEDICINE | Facility: CLINIC | Age: 75
End: 2025-07-14
Payer: COMMERCIAL

## 2025-07-14 ENCOUNTER — MYC MEDICAL ADVICE (OUTPATIENT)
Dept: PHYSICAL MEDICINE AND REHAB | Facility: CLINIC | Age: 75
End: 2025-07-14

## 2025-07-14 VITALS
WEIGHT: 168.4 LBS | RESPIRATION RATE: 16 BRPM | BODY MASS INDEX: 24.94 KG/M2 | HEIGHT: 69 IN | DIASTOLIC BLOOD PRESSURE: 71 MMHG | SYSTOLIC BLOOD PRESSURE: 123 MMHG | TEMPERATURE: 97.4 F | HEART RATE: 58 BPM | OXYGEN SATURATION: 98 %

## 2025-07-14 DIAGNOSIS — M72.0 DUPUYTREN'S CONTRACTURE OF BOTH HANDS: ICD-10-CM

## 2025-07-14 DIAGNOSIS — Z23 NEED FOR COVID-19 VACCINE: ICD-10-CM

## 2025-07-14 DIAGNOSIS — R20.0 NUMBNESS: ICD-10-CM

## 2025-07-14 DIAGNOSIS — G44.86 CERVICOGENIC HEADACHE: ICD-10-CM

## 2025-07-14 DIAGNOSIS — M47.812 CERVICAL SPONDYLOSIS WITHOUT MYELOPATHY: ICD-10-CM

## 2025-07-14 DIAGNOSIS — M48.02 NEUROFORAMINAL STENOSIS OF CERVICAL SPINE: ICD-10-CM

## 2025-07-14 DIAGNOSIS — M54.2 NECK PAIN: ICD-10-CM

## 2025-07-14 DIAGNOSIS — R07.89 ATYPICAL CHEST PAIN: ICD-10-CM

## 2025-07-14 DIAGNOSIS — L98.9 SKIN LESION: ICD-10-CM

## 2025-07-14 DIAGNOSIS — M54.12 CERVICAL RADICULOPATHY: ICD-10-CM

## 2025-07-14 DIAGNOSIS — I48.0 PAROXYSMAL ATRIAL FIBRILLATION (H): ICD-10-CM

## 2025-07-14 DIAGNOSIS — R79.89 ABNORMAL LFTS: Primary | ICD-10-CM

## 2025-07-14 DIAGNOSIS — M54.9 UPPER BACK PAIN: ICD-10-CM

## 2025-07-14 LAB
ALBUMIN SERPL BCG-MCNC: 4.3 G/DL (ref 3.5–5.2)
ALP SERPL-CCNC: 92 U/L (ref 40–150)
ALT SERPL W P-5'-P-CCNC: 39 U/L (ref 0–70)
AST SERPL W P-5'-P-CCNC: 34 U/L (ref 0–45)
BILIRUB SERPL-MCNC: 0.8 MG/DL
BILIRUBIN DIRECT (ROCHE PRO & PURE): 0.34 MG/DL (ref 0–0.45)
PROT SERPL-MCNC: 6.9 G/DL (ref 6.4–8.3)

## 2025-07-14 PROCEDURE — 80076 HEPATIC FUNCTION PANEL: CPT | Performed by: PATHOLOGY

## 2025-07-14 PROCEDURE — 3074F SYST BP LT 130 MM HG: CPT | Performed by: INTERNAL MEDICINE

## 2025-07-14 PROCEDURE — 99214 OFFICE O/P EST MOD 30 MIN: CPT | Mod: 25 | Performed by: INTERNAL MEDICINE

## 2025-07-14 PROCEDURE — 36415 COLL VENOUS BLD VENIPUNCTURE: CPT | Performed by: PATHOLOGY

## 2025-07-14 PROCEDURE — 90480 ADMN SARSCOV2 VAC 1/ONLY CMP: CPT | Performed by: INTERNAL MEDICINE

## 2025-07-14 PROCEDURE — 91320 SARSCV2 VAC 30MCG TRS-SUC IM: CPT | Performed by: INTERNAL MEDICINE

## 2025-07-14 PROCEDURE — 3078F DIAST BP <80 MM HG: CPT | Performed by: INTERNAL MEDICINE

## 2025-07-14 NOTE — PROGRESS NOTES
Assessment & Plan   Abnormal LFTs  -Repeat LFT labs were normal.   Recent Labs   Lab Test 07/14/25  1111   PROTTOTAL 6.9   ALBUMIN 4.3   BILITOTAL 0.8   ALKPHOS 92   AST 34   ALT 39     Dupuytren's contracture of both hands  -Left hand contracture. Hx of subtotal palmar fasciectomy. Would like to establish care with a new provider, as his previous surgeon, Dr. Oleary, is leaving.    Paroxysmal atrial fibrillation (H)  -Managed by cardiology. Frequent cardioversion. Today is stable.     Neuroforaminal stenosis of cervical spine  -Recently stitched from gabapentin to Lyrica on 6/30 for pain control. Reports improved pain control on this new medication regimen. Will continue to follow neurology, defers need for surgical consult at this time.     Atypical chest pain  -Patient had an self limited episode of bilateral chest pain and was seen in the ED on 6/12. Cardiac workup was unremarkable. Did not receive any medications in the ER. Other possible etiologies include pleuritic pain, MSK pain, and anxiety/panic. Patient denies any infection or difficulty breathing, making pleuritic pain less likely. Does report some increase stressors secondary to public speaking for his [employer], suggesting possible panic attack.     Skin lesion  -New raised crusted papular lesion below right eye. Will recommended follow up with dermatology due to proximity of lesion to eye. Has an appointment in Jan 2026, will send message to try and get in sooner. R/o actinic keratosis, nevus, skin cancer.  Advised he may need to be seen elsewhere for sooner appt.    Need for COVID-19 vaccine  -Vaccine received 7/14     - Hepatic panel (Albumin, ALT, AST, Bili, Alk Phos, TP); Future  MED REC REQUIRED  Post Medication Reconciliation Status:         Laureano Marie is a 75 year old, presenting for the following health issues:  Hospital F/U (Discuss liver function test./Referral for hand issue.)      7/14/2025     9:47 AM   Additional Questions  "  Roomed by KTR   Accompanied by Larissa(wife)     HPI   Episode of self limited bilateral chest pain 1 month ago.Was sitting at desk during onset of pain. Pain lasted over night and he was seen in the ER on 6/23. Pain resolved without any intervention and cardiac workup was unremarkable. Has had no new symptoms since. Denies any recent illness or difficulty breathing, but reports increase stress due to public speaking. Wife reports some people in their life has recently passed away.     Neck pain has improved since switching from gabapentin to Lyrica on 6/30. Feels better and says he wants to wait for surgical consultation.     Will follow up LFTs, which were mildly elevated in January. Hx of Hep B in 1969 and Hep C which seemed to have resolved per chart review.     Dupuytren contracture on left palm. Hx of previous surgeries but previous surgeon leaving, will send my chart message with new doctors for opinion for new provider.                   Objective    /71 (BP Location: Right arm, Patient Position: Sitting, Cuff Size: Adult Regular)   Pulse 58   Temp 97.4  F (36.3  C) (Temporal)   Resp 16   Ht 1.753 m (5' 9\")   Wt 76.4 kg (168 lb 6.4 oz)   SpO2 98%   BMI 24.87 kg/m    Body mass index is 24.87 kg/m .  Physical Exam   Constitutional: Alert, oriented, pleasant, no acute distress  Head: Normocephalic, atraumatic  Eyes: Extra-ocular movements intact, no scleral icterus  Cardiovascular: Regular rate and rhythm, no murmurs, rubs or gallops, peripheral pulses full/symmetric  Respiratory: Good air movement bilaterally, lungs clear, no wheezes/rales/rhonchi  GI: Abdomen soft, bowel sounds present, nondistended, nontender, liver not enlarged or tender, no rebound/guarding  Musculoskeletal: No edema, normal muscle tone, normal gait. Contraction of left palm  Neurologic: Alert and oriented, cranial nerves 2-12 intact, no focal deficits  Skin: Raised papular lesion with white crust below right " eye.  Psychiatric: normal mentation, affect and mood              Signed Electronically by: Yani Burnett MD    The longitudinal plan of care for the diagnosis(es)/condition(s) as documented were addressed during this visit. Due to the added complexity in care, I will continue to support Frank in the subsequent management and with ongoing continuity of care.

## 2025-07-14 NOTE — TELEPHONE ENCOUNTER
Request received from patient via My Chart to have his pregabalin (LYRICA) 50 MG capsule prescription sent to his mail order pharmacy, [facility], as they are unable to transfer the prescription from his [facility], need a new RX sent to mail order.     Medication: pregabalin (LYRICA) 50 MG capsule   Directions: Take 1 capsule (50 mg) by mouth at bedtime.      Last ordered: 6/30/25   Qty: 30  RF: 3  Last OV: 6/30/25  Next OV: 8/12/25    Routing to Amanda Carrion NP to review and sign if appropriate.    JOSE MANUEL Lazar RN Care Coordinator  [facility]   [facility] main phone # [phone] fax # [phone]

## 2025-07-14 NOTE — PROGRESS NOTES
Preventive Care Visit  [facility]  Yani Burnett MD, Internal Medicine  Jul 14, 2025  {Provider  Link to SmartSet :142451}    {PROVIDER CHARTING PREFERENCE:946074}    Laureano Marie is a 75 year old, presenting for the following:  Hospital F/U        7/14/2025     9:47 AM   Additional Questions   Roomed by KTR          HPI      Send Derm note***    {MA/LPN/RN Pre-Provider Visit Orders- hCG/UA/Strep (Optional):214795}  {SUPERLIST (Optional):142806}  {additonal problems for provider to add (Optional):444179}  Advance Care Planning  {The storyboard will display whether the patient has ACP docs on file. Hover over the Code section in the storyboard to access the ACP documents. :819589}  {(AWV REQUIRED) Advance Care Planning Reviewed:679039}        12/27/2024   General Health   How would you rate your overall physical health? Good   Feel stress (tense, anxious, or unable to sleep) Not at all         12/27/2024   Nutrition   Diet: Regular (no restrictions)         12/27/2024   Exercise   Days per week of moderate/strenous exercise 0 days   Average minutes spent exercising at this level 0 min         12/27/2024   Social Factors   Frequency of gathering with friends or relatives More than three times a week   Worry food won't last until get money to buy more No   Food not last or not have enough money for food? No   Do you have housing? (Housing is defined as stable permanent housing and does not include staying outside in a car, in a tent, in an abandoned building, in an overnight shelter, or couch-surfing.) Yes   Are you worried about losing your housing? No   Lack of transportation? No   Unable to get utilities (heat,electricity)? No         12/27/2024   Activities of Daily Living- Home Safety   Needs help with the following daily activites None of the above   Safety concerns in the home None of the above         12/27/2024   Dental   Dentist two times every year? Yes          2024   Hearing Screening   Hearing concerns? (!) IT'S HARDER TO UNDERSTAND WOMEN'S VOICES THAN MEN'S VOICES.           2024   Urinary Incontinence Screening   Bothered by leaking urine in past 6 months No       {Rooming Staff Patient needs a PHQ as part of the AWV.  Use this link to complete and then refresh the note to pull results Link to PHQ2 Assessment :251991}  {USE TO PULL IN PHQ RESULTS FOR TODAY:433510}      2024   Substance Use   Alcohol more than 3/day or more than 7/wk No   Do you have a current opioid prescription? No   How severe/bad is pain from 1 to 10? 2/10   Do you use any other substances recreationally? No     Social History     Tobacco Use    Smoking status: Former     Current packs/day: 0.00     Average packs/day: 0.5 packs/day for 10.0 years (5.0 ttl pk-yrs)     Types: Cigarettes     Start date: 6/15/1964     Quit date: 1974     Years since quittin.0    Smokeless tobacco: Former     Quit date: 1969   Vaping Use    Vaping status: Never Used   Substance Use Topics    Alcohol use: Yes     Alcohol/week: 3.0 - 4.0 standard drinks of alcohol     Comment: 4 or 5 drinks/week, limit of 1    Drug use: No     Comment: --history of marijuana     {Provider  If there are gaps in the social history shown above, please follow the link to update and then refresh the note Link to Social and Substance History :596594}  ASCVD Risk   The 10-year ASCVD risk score (Elizabeth DK, et al., 2019) is: 18.4%    Values used to calculate the score:      Age: 75 years      Sex: Male      Is Non- : No      Diabetic: No      Tobacco smoker: No      Systolic Blood Pressure: 111 mmHg      Is BP treated: No      HDL Cholesterol: 70 mg/dL      Total Cholesterol: 174 mg/dL    {Link to Fracture Risk Assessment Tool (Optional):868543}    {Provider  REQUIRED FOR AWV Use the storyboard to review patient history, after sections have been marked as reviewed,  refresh note to capture documentation:791576}  {Provider   REQUIRED AWV use this link to review and update sexual activity history  after section has been marked as reviewed, refresh note to capture documentation:938687}  Reviewed and updated as needed this visit by Provider                    {HISTORY OPTIONS (Optional):490237}  Current providers sharing in care for this patient include:  Patient Care Team:  Yani Burnett MD as PCP - General (Internal Medicine)  Artur Grullon MD as MD (Family Practice)  Jovanni Felipe MD as MD (Clinical Cardiac Electrophysiology)  Suresh Raymond MD as MD (Surgery)  Lars Oleary MD as MD (Hand Surgery)  Tray Hussein MD as MD (Interventional Cardiology)  Meggan Brar RN as Specialty Care Coordinator (Cardiology)  Roldan De La O MD as MD (Urology)  Vanessa Edwards MD as Referring Physician (Internal Medicine)  Yani Burnett MD as MD (Internal Medicine)  Yani Burnett MD as Referring Physician (Internal Medicine)  Karina Lopez PA as Physician Assistant (Urology)  Yamilka Thomas APRN CNP as Nurse Practitioner (Cardiovascular Disease)  Yamilka Thomas APRN CNP as Assigned Heart and Vascular Provider  Lucinda Mccain MD as MD (Ophthalmology)  Lucinda Mccain MD as Assigned Surgical Provider  Yani Burnett MD as Assigned PCP  Emanuel Pantoja MD as MD (Dermatology)    The following health maintenance items are reviewed in Epic and correct as of today:  Health Maintenance   Topic Date Due    URINE DRUG SCREEN  Never done    ANNUAL REVIEW OF HM ORDERS  Never done    COVID-19 VACCINE (9 - 2024-25 season) 04/24/2025    INFLUENZA VACCINE (1) 09/01/2025    MEDICARE ANNUAL WELLNESS VISIT  12/27/2025    FALL RISK ASSESSMENT  12/27/2025    DIABETES SCREENING  06/12/2028    DTAP/TDAP/TD VACCINE (3 - Td or Tdap) 05/02/2029    LIPID  12/27/2029    ADVANCE CARE PLANNING  12/27/2029    HEPATITIS C  "SCREENING  Completed    PHQ-2 (once per calendar year)  Completed    PNEUMOCOCCAL VACCINE 50+ YEARS  Completed    ZOSTER VACCINE  Completed    RSV VACCINE  Completed    HPV VACCINE  Aged Out    MENINGITIS VACCINE  Aged Out    COLORECTAL CANCER SCREENING  Discontinued       {ROS Picklists (Optional):506288}     Objective    Exam  There were no vitals taken for this visit.   Estimated body mass index is 24.66 kg/m  as calculated from the following:    Height as of 3/17/25: 1.753 m (5' 9\").    Weight as of 5/19/25: 75.8 kg (167 lb).    Physical Exam  {Exam Choices (Optional):849273}  {Provider  The Mini-Cog is incomplete, use link to complete and refresh note Link to Mini-Cog :271694}      12/27/2024   Mini Cog   Clock Draw Score 2 Normal   3 Item Recall 3 objects recalled   Mini Cog Total Score 5     {A Mini-Cog total score of 0-2 suggests the possibility of dementia, score of 3-5 suggests no dementia:975877}         Signed Electronically by: Yani Burnett MD  {Email feedback regarding this note to [email or web address]   :644520}Frank is a 75 year old that presents in clinic today for the following:     Chief Complaint   Patient presents with    Hospital F/U           7/14/2025     9:47 AM   Additional Questions   Roomed by KTR     Screenings from encounters over the past 10 days    No data recorded       Noreen Ritter at 9:51 AM on 7/14/2025    "

## 2025-07-15 ENCOUNTER — MYC MEDICAL ADVICE (OUTPATIENT)
Dept: INTERNAL MEDICINE | Facility: CLINIC | Age: 75
End: 2025-07-15
Payer: COMMERCIAL

## 2025-07-15 DIAGNOSIS — M72.0 DUPUYTREN'S CONTRACTURE OF BOTH HANDS: Primary | ICD-10-CM

## 2025-07-15 DIAGNOSIS — G44.86 CERVICOGENIC HEADACHE: ICD-10-CM

## 2025-07-15 DIAGNOSIS — M47.812 CERVICAL SPONDYLOSIS WITHOUT MYELOPATHY: ICD-10-CM

## 2025-07-15 DIAGNOSIS — M54.9 UPPER BACK PAIN: ICD-10-CM

## 2025-07-15 DIAGNOSIS — R20.0 NUMBNESS: ICD-10-CM

## 2025-07-15 DIAGNOSIS — M54.2 NECK PAIN: ICD-10-CM

## 2025-07-15 DIAGNOSIS — M54.12 CERVICAL RADICULOPATHY: ICD-10-CM

## 2025-07-15 DIAGNOSIS — M54.12 CERVICAL RADICULOPATHY: Primary | ICD-10-CM

## 2025-07-15 DIAGNOSIS — M48.02 NEUROFORAMINAL STENOSIS OF CERVICAL SPINE: ICD-10-CM

## 2025-07-15 RX ORDER — PREGABALIN 50 MG/1
50 CAPSULE ORAL AT BEDTIME
Qty: 120 CAPSULE | Refills: 0 | Status: SHIPPED | OUTPATIENT
Start: 2025-08-01

## 2025-07-15 RX ORDER — PREGABALIN 50 MG/1
50 CAPSULE ORAL AT BEDTIME
Qty: 30 CAPSULE | Refills: 0 | Status: SHIPPED | OUTPATIENT
Start: 2025-07-15

## 2025-07-15 NOTE — TELEPHONE ENCOUNTER
[facility] Call Center    Phone Message    May a detailed message be left on voicemail: yes     Reason for Call: Medication Question or concern regarding medication   Prescription Clarification  Name of Medication: pregabalin (LYRICA) 50 MG capsule      Prescribing Provider: Amanda Carrion   Pharmacy:   [facility] - [place], [place] [facility] [address] [facility] [facility]         What on the order needs clarification? Pt called in with Brianna from [facility] ; insurance wasn't able to establish a vacation override due to the type of medication. Insurance + pharmacy indicate that medication needs to be sent in as a new Rx to allow release. Please review and submit new Rx as soon as able, pt needs this week to allow  before departure on Monday 7/21.       Action Taken: Other: [facility] PMR     Travel Screening: Not Applicable     Date of Service:

## 2025-07-22 ENCOUNTER — PATIENT OUTREACH (OUTPATIENT)
Dept: CARE COORDINATION | Facility: CLINIC | Age: 75
End: 2025-07-22
Payer: COMMERCIAL

## 2025-07-24 ENCOUNTER — PATIENT OUTREACH (OUTPATIENT)
Dept: CARE COORDINATION | Facility: CLINIC | Age: 75
End: 2025-07-24
Payer: COMMERCIAL

## 2025-08-12 ENCOUNTER — OFFICE VISIT (OUTPATIENT)
Dept: PHYSICAL MEDICINE AND REHAB | Facility: CLINIC | Age: 75
End: 2025-08-12
Payer: COMMERCIAL

## 2025-08-12 VITALS — HEART RATE: 59 BPM | OXYGEN SATURATION: 97 % | DIASTOLIC BLOOD PRESSURE: 71 MMHG | SYSTOLIC BLOOD PRESSURE: 117 MMHG

## 2025-08-12 DIAGNOSIS — M47.812 CERVICAL SPONDYLOSIS WITHOUT MYELOPATHY: Primary | ICD-10-CM

## 2025-08-12 PROCEDURE — 99213 OFFICE O/P EST LOW 20 MIN: CPT | Performed by: NURSE PRACTITIONER

## 2025-08-12 PROCEDURE — 3074F SYST BP LT 130 MM HG: CPT | Performed by: NURSE PRACTITIONER

## 2025-08-12 PROCEDURE — 3078F DIAST BP <80 MM HG: CPT | Performed by: NURSE PRACTITIONER

## 2025-08-15 ENCOUNTER — PRE VISIT (OUTPATIENT)
Dept: ORTHOPEDICS | Facility: CLINIC | Age: 75
End: 2025-08-15

## 2025-08-18 ENCOUNTER — TELEPHONE (OUTPATIENT)
Dept: ORTHOPEDICS | Facility: CLINIC | Age: 75
End: 2025-08-18
Payer: COMMERCIAL

## 2025-08-18 ENCOUNTER — HOSPITAL ENCOUNTER (OUTPATIENT)
Facility: AMBULATORY SURGERY CENTER | Age: 75
End: 2025-08-18
Attending: STUDENT IN AN ORGANIZED HEALTH CARE EDUCATION/TRAINING PROGRAM | Admitting: STUDENT IN AN ORGANIZED HEALTH CARE EDUCATION/TRAINING PROGRAM
Payer: COMMERCIAL

## 2025-08-18 DIAGNOSIS — M72.0 DUPUYTREN'S CONTRACTURE OF LEFT HAND: Primary | ICD-10-CM

## 2025-08-20 ENCOUNTER — OFFICE VISIT (OUTPATIENT)
Dept: DERMATOLOGY | Facility: CLINIC | Age: 75
End: 2025-08-20
Attending: INTERNAL MEDICINE
Payer: COMMERCIAL

## 2025-08-20 DIAGNOSIS — L98.9 SKIN LESION: ICD-10-CM

## 2025-08-20 DIAGNOSIS — L57.8 ACTINIC SKIN DAMAGE: ICD-10-CM

## 2025-08-20 DIAGNOSIS — D22.9 MULTIPLE BENIGN NEVI: ICD-10-CM

## 2025-08-20 DIAGNOSIS — L82.0 INFLAMED SEBORRHEIC KERATOSIS: Primary | ICD-10-CM

## 2025-08-20 DIAGNOSIS — L81.4 LENTIGINES: ICD-10-CM

## 2025-08-20 PROCEDURE — 17110 DESTRUCTION B9 LES UP TO 14: CPT | Performed by: STUDENT IN AN ORGANIZED HEALTH CARE EDUCATION/TRAINING PROGRAM

## 2025-08-20 PROCEDURE — 99213 OFFICE O/P EST LOW 20 MIN: CPT | Mod: 25 | Performed by: STUDENT IN AN ORGANIZED HEALTH CARE EDUCATION/TRAINING PROGRAM

## 2025-08-26 ENCOUNTER — OFFICE VISIT (OUTPATIENT)
Dept: FAMILY MEDICINE | Facility: CLINIC | Age: 75
End: 2025-08-26
Payer: COMMERCIAL

## 2025-08-26 VITALS
RESPIRATION RATE: 18 BRPM | SYSTOLIC BLOOD PRESSURE: 114 MMHG | OXYGEN SATURATION: 98 % | HEART RATE: 55 BPM | TEMPERATURE: 97.9 F | BODY MASS INDEX: 25.49 KG/M2 | HEIGHT: 69 IN | DIASTOLIC BLOOD PRESSURE: 58 MMHG | WEIGHT: 172.1 LBS

## 2025-08-26 DIAGNOSIS — M72.0 DUPUYTREN'S CONTRACTURE OF LEFT HAND: ICD-10-CM

## 2025-08-26 DIAGNOSIS — I48.0 PAROXYSMAL ATRIAL FIBRILLATION (H): ICD-10-CM

## 2025-08-26 DIAGNOSIS — Z01.818 PREOP GENERAL PHYSICAL EXAM: Primary | ICD-10-CM

## 2025-08-26 DIAGNOSIS — N40.1 BPH WITH URINARY OBSTRUCTION: ICD-10-CM

## 2025-08-26 DIAGNOSIS — G47.33 OSA (OBSTRUCTIVE SLEEP APNEA): ICD-10-CM

## 2025-08-26 DIAGNOSIS — Z78.9 OTHER SPECIFIED HEALTH STATUS: ICD-10-CM

## 2025-08-26 DIAGNOSIS — N13.8 BPH WITH URINARY OBSTRUCTION: ICD-10-CM

## 2025-08-26 LAB
ANION GAP SERPL CALCULATED.3IONS-SCNC: 9 MMOL/L (ref 7–15)
BUN SERPL-MCNC: 14.7 MG/DL (ref 8–23)
CALCIUM SERPL-MCNC: 9.5 MG/DL (ref 8.8–10.4)
CHLORIDE SERPL-SCNC: 106 MMOL/L (ref 98–107)
CREAT SERPL-MCNC: 0.94 MG/DL (ref 0.67–1.17)
EGFRCR SERPLBLD CKD-EPI 2021: 84 ML/MIN/1.73M2
ERYTHROCYTE [DISTWIDTH] IN BLOOD BY AUTOMATED COUNT: 12.5 % (ref 10–15)
GLUCOSE SERPL-MCNC: 91 MG/DL (ref 70–99)
HCO3 SERPL-SCNC: 26 MMOL/L (ref 22–29)
HCT VFR BLD AUTO: 38.8 % (ref 40–53)
HGB BLD-MCNC: 13 G/DL (ref 13.3–17.7)
MCH RBC QN AUTO: 31.6 PG (ref 26.5–33)
MCHC RBC AUTO-ENTMCNC: 33.5 G/DL (ref 31.5–36.5)
MCV RBC AUTO: 94.2 FL (ref 78–100)
PLATELET # BLD AUTO: 177 10E3/UL (ref 150–450)
POTASSIUM SERPL-SCNC: 4.2 MMOL/L (ref 3.4–5.3)
RBC # BLD AUTO: 4.12 10E6/UL (ref 4.4–5.9)
SODIUM SERPL-SCNC: 141 MMOL/L (ref 135–145)
WBC # BLD AUTO: 5.97 10E3/UL (ref 4–11)

## 2025-08-26 PROCEDURE — 99214 OFFICE O/P EST MOD 30 MIN: CPT

## 2025-08-26 PROCEDURE — 85027 COMPLETE CBC AUTOMATED: CPT

## 2025-08-26 PROCEDURE — 80048 BASIC METABOLIC PNL TOTAL CA: CPT

## 2025-08-26 PROCEDURE — 1125F AMNT PAIN NOTED PAIN PRSNT: CPT

## 2025-08-26 PROCEDURE — 36415 COLL VENOUS BLD VENIPUNCTURE: CPT

## 2025-08-26 PROCEDURE — 3078F DIAST BP <80 MM HG: CPT

## 2025-08-26 PROCEDURE — 3074F SYST BP LT 130 MM HG: CPT

## 2025-08-26 ASSESSMENT — PAIN SCALES - GENERAL: PAINLEVEL_OUTOF10: MILD PAIN (1)

## 2025-09-01 RX ORDER — CEFAZOLIN SODIUM 2 G/50ML
2 SOLUTION INTRAVENOUS
OUTPATIENT
Start: 2025-09-01

## 2025-09-01 RX ORDER — CEFAZOLIN SODIUM 2 G/50ML
2 SOLUTION INTRAVENOUS SEE ADMIN INSTRUCTIONS
OUTPATIENT
Start: 2025-09-01

## 2025-09-04 RX ORDER — ACETAMINOPHEN 325 MG/1
975 TABLET ORAL ONCE
OUTPATIENT
Start: 2025-09-04 | End: 2025-09-04

## 2025-09-04 RX ORDER — LIDOCAINE 40 MG/G
CREAM TOPICAL
OUTPATIENT
Start: 2025-09-04

## 2025-09-04 RX ORDER — NALOXONE HYDROCHLORIDE 0.4 MG/ML
0.1 INJECTION, SOLUTION INTRAMUSCULAR; INTRAVENOUS; SUBCUTANEOUS
OUTPATIENT
Start: 2025-09-04

## 2025-09-04 RX ORDER — DEXAMETHASONE SODIUM PHOSPHATE 10 MG/ML
4 INJECTION, SOLUTION INTRAMUSCULAR; INTRAVENOUS
OUTPATIENT
Start: 2025-09-04

## 2025-09-04 RX ORDER — SODIUM CHLORIDE, SODIUM LACTATE, POTASSIUM CHLORIDE, CALCIUM CHLORIDE 600; 310; 30; 20 MG/100ML; MG/100ML; MG/100ML; MG/100ML
INJECTION, SOLUTION INTRAVENOUS CONTINUOUS
OUTPATIENT
Start: 2025-09-04

## 2025-09-04 RX ORDER — OXYCODONE HYDROCHLORIDE 5 MG/1
10 TABLET ORAL
Refills: 0 | OUTPATIENT
Start: 2025-09-04

## 2025-09-04 RX ORDER — ONDANSETRON 4 MG/1
4 TABLET, ORALLY DISINTEGRATING ORAL EVERY 30 MIN PRN
OUTPATIENT
Start: 2025-09-04

## 2025-09-04 RX ORDER — OXYCODONE HYDROCHLORIDE 5 MG/1
5 TABLET ORAL
Refills: 0 | OUTPATIENT
Start: 2025-09-04

## 2025-09-04 RX ORDER — ONDANSETRON 2 MG/ML
4 INJECTION INTRAMUSCULAR; INTRAVENOUS EVERY 30 MIN PRN
OUTPATIENT
Start: 2025-09-04

## (undated) DEVICE — LINEN ORTHO PACK 5446

## (undated) DEVICE — PREP SKIN SCRUB TRAY 4461A

## (undated) DEVICE — PREP CHLORAPREP 26ML TINTED ORANGE  260815

## (undated) DEVICE — SYR 50ML CATH TIP W/O NDL 309620

## (undated) DEVICE — TRAY PAIN INJECTION 97A 640

## (undated) DEVICE — SUPPORTER ATHLETIC LG LATEX 202636

## (undated) DEVICE — PACK MINOR CUSTOM ASC

## (undated) DEVICE — DRSG GAUZE 2X2" 8042

## (undated) DEVICE — SU VICRYL 2-0 CT-2 27" J333H

## (undated) DEVICE — NDL 27GA 1.25" 305136

## (undated) DEVICE — DRAPE STOCKINETTE 4" 8544

## (undated) DEVICE — CATH FOLEY 3WAY 24FR 30ML SIL LUBRISIL IC 73024SI

## (undated) DEVICE — TUBING FLUID WARMER RANGER 24750

## (undated) DEVICE — GLOVE BIOGEL PI SZ 8.0 40880

## (undated) DEVICE — ESU ELEC OLYMPUS PLASMA BIPOLAR LOOP LG 12/16DEG WA22603S

## (undated) DEVICE — PACK HAND CUSTOM ASC

## (undated) DEVICE — SUCTION MANIFOLD DORNOCH ULTRA CART UL-CL500

## (undated) DEVICE — SET IV EXT 8IN MICRO POWER INJ 7N8300

## (undated) DEVICE — SOL WATER IRRIG 500ML BOTTLE 2F7113

## (undated) DEVICE — SYR 07ML EPIDURAL LOSS OF RESISTANCE PULSATOR 4905

## (undated) DEVICE — LINEN TOWEL PACK X5 5464

## (undated) DEVICE — EVACUATOR BLADDER UROVAC LATEX M0067301250

## (undated) DEVICE — SOL NACL 0.9% IRRIG 500ML BOTTLE 2F7123

## (undated) DEVICE — TAPE MEDIPORE 4"X2YD 2864

## (undated) DEVICE — PREP CHLORAPREP W/ORANGE TINT 10.5ML 260715

## (undated) DEVICE — STRAP KNEE/BODY 31143004

## (undated) DEVICE — SOL NACL 0.9% IRRIG 1000ML BOTTLE 2F7124

## (undated) DEVICE — PAD CHUX UNDERPAD 30X36" P3036C

## (undated) DEVICE — SU VICRYL 3-0 SH 27" J316H

## (undated) DEVICE — DRSG STERI STRIP 1/2X4" R1547

## (undated) DEVICE — GLOVE BIOGEL PI ULTRATOUCH G SZ 7.0 42170

## (undated) DEVICE — NDL ECLIPSE 22GA 1.5"

## (undated) DEVICE — SOL WATER IRRIG 1000ML BOTTLE 2F7114

## (undated) DEVICE — COVER CAMERA IN-LIGHT DISP LT-C02

## (undated) DEVICE — BLADE KNIFE SURG 10 371110

## (undated) DEVICE — BLADE KNIFE BEAVER MINI BEAVER6700

## (undated) DEVICE — NDL EPIDURAL TUOHY 20GA 3.5" 405028

## (undated) DEVICE — PREP CHLORHEXIDINE 4% 4OZ (HIBICLENS) 57504

## (undated) DEVICE — DRAPE STERI TOWEL LG 1010

## (undated) DEVICE — ESU HOLSTER PLASTIC DISP E2400

## (undated) DEVICE — DRAIN PENROSE 0.25"X18" LATEX FREE GR201

## (undated) DEVICE — BAG URINARY DRAIN LUBRISIL IC 4000ML LF 253509A

## (undated) DEVICE — SU MONOCRYL 5-0 P-3 18" UND Y493G

## (undated) DEVICE — GLOVE PROTEXIS W/NEU-THERA 7.5  2D73TE75

## (undated) DEVICE — SUCTION MANIFOLD NEPTUNE 2 SYS 4 PORT 0702-020-000

## (undated) DEVICE — SU MONOCRYL 4-0 PS-2 27" UND Y426H

## (undated) DEVICE — DRSG GAUZE 4X4" 3033

## (undated) DEVICE — LINEN GOWN XLG 5407

## (undated) DEVICE — BLADE KNIFE SURG 15 371115

## (undated) DEVICE — GLOVE PROTEXIS POWDER FREE 8.0 ORTHOPEDIC 2D73ET80

## (undated) DEVICE — IMM ALUMI HAND LG 760

## (undated) DEVICE — GOWN XLG DISP 9545

## (undated) DEVICE — LINEN GOWN X4 5410

## (undated) DEVICE — SOL NACL 0.9% IRRIG 3000ML BAG 2B7477

## (undated) DEVICE — Device

## (undated) DEVICE — DRSG PRIMAPORE 02X3" 7133

## (undated) DEVICE — PEN MARKING SKIN W/PAPER RULER 31145785

## (undated) DEVICE — DRAPE LAP W/ARMBOARD 29410

## (undated) DEVICE — ESU GROUND PAD ADULT W/CORD E7507

## (undated) DEVICE — CAST PADDING 4" COTTON WEBRIL UNSTERILE 9084

## (undated) DEVICE — GOWN IMPERVIOUS SPECIALTY XLG/XLONG 32474

## (undated) RX ORDER — EPHEDRINE SULFATE 50 MG/ML
INJECTION, SOLUTION INTRAMUSCULAR; INTRAVENOUS; SUBCUTANEOUS
Status: DISPENSED
Start: 2020-08-21

## (undated) RX ORDER — ONDANSETRON 2 MG/ML
INJECTION INTRAMUSCULAR; INTRAVENOUS
Status: DISPENSED
Start: 2020-08-21

## (undated) RX ORDER — FENTANYL CITRATE 50 UG/ML
INJECTION, SOLUTION INTRAMUSCULAR; INTRAVENOUS
Status: DISPENSED
Start: 2023-08-09

## (undated) RX ORDER — OXYCODONE HYDROCHLORIDE 5 MG/1
TABLET ORAL
Status: DISPENSED
Start: 2017-10-26

## (undated) RX ORDER — GABAPENTIN 300 MG/1
CAPSULE ORAL
Status: DISPENSED
Start: 2017-12-15

## (undated) RX ORDER — DEXAMETHASONE SODIUM PHOSPHATE 4 MG/ML
INJECTION, SOLUTION INTRA-ARTICULAR; INTRALESIONAL; INTRAMUSCULAR; INTRAVENOUS; SOFT TISSUE
Status: DISPENSED
Start: 2017-10-26

## (undated) RX ORDER — BUPIVACAINE HYDROCHLORIDE 5 MG/ML
INJECTION, SOLUTION EPIDURAL; INTRACAUDAL
Status: DISPENSED
Start: 2017-12-15

## (undated) RX ORDER — SODIUM CHLORIDE 9 MG/ML
INJECTION, SOLUTION INTRAVENOUS
Status: DISPENSED
Start: 2020-08-21

## (undated) RX ORDER — PROPOFOL 10 MG/ML
INJECTION, EMULSION INTRAVENOUS
Status: DISPENSED
Start: 2017-10-26

## (undated) RX ORDER — ACETAMINOPHEN 325 MG/1
TABLET ORAL
Status: DISPENSED
Start: 2017-10-26

## (undated) RX ORDER — GLYCOPYRROLATE 0.2 MG/ML
INJECTION, SOLUTION INTRAMUSCULAR; INTRAVENOUS
Status: DISPENSED
Start: 2020-08-21

## (undated) RX ORDER — LIDOCAINE HYDROCHLORIDE AND EPINEPHRINE 10; 10 MG/ML; UG/ML
INJECTION, SOLUTION INFILTRATION; PERINEURAL
Status: DISPENSED
Start: 2023-08-09

## (undated) RX ORDER — FENTANYL CITRATE 50 UG/ML
INJECTION, SOLUTION INTRAMUSCULAR; INTRAVENOUS
Status: DISPENSED
Start: 2020-08-21

## (undated) RX ORDER — CEFAZOLIN SODIUM 2 G/50ML
SOLUTION INTRAVENOUS
Status: DISPENSED
Start: 2023-08-09

## (undated) RX ORDER — ONDANSETRON 2 MG/ML
INJECTION INTRAMUSCULAR; INTRAVENOUS
Status: DISPENSED
Start: 2017-10-26

## (undated) RX ORDER — FENTANYL CITRATE 50 UG/ML
INJECTION, SOLUTION INTRAMUSCULAR; INTRAVENOUS
Status: DISPENSED
Start: 2017-12-15

## (undated) RX ORDER — PROPOFOL 10 MG/ML
INJECTION, EMULSION INTRAVENOUS
Status: DISPENSED
Start: 2020-08-21

## (undated) RX ORDER — KETOROLAC TROMETHAMINE 30 MG/ML
INJECTION, SOLUTION INTRAMUSCULAR; INTRAVENOUS
Status: DISPENSED
Start: 2017-10-26

## (undated) RX ORDER — LIDOCAINE HYDROCHLORIDE 20 MG/ML
INJECTION, SOLUTION EPIDURAL; INFILTRATION; INTRACAUDAL; PERINEURAL
Status: DISPENSED
Start: 2017-10-26

## (undated) RX ORDER — REGADENOSON 0.08 MG/ML
INJECTION, SOLUTION INTRAVENOUS
Status: DISPENSED
Start: 2018-01-08

## (undated) RX ORDER — ACETAMINOPHEN 325 MG/1
TABLET ORAL
Status: DISPENSED
Start: 2017-12-15

## (undated) RX ORDER — LIDOCAINE HYDROCHLORIDE 10 MG/ML
INJECTION, SOLUTION EPIDURAL; INFILTRATION; INTRACAUDAL; PERINEURAL
Status: DISPENSED
Start: 2017-12-15

## (undated) RX ORDER — ATROPA BELLADONNA AND OPIUM 16.2; 3 MG/1; MG/1
SUPPOSITORY RECTAL
Status: DISPENSED
Start: 2020-08-21

## (undated) RX ORDER — ACETAMINOPHEN 325 MG/1
TABLET ORAL
Status: DISPENSED
Start: 2023-08-09

## (undated) RX ORDER — FENTANYL CITRATE 50 UG/ML
INJECTION, SOLUTION INTRAMUSCULAR; INTRAVENOUS
Status: DISPENSED
Start: 2023-06-26

## (undated) RX ORDER — LIDOCAINE HYDROCHLORIDE 20 MG/ML
JELLY TOPICAL
Status: DISPENSED
Start: 2020-06-26

## (undated) RX ORDER — GABAPENTIN 100 MG/1
CAPSULE ORAL
Status: DISPENSED
Start: 2017-10-26

## (undated) RX ORDER — BUPIVACAINE HYDROCHLORIDE 2.5 MG/ML
INJECTION, SOLUTION EPIDURAL; INFILTRATION; INTRACAUDAL
Status: DISPENSED
Start: 2017-10-26

## (undated) RX ORDER — REGADENOSON 0.08 MG/ML
INJECTION, SOLUTION INTRAVENOUS
Status: DISPENSED
Start: 2022-10-03

## (undated) RX ORDER — DEXAMETHASONE SODIUM PHOSPHATE 4 MG/ML
INJECTION, SOLUTION INTRA-ARTICULAR; INTRALESIONAL; INTRAMUSCULAR; INTRAVENOUS; SOFT TISSUE
Status: DISPENSED
Start: 2020-08-21

## (undated) RX ORDER — FENTANYL CITRATE 50 UG/ML
INJECTION, SOLUTION INTRAMUSCULAR; INTRAVENOUS
Status: DISPENSED
Start: 2018-01-15